# Patient Record
Sex: FEMALE | Race: WHITE | NOT HISPANIC OR LATINO | Employment: FULL TIME | URBAN - METROPOLITAN AREA
[De-identification: names, ages, dates, MRNs, and addresses within clinical notes are randomized per-mention and may not be internally consistent; named-entity substitution may affect disease eponyms.]

---

## 2017-01-03 ENCOUNTER — HOSPITAL ENCOUNTER (OUTPATIENT)
Facility: CLINIC | Age: 52
End: 2017-01-03
Attending: INTERNAL MEDICINE | Admitting: INTERNAL MEDICINE

## 2017-02-22 ENCOUNTER — OFFICE VISIT (OUTPATIENT)
Dept: FAMILY MEDICINE | Facility: CLINIC | Age: 52
End: 2017-02-22
Payer: COMMERCIAL

## 2017-02-22 VITALS
WEIGHT: 266 LBS | HEART RATE: 66 BPM | HEIGHT: 65 IN | BODY MASS INDEX: 44.32 KG/M2 | TEMPERATURE: 98.1 F | DIASTOLIC BLOOD PRESSURE: 70 MMHG | SYSTOLIC BLOOD PRESSURE: 112 MMHG

## 2017-02-22 DIAGNOSIS — M54.41 ACUTE RIGHT-SIDED LOW BACK PAIN WITH RIGHT-SIDED SCIATICA: Primary | ICD-10-CM

## 2017-02-22 PROCEDURE — 99213 OFFICE O/P EST LOW 20 MIN: CPT | Performed by: PHYSICIAN ASSISTANT

## 2017-02-22 RX ORDER — METHYLPREDNISOLONE 4 MG
TABLET, DOSE PACK ORAL
Qty: 21 TABLET | Refills: 0 | Status: SHIPPED | OUTPATIENT
Start: 2017-02-22 | End: 2017-03-02

## 2017-02-22 ASSESSMENT — PAIN SCALES - GENERAL: PAINLEVEL: SEVERE PAIN (6)

## 2017-02-22 NOTE — PROGRESS NOTES
SUBJECTIVE:                                                    Sunshine Delgado is a 52 year old female who presents to clinic today for the following health issues:      Joint Pain     Onset: 3 weeks ago    Description:   Location: right hip and going down to leg  Character: Sharp and shooting pain    Intensity: 6/10    Progression of Symptoms: same    Accompanying Signs & Symptoms:  Other symptoms: radiation of pain to down right leg, numbness in right foot and leg and swelling of right knee   History:   Previous similar pain: no       Precipitating factors:   Trauma or overuse: no     Alleviating factors:  Improved by: Tylenol and NSAID - Naproxen       Therapies Tried and outcome: none other than above      Problem list and histories reviewed & adjusted, as indicated.  Additional history: as documented    Patient Active Problem List   Diagnosis     Generalized anxiety disorder     CARDIOVASCULAR SCREENING; LDL GOAL LESS THAN 160     MARIA GUADALUPE (obstructive sleep apnea)- severe (AHI 84)     Elevated LFTs     Morbid obesity (H)     Left ovarian cyst     Health Care Home     Moderate major depression (H)     Insomnia     Past Surgical History   Procedure Laterality Date     Hysterectomy, vaginal       still has ovaries     Colonoscopy  2000       Social History   Substance Use Topics     Smoking status: Never Smoker     Smokeless tobacco: Never Used     Alcohol use Yes      Comment: 1-2 per mo     Family History   Problem Relation Age of Onset     Eye Disorder Mother      lost eyesight; probable macular degeneration     Psychotic Disorder Mother      Dementia /Alzhimers     Neurologic Disorder Mother      seizures     DIABETES Father      Psychotic Disorder Sister      bipolar     Thyroid Disease Sister      h/o thyroid cancer     CANCER Other      Brain cancer     Hypertension No family hx of          Current Outpatient Prescriptions   Medication Sig Dispense Refill     citalopram (CELEXA) 40 MG tablet Take 1 tablet (40  "mg) by mouth daily 90 tablet 1     traZODone (DESYREL) 100 MG tablet Take 1.5 tablets (150 mg) by mouth nightly as needed for sleep 135 tablet 1     order for DME Resmed Airsense 10 auto cpap 6-7 cm, Airfit P10 for her XS pillows       Acetaminophen (TYLENOL PO) Take 325 mg by mouth As needed       Allergies   Allergen Reactions     Contrast Dye Other (See Comments)     Patient had sneezing and an itchy throat following contrast injection of 100 mL Isovue-370.     Sulfa Drugs Hives     Vicodin [Hydrocodone-Acetaminophen]      Wasps [Hornets] Swelling     Now carries Epi Pen       ROS:  Constitutional, HEENT, cardiovascular, pulmonary, GI, , musculoskeletal, neuro, skin, endocrine and psych systems are negative, except as otherwise noted.    OBJECTIVE:                                                    /70 (BP Location: Right arm, Cuff Size: Adult Large)  Pulse 66  Temp 98.1  F (36.7  C) (Oral)  Ht 5' 5\" (1.651 m)  Wt 266 lb (120.7 kg)  BMI 44.26 kg/m2  Body mass index is 44.26 kg/(m^2).  GENERAL: healthy, alert and no distress  NECK: no adenopathy, no asymmetry, masses, or scars and thyroid normal to palpation  RESP: lungs clear to auscultation - no rales, rhonchi or wheezes  CV: regular rate and rhythm, normal S1 S2, no S3 or S4, no murmur, click or rub, no peripheral edema and peripheral pulses strong  MS: RUE exam shows normal strength and muscle mass, ROM of all joints is normal and TTP of right buttock.     Diagnostic Test Results:  none      ASSESSMENT/PLAN:                                                    1. Acute right-sided low back pain with right-sided sciatica  Continue with stretches and ortho follow up.  - methylPREDNISolone (MEDROL DOSEPAK) 4 MG tablet; Follow package instructions  Dispense: 21 tablet; Refill: 0  - ORTHO  REFERRAL  I have discussed any lab or imaging results, the patient's diagnosis, and my plan of treatment with the patient and/or family. Patient is aware to come " back in with worsening symptoms or if no relief despite treatment plan.  Patient voiced understanding and had no further questions.     Claudia Kim PA-C  [facility]

## 2017-02-22 NOTE — NURSING NOTE
"Chief Complaint   Patient presents with     Musculoskeletal Problem     hip and right leg pain       Initial /70 (BP Location: Right arm, Cuff Size: Adult Large)  Pulse 66  Temp 98.1  F (36.7  C) (Oral)  Ht 5' 5\" (1.651 m)  Wt 266 lb (120.7 kg)  BMI 44.26 kg/m2 Estimated body mass index is 44.26 kg/(m^2) as calculated from the following:    Height as of this encounter: 5' 5\" (1.651 m).    Weight as of this encounter: 266 lb (120.7 kg).  Medication Reconciliation: complete     Amira PHILIP, Certified Medical Assistant (AAMA)February 22, 2017 11:04 AM      "

## 2017-02-22 NOTE — PATIENT INSTRUCTIONS
Possible Causes of Low Back or Leg Pain    The symptoms in your back or leg may be due to pressure on a nerve. This pressure may be caused by a damaged disk or by abnormal bone growth. Either way, you may feel pain, burning, tingling, or numbness. If you have pressure on a nerve that connects to the sciatic nerve, pain may shoot down your leg.    Pressure from the disk  Constant wear and tear can weaken a disk over time and cause back pain. The disk can then be damaged by a sudden movement or injury. If its soft center begins to bulge, the disk may press on a nerve. Or the outside of the disk may tear, and the soft center may squeeze through and pinch a nerve.    Pressure from bone  As a disk wears out, the vertebrae right above and below the disk begin to touch. This can put pressure on a nerve. Often, abnormal bone (called bone spurs) grows where the vertebrae rub against each other. This can cause the foramen or the spinal canal to narrow (called stenosis) and press against a nerve.    8765-9812 [employer]. [address] [place] [address], [place] [address]. All rights reserved. This information is not intended as a substitute for professional medical care. Always follow your healthcare professional's instructions.

## 2017-02-22 NOTE — MR AVS SNAPSHOT
After Visit Summary   2/22/2017    Sunshine Delgado    MRN: [id number]           Patient Information     Date Of Birth          1965        Visit Information        Provider Department      2/22/2017 11:00 AM Claudia Kim PA-C [facility]        Today's Diagnoses     Acute right-sided low back pain with right-sided sciatica    -  1      Care Instructions      Possible Causes of Low Back or Leg Pain    The symptoms in your back or leg may be due to pressure on a nerve. This pressure may be caused by a damaged disk or by abnormal bone growth. Either way, you may feel pain, burning, tingling, or numbness. If you have pressure on a nerve that connects to the sciatic nerve, pain may shoot down your leg.    Pressure from the disk  Constant wear and tear can weaken a disk over time and cause back pain. The disk can then be damaged by a sudden movement or injury. If its soft center begins to bulge, the disk may press on a nerve. Or the outside of the disk may tear, and the soft center may squeeze through and pinch a nerve.    Pressure from bone  As a disk wears out, the vertebrae right above and below the disk begin to touch. This can put pressure on a nerve. Often, abnormal bone (called bone spurs) grows where the vertebrae rub against each other. This can cause the foramen or the spinal canal to narrow (called stenosis) and press against a nerve.    7440-8371 [employer]. [address] [place] [address]. All rights reserved. This information is not intended as a substitute for professional medical care. Always follow your healthcare professional's instructions.              Follow-ups after your visit        Additional Services     ORTHO  REFERRAL       [facility] is referring you to the Orthopedic  Services at [facility].       The  Representative will assist you in the coordination of  your Orthopedic and Musculoskeletal Care as prescribed by your physician.    The [facility] Representative will call you within 1 business day to help schedule your appointment, or you may contact the [facility] Representative at:    All areas ~ [phone]     Type of Referral : NON surgical      Timeframe requested: 3 - 5 days    Coverage of these services is subject to the terms and limitations of your health insurance plan.  Please call member services at your health plan with any benefit or coverage questions.      If X-rays, CT or MRI's have been performed, please contact the facility where they were done to arrange for , prior to your scheduled appointment.  Please bring this referral request to your appointment and present it to your specialist.                  Your next 10 appointments already scheduled     Mar 15, 2017   Procedure with [place] Goncalves MD   [facility] ([facility])    [address]  [place] [address] [phone]           [facility] is located at [address] [place]              Who to contact     If you have questions or need follow up information about today's clinic visit or your schedule please contact [facility] directly at [phone].  Normal or non-critical lab and imaging results will be communicated to you by MyChart, letter or phone within 4 business days after the clinic has received the results. If you do not hear from us within 7 days, please contact the clinic through MyChart or phone. If you have a critical or abnormal lab result, we will notify you by phone as soon as possible.  Submit refill requests through [employer] or call your pharmacy and they will forward the refill request to us. Please allow 3 business days for your refill to be completed.          Additional Information About Your Visit        [employer] Information     [employer] gives you secure access to your  "electronic health record. If you see a primary care provider, you can also send messages to your care team and make appointments. If you have questions, please call your primary care clinic.  If you do not have a primary care provider, please call [phone] and they will assist you.        Care EveryWhere ID     This is your Care EveryWhere ID. This could be used by other organizations to access your [facility] medical records  [id number]        Your Vitals Were     Pulse Temperature Height BMI (Body Mass Index)          66 98.1  F (36.7  C) (Oral) 5' 5\" (1.651 m) 44.26 kg/m2         Blood Pressure from Last 3 Encounters:   02/22/17 112/70   12/05/16 128/76   12/02/16 109/75    Weight from Last 3 Encounters:   02/22/17 266 lb (120.7 kg)   12/05/16 255 lb (115.7 kg)   12/02/16 256 lb (116.1 kg)              We Performed the Following     ORTHO  REFERRAL          Today's Medication Changes          These changes are accurate as of: 2/22/17 11:24 AM.  If you have any questions, ask your nurse or doctor.               Start taking these medicines.        Dose/Directions    methylPREDNISolone 4 MG tablet   Commonly known as:  MEDROL DOSEPAK   Used for:  Acute right-sided low back pain with right-sided sciatica   Started by:  Claudia Kim PA-C        Follow package instructions   Quantity:  21 tablet   Refills:  0            Where to get your medicines      These medications were sent to [facility] [address].  [address] [place] [address], [facility] [address]     Phone:  [phone]     methylPREDNISolone 4 MG tablet                Primary Care Provider Office Phone # Fax #    Lesly Pacheco PA-C [phone]       [address] [address]        Thank you!     Thank you for choosing [facility]  for your care. Our goal is always to provide you with excellent care. Hearing " back from our patients is one way we can continue to improve our services. Please take a few minutes to complete the written survey that you may receive in the mail after your visit with us. Thank you!             Your Updated Medication List - Protect others around you: Learn how to safely use, store and throw away your medicines at www.disposemymeds.org.          This list is accurate as of: 2/22/17 11:24 AM.  Always use your most recent med list.                   Brand Name Dispense Instructions for use    citalopram 40 MG tablet    celeXA    90 tablet    Take 1 tablet (40 mg) by mouth daily       methylPREDNISolone 4 MG tablet    MEDROL DOSEPAK    21 tablet    Follow package instructions       order for DME      Resmed Airsense 10 auto cpap 6-7 cm, Airfit P10 for her XS pillows       traZODone 100 MG tablet    DESYREL    135 tablet    Take 1.5 tablets (150 mg) by mouth nightly as needed for sleep       TYLENOL PO      Take 325 mg by mouth As needed

## 2017-02-27 ENCOUNTER — TRANSFERRED RECORDS (OUTPATIENT)
Dept: HEALTH INFORMATION MANAGEMENT | Facility: CLINIC | Age: 52
End: 2017-02-27

## 2017-02-27 LAB — INR PPP: 0.9 (ref 1–1.2)

## 2017-03-02 DIAGNOSIS — M54.41 ACUTE RIGHT-SIDED LOW BACK PAIN WITH RIGHT-SIDED SCIATICA: ICD-10-CM

## 2017-03-02 RX ORDER — METHYLPREDNISOLONE 4 MG
TABLET, DOSE PACK ORAL
Qty: 21 TABLET | Refills: 0 | Status: SHIPPED | OUTPATIENT
Start: 2017-03-02 | End: 2017-03-11

## 2017-03-02 NOTE — TELEPHONE ENCOUNTER
Reason for Call:  Other     Detailed comments: Patient said that she is unable to get an appointment for pain specialist until March 11. She is in extreme pain and would like to know if the steroids were still an option. Please call back to discuss further    Phone Number Patient can be reached at: Home number on file [phone] (home)    Best Time:     Can we leave a detailed message on this number? Not Applicable    Call taken on 3/2/2017 at 11:13 AM by Sadie Lama

## 2017-03-02 NOTE — TELEPHONE ENCOUNTER
Medrol helped a lot, the swelling went down & she was able to walk. The pain is bad again & the swelling is worsening again & she wants something to get her though until the 11th if possible.   Jocelyn Dillard RN

## 2017-03-03 NOTE — TELEPHONE ENCOUNTER
Patient presents to clinic requesting this tonight as she is in so much pain. Last fill 2/22 #21. Pended med & pharmacy.  Jocelyn Dillard RN

## 2017-03-11 ENCOUNTER — OFFICE VISIT (OUTPATIENT)
Dept: ORTHOPEDICS | Facility: CLINIC | Age: 52
End: 2017-03-11
Payer: COMMERCIAL

## 2017-03-11 ENCOUNTER — RADIANT APPOINTMENT (OUTPATIENT)
Dept: GENERAL RADIOLOGY | Facility: CLINIC | Age: 52
End: 2017-03-11
Attending: PHYSICIAN ASSISTANT
Payer: COMMERCIAL

## 2017-03-11 VITALS — HEART RATE: 70 BPM | HEIGHT: 65 IN | BODY MASS INDEX: 48.82 KG/M2 | OXYGEN SATURATION: 96 % | WEIGHT: 293 LBS

## 2017-03-11 DIAGNOSIS — M54.50 LOW BACK PAIN RADIATING TO RIGHT LEG: Primary | ICD-10-CM

## 2017-03-11 DIAGNOSIS — M79.604 LOW BACK PAIN RADIATING TO RIGHT LEG: ICD-10-CM

## 2017-03-11 DIAGNOSIS — M79.604 LOW BACK PAIN RADIATING TO RIGHT LEG: Primary | ICD-10-CM

## 2017-03-11 DIAGNOSIS — M54.50 LOW BACK PAIN RADIATING TO RIGHT LEG: ICD-10-CM

## 2017-03-11 PROCEDURE — 72100 X-RAY EXAM L-S SPINE 2/3 VWS: CPT

## 2017-03-11 PROCEDURE — 99244 OFF/OP CNSLTJ NEW/EST MOD 40: CPT | Performed by: PHYSICIAN ASSISTANT

## 2017-03-11 RX ORDER — TRAMADOL HYDROCHLORIDE 50 MG/1
50-100 TABLET ORAL EVERY 6 HOURS PRN
Qty: 20 TABLET | Refills: 0 | Status: SHIPPED | OUTPATIENT
Start: 2017-03-11 | End: 2017-03-17

## 2017-03-11 RX ORDER — NAPROXEN 500 MG/1
500 TABLET ORAL 2 TIMES DAILY WITH MEALS
Qty: 60 TABLET | Refills: 1 | Status: SHIPPED | OUTPATIENT
Start: 2017-03-11 | End: 2017-08-31

## 2017-03-11 RX ORDER — CYCLOBENZAPRINE HCL 5 MG
5 TABLET ORAL 2 TIMES DAILY PRN
Qty: 45 TABLET | Refills: 0 | Status: SHIPPED | OUTPATIENT
Start: 2017-03-11 | End: 2017-03-16

## 2017-03-11 NOTE — MR AVS SNAPSHOT
After Visit Summary   3/11/2017    Sunshine Delgado    MRN: [id number]           Patient Information     Date Of Birth          1965        Visit Information        Provider Department      3/11/2017 9:00 AM Francesca Abraham PA-C [facility]        Today's Diagnoses     Low back pain radiating to right leg    -  1       Follow-ups after your visit        Additional Services     [facility] PT, HAND, AND CHIROPRACTIC REFERRAL       **This order will print in the [facility] Scheduling Office**    Physical Therapy, Hand Therapy and Chiropractic Care are available through:    *[facility]  *[facility]  *[facility]    Call one number to schedule at any of the above locations: [phone].    Your provider has referred you to: Physical Therapy at [facility] or [facility]    Indication/Reason for Referral: low back pain  Onset of Illness: acute  Therapy Orders: Evaluate and Treat  Special Programs: mobilization, myofacial release, stretching, then strengthening  Special Request: possible dry needling    Joslyn Jorgensen      Additional Comments for the Therapist or Chiropractor: none    Please be aware that coverage of these services is subject to the terms and limitations of your health insurance plan.  Call member services at your health plan with any benefit or coverage questions.      Please bring the following to your appointment:    *Your personal calendar for scheduling future appointments  *Comfortable clothing                  Future tests that were ordered for you today     Open Future Orders        Priority Expected Expires Ordered    MR Lumbar Spine w/o Contrast Routine  3/11/2018 3/11/2017            Who to contact     If you have questions or need follow up information about today's clinic visit or your schedule please contact [facility] directly at [phone].  Normal or non-critical lab and  "imaging results will be communicated to you by MyChart, letter or phone within 4 business days after the clinic has received the results. If you do not hear from us within 7 days, please contact the clinic through [employer] or phone. If you have a critical or abnormal lab result, we will notify you by phone as soon as possible.  Submit refill requests through [employer] or call your pharmacy and they will forward the refill request to us. Please allow 3 business days for your refill to be completed.          Additional Information About Your Visit        [facility] Information     [employer] gives you secure access to your electronic health record. If you see a primary care provider, you can also send messages to your care team and make appointments. If you have questions, please call your primary care clinic.  If you do not have a primary care provider, please call [phone] and they will assist you.        Care EveryWhere ID     This is your Care EveryWhere ID. This could be used by other organizations to access your [place] medical records  [id number]        Your Vitals Were     Pulse Height Pulse Oximetry BMI (Body Mass Index)          70 5' 5\" (1.651 m) 96% 48.92 kg/m2         Blood Pressure from Last 3 Encounters:   02/22/17 112/70   12/05/16 128/76   12/02/16 109/75    Weight from Last 3 Encounters:   03/11/17 294 lb (133.4 kg)   02/22/17 266 lb (120.7 kg)   12/05/16 255 lb (115.7 kg)              We Performed the Following     [facility] PT, HAND, AND CHIROPRACTIC REFERRAL          Today's Medication Changes          These changes are accurate as of: 3/11/17  9:52 AM.  If you have any questions, ask your nurse or doctor.               Start taking these medicines.        Dose/Directions    cyclobenzaprine 5 MG tablet   Commonly known as:  FLEXERIL   Used for:  Low back pain radiating to right leg   Started by:  Francesca Abraham PA-C        Dose:  5 mg   Take 1 tablet (5 mg) by mouth 2 times daily as needed for " muscle spasms   Quantity:  45 tablet   Refills:  0       naproxen 500 MG tablet   Commonly known as:  NAPROSYN   Used for:  Low back pain radiating to right leg   Started by:  Francesca Abraham PA-C        Dose:  500 mg   Take 1 tablet (500 mg) by mouth 2 times daily (with meals)   Quantity:  60 tablet   Refills:  1       traMADol 50 MG tablet   Commonly known as:  ULTRAM   Used for:  Low back pain radiating to right leg   Started by:  Francesca Abraham PA-C        Dose:   mg   Take 1-2 tablets ( mg) by mouth every 6 hours as needed for pain   Quantity:  20 tablet   Refills:  0            Where to get your medicines      Some of these will need a paper prescription and others can be bought over the counter.  Ask your nurse if you have questions.     Bring a paper prescription for each of these medications     cyclobenzaprine 5 MG tablet    naproxen 500 MG tablet    traMADol 50 MG tablet                Primary Care Provider Office Phone # Fax #    Lesly Pacheco PA-C [phone]       [address] [address]        Thank you!     Thank you for choosing [facility]  for your care. Our goal is always to provide you with excellent care. Hearing back from our patients is one way we can continue to improve our services. Please take a few minutes to complete the written survey that you may receive in the mail after your visit with us. Thank you!             Your Updated Medication List - Protect others around you: Learn how to safely use, store and throw away your medicines at www.disposemymeds.org.          This list is accurate as of: 3/11/17  9:52 AM.  Always use your most recent med list.                   Brand Name Dispense Instructions for use    citalopram 40 MG tablet    celeXA    90 tablet    Take 1 tablet (40 mg) by mouth daily       cyclobenzaprine 5 MG tablet    FLEXERIL    45 tablet    Take 1  tablet (5 mg) by mouth 2 times daily as needed for muscle spasms       naproxen 500 MG tablet    NAPROSYN    60 tablet    Take 1 tablet (500 mg) by mouth 2 times daily (with meals)       order for DME      Resmed Airsense 10 auto cpap 6-7 cm, Airfit P10 for her XS pillows       traMADol 50 MG tablet    ULTRAM    20 tablet    Take 1-2 tablets ( mg) by mouth every 6 hours as needed for pain       traZODone 100 MG tablet    DESYREL    135 tablet    Take 1.5 tablets (150 mg) by mouth nightly as needed for sleep       TYLENOL PO      Take 325 mg by mouth As needed

## 2017-03-11 NOTE — PROGRESS NOTES
Sports Medicine Clinic Visit    PCP: Lesly Pachecobonnie Delgado is a 52 year old female who is seen  in consultation at the request of Claudia Kim presenting with low back pain with radiating pain to the hip and groin.    Injury: insidious onset    Location of Pain: right side low back, hip, lateral thigh, inner lower leg and foot  Duration of Pain: 6-7 weeks   Rating of Pain at worst: 10/10  Rating of Pain Currently:5/10  Symptoms are better with: Medrol Dose Pack, tylenol and ibuprofen with minimal improvement   Symptoms are worse with: walking, activity, being on her feet  Additional Features:  SOB, heart palpitations, increasing fatigue, bilateral lower leg edema   Positive: swelling in the legs, numbness in the pelvis and weakness in the legs (feeling like her legs are going to give out)  Other evaluation and/or treatments so far consists of: Medrol Dose Pack with moderate relief however gets swelling in the lower extremities and feelings of compression in the spine, chiropractor  Prior History of related problems: Seen chiropractor for ~3 years for general adjustments    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    Family history, medical history and surgical history have all been discussed with patient and appended to medical chart below.    Past Medical History   Diagnosis Date     Hoarseness of voice 3/4/2010     Voice fatigue 10/22/2009     Past Surgical History   Procedure Laterality Date     Hysterectomy, vaginal       still has ovaries     Colonoscopy  2000     Family History   Problem Relation Age of Onset     Eye Disorder Mother      lost eyesight; probable macular degeneration     Psychotic Disorder Mother      Dementia /Alzhimers     Neurologic Disorder Mother      seizures     DIABETES Father   "    Psychotic Disorder Sister      bipolar     Thyroid Disease Sister      h/o thyroid cancer     CANCER Other      Brain cancer     Hypertension No family hx of      Objective  Pulse 70  Ht 5' 5\" (1.651 m)  Wt 294 lb (133.4 kg)  SpO2 96%  BMI 48.92 kg/m2  Constitutional:well-developed, well-nourished, and in no distress.   Cardiovascular: Intact distal pulses.    Neurological: alert. Gait normal  Skin: Skin is warm and dry.   Psychiatric: Mood and affect normal.   Respiratory: unlabored, speaks in full sentences  Gastrointestinal: masses -liver painful, deformities none  Hematologic/Lymphatic/Immunologic: neg lymphadenopathy, positive lymphedema    Exam:  Back Exam:    Inspection:       no visible deformity in the low back       normal skin       normal vascular       normal lymphatic    ROM:       limited flexion due to pain       limited extension due to pain       Full rotation    Tender/ Tissue Texture Abnormality:       paraspinal muscles, hip flexors    Non Tender:       remainder of lumbar spine    Strength:       hip flexion 3/5       knee extension 3/5       ankle dorsiflexion 5/5       ankle plantarflexion 5/5       dorsiflexion of the great toe 5/5      Hip abduction: 4/5      Hip adduction:  4/5    Reflexes:       patellar (L3, L4) symmetric normal       achilles tendons (S1) symmetric normal    Sensation:      grossly intact throughout lower extremities    Special tests:       straight leg raise left negative        straight leg raise right positive              Radiology:  Study Result   LUMBAR SPINE TWO TO THREE VIEWS 3/11/2017 9:20 AM      HISTORY: Low back pain.     COMPARISON: None.         IMPRESSION: Lumbar vertebrae are normally aligned. No compression  deformity or acute fracture.     LIONEL RITCHIE MD       I have personally reviewed images with patient    Assessment:  1. Low back pain radiating to right leg        Plan:  Discussed the assessment with the patient.  I am recommending she " obtain an MRI for her lumbar spine.  I would also like for her to start with physical therapy.  In regards to her complaints of bilateral lower leg pitting edema, shortness of breath, increasing fatigue and heart palpitations, I would like for her to have a thorough evaluation with her primary care provider as these are not related to her back pain.  My clinic will be in touch with Sunshine regarding her MRI results and any other treatment options we may have for her at that time.          Francesca Abraham PA-C  [facility]  [facility]      Disclaimer: This note consists of symbols derived from keyboarding, dictation and/or voice recognition software. As a result, there may be errors in the script that have gone undetected. Please consider this when interpreting information found in this chart.

## 2017-03-11 NOTE — NURSING NOTE
"Chief Complaint   Patient presents with     Back Pain       Initial Pulse 70  Ht 5' 5\" (1.651 m)  Wt 294 lb (133.4 kg)  SpO2 96%  BMI 48.92 kg/m2 Estimated body mass index is 48.92 kg/(m^2) as calculated from the following:    Height as of this encounter: 5' 5\" (1.651 m).    Weight as of this encounter: 294 lb (133.4 kg).  Medication Reconciliation: complete     April Box M.Ed., ATC      "

## 2017-03-15 ENCOUNTER — RADIANT APPOINTMENT (OUTPATIENT)
Dept: MRI IMAGING | Facility: CLINIC | Age: 52
End: 2017-03-15
Attending: PHYSICIAN ASSISTANT
Payer: COMMERCIAL

## 2017-03-15 DIAGNOSIS — M54.50 LOW BACK PAIN RADIATING TO RIGHT LEG: ICD-10-CM

## 2017-03-15 DIAGNOSIS — M54.50 LUMBAR PAIN: Primary | ICD-10-CM

## 2017-03-15 DIAGNOSIS — M79.604 LOW BACK PAIN RADIATING TO RIGHT LEG: ICD-10-CM

## 2017-03-15 PROCEDURE — 72148 MRI LUMBAR SPINE W/O DYE: CPT | Mod: TC

## 2017-03-15 NOTE — TELEPHONE ENCOUNTER
MR LUMBAR SPINE WITHOUT CONTRAST 3/15/2017 11:10 AM     HISTORY: Low back pain.     TECHNIQUE: Sagittal T1 and T2, sagittal IR, and transverse proton  density and T2-weighted pulse sequences.     FINDINGS: Five lumbar vertebrae are assumed. Vertebral body heights  and sagittal alignment are within normal limits. The conus medullaris  is unremarkable in appearance on the sagittal images. Apophyseal  joints appear essentially within normal limits with no periarticular  reactive marrow edema. Mild or early degenerative loss of disc signal  with normal disc height is noted from L2-L3 through L4-L5. Marrow  signal is within normal limits.     L2-L3: No disc bulge or herniation. No central or foraminal stenosis.     L3-L4: High signal annular fissuring adjacent to the left lateral  recess with minimal left lateral recess disc protrusion. This could  asymmetrically contact the descending left L4 nerve root. There is no  central stenosis. The L3 neural foramina appear to be bilaterally  patent.     L4-L5: No disc bulge or herniation. No central or foraminal stenosis.     L5-S1: No disc bulge or herniation. No central or foraminal stenosis.         IMPRESSION:  1. Mild or early multilevel degenerative disc disease, greatest at  L3-L4 and L4-L5.  2. L3-L4 high signal annular fissuring and small left lateral recess  disc protrusion which could asymmetrically contact the descending left  L4 nerve root within the left lateral recess.     ELEANOR KONG MD

## 2017-03-15 NOTE — TELEPHONE ENCOUNTER
Pt. LVM stating that the medication she was given following her Saturday appointment is not effective because it is only providing relief for about an hour per dose, and she describes the pain as excruciating. Please call back at earliest convenience.

## 2017-03-16 RX ORDER — CYCLOBENZAPRINE HCL 5 MG
5 TABLET ORAL 2 TIMES DAILY PRN
Qty: 45 TABLET | Refills: 0 | Status: SHIPPED | OUTPATIENT
Start: 2017-03-16 | End: 2017-08-31

## 2017-03-16 NOTE — TELEPHONE ENCOUNTER
I am recommending she be scheduled for a epidural steroid injection through [facility] to help reduce inflammation and pain, however if pain is excruciating I am also recommending a surgical consultation or patient may need evaluation at ER if pain worsens.  For short term pain relief, I am recommending muscle relaxer along with a one time only prescription for a narcotic.  She will have to pick this narcotic up at the [facility] as this can not be sent directly to a pharmacy.       Francesca Abraham PA-C

## 2017-03-16 NOTE — TELEPHONE ENCOUNTER
Spoke with patient.  Both referrals for inj and spine surgeon placed, patient will be contacted for scheduling.  Patient did go to ER over weekend.  Suggested she hold off on oral steroid unitl contacted by IFTIKHAR.    Francesca, please send Rx muscle relaxer to [facility] pharmacy.    Shayy SUGGS

## 2017-03-17 ENCOUNTER — TRANSFERRED RECORDS (OUTPATIENT)
Dept: HEALTH INFORMATION MANAGEMENT | Facility: CLINIC | Age: 52
End: 2017-03-17

## 2017-03-17 NOTE — TELEPHONE ENCOUNTER
Discussed with patient that medication requests can take up to three days to fill, this will be managed on Monday when Francesca is back in clinic.  Patient ok with that plan.    RIGOBERTO Robertson,  ATC

## 2017-03-17 NOTE — TELEPHONE ENCOUNTER
Patient called at 15:28. She said that she is getting low on her tramadol and would like a refill. She had her injection today for her pinched nerve and is in some pain so she would like enough for the weekend and also to get her through to her next appointment. We can call her at [phone]    Claudia CHAMPION (R)

## 2017-03-20 RX ORDER — TRAMADOL HYDROCHLORIDE 50 MG/1
50-100 TABLET ORAL EVERY 6 HOURS PRN
Qty: 20 TABLET | Refills: 0 | Status: SHIPPED | OUTPATIENT
Start: 2017-03-20 | End: 2017-03-20

## 2017-03-20 RX ORDER — TRAMADOL HYDROCHLORIDE 50 MG/1
50-100 TABLET ORAL EVERY 6 HOURS PRN
Qty: 40 TABLET | Refills: 0 | Status: SHIPPED | OUTPATIENT
Start: 2017-03-20 | End: 2017-08-31

## 2017-03-20 NOTE — TELEPHONE ENCOUNTER
LVM that prescription is ready for  at [facility] and that she will need a photo ID.    Marga Del Cid, ATC

## 2017-03-21 ENCOUNTER — THERAPY VISIT (OUTPATIENT)
Dept: PHYSICAL THERAPY | Facility: CLINIC | Age: 52
End: 2017-03-21
Payer: COMMERCIAL

## 2017-03-21 DIAGNOSIS — M54.42 BILATERAL LOW BACK PAIN WITH LEFT-SIDED SCIATICA: Primary | ICD-10-CM

## 2017-03-21 PROCEDURE — 97110 THERAPEUTIC EXERCISES: CPT | Mod: GP | Performed by: PHYSICAL THERAPIST

## 2017-03-21 PROCEDURE — 97162 PT EVAL MOD COMPLEX 30 MIN: CPT | Mod: GP | Performed by: PHYSICAL THERAPIST

## 2017-03-21 NOTE — PROGRESS NOTES
"[facility] Initial Evaluation    Subjective:    Sunshine Delgado is a 52 year old female with a lumbar condition.  Condition occurred with:  Insidious onset (mid to late January 2017).  Condition occurred: for unknown reasons.  This is a recurrent condition  History of previous LBP, but never LE symptoms.    Patient reports pain:  Lumbar spine right and lumbar spine left.  Radiates to:  Gluteals right, thigh right, other, lower leg right and foot right (right groin; A/P thigh).  Pain is described as sharp and stabbing and is constant and reported as 8/10.  Associated symptoms:  Loss of motion, numbness and tingling. Pain is the same all the time.  Symptoms are exacerbated by sitting, standing, walking and bending Relieved by: stretching.  Since onset symptoms are gradually worsening.  Special tests:  MRI and x-ray (DDD, \"three pinched nerves\" per patient; see EMR for results).  Previous treatment includes chiropractic.  There was mild improvement following previous treatment.  General health as reported by patient is good.  Pertinent medical history includes:  Overweight, depression and sleep disorder/apnea.  Medical allergies: yes (Sulfa, contrast dye).  Other surgeries include:  No.  Current medications:  Muscle relaxants, pain medication, anti-depressants and anti-inflammatory.  Current occupation is .  Patient is working in normal job without restrictions.  Primary job tasks include:  Prolonged standing, lifting and other (computer work, pushing/pulling).    Barriers include:  None as reported by the patient.    Red flags:  Pain at rest/night.                      Objective:  Posture: obese stature; anterior pelvic tilt; hinging upper lumbar spine; increased thoracic kyphosis  Lumbar AROM:  Movement Loss None Mild  Moderate Significant   Flexion  LBP     Extension    LBP   Side Bend L       Side Bend R       Side [place] L   X    Side Glide R   X      Hip Screen:  " negative    Myotomes Left Right   Hip Flexion (T12-L3) 5/5 3+/5   Quads (L2-4) 5/5 5/5   Ankle DF (L4) 5/5 3+/5   EHL (L5) 5/5 5/5   Gastroc (S1) 5/5 5/5     DTR's Left Right   Knee Jerk (L4) 1 0   Ankle Jerk (S1) 1 0     Dural Tension Signs Left Right   Slump Neg Pos   SLR Neg Neg   SLR w/DF Pos Pos   Femoral Nerve Neg Neg     LE Flexibility:  Decreased bilateral quads and HS  Core Strength: NA  Palpation:  Tenderness bilateral T-L paraspinals, PSIS, and R pirifomis     Mobility Testing:   Mild hypomobility and tenderness to PA's L2-5               System    Physical Exam      [place] Lumbar Evaluation        Test Movements:  FIS: During: produces  After: no worse  Mechanical Response: no effect  Repeat FIS: During: peripheralizing  After: worse  Mechanical Response: no effect  EIS: During: no effect  After: no effect  Mechanical Response: no effect  Repeat EIS: During: abolishes  After: better  Mechanical Response: IncROM    EIL: During: decreases  After: better  Mechanical Response: no effect  Repeat EIL: During: decreases  After: better  Mechanical Response: IncROM        Conclusion: derangement                                         ROS    Assessment/Plan:      Patient is a 52 year old female with lumbar complaints.    Patient has the following significant findings with corresponding treatment plan.                Diagnosis 1:  LBP   Pain -  self management, education, directional preference exercise and home program  Decreased ROM/flexibility - manual therapy and therapeutic exercise  Decreased strength - therapeutic exercise  Decreased function - home program  Impaired posture - neuro re-education    Therapy Evaluation Codes:   1) History comprised of:   Personal factors that impact the plan of care:      None.    Comorbidity factors that impact the plan of care are:      Depression and Overweight.     Medications impacting care: Anti-depressant and Pain.  2) Examination of Body Systems comprised of:   Body  structures and functions that impact the plan of care:      Lumbar spine.   Activity limitations that impact the plan of care are:      Bending, Dressing, Sitting, Standing and Walking.  3) Clinical presentation characteristics are:   Evolving/Changing.  4) Decision-Making    Moderate complexity using standardized patient assessment instrument and/or measureable assessment of functional outcome.  Cumulative Therapy Evaluation is: Moderate complexity.    Previous and current functional limitations:  (See Goal Flow Sheet for this information)    Short term and Long term goals: (See Goal Flow Sheet for this information)     Communication ability:  Patient appears to be able to clearly communicate and understand verbal and written communication and follow directions correctly.  Treatment Explanation - The following has been discussed with the patient:   RX ordered/plan of care  Anticipated outcomes  Possible risks and side effects  This patient would benefit from PT intervention to resume normal activities.   Rehab potential is good.    Frequency:  1 X week, once daily  Duration:  for 6 weeks  Discharge Plan:  Achieve all LTG.  Independent in home treatment program.  Reach maximal therapeutic benefit.    Please refer to the daily flowsheet for treatment today, total treatment time and time spent performing 1:1 timed codes.

## 2017-03-21 NOTE — MR AVS SNAPSHOT
After Visit Summary   3/21/2017    Sunshine Delgado    MRN: [id number]           Patient Information     Date Of Birth          1965        Visit Information        Provider Department      3/21/2017 11:30 AM Gretchen Amos, [facility] [place] [facility]        Today's Diagnoses     Bilateral low back pain with left-sided sciatica    -  1       Follow-ups after your visit        Your next 10 appointments already scheduled     Mar 27, 2017 10:20 AM CDT   New Visit with See Cheema MD   [facility] ([facility])    [address] [place] [address] [address]  [facility] [address]   [phone]            Mar 28, 2017 11:30 AM CDT   RAPHAEL Spine with Gretchen Amos PT   [facility] [place] [facility] ([facility] [place])    [address]  [place] [address]   [phone]            Apr 04, 2017 11:30 AM CDT   RAPHAEL Spine with Gretchen Amos PT   [facility] [place] [facility] ([facility] [place])    [address]  [place] [address]   [phone]              Who to contact     If you have questions or need follow up information about today's clinic visit or your schedule please contact [facility] directly at [phone].  Normal or non-critical lab and imaging results will be communicated to you by MyChart, letter or phone within 4 business days after the clinic has received the results. If you do not hear from us within 7 days, please contact the clinic through [employer] or phone. If you have a critical or abnormal lab result, we will notify you by phone as soon as possible.  Submit refill requests through Leaf or call your pharmacy and they will forward the refill request to us. Please allow 3 business days for your refill to be completed.          Additional Information About Your Visit        [employer] Information     Leaf gives you secure  access to your electronic health record. If you see a primary care provider, you can also send messages to your care team and make appointments. If you have questions, please call your primary care clinic.  If you do not have a primary care provider, please call [phone] and they will assist you.        Care EveryWhere ID     This is your Care EveryWhere ID. This could be used by other organizations to access your [facility] medical records  [id number]         Blood Pressure from Last 3 Encounters:   02/22/17 112/70   12/05/16 128/76   12/02/16 109/75    Weight from Last 3 Encounters:   03/11/17 133.4 kg (294 lb)   02/22/17 120.7 kg (266 lb)   12/05/16 115.7 kg (255 lb)              We Performed the Following     HC PT EVAL, MODERATE COMPLEXITY     RAPHAEL INITIAL EVAL REPORT     THERAPEUTIC EXERCISES        Primary Care Provider Office Phone # Fax #    Lesly Pacheco PA-C [phone]       [address] [address]        Thank you!     Thank you for choosing [facility]  for your care. Our goal is always to provide you with excellent care. Hearing back from our patients is one way we can continue to improve our services. Please take a few minutes to complete the written survey that you may receive in the mail after your visit with us. Thank you!             Your Updated Medication List - Protect others around you: Learn how to safely use, store and throw away your medicines at www.disposemymeds.org.          This list is accurate as of: 3/21/17  3:57 PM.  Always use your most recent med list.                   Brand Name Dispense Instructions for use    citalopram 40 MG tablet    celeXA    90 tablet    Take 1 tablet (40 mg) by mouth daily       cyclobenzaprine 5 MG tablet    FLEXERIL    45 tablet    Take 1 tablet (5 mg) by mouth 2 times daily as needed for muscle spasms       naproxen 500 MG tablet    NAPROSYN    60 tablet     Take 1 tablet (500 mg) by mouth 2 times daily (with meals)       order for DME      Resmed Airsense 10 auto cpap 6-7 cm, Airfit P10 for her XS pillows       traMADol 50 MG tablet    ULTRAM    40 tablet    Take 1-2 tablets ( mg) by mouth every 6 hours as needed for pain       traZODone 100 MG tablet    DESYREL    135 tablet    Take 1.5 tablets (150 mg) by mouth nightly as needed for sleep       TYLENOL PO      Take 325 mg by mouth As needed

## 2017-03-27 ENCOUNTER — OFFICE VISIT (OUTPATIENT)
Dept: NEUROSURGERY | Facility: CLINIC | Age: 52
End: 2017-03-27
Attending: NEUROLOGICAL SURGERY
Payer: COMMERCIAL

## 2017-03-27 VITALS
SYSTOLIC BLOOD PRESSURE: 111 MMHG | TEMPERATURE: 97 F | HEART RATE: 75 BPM | WEIGHT: 275.8 LBS | DIASTOLIC BLOOD PRESSURE: 75 MMHG | BODY MASS INDEX: 45.9 KG/M2

## 2017-03-27 DIAGNOSIS — M79.604 RIGHT LEG PAIN: Primary | ICD-10-CM

## 2017-03-27 PROCEDURE — 99211 OFF/OP EST MAY X REQ PHY/QHP: CPT | Performed by: NEUROLOGICAL SURGERY

## 2017-03-27 PROCEDURE — 99204 OFFICE O/P NEW MOD 45 MIN: CPT | Performed by: NEUROLOGICAL SURGERY

## 2017-03-27 NOTE — MR AVS SNAPSHOT
After Visit Summary   3/27/2017    Sunshine Delgado    MRN: [id number]           Patient Information     Date Of Birth          1965        Visit Information        Provider Department      3/27/2017 10:20 AM See Cheema MD [facility]        Today's Diagnoses     Right leg pain    -  1      Care Instructions    Right leg EMG with Dr. Mcgregor  We will call you with the results        Follow-ups after your visit        Your next 10 appointments already scheduled     Mar 28, 2017 11:30 AM CDT   RAPHAEL Spine with Gretchen Amos PT   [facility] [place] [facility] ([facility] [place])    [address]  [address]  [place] [address]   [phone]            Apr 04, 2017 11:30 AM CDT   RAPHAEL Spine with Gretchen Amos PT   [facility] [place] [facility] ([facility] [place])    [address]  [address]  [place] [address]   [phone]              Who to contact     If you have questions or need follow up information about today's clinic visit or your schedule please contact [facility] directly at [phone].  Normal or non-critical lab and imaging results will be communicated to you by MyChart, letter or phone within 4 business days after the clinic has received the results. If you do not hear from us within 7 days, please contact the clinic through MyChart or phone. If you have a critical or abnormal lab result, we will notify you by phone as soon as possible.  Submit refill requests through [employer] or call your pharmacy and they will forward the refill request to us. Please allow 3 business days for your refill to be completed.          Additional Information About Your Visit        [employer] Information     [employer] gives you secure access to your electronic health record. If you see a primary care provider, you can also send messages to your care team and make appointments. If you have questions,  please call your primary care clinic.  If you do not have a primary care provider, please call [phone] and they will assist you.        Care EveryWhere ID     This is your Care EveryWhere ID. This could be used by other organizations to access your [facility] medical records  [id number]        Your Vitals Were     Pulse Temperature BMI (Body Mass Index)             75 97  F (36.1  C) 45.9 kg/m2          Blood Pressure from Last 3 Encounters:   03/27/17 111/75   02/22/17 112/70   12/05/16 128/76    Weight from Last 3 Encounters:   03/27/17 275 lb 12.8 oz (125.1 kg)   03/11/17 294 lb (133.4 kg)   02/22/17 266 lb (120.7 kg)              We Performed the Following     NEEDLE EMG SINGLE FIBER        Primary Care Provider Office Phone # Fax #    Lesly Pacheco PA-C [phone]       [address] [address]        Thank you!     Thank you for choosing [facility]  for your care. Our goal is always to provide you with excellent care. Hearing back from our patients is one way we can continue to improve our services. Please take a few minutes to complete the written survey that you may receive in the mail after your visit with us. Thank you!             Your Updated Medication List - Protect others around you: Learn how to safely use, store and throw away your medicines at www.disposemymeds.org.          This list is accurate as of: 3/27/17 11:11 AM.  Always use your most recent med list.                   Brand Name Dispense Instructions for use    citalopram 40 MG tablet    celeXA    90 tablet    Take 1 tablet (40 mg) by mouth daily       cyclobenzaprine 5 MG tablet    FLEXERIL    45 tablet    Take 1 tablet (5 mg) by mouth 2 times daily as needed for muscle spasms       naproxen 500 MG tablet    NAPROSYN    60 tablet    Take 1 tablet (500 mg) by mouth 2 times daily (with meals)       order for [facility]      Resmed Airsense 10 auto  cpap 6-7 cm, Airfit P10 for her XS pillows       traMADol 50 MG tablet    ULTRAM    40 tablet    Take 1-2 tablets ( mg) by mouth every 6 hours as needed for pain       traZODone 100 MG tablet    DESYREL    135 tablet    Take 1.5 tablets (150 mg) by mouth nightly as needed for sleep       TYLENOL PO      Take 325 mg by mouth As needed

## 2017-03-27 NOTE — NURSING NOTE
"Sunshine Delgado is a 52 year old female who presents for:  Chief Complaint   Patient presents with     Neurologic Problem     Lumbar pain radiating into right leg -SUPRIYA Rodriguez. MRI 3-15-17        Initial Vitals:  /75 (BP Location: Right arm, Patient Position: Chair, Cuff Size: Adult Large)  Pulse 75  Temp 97  F (36.1  C)  Wt 275 lb 12.8 oz (125.1 kg)  BMI 45.9 kg/m2 Estimated body mass index is 45.9 kg/(m^2) as calculated from the following:    Height as of 3/11/17: 5' 5\" (1.651 m).    Weight as of this encounter: 275 lb 12.8 oz (125.1 kg).. Body surface area is 2.4 meters squared. BP completed using cuff size: large  Data Unavailable    Do you feel safe in your environment?  Yes  Do you need any refills today? No    Nursing Comments:Lumbar pain radiating into right leg -SUPRIYA Rodriguez. MRI 3-15-17 .  Patient rates 8 pain today as 3/27/17      5 min. nursing intake time  Faby Linares CMA      Discharge plan: See doctor dictation  2 min. nursing discharge time  Faby Linares CMA         "

## 2017-03-27 NOTE — PROGRESS NOTES
52-year-old female with lumbar spondylosis, severe right leg pain.  Notes 7 out of 10, sharp pain, radiating from the right lower back to the lateral thigh, knee, and dorsal foot.  This has been over the last three months, after a trip to [place].  Severe and lifestyle limiting.  She did do an epidural steroid injection, with modest improvement.  She has recently started physical therapy.  MRI of the lumbar spine demonstrated a left-sided L3 4 disc herniation, but no pressure on any of the right-sided nerve roots.         Past Medical History:   Diagnosis Date     Depression     self reported     Hoarseness of voice 3/4/2010     Voice fatigue 10/22/2009     Past Surgical History:   Procedure Laterality Date     COLONOSCOPY  2000     HYSTERECTOMY, VAGINAL      still has ovaries     Social History     Social History     Marital status:      Spouse name: N/A     Number of children: N/A     Years of education: N/A     Occupational History     Recreation Especialist      [employer]     Social History Main Topics     Smoking status: Never Smoker     Smokeless tobacco: Never Used     Alcohol use Yes      Comment: 1-2 per mo     Drug use: No     Sexual activity: Yes     Partners: Male     Birth control/ protection: Surgical     Other Topics Concern     Parent/Sibling W/ Cabg, Mi Or Angioplasty Before 65f 55m? No     Social History Narrative     Family History   Problem Relation Age of Onset     Eye Disorder Mother      lost eyesight; probable macular degeneration     Psychotic Disorder Mother      Dementia /Alzhimers     Neurologic Disorder Mother      seizures     DIABETES Mother      Hypertension Mother      Alzheimer Disease Mother      Arthritis Mother      OSTEOPOROSIS Mother      DIABETES Father      Depression Father      Hyperlipidemia Father      Psychotic Disorder Sister      bipolar     Thyroid Disease Sister      h/o thyroid cancer     Depression Sister      CANCER Other      Brain cancer        ROS:  10 point ROS neg other than the symptoms noted above in the HPI.    Physical Exam  /75 (BP Location: Right arm, Patient Position: Chair, Cuff Size: Adult Large)  Pulse 75  Temp 97  F (36.1  C)  Wt 125.1 kg (275 lb 12.8 oz)  BMI 45.9 kg/m2  HEENT:  Normocephalic, atraumatic.  PERRLA.  EOM s intact.  Visual fields full to gross exam  Neck:  Supple, non-tender, without lymphadenopathy.  Heart:  No peripheral edema  Lungs:  No SOB  Abdomen:  Non-distended.   Skin:  Warm and dry.  Extremities:  No edema, cyanosis or clubbing.    NEUROLOGICAL EXAMINATION:     Mental status:  Alert and Oriented x 3, speech is fluent.  Cranial nerves:  II-XII intact.   Motor:    Shoulder Abduction:  Right:  5/5   Left:  5/5  Biceps:                      Right:  5/5   Left:  5/5  Triceps:                     Right:  5/5   Left:  5/5  Wrist Extensors:       Right:  5/5   Left:  5/5  Wrist Flexors:           Right:  5/5   Left:  5/5  interosseus :            Right:  5/5   Left:  5/5   Hip Flexor:                Right: 5/5  Left:  5/5  Hip Adductor:             Right:  5/5  Left:  5/5  Hip Abductor:             Right:  5/5  Left:  5/5  Gastroc Soleus:        Right:  5/5  Left:  5/5  Tib/Ant:                      Right:  5/5  Left:  5/5  EHL:                     Right:  5/5  Left:  5/5  Sensation:  Intact  Reflexes:  Negative Babinski.  Negative Clonus.  Negative Toledo's.  Coordination:  Smooth finger to nose testing.   Negative pronator drift.  Smooth tandem walking.    A/P:  52-year-old female with lumbar spondylosis, severe right leg pain    I had a lengthy discussion with the patient, reviewing the history, symptoms and imaging  We discussed some possibilities for causes of her leg pain given that there is no pressure on the nerves  We will obtain an EMG for further diagnostic work-up  Recommended that she continue PT  If EMG negative, would recommend alternate medical therapy such as gabapentin or lyrica

## 2017-04-11 ENCOUNTER — TRANSFERRED RECORDS (OUTPATIENT)
Dept: HEALTH INFORMATION MANAGEMENT | Facility: CLINIC | Age: 52
End: 2017-04-11

## 2017-04-17 ENCOUNTER — TELEPHONE (OUTPATIENT)
Dept: NEUROSURGERY | Facility: CLINIC | Age: 52
End: 2017-04-17

## 2017-04-17 DIAGNOSIS — M79.604 RIGHT LEG PAIN: Primary | ICD-10-CM

## 2017-04-17 NOTE — TELEPHONE ENCOUNTER
Evon Brown PA-C reviewed patient's EMG and spoke with Dr. Cheema. They recommend pain clinic referral. Placed order for [facility]. LVM with patient, advised her to call back with questions.

## 2017-05-01 PROBLEM — M54.42 BILATERAL LOW BACK PAIN WITH LEFT-SIDED SCIATICA: Status: RESOLVED | Noted: 2017-03-21 | Resolved: 2017-05-01

## 2017-05-01 NOTE — PROGRESS NOTES
Patient was seen in physical therapy for evaluation of LBP on 3/21/17. Following initial visit, patient consecutively either no-showed or cancelled for her next 3 appointments. No further information on patient's status is known.  Will consequently discharge from physical therapy.

## 2017-05-03 ENCOUNTER — DOCUMENTATION ONLY (OUTPATIENT)
Dept: SLEEP MEDICINE | Facility: CLINIC | Age: 52
End: 2017-05-03

## 2017-05-03 DIAGNOSIS — G47.33 OSA (OBSTRUCTIVE SLEEP APNEA): Primary | ICD-10-CM

## 2017-05-03 NOTE — PROGRESS NOTES
Pt returned my call and states that things are going well.  She is not having any issues using it.  Her AHI is still elevated so order sent to provider to adjust pressure as her 95% is 10cm H2O.

## 2017-07-05 DIAGNOSIS — F41.1 GENERALIZED ANXIETY DISORDER: Chronic | ICD-10-CM

## 2017-07-05 DIAGNOSIS — F33.1 MAJOR DEPRESSIVE DISORDER, RECURRENT EPISODE, MODERATE (H): Chronic | ICD-10-CM

## 2017-07-05 NOTE — TELEPHONE ENCOUNTER
Medication Detail      Disp Refills Start End ADRIANNA   citalopram (CELEXA) 40 MG tablet 90 tablet 1 12/5/2016  No   Sig: Take 1 tablet (40 mg) by mouth daily   Class: E-Prescribe   Route: Oral   Order: [id number]       Last Office Visit with [facility] primary care provider:  2/22/2017        Last PHQ-9 score on record=   PHQ-9 SCORE 12/5/2016   Total Score 5

## 2017-07-06 RX ORDER — CITALOPRAM HYDROBROMIDE 40 MG/1
40 TABLET ORAL DAILY
Qty: 30 TABLET | Refills: 0 | Status: SHIPPED | OUTPATIENT
Start: 2017-07-06 | End: 2017-08-08

## 2017-08-02 ENCOUNTER — OFFICE VISIT (OUTPATIENT)
Dept: FAMILY MEDICINE | Facility: CLINIC | Age: 52
End: 2017-08-02
Payer: COMMERCIAL

## 2017-08-02 ENCOUNTER — RADIANT APPOINTMENT (OUTPATIENT)
Dept: GENERAL RADIOLOGY | Facility: CLINIC | Age: 52
End: 2017-08-02
Attending: FAMILY MEDICINE
Payer: COMMERCIAL

## 2017-08-02 VITALS
SYSTOLIC BLOOD PRESSURE: 128 MMHG | HEIGHT: 65 IN | HEART RATE: 72 BPM | BODY MASS INDEX: 44.36 KG/M2 | TEMPERATURE: 98.3 F | DIASTOLIC BLOOD PRESSURE: 72 MMHG | WEIGHT: 266.25 LBS

## 2017-08-02 DIAGNOSIS — M70.61 TROCHANTERIC BURSITIS OF BOTH HIPS: Primary | ICD-10-CM

## 2017-08-02 DIAGNOSIS — M54.42 LEFT-SIDED LOW BACK PAIN WITH LEFT-SIDED SCIATICA, UNSPECIFIED CHRONICITY: ICD-10-CM

## 2017-08-02 DIAGNOSIS — M25.552 PAIN OF BOTH HIP JOINTS: ICD-10-CM

## 2017-08-02 DIAGNOSIS — M25.551 PAIN OF BOTH HIP JOINTS: ICD-10-CM

## 2017-08-02 DIAGNOSIS — R20.9 DISTURBANCE OF SKIN SENSATION: ICD-10-CM

## 2017-08-02 DIAGNOSIS — M70.62 TROCHANTERIC BURSITIS OF BOTH HIPS: Primary | ICD-10-CM

## 2017-08-02 DIAGNOSIS — Z12.31 VISIT FOR SCREENING MAMMOGRAM: ICD-10-CM

## 2017-08-02 DIAGNOSIS — Z12.11 SCREEN FOR COLON CANCER: ICD-10-CM

## 2017-08-02 DIAGNOSIS — M79.605 PAIN IN BOTH LOWER EXTREMITIES: ICD-10-CM

## 2017-08-02 DIAGNOSIS — M79.604 PAIN IN BOTH LOWER EXTREMITIES: ICD-10-CM

## 2017-08-02 PROCEDURE — 99215 OFFICE O/P EST HI 40 MIN: CPT | Performed by: FAMILY MEDICINE

## 2017-08-02 PROCEDURE — 73523 X-RAY EXAM HIPS BI 5/> VIEWS: CPT

## 2017-08-02 NOTE — MR AVS SNAPSHOT
After Visit Summary   8/2/2017    Sunshine Delgado    MRN: [id number]           Patient Information     Date Of Birth          1965        Visit Information        Provider Department      8/2/2017 3:40 PM Carlos Bhat DO [facility]        Today's Diagnoses     Trochanteric bursitis of both hips    -  1    Screen for colon cancer        Visit for screening mammogram        Pain of both hip joints        Left-sided low back pain with left-sided sciatica, unspecified chronicity        Pain in both lower extremities        Disturbance of skin sensation          Care Instructions    Follow up with ortho for trochanteric injection  Possible RONAK injection in the back  Follow up for mammo  Colonoscopy  Here for physical and fasing labs and recheck on pain  Come fasting for labs  Do exercise below                   Trochanteric Bursitis           What is trochanteric bursitis?   Trochanteric bursitis is irritation or inflammation of the trochanteric bursa. A bursa is a fluid-filled sac that acts as a cushion between tendons, bones, and skin. The trochanteric bursa is located on the upper, outer area of the thigh. There is a bump on the outer side of the upper part of the thigh bone (femur) called the greater trochanter. The trochanteric bursa is located over the greater trochanter.   How does it occur?   The trochanteric bursa may be inflamed by a group of muscles or tendons rubbing over the bursa and causing friction against the thigh bone. This injury can occur with running, walking, or bicycling, especially when the bicycle seat is too high.   What are the symptoms?   You have pain on the upper outer area of your thigh or in your hip. The pain is worse when you walk, bicycle, or go up or down stairs. You have pain when you move your thigh bone and feel tenderness in the area over the greater trochanter.   How is it diagnosed?   Your healthcare provider will ask about your  symptoms and examine your hip and thigh.   How is it treated?   To treat this condition:   Put an ice pack, gel pack, or package of frozen vegetables, wrapped in a cloth on the area every 3 to 4 hours, for up to 20 minutes at a time.   Take an anti-inflammatory medicine such as ibuprofen, or other medicine as directed by your provider. Nonsteroidal anti-inflammatory medicines (NSAIDs) may cause stomach bleeding and other problems. These risks increase with age. Read the label and take as directed. Unless recommended by your healthcare provider, do not take for more than 10 days.   Your provider may give you an injection of a corticosteroid medicine.   While you are recovering from your injury you will need to change your sport or activity to one that does not make your condition worse. For example, you may need to swim instead of running or bicycling. If you are bicycling, you may need to lower your bicycle seat.   How long do the effects last?   The length of recovery depends on many factors such as your age, health, and if you have had a previous injury. Recovery time also depends on the severity of the injury. A bursa that is only mildly inflamed and has just started to hurt may improve within a few weeks. A bursa that is significantly inflamed and has been painful for a long time may take up to a few months to improve. You need to stop doing the activities that cause pain until your bursa has healed. If you continue doing activities that cause pain, your symptoms will return and it will take longer to recover.   When can I return to my normal activities?   Everyone recovers from an injury at a different rate. Return to your activities depends on how soon your leg recovers, not by how many days or weeks it has been since your injury has occurred. In general, the longer you have symptoms before you start treatment, the longer it will take to get better. The goal of rehabilitation is to return to your normal  activities as soon as is safely possible. If you return too soon you may worsen your injury.   You may safely return to your normal activities when, starting from the top of the list and progressing to the end, each of the following is true:   You have full range of motion in the injured leg compared to the uninjured leg.   You have full strength of the injured leg compared to the uninjured leg.   You can walk straight ahead without pain or limping.   How can I prevent trochanteric bursitis?   Trochanteric bursitis is best prevented by warming up properly and stretching the muscles on the outer side of your upper thigh.     Published by [employer].  This content is reviewed periodically and is subject to change as new health information becomes available. The information is intended to inform and educate and is not a replacement for medical evaluation, advice, diagnosis or treatment by a healthcare professional.   Written by Humberto Longoria MD, for [employer].   ? 2010 [facility] and/or its affiliates. All Rights Reserved.   Copyright   [employer] Reference Systems 2011         Trochanteric Bursitis Rehabilitation Exercises   You can begin stretching the muscles that run along the outside of your hip using the first two exercise. You can do the strengthening exercises when the sharp pain lessens.   Stretching exercises     Piriformis stretch: Lying on your back with both knees bent, rest the ankle of your injured leg over the knee of your uninjured leg. Grasp the thigh of your uninjured leg and pull that knee toward your chest. You will feel a stretch along the buttocks and possibly along the outside of your hip on the injured side. Hold this for 15 to 30 seconds. Repeat 3 times.     Iliotibial band stretch (standing): Cross your uninjured leg in front of your injured leg and bend down and touch your toes. You can move your hands across the floor toward the uninjured side and you will feel more stretch on the  outside of your thigh on the injured side. Hold this position for 15 to 30 seconds. Return to the starting position. Repeat 3 times.   Strengthening exercises     Straight leg raise: Lie on your back with your legs straight out in front of you. Tighten up the top of your thigh muscle on the injured leg and lift that leg about 8 inches off the floor, keeping the thigh muscle tight throughout. Slowly lower your leg back down to the floor. Do 3 sets of 10.     Wall squat with a ball: Stand with your back, shoulders, and head against a wall and look straight ahead. Keep your shoulders relaxed and your feet 1 foot away from the wall and a shoulder's width apart. Place a rolled up pillow or a soccer-sized ball between your thighs. Keeping your head against the wall, slowly squat while squeezing the pillow or ball at the same time. Squat down until you are almost in a sitting position. Your thighs will not yet be parallel to the floor. Hold this position for 10 seconds and then slowly slide back up the wall. Make sure you keep squeezing the pillow or ball throughout this exercise. Repeat 10 times. Build up to 3 sets of 10.     Prone hip extension: Lie on your stomach with your legs straight out behind you. Tighten up your buttocks muscles and lift one leg off the floor about 8 inches. Keep your knee straight. Hold for 5 seconds. Then lower your leg and relax. Do 3 sets of 10.   Written by Anna Escobedo M.S., P.T., for [employer].   Published by [employer].   This content is reviewed periodically and is subject to change as new health information becomes available. The information is intended to inform and educate and is not a replacement for medical evaluation, advice, diagnosis or treatment by a healthcare professional.   Sports Medicine Advisor 2003.1 Index  Sports Medicine Advisor 2003.1 Credits   Copyright   2003 [employer]. All rights reserved.   Page footer  image                  [facility]   Discharged by : Amira PHILIP, Certified Medical Assistant (AAMA)August 2, 2017 4:49 PM    Paper scripts provided to patient : none   If you have any questions regarding to your visit please contact your care team:   [facility] Clinic Hours Telephone Number   Dr. Amira Pacheco, LINDA   7am-7pm Monday - Thursday   7am-5pm Fridays  [phone]   (Appointment scheduling available 24/7)   RN Line   [phone] option 2     What options do I have for visits at the clinic other than the traditional office visit?   To expand how we care for you, many of our providers are utilizing electronic visits (e-visits) and telephone visits, when medically appropriate, for interactions with their patients rather than a visit in the clinic. We also offer nurse visits for many medical concerns. Just like any other service, we will bill your insurance company for this type of visit based on time spent on the phone with your provider. Not all insurance companies cover these visits. Please check with your medical insurance if this type of visit is covered. You will be responsible for any charges that are not paid by your insurance.   E-visits via [email or web address]: generally incur a $35.00 fee.   Telephone visits:   Time spent on the phone: *charged based on time that is spent on the phone in increments of 10 minutes. Estimated cost:   5-10 mins $30.00   11-20 mins. $59.00   21-30 mins. $85.00   Use [email or web address] (secure email communication and access to your chart) to send your primary care provider a message or make an appointment. Ask someone on your Team how to sign up for [employer].   For a Price Quote for your services, please call our Consumer Price Line at [phone].   As always, Thank you for trusting us with your health care needs!                    [facility]:    Scheduling Appointments  Kristel Leiva  [facility]  Call: [phone]    [facility]  Call: [phone]    [facility]  Call: [phone]    WHERE TO GO FOR CARE?    Clinic    Make an appointment if you:       Are sick (cold, cough, flu, sore throat, earache or in pain).       Have a small injury (sprain, small cut, burn or broken bone).       Need a physical exam, Pap smear, vaccine or prescription refill.       Have questions about your health or medicines.    To reach us:      Call [phone] ([phone]). Open 24 hours every day. (For counseling services, call [phone].)    Log into Bitly at [employer]. (Visit [facility] to create an account.) Hospital emergency room    An emergency is a serious or life- threatening problem that must be treated right away.    Call [phone] or get to the hospital if you have:      Very bad or sudden:            - Chest pain or pressure         - Bleeding         - Head or belly pain         - Dizziness or trouble seeing, walking or                          Speaking      Problems breathing      Blood in your vomit or you are coughing up blood      A major injury (knocked out, loss of a finger or limb, rape, broken bone protruding from skin)    A mental health crisis. (Or call the Mental Health Crisis line at 1-593.448.1523 or Suicide Prevention Hotline at 1-216.199.1256.)    Open 24 hours every day. You don't need an appointment.     Urgent care    Visit urgent care for sickness or small injuries when the clinic is closed. You don't need an appointment. To check hours or find an urgent care near you, visit [email or web address]. Online care    Get online care from [email or web address] for more than 70 common problems, like colds, allergies and infections. Open 24 hours every day at: [email or web address]   Need help deciding?    For advice about where to be seen, you may call your clinic and ask to speak with a nurse. We're here for you 24 hours  every day.         If you are deaf or hard of hearing, please let us know. We provide many free services including sign language interpreters, oral interpreters, TTYs, telephone amplifiers, note takers and written materials.                 Follow-ups after your visit        Additional Services     GASTROENTEROLOGY ADULT REF PROCEDURE ONLY       Last Lab Result: Creatinine (mg/dL)       Date                     Value                 08/07/2016               1.12             ----------  There is no height or weight on file to calculate BMI.     Needed:  No  Language:  English    Patient will be contacted to schedule procedure.     Please be aware that coverage of these services is subject to the terms and limitations of your health insurance plan.  Call member services at your health plan with any benefit or coverage questions.  Any procedures must be performed at a [facility] facility OR coordinated by your clinic's referral office.    Please bring the following with you to your appointment:    (1) Any X-Rays, CTs or MRIs which have been performed.  Contact the facility where they were done to arrange for  prior to your scheduled appointment.    (2) List of current medications   (3) This referral request   (4) Any documents/labs given to you for this referral            ORTHO  REFERRAL       [facility] is referring you to the Orthopedic  Services at [facility].       The  Representative will assist you in the coordination of your Orthopedic and Musculoskeletal Care as prescribed by your physician.    The  Representative will call you within 1 business day to help schedule your appointment, or you may contact the  Representative at:    All areas ~ [phone]     Type of Referral : Non Surgical  Pain Interventionist       Timeframe requested: Within 2 weeks    Coverage of these services is subject to the terms and  limitations of your health insurance plan.  Please call member services at your health plan with any benefit or coverage questions.      If X-rays, CT or MRI's have been performed, please contact the facility where they were done to arrange for , prior to your scheduled appointment.  Please bring this referral request to your appointment and present it to your specialist.                  Future tests that were ordered for you today     Open Future Orders        Priority Expected Expires Ordered    MA SCREENING DIGITAL BILAT - Future  (s+30) Routine  8/2/2018 8/2/2017            Who to contact     If you have questions or need follow up information about today's clinic visit or your schedule please contact [facility] directly at [phone].  Normal or non-critical lab and imaging results will be communicated to you by [facility], letter or phone within 4 business days after the clinic has received the results. If you do not hear from us within 7 days, please contact the clinic through [employer] or phone. If you have a critical or abnormal lab result, we will notify you by phone as soon as possible.  Submit refill requests through [employer] or call your pharmacy and they will forward the refill request to us. Please allow 3 business days for your refill to be completed.          Additional Information About Your Visit        [facility] Information     [employer] gives you secure access to your electronic health record. If you see a primary care provider, you can also send messages to your care team and make appointments. If you have questions, please call your primary care clinic.  If you do not have a primary care provider, please call [phone] and they will assist you.        Care EveryWhere ID     This is your Care EveryWhere ID. This could be used by other organizations to access your [facility] medical records  [id number]        Your Vitals Were     Pulse Temperature Height BMI (Body Mass  "Index)          72 98.3  F (36.8  C) (Oral) 5' 5\" (1.651 m) 44.31 kg/m2         Blood Pressure from Last 3 Encounters:   08/02/17 128/72   03/27/17 111/75   02/22/17 112/70    Weight from Last 3 Encounters:   08/02/17 266 lb 4 oz (120.8 kg)   03/27/17 275 lb 12.8 oz (125.1 kg)   03/11/17 294 lb (133.4 kg)              We Performed the Following     GASTROENTEROLOGY ADULT REF PROCEDURE ONLY     ORTHO  REFERRAL     XR Pelvis and Hip Bilateral 2 Views          Today's Medication Changes          These changes are accurate as of: 8/2/17  4:49 PM.  If you have any questions, ask your nurse or doctor.               Start taking these medicines.        Dose/Directions    ferrous sulfate 142 (45 FE) MG Tbcr   Commonly known as:  SLO-FE   Used for:  Pain in both lower extremities   Started by:  Carlos Bhat DO        Dose:  142 mg   Take 1 tablet (142 mg) by mouth daily   Quantity:  30 tablet   Refills:  0         Stop taking these medicines if you haven't already. Please contact your care team if you have questions.     TYLENOL PO   Stopped by:  Carlos Bhat DO                Where to get your medicines      These medications were sent to [facility] [place] [address]     Phone:  [phone]     ferrous sulfate 142 (45 FE) MG Tbcr                Primary Care Provider Office Phone # Fax #    [facility] [phone]       [address] [place] [address]        Equal Access to Services     LUCA KELLER : Wero Jimenez, rian judge, qamary redmond. So [facility] [phone].    ATENCIÓN: Si habla español, tiene a beard disposición servicios gratuitos de asistencia lingüística. Isidra al [phone].    We comply with applicable federal civil rights laws and [place] laws. We do not discriminate on " the basis of race, color, national origin, age, disability sex, sexual orientation or gender identity.            Thank you!     Thank you for choosing [facility]  for your care. Our goal is always to provide you with excellent care. Hearing back from our patients is one way we can continue to improve our services. Please take a few minutes to complete the written survey that you may receive in the mail after your visit with us. Thank you!             Your Updated Medication List - Protect others around you: Learn how to safely use, store and throw away your medicines at www.disposemymeds.org.          This list is accurate as of: 8/2/17  4:49 PM.  Always use your most recent med list.                   Brand Name Dispense Instructions for use Diagnosis    citalopram 40 MG tablet    celeXA    30 tablet    Take 1 tablet (40 mg) by mouth daily Due for a visit for refills!    Generalized anxiety disorder, Major depressive disorder, recurrent episode, moderate (H)       cyclobenzaprine 5 MG tablet    FLEXERIL    45 tablet    Take 1 tablet (5 mg) by mouth 2 times daily as needed for muscle spasms    Low back pain radiating to right leg       ferrous sulfate 142 (45 FE) MG Tbcr    SLO-FE    30 tablet    Take 1 tablet (142 mg) by mouth daily    Pain in both lower extremities       naproxen 500 MG tablet    NAPROSYN    60 tablet    Take 1 tablet (500 mg) by mouth 2 times daily (with meals)    Low back pain radiating to right leg       order for DME      Resmed Airsense 10 auto cpap 6-7 cm, Airfit P10 for her XS pillows        traMADol 50 MG tablet    ULTRAM    40 tablet    Take 1-2 tablets ( mg) by mouth every 6 hours as needed for pain    Low back pain radiating to right leg       traZODone 100 MG tablet    DESYREL    135 tablet    Take 1.5 tablets (150 mg) by mouth nightly as needed for sleep    Insomnia due to other mental disorder

## 2017-08-02 NOTE — PROGRESS NOTES
SUBJECTIVE:                                                    Sunshine Delgado is a 52 year old female who presents to clinic today for the following health issues:  NEW TO THIS PROVIDER  Joint Pain    Onset: January 2017    Description:   Location: Started with right leg through March, started in right hip and radiating down to ankle.  Left leg started bothering her after March.  She aches in her knees, joints, hard to get up in morning. CT scan done of pelvic area and hips and spine. Has had PT, an injection of some sort also that helped for a week.  Character: Stabbing    Intensity: 4-10/10    Progression of Symptoms: intermittent    Accompanying Signs & Symptoms:  Other symptoms: numbness, tingling and swelling    History:   Previous similar pain: no       Precipitating factors:   Trauma or overuse: no     Alleviating factors:  Improved by: Shot helped for a week, chiropractor helped for a day at a time. PT exercises, Ice helped a little    Therapies Tried and outcome: above, heat did not help.    Patient has seen physical therapy/ortho/neurosurgery    A/P:  52-year-old female with lumbar spondylosis, severe right leg pain     I had a lengthy discussion with the patient, reviewing the history, symptoms and imaging  We discussed some possibilities for causes of her leg pain given that there is no pressure on the nerves  We will obtain an EMG for further diagnostic work-up  Recommended that she continue PT  If EMG negative, would recommend alternate medical therapy such as gabapentin or lyrica     January right radiation back pain to the toes, had seen physical therapy and went to ortho  Lost weight ao help with pain, and has lost 10 lbs and has not helped,  3/17 steroid injection done and felt that it helped with the pain for few weeks.     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, she has to work and be active    She has not been without pain, both legs are well in pain   shooting pain from back and wraping to thigh and heel on the right  Left side also the last month wrapping around  The legs  Then she gets sharp stabbing all around the thigh/legs/knee/feet too  Feet are numb  Mostly at night, no twitching,     Hip pain with touch -reproducible, no fever no chills, no fatigue, no abdominal pain, on vaginal issues, no bleeding, no other sx  No weakness, no bowel and bladder issues, no groin or genitalia nubmness      Problem list and histories reviewed & adjusted, as indicated.  Additional history: as documented    Patient Active Problem List   Diagnosis     Generalized anxiety disorder     CARDIOVASCULAR SCREENING; LDL GOAL LESS THAN 160     MARIA GUADALUPE (obstructive sleep apnea)- severe (AHI 84)     Elevated LFTs     Morbid obesity (H)     Left ovarian cyst     Health Care Home     Moderate major depression (H)     Insomnia     Past Surgical History:   Procedure Laterality Date     COLONOSCOPY  2000     GENITOURINARY SURGERY  207     HYSTERECTOMY, VAGINAL      still has ovaries       Social History   Substance Use Topics     Smoking status: Never Smoker     Smokeless tobacco: Never Used     Alcohol use Yes      Comment: 1-2 per mo     Family History   Problem Relation Age of Onset     Eye Disorder Mother      lost eyesight; probable macular degeneration     Psychotic Disorder Mother      Dementia /Alzhimers     Neurologic Disorder Mother      seizures     DIABETES Mother      Hypertension Mother      Alzheimer Disease Mother      Arthritis Mother      OSTEOPOROSIS Mother      Obesity Mother      DIABETES Father      Depression Father      Hyperlipidemia Father      Obesity Father      Psychotic Disorder Sister      bipolar     Thyroid Disease Sister      h/o thyroid cancer     Depression Sister      CANCER Other      Brain cancer     OSTEOPOROSIS Maternal  "Grandmother      Anxiety Disorder Son      Thyroid Disease Sister      Obesity Sister              Reviewed and updated as needed this visit by clinical staff       Reviewed and updated as needed this visit by Provider         ROS:  Constitutional, HEENT, cardiovascular, pulmonary, GI, , musculoskeletal, neuro, skin, endocrine and psych systems are negative, except as otherwise noted.      OBJECTIVE:   /72 (BP Location: Right arm, Patient Position: Sitting, Cuff Size: Adult Large)  Pulse 72  Temp 98.3  F (36.8  C) (Oral)  Ht 5' 5\" (1.651 m)  Wt 266 lb 4 oz (120.8 kg)  BMI 44.31 kg/m2  Body mass index is 44.31 kg/(m^2).  GENERAL: healthy, alert and no distress  EYES: Eyes grossly normal to inspection, PERRL and conjunctivae and sclerae normal  HENT: ear canals and TM's normal, nose and mouth without ulcers or lesions  NECK: no adenopathy, no asymmetry, masses, or scars and thyroid normal to palpation  RESP: lungs clear to auscultation - no rales, rhonchi or wheezes  CV: regular rate and rhythm, normal S1 S2, no S3 or S4, no murmur, click or rub, no peripheral edema and peripheral pulses strong  ABDOMEN: soft, nontender, no hepatosplenomegaly, no masses and bowel sounds normal  MS: no gross musculoskeletal defects noted, no edema    Diagnostic Test Results:  Results for orders placed or performed in visit on 08/02/17   XR Pelvis and Hip Bilateral 2 Views    Narrative    PELVIS AND HIP BILATERAL TWO VIEWS  8/2/2017 5:06 PM      HISTORY: Pain in right hip. Pain in left hip.    COMPARISON: None.      Impression    IMPRESSION: No acute fracture or dislocation. Minimal changes of  osteoarthritis bilaterally.    WESTON LUDWIG MD         ASSESSMENT/PLAN:       ICD-10-CM    1. Trochanteric bursitis of both hips M70.61 ORTHO  REFERRAL    M70.62    2. Screen for colon cancer Z12.11 GASTROENTEROLOGY ADULT REF PROCEDURE ONLY   3. Visit for screening mammogram Z12.31 MA SCREENING DIGITAL BILAT - Future  " (s+30)   4. Pain of both hip joints M25.551 XR Pelvis and Hip Bilateral 2 Views    M25.552 ORTHO  REFERRAL   5. Left-sided low back pain with left-sided sciatica, unspecified chronicity M54.42 ORTHO  REFERRAL   6. Pain in both lower extremities M79.604 ORTHO  REFERRAL    M79.605 ferrous sulfate (SLO-FE) 142 (45 FE) MG TBCR   7. Disturbance of skin sensation R20.9    new patient to this provider   With multiple concerns  Chronic back pain - lumbar spondylosis, severe right leg pain-she wanted to go over ortho and neurosurgery recommendations and imaging tests stating that they did not explain it for her   discussion with the patient, reviewing the history, symptoms and imaging  We discussed some possibilities for causes of her leg pain given that there is no pressure on the nerves  She was offered EMG and referred to pain clinic but states that she does not want those options.   On exam today tenderness on trochanteric bursa's, ? Contributing to her worsening leg pain? Advised ortho for injection and reevaluation. Imaged hip/legs-negative  Discussed considering another RONAK  Possible RONAK injection in the back  Follow up for mammo  Colonoscopy advised, referred toay  Here for physical and fasing labs and recheck on pain  Come fasting for labs    Spent greater than 75  min with  50% of counseling and coordination of care for the conditions documented above.          See Patient Instructions    Carlos Bhat DO  [facility]

## 2017-08-02 NOTE — PATIENT INSTRUCTIONS
Follow up with ortho for trochanteric injection  Possible RONAK injection in the back  Follow up for mammo  Colonoscopy  Here for physical and fasing labs and recheck on pain  Come fasting for labs  Do exercise below                   Trochanteric Bursitis           What is trochanteric bursitis?   Trochanteric bursitis is irritation or inflammation of the trochanteric bursa. A bursa is a fluid-filled sac that acts as a cushion between tendons, bones, and skin. The trochanteric bursa is located on the upper, outer area of the thigh. There is a bump on the outer side of the upper part of the thigh bone (femur) called the greater trochanter. The trochanteric bursa is located over the greater trochanter.   How does it occur?   The trochanteric bursa may be inflamed by a group of muscles or tendons rubbing over the bursa and causing friction against the thigh bone. This injury can occur with running, walking, or bicycling, especially when the bicycle seat is too high.   What are the symptoms?   You have pain on the upper outer area of your thigh or in your hip. The pain is worse when you walk, bicycle, or go up or down stairs. You have pain when you move your thigh bone and feel tenderness in the area over the greater trochanter.   How is it diagnosed?   Your healthcare provider will ask about your symptoms and examine your hip and thigh.   How is it treated?   To treat this condition:   Put an ice pack, gel pack, or package of frozen vegetables, wrapped in a cloth on the area every 3 to 4 hours, for up to 20 minutes at a time.   Take an anti-inflammatory medicine such as ibuprofen, or other medicine as directed by your provider. Nonsteroidal anti-inflammatory medicines (NSAIDs) may cause stomach bleeding and other problems. These risks increase with age. Read the label and take as directed. Unless recommended by your healthcare provider, do not take for more than 10 days.   Your provider may give you an injection of a  corticosteroid medicine.   While you are recovering from your injury you will need to change your sport or activity to one that does not make your condition worse. For example, you may need to swim instead of running or bicycling. If you are bicycling, you may need to lower your bicycle seat.   How long do the effects last?   The length of recovery depends on many factors such as your age, health, and if you have had a previous injury. Recovery time also depends on the severity of the injury. A bursa that is only mildly inflamed and has just started to hurt may improve within a few weeks. A bursa that is significantly inflamed and has been painful for a long time may take up to a few months to improve. You need to stop doing the activities that cause pain until your bursa has healed. If you continue doing activities that cause pain, your symptoms will return and it will take longer to recover.   When can I return to my normal activities?   Everyone recovers from an injury at a different rate. Return to your activities depends on how soon your leg recovers, not by how many days or weeks it has been since your injury has occurred. In general, the longer you have symptoms before you start treatment, the longer it will take to get better. The goal of rehabilitation is to return to your normal activities as soon as is safely possible. If you return too soon you may worsen your injury.   You may safely return to your normal activities when, starting from the top of the list and progressing to the end, each of the following is true:   You have full range of motion in the injured leg compared to the uninjured leg.   You have full strength of the injured leg compared to the uninjured leg.   You can walk straight ahead without pain or limping.   How can I prevent trochanteric bursitis?   Trochanteric bursitis is best prevented by warming up properly and stretching the muscles on the outer side of your upper thigh.      Published by [employer].  This content is reviewed periodically and is subject to change as new health information becomes available. The information is intended to inform and educate and is not a replacement for medical evaluation, advice, diagnosis or treatment by a healthcare professional.   Written by Humberto Longoria MD, for [employer].   ? 2010 [facility] and/or its affiliates. All Rights Reserved.   Copyright   [employer] 2011         Trochanteric Bursitis Rehabilitation Exercises   You can begin stretching the muscles that run along the outside of your hip using the first two exercise. You can do the strengthening exercises when the sharp pain lessens.   Stretching exercises     Piriformis stretch: Lying on your back with both knees bent, rest the ankle of your injured leg over the knee of your uninjured leg. Grasp the thigh of your uninjured leg and pull that knee toward your chest. You will feel a stretch along the buttocks and possibly along the outside of your hip on the injured side. Hold this for 15 to 30 seconds. Repeat 3 times.     Iliotibial band stretch (standing): Cross your uninjured leg in front of your injured leg and bend down and touch your toes. You can move your hands across the floor toward the uninjured side and you will feel more stretch on the outside of your thigh on the injured side. Hold this position for 15 to 30 seconds. Return to the starting position. Repeat 3 times.   Strengthening exercises     Straight leg raise: Lie on your back with your legs straight out in front of you. Tighten up the top of your thigh muscle on the injured leg and lift that leg about 8 inches off the floor, keeping the thigh muscle tight throughout. Slowly lower your leg back down to the floor. Do 3 sets of 10.     Wall squat with a ball: Stand with your back, shoulders, and head against a wall and look straight ahead. Keep your shoulders relaxed and your feet 1 foot away from the  wall and a shoulder's width apart. Place a rolled up pillow or a soccer-sized ball between your thighs. Keeping your head against the wall, slowly squat while squeezing the pillow or ball at the same time. Squat down until you are almost in a sitting position. Your thighs will not yet be parallel to the floor. Hold this position for 10 seconds and then slowly slide back up the wall. Make sure you keep squeezing the pillow or ball throughout this exercise. Repeat 10 times. Build up to 3 sets of 10.     Prone hip extension: Lie on your stomach with your legs straight out behind you. Tighten up your buttocks muscles and lift one leg off the floor about 8 inches. Keep your knee straight. Hold for 5 seconds. Then lower your leg and relax. Do 3 sets of 10.   Written by Anna Escobedo M.S., P.T., for [employer].   Published by [employer].   This content is reviewed periodically and is subject to change as new health information becomes available. The information is intended to inform and educate and is not a replacement for medical evaluation, advice, diagnosis or treatment by a healthcare professional.   Sports Medicine Advisor 2003.1 Index  Sports Medicine Advisor 2003.1 Credits   Copyright   2003 [employer]. All rights reserved.   Page footer image                  [facility]   Discharged by : Amira PHILIP, Certified Medical Assistant (AAMA)August 2, 2017 4:49 PM    Paper scripts provided to patient : none   If you have any questions regarding to your visit please contact your care team:   [facility] Hours Telephone Number   Dr. Amira Pacheco PA-C   7am-7pm Monday - Thursday   7am-5pm Fridays  [phone]   (Appointment scheduling available 24/7)   RN Line   [phone] option 2     What options do I have for visits at the clinic other than the traditional office visit?   To expand how we  care for you, many of our providers are utilizing electronic visits (e-visits) and telephone visits, when medically appropriate, for interactions with their patients rather than a visit in the clinic. We also offer nurse visits for many medical concerns. Just like any other service, we will bill your insurance company for this type of visit based on time spent on the phone with your provider. Not all insurance companies cover these visits. Please check with your medical insurance if this type of visit is covered. You will be responsible for any charges that are not paid by your insurance.   E-visits via [employer]: generally incur a $35.00 fee.   Telephone visits:   Time spent on the phone: *charged based on time that is spent on the phone in increments of 10 minutes. Estimated cost:   5-10 mins $30.00   11-20 mins. $59.00   21-30 mins. $85.00   Use [employer] (secure email communication and access to your chart) to send your primary care provider a message or make an appointment. Ask someone on your Team how to sign up for [employer].   For a Price Quote for your services, please call our [employer] Price Line at [phone].   As always, Thank you for trusting us with your health care needs!                    [facility]:    Scheduling Appointments  [facility]  Call: [phone]    [facility]  Call: [phone]    [facility]  Call: [phone]    WHERE TO GO FOR CARE?    Clinic    Make an appointment if you:       Are sick (cold, cough, flu, sore throat, earache or in pain).       Have a small injury (sprain, small cut, burn or broken bone).       Need a physical exam, Pap smear, vaccine or prescription refill.       Have questions about your health or medicines.    To reach us:      Call [phone] ([phone]). Open 24 hours every day. (For counseling services, call [phone].)    Log into [employer] at  [email or web address]. (Visit [email or web address] to create an account.) Hospital emergency room    An emergency is a serious or life- threatening problem that must be treated right away.    Call 911 or get to the hospital if you have:      Very bad or sudden:            - Chest pain or pressure         - Bleeding         - Head or belly pain         - Dizziness or trouble seeing, walking or                          Speaking      Problems breathing      Blood in your vomit or you are coughing up blood      A major injury (knocked out, loss of a finger or limb, rape, broken bone protruding from skin)    A mental health crisis. (Or call the Mental Health Crisis line at 1-613.508.3008 or Suicide Prevention Hotline at 1-953.146.6884.)    Open 24 hours every day. You don't need an appointment.     Urgent care    Visit urgent care for sickness or small injuries when the clinic is closed. You don't need an appointment. To check hours or find an urgent care near you, visit [email or web address]. Online care    Get online care from [email or web address] for more than 70 common problems, like colds, allergies and infections. Open 24 hours every day at: [email or web address]   Need help deciding?    For advice about where to be seen, you may call your clinic and ask to speak with a nurse. We're here for you 24 hours every day.         If you are deaf or hard of hearing, please let us know. We provide many free services including sign language interpreters, oral interpreters, TTYs, telephone amplifiers, note takers and written materials.

## 2017-08-02 NOTE — NURSING NOTE
"Chief Complaint   Patient presents with     Musculoskeletal Problem       Initial /72 (BP Location: Right arm, Patient Position: Sitting, Cuff Size: Adult Large)  Pulse 72  Temp 98.3  F (36.8  C) (Oral)  Ht 5' 5\" (1.651 m)  Wt 266 lb 4 oz (120.8 kg)  BMI 44.31 kg/m2 Estimated body mass index is 44.31 kg/(m^2) as calculated from the following:    Height as of this encounter: 5' 5\" (1.651 m).    Weight as of this encounter: 266 lb 4 oz (120.8 kg).  Medication Reconciliation: complete     Amira PHILIP, Certified Medical Assistant (AAMA)August 2, 2017 3:51 PM      "

## 2017-08-07 ENCOUNTER — OFFICE VISIT (OUTPATIENT)
Dept: ORTHOPEDICS | Facility: CLINIC | Age: 52
End: 2017-08-07
Payer: COMMERCIAL

## 2017-08-07 VITALS
HEIGHT: 65 IN | WEIGHT: 266.25 LBS | BODY MASS INDEX: 44.36 KG/M2 | DIASTOLIC BLOOD PRESSURE: 73 MMHG | SYSTOLIC BLOOD PRESSURE: 113 MMHG | HEART RATE: 70 BPM

## 2017-08-07 DIAGNOSIS — M25.551 BILATERAL HIP PAIN: Primary | ICD-10-CM

## 2017-08-07 DIAGNOSIS — M25.552 BILATERAL HIP PAIN: Primary | ICD-10-CM

## 2017-08-07 PROCEDURE — 99243 OFF/OP CNSLTJ NEW/EST LOW 30: CPT | Performed by: PEDIATRICS

## 2017-08-07 NOTE — PROGRESS NOTES
Sports Medicine Clinic Visit    PCP: Clinic, [facility]    Sunshine eDlgado is a 52 year old female who is seen  in consultation at the request of  Carlos Bhat D.O. presenting with bilateral hip pain.    Injury: Patient reports pain for the past 8 months.  No injury or overuse.  Pain is all over, low back, lateral hips, groin, down legs.  Previously her right leg was numb as well.  Has seen Francesca Abraham in the past for low back pain, also saw Spine surgery and had and RONAK that worked for only around 1 week.  Has not done PT, previously pain was too severe.  Is considering pain clinic referral.  - Here today to help determine if there is a hip diagnosis contributing to her pain.    Sunshine was asked to complete the Oswestry Low Back Disability Index today:  Disability score: 48%.     Location of Pain: groin, lateral hip  Duration of Pain: 8 month(s)  Rating of Pain at worst: 10/10  Rating of Pain Currently: 4/10  Symptoms are better with: Heat and new bed, trying to get back into walking for fitness  Symptoms are worse with: sitting and walking  Additional Features:   Positive: paresthesias and numbness - right leg, both feet (improved)   Negative: swelling, bruising, popping, grinding, catching, locking, instability, weakness, pain in other joints and systemic symptoms  Other evaluation and/or treatments so far consists of: RONAK in Feb or Mar 2017 at [facility], got relief for about 1 week, chiropractor  Prior History of related problems: chronic pain    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    Patient's current problem list, past medical and surgical history, and family history were reviewed.    Patient Active Problem  "List   Diagnosis     Generalized anxiety disorder     CARDIOVASCULAR SCREENING; LDL GOAL LESS THAN 160     MARIA GUADALUPE (obstructive sleep apnea)- severe (AHI 84)     Elevated LFTs     Morbid obesity (H)     Left ovarian cyst     Health Care Home     Moderate major depression (H)     Insomnia     Past Medical History:   Diagnosis Date     Depression     self reported     Depressive disorder      Hoarseness of voice 3/4/2010     Voice fatigue 10/22/2009     Past Surgical History:   Procedure Laterality Date     COLONOSCOPY  2000     GENITOURINARY SURGERY  207     HYSTERECTOMY, VAGINAL      still has ovaries     Family History   Problem Relation Age of Onset     Eye Disorder Mother      lost eyesight; probable macular degeneration     Psychotic Disorder Mother      Dementia /Alzhimers     Neurologic Disorder Mother      seizures     DIABETES Mother      Hypertension Mother      Alzheimer Disease Mother      Arthritis Mother      OSTEOPOROSIS Mother      Obesity Mother      DIABETES Father      Depression Father      Hyperlipidemia Father      Obesity Father      Psychotic Disorder Sister      bipolar     Thyroid Disease Sister      h/o thyroid cancer     Depression Sister      CANCER Other      Brain cancer     OSTEOPOROSIS Maternal Grandmother      Anxiety Disorder Son      Thyroid Disease Sister      Obesity Sister        Objective  /73  Pulse 70  Ht 5' 5\" (1.651 m)  Wt 266 lb 4 oz (120.8 kg)  BMI 44.31 kg/m2    GENERAL APPEARANCE: healthy, alert and no distress   GAIT: NORMAL  SKIN: no suspicious lesions or rashes  HEENT: Sclera clear, anicteric  CV: good peripheral pulses  RESP: Breathing not labored  NEURO: Normal strength and tone, mentation intact and speech normal  PSYCH:  mentation appears normal and affect normal/bright    Bilateral hip exam    Inspection:      no edema or ecchymosis in hip area    Tender:      greater trochanter bilateral       groin bilateral    Non Tender:      remainder of the hip area " bilateral    ROM:     Full active and passive ROM  bilateral    Strength:      flexion 4/5 bilateral       abduction 4/5 bilateral       adduction 4/5 bilateral    Sensation:      grossly intact in hip and thigh    Special Tests:      positive (+) CHI bilateral       positive (+) FADIR bilateral       positive (+) scour bilateral    Radiology  I visualized and reviewed these images with the patient  PELVIS AND HIP BILATERAL TWO VIEWS  8/2/2017 5:06 PM    HISTORY: Pain in right hip. Pain in left hip.  COMPARISON: None.  IMPRESSION: No acute fracture or dislocation. Minimal changes of  osteoarthritis bilaterally.    Assessment:  1. Bilateral hip pain      Bilateral hip pain in the context of overall pain including lumbar pain.  On exam, patient has features of both intra and extraarticular pain with differential of hip osteoarthritis, greater trochanteric pain syndrome, lumbar pathology all possibly contributing.  We discussed further work up and treatment to better define if her hip pain is the main cause of pain including physical therapy (which she would not be interested in), injections, imaging.  We also discussed the benefits of pain clinic referral, which she will consider.    Plan:  - Today's Plan of Care:  Observe and monitor symptoms  Rehab: Physical Therapy: [facility] - [phone]  Steroid injection of the  bilateral hip: intra-articular  performed via ultrasound by Dr. Simons    Follow Up: 2 weeks after injection    Concerning signs and symptoms were reviewed.  The patient expressed understanding of this management plan and all questions were answered at this time.    Thanks for the opportunity to participate in the care of this patient, I will keep you updated on their progress.    CC: Carlos Sesay MD [facility]  Primary Care Sports Medicine  [facility]

## 2017-08-07 NOTE — MR AVS SNAPSHOT
After Visit Summary   8/7/2017    Sunshine Delgado    MRN: [id number]           Patient Information     Date Of Birth          1965        Visit Information        Provider Department      8/7/2017 6:40 PM Lashonda Sesay MD [facility] [place]        Today's Diagnoses     Bilateral hip pain    -  1      Care Instructions    Plan:  - Today's Plan of Care:  Observe and monitor symptoms  Rehab: [facility]: [facility] - [phone]  Steroid injection of the  bilateral hip: intra-articular  performed via ultrasound by Dr. Simons    Follow Up: 2 weeks after injection    If you have any further questions for your physician or physician s care team you can call [phone] and use option 3 to leave a voice message. Calls received during business hours will be returned same day.            Follow-ups after your visit        Additional Services     [facility] PT, HAND, AND CHIROPRACTIC REFERRAL       **This order will print in the [facility] Scheduling Office**    Physical Therapy, Hand Therapy and Chiropractic Care are available through:    *[facility]  *[facility]  *[facility]    Call one number to schedule at any of the above locations: [phone].    Your provider has referred you to: Physical Therapy at [facility] or [facility]    Indication/Reason for Referral: Hip Pain  Onset of Illness:   Therapy Orders: Evaluate and Treat  Special Programs: None  Special Request: None    Joslyn Jorgensen      Additional Comments for the Therapist or Chiropractor:     Please be aware that coverage of these services is subject to the terms and limitations of your health insurance plan.  Call member services at your health plan with any benefit or coverage questions.      Please bring the following to your appointment:    *Your personal calendar for scheduling future appointments  *Comfortable clothing                  Your next 10  appointments already scheduled     Sep 18, 2017 10:20 AM CDT   SHORT with Guera Castellon MD   [facility]    [address] [place] [id number] [phone]           Your Arrival times is X, We need you to be here at this time to get checked in and have the assistant get you ready for the provider, which can take about 15 minutes. Your appointment time with your provider is at  X. Thank you.            Sep 20, 2017 12:20 PM CDT   PHYSICAL with Guera Castellon MD   [facility] ([address] [address]   [phone]              Who to contact     If you have questions or need follow up information about today's clinic visit or your schedule please contact [facility] directly at [phone].  Normal or non-critical lab and imaging results will be communicated to you by MyChart, letter or phone within 4 business days after the clinic has received the results. If you do not hear from us within 7 days, please contact the clinic through Kii[employer] or phone. If you have a critical or abnormal lab result, we will notify you by phone as soon as possible.  Submit refill requests through [email or web address] or call your pharmacy and they will forward the refill request to us. Please allow 3 business days for your refill to be completed.          Additional Information About Your Visit        Kiihart Information     [email or web address] gives you secure access to your electronic health record. If you see a primary care provider, you can also send messages to your care team and make appointments. If you have questions, please call your primary care clinic.  If you do not have a primary care provider, please call [phone] and they will assist you.        Care EveryWhere ID     This is your Care EveryWhere ID. This could be used by other organizations to access your  "[place] medical records  [id number]        Your Vitals Were     Pulse Height BMI (Body Mass Index)             70 5' 5\" (1.651 m) 44.31 kg/m2          Blood Pressure from Last 3 Encounters:   08/07/17 113/73   08/02/17 128/72   03/27/17 111/75    Weight from Last 3 Encounters:   08/07/17 266 lb 4 oz (120.8 kg)   08/02/17 266 lb 4 oz (120.8 kg)   03/27/17 275 lb 12.8 oz (125.1 kg)              We Performed the Following     [facility] PT, HAND, AND CHIROPRACTIC REFERRAL        Primary Care Provider Office Phone # Fax #    [facility] [phone]       [address] [facility] [address]        Equal Access to Services     LUCA KELLER : Hadii jatinder lillyo Sovadim, waaxda luqadaha, qaybta kaalmada adeegyada, mary raymundo. So [facility] [phone].    ATENCIÓN: Si habla español, tiene a beard disposición servicios gratuitos de asistencia lingüística. Isidra al [phone].    We comply with applicable federal civil rights laws and [place] laws. We do not discriminate on the basis of race, color, national origin, age, disability sex, sexual orientation or gender identity.            Thank you!     Thank you for choosing [facility]  for your care. Our goal is always to provide you with excellent care. Hearing back from our patients is one way we can continue to improve our services. Please take a few minutes to complete the written survey that you may receive in the mail after your visit with us. Thank you!             Your Updated Medication List - Protect others around you: Learn how to safely use, store and throw away your medicines at www.disposemymeds.org.          This list is accurate as of: 8/7/17  7:33 PM.  Always use your most recent med list.                   Brand Name Dispense Instructions for use Diagnosis    citalopram 40 MG tablet    celeXA    30 tablet    Take 1 tablet (40 mg) by mouth daily Due for a visit " for refills!    Generalized anxiety disorder, Major depressive disorder, recurrent episode, moderate (H)       cyclobenzaprine 5 MG tablet    FLEXERIL    45 tablet    Take 1 tablet (5 mg) by mouth 2 times daily as needed for muscle spasms    Low back pain radiating to right leg       ferrous sulfate 142 (45 FE) MG Tbcr    SLO-FE    30 tablet    Take 1 tablet (142 mg) by mouth daily    Pain in both lower extremities       naproxen 500 MG tablet    NAPROSYN    60 tablet    Take 1 tablet (500 mg) by mouth 2 times daily (with meals)    Low back pain radiating to right leg       order for DME      Resmed Airsense 10 auto cpap 6-7 cm, Airfit P10 for her XS pillows        traMADol 50 MG tablet    ULTRAM    40 tablet    Take 1-2 tablets ( mg) by mouth every 6 hours as needed for pain    Low back pain radiating to right leg       traZODone 100 MG tablet    DESYREL    135 tablet    Take 1.5 tablets (150 mg) by mouth nightly as needed for sleep    Insomnia due to other mental disorder

## 2017-08-08 DIAGNOSIS — F33.1 MAJOR DEPRESSIVE DISORDER, RECURRENT EPISODE, MODERATE (H): Chronic | ICD-10-CM

## 2017-08-08 DIAGNOSIS — F41.1 GENERALIZED ANXIETY DISORDER: Chronic | ICD-10-CM

## 2017-08-08 NOTE — PATIENT INSTRUCTIONS
Plan:  - Today's Plan of Care:  Observe and monitor symptoms  Rehab: Physical Therapy: [facility] - [phone]  Steroid injection of the  bilateral hip: intra-articular  performed via ultrasound by Dr. Simons    Follow Up: 2 weeks after injection    If you have any further questions for your physician or physician s care team you can call [phone] and use option 3 to leave a voice message. Calls received during business hours will be returned same day.

## 2017-08-08 NOTE — TELEPHONE ENCOUNTER
Medication Detail      Disp Refills Start End ADRIANNA   citalopram (CELEXA) 40 MG tablet 30 tablet 0 7/6/2017  No   Sig: Take 1 tablet (40 mg) by mouth daily Due for a visit for refills!   Class: E-Prescribe   Route: Oral   Order: [id number]   E-Prescribing Status       Last Office Visit with [facility] primary care provider:  8/2/2017   Next 5 appointments (look out 90 days)     Aug 21, 2017 10:40 AM CDT   Return Visit with Lashonda Sesay MD   [facility] [place] [facility] [place]    [address]  [place] [address]   [phone]            Sep 18, 2017 10:20 AM CDT   SHORT [facility]on [facility]   [facility]    [address] [place] [address]   [phone]            Sep 20, 2017 12:20 PM CDT   PHYSICAL with Guera [facility]ellon MD   [facility]    [address] [place] [address]   [phone]                   Last PHQ-9 score on record=   PHQ-9 SCORE 12/5/2016   Total Score -   Total Score 5

## 2017-08-09 ENCOUNTER — TELEPHONE (OUTPATIENT)
Dept: ORTHOPEDICS | Facility: CLINIC | Age: 52
End: 2017-08-09

## 2017-08-09 NOTE — TELEPHONE ENCOUNTER
LVM for patient to return call. Please leave a time that you will be available for a call back    Pt is scheduled with Dr Sesay on 8/21 however Dr. Sesay referred her to Dr. Simons for bilateral hip injections. Pt should schedule appt with Dr. Simons for this (First available 8/23). Dr Sesay would like to see her back 2 weeks following the injection.      April Box M.Ed., ATR, ATC

## 2017-08-10 RX ORDER — CITALOPRAM HYDROBROMIDE 40 MG/1
TABLET ORAL
Qty: 30 TABLET | Refills: 1 | Status: SHIPPED | OUTPATIENT
Start: 2017-08-10 | End: 2017-09-18

## 2017-08-10 NOTE — TELEPHONE ENCOUNTER
LVM with detailed info on scheduling injection with Dr. Simons and following up with Dr. Sesay 2 weeks after the injections.  Left number for central scheduling as well a clinic number for questions.    Marga Del Cid, ATC

## 2017-08-10 NOTE — TELEPHONE ENCOUNTER
Patient was seen on 8/2 but it appears that anxiety wasn't discussed. Olena to 9/18 appt & added to appt note.  Jocelyn Dillard RN

## 2017-08-15 ENCOUNTER — THERAPY VISIT (OUTPATIENT)
Dept: PHYSICAL THERAPY | Facility: CLINIC | Age: 52
End: 2017-08-15
Payer: COMMERCIAL

## 2017-08-15 DIAGNOSIS — M54.50 BILATERAL LOW BACK PAIN WITHOUT SCIATICA: Primary | ICD-10-CM

## 2017-08-15 PROCEDURE — 97161 PT EVAL LOW COMPLEX 20 MIN: CPT | Mod: GP | Performed by: PHYSICAL THERAPIST

## 2017-08-15 PROCEDURE — 97110 THERAPEUTIC EXERCISES: CPT | Mod: GP | Performed by: PHYSICAL THERAPIST

## 2017-08-15 ASSESSMENT — ACTIVITIES OF DAILY LIVING (ADL)
STANDING_FOR_15_MINUTES: EXTREME DIFFICULTY
HOS_ADL_SCORE(%): 26.47
WALKING_INITIALLY: EXTREME DIFFICULTY
RECREATIONAL_ACTIVITIES: EXTREME DIFFICULTY
DEEP_SQUATTING: UNABLE TO DO
LIGHT_TO_MODERATE_WORK: MODERATE DIFFICULTY
STEPPING_UP_AND_DOWN_CURBS: EXTREME DIFFICULTY
HOS_ADL_ITEM_SCORE_TOTAL: 18
TWISTING/PIVOTING_ON_INVOLVED_LEG: EXTREME DIFFICULTY
GOING_DOWN_1_FLIGHT_OF_STAIRS: EXTREME DIFFICULTY
WALKING_UP_STEEP_HILLS: EXTREME DIFFICULTY
GOING_UP_1_FLIGHT_OF_STAIRS: EXTREME DIFFICULTY
HEAVY_WORK: UNABLE TO DO
SITTING_FOR_15_MINUTES: EXTREME DIFFICULTY
ROLLING_OVER_IN_BED: EXTREME DIFFICULTY
HOS_ADL_COUNT: 17
WALKING_APPROXIMATELY_10_MINUTES: EXTREME DIFFICULTY
HOS_ADL_HIGHEST_POTENTIAL_SCORE: 68
GETTING_INTO_AND_OUT_OF_AN_AVERAGE_CAR: MODERATE DIFFICULTY
WALKING_DOWN_STEEP_HILLS: MODERATE DIFFICULTY
GETTING_INTO_AND_OUT_OF_A_BATHTUB: EXTREME DIFFICULTY
WALKING_15_MINUTES_OR_GREATER: EXTREME DIFFICULTY
PUTTING_ON_SOCKS_AND_SHOES: EXTREME DIFFICULTY

## 2017-08-15 NOTE — PROGRESS NOTES
Subjective:    Patient is a 52 year old female presenting with rehab left hip hpi.   Sunshine Delgado is a 52 year old female with a bilateral hips condition.  Condition occurred with:  Insidious onset.  Condition occurred: for unknown reasons.  This is a chronic condition  Patient reports the onset of bilateral hip and leg pain in January 2017 without a specific mechanism of injury or change in daily routine..      Radiates to:  Thigh.  Pain is described as aching and stabbing and is constant and reported as 9/10.  Associated symptoms:  Loss of motion/stiffness, loss of strength, buckling/giving out and catching. Pain is worse during the day.  Symptoms are exacerbated by ascending stairs, descending stairs, bending/squatting, running, standing and walking and relieved by rest.  Since onset symptoms are unchanged.  Special tests:  MRI and x-ray (see epic).      General health as reported by patient is fair.                      Red flags:  None as reported by the patient.                        Objective:    System         Lumbar/SI Evaluation  ROM:    AROM Lumbar:   Flexion:            Hands to floor, painful  Ext:                    Mod loss, no pain   Side Bend:        Left:  Hand to knee, no pain    Right:  Hand to knee, no tammie  Rotation:           Left:  Mod loss due to neck pain    Right:  Mod loss due to neck  Side Glide:        Left:     Right:           Lumbar Myotomes:  Lumbar myotomes: grossly 4+/5, bilaterally.                Lumbar Dermtomes:  normal                Neural Tension/Mobility:    Left side:  SLR positive.   Right side:   SLR positive.  Lumbar Palpation:    Tenderness present at Left:    PSIS; Gluteus Medius; Greater Trochanter and Vertebral  Tenderness present at Right: PSIS; Gluteus Medius; Greater Trochanter and Vertebral        SI joint/Sacrum:    unremarkable                                            Hip Evaluation  HIP AROM:  AROM:    Left Hip:     Normal    Right Hip:    Normal                    Hip Strength:    Flexion:   Left: 4+/5   Pain:  Right: 4+/5   Pain:                    Extension:  Left: 4-/5  Pain:Right: 4-/5    Pain:    Abduction:  Left: 4-/5     Pain:Right: 4-/5    Pain:  Adduction:  Left: 4+/5    Pain:Right: 4+/5   Pain:  Internal Rotation:  Left: 4+/5    Pain:Right: 4+/5   Pain:  External Rotation:  Left: 4-/5   Pain:  Right: 4-/5   Pain:            Hip Special Testing:      Left hip negative for the following special tests:  Juan; Fadir/Labrum or Distraction  Right hip negative for the following special tests:  Juan; Fadir/Labrum or Distraction    Hip Palpation:    Left hip tenderness present at:   Greater Trachanter  Right hip tenderness present at:  Greater Trachanter  Functional Testing:  not assessed                         [facility] Lumbar Evaluation        Test Movements:  FIS: During: produces  After: no worse  Mechanical Response: no effect  Repeat FIS: During: produces  After: worse  Mechanical Response: no effect  EIS: During: no effect  After: no effect  Mechanical Response: no effect  Repeat EIS: During: abolishes  After: better  Mechanical Response: IncROM                                                     ROS    Assessment/Plan:      Patient is a 52 year old female with both sides hip and low back complaints.    Patient has the following significant findings with corresponding treatment plan.                Diagnosis 1:  Bilateral hip pain  Pain -  hot/cold therapy, manual therapy, self management, education and home program  Decreased ROM/flexibility - manual therapy, therapeutic exercise, therapeutic activity and home program  Decreased joint mobility - manual therapy, therapeutic exercise, therapeutic activity and home program  Decreased strength - therapeutic exercise, therapeutic activities and home program  Decreased function - therapeutic activities and home program  Impaired posture - neuro re-education    Therapy Evaluation Codes:    1) History comprised of:   Personal factors that impact the plan of care:      Time since onset of symptoms.    Comorbidity factors that impact the plan of care are:      Depression and Overweight.     Medications impacting care: None.  2) Examination of Body Systems comprised of:   Body structures and functions that impact the plan of care:      Hip and Lumbar spine.   Activity limitations that impact the plan of care are:      Bending, Lifting, Sitting, Squatting/kneeling, Stairs and Walking.  3) Clinical presentation characteristics are:   Stable/Uncomplicated.  4) Decision-Making    Low complexity using standardized patient assessment instrument and/or measureable assessment of functional outcome.  Cumulative Therapy Evaluation is: Low complexity.    Previous and current functional limitations:  (See Goal Flow Sheet for this information)    Short term and Long term goals: (See Goal Flow Sheet for this information)     Communication ability:  Patient appears to be able to clearly communicate and understand verbal and written communication and follow directions correctly.  Treatment Explanation - The following has been discussed with the patient:   RX ordered/plan of care  Anticipated outcomes  Possible risks and side effects  This patient would benefit from PT intervention to resume normal activities.   Rehab potential is good.    Frequency:  1 X week, once daily  Duration:  for 6 weeks  Discharge Plan:  Achieve all LTG.  Independent in home treatment program.  Reach maximal therapeutic benefit.    Please refer to the daily flowsheet for treatment today, total treatment time and time spent performing 1:1 timed codes.

## 2017-08-15 NOTE — MR AVS SNAPSHOT
After Visit Summary   8/15/2017    Sunshine Delgado    MRN: [id number]           Patient Information     Date Of Birth          1965        Visit Information        Provider Department      8/15/2017 3:30 PM Raúl Wilkins, PT [facility]        Today's Diagnoses     Bilateral low back pain without sciatica    -  1       Follow-ups after your visit        Your next 10 appointments already scheduled     Aug 23, 2017  9:20 AM CDT   PROCEDURE with Reese Siomns DO   [facility] [place] ([facility] [place])    [address]  [place] [address]   [phone]            Aug 23, 2017 11:20 AM CDT   RAPHAEL Extremity with Raúl Wilkins, PT   [facility] ([facility] [place])    [address]  [address]  [place] [address]   [phone]            Aug 28, 2017 10:40 AM CDT   RAPHAEL Extremity with Raúl Wilkins, PT   [facility] ([facility] [place])    [address]  [address]  [place] [address]   [phone]            Sep 07, 2017 11:00 AM CDT   Return Visit with Lashonda Sesay MD   [facility] [place] ([facility] [place])    [address]  [place] [address]   [phone]            Sep 13, 2017 10:30 AM CDT   (Arrive by 10:15 AM)   New Bariatric Surgery Consult with LINDA Nuñez [facility] ([facility])    [address] [place] [address] [phone]            Sep 13, 2017 11:00 AM CDT   NUTRITION VISIT with STANFORD Koch [facility] ([facility] [place])    [address] [place] [address]   [phone]            Sep 18, 2017 10:20 AM CDT   SHORT with Guera Castellon MD   [facility]    [address] [place] [address]  [place] [id number] [phone]           Your Arrival times is X, We need you to be here at this time to get checked in and have the assistant get you ready for the provider, which can take about 15 minutes. Your appointment time with your provider is at  X. Thank you.            Sep 20, 2017 12:20 PM CDT   PHYSICAL with Guera Castellon MD   [facility]    [address] [place] [id number] [phone]              Who to contact     If you have questions or need follow up information about today's clinic visit or your schedule please contact [facility] directly at [phone].  Normal or non-critical lab and imaging results will be communicated to you by [employer], letter or phone within 4 business days after the clinic has received the results. If you do not hear from us within 7 days, please contact the clinic through [email or web address] or phone. If you have a critical or abnormal lab result, we will notify you by phone as soon as possible.  Submit refill requests through [employer] or call your pharmacy and they will forward the refill request to us. Please allow 3 business days for your refill to be completed.          Additional Information About Your Visit        [employer] Information     [employer] gives you secure access to your electronic health record. If you see a primary care provider, you can also send messages to your care team and make appointments. If you have questions, please call your primary care clinic.  If you do not have a primary care provider, please call [phone] and they will assist you.        Care EveryWhere ID     This is your Care EveryWhere ID. This could be used by other organizations to access your [place] medical records  [id number]         Blood Pressure from Last 3 Encounters:   08/07/17 113/73   08/02/17 128/72   03/27/17 111/75    Weight from Last 3 Encounters:   08/07/17 120.8 kg (266 lb 4 oz)   08/02/17 120.8 kg  (266 lb 4 oz)   03/27/17 125.1 kg (275 lb 12.8 oz)              We Performed the Following     PT Eval, Low Complexity (36546)     Therapeutic Exercises        Primary Care Provider Office Phone # Fax #    [facility] [phone] [address] [address]        Equal Access to Services     LUCA KELLER : Hadii aad ku hadasho Soomaali, waaxda luqadaha, qaybta kaalmada adeegyada, waxay idiin hayaan adeeg khjovanteena laines . So [facility] [phone].    ATENCIÓN: Si habla español, tiene a beard disposición servicios gratuitos de asistencia lingüística. Amyame al [phone].    We comply with applicable federal civil rights laws and [place] laws. We do not discriminate on the basis of race, color, national origin, age, disability sex, sexual orientation or gender identity.            Thank you!     Thank you for choosing [facility]  for your care. Our goal is always to provide you with excellent care. Hearing back from our patients is one way we can continue to improve our services. Please take a few minutes to complete the written survey that you may receive in the mail after your visit with us. Thank you!             Your Updated Medication List - Protect others around you: Learn how to safely use, store and throw away your medicines at www.disposemymeds.org.          This list is accurate as of: 8/15/17  4:17 PM.  Always use your most recent med list.                   Brand Name Dispense Instructions for use Diagnosis    citalopram 40 MG tablet    celeXA    30 tablet    TAKE 1 TABLET (40 MG) BY MOUTH DAILY DUE FOR A VISIT FOR REFILLS!    Generalized anxiety disorder, Major depressive disorder, recurrent episode, moderate (H)       cyclobenzaprine 5 MG tablet    FLEXERIL    45 tablet    Take 1 tablet (5 mg) by mouth 2 times daily as needed for muscle spasms    Low back pain radiating to right leg       ferrous sulfate 142 (45 FE) MG Tbcr    SLO-FE    30 tablet     Take 1 tablet (142 mg) by mouth daily    Pain in both lower extremities       naproxen 500 MG tablet    NAPROSYN    60 tablet    Take 1 tablet (500 mg) by mouth 2 times daily (with meals)    Low back pain radiating to right leg       order for DME      Resmed Airsense 10 auto cpap 6-7 cm, Airfit P10 for her XS pillows        traMADol 50 MG tablet    ULTRAM    40 tablet    Take 1-2 tablets ( mg) by mouth every 6 hours as needed for pain    Low back pain radiating to right leg       traZODone 100 MG tablet    DESYREL    135 tablet    Take 1.5 tablets (150 mg) by mouth nightly as needed for sleep    Insomnia due to other mental disorder

## 2017-08-16 DIAGNOSIS — M54.50 CHRONIC BILATERAL LOW BACK PAIN WITHOUT SCIATICA: ICD-10-CM

## 2017-08-16 DIAGNOSIS — M25.559 PAIN IN JOINT, PELVIC REGION AND THIGH, UNSPECIFIED LATERALITY: Primary | ICD-10-CM

## 2017-08-16 DIAGNOSIS — G89.29 CHRONIC BILATERAL LOW BACK PAIN WITHOUT SCIATICA: ICD-10-CM

## 2017-08-16 NOTE — PROGRESS NOTES
Subjective:    Patient is a 52 year old female presenting with rehab left ankle/foot hpi.                                      Pertinent medical history includes:  None.  Medical allergies: no.  Other surgeries include:  None reported.  Current medications:  Anti-depressants.  Current occupation is .                                    Objective:    System    Physical Exam    General     ROS    Assessment/Plan:

## 2017-08-17 ENCOUNTER — TELEPHONE (OUTPATIENT)
Dept: FAMILY MEDICINE | Facility: CLINIC | Age: 52
End: 2017-08-17

## 2017-08-17 NOTE — TELEPHONE ENCOUNTER
Please abstract the following data from this visit with this patient into the appropriate field in Epic:    Colonoscopy done on this date: 2015 (approximately), by this group: [facility], results were normal.

## 2017-08-23 ENCOUNTER — THERAPY VISIT (OUTPATIENT)
Dept: PHYSICAL THERAPY | Facility: CLINIC | Age: 52
End: 2017-08-23
Payer: COMMERCIAL

## 2017-08-23 ENCOUNTER — OFFICE VISIT (OUTPATIENT)
Dept: ORTHOPEDICS | Facility: CLINIC | Age: 52
End: 2017-08-23
Payer: COMMERCIAL

## 2017-08-23 ENCOUNTER — PRE VISIT (OUTPATIENT)
Dept: SURGERY | Facility: CLINIC | Age: 52
End: 2017-08-23

## 2017-08-23 DIAGNOSIS — M54.50 BILATERAL LOW BACK PAIN WITHOUT SCIATICA: Primary | ICD-10-CM

## 2017-08-23 DIAGNOSIS — M25.559 PAIN IN JOINT, PELVIC REGION AND THIGH, UNSPECIFIED LATERALITY: Primary | ICD-10-CM

## 2017-08-23 PROCEDURE — 97110 THERAPEUTIC EXERCISES: CPT | Mod: GP | Performed by: PHYSICAL THERAPIST

## 2017-08-23 PROCEDURE — 20611 DRAIN/INJ JOINT/BURSA W/US: CPT | Mod: 50 | Performed by: FAMILY MEDICINE

## 2017-08-23 RX ORDER — TRIAMCINOLONE ACETONIDE 40 MG/ML
80 INJECTION, SUSPENSION INTRA-ARTICULAR; INTRAMUSCULAR ONCE
Qty: 2 ML | Refills: 0 | OUTPATIENT
Start: 2017-08-23 | End: 2017-08-23

## 2017-08-23 NOTE — PROGRESS NOTES
Sunshine Delgado  :  1965  DOS: 2017  MRN: [id number]    Sports Medicine Clinic Procedure    Ultrasound Guided Bilateral Intra-Articular Hip Injection    Clinical History:  Patient reports pain for the past 8 months.  No injury or overuse.  Pain is all over, low back, lateral hips, groin, down legs.  Previously her right leg was numb as well.  Has seen Francesca Abraham in the past for low back pain, also saw Spine surgery and had and RONAK that worked for only around 1 week.  Has not done PT, previously pain was too severe.  Is considering pain clinic referral.  - Here today to help determine if there is a hip diagnosis contributing to her pain.    Diagnosis:   1. Pain in joint, pelvic region and thigh, unspecified laterality      Referring Physician: Lashonda Sesay M.D.  Technique: The risks of the procedure were explained to the patient.  A consent was signed for the intra-articular hip injection.  The patient was evaluated with a [employer] ultrasound machine using a 12 MHz linear probe.     4 ml of 1% lidocaine was used for local anesthesia. The Bilateral hip was prepped and draped in a sterile manner.  Ultrasound identification of the acetabulum, femoral head, and femoral neck in both long and short axis.  The location of the femoral vessels were noted and marked.  The probe was placed in a oblique-sagittal axis to the Bilateral femoral neck.  A 5 inch 22 gauge needle was placed under ultrasound guidance into the anterior hip capsule.  A mixture of 4 ml's 0.2% ropivicaine and 1 ml kenalog (40mg/ml) was injected without difficulty on oblique-sagittal axis view.  The needle was removed and there was good hemostasis without complications.  There was ultrasound documentation of needle placement and injection.  Pre-procedural pain 7/10 bilaterally.  Post-procedural pain 2/10 on R and 1/10 on L, with some mild muscle discomfort on L due to needle path     Impression:  Successful Left intra-articular hip  injection.    Plan:  Follow up 2 weeks as discussed with Dr Sesay  Expectations and goals of CSI reviewed  Often 2-3 days for steroid effect, and can take up to two weeks for maximum effect  We discussed modified progressive pain-free activity as tolerated  Do not overuse in first two weeks if feeling better due to concern for vulnerability while steroid is working  Supportive care reviewed  All questions were answered today  Contact us with additional questions or concerns  Signs and sx of concern reviewed      Reese Simons DO, CAQ  Primary Care Sports Medicine  [facility]

## 2017-08-23 NOTE — MR AVS SNAPSHOT
After Visit Summary   8/23/2017    Sunshine Delgado    MRN: [id number]           Patient Information     Date Of Birth          1965        Visit Information        Provider Department      8/23/2017 11:20 AM Raúl Wilkins, PT [facility]        Today's Diagnoses     Bilateral low back pain without sciatica    -  1       Follow-ups after your visit        Your next 10 appointments already scheduled     Aug 25, 2017  8:00 AM CDT   MA SCREENING DIGITAL BILATERAL with FKMA1   [facility] ([facility]    [address] [address]   [phone]           Do not use any powder, lotion or deodorant under your arms or on your breast. If you do, we will ask you to remove it before your exam.  Wear comfortable, two-piece clothing.  If you have any allergies, tell your care team.  Bring any previous mammograms from other facilities or have them mailed to the breast center.            Aug 28, 2017 10:40 AM CDT   RAPHAEL Extremity with Raúl Wilkins, PT   [facility] ([facility])    [address]  [address]  [facility] [address]   [phone]            Sep 07, 2017 11:00 AM CDT   Return Visit with Lashonda Sesay MD   [facility] [place] ([facility] [place])    [address]  [place] [address]   [phone]            Sep 13, 2017 10:30 AM CDT   (Arrive by 10:15 AM)   New Bariatric Surgery Consult with LINDA Nuñez [facility] ([facility])    [address] [place] [id number] [phone]            Sep 13, 2017 11:00 AM CDT   NUTRITION VISIT with STANFORD Koch [facility] ([facility] [place])    [address] [place] [id number] [phone]            Sep 18, 2017 10:20 AM CDT   SHORT with Guera Andrea  MD Cande   [facility] ([facility])    [address] [place] [address] [phone]           Your Arrival times is X, We need you to be here at this time to get checked in and have the assistant get you ready for the provider, which can take about 15 minutes. Your appointment time with your provider is at  X. Thank you.            Sep 20, 2017 12:20 PM CDT   PHYSICAL with Guera Castellon MD   [facility] ([address] [address] [phone]              Who to contact     If you have questions or need follow up information about today's clinic visit or your schedule please contact [facility] directly at [phone].  Normal or non-critical lab and imaging results will be communicated to you by MyChart, letter or phone within 4 business days after the clinic has received the results. If you do not hear from us within 7 days, please contact the clinic through [employer] or phone. If you have a critical or abnormal lab result, we will notify you by phone as soon as possible.  Submit refill requests through [employer] or call your pharmacy and they will forward the refill request to us. Please allow 3 business days for your refill to be completed.          Additional Information About Your Visit        [employer] Information     [employer] gives you secure access to your electronic health record. If you see a primary care provider, you can also send messages to your care team and make appointments. If you have questions, please call your primary care clinic.  If you do not have a primary care provider, please call [phone] and they will assist you.        Care EveryWhere ID     This is your Care EveryWhere ID. This could be used by other organizations to access your [place] medical records  [id number]         Blood Pressure from Last 3 Encounters:   08/07/17 113/73   08/02/17  128/72   03/27/17 111/75    Weight from Last 3 Encounters:   08/07/17 120.8 kg (266 lb 4 oz)   08/02/17 120.8 kg (266 lb 4 oz)   03/27/17 125.1 kg (275 lb 12.8 oz)              We Performed the Following     Therapeutic Exercises        Primary Care Provider Office Phone # Fax #    [facility] [phone]       [address] [facility] [address]        Equal Access to Services     LUCA KELLER : Hadii aad ku hadasho Soomaali, waaxda luqadaha, qaybta kaalmada adeegyada, waxay idiin hayaan ryan raymundo. So [facility] [phone].    ATENCIÓN: Si habla español, tiene a beard disposición servicios gratuitos de asistencia lingüística. Llame al [phone].    We comply with applicable federal civil rights laws and [place] laws. We do not discriminate on the basis of race, color, national origin, age, disability sex, sexual orientation or gender identity.            Thank you!     Thank you for choosing [facility]  for your care. Our goal is always to provide you with excellent care. Hearing back from our patients is one way we can continue to improve our services. Please take a few minutes to complete the written survey that you may receive in the mail after your visit with us. Thank you!             Your Updated Medication List - Protect others around you: Learn how to safely use, store and throw away your medicines at www.disposemymeds.org.          This list is accurate as of: 8/23/17 11:49 AM.  Always use your most recent med list.                   Brand Name Dispense Instructions for use Diagnosis    citalopram 40 MG tablet    celeXA    30 tablet    TAKE 1 TABLET (40 MG) BY MOUTH DAILY DUE FOR A VISIT FOR REFILLS!    Generalized anxiety disorder, Major depressive disorder, recurrent episode, moderate (H)       cyclobenzaprine 5 MG tablet    FLEXERIL    45 tablet    Take 1 tablet (5 mg) by mouth 2 times daily as needed for muscle spasms    Low back pain radiating to  right leg       ferrous sulfate 142 (45 FE) MG Tbcr    SLO-FE    30 tablet    Take 1 tablet (142 mg) by mouth daily    Pain in both lower extremities       naproxen 500 MG tablet    NAPROSYN    60 tablet    Take 1 tablet (500 mg) by mouth 2 times daily (with meals)    Low back pain radiating to right leg       order for DME      Resmed Airsense 10 auto cpap 6-7 cm, Airfit P10 for her XS pillows        traMADol 50 MG tablet    ULTRAM    40 tablet    Take 1-2 tablets ( mg) by mouth every 6 hours as needed for pain    Low back pain radiating to right leg       traZODone 100 MG tablet    DESYREL    135 tablet    Take 1.5 tablets (150 mg) by mouth nightly as needed for sleep    Insomnia due to other mental disorder

## 2017-08-23 NOTE — MR AVS SNAPSHOT
After Visit Summary   8/23/2017    Sunshine Delgado    MRN: [id number]           Patient Information     Date Of Birth          1965        Visit Information        Provider Department      8/23/2017 9:20 AM Reese Simons,  [facility] [place]        Today's Diagnoses     Pain in joint, pelvic region and thigh, unspecified laterality    -  1       Follow-ups after your visit        Your next 10 appointments already scheduled     Aug 25, 2017  8:00 AM CDT   MA SCREENING DIGITAL BILATERAL with FKMA1   [facility] ([facility])    [address] [address]   [phone]           Do not use any powder, lotion or deodorant under your arms or on your breast. If you do, we will ask you to remove it before your exam.  Wear comfortable, two-piece clothing.  If you have any allergies, tell your care team.  Bring any previous mammograms from other facilities or have them mailed to the breast center.            Aug 28, 2017 10:40 AM CDT   RAPHAEL Extremity with Raúl Wilkins, PT   [facility] ([facility] [place])    [address]  [address]  [facility] [address]   [phone]            Sep 07, 2017 11:00 AM CDT   Return Visit with Lashonda Sesay MD   [facility] [place] ([facility] [place])    [address]  [place] [address]   [phone]            Sep 13, 2017 10:30 AM CDT   (Arrive by 10:15 AM)   New Bariatric Surgery Consult with LINDA Nuñez [facility] ([facility])    [address] [place] [address] [phone]            Sep 13, 2017 11:00 AM CDT   NUTRITION VISIT with STANFORD Koch [facility] ([facility])    [address] [place] [id number] [phone]             Sep 18, 2017 10:20 AM CDT   SHORT with Guera Castellon MD   [facility] ([facility])    [address] [place] [address] [phone]           Your Arrival times is X, We need you to be here at this time to get checked in and have the assistant get you ready for the provider, which can take about 15 minutes. Your appointment time with your provider is at  X. Thank you.            Sep 20, 2017 12:20 PM CDT   PHYSICAL with Guera Castellon MD   [facility] ([facility])    [address] [place] [address]   [phone]              Who to contact     If you have questions or need follow up information about today's clinic visit or your schedule please contact [facility] directly at [phone].  Normal or non-critical lab and imaging results will be communicated to you by MyChart, letter or phone within 4 business days after the clinic has received the results. If you do not hear from us within 7 days, please contact the clinic through Customer Alliance[employer] or phone. If you have a critical or abnormal lab result, we will notify you by phone as soon as possible.  Submit refill requests through YouEarnedIt or call your pharmacy and they will forward the refill request to us. Please allow 3 business days for your refill to be completed.          Additional Information About Your Visit        YouEarnedIt Information     YouEarnedIt gives you secure access to your electronic health record. If you see a primary care provider, you can also send messages to your care team and make appointments. If you have questions, please call your primary care clinic.  If you do not have a primary care provider, please call [phone] and they will assist you.        Care EveryWhere ID     This is your Care EveryWhere ID. This could be used by other organizations to access your [facility]  records  [id number]         Blood Pressure from Last 3 Encounters:   08/07/17 113/73   08/02/17 128/72   03/27/17 111/75    Weight from Last 3 Encounters:   08/07/17 266 lb 4 oz (120.8 kg)   08/02/17 266 lb 4 oz (120.8 kg)   03/27/17 275 lb 12.8 oz (125.1 kg)              We Performed the Following     HC ARTHROCENTESIS ASPIR&/INJ MAJOR JT/BURSA W/US     TRIAMCINOLONE ACET INJ NOS          Today's Medication Changes          These changes are accurate as of: 8/23/17  7:42 PM.  If you have any questions, ask your nurse or doctor.               Start taking these medicines.        Dose/Directions    triamcinolone acetonide 40 MG/ML injection   Commonly known as:  KENALOG-40   Used for:  Pain in joint, pelvic region and thigh, unspecified laterality   Started by:  Reese Simons DO        Dose:  80 mg   2 mLs (80 mg) by INTRA-ARTICULAR route once for 1 dose   Quantity:  2 mL   Refills:  0            Where to get your medicines      Some of these will need a paper prescription and others can be bought over the counter.  Ask your nurse if you have questions.     You don't need a prescription for these medications     triamcinolone acetonide 40 MG/ML injection                Primary Care Provider Office Phone # Fax #    [facility] [phone]       [address] [place] [address]        Equal Access to Services     LUCA KELLER AH: Hadii jatinder lo hadasho Soronnali, waaxda luqadaha, qaybta kaalmada adeegyada, wax osiris raymundo. So [facility] [phone].    ATENCIÓN: Si habla español, tiene a beard disposición servicios gratuitos de asistencia lingüística. Llame al [phone].    We comply with applicable federal civil rights laws and [place] laws. We do not discriminate on the basis of race, color, national origin, age, disability sex, sexual orientation or gender identity.            Thank you!     Thank you for choosing [facility]  for your care. Our goal is always to provide you with excellent care. Hearing back from our patients is one way we can continue to improve our services. Please take a few minutes to complete the written survey that you may receive in the mail after your visit with us. Thank you!             Your Updated Medication List - Protect others around you: Learn how to safely use, store and throw away your medicines at www.disposemymeds.org.          This list is accurate as of: 8/23/17  7:42 PM.  Always use your most recent med list.                   Brand Name Dispense Instructions for use Diagnosis    citalopram 40 MG tablet    celeXA    30 tablet    TAKE 1 TABLET (40 MG) BY MOUTH DAILY DUE FOR A VISIT FOR REFILLS!    Generalized anxiety disorder, Major depressive disorder, recurrent episode, moderate (H)       cyclobenzaprine 5 MG tablet    FLEXERIL    45 tablet    Take 1 tablet (5 mg) by mouth 2 times daily as needed for muscle spasms    Low back pain radiating to right leg       ferrous sulfate 142 (45 FE) MG Tbcr    SLO-FE    30 tablet    Take 1 tablet (142 mg) by mouth daily    Pain in both lower extremities       naproxen 500 MG tablet    NAPROSYN    60 tablet    Take 1 tablet (500 mg) by mouth 2 times daily (with meals)    Low back pain radiating to right leg       order for DME      Resmed Airsense 10 auto cpap 6-7 cm, Airfit P10 for her XS pillows        traMADol 50 MG tablet    ULTRAM    40 tablet    Take 1-2 tablets ( mg) by mouth every 6 hours as needed for pain    Low back pain radiating to right leg       traZODone 100 MG tablet    DESYREL    135 tablet    Take 1.5 tablets (150 mg) by mouth nightly as needed for sleep    Insomnia due to other mental disorder       triamcinolone acetonide 40 MG/ML injection    KENALOG-40    2 mL    2 mLs (80 mg) by INTRA-ARTICULAR route once for 1 dose    Pain in joint, pelvic region and thigh, unspecified laterality

## 2017-08-23 NOTE — TELEPHONE ENCOUNTER
1.  Date/reason for appt: 9/13/17-- NBS  2.  Referring provider: Lesly Pacheco  3.  Call to patient (Yes / No - short description): no, referred  4.  Previous care at / records requested from: [facility] -- referral and records in [facility].

## 2017-08-31 ENCOUNTER — OFFICE VISIT (OUTPATIENT)
Dept: FAMILY MEDICINE | Facility: CLINIC | Age: 52
End: 2017-08-31
Payer: COMMERCIAL

## 2017-08-31 VITALS
TEMPERATURE: 98.4 F | HEIGHT: 65 IN | DIASTOLIC BLOOD PRESSURE: 80 MMHG | HEART RATE: 70 BPM | WEIGHT: 268 LBS | RESPIRATION RATE: 16 BRPM | SYSTOLIC BLOOD PRESSURE: 122 MMHG | BODY MASS INDEX: 44.65 KG/M2 | OXYGEN SATURATION: 96 %

## 2017-08-31 DIAGNOSIS — E66.01 MORBID OBESITY DUE TO EXCESS CALORIES (H): Chronic | ICD-10-CM

## 2017-08-31 DIAGNOSIS — J01.90 ACUTE SINUSITIS, RECURRENCE NOT SPECIFIED, UNSPECIFIED LOCATION: Primary | ICD-10-CM

## 2017-08-31 DIAGNOSIS — F32.0 MILD MAJOR DEPRESSION (H): ICD-10-CM

## 2017-08-31 PROCEDURE — 99213 OFFICE O/P EST LOW 20 MIN: CPT | Performed by: FAMILY MEDICINE

## 2017-08-31 NOTE — NURSING NOTE
"Chief Complaint   Patient presents with     Cough     2 1/2 week     Headache       Initial /80  Pulse 70  Temp 98.4  F (36.9  C)  Resp 16  Ht 5' 5\" (1.651 m)  Wt 268 lb (121.6 kg)  SpO2 96%  BMI 44.6 kg/m2 Estimated body mass index is 44.6 kg/(m^2) as calculated from the following:    Height as of this encounter: 5' 5\" (1.651 m).    Weight as of this encounter: 268 lb (121.6 kg).  Medication Reconciliation: complete     Zelda Garcia. MA      "

## 2017-08-31 NOTE — PROGRESS NOTES
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  GENERAL: alert, no distress and obese  EYES: Eyes grossly normal to inspection, PERRL and conjunctivae and sclerae normal  HENT: ear canals and TM's normal, nose and mouth without ulcers or lesions  NECK: no adenopathy, no asymmetry, masses, or scars and thyroid normal to palpation  RESP: lungs clear to auscultation - no rales, rhonchi or wheezes  CV: regular rate and rhythm, normal S1 S2, no S3 or S4, no murmur, click or rub, no peripheral edema and peripheral pulses strong  MS: extremities normal- no gross deformities noted  PSYCH: mentation appears normal, affect normal/bright    Diagnostic Test Results:  None     ASSESSMENT/PLAN:   (J01.90) Acute sinusitis, recurrence not specified, unspecified location  (primary encounter diagnosis)  Comment: Differential diagnoses would include: viral upper respiratory illness, viral bronchitis; doubt allergies   Plan: amoxicillin-clavulanate (AUGMENTIN) " 875-125 MG         per tablet        Call or return to clinic as needed if these symptoms worsen or fail to improve as anticipated.     (F32.0) Mild major depression (H)  Comment: Well controlled with medications without side effects.   Plan: follow-up with PCP as scheduled     (E66.01) Morbid obesity due to excess calories (H)  Plan: seeing our dietician; follow-up with PCP as planned       See Patient Instructions    Shana Jett MD  [facility]

## 2017-08-31 NOTE — PATIENT INSTRUCTIONS
[facility]    If you have any questions regarding to your visit please contact your care team:       Team Red:   Clinic Hours Telephone Number   Dr. [facility]  (pediatrics)  Lesly Arteaga NP 7am-7pm  Monday - Thursday   7am-5pm  Fridays  [phone] (fax)    Chele CARREON  [phone]   Urgent Care - [place] and [place] Monday-Friday  [place] - 11am-8pm  Saturday-Sunday  Both sites - 9am-5pm  [phone] - [facility]  [phone] - [place]       What options do I have for visits at the clinic other than the traditional office visit?  To expand how we care for you, many of our providers are utilizing electronic visits (e-visits) and telephone visits, when medically appropriate, for interactions with their patients rather than a visit in the clinic.   We also offer nurse visits for many medical concerns. Just like any other service, we will bill your insurance company for this type of visit based on time spent on the phone with your provider. Not all insurance companies cover these visits. Please check with your medical insurance if this type of visit is covered. You will be responsible for any charges that are not paid by your insurance.      E-visits via CorkCRM:  generally incur a $35.00 fee.  Telephone visits:  Time spent on the phone: *charged based on time that is spent on the phone in increments of 10 minutes. Estimated cost:   5-10 mins $30.00   11-20 mins. $59.00   21-30 mins. $85.00     As always, Thank you for trusting us with your health care needs!    Discharge by BUCK BO

## 2017-08-31 NOTE — MR AVS SNAPSHOT
After Visit Summary   8/31/2017    Sunshine Delgado    MRN: [id number]           Patient Information     Date Of Birth          1965        Visit Information        Provider Department      8/31/2017 11:20 AM Shana Jett MD [facility]        Today's Diagnoses     Acute sinusitis, recurrence not specified, unspecified location    -  1    Mild major depression (H)        Morbid obesity due to excess calories (H)          Care Instructions    [facility]    If you have any questions regarding to your visit please contact your care team:       Team Red:   Clinic Hours Telephone Number   Dr. [facility]  (pediatrics)  Lesly Arteaga NP 7am-7pm  Monday - Thursday   7am-5pm  Fridays  [phone] (fax)    Chele CARREON  [phone]   Urgent Care - [place] and [place] Monday-Friday  [place] - 11am-8pm  Saturday-Sunday  Both sites - 9am-5pm  [phone] - [facility]  [phone] - [place]       What options do I have for visits at the clinic other than the traditional office visit?  To expand how we care for you, many of our providers are utilizing electronic visits (e-visits) and telephone visits, when medically appropriate, for interactions with their patients rather than a visit in the clinic.   We also offer nurse visits for many medical concerns. Just like any other service, we will bill your insurance company for this type of visit based on time spent on the phone with your provider. Not all insurance companies cover these visits. Please check with your medical insurance if this type of visit is covered. You will be responsible for any charges that are not paid by your insurance.      E-visits via Skadoit:  generally incur a $35.00 fee.  Telephone visits:  Time spent on the phone: *charged based on time that is spent on the phone in increments of 10 minutes. Estimated cost:   5-10 mins $30.00   11-20  mins. $59.00   21-30 mins. $85.00     As always, Thank you for trusting us with your health care needs!    Discharge by BUCK BO                         Follow-ups after your visit        Follow-up notes from your care team     Return in about 1 month (around 9/30/2017), or if symptoms worsen or fail to improve, for physical (fasting labs up to one week prior), depression.      Your next 10 appointments already scheduled     Sep 07, 2017 11:00 AM CDT   Return Visit with Lashonda Sesay MD   [facility] [place] ([facility] [place])    [address]  [place] [address]   [phone]            Sep 13, 2017 10:30 AM CDT   (Arrive by 10:15 AM)   New Bariatric Surgery Consult with Any Morris PA-C   [facility] ([facility])    [address] [id number] [phone]            Sep 13, 2017 11:00 AM CDT   NUTRITION VISIT with Simin Wagner RD   [facility] ([facility])    [address] [id number] [phone]            Sep 18, 2017 10:20 AM CDT   SHORT with Guera Castellon MD   [facility]    [address] [place] [address] [phone]           Your Arrival times is X, We need you to be here at this time to get checked in and have the assistant get you ready for the provider, which can take about 15 minutes. Your appointment time with your provider is at  X. Thank you.            Sep 20, 2017 12:20 PM CDT   PHYSICAL with Guera Castellon MD   [facility]    [address] [place] [address] [phone]              Who to contact     If you have questions or need follow up information about today's clinic visit or your schedule please  "contact [facility] directly at [phone].  Normal or non-critical lab and imaging results will be communicated to you by MyChart, letter or phone within 4 business days after the clinic has received the results. If you do not hear from us within 7 days, please contact the clinic through nLife Therapeuticshart or phone. If you have a critical or abnormal lab result, we will notify you by phone as soon as possible.  Submit refill requests through Efield or call your pharmacy and they will forward the refill request to us. Please allow 3 business days for your refill to be completed.          Additional Information About Your Visit        [employer] Therapeutics[employer]b [employer] Information     Efield gives you secure access to your electronic health record. If you see a primary care provider, you can also send messages to your care team and make appointments. If you have questions, please call your primary care clinic.  If you do not have a primary care provider, please call [phone] and they will assist you.        Care EveryWhere ID     This is your Care EveryWhere ID. This could be used by other organizations to access your [place] medical records  [id number]        Your Vitals Were     Pulse Temperature Respirations Height Pulse Oximetry BMI (Body Mass Index)    70 98.4  F (36.9  C) 16 5' 5\" (1.651 m) 96% 44.6 kg/m2       Blood Pressure from Last 3 Encounters:   08/31/17 122/80   08/07/17 113/73   08/02/17 128/72    Weight from Last 3 Encounters:   08/31/17 268 lb (121.6 kg)   08/07/17 266 lb 4 oz (120.8 kg)   08/02/17 266 lb 4 oz (120.8 kg)              Today, you had the following     No orders found for display         Today's Medication Changes          These changes are accurate as of: 8/31/17 11:50 AM.  If you have any questions, ask your nurse or doctor.               Start taking these medicines.        Dose/Directions    amoxicillin-clavulanate 875-125 MG per tablet   Commonly known as:  AUGMENTIN   Used for:  Acute sinusitis, " recurrence not specified, unspecified location   Started by:  Shana Jett MD        Dose:  1 tablet   Take 1 tablet by mouth 2 times daily for 10 days   Quantity:  20 tablet   Refills:  0            Where to get your medicines      These medications were sent to [facility] - [place], [place] - [address]  [address], [place] [address]     Phone:  [phone]     amoxicillin-clavulanate 875-125 MG per tablet                Primary Care Provider Office Phone # Fax #    Shana Jett [phone]       [address] [place] [address]  [place] [address]        Equal Access to Services     [facility]: Hadii jatinder lo hadasho Soomaali, waaxda luqadaha, qaybta kaalmada adeegyada, mary cloud . So [facility] [phone].    ATENCIÓN: Si habla español, tiene a beard disposición servicios gratuitos de asistencia lingüística. Llame al [phone].    We comply with applicable federal civil rights laws and [place] laws. We do not discriminate on the basis of race, color, national origin, age, disability sex, sexual orientation or gender identity.            Thank you!     Thank you for choosing [facility]  for your care. Our goal is always to provide you with excellent care. Hearing back from our patients is one way we can continue to improve our services. Please take a few minutes to complete the written survey that you may receive in the mail after your visit with us. Thank you!             Your Updated Medication List - Protect others around you: Learn how to safely use, store and throw away your medicines at www.disposemymeds.org.          This list is accurate as of: 8/31/17 11:50 AM.  Always use your most recent med list.                   Brand Name Dispense Instructions for use Diagnosis    amoxicillin-clavulanate 875-125 MG per tablet    AUGMENTIN    20 tablet    Take 1 tablet by mouth 2 times daily for 10 days    Acute  sinusitis, recurrence not specified, unspecified location       citalopram 40 MG tablet    celeXA    30 tablet    TAKE 1 TABLET (40 MG) BY MOUTH DAILY DUE FOR A VISIT FOR REFILLS!    Generalized anxiety disorder, Major depressive disorder, recurrent episode, moderate (H)       order for DME      Resmed Airsense 10 auto cpap 6-7 cm, Airfit P10 for her XS pillows

## 2017-09-05 ENCOUNTER — TELEPHONE (OUTPATIENT)
Dept: FAMILY MEDICINE | Facility: CLINIC | Age: 52
End: 2017-09-05

## 2017-09-05 NOTE — TELEPHONE ENCOUNTER
Patient was in to see Dr. Jett and has the diagnosis of Depression and was due for a PHQ-9. Please complete, thank you.

## 2017-09-08 ASSESSMENT — PATIENT HEALTH QUESTIONNAIRE - PHQ9: SUM OF ALL RESPONSES TO PHQ QUESTIONS 1-9: 4

## 2017-09-11 ENCOUNTER — CARE COORDINATION (OUTPATIENT)
Dept: SURGERY | Facility: CLINIC | Age: 52
End: 2017-09-11

## 2017-09-11 NOTE — PROGRESS NOTES
Patient contacted. Phone    I am contacting you to confirm your appointment with  Any ANGUIANO at 1030 on 9/13.     Please complete your questionnaire at least one day prior to your appointment. If you're unable to complete the questionnaire prior to your appointment please arrive 30 min prior to check-in time.    If questionarre is not completed your appointment may be canceled.     If you cannot make it to this appointment and would like to reschedule, please call [phone] option 1. For nursing questions please press option #3.    [facility], 4th floor.  [address] [place] [address]    It's a pleasure being involved in your health care      Sincerely,   Lila Lujan LPN  Bariatric and General Surgery

## 2017-09-13 ENCOUNTER — ALLIED HEALTH/NURSE VISIT (OUTPATIENT)
Dept: SURGERY | Facility: CLINIC | Age: 52
End: 2017-09-13

## 2017-09-13 ENCOUNTER — OFFICE VISIT (OUTPATIENT)
Dept: SURGERY | Facility: CLINIC | Age: 52
End: 2017-09-13

## 2017-09-13 VITALS
DIASTOLIC BLOOD PRESSURE: 72 MMHG | WEIGHT: 272 LBS | BODY MASS INDEX: 45.32 KG/M2 | HEIGHT: 65 IN | OXYGEN SATURATION: 95 % | HEART RATE: 67 BPM | TEMPERATURE: 98.2 F | SYSTOLIC BLOOD PRESSURE: 114 MMHG

## 2017-09-13 DIAGNOSIS — E66.01 MORBID OBESITY DUE TO EXCESS CALORIES (H): Primary | ICD-10-CM

## 2017-09-13 RX ORDER — ACETAMINOPHEN 325 MG/1
TABLET ORAL
COMMUNITY
End: 2018-10-29

## 2017-09-13 RX ORDER — CITALOPRAM HYDROBROMIDE 20 MG/1
20 TABLET ORAL
COMMUNITY
End: 2017-09-18 | Stop reason: DRUGHIGH

## 2017-09-13 RX ORDER — IBUPROFEN 600 MG/1
600 TABLET, FILM COATED ORAL PRN
COMMUNITY
Start: 2012-03-21 | End: 2018-10-01

## 2017-09-13 RX ORDER — EPINEPHRINE 0.3 MG/.3ML
0.3 INJECTION SUBCUTANEOUS
COMMUNITY
Start: 2015-08-09 | End: 2017-09-18

## 2017-09-13 ASSESSMENT — PAIN SCALES - GENERAL: PAINLEVEL: NO PAIN (0)

## 2017-09-13 NOTE — PROGRESS NOTES
"New Bariatric Nutrition Consultation Note    Reason For Visit: Nutrition Assessment    Sunshine Delgado is a 52 year-old presenting today for new bariatric nutrition consult.  Pt is interested in laparoscopic sleeve gastrectomy (Dr. Joseph).  This is pt's first of 3 required nutrition visits prior to surgery. Pt referred by Any CARR) on 9/13/17.     She is interested in having weight loss surgery for the following reasons:  To improve overall health and wellbeing with arthritis and bursitis in hips.     Support System Reviewed With Patient 9/12/2017   Who do you have in your support network that can be available to help you for the first 2 weeks after surgery? My dad, my sister and my boy friend   Who can you count on for support throughout your weight loss surgery journey? My boss at work and my boy friend       ANTHROPOMETRICS:    Height as of an earlier encounter on 9/13/17: 1.651 m (5' 5\").    Weight as of an earlier encounter on 9/13/17: 123.4 kg (272 lb) with BMI of 45.26.    Required weight loss goal pre-op: -10 lbs from initial consult weight (goal weight 262 lbs or less before surgery)       9/12/2017   I have tried the following methods to lose weight Watching portions or calories, Exercise, Weight Watchers, Ashleykrista Devi       Weight Loss Questions Reviewed With Patient 9/12/2017   How long have you been overweight? Following one or more pregnancies       SUPPLEMENT INFORMATION:  none    NUTRITION HISTORY:  Recall Diet Questions Reviewed With Patient 9/12/2017   Describe what you typically consume for breakfast (typical or most recent): Coffee protein bar eggs    Describe what you typically consume for lunch (typical or most recent): Salad with chicken cheese tomatoes    Describe what you typically consume for supper (typical or most recent): Pizza or sandwich or tacos   Describe what you typically consume as snacks (typical or most recent): Popcorn, almonds, chocolate,,protein idalia   How many ounces " of water, or other low calorie drinks, do you drink daily (8 oz=1 glass)? 64 oz or more   How many ounces of caffeine (coffee, tea, pop) do you drink daily (8 oz=1 glass)? 16 oz - coffee with almond milk   How often do you drink alcohol? Monthly or less   If you do drink alcohol, how many drinks might you have in a day? (one drink = 5 oz. wine, 1 can/bottle of beer, 1 shot liquor) 1 or 2   *Pt is willing to abstain from alcohol after surgery.     Eating Habits 9/12/2017   Do you have any dietary restrictions? No   Do you currently binge eat (eat a large amount of food in a short time)? No   Are you an emotional eater? Yes   Do you get up to eat after falling asleep? No   What foods do you crave? Sweets       Dining Out History Reviewed With Patient 9/12/2017   How often do you dine out? Nearly every day.   Where do you dine out? (select all that apply) sit-down restaurants, fast food chains   What types of food do you order when you dine out? Grilled chicken or salads and burgers       Physical Activity Reviewed With Patient 9/12/2017   How often do you exercise? 3 to 4 times per week   What is the duration of your exercise (in minutes)? 20 Minutes   What types of exercise do you do? walking   What keeps you from being more active?  Pain, Too tired   *Pt completed her PT for her hips.      NUTRITION DIAGNOSIS:  Obesity r/t long history of self-monitoring deficit and excessive energy intake aeb BMI >30.    INTERVENTION:  Intervention Provided/Education Provided on post-op diet guidelines, vitamins/minerals essential post-operatively, GI anatomy of bariatric surgeries, ways to help prepare for post-op diet guidelines pre-operatively, portion/calorie-control, mindful eating, exercise.  Provided pt with list of goals RD contact information.      Questions Reviewed With Patient 9/12/2017   How ready are you to make changes regarding your weight? Number 1 = Not ready at all to make changes up to 10 = very ready. 10   How  confident are you that you can change? 1 = Not confident that you will be successful making changes up to 10 = very confident. 8       Patient Understanding: good  Expected Compliance: good    GOALS:  Relating To Eating:  Eat slowly (20-30 minutes per meal), chewing foods well (25 chews per bite/applesauce consistency)  Focus on lean protein and non-starchy vegetables/whole fruit at each meal with no more than 1 cup of carbs or 2 slices of bread  Record food intake prior to eating throughout the day.  May use MyFitnessPal.com.    Relating to beverages:  Separate fluids from meals by 30 minutes before, during, and after eating.  Gradually wean off of caffeine.     Relating to dietary supplements:  Start a multivitamin containing iron daily    Relating to activity:  Increase activity as able (walking; pool)    Relating to cravings:  Practice alternatives to emotional eating (making jewelry; painting rocks).    Follow-Up: 1 month    Time spent with patient: 30 minutes.  Simin Wagner, MS, RDN, LDN, CLT  Pager: [phone]

## 2017-09-13 NOTE — MR AVS SNAPSHOT
MRN:[id number]                      After Visit Summary   9/13/2017    Sunshine Delgado    MRN: [id number]           Visit Information        Provider Department      9/13/2017 11:00 AM Simin Wagner RD [facility]        Your next 10 appointments already scheduled     Sep 18, 2017 10:20 AM CDT   SHORT with Guera Castellon MD   [address] [id number] [phone]           Your Arrival times is X, We need you to be here at this time to get checked in and have the assistant get you ready for the provider, which can take about 15 minutes. Your appointment time with your provider is at  X. Thank you.            Sep 20, 2017 12:20 PM CDT   PHYSICAL with Guera Castellon MD   [address] [address]   [phone]              Care Instructions    GOALS:  Relating To Eating:  Eat slowly (20-30 minutes per meal), chewing foods well (25 chews per bite/applesauce consistency)  Focus on lean protein and non-starchy vegetables/whole fruit at each meal with no more than 1 cup of carbs or 2 slices of bread  Record food intake prior to eating throughout the day.  May use MyFitnessPal.com.    Relating to beverages:  Separate fluids from meals by 30 minutes before, during, and after eating.  Gradually wean off of caffeine.     Relating to dietary supplements:  Start a multivitamin containing iron daily    Relating to activity:  Increase activity as able (walking; pool)    Relating to cravings:  Practice alternatives to emotional eating (making jewelry; painting rocks).    Follow-up with dietitian (Simin Wagner) in 1 month.         [employer] Information     [employer] gives you secure access to your electronic health record. If you see a primary care provider, you can also send messages to  your care team and make appointments. If you have questions, please call your primary care clinic.  If you do not have a primary care provider, please call [phone] and they will assist you.      [employer] is an electronic gateway that provides easy, online access to your medical records. With [employer], you can request a clinic appointment, read your test results, renew a prescription or communicate with your care team.     To access your existing account, please contact your [facility] or call [phone] for assistance.        Care EveryWhere ID     This is your Care EveryWhere ID. This could be used by other organizations to access your [facility] medical records  [id number]        Equal Access to Services     LUCA KELLER : Wero Jimenez, rian judge, jaxon ball, mary raymundo. So [facility] [phone].    ATENCIÓN: Si habla español, tiene a beard disposición servicios gratuitos de asistencia lingüística. Llame al [phone].    We comply with applicable federal civil rights laws and [place] laws. We do not discriminate on the basis of race, color, national origin, age, disability sex, sexual orientation or gender identity.

## 2017-09-13 NOTE — NURSING NOTE
"(   Chief Complaint   Patient presents with     Consult     NBS    )    ( Weight: 272 lb )  ( Height: 5' 5\" )  ( BMI (Calculated): 45.36 )  (   )  (   )  (   )  (   )  ( Waist Circumference (cm): 53 cm )  (   )    ( BP: 114/72 )  (   )  ( Temp: 98.2  F (36.8  C) )  (   )  ( Pulse: 67 )  (   )  ( SpO2: 95 % )    (   Patient Active Problem List   Diagnosis     Generalized anxiety disorder     CARDIOVASCULAR SCREENING; LDL GOAL LESS THAN 160     MARIA GUADALUPE (obstructive sleep apnea)- severe (AHI 84)     Morbid obesity (H)     Health Care Home     Mild major depression (H)     Insomnia     Bee sting allergy    )  (   Current Outpatient Prescriptions   Medication Sig Dispense Refill     ibuprofen (ADVIL/MOTRIN) 600 MG tablet Take 600 mg by mouth       EPINEPHrine (EPIPEN/ADRENACLICK/OR ANY BX GENERIC EQUIV) 0.3 MG/0.3ML injection 2-pack Inject 0.3 mg into the muscle       citalopram (CELEXA) 20 MG tablet Take 20 mg by mouth       acetaminophen (TYLENOL) 325 MG tablet        citalopram (CELEXA) 40 MG tablet TAKE 1 TABLET (40 MG) BY MOUTH DAILY DUE FOR A VISIT FOR REFILLS! 30 tablet 1     order for DME Resmed Airsense 10 auto cpap 6-7 cm, Airfit P10 for her XS pillows      )  ( Diabetes Eval:    )    ( Pain Eval:  No Pain (0) )    ( Wound Eval:       )    (   History   Smoking Status     Never Smoker   Smokeless Tobacco     Never Used    )    ( Signed By:  Kamila Giraldo; September 13, 2017; 10:40 AM )  "

## 2017-09-13 NOTE — PROGRESS NOTES
"New Bariatric Surgery Consultation Note    RE: Sunshine Delgado  MR#: [id number]  : 1965      Referring provider:       2017   Who referred you? Lesly Pacheco       Chief Complaint/Reason for visit: evaluation for possible weight loss surgery    Dear Shana Jett MD (General),    I had the pleasure of seeing your patient, Sunshine Delgado, to evaluate her obesity and consider her for possible weight loss surgery. As you know, Sunshine Delgado is 52 year old.  She has a height of 5' 5\", a weight of 272 lbs 0 oz, and calculated Body mass index is 45.26 kg/(m^2).    HISTORY OF PRESENT ILLNESS:  Weight Loss History Reviewed with Patient 2017   How long have you been overweight? Following one or more pregnancies   What is the most that you have ever weighed? 290   What is the most weight you have lost? 70   I have tried the following methods to lose weight Watching portions or calories, Exercise, Weight Watchers, Ashley Marito   I have tried the following weight loss medications? (Check all that apply) None   Have you ever had weight loss surgery? No       CO-MORBIDITIES OF OBESITY INCLUDE:     2017   I have the following co-morbidities associated with obesity: Sleep Apnea, Weight Bearing Joint Pain   Do you use a CPAP? Yes       PAST MEDICAL HISTORY:  Past Medical History:   Diagnosis Date     Bee sting allergy      Depressive disorder      Generalized anxiety disorder 10/15/2009    lexapro made things worse.       Morbid obesity (H)      MARIA GUADALUPE (obstructive sleep apnea)- severe (AHI 84) 2011     Voice fatigue 10/22/2009       PAST SURGICAL HISTORY:  Past Surgical History:   Procedure Laterality Date     COLONOSCOPY       GENITOURINARY SURGERY  207     HYSTERECTOMY, PAP NO LONGER INDICATED       HYSTERECTOMY, VAGINAL      still has ovaries       FAMILY HISTORY:   Family History   Problem Relation Age of Onset     Eye Disorder Mother      lost eyesight; probable macular degeneration     Psychotic " Disorder Mother      Dementia /Alzhimers     Neurologic Disorder Mother      seizures     DIABETES Mother      Hypertension Mother      Alzheimer Disease Mother      Arthritis Mother      OSTEOPOROSIS Mother      Obesity Mother      DIABETES Father      Depression Father      Hyperlipidemia Father      Obesity Father      Psychotic Disorder Sister      bipolar     Thyroid Disease Sister      h/o thyroid cancer     Depression Sister      CANCER Other      Brain cancer     OSTEOPOROSIS Maternal Grandmother      Anxiety Disorder Son      Thyroid Disease Sister      Obesity Sister        SOCIAL HISTORY:   Social History Questions Reviewed With Patient 9/12/2017   Which best describes your employment status (select all that apply) I work full-time   If you work, what is your occupation? [employer]   Which best describes your marital status:    Do you have children? Yes   Who do you have in your support network that can be available to help you for the first 2 weeks after surgery? My dad, my sister and my boy friend   Who can you count on for support throughout your weight loss surgery journey? My boss at work and my boy friend   Can you afford 3 meals a day?  Yes   Can you afford 50-60 dollars a month for vitamins? No   Lost  to brain cancer 2 years ago. Gained a lot of weight during the stress.  January 2017: Hip pain due to arthritis/bursitis.  She gained more weight due to immobility.      HABITS:     9/12/2017   How often do you drink alcohol? Monthly or less   If you do drink alcohol, how many drinks might you have in a day? (one drink = 5 oz. wine, 1 can/bottle of beer, 1 shot liquor) 1 or 2   Do you currently use any of the following Nicotine products? No   Have you ever used any of the following nicotine products? No   Have you or are you currently using street drugs or prescription strength medication for which you do not have a prescription for? No   Do you have a history of chemical  dependency (alcohol or drug abuse)? No       PSYCHOLOGICAL HISTORY:   Psychological History Reviewed With Patient 9/12/2017   Have you ever attempted suicide? Never.   Have you had thoughts of suicide in the past year? No   Have you ever been hospitalized for mental illness or a suicide attempt? Never.   Do you have a history of chronic pain? No   Have you ever been diagnosed with fibromyalgia? No   Are you currently being treated for any of the following? (select all that apply) Anxiety   Are you currently seeing a therapist or counselor?  Yes   Are you currently seeing a psychiatrist? No       ROS:     9/12/2017   Skin:  None of the above   HEENT: Headaches   Musculoskeletal: Arthritis   Cardiovascular: None of the above   Pulmonary: None of the above   Gastrointestinal: None of the above   Genitourinary: None of the above   Hematological: None of the above   Neurological: None of the above   Female only: None of the above       EATING BEHAVIORS:     9/12/2017   Have you or anyone else thought that you had an eating disorder? No   Do you currently binge eat (eat a large amount of food in a short time)? No   Are you an emotional eater? Yes   Do you get up to eat after falling asleep? No       EXERCISE:     9/12/2017   How often do you exercise? 3 to 4 times per week   What is the duration of your exercise (in minutes)? 20 Minutes   What types of exercise do you do? walking   What keeps you from being more active?  Pain, Too tired       MEDICATIONS:  Current Outpatient Prescriptions   Medication     ibuprofen (ADVIL/MOTRIN) 600 MG tablet     EPINEPHrine (EPIPEN/ADRENACLICK/OR ANY BX GENERIC EQUIV) 0.3 MG/0.3ML injection 2-pack     citalopram (CELEXA) 20 MG tablet     acetaminophen (TYLENOL) 325 MG tablet     citalopram (CELEXA) 40 MG tablet     order for DME     No current facility-administered medications for this visit.        ALLERGIES:  Allergies   Allergen Reactions     Contrast Dye Other (See Comments)      "Patient had sneezing and an itchy throat following contrast injection of 100 mL Isovue-370.     Sulfa Drugs Hives     Vicodin [Hydrocodone-Acetaminophen]      Wasps [Hornets] Swelling     Now carries Epi Pen       LABS/IMAGING/MEDICAL RECORDS REVIEW:     PHYSICAL EXAM:  /72 (BP Location: Left arm, Patient Position: Chair, Cuff Size: Adult Regular)  Pulse 67  Temp 98.2  F (36.8  C)  Ht 5' 5\"  Wt 272 lb  SpO2 95%  BMI 45.26 kg/m2  General: NAD  Neurologic: A & O x 3, gait normal  Head: normocephalic, atraumatic  HEENT: PERRL, EOMI.   Respiratory: respirations unlabored  Abdomen: Obese, Soft NT ND   Extremities: No LE swelling   Skin: warm and dry.  No rashes on exposed skin  Psychiatric: Mentation and Affect appear normal      In summary, Sunshine Delgado has Class III obesity with a body mass index of Body mass index is 45.26 kg/(m^2). kg/m2 and the comorbidities stated above. She completed an informational seminar and is a candidate for the laparoscopic gastric sleeve.  She will have to complete the following pre-requisites:  Bariatric Task List  Status:  Is patient a candidate for bariatric surgery?:  Yes -     Cleared to schedule surgeon consult?:    -     Status:  surgery evaluation in process -     Surgeon: Opal Hernandezative surgery month/year: Dec 2017 -        Patient Info: Initial Height:  5'5\" -     Initial Weight:  272lb -     Initial BMI:  45.26 -     Date of Initial Weight/Height:  9/13/2017 -     Goal Weight (lbs):  262 -     Required Weight Loss:  10 -     Surgery Type:  sleeve gastrectomy -     Multidisciplinary Meeting:    -        Insurance:  Insurance:  Yes -     Insurance Type:  Medica -        Patient Education:  Information Session:  Completed -     Given \"Making your decision\" handout?:  Yes -     Given support group information?:  Yes -     Attended support group?:  Yes -     Support plan in place?:  Completed -     Research consents signed?:  Yes -        PCP:  Establish care " with PCP:  Completed -     Follow up with PCP:    -     PCP letter of support:  Needed -        Psychological Evaluation:  Psych eval:  Needed -     Therapist letter of support:  Needed -        Dietician Visits: Structured weight loss required?:  Yes -     Number of Visits:  3 -     Dietician Visit 1:  Completed -     Dietician Visit 2:  Needed -     Dietician Visit 3:  Needed -        Lab Work: Complete Blood Count:  Needed -     Comprehensive Metabolic Panel:  Needed -     Vitamin D:  Needed -     PTH:  Needed -        Consults:  Sleep Medicine Testing/Consult:  Needed - clearance due to MARIA GUADALUPE      Final Tasks:  Before surgery online class:  Needed -     Before surgery online class website link:  [email or web address]   After surgery online class:  Needed -     After surgery online class website link:  [email or web address]   Nurse visit for weigh-in and information:  Needed -     Pre-assessment clinic visit with anesthesia team for H&P:  Needed -     Final labs (Hgb, plt, T&S, UA):  Needed -              Today in the office we discussed gastric sleeve surgery. Preoperative, perioperative, and postoperative processes, management, and follow up were addressed.  Risks and benefits were outlined including the risk of death, staple line leak (1-2%), PE, DVT, ulcer, worsening GERD, N/V, stricture, hernia, wound infection, weight regain, and vitamin deficiencies. I emphasized exercise and activity along with appropriate food choice as the main foundation for weight loss with surgery providing surgical reinforcement of this.  All questions were answered.  A goal sheet and support group handout were given to the patient.      Once the patient has completed the requirements in their task list and there are no further recommendations, the pt will be allowed to see the surgeon of her choice for consultation on the laparoscopic gastric sleeve surgery. Patient verbalizes understanding of the  process to surgery and expectations for the postoperative period including the need for lifelong lifestyle changes, vitamin supplementation, and laboratory monitoring.     Sincerely,  Any Morris PA-C    I spent a total of 30 minutes face to face with Sunshine during today's office visit. Over 50% of this time was spent counseling the patient and/or coordinating care.

## 2017-09-13 NOTE — MR AVS SNAPSHOT
"              After Visit Summary   9/13/2017    Sunshine Delgado    MRN: [id number]           Patient Information     Date Of Birth          1965        Visit Information        Provider Department      9/13/2017 10:30 AM Any Morris PA-C [facility]        Care Instructions    See dietitian in 1 month      Bariatric Task List  Status:  Is patient a candidate for bariatric surgery?:  Yes -     Cleared to schedule surgeon consult?:    -     Status:  surgery evaluation in process -     Surgeon: Opal Manriquez surgery month/year: Dec 2017 -        Patient Info: Initial Height:  5'5\" -     Initial Weight:  272lb -     Initial BMI:  45.26 -     Date of Initial Weight/Height:  9/13/2017 -     Goal Weight (lbs):  262 -     Required Weight Loss:  10 -     Surgery Type:  sleeve gastrectomy -     Multidisciplinary Meeting:    -        Insurance:  Insurance:  Yes -     Insurance Type:  Medica -        Patient Education:  Information Session:  Completed -     Given \"Making your decision\" handout?:  Yes -     Given support group information?:  Yes -     Attended support group?:  Yes -     Support plan in place?:  Completed -     Research consents signed?:  Yes -        PCP:  Establish care with PCP:  Completed -     Follow up with PCP:    -     PCP letter of support:  Needed -        Psychological Evaluation:  Psych eval:  Needed -     Therapist letter of support:  Needed -        Dietician Visits: Structured weight loss required?:  Yes -     Number of Visits:  3 -     Dietician Visit 1:  Completed -     Dietician Visit 2:  Needed -     Dietician Visit 3:  Needed -        Lab Work: Complete Blood Count:  Needed -     Comprehensive Metabolic Panel:  Needed -     Vitamin D:  Needed -     PTH:  Needed -        Consults:  Sleep Medicine Testing/Consult:  Needed - clearance due to MARIA GUADALUPE      Final Tasks:  Before surgery online class:  Needed -     Before surgery online class website " link:  [email or web address]   After surgery online class:  Needed -     After surgery online class website link:  [email or web address]   Nurse visit for weigh-in and information:  Needed -     Pre-assessment clinic visit with anesthesia team for H&P:  Needed -     Final labs (Hgb, plt, T&S, UA):  Needed -                    Follow-ups after your visit        Your next 10 appointments already scheduled     Sep 18, 2017 10:20 AM CDT   SHORT with Guera Castellon MD   [address] [address] [phone]           Your Arrival times is X, We need you to be here at this time to get checked in and have the assistant get you ready for the provider, which can take about 15 minutes. Your appointment time with your provider is at  X. Thank you.            Sep 20, 2017 12:20 PM CDT   PHYSICAL with Guera Castellon MD   [address] [address] [phone]              Who to contact     Please call your clinic at [phone] to:    Ask questions about your health    Make or cancel appointments    Discuss your medicines    Learn about your test results    Speak to your doctor   If you have compliments or concerns about an experience at your clinic, or if you wish to file a complaint, please contact [facility] Patient Relations at [phone] or email us at [email or web address]         Additional Information About Your Visit        Med.lyhart Information     [employer] gives you secure access to your electronic health record. If you see a primary care provider, you can also send messages to your care team and make appointments. If you have questions, please call your primary care clinic.  If you do not have a primary care provider, please call [phone] and they will  "assist you.      NuPotential is an electronic gateway that provides easy, online access to your medical records. With [employer], you can request a clinic appointment, read your test results, renew a prescription or communicate with your care team.     To access your existing account, please contact your [facility] or call [phone] for assistance.        Care EveryWhere ID     This is your Care EveryWhere ID. This could be used by other organizations to access your [facility] medical records  [id number]        Your Vitals Were     Pulse Temperature Height Pulse Oximetry BMI (Body Mass Index)       67 98.2  F (36.8  C) 5' 5\" 95% 45.26 kg/m2        Blood Pressure from Last 3 Encounters:   09/13/17 114/72   08/31/17 122/80   08/07/17 113/73    Weight from Last 3 Encounters:   09/13/17 272 lb   08/31/17 268 lb   08/07/17 266 lb 4 oz              Today, you had the following     No orders found for display       Primary Care Provider Office Phone # Fax #    Shana Jett [phone] [address] [place] [address]        Equal Access to Services     [facility]: Hadii aad ku hadasho Soronnali, waaxda luqadaha, qaybta kaalmada adeegyada, mary renn ryan cloud . So [facility] [phone].    ATENCIÓN: Si habla español, tiene a beard disposición servicios gratuitos de asistencia lingüística. Llame al [phone].    We comply with applicable federal civil rights laws and [place] laws. We do not discriminate on the basis of race, color, national origin, age, disability sex, sexual orientation or gender identity.            Thank you!     Thank you for choosing [facility]  for your care. Our goal is always to provide you with excellent care. Hearing back from our patients is one way we can continue to improve our services. Please take a few minutes to complete the written survey that you may receive in the mail after your " visit with us. Thank you!             Your Updated Medication List - Protect others around you: Learn how to safely use, store and throw away your medicines at www.disposemymeds.org.          This list is accurate as of: 9/13/17 11:03 AM.  Always use your most recent med list.                   Brand Name Dispense Instructions for use Diagnosis    * citalopram 20 MG tablet    celeXA     Take 20 mg by mouth        * citalopram 40 MG tablet    celeXA    30 tablet    TAKE 1 TABLET (40 MG) BY MOUTH DAILY DUE FOR A VISIT FOR REFILLS!    Generalized anxiety disorder, Major depressive disorder, recurrent episode, moderate (H)       EPINEPHrine 0.3 MG/0.3ML injection 2-pack    EPIPEN/ADRENACLICK/or ANY BX GENERIC EQUIV     Inject 0.3 mg into the muscle        ibuprofen 600 MG tablet    ADVIL/MOTRIN     Take 600 mg by mouth        order for DME      Resmed Airsense 10 auto cpap 6-7 cm, Airfit P10 for her XS pillows        TYLENOL 325 MG tablet   Generic drug:  acetaminophen           * Notice:  This list has 2 medication(s) that are the same as other medications prescribed for you. Read the directions carefully, and ask your doctor or other care provider to review them with you.

## 2017-09-13 NOTE — PATIENT INSTRUCTIONS
GOALS:  Relating To Eating:  Eat slowly (20-30 minutes per meal), chewing foods well (25 chews per bite/applesauce consistency)  Focus on lean protein and non-starchy vegetables/whole fruit at each meal with no more than 1 cup of carbs or 2 slices of bread  Record food intake prior to eating throughout the day.  May use MyFitnessPal.com.    Relating to beverages:  Separate fluids from meals by 30 minutes before, during, and after eating.  Gradually wean off of caffeine.     Relating to dietary supplements:  Start a multivitamin containing iron daily    Relating to activity:  Increase activity as able (walking; pool)    Relating to cravings:  Practice alternatives to emotional eating (making jewelry; painting rocks).    Follow-up with dietitian (Simin Wagner) in 1 month.

## 2017-09-13 NOTE — PATIENT INSTRUCTIONS
"See dietitian in 1 month      Bariatric Task List  Status:  Is patient a candidate for bariatric surgery?:  Yes -     Cleared to schedule surgeon consult?:    -     Status:  surgery evaluation in process -     Surgeon: Opal Manriquez surgery month/year: Dec 2017 -        Patient Info: Initial Height:  5'5\" -     Initial Weight:  272lb -     Initial BMI:  45.26 -     Date of Initial Weight/Height:  9/13/2017 -     Goal Weight (lbs):  262 -     Required Weight Loss:  10 -     Surgery Type:  sleeve gastrectomy -     Multidisciplinary Meeting:    -        Insurance:  Insurance:  Yes -     Insurance Type:  Medica -        Patient Education:  Information Session:  Completed -     Given \"Making your decision\" handout?:  Yes -     Given support group information?:  Yes -     Attended support group?:  Yes -     Support plan in place?:  Completed -     Research consents signed?:  Yes -        PCP:  Establish care with PCP:  Completed -     Follow up with PCP:    -     PCP letter of support:  Needed -        Psychological Evaluation:  Psych eval:  Needed -     Therapist letter of support:  Needed -        Dietician Visits: Structured weight loss required?:  Yes -     Number of Visits:  3 -     Dietician Visit 1:  Completed -     Dietician Visit 2:  Needed -     Dietician Visit 3:  Needed -        Lab Work: Complete Blood Count:  Needed -     Comprehensive Metabolic Panel:  Needed -     Vitamin D:  Needed -     PTH:  Needed -        Consults:  Sleep Medicine Testing/Consult:  Needed - clearance due to MARIA GUADALUPE      Final Tasks:  Before surgery online class:  Needed -     Before surgery online class website link:  [email or web address]   After surgery online class:  Needed -     After surgery online class website link:  [email or web address]   Nurse visit for weigh-in and information:  Needed -     Pre-assessment clinic visit with anesthesia team for H&P:  Needed -     Final labs (Hgb, plt, " T&S, UA):  Needed -

## 2017-09-13 NOTE — LETTER
"2017       RE: Sunshine Delgado  [address] [place] [id number]     Dear Colleague,    Thank you for referring your patient, Sunshine Delgado, to the [facility] at [facility]. Please see a copy of my visit note below.    New Bariatric Surgery Consultation Note    RE: Sunshine Delgado  MR#: [id number]  : 1965      Referring provider:       2017   Who referred you? Lesly Pacheco       Chief Complaint/Reason for visit: evaluation for possible weight loss surgery    Dear Shana Jett MD (General),    I had the pleasure of seeing your patient, Sunshine Delgado, to evaluate her obesity and consider her for possible weight loss surgery. As you know, Sunshine Delgado is 52 year old.  She has a height of 5' 5\", a weight of 272 lbs 0 oz, and calculated Body mass index is 45.26 kg/(m^2).    HISTORY OF PRESENT ILLNESS:  Weight Loss History Reviewed with Patient 2017   How long have you been overweight? Following one or more pregnancies   What is the most that you have ever weighed? 290   What is the most weight you have lost? 70   I have tried the following methods to lose weight Watching portions or calories, Exercise, Weight Watchers, Ashley Marito   I have tried the following weight loss medications? (Check all that apply) None   Have you ever had weight loss surgery? No       CO-MORBIDITIES OF OBESITY INCLUDE:     2017   I have the following co-morbidities associated with obesity: Sleep Apnea, Weight Bearing Joint Pain   Do you use a CPAP? Yes       PAST MEDICAL HISTORY:  Past Medical History:   Diagnosis Date     Bee sting allergy      Depressive disorder      Generalized anxiety disorder 10/15/2009    lexapro made things worse.       Morbid obesity (H)      MARIA GUADALUPE (obstructive sleep apnea)- severe (AHI 84) 2011     Voice fatigue 10/22/2009       PAST SURGICAL HISTORY:  Past Surgical History:   Procedure Laterality Date     " COLONOSCOPY  2000     GENITOURINARY SURGERY  207     HYSTERECTOMY, PAP NO LONGER INDICATED       HYSTERECTOMY, VAGINAL      still has ovaries       FAMILY HISTORY:   Family History   Problem Relation Age of Onset     Eye Disorder Mother      lost eyesight; probable macular degeneration     Psychotic Disorder Mother      Dementia /Alzhimers     Neurologic Disorder Mother      seizures     DIABETES Mother      Hypertension Mother      Alzheimer Disease Mother      Arthritis Mother      OSTEOPOROSIS Mother      Obesity Mother      DIABETES Father      Depression Father      Hyperlipidemia Father      Obesity Father      Psychotic Disorder Sister      bipolar     Thyroid Disease Sister      h/o thyroid cancer     Depression Sister      CANCER Other      Brain cancer     OSTEOPOROSIS Maternal Grandmother      Anxiety Disorder Son      Thyroid Disease Sister      Obesity Sister        SOCIAL HISTORY:   Social History Questions Reviewed With Patient 9/12/2017   Which best describes your employment status (select all that apply) I work full-time   If you work, what is your occupation? [employer]   Which best describes your marital status:    Do you have children? Yes   Who do you have in your support network that can be available to help you for the first 2 weeks after surgery? My dad, my sister and my boy friend   Who can you count on for support throughout your weight loss surgery journey? My boss at work and my boy friend   Can you afford 3 meals a day?  Yes   Can you afford 50-60 dollars a month for vitamins? No   Lost  to brain cancer 2 years ago. Gained a lot of weight during the stress.  January 2017: Hip pain due to arthritis/bursitis.  She gained more weight due to immobility.      HABITS:     9/12/2017   How often do you drink alcohol? Monthly or less   If you do drink alcohol, how many drinks might you have in a day? (one drink = 5 oz. wine, 1 can/bottle of beer, 1 shot liquor) 1 or 2    Do you currently use any of the following Nicotine products? No   Have you ever used any of the following nicotine products? No   Have you or are you currently using street drugs or prescription strength medication for which you do not have a prescription for? No   Do you have a history of chemical dependency (alcohol or drug abuse)? No       PSYCHOLOGICAL HISTORY:   Psychological History Reviewed With Patient 9/12/2017   Have you ever attempted suicide? Never.   Have you had thoughts of suicide in the past year? No   Have you ever been hospitalized for mental illness or a suicide attempt? Never.   Do you have a history of chronic pain? No   Have you ever been diagnosed with fibromyalgia? No   Are you currently being treated for any of the following? (select all that apply) Anxiety   Are you currently seeing a therapist or counselor?  Yes   Are you currently seeing a psychiatrist? No       ROS:     9/12/2017   Skin:  None of the above   HEENT: Headaches   Musculoskeletal: Arthritis   Cardiovascular: None of the above   Pulmonary: None of the above   Gastrointestinal: None of the above   Genitourinary: None of the above   Hematological: None of the above   Neurological: None of the above   Female only: None of the above       EATING BEHAVIORS:     9/12/2017   Have you or anyone else thought that you had an eating disorder? No   Do you currently binge eat (eat a large amount of food in a short time)? No   Are you an emotional eater? Yes   Do you get up to eat after falling asleep? No       EXERCISE:     9/12/2017   How often do you exercise? 3 to 4 times per week   What is the duration of your exercise (in minutes)? 20 Minutes   What types of exercise do you do? walking   What keeps you from being more active?  Pain, Too tired       MEDICATIONS:  Current Outpatient Prescriptions   Medication     ibuprofen (ADVIL/MOTRIN) 600 MG tablet     EPINEPHrine (EPIPEN/ADRENACLICK/OR ANY BX GENERIC EQUIV) 0.3 MG/0.3ML injection  "2-pack     citalopram (CELEXA) 20 MG tablet     acetaminophen (TYLENOL) 325 MG tablet     citalopram (CELEXA) 40 MG tablet     order for DME     No current facility-administered medications for this visit.        ALLERGIES:  Allergies   Allergen Reactions     Contrast Dye Other (See Comments)     Patient had sneezing and an itchy throat following contrast injection of 100 mL Isovue-370.     Sulfa Drugs Hives     Vicodin [Hydrocodone-Acetaminophen]      Wasps [Hornets] Swelling     Now carries Epi Pen       LABS/IMAGING/MEDICAL RECORDS REVIEW:     PHYSICAL EXAM:  /72 (BP Location: Left arm, Patient Position: Chair, Cuff Size: Adult Regular)  Pulse 67  Temp 98.2  F (36.8  C)  Ht 5' 5\"  Wt 272 lb  SpO2 95%  BMI 45.26 kg/m2  General: NAD  Neurologic: A & O x 3, gait normal  Head: normocephalic, atraumatic  HEENT: PERRL, EOMI.   Respiratory: respirations unlabored  Abdomen: Obese, Soft NT ND   Extremities: No LE swelling   Skin: warm and dry.  No rashes on exposed skin  Psychiatric: Mentation and Affect appear normal      In summary, Sunshine Delgado has Class III obesity with a body mass index of Body mass index is 45.26 kg/(m^2). kg/m2 and the comorbidities stated above. She completed an informational seminar and is a candidate for the laparoscopic gastric sleeve.  She will have to complete the following pre-requisites:  Bariatric Task List  Status:  Is patient a candidate for bariatric surgery?:  Yes -     Cleared to schedule surgeon consult?:    -     Status:  surgery evaluation in process -     Surgeon: Opal -     Davidative surgery month/year: Dec 2017 -        Patient Info: Initial Height:  5'5\" -     Initial Weight:  272lb -     Initial BMI:  45.26 -     Date of Initial Weight/Height:  9/13/2017 -     Goal Weight (lbs):  262 -     Required Weight Loss:  10 -     Surgery Type:  sleeve gastrectomy -     Multidisciplinary Meeting:    -        Insurance:  Insurance:  Yes -     Insurance Type:  Medica -   " "     Patient Education:  Information Session:  Completed -     Given \"Making your decision\" handout?:  Yes -     Given support group information?:  Yes -     Attended support group?:  Yes -     Support plan in place?:  Completed -     Research consents signed?:  Yes -        PCP:  Establish care with PCP:  Completed -     Follow up with PCP:    -     PCP letter of support:  Needed -        Psychological Evaluation:  Psych eval:  Needed -     Therapist letter of support:  Needed -        Dietician Visits: Structured weight loss required?:  Yes -     Number of Visits:  3 -     Dietician Visit 1:  Completed -     Dietician Visit 2:  Needed -     Dietician Visit 3:  Needed -        Lab Work: Complete Blood Count:  Needed -     Comprehensive Metabolic Panel:  Needed -     Vitamin D:  Needed -     PTH:  Needed -        Consults:  Sleep Medicine Testing/Consult:  Needed - clearance due to MARIA GUADALUPE      Final Tasks:  Before surgery online class:  Needed -     Before surgery online class website link:  [email or web address]   After surgery online class:  Needed -     After surgery online class website link:  [email or web address]   Nurse visit for weigh-in and information:  Needed -     Pre-assessment clinic visit with anesthesia team for H&P:  Needed -     Final labs (Hgb, plt, T&S, UA):  Needed -              Today in the office we discussed gastric sleeve surgery. Preoperative, perioperative, and postoperative processes, management, and follow up were addressed.  Risks and benefits were outlined including the risk of death, staple line leak (1-2%), PE, DVT, ulcer, worsening GERD, N/V, stricture, hernia, wound infection, weight regain, and vitamin deficiencies. I emphasized exercise and activity along with appropriate food choice as the main foundation for weight loss with surgery providing surgical reinforcement of this.  All questions were answered.  A goal sheet and support group handout were " given to the patient.      Once the patient has completed the requirements in their task list and there are no further recommendations, the pt will be allowed to see the surgeon of her choice for consultation on the laparoscopic gastric sleeve surgery. Patient verbalizes understanding of the process to surgery and expectations for the postoperative period including the need for lifelong lifestyle changes, vitamin supplementation, and laboratory monitoring.     Sincerely,  Any Morris PA-C    I spent a total of 30 minutes face to face with Sunshine during today's office visit. Over 50% of this time was spent counseling the patient and/or coordinating care.        Again, thank you for allowing me to participate in the care of your patient.      Sincerely,    Any Morris PA-C

## 2017-09-18 ENCOUNTER — OFFICE VISIT (OUTPATIENT)
Dept: FAMILY MEDICINE | Facility: CLINIC | Age: 52
End: 2017-09-18
Payer: COMMERCIAL

## 2017-09-18 VITALS
BODY MASS INDEX: 45.15 KG/M2 | HEART RATE: 68 BPM | DIASTOLIC BLOOD PRESSURE: 74 MMHG | HEIGHT: 65 IN | TEMPERATURE: 98 F | WEIGHT: 271 LBS | SYSTOLIC BLOOD PRESSURE: 118 MMHG

## 2017-09-18 DIAGNOSIS — F33.1 MAJOR DEPRESSIVE DISORDER, RECURRENT EPISODE, MODERATE (H): Primary | Chronic | ICD-10-CM

## 2017-09-18 DIAGNOSIS — Z91.030 BEE STING ALLERGY: ICD-10-CM

## 2017-09-18 DIAGNOSIS — F41.1 GENERALIZED ANXIETY DISORDER: Chronic | ICD-10-CM

## 2017-09-18 DIAGNOSIS — Z23 NEED FOR PROPHYLACTIC VACCINATION AND INOCULATION AGAINST INFLUENZA: ICD-10-CM

## 2017-09-18 PROCEDURE — 99214 OFFICE O/P EST MOD 30 MIN: CPT | Mod: 25 | Performed by: FAMILY MEDICINE

## 2017-09-18 PROCEDURE — 90471 IMMUNIZATION ADMIN: CPT | Performed by: FAMILY MEDICINE

## 2017-09-18 PROCEDURE — 90686 IIV4 VACC NO PRSV 0.5 ML IM: CPT | Performed by: FAMILY MEDICINE

## 2017-09-18 RX ORDER — EPINEPHRINE 0.3 MG/.3ML
0.3 INJECTION SUBCUTANEOUS PRN
Qty: 0.6 ML | Refills: 3 | Status: SHIPPED | OUTPATIENT
Start: 2017-09-18 | End: 2018-10-29 | Stop reason: ALTCHOICE

## 2017-09-18 RX ORDER — BUPROPION HYDROCHLORIDE 150 MG/1
150 TABLET ORAL EVERY MORNING
Qty: 90 TABLET | Refills: 0 | Status: SHIPPED | OUTPATIENT
Start: 2017-09-18 | End: 2017-11-15

## 2017-09-18 RX ORDER — CITALOPRAM HYDROBROMIDE 40 MG/1
40 TABLET ORAL DAILY
Qty: 90 TABLET | Refills: 3 | Status: SHIPPED | OUTPATIENT
Start: 2017-09-18 | End: 2018-10-29

## 2017-09-18 ASSESSMENT — ANXIETY QUESTIONNAIRES
1. FEELING NERVOUS, ANXIOUS, OR ON EDGE: MORE THAN HALF THE DAYS
2. NOT BEING ABLE TO STOP OR CONTROL WORRYING: MORE THAN HALF THE DAYS
5. BEING SO RESTLESS THAT IT IS HARD TO SIT STILL: MORE THAN HALF THE DAYS
GAD7 TOTAL SCORE: 15
3. WORRYING TOO MUCH ABOUT DIFFERENT THINGS: MORE THAN HALF THE DAYS
7. FEELING AFRAID AS IF SOMETHING AWFUL MIGHT HAPPEN: MORE THAN HALF THE DAYS
6. BECOMING EASILY ANNOYED OR IRRITABLE: NEARLY EVERY DAY

## 2017-09-18 ASSESSMENT — PATIENT HEALTH QUESTIONNAIRE - PHQ9
5. POOR APPETITE OR OVEREATING: MORE THAN HALF THE DAYS
SUM OF ALL RESPONSES TO PHQ QUESTIONS 1-9: 12

## 2017-09-18 NOTE — PROGRESS NOTES
"  SUBJECTIVE:   Sunshine Delgado is a 52 year old female who presents to clinic today for the following health issues:      Depression and Anxiety Follow-Up    Status since last visit:  Up and Down-- would like to talk about medication- adding something else or changing dose?    Other associated symptoms:  None    Complicating factors:     Significant life event: Yes-   Still dealing with death of - anger issues, new boyfriend but feels he doesn't understand her- is seeing a therapist     Current substance abuse: None    -- Patient is using her Citalopram 40 MG medication for both anxiety and depression after her  fell sick. She denies any sexual side effects due to her medication. Patient notes that she has been really \"weepy and sad\" for some months. She initially thought her mood was due to her being diagnosed with a pinch nerve which made it difficult for her to use her right leg. She was placed on pain medications. She went for physical therapy which provided her minimal relief, and after having an injections in her right leg she started feeling better and has since been able to use her leg. She notes that even after she started experiencing pain relief she was still \"weepy and sad.\"  She follows up with a psychologist. She states that she has had decreased interest in doing activities. She has no trouble sleeping due to her CPAP, but she has a hard time concentrating which she has always had since she was younger, but she is still able to function throughout her day. She denied any thoughts of self harm. She also denies any seizures. She has tried lexapro 20 MG  which she had side effects to (sweeling).  She has also tried Wellbutrin a few years ago due to sad moods after giving birth to her son. She states that her doctor told her that her sadness is not post partum.       Alternative medications such as Wellbutrin, Zoloft, Cymbalta, and other SNRI's and their side effects were discussed with her " today and she is open to the plan of switching her medications.       PHQ-9 SCORE 12/5/2016 9/8/2017 9/18/2017   Total Score - - -   Total Score 5 4 12     ADRIANNE-7 SCORE 9/6/2016 12/5/2016 9/18/2017   Total Score - - -   Total Score 0 0 15       PHQ-9  English  PHQ-9   Any Language  GAD7      Amount of exercise or physical activity: 6-7 days/week for an average of 15-30 minutes    Problems taking medications regularly: No    Medication side effects: none  Diet:  Diet for [facility]      Problem list and histories reviewed & adjusted, as indicated.  Additional history: as documented    Patient Active Problem List   Diagnosis     Generalized anxiety disorder     CARDIOVASCULAR SCREENING; LDL GOAL LESS THAN 160     MARIA GUADALUPE (obstructive sleep apnea)- severe (AHI 84)     Morbid obesity (H)     Health Care Home     Mild major depression (H)     Insomnia     Bee sting allergy     Past Surgical History:   Procedure Laterality Date     COLONOSCOPY  2000     GENITOURINARY SURGERY  207     HYSTERECTOMY, PAP NO LONGER INDICATED       HYSTERECTOMY, VAGINAL      still has ovaries       Social History   Substance Use Topics     Smoking status: Never Smoker     Smokeless tobacco: Never Used     Alcohol use Yes      Comment: 1-2 per mo     Family History   Problem Relation Age of Onset     Eye Disorder Mother      lost eyesight; probable macular degeneration     Psychotic Disorder Mother      Dementia /Alzhimers     Neurologic Disorder Mother      seizures     DIABETES Mother      Hypertension Mother      Alzheimer Disease Mother      Arthritis Mother      OSTEOPOROSIS Mother      Obesity Mother      DIABETES Father      Depression Father      Hyperlipidemia Father      Obesity Father      Psychotic Disorder Sister      bipolar     Thyroid Disease Sister      h/o thyroid cancer     Depression Sister      CANCER Other      Brain cancer     OSTEOPOROSIS Maternal Grandmother      Anxiety Disorder Son      Thyroid Disease Sister       "Obesity Sister          Current Outpatient Prescriptions   Medication Sig Dispense Refill     EPINEPHrine (EPIPEN/ADRENACLICK/OR ANY BX GENERIC EQUIV) 0.3 MG/0.3ML injection 2-pack Inject 0.3 mLs (0.3 mg) into the muscle as needed for anaphylaxis 0.6 mL 3     citalopram (CELEXA) 40 MG tablet Take 1 tablet (40 mg) by mouth daily 90 tablet 3     buPROPion (WELLBUTRIN XL) 150 MG 24 hr tablet Take 1 tablet (150 mg) by mouth every morning 90 tablet 0     acetaminophen (TYLENOL) 325 MG tablet        ibuprofen (ADVIL/MOTRIN) 600 MG tablet Take 600 mg by mouth       order for DME Resmed Airsense 10 auto cpap 6-7 cm, Airfit P10 for her XS pillows       [DISCONTINUED] citalopram (CELEXA) 20 MG tablet Take 20 mg by mouth       [DISCONTINUED] citalopram (CELEXA) 40 MG tablet TAKE 1 TABLET (40 MG) BY MOUTH DAILY DUE FOR A VISIT FOR REFILLS! 30 tablet 1     Labs reviewed in EPIC      Reviewed and updated as needed this visit by clinical staffTobacco  Allergies  Meds  Med Hx  Surg Hx  Fam Hx  Soc Hx      Reviewed and updated as needed this visit by Provider         ROS:  Constitutional, HEENT, cardiovascular, pulmonary, GI, , musculoskeletal, neuro, skin, endocrine and psych systems are negative, except as otherwise noted.    This document serves as a record of the services and decisions personally performed and made by Guera Velez MD. It was created on his/her behalf by Gabbie Salazar, a trained medical scribe. The creation of this document is based the provider's statements to the medical scribe.    Scribderik Salazar  10:31 AM, September 18, 2017  OBJECTIVE:   /74 (BP Location: Right arm, Patient Position: Chair, Cuff Size: Adult Large)  Pulse 68  Temp 98  F (36.7  C) (Oral)  Ht 1.651 m (5' 5\")  Wt 122.9 kg (271 lb)  Breastfeeding? Yes  BMI 45.1 kg/m2  Body mass index is 45.1 kg/(m^2).  GENERAL: healthy, alert and no distress  PSYCH: mentation appears normal, affect normal/bright    Diagnostic " Test Results:  Results for orders placed or performed in visit on 08/02/17   XR Pelvis and Hip Bilateral 2 Views    Narrative    PELVIS AND HIP BILATERAL TWO VIEWS  8/2/2017 5:06 PM      HISTORY: Pain in right hip. Pain in left hip.    COMPARISON: None.      Impression    IMPRESSION: No acute fracture or dislocation. Minimal changes of  osteoarthritis bilaterally.    WESTON LUDWIG MD     PHQ-9 SCORE 12/5/2016 9/8/2017 9/18/2017   Total Score - - -   Total Score 5 4 12     ADRIANNE-7 SCORE 9/6/2016 12/5/2016 9/18/2017   Total Score - - -   Total Score 0 0 15           ASSESSMENT/PLAN:   1. Major depressive disorder, recurrent episode, moderate (H)  Not well controlled, add Wellbutrin, reviewed side effects, She has a physical in 2 days   - citalopram (CELEXA) 40 MG tablet; Take 1 tablet (40 mg) by mouth daily  Dispense: 90 tablet; Refill: 3  - buPROPion (WELLBUTRIN XL) 150 MG 24 hr tablet; Take 1 tablet (150 mg) by mouth every morning  Dispense: 90 tablet; Refill: 0    Common side effects of medications prescribed at this visit were discussed with the patient. Severe side effects, including current applicable black box warnings, were discussed. We discussed options for dealing with these possible side effects and allergic reactions, based on their severity.      2. Generalized anxiety disorder  Not well controlled, add Wellbutrin, reviewed side effects, She has a physical in 2 days   - citalopram (CELEXA) 40 MG tablet; Take 1 tablet (40 mg) by mouth daily  Dispense: 90 tablet; Refill: 3  - buPROPion (WELLBUTRIN XL) 150 MG 24 hr tablet; Take 1 tablet (150 mg) by mouth every morning  Dispense: 90 tablet; Refill: 0    Common side effects of medications prescribed at this visit were discussed with the patient. Severe side effects, including current applicable black box warnings, were discussed. We discussed options for dealing with these possible side effects and allergic reactions, based on their severity.      3. Need for  prophylactic vaccination and inoculation against influenza    - FLU VAC, SPLIT VIRUS IM > 3 YO (QUADRIVALENT) [59076]  - Vaccine Administration, Initial [22588]    4. Bee sting allergy  Chronic, refill done  - EPINEPHrine (EPIPEN/ADRENACLICK/OR ANY BX GENERIC EQUIV) 0.3 MG/0.3ML injection 2-pack; Inject 0.3 mLs (0.3 mg) into the muscle as needed for anaphylaxis  Dispense: 0.6 mL; Refill: 3    FUTURE APPOINTMENTS:       - Follow-up visit in 2 days for physical    The information in this document, created by the medical scribe for me, accurately reflects the services I personally performed and the decisions made by me. I have reviewed and approved this document for accuracy prior to leaving the patient care area.  Guera Castellon MD  10:32 AM, 09/18/17    Guera Castellon MD  [facility]

## 2017-09-18 NOTE — NURSING NOTE
"Chief Complaint   Patient presents with     Anxiety     Depression     Flu Shot     DONE       Initial /74 (BP Location: Right arm, Patient Position: Chair, Cuff Size: Adult Large)  Pulse 68  Temp 98  F (36.7  C) (Oral)  Ht 5' 5\" (1.651 m)  Wt 271 lb (122.9 kg)  Breastfeeding? Yes  BMI 45.1 kg/m2 Estimated body mass index is 45.1 kg/(m^2) as calculated from the following:    Height as of this encounter: 5' 5\" (1.651 m).    Weight as of this encounter: 271 lb (122.9 kg).  Medication Reconciliation: complete    "

## 2017-09-18 NOTE — PROGRESS NOTES
Injectable Influenza Immunization Documentation    1.  Is the person to be vaccinated sick today? No    2. Does the person to be vaccinated have an allergy to a component   of the vaccine?  No    3. Has the person to be vaccinated ever had a serious reaction   to influenza vaccine in the past?  No    4. Has the person to be vaccinated ever had Guillain-Barré syndrome? No    Form completed by patient

## 2017-09-18 NOTE — PATIENT INSTRUCTIONS
[facility]   Discharged by : Luci STOVALL MA    If you have any questions regarding your visit please contact your care team:     [facility] Clinic Hours Telephone Number   Dr. Amira Jimenez   7am-7pm Monday - Thursday   7am-5pm Fridays  [phone]   (Appointment scheduling available 24/7)   RN Line   [phone] option 2       For a Price Quote for your services, please call our Consumer Price Line at [phone].     What options do I have for visits at the clinic other than the traditional office visit?     To expand how we care for you, many of our providers are utilizing electronic visits (e-visits) and telephone visits, when medically appropriate, for interactions with their patients rather than a visit in the clinic. We also offer nurse visits for many medical concerns. Just like any other service, we will bill your insurance company for this type of visit based on time spent on the phone with your provider. Not all insurance companies cover these visits. Please check with your medical insurance if this type of visit is covered. You will be responsible for any charges that are not paid by your insurance.   E-visits via [employer]: generally incur a $35.00 fee.     Telephone visits:   Time spent on the phone: *charged based on time that is spent on the phone in increments of 10 minutes. Estimated cost:   5-10 mins $30.00   11-20 mins. $59.00   21-30 mins. $85.00     Use [employer] (secure email communication and access to your chart) to send your primary care provider a message or make an appointment. Ask someone on your Team how to sign up for [employer].     As always, Thank you for trusting us with your health care needs!      [facility]:    Scheduling Appointments  [facility] [place]  Call: [phone]    [facility]etsy Robles [facility]  Call: [phone]    [facility]  Locations  Call: [phone]      WHERE TO GO FOR CARE?    Clinic    Make an appointment if you:       Are sick (cold, cough, flu, sore throat, earache or in pain).       Have a small injury (sprain, small cut, burn or broken bone).       Need a physical exam, Pap smear, vaccine or prescription refill.       Have questions about your health or medicines.    To reach us:      Call [phone] ([phone]). Open 24 hours every day. (For counseling services, call [phone].)    Log into [employer] at [employer]ass. (Visit [employer] to create an account.) Hospital emergency room    An emergency is a serious or life- threatening problem that must be treated right away.    Call [phone] or get to the hospital if you have:      Very bad or sudden:            - Chest pain or pressure         - Bleeding         - Head or belly pain         - Dizziness or trouble seeing, walking or                          Speaking      Problems breathing      Blood in your vomit or you are coughing up blood      A major injury (knocked out, loss of a finger or limb, rape, broken bone protruding from skin)    A mental health crisis. (Or call the Mental Health Crisis line at 1-539.293.4085 or Suicide Prevention Hotline at 1-390.922.5878.)    Open 24 hours every day. You don't need an appointment.     Urgent care    Visit urgent care for sickness or small injuries when the clinic is closed. You don't need an appointment. To check hours or find an urgent care near you, visit [email or web address]. Online care    Get online care from [facility] for more than 70 common problems, like colds, allergies and infections. Open 24 hours every day at:   [email or web address]   Need help deciding?    For advice about where to be seen, you may call your clinic and ask to speak with a nurse. We're here for you 24 hours every day.         If you are deaf or hard of hearing, please let us know. We provide many free services including sign language  interpreters, oral interpreters, TTYs, telephone amplifiers, note takers and written materials.

## 2017-09-18 NOTE — MR AVS SNAPSHOT
After Visit Summary   9/18/2017    Sunshine Delgado    MRN: [id number]           Patient Information     Date Of Birth          1965        Visit Information        Provider Department      9/18/2017 10:20 AM Guera Castellon MD [facility]        Today's Diagnoses     Major depressive disorder, recurrent episode, moderate (H)    -  1    Generalized anxiety disorder        Need for prophylactic vaccination and inoculation against influenza        Bee sting allergy          Care Instructions    [facility]   Discharged by : Luci STOVALL MA    If you have any questions regarding your visit please contact your care team:     [facility] Clinic Hours Telephone Number   Dr. Amira Jimenez   7am-7pm Monday - Thursday   7am-5pm Fridays  [phone]   (Appointment scheduling available 24/7)   RN Line   [phone] option 2       For a Price Quote for your services, please call our Consumer Price Line at [phone].     What options do I have for visits at the clinic other than the traditional office visit?     To expand how we care for you, many of our providers are utilizing electronic visits (e-visits) and telephone visits, when medically appropriate, for interactions with their patients rather than a visit in the clinic. We also offer nurse visits for many medical concerns. Just like any other service, we will bill your insurance company for this type of visit based on time spent on the phone with your provider. Not all insurance companies cover these visits. Please check with your medical insurance if this type of visit is covered. You will be responsible for any charges that are not paid by your insurance.   E-visits via [employer]: generally incur a $35.00 fee.     Telephone visits:   Time spent on the phone: *charged based on time that is spent on the phone in increments of 10 minutes. Estimated  cost:   5-10 mins $30.00   11-20 mins. $59.00   21-30 mins. $85.00     Use [employer] (secure email communication and access to your chart) to send your primary care provider a message or make an appointment. Ask someone on your Team how to sign up for [employer].     As always, Thank you for trusting us with your health care needs!      [facility]:    Scheduling Appointments  [facility]  Call: [phone]    [facility]  Call: [phone]    [facility]  Call: [phone]      WHERE TO GO FOR CARE?    Clinic    Make an appointment if you:       Are sick (cold, cough, flu, sore throat, earache or in pain).       Have a small injury (sprain, small cut, burn or broken bone).       Need a physical exam, Pap smear, vaccine or prescription refill.       Have questions about your health or medicines.    To reach us:      Call [phone] ([phone]). Open 24 hours every day. (For counseling services, call [phone].)    Log into GooodJob at [email or web address]. (Visit [email or web address] to create an account.) Hospital emergency room    An emergency is a serious or life- threatening problem that must be treated right away.    Call [phone] or get to the hospital if you have:      Very bad or sudden:            - Chest pain or pressure         - Bleeding         - Head or belly pain         - Dizziness or trouble seeing, walking or                          Speaking      Problems breathing      Blood in your vomit or you are coughing up blood      A major injury (knocked out, loss of a finger or limb, rape, broken bone protruding from skin)    A mental health crisis. (Or call the Mental Health Crisis line at 1-384.952.6178 or Suicide Prevention Hotline at 1-512.616.6121.)    Open 24 hours every day. You don't need an appointment.     Urgent care    Visit urgent care for sickness or small injuries when the clinic is closed. You  don't need an appointment. To check hours or find an urgent care near you, visit [email or web address]. Online care    Get online care from [facility] for more than 70 common problems, like colds, allergies and infections. Open 24 hours every day at:   [email or web address]   Need help deciding?    For advice about where to be seen, you may call your clinic and ask to speak with a nurse. We're here for you 24 hours every day.         If you are deaf or hard of hearing, please let us know. We provide many free services including sign language interpreters, oral interpreters, TTYs, telephone amplifiers, note takers and written materials.                         Follow-ups after your visit        Your next 10 appointments already scheduled     Sep 20, 2017 12:20 PM CDT   PHYSICAL with Guera Castellon MD   [facility]    [address] [place] [id number] [phone]            Sep 28, 2017 10:30 AM CDT   Return Sleep Patient with SUPRIYA Bartholomew   [facility] ([facility])    [address] [place] [id number]   [phone]            Oct 13, 2017 10:30 AM CDT   NUTRITION VISIT with STANFORD Koch [facility] ([facility])    [address] [place] [id number] [phone]            Nov 10, 2017 10:30 AM CST   NUTRITION VISIT with STANFORD Koch [facility] ([facility])    [address] [place] [id number] [phone]              Who to contact     If you have questions or need follow up information about today's clinic visit or your schedule please contact [facility] directly at [phone].  Normal or non-critical lab and imaging results will be communicated to you by MyChart, letter or phone  "within 4 business days after the clinic has received the results. If you do not hear from us within 7 days, please contact the clinic through [employer] or phone. If you have a critical or abnormal lab result, we will notify you by phone as soon as possible.  Submit refill requests through [employer] or call your pharmacy and they will forward the refill request to us. Please allow 3 business days for your refill to be completed.          Additional Information About Your Visit        [employer] Information     [employer] gives you secure access to your electronic health record. If you see a primary care provider, you can also send messages to your care team and make appointments. If you have questions, please call your primary care clinic.  If you do not have a primary care provider, please call [phone] and they will assist you.        Care EveryWhere ID     This is your Care EveryWhere ID. This could be used by other organizations to access your [facility] medical records  [id number]        Your Vitals Were     Pulse Temperature Height Breastfeeding? BMI (Body Mass Index)       68 98  F (36.7  C) (Oral) 5' 5\" (1.651 m) Yes 45.1 kg/m2        Blood Pressure from Last 3 Encounters:   09/18/17 118/74   09/13/17 114/72   08/31/17 122/80    Weight from Last 3 Encounters:   09/18/17 271 lb (122.9 kg)   09/13/17 272 lb (123.4 kg)   08/31/17 268 lb (121.6 kg)              We Performed the Following     FLU VAC, SPLIT VIRUS IM > 3 YO (QUADRIVALENT) [10222]     Vaccine Administration, Initial [08331]          Today's Medication Changes          These changes are accurate as of: 9/18/17 11:02 AM.  If you have any questions, ask your nurse or doctor.               Start taking these medicines.        Dose/Directions    buPROPion 150 MG 24 hr tablet   Commonly known as:  WELLBUTRIN XL   Used for:  Generalized anxiety disorder, Major depressive disorder, recurrent episode, moderate (H)   Started by:  Guera Castellon MD        " Dose:  150 mg   Take 1 tablet (150 mg) by mouth every morning   Quantity:  90 tablet   Refills:  0         These medicines have changed or have updated prescriptions.        Dose/Directions    citalopram 40 MG tablet   Commonly known as:  celeXA   This may have changed:  See the new instructions.   Used for:  Generalized anxiety disorder, Major depressive disorder, recurrent episode, moderate (H)   Changed by:  Guera Castellon MD        Dose:  40 mg   Take 1 tablet (40 mg) by mouth daily   Quantity:  90 tablet   Refills:  3       EPINEPHrine 0.3 MG/0.3ML injection 2-pack   Commonly known as:  EPIPEN/ADRENACLICK/or ANY BX GENERIC EQUIV   This may have changed:    - when to take this  - reasons to take this   Used for:  Bee sting allergy   Changed by:  Guera Castellon MD        Dose:  0.3 mg   Inject 0.3 mLs (0.3 mg) into the muscle as needed for anaphylaxis   Quantity:  0.6 mL   Refills:  3            Where to get your medicines      These medications were sent to [facility] - [address].  [address] [facility] [address]     Phone:  [phone]     buPROPion 150 MG 24 hr tablet    citalopram 40 MG tablet    EPINEPHrine 0.3 MG/0.3ML injection 2-pack                Primary Care Provider Office Phone # Fax #    Shana Jett [phone] [address] [facility] [address]        Equal Access to Services     [facility] AH: Hadii jatinder ku hadasho Soomaali, waaxda luqadaha, qaybta kaalmada adeegyada, mary newell hayruben cloud . So [facility] [phone].    ATENCIÓN: Si habla español, tiene a beard disposición servicios gratuitos de asistencia lingüística. Isidra al [phone].    We comply with applicable federal civil rights laws and [place] laws. We do not discriminate on the basis of race, color, national origin, age, disability sex, sexual orientation or gender identity.            Thank you!     Thank you for  choosing [facility]  for your care. Our goal is always to provide you with excellent care. Hearing back from our patients is one way we can continue to improve our services. Please take a few minutes to complete the written survey that you may receive in the mail after your visit with us. Thank you!             Your Updated Medication List - Protect others around you: Learn how to safely use, store and throw away your medicines at www.disposemymeds.org.          This list is accurate as of: 9/18/17 11:02 AM.  Always use your most recent med list.                   Brand Name Dispense Instructions for use Diagnosis    buPROPion 150 MG 24 hr tablet    WELLBUTRIN XL    90 tablet    Take 1 tablet (150 mg) by mouth every morning    Generalized anxiety disorder, Major depressive disorder, recurrent episode, moderate (H)       citalopram 40 MG tablet    celeXA    90 tablet    Take 1 tablet (40 mg) by mouth daily    Generalized anxiety disorder, Major depressive disorder, recurrent episode, moderate (H)       EPINEPHrine 0.3 MG/0.3ML injection 2-pack    EPIPEN/ADRENACLICK/or ANY BX GENERIC EQUIV    0.6 mL    Inject 0.3 mLs (0.3 mg) into the muscle as needed for anaphylaxis    Bee sting allergy       ibuprofen 600 MG tablet    ADVIL/MOTRIN     Take 600 mg by mouth        order for DME      Resmed Airsense 10 auto cpap 6-7 cm, Airfit P10 for her XS pillows        TYLENOL 325 MG tablet   Generic drug:  acetaminophen

## 2017-09-19 ASSESSMENT — ANXIETY QUESTIONNAIRES: GAD7 TOTAL SCORE: 15

## 2017-09-20 ENCOUNTER — DOCUMENTATION ONLY (OUTPATIENT)
Dept: LAB | Facility: CLINIC | Age: 52
End: 2017-09-20

## 2017-09-20 ENCOUNTER — OFFICE VISIT (OUTPATIENT)
Dept: FAMILY MEDICINE | Facility: CLINIC | Age: 52
End: 2017-09-20
Payer: COMMERCIAL

## 2017-09-20 VITALS
TEMPERATURE: 98.7 F | DIASTOLIC BLOOD PRESSURE: 86 MMHG | WEIGHT: 271 LBS | SYSTOLIC BLOOD PRESSURE: 121 MMHG | BODY MASS INDEX: 45.15 KG/M2 | HEART RATE: 86 BPM | HEIGHT: 65 IN

## 2017-09-20 DIAGNOSIS — Z23 NEED FOR PROPHYLACTIC VACCINATION WITH TETANUS-DIPHTHERIA (TD): ICD-10-CM

## 2017-09-20 DIAGNOSIS — E66.01 MORBID OBESITY DUE TO EXCESS CALORIES (H): Chronic | ICD-10-CM

## 2017-09-20 DIAGNOSIS — L98.9 SKIN LESION: ICD-10-CM

## 2017-09-20 DIAGNOSIS — Z13.6 CARDIOVASCULAR SCREENING; LDL GOAL LESS THAN 160: Chronic | ICD-10-CM

## 2017-09-20 DIAGNOSIS — Z12.11 SCREEN FOR COLON CANCER: ICD-10-CM

## 2017-09-20 DIAGNOSIS — Z00.01 ENCOUNTER FOR PREVENTATIVE ADULT HEALTH CARE EXAM WITH ABNORMAL FINDINGS: Primary | ICD-10-CM

## 2017-09-20 DIAGNOSIS — L53.9 ERYTHEMA OF SKIN: ICD-10-CM

## 2017-09-20 DIAGNOSIS — Z13.1 SCREENING FOR DIABETES MELLITUS: ICD-10-CM

## 2017-09-20 DIAGNOSIS — Z12.31 VISIT FOR SCREENING MAMMOGRAM: ICD-10-CM

## 2017-09-20 PROCEDURE — 90471 IMMUNIZATION ADMIN: CPT | Performed by: FAMILY MEDICINE

## 2017-09-20 PROCEDURE — 99396 PREV VISIT EST AGE 40-64: CPT | Mod: 25 | Performed by: FAMILY MEDICINE

## 2017-09-20 PROCEDURE — 90714 TD VACC NO PRESV 7 YRS+ IM: CPT | Performed by: FAMILY MEDICINE

## 2017-09-20 PROCEDURE — 99213 OFFICE O/P EST LOW 20 MIN: CPT | Mod: 25 | Performed by: FAMILY MEDICINE

## 2017-09-20 RX ORDER — BUPROPION HYDROCHLORIDE 150 MG/1
150 TABLET ORAL EVERY MORNING
Qty: 90 TABLET | Refills: 0 | Status: CANCELLED | OUTPATIENT
Start: 2017-09-20

## 2017-09-20 NOTE — LETTER
17    Re:  Sunshine Delgado   1965      To whom it may concern,         I support Sunshine Delgado decision to move forward with weight loss surgery.   She has been morbidly obese since  and has been trying to lose weight for 18 years.  She has tried: watching portions or calories, exercise, Weight Watchers and Ashley Marito.  She has not tried medications.      Past Medical History:   Diagnosis Date     Bee sting allergy      Depressive disorder      Generalized anxiety disorder 10/15/2009    lexapro made things worse.       Morbid obesity (H)      MARIA GUADALUPE (obstructive sleep apnea)- severe (AHI 84) 2011     Voice fatigue 10/22/2009     Past Surgical History:   Procedure Laterality Date     HYSTERECTOMY, PAP NO LONGER INDICATED       HYSTERECTOMY, VAGINAL  2007    still has ovaries     Patient Active Problem List    Diagnosis Date Noted     Bee sting allergy      Priority: Medium     Insomnia 2015     Priority: Medium     Mild major depression (H) 2015     Priority: Medium      is terminally ill. Going through a lot.       Health Care Home 2015     Priority: Medium     No Active Care Coordination 3/16/2015  Care Coordinator: Isamar SANDERS, MSW  See letters for Health Care Home care plan        Morbid obesity (H) 2013     Priority: Medium     MARIA GUADALUPE (obstructive sleep apnea)- severe (AHI 84) 2011     Priority: Medium     Sleep study 5/10/2011 (226#)- AHI 24, RDI 30, lowest oxygen saturation was 88%, CPAP 8 cm was curative.   Study Date: 2016- (248.0 lbs) apnea/hypopnea index 84.4.  REM AHI n/a, supine AHI 88.8 events per hour. Lowest oxygen saturation 80.2%.  Time spent less than or equal to 88% 26.1 minutes. CPAP optimal pressure 5 cmH2O with AHI of 5.2 events per hour.  There were residual non-specific arousals. Time in REM supine on final pressure 72.5 minutes.            CARDIOVASCULAR SCREENING; LDL GOAL LESS THAN 160 2010     Priority: Medium      "Generalized anxiety disorder 10/15/2009     Priority: Medium     lexapro made things worse.          She will be getting a mammogram and is up to date on colonoscopy and immunizations.            Vital Signs 1/20/2015 1/20/2015 1/20/2015 1/23/2015 1/23/2015   Systolic   127  114   Diastolic   70  68   Pulse   57  68   Temperature     98.6   Respirations   16  16   Weight (LB)     241 lb 12.8 oz   Height     5' 4.5\"   BMI (Calculated)     40.95   Pain    5    O2 97 97 97       Vital Signs 5/15/2015 7/8/2015 7/27/2015 9/2/2015 9/28/2015   Systolic 112 118 120 120 90   Diastolic 68 70 82 68 62   Pulse 68 76 60 66 72   Temperature 97.9 98.7 98.6 97.6 98.8   Respirations 16 20  20    Weight (LB) (No Data) (No Data) 238 lb 227 lb 6.4 oz 228 lb   Height 5' 4.5\" 5' 4.5\" 5' 4.5\" 5' 4.5\" 5' 4.5\"   BMI (Calculated)   40.31 38.51 38.61   Pain        O2  96 97           Vital Signs 11/2/2015 11/2/2015 12/8/2015 3/22/2016 3/22/2016   Systolic  108 108  118   Diastolic  76 68  72   Pulse  62 62  76   Temperature  97.4   98   Respirations   12     Weight (LB)  229 lb   233 lb 4 oz   Height     5' 4.5\"   BMI (Calculated)     39.5   Pain 4   0    O2  95        Vital Signs 4/8/2016 4/8/2016 4/25/2016 7/22/2016 8/9/2016   Systolic  120 110 110    Diastolic  74 72 72    Pulse  68 56 71    Temperature  97.7 97.7 97.7    Respirations        Weight (LB)  237 lb 4 oz (No Data) 245 lb    Height  5' 4.5\" 5' 4.5\" 5' 4.5\"    BMI (Calculated)  40.18  41.49    Pain 0    5   O2   100       Vital Signs 8/9/2016 9/6/2016 9/9/2016 9/22/2016 10/14/2016   Systolic 116 116 112 110 110   Diastolic 72 65 78 76 70   Pulse 68 65 70 79 59   Temperature 97.8 97.1 97.7 97.2 97.9   Respirations        Weight (LB) 246 lb 6 oz 244 lb 248 lb 250 lb 251 lb   Height 5' 4.5\" 5' 4.25\" 5' 5\" 5' 5\" 5' 5\"   BMI (Calculated) 41.72 41.64 41.36 41.69 41.86   Pain        O2  97 97 98 95     Vital Signs 12/2/2016 12/5/2016 2/22/2017 2/22/2017 3/11/2017   Systolic 109 128  112 " "   Diastolic 75 76  70    Pulse 67 72  66 70   Temperature 97.8 98.1  98.1    Respirations  20      Weight (LB) 256 lb 255 lb  266 lb 294 lb   Height 5' 5\" 5' 5\"  5' 5\" 5' 5\"   BMI (Calculated) 42.69 42.52  44.36 49.03   Pain   6     O2 96    96         Vital Signs 3/27/2017 8/2/2017 8/7/2017 8/31/2017   Systolic 111 128 113 122   Diastolic 75 72 73 80   Pulse 75 72 70 70   Temperature 97 98.3  98.4   Respirations    16   Weight (LB) 275 lb 12.8 oz 266 lb 4 oz 266 lb 4 oz 268 lb   Height  5' 5\" 5' 5\" 5' 5\"   BMI (Calculated)  44.4 44.4 44.69   Pain       O2    96     Vital Signs 9/13/2017 9/13/2017 9/18/2017 9/20/2017   Systolic  114 118 121   Diastolic  72 74 86   Pulse  67 68 86   Temperature  98.2 98 98.7   Respirations       Weight (LB)  272 lb 271 lb 271 lb   Height  5' 5\" 5' 5\" 5' 4.5\"   BMI (Calculated)  45.36 45.19 45.89   Pain 0      O2  95       Current Outpatient Prescriptions   Medication     TRAZODONE HCL PO     EPINEPHrine (EPIPEN/ADRENACLICK/OR ANY BX GENERIC EQUIV) 0.3 MG/0.3ML injection 2-pack     citalopram (CELEXA) 40 MG tablet     buPROPion (WELLBUTRIN XL) 150 MG 24 hr tablet     acetaminophen (TYLENOL) 325 MG tablet     ibuprofen (ADVIL/MOTRIN) 600 MG tablet     order for DME     No current facility-administered medications for this visit.        Please contact me with any questions.              Guera Castellon MD    "

## 2017-09-20 NOTE — NURSING NOTE
"Chief Complaint   Patient presents with     Physical       Initial /86  Pulse 86  Temp 98.7  F (37.1  C) (Oral)  Ht 5' 4.5\" (1.638 m)  Wt 271 lb (122.9 kg)  Breastfeeding? No  BMI 45.8 kg/m2 Estimated body mass index is 45.8 kg/(m^2) as calculated from the following:    Height as of this encounter: 5' 4.5\" (1.638 m).    Weight as of this encounter: 271 lb (122.9 kg).  Medication Reconciliation: complete    "

## 2017-09-20 NOTE — NURSING NOTE
Prior to injection verified patient identity using patient's name and date of birth.    Screening Questionnaire for Adult Immunization    Are you sick today?   No   Do you have allergies to medications, food, a vaccine component or latex?   Yes   Have you ever had a serious reaction after receiving a vaccination?   No   Do you have a long-term health problem with heart disease, lung disease, asthma, kidney disease, metabolic disease (e.g. diabetes), anemia, or other blood disorder?   No   Do you have cancer, leukemia, HIV/AIDS, or any other immune system problem?   No   In the past 3 months, have you taken medications that affect  your immune system, such as prednisone, other steroids, or anticancer drugs; drugs for the treatment of rheumatoid arthritis, Crohn s disease, or psoriasis; or have you had radiation treatments?   No   Have you had a seizure, or a brain or other nervous system problem?   No   During the past year, have you received a transfusion of blood or blood     products, or been given immune (gamma) globulin or antiviral drug?   No   For women: Are you pregnant or is there a chance you could become        pregnant during the next month?   No   Have you received any vaccinations in the past 4 weeks?   Yes- Flu     Immunization questionnaire was positive for at least one answer.  Notified Dr. Castellon.        Per orders of Dr. Castellon, injection of Td given by Luci Ocasio. Patient instructed to remain in clinic for 15 minutes afterwards, and to report any adverse reaction to me immediately.       Screening performed by Luci Ocasio on 9/20/2017 at 1:57 PM.

## 2017-09-20 NOTE — PROGRESS NOTES
SUBJECTIVE:   CC: Sunshine Delgado is an 52 year old woman who presents for preventive health visit.     Physical   Annual:     Getting at least 3 servings of Calcium per day::  Yes    Bi-annual eye exam::  Yes    Dental care twice a year::  Yes    Sleep apnea or symptoms of sleep apnea::  Sleep apnea    Diet::  Gluten-free/reduced    Frequency of exercise::  2-3 days/week    Duration of exercise::  15-30 minutes    Taking medications regularly::  Yes    Medication side effects::  None    Additional concerns today::  No    Colonoscopy 2015 at [facility] was normal    Mom has vascular dementia.  Mgreat gm-alzheimers, Maunt-alz    How long have you been overweight? Following one or more pregnancies   What is the most that you have ever weighed? 290   What is the most weight you have lost? 70   I have tried the following methods to lose weight Watching portions or calories, Exercise, Weight Watchers, Ashley Marito   I have tried the following weight loss medications? (Check all that apply) None   Have you ever had weight loss surgery? No       CO-MORBIDITIES OF OBESITY INCLUDE:     9/12/2017   I have the following co-morbidities associated with obesity: Sleep Apnea, Weight Bearing Joint Pain   Do you use a CPAP? Yes       Today's PHQ-2 Score:   PHQ-2 ( 1999 Pfizer) 9/13/2017   Q1: Little interest or pleasure in doing things 2   Q2: Feeling down, depressed or hopeless 0   PHQ-2 Score 2   Q1: Little interest or pleasure in doing things More than half the days   Q2: Feeling down, depressed or hopeless Not at all   PHQ-2 Score 2       Abuse: Current or Past(Physical, Sexual or Emotional)- No  Do you feel safe in your environment - Yes    Social History   Substance Use Topics     Smoking status: Never Smoker     Smokeless tobacco: Never Used     Alcohol use Yes      Comment: 1-2 per mo     The patient does not drink >3 drinks per day nor >7 drinks per week.    Reviewed orders with patient.  Reviewed health maintenance and updated  orders accordingly - Yes  BP Readings from Last 3 Encounters:   09/20/17 121/86   09/18/17 118/74   09/13/17 114/72    Wt Readings from Last 3 Encounters:   09/20/17 271 lb (122.9 kg)   09/18/17 271 lb (122.9 kg)   09/13/17 272 lb (123.4 kg)                  Patient Active Problem List   Diagnosis     Generalized anxiety disorder     CARDIOVASCULAR SCREENING; LDL GOAL LESS THAN 160     MARIA GUADALUPE (obstructive sleep apnea)- severe (AHI 84)     Morbid obesity (H)     Health Care Home     Mild major depression (H)     Insomnia     Bee sting allergy     Past Surgical History:   Procedure Laterality Date     HYSTERECTOMY, PAP NO LONGER INDICATED       HYSTERECTOMY, VAGINAL  2007    still has ovaries       Social History   Substance Use Topics     Smoking status: Never Smoker     Smokeless tobacco: Never Used     Alcohol use Yes      Comment: 1-2 per mo     Family History   Problem Relation Age of Onset     Eye Disorder Mother      lost eyesight; probable macular degeneration     Psychotic Disorder Mother      Dementia /Alzhimers     Neurologic Disorder Mother      seizures     DIABETES Mother      Hypertension Mother      Alzheimer Disease Mother      Arthritis Mother      OSTEOPOROSIS Mother      Obesity Mother      DIABETES Father      Depression Father      Hyperlipidemia Father      Obesity Father      Psychotic Disorder Sister      bipolar     Thyroid Disease Sister      h/o thyroid cancer     Depression Sister      CANCER Other      Brain cancer     OSTEOPOROSIS Maternal Grandmother      Anxiety Disorder Son      Thyroid Disease Sister      Obesity Sister          Current Outpatient Prescriptions   Medication Sig Dispense Refill     TRAZODONE HCL PO Take 50 mg by mouth At Bedtime       EPINEPHrine (EPIPEN/ADRENACLICK/OR ANY BX GENERIC EQUIV) 0.3 MG/0.3ML injection 2-pack Inject 0.3 mLs (0.3 mg) into the muscle as needed for anaphylaxis 0.6 mL 3     citalopram (CELEXA) 40 MG tablet Take 1 tablet (40 mg) by mouth daily 90  "tablet 3     buPROPion (WELLBUTRIN XL) 150 MG 24 hr tablet Take 1 tablet (150 mg) by mouth every morning 90 tablet 0     acetaminophen (TYLENOL) 325 MG tablet        ibuprofen (ADVIL/MOTRIN) 600 MG tablet Take 600 mg by mouth       order for DME Resmed Airsense 10 auto cpap 6-7 cm, Airfit P10 for her XS pillows             Patient over age 50, mutual decision to screen reflected in health maintenance.      Pertinent mammograms are reviewed under the imaging tab.  History of abnormal Pap smear: Status post benign hysterectomy. Health Maintenance and Surgical History updated.    Reviewed and updated as needed this visit by clinical staffTobacco  Allergies  Med Hx  Surg Hx  Fam Hx  Soc Hx        Reviewed and updated as needed this visit by Provider  Med Hx        ROS:  C: NEGATIVE for fever, chills, change in weight  INTEGUMENTARY/SKIN: erythema on neck and chest for many years.  Has new pink papule on right lower leg  E: NEGATIVE for vision changes or irritation  ENT: NEGATIVE for ear, mouth and throat problems  R: NEGATIVE for significant cough or SOB  B: NEGATIVE for masses, tenderness or discharge  CV: NEGATIVE for chest pain, palpitations or peripheral edema  GI: NEGATIVE for nausea, abdominal pain, heartburn, or change in bowel habits  : NEGATIVE for unusual urinary or vaginal symptoms. No vaginal bleeding.  M: NEGATIVE for significant arthralgias or myalgia  N: NEGATIVE for weakness, dizziness or paresthesias  P: NEGATIVE for changes in mood or affect      OBJECTIVE:   /86  Pulse 86  Temp 98.7  F (37.1  C) (Oral)  Ht 5' 4.5\" (1.638 m)  Wt 271 lb (122.9 kg)  Breastfeeding? No  BMI 45.8 kg/m2  EXAM:  GENERAL: healthy, alert and no distress, morbid obesity  HENT: ear canals and TM's normal, nose and mouth without ulcers or lesions  NECK: no adenopathy, no asymmetry, masses, or scars and thyroid normal to palpation  RESP: lungs clear to auscultation - no rales, rhonchi or wheezes  BREAST: normal " without masses, tenderness or nipple discharge and no palpable axillary masses or adenopathy  CV: regular rate and rhythm, normal S1 S2, no S3 or S4, no murmur, click or rub, no peripheral edema and peripheral pulses strong  ABDOMEN: soft, nontender, no hepatosplenomegaly, no masses and bowel sounds normal  MS: no gross musculoskeletal defects noted, no edema  SKIN: erythema on neck and chest for many years.  Has new pink papule, dome shaped, smooth on right lower leg  NEURO: Normal strength and tone, mentation intact and speech normal  PSYCH: mentation appears normal, affect normal/bright    ASSESSMENT/PLAN:   1. Encounter for preventative adult health care exam with abnormal findings      2. Morbid obesity due to excess calories (H)  She needs letter written to pursue bariatric surgery  Letter done in [employer] and routed to Jane Aguirre RN nurse coordinator    - **CBC with platelets FUTURE anytime; Future  - **Comprehensive metabolic panel FUTURE anytime; Future  - **Vitamin D Deficiency FUTURE anytime; Future  - **Parathyroid Hormone Intact FUTURE 2mo; Future    3. Skin lesion    - DERMATOLOGY REFERRAL    4. Erythema of skin    - DERMATOLOGY REFERRAL    5. Screen for colon cancer  Up to date on colonoscopy 2015    6. CARDIOVASCULAR SCREENING; LDL GOAL LESS THAN 160    - **Lipid panel reflex to direct LDL FUTURE anytime; Future    7. Need for prophylactic vaccination with tetanus-diphtheria (TD)    - TD (ADULT, 7+) PRESERVE FREE    8. Screening for diabetes mellitus  glucose    9. Visit for screening mammogram    - MA SCREENING DIGITAL BILAT - Future  (s+30); Future    COUNSELING:  Reviewed preventive health counseling, as reflected in patient instructions       Regular exercise       Healthy diet/nutrition       Colon cancer screening       ([place])menopause management       The ASCVD Risk score (Snelling CHYNA Jr, et al., 2013) failed to calculate for the following reasons:    Cannot find a previous HDL lab    Cannot  "find a previous total cholesterol lab    BP Screening:   Last 3 BP Readings:    BP Readings from Last 3 Encounters:   09/20/17 121/86   09/18/17 118/74   09/13/17 114/72       The following was recommended to the patient:  Re-screen BP within a year and recommended lifestyle modifications     reports that she has never smoked. She has never used smokeless tobacco.    Estimated body mass index is 45.8 kg/(m^2) as calculated from the following:    Height as of this encounter: 5' 4.5\" (1.638 m).    Weight as of this encounter: 271 lb (122.9 kg).   Weight management plan: Patient referred to endocrine and/or weight management specialty    Counseling Resources:  ATP IV Guidelines  Pooled Cohorts Equation Calculator  Breast Cancer Risk Calculator  FRAX Risk Assessment  ICSI Preventive Guidelines  Dietary Guidelines for Americans, 2010  USDA's MyPlate  ASA Prophylaxis  Lung CA Screening    Guera Castellon MD  [facility]  Answers for HPI/ROS submitted by the patient on 9/13/2017   PHQ-2 Score: 2    "

## 2017-09-20 NOTE — MR AVS SNAPSHOT
After Visit Summary   9/20/2017    Sunshine Delgado    MRN: [id number]           Patient Information     Date Of Birth          1965        Visit Information        Provider Department      9/20/2017 12:20 PM Guera Castellon MD [facility]        Today's Diagnoses     Encounter for preventative adult health care exam with abnormal findings    -  1    Screen for colon cancer        Visit for screening mammogram        Screening for diabetes mellitus        CARDIOVASCULAR SCREENING; LDL GOAL LESS THAN 160        Need for prophylactic vaccination with tetanus-diphtheria (TD)        Skin lesion        Erythema of skin          Care Instructions      Preventive Health Recommendations  Female Ages 50 - 64    Yearly exam: See your health care provider every year in order to  o Review health changes.   o Discuss preventive care.    o Review your medicines if your doctor has prescribed any.      Get a Pap test every three years (unless you have an abnormal result and your provider advises testing more often).    If you get Pap tests with HPV test, you only need to test every 5 years, unless you have an abnormal result.     You do not need a Pap test if your uterus was removed (hysterectomy) and you have not had cancer.    You should be tested each year for STDs (sexually transmitted diseases) if you're at risk.     Have a mammogram every 1 to 2 years.    Have a colonoscopy at age 50, or have a yearly FIT test (stool test). These exams screen for colon cancer.      Have a cholesterol test every 5 years, or more often if advised.    Have a diabetes test (fasting glucose) every three years. If you are at risk for diabetes, you should have this test more often.     If you are at risk for osteoporosis (brittle bone disease), think about having a bone density scan (DEXA).    Shots: Get a flu shot each year. Get a tetanus shot every 10 years.    Nutrition:     Eat at least 5 servings of  fruits and vegetables each day.    Eat whole-grain bread, whole-wheat pasta and brown rice instead of white grains and rice.    Talk to your provider about Calcium and Vitamin D.     Lifestyle    Exercise at least 150 minutes a week (30 minutes a day, 5 days a week). This will help you control your weight and prevent disease.    Limit alcohol to one drink per day.    No smoking.     Wear sunscreen to prevent skin cancer.     See your dentist every six months for an exam and cleaning.    See your eye doctor every 1 to 2 years.    [facility]   Discharged by : Luci STOVALL MA    If you have any questions regarding your visit please contact your care team:     [facility] Clinic Hours Telephone Number   Dr. Amira Jimenez   7am-7pm Monday - Thursday   7am-5pm Fridays  [phone]   (Appointment scheduling available 24/7)   RN Line   [phone] option 2       For a Price Quote for your services, please call our [employer] Price Line at [phone].     What options do I have for visits at the clinic other than the traditional office visit?     To expand how we care for you, many of our providers are utilizing electronic visits (e-visits) and telephone visits, when medically appropriate, for interactions with their patients rather than a visit in the clinic. We also offer nurse visits for many medical concerns. Just like any other service, we will bill your insurance company for this type of visit based on time spent on the phone with your provider. Not all insurance companies cover these visits. Please check with your medical insurance if this type of visit is covered. You will be responsible for any charges that are not paid by your insurance.   E-visits via [employer]: generally incur a $35.00 fee.     Telephone visits:   Time spent on the phone: *charged based on time that is spent on the phone in increments of 10 minutes. Estimated cost:    5-10 mins $30.00   11-20 mins. $59.00   21-30 mins. $85.00     Use [employer] (secure email communication and access to your chart) to send your primary care provider a message or make an appointment. Ask someone on your Team how to sign up for [employer].     As always, Thank you for trusting us with your health care needs!      [facility]:    Scheduling Appointments  [facility]  Call: [phone]    [facility]  Call: [phone]    [facility]  Call: [phone]      WHERE TO GO FOR CARE?    Clinic    Make an appointment if you:       Are sick (cold, cough, flu, sore throat, earache or in pain).       Have a small injury (sprain, small cut, burn or broken bone).       Need a physical exam, Pap smear, vaccine or prescription refill.       Have questions about your health or medicines.    To reach us:      Call [phone] ([phone]). Open 24 hours every day. (For counseling services, call [phone].)    Log into [employer] at [email or web address]. (Visit [email or web address] to create an account.) Hospital emergency room    An emergency is a serious or life- threatening problem that must be treated right away.    Call 246 or get to the hospital if you have:      Very bad or sudden:            - Chest pain or pressure         - Bleeding         - Head or belly pain         - Dizziness or trouble seeing, walking or                          Speaking      Problems breathing      Blood in your vomit or you are coughing up blood      A major injury (knocked out, loss of a finger or limb, rape, broken bone protruding from skin)    A mental health crisis. (Or call the Mental Health Crisis line at 1-932.256.1717 or Suicide Prevention Hotline at 1-354.355.4108.)    Open 24 hours every day. You don't need an appointment.     Urgent care    Visit urgent care for sickness or small injuries when the clinic is closed. You don't  need an appointment. To check hours or find an urgent care near you, visit [email or web address]. Online care    Get online care from [facility] for more than 70 common problems, like colds, allergies and infections. Open 24 hours every day at:   [email or web address]   Need help deciding?    For advice about where to be seen, you may call your clinic and ask to speak with a nurse. We're here for you 24 hours every day.         If you are deaf or hard of hearing, please let us know. We provide many free services including sign language interpreters, oral interpreters, TTYs, telephone amplifiers, note takers and written materials.                         Follow-ups after your visit        Additional Services     DERMATOLOGY REFERRAL       Your provider has referred you to: [facility]: Dermatology Clinic - [place] [phone]   [email or web address]  [facility]: [facility] [phone]   [email or web address]    Please be aware that coverage of these services is subject to the terms and limitations of your health insurance plan.  Call member services at your health plan with any benefit or coverage questions.      Please bring the following with you to your appointment:    (1) Any X-Rays, CTs or MRIs which have been performed.  Contact the facility where they were done to arrange for  prior to your scheduled appointment.    (2) List of current medications  (3) This referral request   (4) Any documents/labs given to you for this referral                  Your next 10 appointments already scheduled     Sep 25, 2017  6:40 PM CDT   Return Visit with Lashonda Sesay MD   [facility] [place] ([facility] [place])    [address]  [place] [address]   [phone]            Sep 28, 2017 10:30 AM CDT   Return Sleep Patient with SUPRIYA Bartholomew   [facility] ([facility])     [address] [address]   [phone]            Oct 13, 2017 10:30 AM CDT   NUTRITION VISIT with STANFORD [facility] ([facility])    [address]  [address] [facility] [id number] [phone]            Nov 10, 2017 10:30 AM CST   NUTRITION VISIT with STANFORD [facility] ([facility])    [address] [id number] [phone]              Future tests that were ordered for you today     Open Future Orders        Priority Expected Expires Ordered    MA SCREENING DIGITAL BILAT - Future  (s+30) Routine  9/20/2018 9/20/2017            Who to contact     If you have questions or need follow up information about today's clinic visit or your schedule please contact [facility] directly at [phone].  Normal or non-critical lab and imaging results will be communicated to you by [employer], letter or phone within 4 business days after the clinic has received the results. If you do not hear from us within 7 days, please contact the clinic through [employer] or phone. If you have a critical or abnormal lab result, we will notify you by phone as soon as possible.  Submit refill requests through [employer] or call your pharmacy and they will forward the refill request to us. Please allow 3 business days for your refill to be completed.          Additional Information About Your Visit        [employer] Information     [employer] gives you secure access to your electronic health record. If you see a primary care provider, you can also send messages to your care team and make appointments. If you have questions, please call your primary care clinic.  If you do not have a primary care provider, please call [phone] and they will assist you.        Care EveryWhere ID     This is your Care  "EveryWhere ID. This could be used by other organizations to access your [facility] medical records  [id number]        Your Vitals Were     Pulse Temperature Height Breastfeeding? BMI (Body Mass Index)       86 98.7  F (37.1  C) (Oral) 5' 4.5\" (1.638 m) No 45.8 kg/m2        Blood Pressure from Last 3 Encounters:   09/20/17 121/86   09/18/17 118/74   09/13/17 114/72    Weight from Last 3 Encounters:   09/20/17 271 lb (122.9 kg)   09/18/17 271 lb (122.9 kg)   09/13/17 272 lb (123.4 kg)              We Performed the Following     DERMATOLOGY REFERRAL     TD (ADULT, 7+) PRESERVE FREE        Primary Care Provider Office Phone # Fax #    Shana Jett [phone] [address] [facility] [address]        Equal Access to Services     [facility]: Hadii aad ku hadasho Soomaali, waaxda luqadaha, qaybta kaalmada adeegyada, waxay griselin hayingridn ryan cloud . So [facility] [phone].    ATENCIÓN: Si habla español, tiene a beard disposición servicios gratuitos de asistencia lingüística. Isidra al [phone].    We comply with applicable federal civil rights laws and [place] laws. We do not discriminate on the basis of race, color, national origin, age, disability sex, sexual orientation or gender identity.            Thank you!     Thank you for choosing [facility]  for your care. Our goal is always to provide you with excellent care. Hearing back from our patients is one way we can continue to improve our services. Please take a few minutes to complete the written survey that you may receive in the mail after your visit with us. Thank you!             Your Updated Medication List - Protect others around you: Learn how to safely use, store and throw away your medicines at www.disposemymeds.org.          This list is accurate as of: 9/20/17  1:40 PM.  Always use your most recent med list.                   Brand Name Dispense Instructions for use Diagnosis    buPROPion 150 " MG 24 hr tablet    WELLBUTRIN XL    90 tablet    Take 1 tablet (150 mg) by mouth every morning    Generalized anxiety disorder, Major depressive disorder, recurrent episode, moderate (H)       citalopram 40 MG tablet    celeXA    90 tablet    Take 1 tablet (40 mg) by mouth daily    Generalized anxiety disorder, Major depressive disorder, recurrent episode, moderate (H)       EPINEPHrine 0.3 MG/0.3ML injection 2-pack    EPIPEN/ADRENACLICK/or ANY BX GENERIC EQUIV    0.6 mL    Inject 0.3 mLs (0.3 mg) into the muscle as needed for anaphylaxis    Bee sting allergy       ibuprofen 600 MG tablet    ADVIL/MOTRIN     Take 600 mg by mouth        order for DME      Resmed Airsense 10 auto cpap 6-7 cm, Airfit P10 for her XS pillows        TRAZODONE HCL PO      Take 50 mg by mouth At Bedtime        TYLENOL 325 MG tablet   Generic drug:  acetaminophen

## 2017-09-20 NOTE — PROGRESS NOTES
This patient has a future lab only appointment on 09/21/2017 and needs orders for fasting labs. Please sign the pended labs taken from the overdue hm and make any needed changes. Thanks oly

## 2017-09-20 NOTE — PATIENT INSTRUCTIONS
Preventive Health Recommendations  Female Ages 50 - 64    Yearly exam: See your health care provider every year in order to  o Review health changes.   o Discuss preventive care.    o Review your medicines if your doctor has prescribed any.      Get a Pap test every three years (unless you have an abnormal result and your provider advises testing more often).    If you get Pap tests with HPV test, you only need to test every 5 years, unless you have an abnormal result.     You do not need a Pap test if your uterus was removed (hysterectomy) and you have not had cancer.    You should be tested each year for STDs (sexually transmitted diseases) if you're at risk.     Have a mammogram every 1 to 2 years.    Have a colonoscopy at age 50, or have a yearly FIT test (stool test). These exams screen for colon cancer.      Have a cholesterol test every 5 years, or more often if advised.    Have a diabetes test (fasting glucose) every three years. If you are at risk for diabetes, you should have this test more often.     If you are at risk for osteoporosis (brittle bone disease), think about having a bone density scan (DEXA).    Shots: Get a flu shot each year. Get a tetanus shot every 10 years.    Nutrition:     Eat at least 5 servings of fruits and vegetables each day.    Eat whole-grain bread, whole-wheat pasta and brown rice instead of white grains and rice.    Talk to your provider about Calcium and Vitamin D.     Lifestyle    Exercise at least 150 minutes a week (30 minutes a day, 5 days a week). This will help you control your weight and prevent disease.    Limit alcohol to one drink per day.    No smoking.     Wear sunscreen to prevent skin cancer.     See your dentist every six months for an exam and cleaning.    See your eye doctor every 1 to 2 years.    [facility]   Discharged by : Luci STOVALL MA    If you have any questions regarding your visit please contact your care team:     [facility]  Hours Telephone Number   Dr. Amira Jimenez   7am-7pm Monday - Thursday   7am-5pm Fridays  [phone]   (Appointment scheduling available 24/7)   RN Line   [phone] option 2       For a Price Quote for your services, please call our Consumer Price Line at [phone].     What options do I have for visits at the clinic other than the traditional office visit?     To expand how we care for you, many of our providers are utilizing electronic visits (e-visits) and telephone visits, when medically appropriate, for interactions with their patients rather than a visit in the clinic. We also offer nurse visits for many medical concerns. Just like any other service, we will bill your insurance company for this type of visit based on time spent on the phone with your provider. Not all insurance companies cover these visits. Please check with your medical insurance if this type of visit is covered. You will be responsible for any charges that are not paid by your insurance.   E-visits via Gozent: generally incur a $35.00 fee.     Telephone visits:   Time spent on the phone: *charged based on time that is spent on the phone in increments of 10 minutes. Estimated cost:   5-10 mins $30.00   11-20 mins. $59.00   21-30 mins. $85.00     Use TwtBkshart (secure email communication and access to your chart) to send your primary care provider a message or make an appointment. Ask someone on your Team how to sign up for GlobeRangert.     As always, Thank you for trusting us with your health care needs!      [facility]:    Scheduling Appointments  [place], [facility]  Call: [phone]    [facility]  Call: [phone]    [facility]  Call: [phone]      WHERE TO GO FOR CARE?    Clinic    Make an appointment if you:       Are sick (cold, cough, flu, sore throat, earache or in  pain).       Have a small injury (sprain, small cut, burn or broken bone).       Need a physical exam, Pap smear, vaccine or prescription refill.       Have questions about your health or medicines.    To reach us:      Call [phone] ([phone]). Open 24 hours every day. (For counseling services, call [phone].)    Log into [employer] at [employer]. (Visit Savosolar to create an account.) Hospital emergency room    An emergency is a serious or life- threatening problem that must be treated right away.    Call 911 or get to the hospital if you have:      Very bad or sudden:            - Chest pain or pressure         - Bleeding         - Head or belly pain         - Dizziness or trouble seeing, walking or                          Speaking      Problems breathing      Blood in your vomit or you are coughing up blood      A major injury (knocked out, loss of a finger or limb, rape, broken bone protruding from skin)    A mental health crisis. (Or call the Mental Health Crisis line at 1-398.329.2466 or Suicide Prevention Hotline at 1-698.391.4878.)    Open 24 hours every day. You don't need an appointment.     Urgent care    Visit urgent care for sickness or small injuries when the clinic is closed. You don't need an appointment. To check hours or find an urgent care near you, visit [email or web address]. Online care    Get online care from [facility] for more than 70 common problems, like colds, allergies and infections. Open 24 hours every day at:   [email or web address]   Need help deciding?    For advice about where to be seen, you may call your clinic and ask to speak with a nurse. We're here for you 24 hours every day.         If you are deaf or hard of hearing, please let us know. We provide many free services including sign language interpreters, oral interpreters, TTYs, telephone amplifiers, note takers and written materials.

## 2017-09-22 ENCOUNTER — TELEPHONE (OUTPATIENT)
Dept: LAB | Facility: CLINIC | Age: 52
End: 2017-09-22

## 2017-09-22 DIAGNOSIS — Z13.6 CARDIOVASCULAR SCREENING; LDL GOAL LESS THAN 160: Chronic | ICD-10-CM

## 2017-09-22 DIAGNOSIS — E66.01 MORBID OBESITY DUE TO EXCESS CALORIES (H): Chronic | ICD-10-CM

## 2017-09-22 LAB
ERYTHROCYTE [DISTWIDTH] IN BLOOD BY AUTOMATED COUNT: 15.3 % (ref 10–15)
HCT VFR BLD AUTO: 41.3 % (ref 35–47)
HGB BLD-MCNC: 13.4 G/DL (ref 11.7–15.7)
MCH RBC QN AUTO: 29.7 PG (ref 26.5–33)
MCHC RBC AUTO-ENTMCNC: 32.4 G/DL (ref 31.5–36.5)
MCV RBC AUTO: 92 FL (ref 78–100)
PLATELET # BLD AUTO: 263 10E9/L (ref 150–450)
PTH-INTACT SERPL-MCNC: 56 PG/ML (ref 12–72)
RBC # BLD AUTO: 4.51 10E12/L (ref 3.8–5.2)
WBC # BLD AUTO: 6.1 10E9/L (ref 4–11)

## 2017-09-22 PROCEDURE — 36415 COLL VENOUS BLD VENIPUNCTURE: CPT | Performed by: FAMILY MEDICINE

## 2017-09-22 PROCEDURE — 82306 VITAMIN D 25 HYDROXY: CPT | Performed by: FAMILY MEDICINE

## 2017-09-22 PROCEDURE — 85027 COMPLETE CBC AUTOMATED: CPT | Performed by: FAMILY MEDICINE

## 2017-09-22 PROCEDURE — 83970 ASSAY OF PARATHORMONE: CPT | Performed by: FAMILY MEDICINE

## 2017-09-22 PROCEDURE — 80061 LIPID PANEL: CPT | Performed by: FAMILY MEDICINE

## 2017-09-22 PROCEDURE — 80053 COMPREHEN METABOLIC PANEL: CPT | Performed by: FAMILY MEDICINE

## 2017-09-22 NOTE — TELEPHONE ENCOUNTER
Patient came to lab for her blood work today.  Parathyroid hormone expect to be done on 11/19 /17. Do you want it done today? YT

## 2017-09-23 LAB
ALBUMIN SERPL-MCNC: 3.8 G/DL (ref 3.4–5)
ALP SERPL-CCNC: 56 U/L (ref 40–150)
ALT SERPL W P-5'-P-CCNC: 37 U/L (ref 0–50)
ANION GAP SERPL CALCULATED.3IONS-SCNC: 4 MMOL/L (ref 3–14)
AST SERPL W P-5'-P-CCNC: 22 U/L (ref 0–45)
BILIRUB SERPL-MCNC: 0.6 MG/DL (ref 0.2–1.3)
BUN SERPL-MCNC: 18 MG/DL (ref 7–30)
CALCIUM SERPL-MCNC: 9.1 MG/DL (ref 8.5–10.1)
CHLORIDE SERPL-SCNC: 103 MMOL/L (ref 94–109)
CHOLEST SERPL-MCNC: 180 MG/DL
CO2 SERPL-SCNC: 30 MMOL/L (ref 20–32)
CREAT SERPL-MCNC: 0.98 MG/DL (ref 0.52–1.04)
GFR SERPL CREATININE-BSD FRML MDRD: 60 ML/MIN/1.7M2
GLUCOSE SERPL-MCNC: 90 MG/DL (ref 70–99)
HDLC SERPL-MCNC: 64 MG/DL
LDLC SERPL CALC-MCNC: 104 MG/DL
NONHDLC SERPL-MCNC: 116 MG/DL
POTASSIUM SERPL-SCNC: 4.4 MMOL/L (ref 3.4–5.3)
PROT SERPL-MCNC: 7.2 G/DL (ref 6.8–8.8)
SODIUM SERPL-SCNC: 137 MMOL/L (ref 133–144)
TRIGL SERPL-MCNC: 59 MG/DL

## 2017-09-25 LAB — DEPRECATED CALCIDIOL+CALCIFEROL SERPL-MC: 31 UG/L (ref 20–75)

## 2017-09-28 ENCOUNTER — OFFICE VISIT (OUTPATIENT)
Dept: SLEEP MEDICINE | Facility: CLINIC | Age: 52
End: 2017-09-28
Payer: COMMERCIAL

## 2017-09-28 VITALS
OXYGEN SATURATION: 96 % | BODY MASS INDEX: 45.32 KG/M2 | WEIGHT: 272 LBS | DIASTOLIC BLOOD PRESSURE: 70 MMHG | HEIGHT: 65 IN | HEART RATE: 69 BPM | SYSTOLIC BLOOD PRESSURE: 105 MMHG

## 2017-09-28 DIAGNOSIS — G47.33 OSA (OBSTRUCTIVE SLEEP APNEA): Primary | ICD-10-CM

## 2017-09-28 PROCEDURE — 99214 OFFICE O/P EST MOD 30 MIN: CPT | Performed by: PHYSICIAN ASSISTANT

## 2017-09-28 NOTE — MR AVS SNAPSHOT
After Visit Summary   9/28/2017    Sunshine Delgado    MRN: [id number]           Patient Information     Date Of Birth          1965        Visit Information        Provider Department      9/28/2017 10:30 AM Tamra Juarez PA [facility]        Today's Diagnoses     MARIA GUADALUPE (obstructive sleep apnea)    -  1      Care Instructions      Your BMI is Body mass index is 45.26 kg/(m^2).  Weight management is a personal decision.  If you are interested in exploring weight loss strategies, the following discussion covers the approaches that may be successful. Body mass index (BMI) is one way to tell whether you are at a healthy weight, overweight, or obese. It measures your weight in relation to your height.  A BMI of 18.5 to 24.9 is in the healthy range. A person with a BMI of 25 to 29.9 is considered overweight, and someone with a BMI of 30 or greater is considered obese. More than two-thirds of American adults are considered overweight or obese.  Being overweight or obese increases the risk for further weight gain. Excess weight may lead to heart disease and diabetes.  Creating and following plans for healthy eating and physical activity may help you improve your health.  Weight control is part of healthy lifestyle and includes exercise, emotional health, and healthy eating habits. Careful eating habits lifelong are the mainstay of weight control. Though there are significant health benefits from weight loss, long-term weight loss with diet alone may be very difficult to achieve- studies show long-term success with dietary management in less than 10% of people. Attaining a healthy weight may be especially difficult to achieve in those with severe obesity. In some cases, medications, devices and surgical management might be considered.  What can you do?  If you are overweight or obese and are interested in methods for weight loss, you should discuss this with your provider.     Consider  reducing daily calorie intake by 500 calories.     Keep a food journal.     Avoiding skipping meals, consider cutting portions instead.    Diet combined with exercise helps maintain muscle while optimizing fat loss. Strength training is particularly important for building and maintaining muscle mass. Exercise helps reduce stress, increase energy, and improves fitness. Increasing exercise without diet control, however, may not burn enough calories to loose weight.       Start walking three days a week 10-20 minutes at a time    Work towards walking thirty minutes five days a week     Eventually, increase the speed of your walking for 1-2 minutes at time    In addition, we recommend that you review healthy lifestyles and methods for weight loss available through the National Institutes of Health patient information sites:  http://win.niddk.nih.gov/publications/index.htm    And look into health and wellness programs that may be available through your health insurance provider, employer, local community center, or marisol club.    Weight management plan: Patient was referred to their PCP to discuss a diet and exercise plan.        Your Body mass index is 45.26 kg/(m^2).  Weight management is a personal decision.  If you are interested in exploring weight loss strategies, the following discussion covers the approaches that may be successful. Body mass index (BMI) is one way to tell whether you are at a healthy weight, overweight, or obese. It measures your weight in relation to your height.  A BMI of 18.5 to 24.9 is in the healthy range. A person with a BMI of 25 to 29.9 is considered overweight, and someone with a BMI of 30 or greater is considered obese. More than two-thirds of American adults are considered overweight or obese.  Being overweight or obese increases the risk for further weight gain. Excess weight may lead to heart disease and diabetes.  Creating and following plans for healthy eating and physical activity  may help you improve your health.  Weight control is part of healthy lifestyle and includes exercise, emotional health, and healthy eating habits. Careful eating habits lifelong are the mainstay of weight control. Though there are significant health benefits from weight loss, long-term weight loss with diet alone may be very difficult to achieve- studies show long-term success with dietary management in less than 10% of people. Attaining a healthy weight may be especially difficult to achieve in those with severe obesity. In some cases, medications, devices and surgical management might be considered.  What can you do?  If you are overweight or obese and are interested in methods for weight loss, you should discuss this with your provider.     Consider reducing daily calorie intake by 500 calories.     Keep a food journal.     Avoiding skipping meals, consider cutting portions instead.    Diet combined with exercise helps maintain muscle while optimizing fat loss. Strength training is particularly important for building and maintaining muscle mass. Exercise helps reduce stress, increase energy, and improves fitness. Increasing exercise without diet control, however, may not burn enough calories to loose weight.       Start walking three days a week 10-20 minutes at a time    Work towards walking thirty minutes five days a week     Eventually, increase the speed of your walking for 1-2 minutes at time    In addition, we recommend that you review healthy lifestyles and methods for weight loss available through the National Institutes of Health patient information sites:  http://win.niddk.nih.gov/publications/index.htm    And look into health and wellness programs that may be available through your health insurance provider, employer, local community center, or marisol club.    Weight management plan: Patient was referred to their PCP to discuss a diet and exercise plan.            Follow-ups after your visit         Follow-up notes from your care team     Return in 1 year (on 9/28/2018) for PAP follow up.      Your next 10 appointments already scheduled     Oct 13, 2017 10:30 AM CDT   NUTRITION VISIT with [facility] ([facility])    [address] [place] [address]   [phone]            Nov 10, 2017 10:30 AM CST   NUTRITION VISIT with [facility] ([facility])    [address] [place] [id number]   [phone]            Nov 15, 2017 11:00 AM CST   [facility]   [facility]    [address] [place] [id number] [phone]           Your Arrival times is X, We need you to be here at this time to get checked in and have the assistant get you ready for the provider, which can take about 15 minutes. Your appointment time with your provider is at  X. Thank you.              Who to contact     If you have questions or need follow up information about today's clinic visit or your schedule please contact [facility] directly at [phone].  Normal or non-critical lab and imaging results will be communicated to you by MyChart, letter or phone within 4 business days after the clinic has received the results. If you do not hear from us within 7 days, please contact the clinic through MyChart or phone. If you have a critical or abnormal lab result, we will notify you by phone as soon as possible.  Submit refill requests through Germmatters or call your pharmacy and they will forward the refill request to us. Please allow 3 business days for your refill to be completed.          Additional Information About Your Visit        Germmatters Information     Germmatters gives you secure access to your electronic health record. If you see a  "primary care provider, you can also send messages to your care team and make appointments. If you have questions, please call your primary care clinic.  If you do not have a primary care provider, please call [phone] and they will assist you.        Care EveryWhere ID     This is your Care EveryWhere ID. This could be used by other organizations to access your [facility] medical records  [id number]        Your Vitals Were     Pulse Height Pulse Oximetry BMI (Body Mass Index)          69 1.651 m (5' 5\") 96% 45.26 kg/m2         Blood Pressure from Last 3 Encounters:   09/28/17 105/70   09/20/17 121/86   09/18/17 118/74    Weight from Last 3 Encounters:   09/28/17 123.4 kg (272 lb)   09/20/17 122.9 kg (271 lb)   09/18/17 122.9 kg (271 lb)              We Performed the Following     Sleep Comprehensive DME        Primary Care Provider Office Phone # Fax #    Shana Jett [phone]       [address] [facility] [address]        Equal Access to Services     [facility]: Hadii aad ku hadasho Soomaali, waaxda luqadaha, qaybta kaalmada quinn, mary cloud . So [facility] [phone].    ATENCIÓN: Si habla español, tiene a beard disposición servicios gratuitos de asistencia lingüística. Isidra al [phone].    We comply with applicable federal civil rights laws and [place] laws. We do not discriminate on the basis of race, color, national origin, age, disability sex, sexual orientation or gender identity.            Thank you!     Thank you for choosing [facility]  for your care. Our goal is always to provide you with excellent care. Hearing back from our patients is one way we can continue to improve our services. Please take a few minutes to complete the written survey that you may receive in the mail after your visit with us. Thank you!             Your Updated Medication List - Protect others around you: Learn how to safely use, store and " throw away your medicines at www.disposemymeds.org.          This list is accurate as of: 9/28/17 10:54 AM.  Always use your most recent med list.                   Brand Name Dispense Instructions for use Diagnosis    buPROPion 150 MG 24 hr tablet    WELLBUTRIN XL    90 tablet    Take 1 tablet (150 mg) by mouth every morning    Generalized anxiety disorder, Major depressive disorder, recurrent episode, moderate (H)       citalopram 40 MG tablet    celeXA    90 tablet    Take 1 tablet (40 mg) by mouth daily    Generalized anxiety disorder, Major depressive disorder, recurrent episode, moderate (H)       EPINEPHrine 0.3 MG/0.3ML injection 2-pack    EPIPEN/ADRENACLICK/or ANY BX GENERIC EQUIV    0.6 mL    Inject 0.3 mLs (0.3 mg) into the muscle as needed for anaphylaxis    Bee sting allergy       ibuprofen 600 MG tablet    ADVIL/MOTRIN     Take 600 mg by mouth        order for DME      Resmed Airsense 10 auto cpap 6-7 cm, Airfit P10 for her XS pillows        TRAZODONE HCL PO      Take 50 mg by mouth At Bedtime        TYLENOL 325 MG tablet   Generic drug:  acetaminophen

## 2017-09-28 NOTE — NURSING NOTE
"Chief Complaint   Patient presents with     CPAP Follow Up       Initial There were no vitals taken for this visit. Estimated body mass index is 45.8 kg/(m^2) as calculated from the following:    Height as of 9/20/17: 1.638 m (5' 4.5\").    Weight as of 9/20/17: 122.9 kg (271 lb).  Medication Reconciliation: complete    "

## 2017-09-28 NOTE — PROGRESS NOTES
"Obstructive Sleep Apnea- PAP Follow-Up Visit:    Chief Complaint   Patient presents with     CPAP Follow Up       Sunshine Delgado comes in today for follow-up of their severe sleep apnea, managed with CPAP.     Sunshine had a sleep study on 5/10/2001 (226#, AHI 24, RDI 30, lowest oxygen saturation was 88%, CPAP 8 cm was curative), did not tolerate CPAP. Treatment likely complicated by sleep onset difficulties. Interim 26# weight gain.    Repeat sleep study-  Study Date: 9/30/2016- (248.0 lbs) apnea/hypopnea index 84.4.  REM AHI n/a, supine AHI 88.8 events per hour. Lowest oxygen saturation 80.2%.  Time spent less than or equal to 88% 26.1 minutes. CPAP optimal pressure 5 cmH2O with AHI of 5.2 events per hour.  There were residual non-specific arousals. Time in REM supine on final pressure 72.5 minutes.    Overall, she rates the experience with PAP as 10 (0 poor, 10 great). The mask is not comfortable.  The mask is uncomfortable because of \"needs new nose pads\".  The mask is not leaking .  She is not snoring with the mask on. She is not having gasp arousals.  She is not having significant oral/nasal dryness. The pressure is comfortable.     Her PAP interface is Nasal Pillows.    Bedtime is typically 2200. Usually it takes about (!) 2 min minutes to fall asleep with the mask on. Wake time is typically 0540.  Patient is using PAP therapy 7 hours per night. The patient is usually getting 7 hours of sleep per night.    She does feel rested in the morning.    Total score - [place]: 0 (9/28/2017 10:00 AM)      ResMed     Auto-PAP 8.0 - 12.0 cmH2O 30 day usage data:    93% of days with > 4 hours of use. 1/30 days with no use. Average use 425 minutes per day.   95%ile Leak 11.68 L/min.   CPAP 95% pressure 11.6 cm.   AHI 1.52 events per hour.     She reports CPAP is going very well and noted significant decrease in daytime sleepiness. She is planning of weight loss surgery possibly in 12/2017.     Past medical/surgical history, " "family history, social history, medications and allergies were reviewed.      Problem List:  Patient Active Problem List    Diagnosis Date Noted     Bee sting allergy      Priority: Medium     Insomnia 12/09/2015     Priority: Medium     Mild major depression (H) 03/27/2015     Priority: Medium      is terminally ill. Going through a lot.       Health Care Home 01/26/2015     Priority: Medium     No Active Care Coordination 3/16/2015  Care Coordinator: Isamar Watson LSW, MSW  See letters for Health Care Home care plan        Morbid obesity (H) 03/21/2013     Priority: Medium     MARIA GUADALUPE (obstructive sleep apnea)- severe (AHI 84) 09/09/2011     Priority: Medium     Sleep study 5/10/2011 (226#)- AHI 24, RDI 30, lowest oxygen saturation was 88%, CPAP 8 cm was curative.   Study Date: 9/30/2016- (248.0 lbs) apnea/hypopnea index 84.4.  REM AHI n/a, supine AHI 88.8 events per hour. Lowest oxygen saturation 80.2%.  Time spent less than or equal to 88% 26.1 minutes. CPAP optimal pressure 5 cmH2O with AHI of 5.2 events per hour.  There were residual non-specific arousals. Time in REM supine on final pressure 72.5 minutes.            CARDIOVASCULAR SCREENING; LDL GOAL LESS THAN 160 05/09/2010     Priority: Medium     Generalized anxiety disorder 10/15/2009     Priority: Medium     lexapro made things worse.           /70  Pulse 69  Ht 1.651 m (5' 5\")  Wt 123.4 kg (272 lb)  SpO2 96%  BMI 45.26 kg/m2        Impression/Plan:  1. Severe Sleep apnea-  Tolerating PAP well. Daytime symptoms are improved.  Continue current CPAP treatment.   The patient is cleared to proceed with bariatric surgery from obstructive sleep apnea standpoint.   Patient advised to bring PAP equipment to use after surgery.   Comprehensive DME.     Sunshine Delgado will follow up in about 1 year, sooner if questions/concerns.    Twenty-five minutes spent with patient, all of which were spent face-to-face counseling, consulting, coordinating plan " of care regarding MARIA GUADALUPE.      Tamra Juarez PA-C    SLEEP APNEA  AIRWAY MANAGEMENT        Patients should be considered for difficult airway management strategy during sedation and will need airway support whenever sleeping without intubation. Structured approach:    1. ANESTHESIAàConsider regional block or short-acting agent such as propofol or desflurane.  2.  ANALGESIAàConsider NSAID, titratable dosing of opiod without sedative to minimize sedation, dexmedetomidine for agitation.  3. OXYGENATIONàUse titratable oxygen to maintain SpO2 90-94%.  4. POSITIONàElevate head of bed 45 degrees or place in lateral position.  5.  MONITORINGàMonitor in supervised area with continuous oximetry and ECG with SpO2 alarm set at 85%. Obtain blood gases to exclude hypercapnia if suspected [SpO2<94% on room air, BMI >45, lethargy].  6. AIRWAYà    Assess for snoring, airway obstruction, or episodic desaturation.    Apply previous effective CPAP 8-12 cm/H20    Intubate if there continues to be evidence of airway obstruction, uncontrolled hypercapnia or risk of vomiting with loss of consciousness.

## 2017-09-28 NOTE — PATIENT INSTRUCTIONS

## 2017-10-08 DIAGNOSIS — F41.1 GENERALIZED ANXIETY DISORDER: Chronic | ICD-10-CM

## 2017-10-08 DIAGNOSIS — F33.1 MAJOR DEPRESSIVE DISORDER, RECURRENT EPISODE, MODERATE (H): Chronic | ICD-10-CM

## 2017-10-09 NOTE — TELEPHONE ENCOUNTER
citalopram  Last Written Prescription Date: 09/18/17  Last Fill Quantity: 90, # refills: 3  Last Office Visit with [facility] primary care provider:  09/20/17 Cande   Next 5 appointments (look out 90 days)     Nov 15, 2017 11:00 AM CST   SHORT with Guera Castellon MD   [facility]    [address] [place] [id number] [phone]                   Last PHQ-9 score on record=   PHQ-9 SCORE 9/18/2017   Total Score -   Total Score 12

## 2017-10-10 RX ORDER — CITALOPRAM HYDROBROMIDE 40 MG/1
40 TABLET ORAL DAILY
Qty: 90 TABLET | Refills: 3 | Status: SHIPPED | OUTPATIENT
Start: 2017-10-10 | End: 2017-11-15

## 2017-10-11 ENCOUNTER — CARE COORDINATION (OUTPATIENT)
Dept: SURGERY | Facility: CLINIC | Age: 52
End: 2017-10-11

## 2017-10-11 NOTE — PROGRESS NOTES
Client returned call to coordinator.  Client would like a December OR date.  Goal = all tasks done and PBS visit with Dr Joseph in November to allow for a December OR date.  Has lost 3 lb so far.  Psych eval final visit at [facility] scheduled for 11/28/17, encouraged to move up to an earlier date if available.  Sees therapist, Joann Padilla at [facility]. Has requested letter of support - will follow-up next week.

## 2017-10-11 NOTE — PROGRESS NOTES
"Left voice message for client to call with updates relating to scheduling psych eval and obtaining letter of support from therapist.    On track with rest of tasks.    Bariatric Task List  Status:  Is patient a candidate for bariatric surgery?:  Yes -     Cleared to schedule surgeon consult?:    -     Status:  surgery evaluation in process -     Surgeon: Opal Hernandezative surgery month/year: Dec 2017 -        Insurance: Insurance:  [employer] -        Patient Info: Initial Weight:  272lb -     Date of Initial Weight/Height:  9/13/2017 -     Goal Weight (lbs):  262 -     Required Weight Loss:  10 -     Surgery Type:  sleeve gastrectomy -        Dietician Visits: Structured weight loss required by insurance?:  Yes -     Number of Visits:  3 -     Dietician Visit 1:  Completed -     Dietician Visit 2:  Needed - 10/13/17 appt   Dietician Visit 3:  Needed - 11/10/17 appt   Dietician Visit additional:    -     Clearance from dietician to see surgeon?:    -     Dietician Notes:    -        Psychological Evaluation: Psych eval:  Needed -     Therapist letter of support:  Needed -        Lab Work: Complete Blood Count:  Completed - 9/22/17 in [facility].   Comprehensive Metabolic Panel:  Completed - 9/22/17 in Epic.   Vitamin D:  Completed - 9/22/17 in Epic.   Hgb A1c:    -     PTH:  Completed - 9/22/17 in Epic.   H. pylori:    -     TSH:    -     Nicotine Testing:    -     Other:    -        Consults/ Clearance: Sleep Medicine:  Completed - clearance due to MARIA GUADALUPE per Basha - 9/28/17 in [facility]. 9/30/16 = [facility].      PCP: Establish care with PCP:  Completed -     Follow up with PCP:    -     PCP letter of support:  Completed - 9/20/17 Dr Guera Castellon - PCP support. BKS      Patient Education:  Information Session:  Completed -     Given \"Making your decision\" handout?:  Yes -     Given support group information?:  Yes -     Support plan in place?:  Completed -     Research consents signed?:  Yes -        Final Tasks:  Before surgery " online class:  Needed -     Before surgery online class website link:  [email or web address]   After surgery online class:  Needed -     After surgery online class website link:  [email or web address]   Nurse visit for weigh-in and information:  Needed -     Pre-assessment clinic visit with anesthesia team for H&P:  Needed -     Final labs (Hgb, plt, T&S, UA):  Needed -        Notes:   -

## 2017-10-13 ENCOUNTER — ALLIED HEALTH/NURSE VISIT (OUTPATIENT)
Dept: SURGERY | Facility: CLINIC | Age: 52
End: 2017-10-13

## 2017-10-13 NOTE — MR AVS SNAPSHOT
MRN:[id number]                      After Visit Summary   10/13/2017    Sunshine Delgado    MRN: [id number]           Visit Information        Provider Department      10/13/2017 10:30 AM Simin Wagner RD M [facility] Surgical Weight Management        Your next 10 appointments already scheduled     Nov 10, 2017 10:30 AM CST   NUTRITION VISIT with STANFORD Koch [facility] ([facility])    [address]  4th Floor  [facility] [id number] [phone]            Nov 15, 2017 11:00 AM CST   SHORT with Guera Castellon MD   [facility]    [address] [address] [phone]           Your Arrival times is X, We need you to be here at this time to get checked in and have the assistant get you ready for the provider, which can take about 15 minutes. Your appointment time with your provider is at  X. Thank you.              Care Instructions    GOALS:  Relating To Eating:  Eat slowly (20-30 minutes per meal), chewing foods well (25 chews per bite/applesauce consistency)  Focus on lean protein and non-starchy vegetables/whole fruit at each meal with no more than 1 cup of carbs or 2 slices of bread  Record food intake prior to eating throughout the day in notebook.     Relating to beverages:  Separate fluids from meals by 30 minutes before, during, and after eating.  (Be sure to drink at least 64 oz between meals daily).   Gradually wean off of caffeine.     Relating to dietary supplements:  Continue multivitamin, vitamin D, and hair/nail/skin supplement daily.    Relating to activity:  Increase activity as able (walking; pool) - daily walking using FitBit.     Relating to cravings:  Practice alternatives to emotional eating (making jewelry; painting rocks).           [employer] Information     [employer] gives you secure access to your electronic health  record. If you see a primary care provider, you can also send messages to your care team and make appointments. If you have questions, please call your primary care clinic.  If you do not have a primary care provider, please call [phone] and they will assist you.      [employer] is an electronic gateway that provides easy, online access to your medical records. With [employer], you can request a clinic appointment, read your test results, renew a prescription or communicate with your care team.     To access your existing account, please contact your [facility] or call [phone] for assistance.        Care EveryWhere ID     This is your Care EveryWhere ID. This could be used by other organizations to access your [place] medical records  [id number]        Equal Access to Services     LUCA KELLER : Wero Jimenez, rian judge, jaxon ball, mary raymundo. So [facility] [phone].    ATENCIÓN: Si habla español, tiene a beard disposición servicios gratuitos de asistencia lingüística. Llame al [phone].    We comply with applicable federal civil rights laws and [place] laws. We do not discriminate on the basis of race, color, national origin, age, disability, sex, sexual orientation, or gender identity.

## 2017-10-13 NOTE — PROGRESS NOTES
"Bariatric Nutrition Consultation Note    Reason For Visit: Nutrition Reassessment    Sunshine Delgado is a 52 year-old presenting today for bariatric nutrition consult.  Pt is interested in laparoscopic sleeve gastrectomy (Dr. Joseph).  This is pt's second of 3 required nutrition visits prior to surgery. Pt referred by Any CARR) on 9/13/17.     Coordination Note: Pt reported that she has scheduled several appointments with providers to help complete her task list and will be seeing a psychologist in [place] for her psych eval.  Pt seemed very organized, bringing in her journal and folder containing her task list and nutrition goals/handouts.  She is hoping to have surgery after the holidays in January.     She is interested in having weight loss surgery for the following reasons:  To improve overall health and wellbeing with arthritis and bursitis in hips.     Support System Reviewed With Patient 9/12/2017   Who do you have in your support network that can be available to help you for the first 2 weeks after surgery? My dad, my sister and my boy friend   Who can you count on for support throughout your weight loss surgery journey? My boss at work and my boy friend       ANTHROPOMETRICS:    Height as of an earlier encounter on 9/13/17: 1.651 m (5' 5\").    Weight as of an earlier encounter on 9/13/17: 123.4 kg (272 lb) with BMI of 45.26.  Current Weight: 269.8 lbs  (-2.2 lbs over the past month from initial weight)     Required weight loss goal pre-op: -10 lbs from initial consult weight (goal weight 262 lbs or less before surgery)    SUPPLEMENT INFORMATION:  MVI and vitamin D gummy and hair/nail supplement    (Reviewed Sept 2017 labs; vitamin D at 32 - advised continuing vitamin/min regimen with vitamin D; GFR borderline low - advised hydration; LDL slightly elevated at 104 - advised continuing exercise and wt loss)    NUTRITION HISTORY:  Progress with Previous Goals:  Relating To Eating:  Eat slowly " (20-30 minutes per meal), chewing foods well (25 chews per bite/applesauce consistency) - Improving; continues to practice this.  Focus on lean protein and non-starchy vegetables/whole fruit at each meal with no more than 1 cup of carbs or 2 slices of bread - Improving; (Breakfast: eggs with salsa or yogurt; Lunch: salad with lean protein (egg, turkey breast, Tbsp mcdaniel + a little olive oil); Dinner: chicken breast + veg or tacos loaded with vegetables and light meat; occasionally 1 protein bar/yogurt/100 Noah almonds/apple/grilled chicken for snack.   Record food intake prior to eating throughout the day.  May use [email or web address]. - Meeting via hand-written notebook record.     Relating to beverages:  Separate fluids from meals by 30 minutes before, during, and after eating.- Continues to work on this.   Gradually wean off of caffeine. - Pt is down to 1 c/day of regular coffee.     Relating to dietary supplements:  Start a multivitamin containing iron daily - meeting.     Relating to activity:  Increase activity as able (walking; pool)  - Pt has been walking daily, longer distance on weekends.  Pt plans to start using her FitBit.     Relating to cravings:  Practice alternatives to emotional eating (making jewelry; painting rocks). - Meeting.     Eating Habits 9/12/2017   Do you have any dietary restrictions? No   Do you currently binge eat (eat a large amount of food in a short time)? No   Are you an emotional eater? Yes   Do you get up to eat after falling asleep? No   What foods do you crave? Sweets       NUTRITION DIAGNOSIS:  Obesity r/t long history of self-monitoring deficit and excessive energy intake aeb BMI >30.- continues    INTERVENTION:  Intervention Provided/Education Provided: Praised Pt for excellent progress with goals and weight lost.  Reviewed food intake journal, making suggestions regarding portions and condiments.  Reviewed previous goals and ways to improve upon them.  Encouraged continuing  mindful eating and exercise.  Provided pt with list of goals RD contact information.      Patient Understanding: good  Expected Compliance: good    GOALS:  Relating To Eating:  Eat slowly (20-30 minutes per meal), chewing foods well (25 chews per bite/applesauce consistency)  Focus on lean protein and non-starchy vegetables/whole fruit at each meal with no more than 1 cup of carbs or 2 slices of bread  Record food intake prior to eating throughout the day in notebook.     Relating to beverages:  Separate fluids from meals by 30 minutes before, during, and after eating.  (Be sure to drink at least 64 oz between meals daily).   Gradually wean off of caffeine.     Relating to dietary supplements:  Continue multivitamin, vitamin D, and hair/nail/skin supplement daily.    Relating to activity:  Increase activity as able (walking; pool) - daily walking using FitBit.     Relating to cravings:  Practice alternatives to emotional eating (making jewelry; painting rocks).    Follow-Up: 1 month    Time spent with patient: 30 minutes.  Simin Wagner, MS, RDN, LDN, CLT  Pager: [phone]

## 2017-10-13 NOTE — PATIENT INSTRUCTIONS
GOALS:  Relating To Eating:  Eat slowly (20-30 minutes per meal), chewing foods well (25 chews per bite/applesauce consistency)  Focus on lean protein and non-starchy vegetables/whole fruit at each meal with no more than 1 cup of carbs or 2 slices of bread  Record food intake prior to eating throughout the day in notebook.     Relating to beverages:  Separate fluids from meals by 30 minutes before, during, and after eating.  (Be sure to drink at least 64 oz between meals daily).   Gradually wean off of caffeine.     Relating to dietary supplements:  Continue multivitamin, vitamin D, and hair/nail/skin supplement daily.    Relating to activity:  Increase activity as able (walking; pool) - daily walking using FitBit.     Relating to cravings:  Practice alternatives to emotional eating (making jewelry; painting rocks).

## 2017-10-23 ENCOUNTER — TRANSFERRED RECORDS (OUTPATIENT)
Dept: HEALTH INFORMATION MANAGEMENT | Facility: CLINIC | Age: 52
End: 2017-10-23

## 2017-11-10 ENCOUNTER — ALLIED HEALTH/NURSE VISIT (OUTPATIENT)
Dept: SURGERY | Facility: CLINIC | Age: 52
End: 2017-11-10

## 2017-11-10 NOTE — PATIENT INSTRUCTIONS
GOALS:  Relating To Eating:  Eat slowly (20-30 minutes per meal), chewing foods well (25 chews per bite/applesauce consistency)    Follow the Modified Liquid Diet for weight loss:  Breakfast: Protein Shake  Lunch: Protein Shake  Supper: 3 oz lean protein + non-starchy vegetables  Snack: non-starchy vegetables (no calorie-containing dips/condiments)  Beverages: at least 48-64 oz water between meals daily    *Protein Shake Criteria: ~200 Calories, at least 20 grams of protein, and less than 10 grams of sugar     Relating to beverages:  Separate fluids from meals by 30 minutes before, during, and after eating.  (Be sure to drink at least 64 oz between meals daily).     Relating to dietary supplements:  Continue multivitamin, vitamin D, and hair/nail/skin supplement daily.    Relating to activity:  Increase activity as able (walking; pool) - daily walking using FitBit.     Relating to cravings:  Practice alternatives to emotional eating (making jewelry; painting rocks).    *Follow-up with Any CARR) as soon as possible and RD (Simin Wagner) in 1 month.

## 2017-11-10 NOTE — MR AVS SNAPSHOT
MRN:[id number]                      After Visit Summary   11/10/2017    Sunshine Delgado    MRN: [id number]           Visit Information        Provider Department      11/10/2017 10:30 AM [facility]        Your next 10 appointments already scheduled     Nov 15, 2017 11:00 AM CST   SHORT with Guera Castellon MD   [facility]    [address] [place] [id number] [phone]           Your Arrival times is X, We need you to be here at this time to get checked in and have the assistant get you ready for the provider, which can take about 15 minutes. Your appointment time with your provider is at  X. Thank you.            Nov 24, 2017 10:00 AM CST   PROCEDURE with Reese Simons DO   [facility] [place] ([facility] [place])    [address]  [place] [id number] [phone]              Care Instructions    GOALS:  Relating To Eating:  Eat slowly (20-30 minutes per meal), chewing foods well (25 chews per bite/applesauce consistency)    Follow the Modified Liquid Diet for weight loss:  Breakfast: Protein Shake  Lunch: Protein Shake  Supper: 3 oz lean protein + non-starchy vegetables  Snack: non-starchy vegetables (no calorie-containing dips/condiments)  Beverages: at least 48-64 oz water between meals daily    *Protein Shake Criteria: ~200 Calories, at least 20 grams of protein, and less than 10 grams of sugar     Relating to beverages:  Separate fluids from meals by 30 minutes before, during, and after eating.  (Be sure to drink at least 64 oz between meals daily).     Relating to dietary supplements:  Continue multivitamin, vitamin D, and hair/nail/skin supplement daily.    Relating to activity:  Increase activity as able (walking; pool) - daily walking using FitBit.     Relating to cravings:  Practice alternatives  to emotional eating (making jewelry; painting rocks).    *Follow-up with Any CARR) as soon as possible and RD (Simin Wagner) in 1 month.             [employer] Information     [employer] gives you secure access to your electronic health record. If you see a primary care provider, you can also send messages to your care team and make appointments. If you have questions, please call your primary care clinic.  If you do not have a primary care provider, please call [phone] and they will assist you.      [employer] is an electronic gateway that provides easy, online access to your medical records. With [employer], you can request a clinic appointment, read your test results, renew a prescription or communicate with your care team.     To access your existing account, please contact your [facility] or call [phone] for assistance.        Care EveryWhere ID     This is your Care EveryWhere ID. This could be used by other organizations to access your [place] medical records  [id number]        Equal Access to Services     LUCA KELLER : Hadii jatinder lo hadasho Soomaali, waaxda luqadaha, qaybta kaalmada adeegyalidia, mary raymundo. So [facility] [phone].    ATENCIÓN: Si habla español, tiene a beard disposición servicios gratuitos de asistencia lingüística. Llame al [phone].    We comply with applicable federal civil rights laws and [place] laws. We do not discriminate on the basis of race, color, national origin, age, disability, sex, sexual orientation, or gender identity.

## 2017-11-10 NOTE — PROGRESS NOTES
"Bariatric Nutrition Consultation Note    Reason For Visit: Nutrition Reassessment    Sunshine Delgado is a 52 year-old presenting today for bariatric nutrition consult.  Pt is interested in laparoscopic sleeve gastrectomy (Dr. Joseph); anticipated surgery Dec 2017/Jan 2018.  This is pt's third of 3 required nutrition visits prior to surgery. Pt referred by Any CARR) on 9/13/17.     Coordination Note: Pt is almost done with her psych eval task which she completed in [place] (just waiting for the results).  Pt continues to show good organizational skills, bringing in her journal and folder containing her task list and nutrition goals/handouts.       She is interested in having weight loss surgery for the following reasons:  To improve overall health and wellbeing with arthritis and bursitis in hips.     ANTHROPOMETRICS:    Height as of an earlier encounter on 9/13/17: 1.651 m (5' 5\").    Weight as of an earlier encounter on 9/13/17: 123.4 kg (272 lb) with BMI of 45.26.  Current Weight: 271.6 lbs (+1.8 lbs over the past month; -0.4 lbs from initial weight)     Required weight loss goal pre-op: -10 lbs from initial consult weight (goal weight 262 lbs or less before surgery)    SUPPLEMENT INFORMATION:  MVI and vitamin D gummy and hair/nail supplement    (Reviewed Sept 2017 labs; vitamin D at 32 - advised continuing vitamin/min regimen with vitamin D; GFR borderline low - advised hydration; LDL slightly elevated at 104 - advised continuing exercise and wt loss)    NUTRITION HISTORY:  Progress with Previous Goals:  Relating To Eating:  Eat slowly (20-30 minutes per meal), chewing foods well (25 chews per bite/applesauce consistency)- meeting.   Focus on lean protein and non-starchy vegetables/whole fruit at each meal with no more than 1 cup of carbs or 2 slices of bread- meeting.   Record food intake prior to eating throughout the day in notebook. - not this past month due to stressors.     Relating to " beverages:  Separate fluids from meals by 30 minutes before, during, and after eating.  (Be sure to drink at least 64 oz between meals daily). - meeting.   Gradually wean off of caffeine. - meeting; now on decaf coffee - sporadically has reg coffee (black).     Relating to dietary supplements:  Continue multivitamin, vitamin D, and hair/nail/skin supplement daily. - meeting.    Relating to activity:  Increase activity as able (walking; pool) - daily walking using FitBit. - Pt has been walking/hiking a little, but her cortisone shots for her hips are wearing off (next injection is end of Nov).     Relating to cravings:  Practice alternatives to emotional eating (making jewelry; painting rocks). - Emotional eating got the best of her this past month due to stress at work and stressful relationship.  She is seeing her psychologist about this.  She also stated that her fiance is having issues with her getting surgery and feeling that she may just leave him for someone else afterwards.  Pt is working on this with the psychologist.       Eating Habits 9/12/2017   Do you have any dietary restrictions? No   Do you currently binge eat (eat a large amount of food in a short time)? No   Are you an emotional eater? Yes   Do you get up to eat after falling asleep? No   What foods do you crave? Sweets       NUTRITION DIAGNOSIS:  Obesity r/t long history of self-monitoring deficit and excessive energy intake aeb BMI >30.- continues    INTERVENTION:  Intervention Provided/Education Provided: Discussed lack of weight loss, possible reasoning, and how to move through this challenge. Reviewed previous goals, importance of practicing mindfulness with eating, portion control.  Educated Pt on the modified liquid diet and suggested she may benefit from seeing the PA for an appetite suppressant.  Provided pt with list of goals RD contact information.      Patient Understanding: good  Expected Compliance: good    GOALS:  Relating To  Eating:  Eat slowly (20-30 minutes per meal), chewing foods well (25 chews per bite/applesauce consistency)    Follow the Modified Liquid Diet for weight loss:  Breakfast: Protein Shake  Lunch: Protein Shake  Supper: 3 oz lean protein + non-starchy vegetables  Snack: non-starchy vegetables (no calorie-containing dips/condiments)  Beverages: at least 48-64 oz water between meals daily    *Protein Shake Criteria: ~200 Calories, at least 20 grams of protein, and less than 10 grams of sugar     Relating to beverages:  Separate fluids from meals by 30 minutes before, during, and after eating.  (Be sure to drink at least 64 oz between meals daily).     Relating to dietary supplements:  Continue multivitamin, vitamin D, and hair/nail/skin supplement daily.    Relating to activity:  Increase activity as able (walking; pool) - daily walking using FitBit.     Relating to cravings:  Practice alternatives to emotional eating (making jewelry; painting rocks).    Follow-Up: 1 month    Time spent with patient: 30 minutes.  Simin Wagner, MS, RDN, LDN, CLT  Pager: [phone]

## 2017-11-13 ENCOUNTER — OFFICE VISIT (OUTPATIENT)
Dept: SURGERY | Facility: CLINIC | Age: 52
End: 2017-11-13

## 2017-11-13 VITALS
HEART RATE: 73 BPM | WEIGHT: 273.2 LBS | TEMPERATURE: 98.7 F | DIASTOLIC BLOOD PRESSURE: 70 MMHG | BODY MASS INDEX: 45.52 KG/M2 | SYSTOLIC BLOOD PRESSURE: 114 MMHG | HEIGHT: 65 IN | OXYGEN SATURATION: 98 %

## 2017-11-13 DIAGNOSIS — E66.01 MORBID OBESITY (H): Primary | ICD-10-CM

## 2017-11-13 RX ORDER — TOPIRAMATE 25 MG/1
TABLET, FILM COATED ORAL
Qty: 90 TABLET | Refills: 1 | Status: SHIPPED | OUTPATIENT
Start: 2017-11-13 | End: 2018-01-15

## 2017-11-13 NOTE — LETTER
2017       RE: Sunshine Delgado  [address] [place] [id number]     Dear Colleague,    Thank you for referring your patient, Sunshine Delgado, to the [facility] at [facility]. Please see a copy of my visit note below.    Pre-Bariatric Surgery Note    Shana Jett    Date: 2017     RE: Sunshine Delgado    MR#: [id number]   : 1965   Date of Visit: 2017    REASON FOR VISIT: Preoperative evaluation for possible weight loss surgery    Dear Shana Vigil,    I had the pleasure of seeing your patient, Sunshine Delgado, in my pre-operative bariatric clinic.    As you know, she is morbidly obese and considering weight loss surgery to treat obesity in association with her medical conditions of obesity.  Her consult weight was 272.  She has gained 1 pounds since her consult weight. She has not met her required pre-surgery weight.    Most recent weights:   Wt Readings from Last 4 Encounters:   17 273 lb 3.2 oz   17 272 lb   17 271 lb   17 271 lb       Please refer to initial consult note from date 17 for patient's weight history and co-morbidities.    ROS    Past Medical History:   Diagnosis Date     Bee sting allergy      Depressive disorder      Generalized anxiety disorder 10/15/2009    lexapro made things worse.       Morbid obesity (H)      MARIA GUADALUPE (obstructive sleep apnea)- severe (AHI 84) 2011     Voice fatigue 10/22/2009       Past Surgical History:   Procedure Laterality Date     HYSTERECTOMY, PAP NO LONGER INDICATED       HYSTERECTOMY, VAGINAL  2007    still has ovaries       Current Outpatient Prescriptions   Medication     citalopram (CELEXA) 40 MG tablet     TRAZODONE HCL PO     EPINEPHrine (EPIPEN/ADRENACLICK/OR ANY BX GENERIC EQUIV) 0.3 MG/0.3ML injection 2-pack     citalopram (CELEXA) 40 MG tablet     buPROPion (WELLBUTRIN XL) 150 MG 24 hr tablet     acetaminophen (TYLENOL) 325  "MG tablet     ibuprofen (ADVIL/MOTRIN) 600 MG tablet     order for DME     No current facility-administered medications for this visit.        Allergies   Allergen Reactions     Contrast Dye Other (See Comments)     Patient had sneezing and an itchy throat following contrast injection of 100 mL Isovue-370.     Sulfa Drugs Hives     Vicodin [Hydrocodone-Acetaminophen]      Wasps [Hornets] Swelling     Now carries Epi Pen       PHYSICAL EXAMINATION:  /70  Pulse 73  Temp 98.7  F (37.1  C) (Oral)  Ht 5' 5\"  Wt 273 lb 3.2 oz  SpO2 98%  BMI 45.46 kg/m2   Body mass index is 45.46 kg/(m^2).   NAD NCAT  Respiratory: breathing unlabored  Abdomen: obese, soft/nt/nd    I emphasized exercise and activity behavior along with appropriate food choice as the main foundation for weight loss with surgery providing surgical reinforcement of the appropriate behavior set.    PLAN:    Starting partial liquid diet per RD recommendations    Start topiramate ramp to 75mg      MEDICATION STARTED AT THIS APPOINTMENT  We are starting topiramate at bedtime.  Start one tab, 25 mg, for a week. Go up to 50 mg (2 tabs) for the next week. At the third week, take   3 tabs (75 mg).  Stay at 3 tabs until you are seen again. Call the nurse at [phone] if you have any questions or concerns. (Do not stop taking it if you don't think it's working. For some people it works even though they do not feel much different.)    Topiramate (Topamax) is a medication that is used most often to treat migraine headaches or for seizures. It has also been found to help with weight loss. Although it's not currently FDA approved for weight loss, it has been used safely for a number of years to help people who are carrying extra weight.     Just how topiramate helps with weight loss has not been exactly determined. However it seems to work on areas of the brain to quiet down signals related to eating.      Topiramate may make you:    >feel less interest in " eating in between meals   >think less about food and eating   >find it easier to push the plate away   >find giving up pop easier    >have an easier time eating less    For some of our patients, the pills work right away. They feel and think quite differently about food. Other patients don't feel much of a change but find in fact they have lost weight! Like all weight loss medications, topiramate works best when you help it work.  This means:    1) Have less tempting high calorie (fattening) food around the house or office    2) Have lower calorie food (fruits, vegetables,low fat meats and dairy) for snacks    3) Eat out only one time or less each week.   4) Eat your meals at a table with the TV or computer off.    Side-effects. Topiramate is generally well tolerated. The main side-effects we see are:   Tingling in hands,feet, or face (usually not very troublesome)   Mental confusion and word finding trouble (about 10% of patients have this.)     Feeling sleepy or a bit dopey- this goes away very soon after starting.    One of the dangers of topiramate is the possibility of birth defects--if you get pregnant when you are on it, there is the risk that your baby will be born with a cleft lip or palate.  If you are on topiramate and of child bearing age, you need to be on a reliable form of birth control or refrain from sexual intercourse.     Please refer to the pharmacy insert for more information on side-effects. Since many pharmacists are not familiar with the use of topiramate in weight loss, calling the clinic will get you the most accurate information on the use of this medication for weight loss.     In order to get refills of this or any medication we prescribe you must be seen in the medical weight mgmt clinic every 2-3 months. Please have your pharmacy fax a refill request to [phone].      Review of general surgery weight loss process    1. Complete preoperative requirements, including weight loss.   Final weight check to confirm MANDATORY weight loss requirement must be documented on a clinic scale.    2. Discuss prior authorization with .    3. History and physical evaluation by PCP of PAC clinic within 30 days of surgery date, preoperative class, and weight check (weigh-in visit) to be scheduled by patient.  Pre-anesthesia clinic for risk evaluation to be scheduled by anesthesia clinic.    4. We cannot guarantee that patient will qualify for surgery unless all preoperative requirements are met, prior authorization from primary insurance company is granted, and insurance changes do not occur.    5. It is possible for patients to regain all weight after weight loss surgery unless they follow guidelines prescribed by our bariatric center.    6. All patients with gastrointestinal complaints after weight loss surgery must have complaints conveyed to the bariatric team for appropriate treatment.    7. Vitamin deficiencies may develop post-bariatric surgery and annual laboratory testing should be performed.    8. Persistent nausea/vomiting after bariatric surgery entails risk of thiamine deficiency and should be treated early.  Vitamin B12 deficiency may develop, especially after gastric bypass surgery and must be recognized.        If you have any questions about our plans please don't hesitate to contact me.    Sincerely,    Any Morris PA-C    I spent a total of 15 minutes face to face with Sunshine Delgado during today's office visit.  Over 50% of this time was spent counseling the patient and/or coordinating care.

## 2017-11-13 NOTE — PATIENT INSTRUCTIONS
See RD in December 11th  Start topiramate ramp 75mg        MEDICATION STARTED AT THIS APPOINTMENT  We are starting topiramate at bedtime.  Start one tab, 25 mg, for a week. Go up to 50 mg (2 tabs) for the next week. At the third week, take   3 tabs (75 mg).  Stay at 3 tabs until you are seen again. Call the nurse at [phone] if you have any questions or concerns. (Do not stop taking it if you don't think it's working. For some people it works even though they do not feel much different.)    Topiramate (Topamax) is a medication that is used most often to treat migraine headaches or for seizures. It has also been found to help with weight loss. Although it's not currently FDA approved for weight loss, it has been used safely for a number of years to help people who are carrying extra weight.     Just how topiramate helps with weight loss has not been exactly determined. However it seems to work on areas of the brain to quiet down signals related to eating.      Topiramate may make you:    >feel less interest in eating in between meals   >think less about food and eating   >find it easier to push the plate away   >find giving up pop easier    >have an easier time eating less    For some of our patients, the pills work right away. They feel and think quite differently about food. Other patients don't feel much of a change but find in fact they have lost weight! Like all weight loss medications, topiramate works best when you help it work.  This means:    1) Have less tempting high calorie (fattening) food around the house or office    2) Have lower calorie food (fruits, vegetables,low fat meats and dairy) for snacks    3) Eat out only one time or less each week.   4) Eat your meals at a table with the TV or computer off.    Side-effects. Topiramate is generally well tolerated. The main side-effects we see are:   Tingling in hands,feet, or face (usually not very troublesome)   Mental confusion and word finding  trouble (about 10% of patients have this.)     Feeling sleepy or a bit dopey- this goes away very soon after starting.    One of the dangers of topiramate is the possibility of birth defects--if you get pregnant when you are on it, there is the risk that your baby will be born with a cleft lip or palate.  If you are on topiramate and of child bearing age, you need to be on a reliable form of birth control or refrain from sexual intercourse.     Please refer to the pharmacy insert for more information on side-effects. Since many pharmacists are not familiar with the use of topiramate in weight loss, calling the clinic will get you the most accurate information on the use of this medication for weight loss.     In order to get refills of this or any medication we prescribe you must be seen in the medical weight mgmt clinic every 2-3 months. Please have your pharmacy fax a refill request to [phone].

## 2017-11-13 NOTE — PROGRESS NOTES
Pre-Bariatric Surgery Note    Shana Jett    Date: 2017     RE: Sunshine Delgado    MR#: [id number]   : 1965   Date of Visit: 2017    REASON FOR VISIT: Preoperative evaluation for possible weight loss surgery    Dear Shana Vigil,    I had the pleasure of seeing your patient, Sunshine Delgado, in my pre-operative bariatric clinic.    As you know, she is morbidly obese and considering weight loss surgery to treat obesity in association with her medical conditions of obesity.  Her consult weight was 272.  She has gained 1 pounds since her consult weight. She has not met her required pre-surgery weight.    Most recent weights:   Wt Readings from Last 4 Encounters:   17 273 lb 3.2 oz   17 272 lb   17 271 lb   17 271 lb       Please refer to initial consult note from date 17 for patient's weight history and co-morbidities.    ROS    Past Medical History:   Diagnosis Date     Bee sting allergy      Depressive disorder      Generalized anxiety disorder 10/15/2009    lexapro made things worse.       Morbid obesity (H)      MARIA GUADALUPE (obstructive sleep apnea)- severe (AHI 84) 2011     Voice fatigue 10/22/2009       Past Surgical History:   Procedure Laterality Date     HYSTERECTOMY, PAP NO LONGER INDICATED       HYSTERECTOMY, VAGINAL  2007    still has ovaries       Current Outpatient Prescriptions   Medication     citalopram (CELEXA) 40 MG tablet     TRAZODONE HCL PO     EPINEPHrine (EPIPEN/ADRENACLICK/OR ANY BX GENERIC EQUIV) 0.3 MG/0.3ML injection 2-pack     citalopram (CELEXA) 40 MG tablet     buPROPion (WELLBUTRIN XL) 150 MG 24 hr tablet     acetaminophen (TYLENOL) 325 MG tablet     ibuprofen (ADVIL/MOTRIN) 600 MG tablet     order for DME     No current facility-administered medications for this visit.        Allergies   Allergen Reactions     Contrast Dye Other (See Comments)     Patient had sneezing and an itchy throat following contrast injection of 100 mL  "Isovue-370.     Sulfa Drugs Hives     Vicodin [Hydrocodone-Acetaminophen]      Wasps [Hornets] Swelling     Now carries Epi Pen       PHYSICAL EXAMINATION:  /70  Pulse 73  Temp 98.7  F (37.1  C) (Oral)  Ht 5' 5\"  Wt 273 lb 3.2 oz  SpO2 98%  BMI 45.46 kg/m2   Body mass index is 45.46 kg/(m^2).   NAD NCAT  Respiratory: breathing unlabored  Abdomen: obese, soft/nt/nd    I emphasized exercise and activity behavior along with appropriate food choice as the main foundation for weight loss with surgery providing surgical reinforcement of the appropriate behavior set.    PLAN:    Starting partial liquid diet per RD recommendations    Start topiramate ramp to 75mg      MEDICATION STARTED AT THIS APPOINTMENT  We are starting topiramate at bedtime.  Start one tab, 25 mg, for a week. Go up to 50 mg (2 tabs) for the next week. At the third week, take   3 tabs (75 mg).  Stay at 3 tabs until you are seen again. Call the nurse at [phone] if you have any questions or concerns. (Do not stop taking it if you don't think it's working. For some people it works even though they do not feel much different.)    Topiramate (Topamax) is a medication that is used most often to treat migraine headaches or for seizures. It has also been found to help with weight loss. Although it's not currently FDA approved for weight loss, it has been used safely for a number of years to help people who are carrying extra weight.     Just how topiramate helps with weight loss has not been exactly determined. However it seems to work on areas of the brain to quiet down signals related to eating.      Topiramate may make you:    >feel less interest in eating in between meals   >think less about food and eating   >find it easier to push the plate away   >find giving up pop easier    >have an easier time eating less    For some of our patients, the pills work right away. They feel and think quite differently about food. Other patients don't feel " much of a change but find in fact they have lost weight! Like all weight loss medications, topiramate works best when you help it work.  This means:    1) Have less tempting high calorie (fattening) food around the house or office    2) Have lower calorie food (fruits, vegetables,low fat meats and dairy) for snacks    3) Eat out only one time or less each week.   4) Eat your meals at a table with the TV or computer off.    Side-effects. Topiramate is generally well tolerated. The main side-effects we see are:   Tingling in hands,feet, or face (usually not very troublesome)   Mental confusion and word finding trouble (about 10% of patients have this.)     Feeling sleepy or a bit dopey- this goes away very soon after starting.    One of the dangers of topiramate is the possibility of birth defects--if you get pregnant when you are on it, there is the risk that your baby will be born with a cleft lip or palate.  If you are on topiramate and of child bearing age, you need to be on a reliable form of birth control or refrain from sexual intercourse.     Please refer to the pharmacy insert for more information on side-effects. Since many pharmacists are not familiar with the use of topiramate in weight loss, calling the clinic will get you the most accurate information on the use of this medication for weight loss.     In order to get refills of this or any medication we prescribe you must be seen in the medical weight mgmt clinic every 2-3 months. Please have your pharmacy fax a refill request to [phone].      Review of general surgery weight loss process    1. Complete preoperative requirements, including weight loss.  Final weight check to confirm MANDATORY weight loss requirement must be documented on a clinic scale.    2. Discuss prior authorization with .    3. History and physical evaluation by PCP of PAC clinic within 30 days of surgery date, preoperative class, and weight check (weigh-in  visit) to be scheduled by patient.  Pre-anesthesia clinic for risk evaluation to be scheduled by anesthesia clinic.    4. We cannot guarantee that patient will qualify for surgery unless all preoperative requirements are met, prior authorization from primary insurance company is granted, and insurance changes do not occur.    5. It is possible for patients to regain all weight after weight loss surgery unless they follow guidelines prescribed by our bariatric center.    6. All patients with gastrointestinal complaints after weight loss surgery must have complaints conveyed to the bariatric team for appropriate treatment.    7. Vitamin deficiencies may develop post-bariatric surgery and annual laboratory testing should be performed.    8. Persistent nausea/vomiting after bariatric surgery entails risk of thiamine deficiency and should be treated early.  Vitamin B12 deficiency may develop, especially after gastric bypass surgery and must be recognized.        If you have any questions about our plans please don't hesitate to contact me.    Sincerely,    Any Morris PA-C    I spent a total of 15 minutes face to face with Sunshine Delgado during today's office visit.  Over 50% of this time was spent counseling the patient and/or coordinating care.

## 2017-11-13 NOTE — NURSING NOTE
"(   Chief Complaint   Patient presents with     RECHECK     PBS    )    ( Weight: 273 lb 3.2 oz )  ( Height: 5' 5\" )  ( BMI (Calculated): 45.56 )  ( Initial Weight: 272 lb )  ( Cumulative weight loss (lbs): -1.2 )  ( Last Visits Weight: 272 lb )  ( Wt change since last visit (lbs): 1.2 )  (   )  (   )    ( BP: 114/70 )  (   )  ( Temp: 98.7  F (37.1  C) )  ( Temp src: Oral )  ( Pulse: 73 )  (   )  ( SpO2: 98 % )    (   Patient Active Problem List   Diagnosis     Generalized anxiety disorder     CARDIOVASCULAR SCREENING; LDL GOAL LESS THAN 160     MARIA GUADALUPE (obstructive sleep apnea)- severe (AHI 84)     Morbid obesity (H)     Health Care Home     Mild major depression (H)     Insomnia     Bee sting allergy    )  (   Current Outpatient Prescriptions   Medication Sig Dispense Refill     citalopram (CELEXA) 40 MG tablet Take 1 tablet (40 mg) by mouth daily 90 tablet 3     TRAZODONE HCL PO Take 50 mg by mouth At Bedtime       EPINEPHrine (EPIPEN/ADRENACLICK/OR ANY BX GENERIC EQUIV) 0.3 MG/0.3ML injection 2-pack Inject 0.3 mLs (0.3 mg) into the muscle as needed for anaphylaxis 0.6 mL 3     citalopram (CELEXA) 40 MG tablet Take 1 tablet (40 mg) by mouth daily 90 tablet 3     buPROPion (WELLBUTRIN XL) 150 MG 24 hr tablet Take 1 tablet (150 mg) by mouth every morning 90 tablet 0     acetaminophen (TYLENOL) 325 MG tablet        ibuprofen (ADVIL/MOTRIN) 600 MG tablet Take 600 mg by mouth       order for DME Resmed Airsense 10 auto cpap 6-7 cm, Airfit P10 for her XS pillows      )  ( Diabetes Eval:    )    ( Pain Eval:  Data Unavailable )    ( Wound Eval:       )    (   History   Smoking Status     Never Smoker   Smokeless Tobacco     Never Used    )    ( Signed By:  Elizabeth Nicholson; November 13, 2017; 8:31 AM )    "

## 2017-11-13 NOTE — MR AVS SNAPSHOT
After Visit Summary   11/13/2017    Sunshine Delgado    MRN: [id number]           Patient Information     Date Of Birth          1965        Visit Information        Provider Department      11/13/2017 8:00 AM Any Morris PA-C [facility]        Today's Diagnoses     Morbid obesity (H)    -  1      Care Instructions    See RD in December 11th  Start topiramate ramp 75mg        MEDICATION STARTED AT THIS APPOINTMENT  We are starting topiramate at bedtime.  Start one tab, 25 mg, for a week. Go up to 50 mg (2 tabs) for the next week. At the third week, take   3 tabs (75 mg).  Stay at 3 tabs until you are seen again. Call the nurse at [phone] if you have any questions or concerns. (Do not stop taking it if you don't think it's working. For some people it works even though they do not feel much different.)    Topiramate (Topamax) is a medication that is used most often to treat migraine headaches or for seizures. It has also been found to help with weight loss. Although it's not currently FDA approved for weight loss, it has been used safely for a number of years to help people who are carrying extra weight.     Just how topiramate helps with weight loss has not been exactly determined. However it seems to work on areas of the brain to quiet down signals related to eating.      Topiramate may make you:    >feel less interest in eating in between meals   >think less about food and eating   >find it easier to push the plate away   >find giving up pop easier    >have an easier time eating less    For some of our patients, the pills work right away. They feel and think quite differently about food. Other patients don't feel much of a change but find in fact they have lost weight! Like all weight loss medications, topiramate works best when you help it work.  This means:    1) Have less tempting high calorie (fattening) food around the house or office    2) Have lower  calorie food (fruits, vegetables,low fat meats and dairy) for snacks    3) Eat out only one time or less each week.   4) Eat your meals at a table with the TV or computer off.    Side-effects. Topiramate is generally well tolerated. The main side-effects we see are:   Tingling in hands,feet, or face (usually not very troublesome)   Mental confusion and word finding trouble (about 10% of patients have this.)     Feeling sleepy or a bit dopey- this goes away very soon after starting.    One of the dangers of topiramate is the possibility of birth defects--if you get pregnant when you are on it, there is the risk that your baby will be born with a cleft lip or palate.  If you are on topiramate and of child bearing age, you need to be on a reliable form of birth control or refrain from sexual intercourse.     Please refer to the pharmacy insert for more information on side-effects. Since many pharmacists are not familiar with the use of topiramate in weight loss, calling the clinic will get you the most accurate information on the use of this medication for weight loss.     In order to get refills of this or any medication we prescribe you must be seen in the medical weight mgmt clinic every 2-3 months. Please have your pharmacy fax a refill request to [phone].                  Follow-ups after your visit        Follow-up notes from your care team     Return in about 2 months (around 1/13/2018).      Your next 10 appointments already scheduled     Nov 15, 2017 11:00 AM TRAM CHEW with Guera Castellon MD   [facility]    [address] [place] [address] [phone]           Your Arrival times is X, We need you to be here at this time to get checked in and have the assistant get you ready for the provider, which can take about 15 minutes. Your appointment time with your provider is at  X. Thank you.            Nov 24, 2017 10:00 AM CST    PROCEDURE with Reese Simons, DO   [facility] [place] ([facility] [place])    [address]  [place] [address]   [phone]            Dec 11, 2017 11:30 AM CST   (Arrive by 11:15 AM)   NUTRITION VISIT with Simin Wganer RD   [facility] ([facility])    [address]  4th Floor  [facility] [address] [phone]              Who to contact     Please call your clinic at [phone] to:    Ask questions about your health    Make or cancel appointments    Discuss your medicines    Learn about your test results    Speak to your doctor   If you have compliments or concerns about an experience at your clinic, or if you wish to file a complaint, please contact [facility] Patient Relations at [phone] or email us at [email or web address]         Additional Information About Your Visit        [employer] Information     [employer] gives you secure access to your electronic health record. If you see a primary care provider, you can also send messages to your care team and make appointments. If you have questions, please call your primary care clinic.  If you do not have a primary care provider, please call [phone] and they will assist you.      [employer] is an electronic gateway that provides easy, online access to your medical records. With [employer], you can request a clinic appointment, read your test results, renew a prescription or communicate with your care team.     To access your existing account, please contact your [facility] Clinic or call [phone] for assistance.        Care EveryWhere ID     This is your Care EveryWhere ID. This could be used by other organizations to access your [facility] medical records  [id number]        Your Vitals Were     Pulse Temperature Height Pulse Oximetry BMI (Body Mass Index)  "      73 98.7  F (37.1  C) (Oral) 5' 5\" 98% 45.46 kg/m2        Blood Pressure from Last 3 Encounters:   11/13/17 114/70   09/28/17 105/70   09/20/17 121/86    Weight from Last 3 Encounters:   11/13/17 273 lb 3.2 oz   09/28/17 272 lb   09/20/17 271 lb              Today, you had the following     No orders found for display         Today's Medication Changes          These changes are accurate as of: 11/13/17  9:10 AM.  If you have any questions, ask your nurse or doctor.               Start taking these medicines.        Dose/Directions    topiramate 25 MG tablet   Commonly known as:  TOPAMAX   Used for:  Morbid obesity (H)   Started by:  Any Morris PA-C        25mg at bedtime for week 1, 50mg at bedtime for 1 week, and 75mg at bedtime thereafter   Quantity:  90 tablet   Refills:  1            Where to get your medicines      These medications were sent to [place] [address] [place] [address]  [address] [facility] [address]     Phone:  [phone]     topiramate 25 MG tablet                Primary Care Provider Office Phone # Fax #    Shana Jett [phone] [address] [facility] [address]        Equal Access to Services     [facility]: Hadii jatinder ku hadasho Soomaali, waaxda luqadaha, qaybta kaalmada adeegyada, mary raymundo. So [facility] [phone].    ATENCIÓN: Si habla español, tiene a beard disposición servicios gratuitos de asistencia lingüística. Isidra al [phone].    We comply with applicable federal civil rights laws and [place] laws. We do not discriminate on the basis of race, color, national origin, age, disability, sex, sexual orientation, or gender identity.            Thank you!     Thank you for choosing [facility]  for your care. Our goal is always to provide you with excellent care. Hearing back from our patients is one way we can continue to improve our services. " Please take a few minutes to complete the written survey that you may receive in the mail after your visit with us. Thank you!             Your Updated Medication List - Protect others around you: Learn how to safely use, store and throw away your medicines at www.disposemymeds.org.          This list is accurate as of: 11/13/17  9:10 AM.  Always use your most recent med list.                   Brand Name Dispense Instructions for use Diagnosis    buPROPion 150 MG 24 hr tablet    WELLBUTRIN XL    90 tablet    Take 1 tablet (150 mg) by mouth every morning    Generalized anxiety disorder, Major depressive disorder, recurrent episode, moderate (H)       * citalopram 40 MG tablet    celeXA    90 tablet    Take 1 tablet (40 mg) by mouth daily    Generalized anxiety disorder, Major depressive disorder, recurrent episode, moderate (H)       * citalopram 40 MG tablet    celeXA    90 tablet    Take 1 tablet (40 mg) by mouth daily    Generalized anxiety disorder, Major depressive disorder, recurrent episode, moderate (H)       EPINEPHrine 0.3 MG/0.3ML injection 2-pack    EPIPEN/ADRENACLICK/or ANY BX GENERIC EQUIV    0.6 mL    Inject 0.3 mLs (0.3 mg) into the muscle as needed for anaphylaxis    Bee sting allergy       ibuprofen 600 MG tablet    ADVIL/MOTRIN     Take 600 mg by mouth        order for DME      Resmed Airsense 10 auto cpap 6-7 cm, Airfit P10 for her XS pillows        topiramate 25 MG tablet    TOPAMAX    90 tablet    25mg at bedtime for week 1, 50mg at bedtime for 1 week, and 75mg at bedtime thereafter    Morbid obesity (H)       TRAZODONE HCL PO      Take 50 mg by mouth At Bedtime        TYLENOL 325 MG tablet   Generic drug:  acetaminophen           * Notice:  This list has 2 medication(s) that are the same as other medications prescribed for you. Read the directions carefully, and ask your doctor or other care provider to review them with you.

## 2017-11-15 ENCOUNTER — OFFICE VISIT (OUTPATIENT)
Dept: FAMILY MEDICINE | Facility: CLINIC | Age: 52
End: 2017-11-15
Payer: COMMERCIAL

## 2017-11-15 VITALS
DIASTOLIC BLOOD PRESSURE: 74 MMHG | BODY MASS INDEX: 44.98 KG/M2 | TEMPERATURE: 99.1 F | HEIGHT: 65 IN | SYSTOLIC BLOOD PRESSURE: 128 MMHG | HEART RATE: 80 BPM | WEIGHT: 270 LBS

## 2017-11-15 DIAGNOSIS — R07.0 THROAT PAIN: ICD-10-CM

## 2017-11-15 DIAGNOSIS — J06.9 VIRAL URI: ICD-10-CM

## 2017-11-15 DIAGNOSIS — F41.1 GENERALIZED ANXIETY DISORDER: Chronic | ICD-10-CM

## 2017-11-15 DIAGNOSIS — F33.1 MAJOR DEPRESSIVE DISORDER, RECURRENT EPISODE, MODERATE (H): Primary | Chronic | ICD-10-CM

## 2017-11-15 LAB
DEPRECATED S PYO AG THROAT QL EIA: NORMAL
SPECIMEN SOURCE: NORMAL

## 2017-11-15 PROCEDURE — 87880 STREP A ASSAY W/OPTIC: CPT | Performed by: FAMILY MEDICINE

## 2017-11-15 PROCEDURE — 99214 OFFICE O/P EST MOD 30 MIN: CPT | Performed by: FAMILY MEDICINE

## 2017-11-15 PROCEDURE — 87081 CULTURE SCREEN ONLY: CPT | Performed by: FAMILY MEDICINE

## 2017-11-15 RX ORDER — BUPROPION HYDROCHLORIDE 150 MG/1
150 TABLET ORAL EVERY MORNING
Qty: 90 TABLET | Refills: 3 | Status: SHIPPED | OUTPATIENT
Start: 2017-11-15 | End: 2017-12-28

## 2017-11-15 ASSESSMENT — ANXIETY QUESTIONNAIRES
7. FEELING AFRAID AS IF SOMETHING AWFUL MIGHT HAPPEN: NOT AT ALL
2. NOT BEING ABLE TO STOP OR CONTROL WORRYING: NOT AT ALL
1. FEELING NERVOUS, ANXIOUS, OR ON EDGE: NOT AT ALL
5. BEING SO RESTLESS THAT IT IS HARD TO SIT STILL: NOT AT ALL
3. WORRYING TOO MUCH ABOUT DIFFERENT THINGS: NOT AT ALL
6. BECOMING EASILY ANNOYED OR IRRITABLE: NOT AT ALL
GAD7 TOTAL SCORE: 0

## 2017-11-15 ASSESSMENT — PATIENT HEALTH QUESTIONNAIRE - PHQ9
SUM OF ALL RESPONSES TO PHQ QUESTIONS 1-9: 1
5. POOR APPETITE OR OVEREATING: NOT AT ALL

## 2017-11-15 NOTE — MR AVS SNAPSHOT
After Visit Summary   11/15/2017    Sunshine Delgado    MRN: [id number]           Patient Information     Date Of Birth          1965        Visit Information        Provider Department      11/15/2017 11:00 AM Guera Castellon MD [facility]        Today's Diagnoses     Major depressive disorder, recurrent episode, moderate (H)    -  1    Generalized anxiety disorder        Throat pain        Viral URI          Care Instructions    If you start feeling like something is off with your moods it could be a side effect of the weight loss medication.  Send us a MyChart when you have your surgery scheduled so we can set up a pre-op appointment.          Follow-ups after your visit        Your next 10 appointments already scheduled     Nov 24, 2017 10:00 AM CST   PROCEDURE with Reese Simons,    [facility] [place] ([facility] [place])    [address] [facility] [address]  [place] [address]   [phone]            Dec 11, 2017 11:30 AM CST   (Arrive by 11:15 AM)   NUTRITION VISIT with Simin Wagner RD   [facility] ([facility])    [address] [place] [address] [phone]              Who to contact     If you have questions or need follow up information about today's clinic visit or your schedule please contact [facility] directly at [phone].  Normal or non-critical lab and imaging results will be communicated to you by MyChart, letter or phone within 4 business days after the clinic has received the results. If you do not hear from us within 7 days, please contact the clinic through MyChart or phone. If you have a critical or abnormal lab result, we will notify you by phone as soon as possible.  Submit refill requests through [facility] or call your pharmacy and they will forward the refill request to us. Please allow 3  "business days for your refill to be completed.          Additional Information About Your Visit        MyChart Information     [employer] gives you secure access to your electronic health record. If you see a primary care provider, you can also send messages to your care team and make appointments. If you have questions, please call your primary care clinic.  If you do not have a primary care provider, please call [phone] and they will assist you.        Care EveryWhere ID     This is your Care EveryWhere ID. This could be used by other organizations to access your [facility] medical records  [id number]        Your Vitals Were     Pulse Temperature Height BMI (Body Mass Index)          80 99.1  F (37.3  C) (Oral) 5' 5\" (1.651 m) 44.93 kg/m2         Blood Pressure from Last 3 Encounters:   11/15/17 128/74   11/13/17 114/70   09/28/17 105/70    Weight from Last 3 Encounters:   11/15/17 270 lb (122.5 kg)   11/13/17 273 lb 3.2 oz (123.9 kg)   09/28/17 272 lb (123.4 kg)              We Performed the Following     Beta strep group A culture     Rapid strep screen          Today's Medication Changes          These changes are accurate as of: 11/15/17  4:32 PM.  If you have any questions, ask your nurse or doctor.               These medicines have changed or have updated prescriptions.        Dose/Directions    citalopram 40 MG tablet   Commonly known as:  celeXA   This may have changed:  Another medication with the same name was removed. Continue taking this medication, and follow the directions you see here.   Used for:  Generalized anxiety disorder, Major depressive disorder, recurrent episode, moderate (H)   Changed by:  Guera Castellon MD        Dose:  40 mg   Take 1 tablet (40 mg) by mouth daily   Quantity:  90 tablet   Refills:  3            Where to get your medicines      [place] [facility] [place] [address]  [address] [place] " [facility] [address]     Phone:  [phone]     buPROPion 150 MG 24 hr tablet                Primary Care Provider Office Phone # Fax #    Shana Jett [phone]       [address]  [place] [address]        Equal Access to Services     LUCA KELLER : Hadii aad ku hadasho Soomaali, waaxda luqadaha, qaybta kaalmada adeegyada, waxay idiin hayaan adeeg khjovanteena laines raymundo. So [facility] [phone].    ATENCIÓN: Si habla español, tiene a beard disposición servicios gratuitos de asistencia lingüística. [facility] [phone].    We comply with applicable federal civil rights laws and [place] laws. We do not discriminate on the basis of race, color, national origin, age, disability, sex, sexual orientation, or gender identity.            Thank you!     Thank you for choosing [facility]  for your care. Our goal is always to provide you with excellent care. Hearing back from our patients is one way we can continue to improve our services. Please take a few minutes to complete the written survey that you may receive in the mail after your visit with us. Thank you!             Your Updated Medication List - Protect others around you: Learn how to safely use, store and throw away your medicines at www.disposemymeds.org.          This list is accurate as of: 11/15/17  4:32 PM.  Always use your most recent med list.                   Brand Name Dispense Instructions for use Diagnosis    buPROPion 150 MG 24 hr tablet    WELLBUTRIN XL    90 tablet    Take 1 tablet (150 mg) by mouth every morning    Generalized anxiety disorder, Major depressive disorder, recurrent episode, moderate (H)       citalopram 40 MG tablet    celeXA    90 tablet    Take 1 tablet (40 mg) by mouth daily    Generalized anxiety disorder, Major depressive disorder, recurrent episode, moderate (H)       EPINEPHrine 0.3 MG/0.3ML injection 2-pack    EPIPEN/ADRENACLICK/or ANY BX GENERIC EQUIV    0.6 mL    Inject 0.3 mLs (0.3  mg) into the muscle as needed for anaphylaxis    Bee sting allergy       ibuprofen 600 MG tablet    ADVIL/MOTRIN     Take 600 mg by mouth        order for DME      Resmed Airsense 10 auto cpap 6-7 cm, Airfit P10 for her XS pillows        topiramate 25 MG tablet    TOPAMAX    90 tablet    25mg at bedtime for week 1, 50mg at bedtime for 1 week, and 75mg at bedtime thereafter    Morbid obesity (H)       TRAZODONE HCL PO      Take 50 mg by mouth At Bedtime        TYLENOL 325 MG tablet   Generic drug:  acetaminophen

## 2017-11-15 NOTE — PROGRESS NOTES
"  SUBJECTIVE:   Sunshine Delgado is a 52 year old female who presents to clinic today for the following health issues:      Depression and Anxiety Follow-Up    Status since last visit: stable    Other associated symptoms:None    Complicating factors:     Significant life event: No     Current substance abuse: None    PHQ-9 Score and MyChart F/U Questions 12/5/2016 9/8/2017 9/18/2017   Total Score 5 4 12   Q9: Suicide Ideation Not at all Not at all Not at all     ADRIANNE-7 SCORE 9/6/2016 12/5/2016 9/18/2017   Total Score - - -   Total Score 0 0 15     PHQ-9  English  PHQ-9   Any Language  GAD7  Suicide Assessment Five-step Evaluation and Treatment (SAFE-T)      Amount of exercise or physical activity: hips have been bothering her with arthritis and bursitis, MAYBE a couple days a week, pushing herself through walking in [employer] with a cart.     Problems taking medications regularly: No    Medication side effects: none    Diet: regular (no restrictions)    HENT: Throat \"feels like she has swallowed glass,\"and face, ears, head and stomach hurts. Mild non-productive cough over the weekend with nausea. Recent ill contact with coworkers who have strep. Has not taken any tylenol/ibuprofen for this. Missed work today.     Depression/anxiety: 9/18 added Wellbutrin to her Celexa. Feels like the Wellbutrin add-on has been helpful. Feels more like herself. Does not notice any side effects.    Weight: prescribed a medication by the [facility] [place] for weight loss. If she loses 5 lbs can have a consult and get pre-op for surgery.    Hip pain: has another injection scheduled for after thanksgiving.    Problem list and histories reviewed & adjusted, as indicated.  Additional history: as documented    Patient Active Problem List   Diagnosis     Generalized anxiety disorder     CARDIOVASCULAR SCREENING; LDL GOAL LESS THAN 160     MARIA GUADALUPE (obstructive sleep apnea)- severe (AHI 84)     Morbid obesity (H)     Health Care Home     Mild major depression (H) "     Insomnia     Bee sting allergy     Past Surgical History:   Procedure Laterality Date     HYSTERECTOMY, PAP NO LONGER INDICATED       HYSTERECTOMY, VAGINAL  2007    still has ovaries       Social History   Substance Use Topics     Smoking status: Never Smoker     Smokeless tobacco: Never Used     Alcohol use Yes      Comment: 1-2 per mo     Family History   Problem Relation Age of Onset     Eye Disorder Mother      lost eyesight; probable macular degeneration     Psychotic Disorder Mother      Dementia /Alzhimers     Neurologic Disorder Mother      seizures     DIABETES Mother      Hypertension Mother      Alzheimer Disease Mother      Arthritis Mother      OSTEOPOROSIS Mother      Obesity Mother      DIABETES Father      Depression Father      Hyperlipidemia Father      Obesity Father      Psychotic Disorder Sister      bipolar     Thyroid Disease Sister      h/o thyroid cancer     Depression Sister      CANCER Other      Brain cancer     OSTEOPOROSIS Maternal Grandmother      Anxiety Disorder Son      Thyroid Disease Sister      Obesity Sister          Current Outpatient Prescriptions   Medication Sig Dispense Refill     topiramate (TOPAMAX) 25 MG tablet 25mg at bedtime for week 1, 50mg at bedtime for 1 week, and 75mg at bedtime thereafter 90 tablet 1     TRAZODONE HCL PO Take 50 mg by mouth At Bedtime       EPINEPHrine (EPIPEN/ADRENACLICK/OR ANY BX GENERIC EQUIV) 0.3 MG/0.3ML injection 2-pack Inject 0.3 mLs (0.3 mg) into the muscle as needed for anaphylaxis 0.6 mL 3     citalopram (CELEXA) 40 MG tablet Take 1 tablet (40 mg) by mouth daily 90 tablet 3     buPROPion (WELLBUTRIN XL) 150 MG 24 hr tablet Take 1 tablet (150 mg) by mouth every morning 90 tablet 0     acetaminophen (TYLENOL) 325 MG tablet        ibuprofen (ADVIL/MOTRIN) 600 MG tablet Take 600 mg by mouth       order for DME Resmed Airsense 10 auto cpap 6-7 cm, Airfit P10 for her XS pillows       [DISCONTINUED] citalopram (CELEXA) 40 MG tablet Take 1  "tablet (40 mg) by mouth daily 90 tablet 3     Allergies   Allergen Reactions     Contrast Dye Other (See Comments)     Patient had sneezing and an itchy throat following contrast injection of 100 mL Isovue-370.     Sulfa Drugs Hives     Vicodin [Hydrocodone-Acetaminophen]      Wasps [Hornets] Swelling     Now carries Epi Pen       Reviewed and updated as needed this visit by clinical staffTobacco  Med Hx  Surg Hx  Fam Hx  Soc Hx      Reviewed and updated as needed this visit by Provider       ROS:  Constitutional, HEENT, cardiovascular, pulmonary, GI, , musculoskeletal, neuro, skin, endocrine and psych systems are negative, except as otherwise noted.    This document serves as a record of the services and decisions personally performed and made by Guera Castellon MD. It was created on her behalf by Wilda Pierre, a trained medical scribe. The creation of this document is based the provider's statements to the medical scribe.    Wilda Pierre November 15, 2017 11:58 AM    OBJECTIVE:   /74  Pulse 80  Temp 99.1  F (37.3  C) (Oral)  Ht 5' 5\" (1.651 m)  Wt 270 lb (122.5 kg)  BMI 44.93 kg/m2  Body mass index is 44.93 kg/(m^2).  GENERAL: obese, alert and no distress  HENT: ear canals and TM's normal, nose and mouth without ulcers or lesions  NECK: no adenopathy, no asymmetry, masses, or scars and thyroid normal to palpation  RESP: lungs clear to auscultation - no rales, rhonchi or wheezes  CV: regular rate and rhythm, normal S1 S2, no S3 or S4, no murmur, click or rub, no peripheral edema and peripheral pulses strong  ABDOMEN: soft, nontender, no hepatosplenomegaly, no masses and bowel sounds normal  PSYCH: mentation appears normal, affect normal/bright    Diagnostic Test Results:  Strep screen - Negative    ASSESSMENT/PLAN:     1. Major depressive disorder, recurrent episode, moderate (H)  Much improved with addition of Wellbutrin. Chronic, stable, well controlled, continue current medication, refill " done if needed    - buPROPion (WELLBUTRIN XL) 150 MG 24 hr tablet; Take 1 tablet (150 mg) by mouth every morning  Dispense: 90 tablet; Refill: 3    2. Generalized anxiety disorder  Much improved with addition of Wellbutrin. Chronic, stable, well controlled, continue current medication, refill done if needed    - buPROPion (WELLBUTRIN XL) 150 MG 24 hr tablet; Take 1 tablet (150 mg) by mouth every morning  Dispense: 90 tablet; Refill: 3    3. Throat pain  Likely viral, symptomatic care.  - Rapid strep screen  - Beta strep group A culture    4. Viral URI  Likely viral, symptomatic care.      FUTURE APPOINTMENTS:       - Follow-up for pre-op after she sends Petert    Guera Castellon MD  [facility]    The information in this document, created by the medical scribe for me, accurately reflects the services I personally performed and the decisions made by me. I have reviewed and approved this document for accuracy prior to leaving the patient care area.

## 2017-11-15 NOTE — PATIENT INSTRUCTIONS
If you start feeling like something is off with your moods it could be a side effect of the weight loss medication.  Send us a MyChart when you have your surgery scheduled so we can set up a pre-op appointment.

## 2017-11-15 NOTE — NURSING NOTE
"Chief Complaint   Patient presents with     RECHECK     mood     Pharyngitis     with strep exposure       Initial /74  Pulse 80  Temp 99.1  F (37.3  C) (Oral)  Ht 5' 5\" (1.651 m)  Wt 270 lb (122.5 kg)  BMI 44.93 kg/m2 Estimated body mass index is 44.93 kg/(m^2) as calculated from the following:    Height as of this encounter: 5' 5\" (1.651 m).    Weight as of this encounter: 270 lb (122.5 kg).  Medication Reconciliation: complete   Celine Eubanks MA      "

## 2017-11-16 LAB
BACTERIA SPEC CULT: NORMAL
SPECIMEN SOURCE: NORMAL

## 2017-11-16 ASSESSMENT — ANXIETY QUESTIONNAIRES: GAD7 TOTAL SCORE: 0

## 2017-11-24 ENCOUNTER — OFFICE VISIT (OUTPATIENT)
Dept: ORTHOPEDICS | Facility: CLINIC | Age: 52
End: 2017-11-24
Payer: COMMERCIAL

## 2017-11-24 VITALS
WEIGHT: 269 LBS | SYSTOLIC BLOOD PRESSURE: 122 MMHG | BODY MASS INDEX: 44.82 KG/M2 | DIASTOLIC BLOOD PRESSURE: 76 MMHG | HEIGHT: 65 IN

## 2017-11-24 DIAGNOSIS — M25.551 BILATERAL HIP PAIN: Primary | ICD-10-CM

## 2017-11-24 DIAGNOSIS — M25.552 BILATERAL HIP PAIN: Primary | ICD-10-CM

## 2017-11-24 DIAGNOSIS — M16.0 PRIMARY OSTEOARTHRITIS OF BOTH HIPS: ICD-10-CM

## 2017-11-24 DIAGNOSIS — M25.559 PAIN IN JOINT, PELVIC REGION AND THIGH, UNSPECIFIED LATERALITY: ICD-10-CM

## 2017-11-24 PROCEDURE — 99213 OFFICE O/P EST LOW 20 MIN: CPT | Mod: 25 | Performed by: FAMILY MEDICINE

## 2017-11-24 PROCEDURE — 20611 DRAIN/INJ JOINT/BURSA W/US: CPT | Mod: 50 | Performed by: FAMILY MEDICINE

## 2017-11-24 NOTE — PROGRESS NOTES
Sports Medicine Clinic Visit    PCP: Clinic, [facility]    Sunshine Delgado is a 52 year old female who is seen  in consultation at the request of  Carlos Bhat D.O. presenting with bilateral hip pain.    Injury: Patient reports pain for the past 8 months.  No injury or overuse.  Pain is all over, low back, lateral hips, groin, down legs.  Previously her right leg was numb as well.  Has seen Francesca Abraham in the past for low back pain, also saw Spine surgery and had and RONAK that worked for only around 1 week.  Has not done PT, previously pain was too severe.  Is considering pain clinic referral.  - Here today to help determine if there is a hip diagnosis contributing to her pain.    Sunshine was asked to complete the Oswestry Low Back Disability Index today:  Disability score: 48%.     Location of Pain: groin, lateral hip  Duration of Pain: 8 month(s)  Rating of Pain at worst: 10/10  Rating of Pain Currently: 4/10  Symptoms are better with: Heat and new bed, trying to get back into walking for fitness  Symptoms are worse with: sitting and walking  Additional Features:   Positive: paresthesias and numbness - right leg, both feet (improved)   Negative: swelling, bruising, popping, grinding, catching, locking, instability, weakness, pain in other joints and systemic symptoms  Other evaluation and/or treatments so far consists of: RONAK in Feb or Mar 2017 at [facility], got relief for about 1 week, chiropractor  Prior History of related problems: chronic pain    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  Bilateral hip IA steroid injection completed on 8/23 provided good relief for ~ 2.5 months.  She is interested in pursuing repeat injections today.  No new injury in the interim.    Review of Systems  Skin: no bruising, no  "swelling  Musculoskeletal: as above  Neurologic: yes prior numbness, paresthesias  Remainder of review of systems is negative including constitutional, CV, pulmonary, GI, except as noted in HPI or medical history.    Patient's current problem list, past medical and surgical history, and family history were reviewed.    Patient Active Problem List   Diagnosis     Generalized anxiety disorder     CARDIOVASCULAR SCREENING; LDL GOAL LESS THAN 160     MARIA GUADALUPE (obstructive sleep apnea)- severe (AHI 84)     Morbid obesity (H)     Health Care Home     Mild major depression (H)     Insomnia     Bee sting allergy     Past Medical History:   Diagnosis Date     Bee sting allergy      Depressive disorder      Generalized anxiety disorder 10/15/2009    lexapro made things worse.       Morbid obesity (H)      MARIA GUADALUPE (obstructive sleep apnea)- severe (AHI 84) 09/09/2011     Voice fatigue 10/22/2009     Past Surgical History:   Procedure Laterality Date     HYSTERECTOMY, PAP NO LONGER INDICATED       HYSTERECTOMY, VAGINAL  2007    still has ovaries     Family History   Problem Relation Age of Onset     Eye Disorder Mother      lost eyesight; probable macular degeneration     Psychotic Disorder Mother      Dementia /Alzhimers     Neurologic Disorder Mother      seizures     DIABETES Mother      Hypertension Mother      Alzheimer Disease Mother      Arthritis Mother      OSTEOPOROSIS Mother      Obesity Mother      DIABETES Father      Depression Father      Hyperlipidemia Father      Obesity Father      Psychotic Disorder Sister      bipolar     Thyroid Disease Sister      h/o thyroid cancer     Depression Sister      CANCER Other      Brain cancer     OSTEOPOROSIS Maternal Grandmother      Anxiety Disorder Son      Thyroid Disease Sister      Obesity Sister        Objective  /76  Ht 5' 5\" (1.651 m)  Wt 269 lb (122 kg)  BMI 44.76 kg/m2    GENERAL APPEARANCE: healthy, alert and no distress   GAIT: NORMAL  SKIN: no suspicious lesions " or rashes  HEENT: Sclera clear, anicteric  CV: good peripheral pulses  RESP: Breathing not labored  NEURO: Normal strength and tone, mentation intact and speech normal  PSYCH:  mentation appears normal and affect normal/bright    Bilateral hip exam - similar to previous    Inspection:      no edema or ecchymosis in hip area    Tender:      greater trochanter bilateral       groin bilateral    Non Tender:      remainder of the hip area bilateral    ROM:     Full active and passive ROM  bilateral    Strength:      flexion 4/5 bilateral       abduction 4/5 bilateral       adduction 4/5 bilateral    Sensation:      grossly intact in hip and thigh    Special Tests:      positive (+) CHI bilateral       positive (+) FADIR bilateral       positive (+) scour bilateral    Sports Medicine Clinic Procedure  Ultrasound Guided Bilateral Intra-Articular Hip Injection    Technique: The risks of the procedure were explained to the patient.  A consent was signed for the intra-articular hip injection.  The patient was evaluated with a [employer] ultrasound machine using a 12 MHz linear probe.   4 ml of 1% lidocaine was used for local anesthesia. The Bilateral hip was prepped and draped in a sterile manner.  Ultrasound identification of the acetabulum, femoral head, and femoral neck in both long and short axis.  The location of the femoral vessels were noted and marked.  The probe was placed in a oblique-sagittal axis to the Bilateral femoral neck.  A 3.5 inch 20 gauge needle was placed under ultrasound guidance into the anterior hip capsule.  A mixture of 3 ml's of 0.2% ropivacaine and 1 ml kenalog (40mg/ml) was injected without difficulty on oblique-sagittal axis view.  The needle was removed and there was good hemostasis without complications.  There was ultrasound documentation of needle placement and injection.  Pre-procedural pain 7/10 on R, 6/10 on L.  Post procedural pain 1-2/10 b/l.      Radiology  I visualized and reviewed  these images with the patient  PELVIS AND HIP BILATERAL TWO VIEWS  8/2/2017 5:06 PM    HISTORY: Pain in right hip. Pain in left hip.  COMPARISON: None.  IMPRESSION: No acute fracture or dislocation. Minimal changes of  osteoarthritis bilaterally.    Assessment:  1. Bilateral hip pain    2. Pain in joint, pelvic region and thigh, unspecified laterality    3. Primary osteoarthritis of both hips        Plan:  Bilateral hip pain in the context of overall pain including lumbar pain.    Improved significantly with hip injections a few months ago  We reviewed her imaging and overall plan again today  She has been working in the water  She plans to have gastric sleeve procedure in January, she needs to lose 5 pounds before then, per her notes  We discussed that cortisone injections for her are not a good long term strategy for management of her hip pain  Main value of the injections was diagnostic, their therapeutic value is useful to help facilitate PT/HEP, but the effect only lasted a couple of months, and they do not heal or fix the underlying wear and tear  We repeated today to give her some relief while she tries to continue to improve her physcial conditioning and work on wt loss before surgery  We discussed that strength and wt loss are the two most important variables in the management of her pain, she acknowledged this  F/u will be prn based on her clinical progress  PT continues to be available to her, home exercise strategies reviewed again  Expectations and goals of CSI reviewed  Often 2-3 days for steroid effect, and can take up to two weeks for maximum effect  We discussed modified progressive pain-free activity as tolerated  Do not overuse in first two weeks if feeling better due to concern for vulnerability while steroid is working  Supportive care reviewed  All questions were answered today  Contact us with additional questions or concerns  Signs and sx of concern reviewed      Reese Simons DO, BALTAZAR  Primary  Care Sports Medicine  [facility]

## 2017-11-24 NOTE — LETTER
11/24/2017         RE: Sunshine Delgado  [address] [facility] [id number]        Dear Colleague,    Thank you for referring your patient, Sunshine Delgado, to the [facility]. Please see a copy of my visit note below.    Sports Medicine Clinic Visit    PCP: Clinic, [facility]    Sunshine Delgado is a 52 year old female who is seen  in consultation at the request of  Carlos Bhat D.O. presenting with bilateral hip pain.    Injury: Patient reports pain for the past 8 months.  No injury or overuse.  Pain is all over, low back, lateral hips, groin, down legs.  Previously her right leg was numb as well.  Has seen Francesca Abraham in the past for low back pain, also saw Spine surgery and had and RONAK that worked for only around 1 week.  Has not done PT, previously pain was too severe.  Is considering pain clinic referral.  - Here today to help determine if there is a hip diagnosis contributing to her pain.    Sunshine was asked to complete the Oswestry Low Back Disability Index today:  Disability score: 48%.     Location of Pain: groin, lateral hip  Duration of Pain: 8 month(s)  Rating of Pain at worst: 10/10  Rating of Pain Currently: 4/10  Symptoms are better with: Heat and new bed, trying to get back into walking for fitness  Symptoms are worse with: sitting and walking  Additional Features:   Positive: paresthesias and numbness - right leg, both feet (improved)   Negative: swelling, bruising, popping, grinding, catching, locking, instability, weakness, pain in other joints and systemic symptoms  Other evaluation and/or treatments so far consists of: RONAK in Feb or Mar 2017 at [facility], got relief for about 1 week, chiropractor  Prior History of related problems: chronic pain    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  Bilateral hip IA steroid injection completed on 8/23 provided good relief for ~ 2.5 months.  She is interested in pursuing repeat injections today.  No new injury in the interim.    Review of Systems  Skin: no bruising, no swelling  Musculoskeletal: as above  Neurologic: yes prior numbness, paresthesias  Remainder of review of systems is negative including constitutional, CV, pulmonary, GI, except as noted in HPI or medical history.    Patient's current problem list, past medical and surgical history, and family history were reviewed.    Patient Active Problem List   Diagnosis     Generalized anxiety disorder     CARDIOVASCULAR SCREENING; LDL GOAL LESS THAN 160     MARIA GUADALUPE (obstructive sleep apnea)- severe (AHI 84)     Morbid obesity (H)     Health Care Home     Mild major depression (H)     Insomnia     Bee sting allergy     Past Medical History:   Diagnosis Date     Bee sting allergy      Depressive disorder      Generalized anxiety disorder 10/15/2009    lexapro made things worse.       Morbid obesity (H)      MARIA GUADALUPE (obstructive sleep apnea)- severe (AHI 84) 09/09/2011     Voice fatigue 10/22/2009     Past Surgical History:   Procedure Laterality Date     HYSTERECTOMY, PAP NO LONGER INDICATED       HYSTERECTOMY, VAGINAL  2007    still has ovaries     Family History   Problem Relation Age of Onset     Eye Disorder Mother      lost eyesight; probable macular degeneration     Psychotic Disorder Mother      Dementia /Alzhimers     Neurologic Disorder Mother      seizures     DIABETES Mother      Hypertension Mother      Alzheimer Disease Mother      Arthritis Mother      OSTEOPOROSIS Mother      Obesity Mother      DIABETES Father      Depression Father      Hyperlipidemia Father      Obesity Father      Psychotic Disorder Sister      bipolar     Thyroid Disease Sister      h/o thyroid cancer     Depression Sister      CANCER Other      Brain cancer     OSTEOPOROSIS Maternal  "Grandmother      Anxiety Disorder Son      Thyroid Disease Sister      Obesity Sister        Objective  /76  Ht 5' 5\" (1.651 m)  Wt 269 lb (122 kg)  BMI 44.76 kg/m2    GENERAL APPEARANCE: healthy, alert and no distress   GAIT: NORMAL  SKIN: no suspicious lesions or rashes  HEENT: Sclera clear, anicteric  CV: good peripheral pulses  RESP: Breathing not labored  NEURO: Normal strength and tone, mentation intact and speech normal  PSYCH:  mentation appears normal and affect normal/bright    Bilateral hip exam - similar to previous    Inspection:      no edema or ecchymosis in hip area    Tender:      greater trochanter bilateral       groin bilateral    Non Tender:      remainder of the hip area bilateral    ROM:     Full active and passive ROM  bilateral    Strength:      flexion 4/5 bilateral       abduction 4/5 bilateral       adduction 4/5 bilateral    Sensation:      grossly intact in hip and thigh    Special Tests:      positive (+) CHI bilateral       positive (+) FADIR bilateral       positive (+) scour bilateral    Sports Medicine Clinic Procedure  Ultrasound Guided Bilateral Intra-Articular Hip Injection    Technique: The risks of the procedure were explained to the patient.  A consent was signed for the intra-articular hip injection.  The patient was evaluated with a [employer] ultrasound machine using a 12 MHz linear probe.   4 ml of 1% lidocaine was used for local anesthesia. The Bilateral hip was prepped and draped in a sterile manner.  Ultrasound identification of the acetabulum, femoral head, and femoral neck in both long and short axis.  The location of the femoral vessels were noted and marked.  The probe was placed in a oblique-sagittal axis to the Bilateral femoral neck.  A 3.5 inch 20 gauge needle was placed under ultrasound guidance into the anterior hip capsule.  A mixture of 3 ml's of 0.2% ropivacaine and 1 ml kenalog (40mg/ml) was injected without difficulty on oblique-sagittal axis " view.  The needle was removed and there was good hemostasis without complications.  There was ultrasound documentation of needle placement and injection.  Pre-procedural pain 7/10 on R, 6/10 on L.  Post procedural pain 1-2/10 b/l.      Radiology  I visualized and reviewed these images with the patient  PELVIS AND HIP BILATERAL TWO VIEWS  8/2/2017 5:06 PM    HISTORY: Pain in right hip. Pain in left hip.  COMPARISON: None.  IMPRESSION: No acute fracture or dislocation. Minimal changes of  osteoarthritis bilaterally.    Assessment:  1. Bilateral hip pain    2. Pain in joint, pelvic region and thigh, unspecified laterality    3. Primary osteoarthritis of both hips        Plan:  Bilateral hip pain in the context of overall pain including lumbar pain.    Improved significantly with hip injections a few months ago  We reviewed her imaging and overall plan again today  She has been working in the water  She plans to have gastric sleeve procedure in January, she needs to lose 5 pounds before then, per her notes  We discussed that cortisone injections for her are not a good long term strategy for management of her hip pain  Main value of the injections was diagnostic, their therapeutic value is useful to help facilitate PT/HEP, but the effect only lasted a couple of months, and they do not heal or fix the underlying wear and tear  We repeated today to give her some relief while she tries to continue to improve her physcial conditioning and work on wt loss before surgery  We discussed that strength and wt loss are the two most important variables in the management of her pain, she acknowledged this  F/u will be prn based on her clinical progress  PT continues to be available to her, home exercise strategies reviewed again  Expectations and goals of CSI reviewed  Often 2-3 days for steroid effect, and can take up to two weeks for maximum effect  We discussed modified progressive pain-free activity as tolerated  Do not overuse  in first two weeks if feeling better due to concern for vulnerability while steroid is working  Supportive care reviewed  All questions were answered today  Contact us with additional questions or concerns  Signs and sx of concern reviewed      Reese Simons DO, CAQ  Primary Care Sports Medicine  [facility]           Again, thank you for allowing me to participate in the care of your patient.        Sincerely,        Reese Simons DO

## 2017-11-26 RX ORDER — TRIAMCINOLONE ACETONIDE 40 MG/ML
40 INJECTION, SUSPENSION INTRA-ARTICULAR; INTRAMUSCULAR ONCE
Qty: 2 ML | Refills: 0 | OUTPATIENT
Start: 2017-11-26 | End: 2017-11-26

## 2017-12-06 ENCOUNTER — MEDICAL CORRESPONDENCE (OUTPATIENT)
Dept: HEALTH INFORMATION MANAGEMENT | Facility: CLINIC | Age: 52
End: 2017-12-06

## 2017-12-08 ENCOUNTER — TELEPHONE (OUTPATIENT)
Dept: FAMILY MEDICINE | Facility: CLINIC | Age: 52
End: 2017-12-08

## 2017-12-08 DIAGNOSIS — F99 INSOMNIA DUE TO OTHER MENTAL DISORDER: Primary | ICD-10-CM

## 2017-12-08 DIAGNOSIS — F51.05 INSOMNIA DUE TO OTHER MENTAL DISORDER: Primary | ICD-10-CM

## 2017-12-08 RX ORDER — TRAZODONE HYDROCHLORIDE 50 MG/1
50 TABLET, FILM COATED ORAL AT BEDTIME
Qty: 90 TABLET | Refills: 3 | Status: SHIPPED | OUTPATIENT
Start: 2017-12-08 | End: 2018-10-29

## 2017-12-08 NOTE — TELEPHONE ENCOUNTER
TRAZODONE HCL PO   50 mg, AT BEDTIME   Reorder     Summary: Take 50 mg by mouth At Bedtime   Dose, Route, Frequency: 50 mg, Oral, AT BEDTIME  Ord/Sold: 9/20/2017 (O)  Report  Taking:   Long-term:   Med Dose History  EditDiscontinue       Patient Sig: Take 50 mg by mouth At Bedtime       Ordered on: 9/20/2017       Authorized by: PATIENT REPORTED             Last Written Prescription Date:  9-  Last Fill Quantity: na,   # refills: na  Last Office Visit: 11/15/2017  Future Office visit:       Routing refill request to provider for review/approval because:  Medication is reported/historical

## 2017-12-08 NOTE — TELEPHONE ENCOUNTER
Called Sunshine- this is a long term med, confirmed dosing, for insomnia & her primary has been the prescriber in the past.   Jocelyn Dillard RN

## 2017-12-08 NOTE — TELEPHONE ENCOUNTER
Is this a long term medication she has been on?  Confirm dosing and pend.  Confirm she is on this for insomnia.  Has primary care been the one to prescribe this in the past.  Guera Castellon MD

## 2017-12-11 ENCOUNTER — ALLIED HEALTH/NURSE VISIT (OUTPATIENT)
Dept: SURGERY | Facility: CLINIC | Age: 52
End: 2017-12-11

## 2017-12-11 NOTE — PROGRESS NOTES
"Bariatric Nutrition Consultation Note    Reason For Visit: Nutrition Reassessment    Sunshine Delgado is a 52 year-old presenting today for bariatric nutrition consult.  Pt is interested in laparoscopic sleeve gastrectomy (Dr. Joseph); anticipated surgery Dec 2017/Jan 2018.  This is pt's fourth of 3 required nutrition visits prior to surgery. Pt referred by Any CARR) on 9/13/17.     Coordination Note: (12/11/17) - Updated task list.  Pt has brought in her psych (therapist) letter of support which she completed in [place].  Writer faxed it into the office of Satya Brambila (surgery scheduler) and Keyana Aguirre (nurse coordinator).  Pt stated that all other tasks are complete with the exception of reaching her pre-op weight loss goal.      She is interested in having weight loss surgery for the following reasons:  To improve overall health and wellbeing with arthritis and bursitis in hips.     ANTHROPOMETRICS:    Height as of an earlier encounter on 9/13/17: 1.651 m (5' 5\").    Weight as of an earlier encounter on 9/13/17: 123.4 kg (272 lb) with BMI of 45.26.  Current Weight: 268.7 lbs (-2.9 lbs over the past month; -3.3 lbs from initial weight)     Required weight loss goal pre-op: -10 lbs from initial consult weight (goal weight 262 lbs or less before surgery)    SUPPLEMENT INFORMATION:  MVI and vitamin D gummy and hair/nail supplement    (Reviewed Sept 2017 labs; vitamin D at 32 - advised continuing vitamin/min regimen with vitamin D; GFR borderline low - advised hydration; LDL slightly elevated at 104 - advised continuing exercise and wt loss)    NUTRITION HISTORY:  Progress with Previous Goals:  Relating To Eating:  Eat slowly (20-30 minutes per meal), chewing foods well (25 chews per bite/applesauce consistency) - Meeting.     Follow the Modified Liquid Diet for weight loss:- meeting (breakfast protein shake; lunch: turkey/chicken/low-fat cheese + veg; supper: protein shake - Slim Fast).   Breakfast: " Protein Shake  Lunch: Protein Shake  Supper: 3 oz lean protein + non-starchy vegetables  Snack: non-starchy vegetables (no calorie-containing dips/condiments)  Beverages: at least 48-64 oz water between meals daily    *Protein Shake Criteria: ~200 Calories, at least 20 grams of protein, and less than 10 grams of sugar     Relating to beverages:  Separate fluids from meals by 30 minutes before, during, and after eating.  (Be sure to drink at least 64 oz between meals daily).  -meeting.     Relating to dietary supplements:  Continue multivitamin, vitamin D, and hair/nail/skin supplement daily. - meeting.     Relating to activity:  Increase activity as able (walking; pool) - daily walking using FitBit. - Since Pt had cortisone shots, she has been able to walk the mall more and swim more often.     Relating to cravings:  Practice alternatives to emotional eating (making jewelry; painting rocks). - meeting  (arm rosemarie).       Eating Habits 9/12/2017   Do you have any dietary restrictions? No   Do you currently binge eat (eat a large amount of food in a short time)? No   Are you an emotional eater? Yes   Do you get up to eat after falling asleep? No   What foods do you crave? Sweets       NUTRITION DIAGNOSIS:  Obesity r/t long history of self-monitoring deficit and excessive energy intake aeb BMI >30.- continues    INTERVENTION:  Intervention Provided/Education Provided: Praised Pt for weight lost.  Reviewed previous goals, importance of practicing mindfulness with eating, portion control.  Pt is happy to continue the modified liquid diet as she is feeling good.  Gave encouragement and support.  Provided instruction on bariatric clear and low-fat full liquid diets.  Provided the following handouts: Diet Guidelines for Bariatric Surgery, Sources of Protein, Keeping Track of Your Fluids, list of recommended vitamin/mineral supplementation after SG surgery and RD contact information.  Provided Pt with list of  goals.    Patient Understanding: good  Expected Compliance: good    GOALS:  Relating To Eating:  Eat slowly (20-30 minutes per meal), chewing foods well (25 chews per bite/applesauce consistency)    Follow the Modified Liquid Diet for weight loss:  Breakfast: Protein Shake  Lunch: Protein Shake  Supper: 3 oz lean protein + non-starchy vegetables  Snack: non-starchy vegetables (no calorie-containing dips/condiments)  Beverages: at least 48-64 oz water between meals daily    *Protein Shake Criteria: ~200 Calories, at least 20 grams of protein, and less than 10 grams of sugar     Relating to beverages:  Separate fluids from meals by 30 minutes before, during, and after eating.  (Be sure to drink at least 64 oz between meals daily).     Relating to dietary supplements:  Continue multivitamin, vitamin D, and hair/nail/skin supplement daily.    Relating to activity:  Increase activity as able (walking; pool) - daily walking using FitBit.     Relating to cravings:  Practice alternatives to emotional eating (making jewelry; painting rocks).    Post-op Goals:   1. Follow the bariatric post-op diet advancement schedule:  - Bariatric clear liquid diet through post-op day 1 (while in the hospital).  - Bariatric low-fat full liquid diet on post-op days 2 through 13 (2 weeks).  2. Sip on 48-64 oz (or greater) fluids daily, recording intake to help stay on-track.  - Drink at least 2 oz of fluid every 30 min.  Follow-Up: 1 month    Time spent with patient: 30 minutes.  Simin Wagner, MS, RDN, LDN, CLT  Pager: [phone]

## 2017-12-11 NOTE — PATIENT INSTRUCTIONS
GOALS:  Relating To Eating:  Eat slowly (20-30 minutes per meal), chewing foods well (25 chews per bite/applesauce consistency)    Follow the Modified Liquid Diet for weight loss:  Breakfast: Protein Shake  Lunch: Protein Shake  Supper: 3 oz lean protein + non-starchy vegetables  Snack: non-starchy vegetables (no calorie-containing dips/condiments)  Beverages: at least 48-64 oz water between meals daily    *Protein Shake Criteria: ~200 Calories, at least 20 grams of protein, and less than 10 grams of sugar     Relating to beverages:  Separate fluids from meals by 30 minutes before, during, and after eating.  (Be sure to drink at least 64 oz between meals daily).     Relating to dietary supplements:  Continue multivitamin, vitamin D, and hair/nail/skin supplement daily.    Relating to activity:  Increase activity as able (walking; pool) - daily walking using FitBit.     Relating to cravings:  Practice alternatives to emotional eating (making jewelry; painting rocks).        CONTACT INFORMATION  Call VELMA Ridley at [phone]:   -If questions about completing tasks on the Tasklist.   -To confirm if tasks are done. If tasks are done, then she can schedule  you to meet the surgeon to review risks and benefits of the surgery, the  clinic nurse for preop teaching and the dietician for postop diet teaching.   -If you have watched the on-line classes prior to meeting the surgeon, call  her or send a [employer] message to confirm that you watched them. The  online classes are at [email or web address] and  [email or web address].    Call Satya at [phone]:   -If questions about insurance and the prior authorization process.   -After meeting the surgeon to get a surgery date, schedule the  appointments with the anesthesia team and the first postop appointments.    Call the Call Center at [phone]:   -If you need to schedule additional monthly dietician visits before or after  surgery.   -If questions after surgery  (ask for the clinic nurse).   -To schedule additional follow-up visits to help keep you on track (1 wk, 1  mo, 3 mo, 6  mo, every 6 months until year 5, then every year at your  anniversary month).    Fax # [phone]:    -preoperative documents to complete tasks on Tasklist.   -FMLA or return to work forms        *Your weight will be checked at your surgeon visit and also again at your anesthesia visit.  It is required that you be at or below your final weight goal up until the day of surgery or your surgery will be cancelled.    DISCLAIMER: Based on the evaluation results, the bariatric team decides whether and which bariatric surgery is the best option for you. Sometimes, even if you meet all of the pre-operative criteria, the bariatric team may still determine that in their medical judgement surgery is not recommended because of the risks to you.

## 2017-12-11 NOTE — MR AVS SNAPSHOT
MRN:[id number]                      After Visit Summary   12/11/2017    Sunshine Delgado    MRN: [id number]           Visit Information        Provider Department      12/11/2017 11:30 AM Simin Wagner RD [facility]        Care Instructions    GOALS:  Relating To Eating:  Eat slowly (20-30 minutes per meal), chewing foods well (25 chews per bite/applesauce consistency)    Follow the Modified Liquid Diet for weight loss:  Breakfast: Protein Shake  Lunch: Protein Shake  Supper: 3 oz lean protein + non-starchy vegetables  Snack: non-starchy vegetables (no calorie-containing dips/condiments)  Beverages: at least 48-64 oz water between meals daily    *Protein Shake Criteria: ~200 Calories, at least 20 grams of protein, and less than 10 grams of sugar     Relating to beverages:  Separate fluids from meals by 30 minutes before, during, and after eating.  (Be sure to drink at least 64 oz between meals daily).     Relating to dietary supplements:  Continue multivitamin, vitamin D, and hair/nail/skin supplement daily.    Relating to activity:  Increase activity as able (walking; pool) - daily walking using FitBit.     Relating to cravings:  Practice alternatives to emotional eating (making jewelry; painting rocks).        CONTACT INFORMATION  Call VELMA Ridley at [phone]:   -If questions about completing tasks on the Tasklist.   -To confirm if tasks are done. If tasks are done, then she can schedule  you to meet the surgeon to review risks and benefits of the surgery, the  clinic nurse for preop teaching and the dietician for postop diet teaching.   -If you have watched the on-line classes prior to meeting the surgeon, call  her or send a [employer] message to confirm that you watched them. The  online classes are at [email or web address] and  [email or web address].    Call Satya at [phone]:   -If questions about insurance and the prior authorization  process.   -After meeting the surgeon to get a surgery date, schedule the  appointments with the anesthesia team and the first postop appointments.    Call the Call Center at [phone]:   -If you need to schedule additional monthly dietician visits before or after  surgery.   -If questions after surgery (ask for the clinic nurse).   -To schedule additional follow-up visits to help keep you on track (1 wk, 1  mo, 3 mo, 6  mo, every 6 months until year 5, then every year at your  anniversary month).    Fax # [phone]:    -preoperative documents to complete tasks on Tasklist.   -FMLA or return to work forms        *Your weight will be checked at your surgeon visit and also again at your anesthesia visit.  It is required that you be at or below your final weight goal up until the day of surgery or your surgery will be cancelled.    DISCLAIMER: Based on the evaluation results, the bariatric team decides whether and which bariatric surgery is the best option for you. Sometimes, even if you meet all of the pre-operative criteria, the bariatric team may still determine that in their medical judgement surgery is not recommended because of the risks to you.         [employer] Information     [employer] gives you secure access to your electronic health record. If you see a primary care provider, you can also send messages to your care team and make appointments. If you have questions, please call your primary care clinic.  If you do not have a primary care provider, please call [phone] and they will assist you.      [employer] is an electronic gateway that provides easy, online access to your medical records. With [employer], you can request a clinic appointment, read your test results, renew a prescription or communicate with your care team.     To access your existing account, please contact your [facility] or call [phone] for assistance.        Care EveryWhere ID     This is your Care  EveryWhere ID. This could be used by other organizations to access your [place] medical records  [id number]        Equal Access to Services     LUCA KELLER : Wero Jimenez, rian judge, jaxon ball, mary raymundo. So [facility] [phone].    ATENCIÓN: Si habla español, tiene a beard disposición servicios gratuitos de asistencia lingüística. Llame al [phone].    We comply with applicable federal civil rights laws and [place] laws. We do not discriminate on the basis of race, color, national origin, age, disability, sex, sexual orientation, or gender identity.

## 2017-12-13 ENCOUNTER — TRANSFERRED RECORDS (OUTPATIENT)
Dept: HEALTH INFORMATION MANAGEMENT | Facility: CLINIC | Age: 52
End: 2017-12-13

## 2017-12-20 ENCOUNTER — OFFICE VISIT (OUTPATIENT)
Dept: FAMILY MEDICINE | Facility: CLINIC | Age: 52
End: 2017-12-20
Payer: COMMERCIAL

## 2017-12-20 VITALS
HEIGHT: 65 IN | DIASTOLIC BLOOD PRESSURE: 72 MMHG | BODY MASS INDEX: 45.23 KG/M2 | TEMPERATURE: 98 F | HEART RATE: 70 BPM | SYSTOLIC BLOOD PRESSURE: 122 MMHG | OXYGEN SATURATION: 97 % | WEIGHT: 271.5 LBS

## 2017-12-20 DIAGNOSIS — J01.90 ACUTE SINUSITIS WITH SYMPTOMS > 10 DAYS: Primary | ICD-10-CM

## 2017-12-20 DIAGNOSIS — R07.0 THROAT PAIN: ICD-10-CM

## 2017-12-20 LAB
DEPRECATED S PYO AG THROAT QL EIA: NORMAL
SPECIMEN SOURCE: NORMAL

## 2017-12-20 PROCEDURE — 87880 STREP A ASSAY W/OPTIC: CPT | Performed by: PHYSICIAN ASSISTANT

## 2017-12-20 PROCEDURE — 87081 CULTURE SCREEN ONLY: CPT | Performed by: PHYSICIAN ASSISTANT

## 2017-12-20 PROCEDURE — 99213 OFFICE O/P EST LOW 20 MIN: CPT | Performed by: PHYSICIAN ASSISTANT

## 2017-12-20 ASSESSMENT — ENCOUNTER SYMPTOMS
WHEEZING: 0
FEVER: 0
PALPITATIONS: 0
COUGH: 1
SORE THROAT: 1
CARDIOVASCULAR NEGATIVE: 1
HEMOPTYSIS: 0
GASTROINTESTINAL NEGATIVE: 1
SINUS PAIN: 1
SHORTNESS OF BREATH: 0
DIAPHORESIS: 0
WEIGHT LOSS: 0
EYE PAIN: 0
CONSTITUTIONAL NEGATIVE: 1
SPUTUM PRODUCTION: 0

## 2017-12-20 NOTE — MR AVS SNAPSHOT
"              After Visit Summary   12/20/2017    Sunshine Delgado    MRN: [id number]           Patient Information     Date Of Birth          1965        Visit Information        Provider Department      12/20/2017 12:20 PM Leigh Michael PA-C [facility]        Today's Diagnoses     Acute sinusitis with symptoms > 10 days    -  1    Throat pain           Follow-ups after your visit        Who to contact     If you have questions or need follow up information about today's clinic visit or your schedule please contact [facility] directly at [phone].  Normal or non-critical lab and imaging results will be communicated to you by Shuttlerockhart, letter or phone within 4 business days after the clinic has received the results. If you do not hear from us within 7 days, please contact the clinic through Shuttlerockhart or phone. If you have a critical or abnormal lab result, we will notify you by phone as soon as possible.  Submit refill requests through Hakia or call your pharmacy and they will forward the refill request to us. Please allow 3 business days for your refill to be completed.          Additional Information About Your Visit        MyChart Information     Hakia gives you secure access to your electronic health record. If you see a primary care provider, you can also send messages to your care team and make appointments. If you have questions, please call your primary care clinic.  If you do not have a primary care provider, please call [phone] and they will assist you.        Care EveryWhere ID     This is your Care EveryWhere ID. This could be used by other organizations to access your [facility] medical records  [id number]        Your Vitals Were     Pulse Temperature Height Pulse Oximetry BMI (Body Mass Index)       70 98  F (36.7  C) (Oral) 5' 5\" (1.651 m) 97% 45.18 kg/m2        Blood Pressure from Last 3 Encounters:   12/20/17 122/72   11/24/17 122/76   11/15/17 " 128/74    Weight from Last 3 Encounters:   12/20/17 271 lb 8 oz (123.2 kg)   11/24/17 269 lb (122 kg)   11/15/17 270 lb (122.5 kg)              We Performed the Following     Rapid strep screen          Today's Medication Changes          These changes are accurate as of: 12/20/17 12:48 PM.  If you have any questions, ask your nurse or doctor.               Start taking these medicines.        Dose/Directions    amoxicillin-clavulanate 875-125 MG per tablet   Commonly known as:  AUGMENTIN   Used for:  Acute sinusitis with symptoms > 10 days   Started by:  Leigh Michael PA-C        Dose:  1 tablet   Take 1 tablet by mouth 2 times daily for 10 days   Quantity:  20 tablet   Refills:  0            Where to get your medicines      These medications [place] [facility] [place] [address]  [address] [facility] [facility] [address]     Phone:  [phone]     amoxicillin-clavulanate 875-125 MG per tablet                Primary Care Provider Office Phone # Fax #    Shana Jett [phone] [address] [facility] [address]        Equal Access to Services     [facility] AH: Hadii jatinder lo hadasho Sovadim, waaxda luqadaha, qaybta kaalmada adeegyada, mary raymundo. So [facility] [phone].    ATENCIÓN: Si habla español, tiene a beard disposición servicios gratuitos de asistencia lingüística. [facility] [phone].    We comply with applicable federal civil rights laws and [place] laws. We do not discriminate on the basis of race, color, national origin, age, disability, sex, sexual orientation, or gender identity.            Thank you!     Thank you for choosing [facility]  for your care. Our goal is always to provide you with excellent care. Hearing back from our patients is one way we can continue to improve our services. Please take a few minutes to complete the written survey that you may receive in  the mail after your visit with us. Thank you!             Your Updated Medication List - Protect others around you: Learn how to safely use, store and throw away your medicines at www.disposemymeds.org.          This list is accurate as of: 12/20/17 12:48 PM.  Always use your most recent med list.                   Brand Name Dispense Instructions for use Diagnosis    amoxicillin-clavulanate 875-125 MG per tablet    AUGMENTIN    20 tablet    Take 1 tablet by mouth 2 times daily for 10 days    Acute sinusitis with symptoms > 10 days       buPROPion 150 MG 24 hr tablet    WELLBUTRIN XL    90 tablet    Take 1 tablet (150 mg) by mouth every morning    Generalized anxiety disorder, Major depressive disorder, recurrent episode, moderate (H)       citalopram 40 MG tablet    celeXA    90 tablet    Take 1 tablet (40 mg) by mouth daily    Generalized anxiety disorder, Major depressive disorder, recurrent episode, moderate (H)       EPINEPHrine 0.3 MG/0.3ML injection 2-pack    EPIPEN/ADRENACLICK/or ANY BX GENERIC EQUIV    0.6 mL    Inject 0.3 mLs (0.3 mg) into the muscle as needed for anaphylaxis    Bee sting allergy       ibuprofen 600 MG tablet    ADVIL/MOTRIN     Take 600 mg by mouth        order for DME      Resmed Airsense 10 auto cpap 6-7 cm, Airfit P10 for her XS pillows        topiramate 25 MG tablet    TOPAMAX    90 tablet    25mg at bedtime for week 1, 50mg at bedtime for 1 week, and 75mg at bedtime thereafter    Morbid obesity (H)       traZODone 50 MG tablet    DESYREL    90 tablet    Take 1 tablet (50 mg) by mouth At Bedtime    Insomnia due to other mental disorder       TYLENOL 325 MG tablet   Generic drug:  acetaminophen

## 2017-12-20 NOTE — PROGRESS NOTES
SUBJECTIVE:     HPI  Sunshine Delgado is a 52 year old female who presents to clinic today for the following health issues:  ENT Symptoms           Symptoms: cc Present Absent Comment   Fever/Chills   x Initially but this has resolved   Fatigue  x     Muscle Aches   x    Eye Irritation  x  Itching and left eye pain   Sneezing   x    Nasal Nacho/Drg  x     Sinus Pressure/Pain  x     Loss of smell  x     Dental pain  x  The whole left side, upper and bottom teeth   Sore Throat  x  Due to a lot of drainage   Swollen Glands   x    Ear Pain/Fullness  x  Left ear feels full but no drainage or changes in her hearing   Cough  x     Wheeze   x    Chest Pain   x    Shortness of breath   x    Rash       Other         Symptom duration:  6 weeks ago   Symptom severity:  some days seems better then gets worse again   Treatments tried:  Ibuprofen, sudefed, antihistamine without any relief   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  Patient Active Problem List   Diagnosis     Generalized anxiety disorder     CARDIOVASCULAR SCREENING; LDL GOAL LESS THAN 160     MARIA GUADALUPE (obstructive sleep apnea)- severe (AHI 84)     Morbid obesity (H)     Health Care Home     Mild major depression (H)     Insomnia     Bee sting allergy     Current Outpatient Prescriptions   Medication Sig Dispense Refill     traZODone (DESYREL) 50 MG tablet Take 1 tablet (50 mg) by mouth At Bedtime 90 tablet 3     buPROPion (WELLBUTRIN XL) 150 MG 24 hr tablet Take 1 tablet (150 mg) by mouth every morning 90 tablet 3     topiramate (TOPAMAX) 25 MG tablet 25mg at bedtime for week 1, 50mg at bedtime for 1 week, and 75mg at bedtime thereafter 90 tablet 1     EPINEPHrine (EPIPEN/ADRENACLICK/OR ANY BX GENERIC EQUIV) 0.3 MG/0.3ML injection 2-pack Inject 0.3 mLs (0.3 mg) into the muscle as needed for anaphylaxis 0.6 mL 3     citalopram (CELEXA) 40 MG tablet Take 1 tablet (40 mg) by mouth daily 90 tablet 3     acetaminophen (TYLENOL) 325  "MG tablet        ibuprofen (ADVIL/MOTRIN) 600 MG tablet Take 600 mg by mouth       order for DME Resmed Airsense 10 auto cpap 6-7 cm, Airfit P10 for her XS pillows       Allergies   Allergen Reactions     Contrast Dye Other (See Comments)     Patient had sneezing and an itchy throat following contrast injection of 100 mL Isovue-370.     Sulfa Drugs Hives     Vicodin [Hydrocodone-Acetaminophen]      Wasps [Hornets] Swelling     Now carries Epi Pen       Review of Systems   Constitutional: Negative.  Negative for diaphoresis, fever and weight loss.   HENT: Positive for congestion, ear pain, sinus pain and sore throat.    Eyes: Negative for pain.   Respiratory: Positive for cough. Negative for hemoptysis, sputum production, shortness of breath and wheezing.    Cardiovascular: Negative.  Negative for chest pain and palpitations.   Gastrointestinal: Negative.    Skin: Negative.    Endo/Heme/Allergies: Negative for environmental allergies.   All other systems reviewed and are negative.      /72 (BP Location: Right arm, Cuff Size: Adult Large)  Pulse 70  Temp 98  F (36.7  C) (Oral)  Ht 5' 5\" (1.651 m)  Wt 271 lb 8 oz (123.2 kg)  SpO2 97%  BMI 45.18 kg/m2  Physical Exam   Constitutional: She is oriented to person, place, and time and well-developed, well-nourished, and in no distress. No distress.   HENT:   Head: Normocephalic and atraumatic.   Nose: Mucosal edema and rhinorrhea present. No nasal deformity or septal deviation. No epistaxis.  No foreign bodies. Right sinus exhibits maxillary sinus tenderness and frontal sinus tenderness. Left sinus exhibits maxillary sinus tenderness and frontal sinus tenderness.   Mouth/Throat: Uvula is midline and oropharynx is clear and moist. No oropharyngeal exudate, posterior oropharyngeal edema, posterior oropharyngeal erythema or tonsillar abscesses.   TMs are intact without any erythema or bulging bilaterally.  Airway is patent.   Eyes: Conjunctivae and EOM are normal. " Pupils are equal, round, and reactive to light. No scleral icterus.   Neck: Normal range of motion. Neck supple. No thyromegaly present.   Cardiovascular: Normal rate, regular rhythm, normal heart sounds and intact distal pulses.  Exam reveals no gallop and no friction rub.    No murmur heard.  Pulmonary/Chest: Effort normal and breath sounds normal. No accessory muscle usage. No respiratory distress. She has no decreased breath sounds. She has no wheezes. She has no rhonchi. She has no rales.   Lymphadenopathy:     She has no cervical adenopathy.   Neurological: She is alert and oriented to person, place, and time.   Skin: Skin is warm and dry. No cyanosis. Nails show no clubbing.   Psychiatric: Mood and affect normal.   Nursing note and vitals reviewed.        Assessment/Plan:  Acute sinusitis with symptoms > 10 days:  Will treat with [id number] for sinusitis and take with food/probiotics to minimize GI upset.  Recommend tylenol/ibuprofen prn pain/fever and zyrtec/pseudofed for sinus congestion.   Rest, fluids, chicken soup.  Recheck in clinic if symptoms worsen or if symptoms do not improve.    -     amoxicillin-clavulanate (AUGMENTIN) 875-125 MG per tablet; Take 1 tablet by mouth 2 times daily for 10 days  -     Beta strep group A culture    Throat pain:  RST is negative, will send for throat culture.  Recommend tylenol/ibuprofen prn pain/fever, warm salt water gargles, lozenges or cough drops.  -     Rapid strep screen  -     Beta strep group A culture          Leigh Michael PA-C

## 2017-12-21 LAB
BACTERIA SPEC CULT: NORMAL
SPECIMEN SOURCE: NORMAL

## 2017-12-27 ENCOUNTER — OFFICE VISIT (OUTPATIENT)
Dept: FAMILY MEDICINE | Facility: CLINIC | Age: 52
End: 2017-12-27
Payer: COMMERCIAL

## 2017-12-27 VITALS
TEMPERATURE: 98.8 F | WEIGHT: 278.6 LBS | BODY MASS INDEX: 46.36 KG/M2 | OXYGEN SATURATION: 97 % | HEART RATE: 73 BPM | SYSTOLIC BLOOD PRESSURE: 122 MMHG | DIASTOLIC BLOOD PRESSURE: 78 MMHG

## 2017-12-27 DIAGNOSIS — S90.851A ACUTE FOREIGN BODY OF RIGHT HEEL, INITIAL ENCOUNTER: Primary | ICD-10-CM

## 2017-12-27 PROCEDURE — 99213 OFFICE O/P EST LOW 20 MIN: CPT | Performed by: FAMILY MEDICINE

## 2017-12-27 NOTE — NURSING NOTE
"Chief Complaint   Patient presents with     Musculoskeletal Problem     heel pain       Initial /78  Pulse 73  Temp 98.8  F (37.1  C) (Oral)  Wt 278 lb 9.6 oz (126.4 kg)  SpO2 97%  BMI 46.36 kg/m2 Estimated body mass index is 46.36 kg/(m^2) as calculated from the following:    Height as of 12/20/17: 5' 5\" (1.651 m).    Weight as of this encounter: 278 lb 9.6 oz (126.4 kg).  Medication Reconciliation: complete   Christa Martins MA      "

## 2017-12-27 NOTE — MR AVS SNAPSHOT
After Visit Summary   12/27/2017    Sunshine Delgado    MRN: [id number]           Patient Information     Date Of Birth          1965        Visit Information        Provider Department      12/27/2017 3:15 PM Elaine Bean MD [facility]        Today's Diagnoses     Acute foreign body of right heel, initial encounter    -  1      Care Instructions    [facility]    If you have any questions regarding to your visit please contact your care team:       Team Purple:   Clinic Hours Telephone Number   Dr. Elaine Alba   7am-7pm  Monday - Thursday   7am-5pm  Fridays  [phone]  (Appointment scheduling available 24/7)    Questions about your Visit?   Team Line:  [phone]   Urgent Care - [place] - 11am-9pm Monday-Friday Saturday-Sunday- 9am-5pm   [place] - 5pm-9pm Monday-Friday Saturday-Sunday- 9am-5pm  [phone] - [facility]  [phone] - [place]       What options do I have for visits at the clinic other than the traditional office visit?  To expand how we care for you, many of our providers are utilizing electronic visits (e-visits) and telephone visits, when medically appropriate, for interactions with their patients rather than a visit in the clinic.   We also offer nurse visits for many medical concerns. Just like any other service, we will bill your insurance company for this type of visit based on time spent on the phone with your provider. Not all insurance companies cover these visits. Please check with your medical insurance if this type of visit is covered. You will be responsible for any charges that are not paid by your insurance.      E-visits via [employer]:  generally incur a $35.00 fee.  Telephone visits:  Time spent on the phone: *charged based on time that is spent on the phone in increments of 10 minutes. Estimated cost:   5-10 mins $30.00   11-20  mins. $59.00   21-30 mins. $85.00     Use NextFitt (secure email communication and access to your chart) to send your primary care provider a message or make an appointment. Ask someone on your Team how to sign up for InPlace.  For a Price Quote for your services, please call our Consumer Price Line at [phone].  As always, Thank you for trusting us with your health care needs!              Follow-ups after your visit        Additional Services     PODIATRY/FOOT & ANKLE SURGERY REFERRAL       Your provider has referred you to: [place]: [facility] [phone]   [email or web address]    Please be aware that coverage of these services is subject to the terms and limitations of your health insurance plan.  Call member services at your health plan with any benefit or coverage questions.      Please bring the following to your appointment:  >>   Any x-rays, CTs or MRIs which have been performed.  Contact the facility where they were done to arrange for  prior to your scheduled appointment.    >>   List of current medications   >>   This referral request   >>   Any documents/labs given to you for this referral                  Who to contact     If you have questions or need follow up information about today's clinic visit or your schedule please contact [facility] directly at [phone].  Normal or non-critical lab and imaging results will be communicated to you by INTEGRATED BIOPHAR[employer]hart, letter or phone within 4 business days after the clinic has received the results. If you do not hear from us within 7 days, please contact the clinic through INTE[employer]OP[employer]AR[employer] or phone. If you have a critical or abnormal lab result, we will notify you by phone as soon as possible.  Submit refill requests through InPlace or call your pharmacy and they will forward the refill request to us. Please allow 3 business days for your refill to be completed.          Additional Information About Your  Visit        MyChart Information     [email or web address] gives you secure access to your electronic health record. If you see a primary care provider, you can also send messages to your care team and make appointments. If you have questions, please call your primary care clinic.  If you do not have a primary care provider, please call [phone] and they will assist you.        Care EveryWhere ID     This is your Care EveryWhere ID. This could be used by other organizations to access your [place] medical records  [id number]        Your Vitals Were     Pulse Temperature Pulse Oximetry BMI (Body Mass Index)          73 98.8  F (37.1  C) (Oral) 97% 46.36 kg/m2         Blood Pressure from Last 3 Encounters:   12/27/17 122/78   12/20/17 122/72   11/24/17 122/76    Weight from Last 3 Encounters:   12/27/17 278 lb 9.6 oz (126.4 kg)   12/20/17 271 lb 8 oz (123.2 kg)   11/24/17 269 lb (122 kg)              We Performed the Following     PODIATRY/FOOT & ANKLE SURGERY REFERRAL        Primary Care Provider Office Phone # Fax #    Shana Jett [phone]       [address] [place] [address]        Equal Access to Services     LUCA KELLER : Hadii aad ku hadasho Soomaali, waaxda luqadaha, qaybta kaalmada adeegyada, waxay idiin hayingridn adeeg khbriana laines ah. So [facility] [phone].    ATENCIÓN: Si habla español, tiene a beard disposición servicios gratuitos de asistencia lingüística. Llame al [phone].    We comply with applicable federal civil rights laws and [place] laws. We do not discriminate on the basis of race, color, national origin, age, disability, sex, sexual orientation, or gender identity.            Thank you!     Thank you for choosing [facility]  for your care. Our goal is always to provide you with excellent care. Hearing back from our patients is one way we can continue to improve our services. Please take a few minutes to complete the written survey that you may receive in  the mail after your visit with us. Thank you!             Your Updated Medication List - Protect others around you: Learn how to safely use, store and throw away your medicines at www.disposemymeds.org.          This list is accurate as of: 12/27/17  4:01 PM.  Always use your most recent med list.                   Brand Name Dispense Instructions for use Diagnosis    amoxicillin-clavulanate 875-125 MG per tablet    AUGMENTIN    20 tablet    Take 1 tablet by mouth 2 times daily for 10 days    Acute sinusitis with symptoms > 10 days       buPROPion 150 MG 24 hr tablet    WELLBUTRIN XL    90 tablet    Take 1 tablet (150 mg) by mouth every morning    Generalized anxiety disorder, Major depressive disorder, recurrent episode, moderate (H)       citalopram 40 MG tablet    celeXA    90 tablet    Take 1 tablet (40 mg) by mouth daily    Generalized anxiety disorder, Major depressive disorder, recurrent episode, moderate (H)       EPINEPHrine 0.3 MG/0.3ML injection 2-pack    EPIPEN/ADRENACLICK/or ANY BX GENERIC EQUIV    0.6 mL    Inject 0.3 mLs (0.3 mg) into the muscle as needed for anaphylaxis    Bee sting allergy       ibuprofen 600 MG tablet    ADVIL/MOTRIN     Take 600 mg by mouth        order for DME      Resmed Airsense 10 auto cpap 6-7 cm, Airfit P10 for her XS pillows        topiramate 25 MG tablet    TOPAMAX    90 tablet    25mg at bedtime for week 1, 50mg at bedtime for 1 week, and 75mg at bedtime thereafter    Morbid obesity (H)       traZODone 50 MG tablet    DESYREL    90 tablet    Take 1 tablet (50 mg) by mouth At Bedtime    Insomnia due to other mental disorder       TYLENOL 325 MG tablet   Generic drug:  acetaminophen

## 2017-12-27 NOTE — PATIENT INSTRUCTIONS
[facility]    If you have any questions regarding to your visit please contact your care team:       Team Purple:   Clinic Hours Telephone Number   Dr. Elaine Alba   7am-7pm  Monday - Thursday   7am-5pm  Fridays  [phone]  (Appointment scheduling available 24/7)    Questions about your Visit?   Team Line:  [phone]   Urgent Care - [place] and [facility] - 11am-9pm Monday-Friday Saturday-Sunday- 9am-5pm   [place] - 5pm-9pm Monday-Friday Saturday-Sunday- 9am-5pm  [phone] - [facility]  [phone] - [place]       What options do I have for visits at the clinic other than the traditional office visit?  To expand how we care for you, many of our providers are utilizing electronic visits (e-visits) and telephone visits, when medically appropriate, for interactions with their patients rather than a visit in the clinic.   We also offer nurse visits for many medical concerns. Just like any other service, we will bill your insurance company for this type of visit based on time spent on the phone with your provider. Not all insurance companies cover these visits. Please check with your medical insurance if this type of visit is covered. You will be responsible for any charges that are not paid by your insurance.      E-visits via [employer]:  generally incur a $35.00 fee.  Telephone visits:  Time spent on the phone: *charged based on time that is spent on the phone in increments of 10 minutes. Estimated cost:   5-10 mins $30.00   11-20 mins. $59.00   21-30 mins. $85.00     Use Manyetahart (secure email communication and access to your chart) to send your primary care provider a message or make an appointment. Ask someone on your Team how to sign up for MarginLeft.  For a Price Quote for your services, please call our Consumer Price Line at [phone].  As always, Thank you for trusting us with your health care needs!

## 2017-12-27 NOTE — PROGRESS NOTES
SUBJECTIVE:   Sunshine Delgado is a 52 year old female who presents to clinic today for the following health issues:      Musculoskeletal problem/pain      Duration: 4 days ago    Description  Location: right heel    Intensity:  mild    Accompanying signs and symptoms: none    History  Previous similar problem: no   Previous evaluation:  none    Precipitating or alleviating factors:  Trauma or overuse: YES stepped on something  Aggravating factors include: walking    Therapies tried and outcome: soaking in hot water             Problem list and histories reviewed & adjusted, as indicated.  Additional history: as documented    Patient Active Problem List   Diagnosis     Generalized anxiety disorder     CARDIOVASCULAR SCREENING; LDL GOAL LESS THAN 160     MARIA GUADALUPE (obstructive sleep apnea)- severe (AHI 84)     Morbid obesity (H)     Health Care Home     Mild major depression (H)     Insomnia     Bee sting allergy     Past Surgical History:   Procedure Laterality Date     HYSTERECTOMY, PAP NO LONGER INDICATED       HYSTERECTOMY, VAGINAL  2007    still has ovaries       Social History   Substance Use Topics     Smoking status: Never Smoker     Smokeless tobacco: Never Used     Alcohol use Yes      Comment: 1-2 per mo     Family History   Problem Relation Age of Onset     Eye Disorder Mother      lost eyesight; probable macular degeneration     Psychotic Disorder Mother      Dementia /Alzhimers     Neurologic Disorder Mother      seizures     DIABETES Mother      Hypertension Mother      Alzheimer Disease Mother      Arthritis Mother      OSTEOPOROSIS Mother      Obesity Mother      DIABETES Father      Depression Father      Hyperlipidemia Father      Obesity Father      Psychotic Disorder Sister      bipolar     Thyroid Disease Sister      h/o thyroid cancer     Depression Sister      Bipolar     Obesity Sister      CANCER Other      Brain cancer     OSTEOPOROSIS Maternal Grandmother      Anxiety Disorder Son           Current Outpatient Prescriptions   Medication Sig Dispense Refill     amoxicillin-clavulanate (AUGMENTIN) 875-125 MG per tablet Take 1 tablet by mouth 2 times daily for 10 days 20 tablet 0     traZODone (DESYREL) 50 MG tablet Take 1 tablet (50 mg) by mouth At Bedtime 90 tablet 3     buPROPion (WELLBUTRIN XL) 150 MG 24 hr tablet Take 1 tablet (150 mg) by mouth every morning 90 tablet 3     EPINEPHrine (EPIPEN/ADRENACLICK/OR ANY BX GENERIC EQUIV) 0.3 MG/0.3ML injection 2-pack Inject 0.3 mLs (0.3 mg) into the muscle as needed for anaphylaxis 0.6 mL 3     citalopram (CELEXA) 40 MG tablet Take 1 tablet (40 mg) by mouth daily 90 tablet 3     acetaminophen (TYLENOL) 325 MG tablet        ibuprofen (ADVIL/MOTRIN) 600 MG tablet Take 600 mg by mouth       order for DME Resmed Airsense 10 auto cpap 6-7 cm, Airfit P10 for her XS pillows       topiramate (TOPAMAX) 25 MG tablet 25mg at bedtime for week 1, 50mg at bedtime for 1 week, and 75mg at bedtime thereafter (Patient not taking: Reported on 12/27/2017) 90 tablet 1     Allergies   Allergen Reactions     Contrast Dye Other (See Comments)     Patient had sneezing and an itchy throat following contrast injection of 100 mL Isovue-370.     Sulfa Drugs Hives     Vicodin [Hydrocodone-Acetaminophen]      Wasps [Hornets] Swelling     Now carries Epi Pen     BP Readings from Last 3 Encounters:   12/27/17 122/78   12/20/17 122/72   11/24/17 122/76    Wt Readings from Last 3 Encounters:   12/27/17 278 lb 9.6 oz (126.4 kg)   12/20/17 271 lb 8 oz (123.2 kg)   11/24/17 269 lb (122 kg)                  Labs reviewed in EPIC        Reviewed and updated as needed this visit by clinical staff     Reviewed and updated as needed this visit by Provider         ROS:  This 52 year old female is here today because she accidentally stepped on a sharp tiny piece of some broken ornament 3 days ago on her right heel. Very painful to walk, especially when barefoot. She can see a dark piece in her heel  when she looks close. All other review of systems are negative  Personal, family, and social history reviewed with patient and revised.         OBJECTIVE:     /78  Pulse 73  Temp 98.8  F (37.1  C) (Oral)  Wt 278 lb 9.6 oz (126.4 kg)  SpO2 97%  BMI 46.36 kg/m2  Body mass index is 46.36 kg/(m^2).   patient has a very tender area on her right heel where I can see a deep dark small foreign body. It is too deep for me to get out. She will need podiatry.     Diagnostic Test Results:  none     ASSESSMENT/PLAN:              1. Acute foreign body of right heel, initial encounter  As above   - PODIATRY/FOOT & ANKLE SURGERY REFERRAL    Return to clinic if no improvement     JUAN A PIZANO MD  [facility]

## 2017-12-28 ENCOUNTER — OFFICE VISIT (OUTPATIENT)
Dept: PODIATRY | Facility: CLINIC | Age: 52
End: 2017-12-28
Payer: COMMERCIAL

## 2017-12-28 ENCOUNTER — TELEPHONE (OUTPATIENT)
Dept: FAMILY MEDICINE | Facility: CLINIC | Age: 52
End: 2017-12-28

## 2017-12-28 VITALS — WEIGHT: 278 LBS | HEART RATE: 84 BPM | BODY MASS INDEX: 46.26 KG/M2 | OXYGEN SATURATION: 94 %

## 2017-12-28 DIAGNOSIS — S90.851A FOREIGN BODY IN RIGHT FOOT, INITIAL ENCOUNTER: Primary | ICD-10-CM

## 2017-12-28 DIAGNOSIS — F33.1 MAJOR DEPRESSIVE DISORDER, RECURRENT EPISODE, MODERATE (H): Chronic | ICD-10-CM

## 2017-12-28 DIAGNOSIS — F41.1 GENERALIZED ANXIETY DISORDER: Chronic | ICD-10-CM

## 2017-12-28 PROCEDURE — 10120 INC&RMVL FB SUBQ TISS SMPL: CPT | Performed by: PODIATRIST

## 2017-12-28 PROCEDURE — 99243 OFF/OP CNSLTJ NEW/EST LOW 30: CPT | Mod: 25 | Performed by: PODIATRIST

## 2017-12-28 RX ORDER — BUPROPION HYDROCHLORIDE 150 MG/1
150 TABLET ORAL EVERY MORNING
Qty: 90 TABLET | Refills: 3 | Status: SHIPPED | OUTPATIENT
Start: 2017-12-28 | End: 2018-10-29

## 2017-12-28 NOTE — TELEPHONE ENCOUNTER
Reason for Call:  Other prescription    Detailed comments: needs a refill of buPROPion (WELLBUTRIN XL) 150 MG 24 hr tablet PT stated that Dr Castellon fills for her please call when sent     [facility] [address] [place] [address]  Phone Number Patient can be reached at: Home number on file [phone] (home)    Best Time: any    Can we leave a detailed message on this number? YES    Call taken on 12/28/2017 at 4:30 PM by Ellen Callejas

## 2017-12-28 NOTE — MR AVS SNAPSHOT
After Visit Summary   12/28/2017    Sunshine Delgado    MRN: [id number]           Patient Information     Date Of Birth          1965        Visit Information        Provider Department      12/28/2017 3:45 PM Dwain Lacy, DPZENON [facility]        Today's Diagnoses     Foreign body in right foot, initial encounter    -  1      Care Instructions    We wish you continued good healing. If you have any questions or concerns, please do not hesitate to contact us at [phone]      Please remember to call and schedule a follow up appointment if one was recommended at your earliest convenience.   PODIATRY CLINIC HOURS  TELEPHONE NUMBER    Dr. Dwain Lacy D.P.M FAC FAS    Clinics:  [facility]        Bridget Kimble MA  Medical Assistant  Tuesday 1PM-6PM  [place]  Wednesday 7AM-2PM  [place]  Thursday 10AM-6PM  [place]  Friday 7AM-345PM  [place]  Specialty schedulers:   [phone] to make an appointment with any Specialty Provider.        Urgent Care locations:    [facility] Monday-Friday 5 pm - 9 pm. Saturday-Sunday 9 am -5pm    Monday-Friday 11 am - 9 pm Saturday 9 am - 5 pm     Monday-Sunday 12 noon-8PM [phone]     If you need a medication refill, please contact us you may need lab work and/or a follow up visit prior to your refill (i.e. Antifungal medications).    Backyardt (secure e-mail communication and access to your chart) to send a message or to make an appointment.    If MRI needed please call [facility] at [phone]        278 lbs 0 oz     Weight management plan: Patient was referred to their PCP to discuss a diet and exercise plan.            Follow-ups after your visit        Who to contact     If you have questions or need follow up information about today's clinic visit or your schedule please contact [place]  [facility] directly at [phone].  Normal or non-critical lab and imaging results will be communicated to you by MyChart, letter or phone within 4 business days after the clinic has received the results. If you do not hear from us within 7 days, please contact the clinic through BagThathart or phone. If you have a critical or abnormal lab result, we will notify you by phone as soon as possible.  Submit refill requests through [employer] or call your pharmacy and they will forward the refill request to us. Please allow 3 business days for your refill to be completed.          Additional Information About Your Visit        [employer] Information     [employer] gives you secure access to your electronic health record. If you see a primary care provider, you can also send messages to your care team and make appointments. If you have questions, please call your primary care clinic.  If you do not have a primary care provider, please call [phone] and they will assist you.        Care EveryWhere ID     This is your Care EveryWhere ID. This could be used by other organizations to access your [place] medical records  [id number]        Your Vitals Were     Pulse Pulse Oximetry BMI (Body Mass Index)             84 94% 46.26 kg/m2          Blood Pressure from Last 3 Encounters:   12/27/17 122/78   12/20/17 122/72   11/24/17 122/76    Weight from Last 3 Encounters:   12/28/17 278 lb (126.1 kg)   12/27/17 278 lb 9.6 oz (126.4 kg)   12/20/17 271 lb 8 oz (123.2 kg)              We Performed the Following     REMOVE FOREIGN BODY SIMPLE          Where to get your medicines      These medications [facility] to [id number] [place] [facility] [place], [place] - [address] [facility]  [address] [facility] [address]     Phone:  [phone]     buPROPion 150 MG 24 hr tablet          Primary Care Provider Office Phone # Fax #    Shana Jett [phone] [address] [facility] [address]        Equal Access  to Services     LUCA KELLER : Wero Jimenez, wadomda humzaqadaha, qaybta kaalmalidia ball, mary raymundo. So [facility] [phone].    ATENCIÓN: Si alice enciso, tiene a beard disposición servicios gratuitos de asistencia lingüística. Llame al [phone].    We comply with applicable federal civil rights laws and [place] laws. We do not discriminate on the basis of race, color, national origin, age, disability, sex, sexual orientation, or gender identity.            Thank you!     Thank you for choosing [facility]  for your care. Our goal is always to provide you with excellent care. Hearing back from our patients is one way we can continue to improve our services. Please take a few minutes to complete the written survey that you may receive in the mail after your visit with us. Thank you!             Your Updated Medication List - Protect others around you: Learn how to safely use, store and throw away your medicines at www.disposemymeds.org.          This list is accurate as of: 12/28/17 11:59 PM.  Always use your most recent med list.                   Brand Name Dispense Instructions for use Diagnosis    amoxicillin-clavulanate 875-125 MG per tablet    AUGMENTIN    20 tablet    Take 1 tablet by mouth 2 times daily for 10 days    Acute sinusitis with symptoms > 10 days       buPROPion 150 MG 24 hr tablet    WELLBUTRIN XL    90 tablet    Take 1 tablet (150 mg) by mouth every morning    Generalized anxiety disorder, Major depressive disorder, recurrent episode, moderate (H)       citalopram 40 MG tablet    celeXA    90 tablet    Take 1 tablet (40 mg) by mouth daily    Generalized anxiety disorder, Major depressive disorder, recurrent episode, moderate (H)       EPINEPHrine 0.3 MG/0.3ML injection 2-pack    EPIPEN/ADRENACLICK/or ANY BX GENERIC EQUIV    0.6 mL    Inject 0.3 mLs (0.3 mg) into the muscle as needed for anaphylaxis    Bee sting allergy       ibuprofen  600 MG tablet    ADVIL/MOTRIN     Take 600 mg by mouth        order for DME      Resmed Airsense 10 auto cpap 6-7 cm, Airfit P10 for her XS pillows        topiramate 25 MG tablet    TOPAMAX    90 tablet    25mg at bedtime for week 1, 50mg at bedtime for 1 week, and 75mg at bedtime thereafter    Morbid obesity (H)       traZODone 50 MG tablet    DESYREL    90 tablet    Take 1 tablet (50 mg) by mouth At Bedtime    Insomnia due to other mental disorder       TYLENOL 325 MG tablet   Generic drug:  acetaminophen

## 2017-12-28 NOTE — PATIENT INSTRUCTIONS
We wish you continued good healing. If you have any questions or concerns, please do not hesitate to contact us at [phone]      Please remember to call and schedule a follow up appointment if one was recommended at your earliest convenience.   PODIATRY CLINIC HOURS  TELEPHONE NUMBER    Dr. Dwain Lacy D.P.M [facility]    Clinics:  [facility]        Bridget Kimble MA  Medical Assistant  Tuesday 1PM-6PM  [place] [facility]  Wednesday 7AM-2PM  [place]  Thursday 10AM-6PM  [place] Friday 7AM-345PM  [place]  Specialty schedulers:   [phone] to make an appointment with any Specialty Provider.        Urgent Care locations:    [facility] Monday-Friday 5 pm - 9 pm. Saturday-Sunday 9 am -5pm    Monday-Friday 11 am - 9 pm Saturday 9 am - 5 pm     Monday-Sunday 12 noon-8PM [phone]     If you need a medication refill, please contact us you may need lab work and/or a follow up visit prior to your refill (i.e. Antifungal medications).    Gloss48t (secure e-mail communication and access to your chart) to send a message or to make an appointment.    If MRI needed please call [facility] at [phone]        278 lbs 0 oz     Weight management plan: Patient was referred to their PCP to discuss a diet and exercise plan.

## 2017-12-28 NOTE — LETTER
12/28/2017         RE: Sunshine Delgado  [address] [facility] [address]        Dear Colleague,    Thank you for referring your patient, Sunshine Delgado, to the [facility]. Please see a copy of my visit note below.    S:  Patient seen in consult from Dr. Bean and complains of right heel pain.  Points to plantar central posterior right heel.  Describes as a burning pain.  aggravated by activity and relieved by rest.  Started this week end.  No pain anywhere else on feet.  Goes barefoot around house.  Has animals in her house.  Works at standing job.  Denies purulence, odor, fever and chills, shortness of breath, numbness.      ROS:  A 10-point review of systems was performed and is positive for that noted in the HPI and as seen below.  All other areas are negative.          Allergies   Allergen Reactions     Contrast Dye Other (See Comments)     Patient had sneezing and an itchy throat following contrast injection of 100 mL Isovue-370.     Sulfa Drugs Hives     Vicodin [Hydrocodone-Acetaminophen]      Wasps [Hornets] Swelling     Now carries Epi Pen       Current Outpatient Prescriptions   Medication Sig Dispense Refill     amoxicillin-clavulanate (AUGMENTIN) 875-125 MG per tablet Take 1 tablet by mouth 2 times daily for 10 days 20 tablet 0     traZODone (DESYREL) 50 MG tablet Take 1 tablet (50 mg) by mouth At Bedtime 90 tablet 3     topiramate (TOPAMAX) 25 MG tablet 25mg at bedtime for week 1, 50mg at bedtime for 1 week, and 75mg at bedtime thereafter 90 tablet 1     EPINEPHrine (EPIPEN/ADRENACLICK/OR ANY BX GENERIC EQUIV) 0.3 MG/0.3ML injection 2-pack Inject 0.3 mLs (0.3 mg) into the muscle as needed for anaphylaxis 0.6 mL 3     citalopram (CELEXA) 40 MG tablet Take 1 tablet (40 mg) by mouth daily 90 tablet 3     acetaminophen (TYLENOL) 325 MG tablet        ibuprofen (ADVIL/MOTRIN) 600 MG tablet Take 600 mg by mouth       order for DME Resmed Airsense 10 auto cpap 6-7 cm, Airfit  P10 for her XS pillows       buPROPion (WELLBUTRIN XL) 150 MG 24 hr tablet Take 1 tablet (150 mg) by mouth every morning 90 tablet 3       Patient Active Problem List   Diagnosis     Generalized anxiety disorder     CARDIOVASCULAR SCREENING; LDL GOAL LESS THAN 160     MARIA GUADALUPE (obstructive sleep apnea)- severe (AHI 84)     Morbid obesity (H)     Health Care Home     Mild major depression (H)     Insomnia     Bee sting allergy       Past Medical History:   Diagnosis Date     Bee sting allergy      Depressive disorder      Generalized anxiety disorder 10/15/2009    lexapro made things worse.       Morbid obesity (H)      MARIA GUADALUPE (obstructive sleep apnea)- severe (AHI 84) 09/09/2011     Voice fatigue 10/22/2009       Past Surgical History:   Procedure Laterality Date     HYSTERECTOMY, PAP NO LONGER INDICATED       HYSTERECTOMY, VAGINAL  2007    still has ovaries       Family History   Problem Relation Age of Onset     Eye Disorder Mother      lost eyesight; probable macular degeneration     Psychotic Disorder Mother      Dementia /Alzhimers     Neurologic Disorder Mother      seizures     DIABETES Mother      Hypertension Mother      Alzheimer Disease Mother      Arthritis Mother      OSTEOPOROSIS Mother      Obesity Mother      DIABETES Father      Depression Father      Hyperlipidemia Father      Obesity Father      Psychotic Disorder Sister      bipolar     Thyroid Disease Sister      h/o thyroid cancer     Depression Sister      Bipolar     Obesity Sister      CANCER Other      Brain cancer     OSTEOPOROSIS Maternal Grandmother      Anxiety Disorder Son        Social History   Substance Use Topics     Smoking status: Never Smoker     Smokeless tobacco: Never Used     Alcohol use Yes      Comment: 1-2 per mo         O: Pulse 84  Wt 278 lb (126.1 kg)  SpO2 94%  BMI 46.26 kg/m2.      Constitutional/ general:  Pt is in no apparent distress, appears well-nourished.  Cooperative with history and physical exam.     Psych:  The  patient answered questions appropriately.  Normal affect.  Seems to have reasonable expectations, in terms of treatment.     Eyes:  Visual scanning/ tracking without deficit.     Ears:  Response to auditory stimuli is normal.  No  hearing aid devices.  Auricles in proper alignment.     Lymphatic:  Popliteal lymph nodes not enlarged.     Lungs:  Non labored breathing, non labored speech. No cough.  No audible wheezing. Even, quiet breathing.       Vascular:  Pedal pulses are palpable bilaterally for both the DP and PT arteries.  CFT < 3 sec.  No edema.  Pedal hair growth noted.     Neuro:  Alert and oriented x 3. Coordinated gait.  Light touch sensation is intact to the L4, L5, S1 distributions. No obvious deficits.  No evidence of neurological-based weakness, spasticity, or contracture in the lower extremities.     Musculoskeletal:    Lower extremity muscle strength is normal.  Patient is ambulatory without an assistive device or brace .  No gross deformities.     Derm: Normal texture and turgor.  No erythema, ecchymosis, or cyanosis.  Hair growth noted.  Pain right heel plantar central posterior.  There is thick skin here.  With debriding this down we encountered a foreign body. This foreign body was incised and removed.  No additional foreign body was noticed.  No sinus tracts, erythema, or edema was noted.  Foreign body consistent with pet hair.        A:  Foreign body right heel    P:   Discussed patient may have foreign body in foot.  Discussed debridement and exploration with patient.  Patient in agreement.  Debrided lesion down and noticed foreign body.  Patient gave verbal consent and this foreign body was incised and removed.  Would was explored, cleansed and dressed with a bandaid.  postopp instructions given.  RETURN TO CLINIC PRN.  Thank you for allowing me participate in the care of this patient.        Dwain Lacy DPM, FACFAS      Again, thank you for allowing me to participate in the care of your  patient.        Sincerely,        Dwain Lacy DPM

## 2017-12-28 NOTE — NURSING NOTE
"Chief Complaint   Patient presents with     Follow Up For     Foot Problems     Fb in right heel       Initial Pulse 84  Wt 278 lb (126.1 kg)  SpO2 94%  BMI 46.26 kg/m2 Estimated body mass index is 46.26 kg/(m^2) as calculated from the following:    Height as of 12/20/17: 5' 5\" (1.651 m).    Weight as of this encounter: 278 lb (126.1 kg).  Medication Reconciliation: complete  "

## 2017-12-29 NOTE — PROGRESS NOTES
S:  Patient seen in consult from Dr. Bean and complains of right heel pain.  Points to plantar central posterior right heel.  Describes as a burning pain.  aggravated by activity and relieved by rest.  Started this week end.  No pain anywhere else on feet.  Goes barefoot around house.  Has animals in her house.  Works at standing job.  Denies purulence, odor, fever and chills, shortness of breath, numbness.      ROS:  A 10-point review of systems was performed and is positive for that noted in the HPI and as seen below.  All other areas are negative.          Allergies   Allergen Reactions     Contrast Dye Other (See Comments)     Patient had sneezing and an itchy throat following contrast injection of 100 mL Isovue-370.     Sulfa Drugs Hives     Vicodin [Hydrocodone-Acetaminophen]      Wasps [Hornets] Swelling     Now carries Epi Pen       Current Outpatient Prescriptions   Medication Sig Dispense Refill     amoxicillin-clavulanate (AUGMENTIN) 875-125 MG per tablet Take 1 tablet by mouth 2 times daily for 10 days 20 tablet 0     traZODone (DESYREL) 50 MG tablet Take 1 tablet (50 mg) by mouth At Bedtime 90 tablet 3     topiramate (TOPAMAX) 25 MG tablet 25mg at bedtime for week 1, 50mg at bedtime for 1 week, and 75mg at bedtime thereafter 90 tablet 1     EPINEPHrine (EPIPEN/ADRENACLICK/OR ANY BX GENERIC EQUIV) 0.3 MG/0.3ML injection 2-pack Inject 0.3 mLs (0.3 mg) into the muscle as needed for anaphylaxis 0.6 mL 3     citalopram (CELEXA) 40 MG tablet Take 1 tablet (40 mg) by mouth daily 90 tablet 3     acetaminophen (TYLENOL) 325 MG tablet        ibuprofen (ADVIL/MOTRIN) 600 MG tablet Take 600 mg by mouth       order for DME Resmed Airsense 10 auto cpap 6-7 cm, Airfit P10 for her XS pillows       buPROPion (WELLBUTRIN XL) 150 MG 24 hr tablet Take 1 tablet (150 mg) by mouth every morning 90 tablet 3       Patient Active Problem List   Diagnosis     Generalized anxiety disorder     CARDIOVASCULAR SCREENING; LDL  GOAL LESS THAN 160     MARIA GUADALUPE (obstructive sleep apnea)- severe (AHI 84)     Morbid obesity (H)     Health Care Home     Mild major depression (H)     Insomnia     Bee sting allergy       Past Medical History:   Diagnosis Date     Bee sting allergy      Depressive disorder      Generalized anxiety disorder 10/15/2009    lexapro made things worse.       Morbid obesity (H)      MARIA GUADALUPE (obstructive sleep apnea)- severe (AHI 84) 09/09/2011     Voice fatigue 10/22/2009       Past Surgical History:   Procedure Laterality Date     HYSTERECTOMY, PAP NO LONGER INDICATED       HYSTERECTOMY, VAGINAL  2007    still has ovaries       Family History   Problem Relation Age of Onset     Eye Disorder Mother      lost eyesight; probable macular degeneration     Psychotic Disorder Mother      Dementia /Alzhimers     Neurologic Disorder Mother      seizures     DIABETES Mother      Hypertension Mother      Alzheimer Disease Mother      Arthritis Mother      OSTEOPOROSIS Mother      Obesity Mother      DIABETES Father      Depression Father      Hyperlipidemia Father      Obesity Father      Psychotic Disorder Sister      bipolar     Thyroid Disease Sister      h/o thyroid cancer     Depression Sister      Bipolar     Obesity Sister      CANCER Other      Brain cancer     OSTEOPOROSIS Maternal Grandmother      Anxiety Disorder Son        Social History   Substance Use Topics     Smoking status: Never Smoker     Smokeless tobacco: Never Used     Alcohol use Yes      Comment: 1-2 per mo         O: Pulse 84  Wt 278 lb (126.1 kg)  SpO2 94%  BMI 46.26 kg/m2.      Constitutional/ general:  Pt is in no apparent distress, appears well-nourished.  Cooperative with history and physical exam.     Psych:  The patient answered questions appropriately.  Normal affect.  Seems to have reasonable expectations, in terms of treatment.     Eyes:  Visual scanning/ tracking without deficit.     Ears:  Response to auditory stimuli is normal.  No  hearing aid  devices.  Auricles in proper alignment.     Lymphatic:  Popliteal lymph nodes not enlarged.     Lungs:  Non labored breathing, non labored speech. No cough.  No audible wheezing. Even, quiet breathing.       Vascular:  Pedal pulses are palpable bilaterally for both the DP and PT arteries.  CFT < 3 sec.  No edema.  Pedal hair growth noted.     Neuro:  Alert and oriented x 3. Coordinated gait.  Light touch sensation is intact to the L4, L5, S1 distributions. No obvious deficits.  No evidence of neurological-based weakness, spasticity, or contracture in the lower extremities.     Musculoskeletal:    Lower extremity muscle strength is normal.  Patient is ambulatory without an assistive device or brace .  No gross deformities.     Derm: Normal texture and turgor.  No erythema, ecchymosis, or cyanosis.  Hair growth noted.  Pain right heel plantar central posterior.  There is thick skin here.  With debriding this down we encountered a foreign body. This foreign body was incised and removed.  No additional foreign body was noticed.  No sinus tracts, erythema, or edema was noted.  Foreign body consistent with pet hair.        A:  Foreign body right heel    P:   Discussed patient may have foreign body in foot.  Discussed debridement and exploration with patient.  Patient in agreement.  Debrided lesion down and noticed foreign body.  Patient gave verbal consent and this foreign body was incised and removed.  Would was explored, cleansed and dressed with a bandaid.  postopp instructions given.  RETURN TO CLINIC PRN.  Thank you for allowing me participate in the care of this patient.        Dwain Lacy DPM, FACFAS

## 2017-12-30 ENCOUNTER — OFFICE VISIT (OUTPATIENT)
Dept: URGENT CARE | Facility: URGENT CARE | Age: 52
End: 2017-12-30
Payer: COMMERCIAL

## 2017-12-30 VITALS
TEMPERATURE: 98.3 F | SYSTOLIC BLOOD PRESSURE: 129 MMHG | HEART RATE: 82 BPM | BODY MASS INDEX: 45.6 KG/M2 | WEIGHT: 274 LBS | DIASTOLIC BLOOD PRESSURE: 73 MMHG | OXYGEN SATURATION: 96 %

## 2017-12-30 DIAGNOSIS — J01.90 ACUTE SINUSITIS TREATED WITH ANTIBIOTICS IN THE PAST 60 DAYS: Primary | ICD-10-CM

## 2017-12-30 DIAGNOSIS — R05.9 COUGH: ICD-10-CM

## 2017-12-30 PROCEDURE — 99214 OFFICE O/P EST MOD 30 MIN: CPT | Mod: 24 | Performed by: NURSE PRACTITIONER

## 2017-12-30 RX ORDER — DOXYCYCLINE 100 MG/1
100 CAPSULE ORAL 2 TIMES DAILY
Qty: 20 CAPSULE | Refills: 0 | Status: SHIPPED | OUTPATIENT
Start: 2017-12-30 | End: 2018-02-20

## 2017-12-30 RX ORDER — CODEINE PHOSPHATE AND GUAIFENESIN 10; 100 MG/5ML; MG/5ML
1 SOLUTION ORAL EVERY 4 HOURS PRN
Qty: 120 ML | Refills: 0 | Status: SHIPPED | OUTPATIENT
Start: 2017-12-30 | End: 2018-02-20

## 2017-12-30 ASSESSMENT — ENCOUNTER SYMPTOMS
PSYCHIATRIC NEGATIVE: 1
NEUROLOGICAL NEGATIVE: 1
MUSCULOSKELETAL NEGATIVE: 1
GASTROINTESTINAL NEGATIVE: 1
CARDIOVASCULAR NEGATIVE: 1
CONSTITUTIONAL NEGATIVE: 1
EYES NEGATIVE: 1
COUGH: 1
SINUS PAIN: 1

## 2017-12-30 NOTE — PROGRESS NOTES
SUBJECTIVE:   Sunshine Delgado is a 52 year old female presenting with a chief complaint of cold symptoms.     Onset of symptoms was 2 week(s) ago.  Course of illness is worsening.    Severity moderate  Current and Associated symptoms: cough - non-productive, cough - productive, headache, sinus pressure and rhinorrhea  Treatment measures tried include Tylenol/Ibuprofen.  Predisposing factors include None.  Treated with amoxicillin for sinus infection, finished yesterday, feels increased fatigue and worsening symptoms.       Review of Systems   Constitutional: Negative.    HENT: Positive for congestion and sinus pain.    Eyes: Negative.    Respiratory: Positive for cough.    Cardiovascular: Negative.    Gastrointestinal: Negative.    Genitourinary: Negative.    Musculoskeletal: Negative.    Skin: Negative.    Neurological: Negative.    Endo/Heme/Allergies: Negative.    Psychiatric/Behavioral: Negative.          Past Medical History:   Diagnosis Date     Bee sting allergy      Depressive disorder      Generalized anxiety disorder 10/15/2009    lexapro made things worse.       Morbid obesity (H)      MARIA GUADALUPE (obstructive sleep apnea)- severe (AHI 84) 09/09/2011     Voice fatigue 10/22/2009     Current Outpatient Prescriptions   Medication Sig Dispense Refill     buPROPion (WELLBUTRIN XL) 150 MG 24 hr tablet Take 1 tablet (150 mg) by mouth every morning 90 tablet 3     amoxicillin-clavulanate (AUGMENTIN) 875-125 MG per tablet Take 1 tablet by mouth 2 times daily for 10 days 20 tablet 0     traZODone (DESYREL) 50 MG tablet Take 1 tablet (50 mg) by mouth At Bedtime 90 tablet 3     topiramate (TOPAMAX) 25 MG tablet 25mg at bedtime for week 1, 50mg at bedtime for 1 week, and 75mg at bedtime thereafter 90 tablet 1     EPINEPHrine (EPIPEN/ADRENACLICK/OR ANY BX GENERIC EQUIV) 0.3 MG/0.3ML injection 2-pack Inject 0.3 mLs (0.3 mg) into the muscle as needed for anaphylaxis 0.6 mL 3     citalopram (CELEXA) 40 MG tablet Take 1 tablet (40  mg) by mouth daily 90 tablet 3     acetaminophen (TYLENOL) 325 MG tablet        ibuprofen (ADVIL/MOTRIN) 600 MG tablet Take 600 mg by mouth       order for DME Resmed Airsense 10 auto cpap 6-7 cm, Airfit P10 for her XS pillows       Social History   Substance Use Topics     Smoking status: Never Smoker     Smokeless tobacco: Never Used     Alcohol use Yes      Comment: 1-2 per mo       OBJECTIVE  /73 (BP Location: Left arm, Patient Position: Chair, Cuff Size: Adult Large)  Pulse 82  Temp 98.3  F (36.8  C) (Oral)  Wt 274 lb (124.3 kg)  SpO2 96%  BMI 45.6 kg/m2    Physical Exam   Constitutional: She is oriented to person, place, and time and well-developed, well-nourished, and in no distress.   HENT:   Head: Normocephalic and atraumatic.   Right Ear: External ear normal.   Left Ear: External ear normal.   Nose: Mucosal edema and rhinorrhea present. Right sinus exhibits maxillary sinus tenderness and frontal sinus tenderness. Left sinus exhibits frontal sinus tenderness.   Mouth/Throat: Oropharynx is clear and moist.   Eyes: Conjunctivae and EOM are normal. Pupils are equal, round, and reactive to light.   Neck: Normal range of motion. Neck supple.   Cardiovascular: Normal rate, regular rhythm and normal heart sounds.    Pulmonary/Chest: Effort normal and breath sounds normal.   Neurological: She is oriented to person, place, and time.   Skin: Skin is warm and dry.         ASSESSMENT:  (J01.90) Acute sinusitis treated with antibiotics in the past 60 days  (primary encounter diagnosis)  Doxycyline 100 mg BID X 10 days    (R05) Cough  Plan: guaiFENesin-codeine (ROBITUSSIN AC) 100-10  MG/5ML SOLN solution      PLAN:    URI Adult:  Ibuprofen, Fluids, Rest, Saline nasal spray and Vaporizer    Followup:    If not improving or if condition worsens, follow up with your Primary Care Provider    LEOPOLDO Perdomo CNP

## 2017-12-30 NOTE — NURSING NOTE
".  Chief Complaint   Patient presents with     URI     weeks       Initial /73 (BP Location: Left arm, Patient Position: Chair, Cuff Size: Adult Large)  Pulse 82  Temp 98.3  F (36.8  C) (Oral)  Wt 274 lb (124.3 kg)  SpO2 96%  BMI 45.6 kg/m2 Estimated body mass index is 45.6 kg/(m^2) as calculated from the following:    Height as of 12/20/17: 5' 5\" (1.651 m).    Weight as of this encounter: 274 lb (124.3 kg).  Medication Reconciliation: complete     Temi Chen CMA      "

## 2017-12-30 NOTE — MR AVS SNAPSHOT
After Visit Summary   12/30/2017    Sunshine Delgado    MRN: [id number]           Patient Information     Date Of Birth          1965        Visit Information        Provider Department      12/30/2017 10:40 AM Pallavi Ferrell APRN CNP [facility]        Today's Diagnoses     Acute sinusitis treated with antibiotics in the past 60 days    -  1    Cough           Follow-ups after your visit        Who to contact     If you have questions or need follow up information about today's clinic visit or your schedule please contact [facility] directly at [phone].  Normal or non-critical lab and imaging results will be communicated to you by [employer], letter or phone within 4 business days after the clinic has received the results. If you do not hear from us within 7 days, please contact the clinic through [employer] or phone. If you have a critical or abnormal lab result, we will notify you by phone as soon as possible.  Submit refill requests through [employer] or call your pharmacy and they will forward the refill request to us. Please allow 3 business days for your refill to be completed.          Additional Information About Your Visit        MyChart Information     [employer] gives you secure access to your electronic health record. If you see a primary care provider, you can also send messages to your care team and make appointments. If you have questions, please call your primary care clinic.  If you do not have a primary care provider, please call [phone] and they will assist you.        Care EveryWhere ID     This is your Care EveryWhere ID. This could be used by other organizations to access your [facility] medical records  [id number]        Your Vitals Were     Pulse Temperature Pulse Oximetry BMI (Body Mass Index)          82 98.3  F (36.8  C) (Oral) 96% 45.6 kg/m2         Blood Pressure from Last 3 Encounters:   12/30/17 129/73   12/27/17 122/78    12/20/17 122/72    Weight from Last 3 Encounters:   12/30/17 274 lb (124.3 kg)   12/28/17 278 lb (126.1 kg)   12/27/17 278 lb 9.6 oz (126.4 kg)              Today, you had the following     No orders found for display         Today's Medication Changes          These changes are accurate as of: 12/30/17 12:38 PM.  If you have any questions, ask your nurse or doctor.               Start taking these medicines.        Dose/Directions    doxycycline 100 MG capsule   Commonly known as:  VIBRAMYCIN   Used for:  Cough   Started by:  Plalavi Ferrell APRN CNP        Dose:  100 mg   Take 1 capsule (100 mg) by mouth 2 times daily   Quantity:  20 capsule   Refills:  0       guaiFENesin-codeine 100-10 MG/5ML Soln solution   Commonly known as:  ROBITUSSIN AC   Used for:  Cough   Started by:  Pallavi Ferrell APRN CNP        Dose:  1 tsp.   Take 5 mLs by mouth every 4 hours as needed for cough   Quantity:  120 mL   Refills:  0            Where to get your medicines      These medications were sent to [facility] [place], [place] - [address]  [address], [facility] [address]     Phone:  [phone]     doxycycline 100 MG capsule         Some of these will need a paper prescription and others can be bought over the counter.  Ask your nurse if you have questions.     Bring a paper prescription for each of these medications     guaiFENesin-codeine 100-10 MG/5ML Soln solution                Primary Care Provider Office Phone # Fax #    Shana Jett [phone]       [address] [place] [address]        Equal Access to Services     [facility] : Hadii jatinder lo hadashtania Sovadim, waaxda luqadaha, qaybta kaalmada quinn, mary raymundo. So [facility] [phone].    ATENCIÓN: Si habla español, tiene a beard disposición servicios gratuitos de asistencia lingüística. Isidra al [phone].    We comply with applicable federal civil rights  laws and [place] laws. We do not discriminate on the basis of race, color, national origin, age, disability, sex, sexual orientation, or gender identity.            Thank you!     Thank you for choosing [facility]  for your care. Our goal is always to provide you with excellent care. Hearing back from our patients is one way we can continue to improve our services. Please take a few minutes to complete the written survey that you may receive in the mail after your visit with us. Thank you!             Your Updated Medication List - Protect others around you: Learn how to safely use, store and throw away your medicines at www.disposemymeds.org.          This list is accurate as of: 12/30/17 12:38 PM.  Always use your most recent med list.                   Brand Name Dispense Instructions for use Diagnosis    buPROPion 150 MG 24 hr tablet    WELLBUTRIN XL    90 tablet    Take 1 tablet (150 mg) by mouth every morning    Generalized anxiety disorder, Major depressive disorder, recurrent episode, moderate (H)       citalopram 40 MG tablet    celeXA    90 tablet    Take 1 tablet (40 mg) by mouth daily    Generalized anxiety disorder, Major depressive disorder, recurrent episode, moderate (H)       doxycycline 100 MG capsule    VIBRAMYCIN    20 capsule    Take 1 capsule (100 mg) by mouth 2 times daily    Cough       EPINEPHrine 0.3 MG/0.3ML injection 2-pack    EPIPEN/ADRENACLICK/or ANY BX GENERIC EQUIV    0.6 mL    Inject 0.3 mLs (0.3 mg) into the muscle as needed for anaphylaxis    Bee sting allergy       guaiFENesin-codeine 100-10 MG/5ML Soln solution    ROBITUSSIN AC    120 mL    Take 5 mLs by mouth every 4 hours as needed for cough    Cough       ibuprofen 600 MG tablet    ADVIL/MOTRIN     Take 600 mg by mouth        order for DME      Resmed Airsense 10 auto cpap 6-7 cm, Airfit P10 for her XS pillows        topiramate 25 MG tablet    TOPAMAX    90 tablet    25mg at bedtime for week 1, 50mg at  bedtime for 1 week, and 75mg at bedtime thereafter    Morbid obesity (H)       traZODone 50 MG tablet    DESYREL    90 tablet    Take 1 tablet (50 mg) by mouth At Bedtime    Insomnia due to other mental disorder       TYLENOL 325 MG tablet   Generic drug:  acetaminophen

## 2018-01-08 ENCOUNTER — CARE COORDINATION (OUTPATIENT)
Dept: SURGERY | Facility: CLINIC | Age: 53
End: 2018-01-08

## 2018-01-08 DIAGNOSIS — Z01.818 PRE-OP EXAM: Primary | ICD-10-CM

## 2018-01-08 NOTE — PROGRESS NOTES
"Tasklist updated and copy sent to client via [employer].    Bariatric Task List  Status:  Is patient a candidate for bariatric surgery?:  Yes -     Cleared to schedule surgeon consult?:    -     Status:  surgery evaluation in process -     Surgeon: Opal -     Davidative surgery month/year: Feb 2018 -        Insurance: Insurance:  [employer] -        Patient Info: Initial Weight:  272lb -     Date of Initial Weight/Height:  9/13/2017 -     Goal Weight (lbs):  262 -     Required Weight Loss:  10 -     Surgery Type:  sleeve gastrectomy -     Multidisciplinary Meeting:    -        Dietician Visits: Structured weight loss required by insurance?:  Yes -     Dietician Visit 1:  Completed -     Dietician Visit 2:  Completed - 10/13/17 appt   Dietician Visit 3:  Completed - 11/10/17 appt   Dietician Visit 4:  Completed - Dec 12/11/17 ([facility])   Dietician Visit 5:    -     Dietician Visit 6:    -     Dietician Visit additional:    -     Clearance from dietician to see surgeon?:    -     Dietician Notes:  Diet post-op ed completed 12/11/17 ([facility]) -        Psychological Evaluation: Psych eval:  Completed - 10/23/17 with additional visits.  met with Joann Encarnacion.   Therapist letter of support:  Completed - Joann Padilla at [facility] (Tx Plan) Done and faxed to [place] on 12/11/17 ([facility])      Lab Work: Complete Blood Count:  Completed - 9/22/17 in [facility].   Comprehensive Metabolic Panel:  Completed - 9/22/17 in Epic.   Vitamin D:  Completed - 9/22/17 in Epic.   PTH:  Completed - 9/22/17 in Epic.   Other:    -  THC testing prior to surgery. To abstain before and after surgery.      Consults/ Clearance: Sleep Medicine:  Completed - clearance due to MARIA GUADALUPE per Basha - 9/28/17 in Epic.      PCP: Establish care with PCP:  Completed -     Follow up with PCP:    -     PCP letter of support:  Completed - 9/20/17 Dr Guera Castellon - PCP support. S      Patient Education:  Information Session:  Completed -     Given \"Making your decision\" " handout?:  Yes -     Given support group information?:  Yes -     Attended support group?:    -     Support plan in place?:  Completed -     Research consents signed?:  Yes -        Final Tasks:  Before surgery online class:  Needed -     Before surgery online class website link:  [email or web address]   After surgery online class:  Needed -     After surgery online class website link:  [email or web address]   Nurse visit for weigh-in and information:  Needed -     Pre-assessment clinic visit with anesthesia team for H&P:  Needed -     Final labs (Hgb, plt, T&S, UA):  Needed -        Notes:   -

## 2018-01-09 NOTE — PROGRESS NOTES
Subjective:  HPI                    Objective:  System    Physical Exam    General     ROS    Assessment/Plan:    DISCHARGE REPORT    Progress reporting period is from 8/23/2017       SUBJECTIVE  Subjective changes noted by patient:  .  Subjective: Patient reports feeling much better, but tends to only do her exercises when she feels pain.  Patient currently has no complaints    Current pain level is  Current Pain level: 0/10.     Previous pain level was   Initial Pain level: 9/10.   Changes in function:  Yes (See Goal flowsheet attached for changes in current functional level)  Adverse reaction to treatment or activity: None    OBJECTIVE  Changes noted in objective findings:  Yes,   Objective: No pain today. tolerated his exercise progressions well     ASSESSMENT/PLAN  Updated problem list and treatment plan: Diagnosis 1:  Low back pain    STG/LTGs have been met or progress has been made towards goals:  Yes (See Goal flow sheet completed today.)  Assessment of Progress: The patient's condition is improving.  The patient has met all of their long term goals.  Self Management Plans:  Patient has been instructed in a home treatment program.  Patient  has been instructed in self management of symptoms.  I have re-evaluated this patient and find that the nature, scope, duration and intensity of the therapy is appropriate for the medical condition of the patient.  Sunshine continues to require the following intervention to meet STG and LTG's:  PT intervention is no longer required to meet STG/LTG.    Recommendations:  This patient is ready to be discharged from therapy and continue their home treatment program.    Please refer to the daily flowsheet for treatment today, total treatment time and time spent performing 1:1 timed codes.

## 2018-01-10 DIAGNOSIS — E66.01 MORBID OBESITY (H): ICD-10-CM

## 2018-01-11 RX ORDER — TOPIRAMATE 25 MG/1
TABLET, FILM COATED ORAL
Qty: 90 TABLET | Refills: 1 | OUTPATIENT
Start: 2018-01-11

## 2018-01-11 NOTE — TELEPHONE ENCOUNTER
Recieved refill request for topiramate (TOPAMAX) 25 MG . Patient needs appointment scheduled prior to any refills. Clinic Coordinator notified and will follow up with the patient as appropriate. The pharmacy has been notified that the medication will not be refilled prior to an appointment being scheduled.

## 2018-01-15 DIAGNOSIS — E66.01 MORBID OBESITY (H): ICD-10-CM

## 2018-01-15 RX ORDER — TOPIRAMATE 25 MG/1
TABLET, FILM COATED ORAL
Qty: 90 TABLET | Refills: 1 | Status: SHIPPED | OUTPATIENT
Start: 2018-01-15 | End: 2018-02-20

## 2018-01-18 ENCOUNTER — ALLIED HEALTH/NURSE VISIT (OUTPATIENT)
Dept: SURGERY | Facility: CLINIC | Age: 53
End: 2018-01-18
Payer: COMMERCIAL

## 2018-01-18 ENCOUNTER — OFFICE VISIT (OUTPATIENT)
Dept: SURGERY | Facility: CLINIC | Age: 53
End: 2018-01-18
Payer: COMMERCIAL

## 2018-01-18 VITALS
SYSTOLIC BLOOD PRESSURE: 115 MMHG | OXYGEN SATURATION: 95 % | TEMPERATURE: 97.8 F | HEIGHT: 65 IN | HEART RATE: 60 BPM | DIASTOLIC BLOOD PRESSURE: 71 MMHG | WEIGHT: 272 LBS | BODY MASS INDEX: 45.32 KG/M2

## 2018-01-18 VITALS — BODY MASS INDEX: 45.32 KG/M2 | HEIGHT: 65 IN | WEIGHT: 272 LBS

## 2018-01-18 DIAGNOSIS — E66.01 MORBID OBESITY DUE TO EXCESS CALORIES (H): Primary | ICD-10-CM

## 2018-01-18 NOTE — PROGRESS NOTES
"  Bariatric Nutrition Consultation Note    Reason For Visit: Nutrition Reassessment    Sunshine Delgado is a 52 year-old presenting today for bariatric nutrition consult.  Pt is interested in laparoscopic sleeve gastrectomy (Dr. Joseph); anticipated surgery Dec 2017/Jan 2018.  This is pt's fifth of 3 required nutrition visits prior to surgery. Pt referred by Any CARR) on 9/13/17.     Coordination Note:   (1/18/18) - Pt stated all tasks are completed with the exception of viewing the online pre-surgery videos. Pt reports she's unable to view/download videos. Writer messaged team.     She is interested in having weight loss surgery for the following reasons:  To improve overall health and wellbeing with arthritis and bursitis in hips.     ANTHROPOMETRICS:    Height as of an earlier encounter on 9/13/17: 1.651 m (5' 5\").    Weight as of an earlier encounter on 9/13/17: 123.4 kg (272 lb) with BMI of 45.26.    Current Weight (1/18/18): 272 lbs (+3.3 lbs over the past month, -0 lbs from initial weight)      Required weight loss goal pre-op: -10 lbs from initial consult weight (goal weight 262 lbs or less before surgery)    SUPPLEMENT INFORMATION:  MVI and vitamin D (both gummy) and hair/nail supplement    (Reviewed Sept 2017 labs; vitamin D at 32 - advised continuing vitamin/min regimen with vitamin D; GFR borderline low - advised hydration; LDL slightly elevated at 104 - advised continuing exercise and wt loss)    NUTRITION HISTORY:  Progress with Previous Goals:  Relating To Eating:  - Eat slowly (20-30 minutes per meal), chewing foods well (25 chews per bite/applesauce consistency) - Met, continues .     - Follow the Modified Liquid Diet for weight loss:- Not met. Pt feeling hungry with modliquid diet (Slim Fast shake BID and only 1 meal/day). She reports she ate, \"whatever I wanted\" over the holidays, but as of 1/2/18 joined Ashley MIGUEL) and now feels more on track. She provides the following diet " "recall:    B: CHUYITA meal (protein/starch)   L: CHUYITA meal (protein/starch) + non-starchy vegetable   D: CHUYITA meal (protein/starch) + non-starchy vegetable   Snk: 3 snacks/day = CHUYITA granola bar, yogurt, chips/popcorn     Relating to beverages:  Separate fluids from meals by 30 minutes before, during, and after eating.  (Be sure to drink at least 64 oz between meals daily). - Not met, improving. Pt drinks >64 oz/day, but is forgetting to separate fluids from meals.     Relating to dietary supplements:  Continue multivitamin, vitamin D, and hair/nail/skin supplement daily. - Met, continues.    Relating to activity:  Increase activity as able (walking; pool) - daily walking using FitBit. - Met, continues. Walking 20 minutes daily.     Relating to cravings:  Practice alternatives to emotional eating (making jewelry; painting rocks). - Met, continues.     NUTRITION DIAGNOSIS:  Obesity r/t long history of self-monitoring deficit and excessive energy intake aeb BMI >30.- continues    INTERVENTION:  Intervention Provided/Education Provided:  Reviewed previous goals, importance of practicing mindfulness with eating, portion control. Pt reports feeling \"set back\" d/t weight gain. She is interested in trying the modified liquid diet again for weight loss. Gave encouragement and support. Provided instruction on post-op diet guidelines. Pt stated she will obtain a weekly weight at [facility] near her home, working toward goal weight. Provided the following handouts: Diet Guidelines for Bariatric Surgery, Keeping Track of Your Fluids, list of recommended vitamin/mineral supplementation after SG surgery and RD contact information.  Provided Pt with list of goals.  Patient Understanding: good  Expected Compliance: good    GOALS:  Pre-op goals:    Relating To Eating:  Eat slowly (20-30 minutes per meal), chewing foods well (25 chews per bite/applesauce consistency)    Follow the Modified Liquid Diet for weight loss:  Breakfast: Protein " Shake  Lunch: Protein Shake -or- CHUYITA meal (protein/starch) + non-starchy vegetables   Supper: 3 oz lean protein + non-starchy vegetables -or- CHUYITA meal (protein/starch) + non-starchy vegetables   Snack: non-starchy vegetables (no calorie-containing dips/condiments)  Beverages: at least 48-64 oz water between meals daily    Relating to beverages:  Separate fluids from meals by 30 minutes before, during, and after eating.   Continue to drink at least 64 oz between meals daily     Relating to dietary supplements:  Continue multivitamin, vitamin D, and hair/nail/skin supplement daily.    Relating to activity:  Increase activity as able (walking; pool) - daily walking using FitBit.     Relating to cravings:  Practice alternatives to emotional eating (making jewelry; painting rocks).      Post-op goals:  1. Follow the bariatric post-op diet advancement schedule:  - Bariatric clear liquid diet through post-op day 1 (while in the hospital).  - Bariatric low-fat full liquid diet on post-op days 2 through 13 (2 weeks).  2. Sip on 48-64 oz (or greater) fluids daily, recording intake to help stay on-track.  - Drink at least 2 oz of fluid every 30 min.    Follow-Up: PRN or 1 month     Time spent with patient: 45 minutes.    Kaity Alba RD, LD

## 2018-01-18 NOTE — PATIENT INSTRUCTIONS
Follow the Modified Liquid Diet for weight loss:  Breakfast: Protein Shake  Lunch: Protein Shake - or- 3 oz lean protein + non-starchy vegetables -or- meal replacement (CHUYITA meal or those listed below)   Supper: 3 oz lean protein + non-starchy vegetables -or- meal replacement (CHUYITA meal or those listed below)   Snack: non-starchy vegetables (no calorie-containing dips/condiments)  Beverages: at least 48-64 oz water between meals daily    *Protein Shake Criteria: no more than 250 Calories, at least 20 grams of protein, and less than 10 grams of sugar     Meal Replacement Shake Options:   M Health Meal Replacement (250 Calories, 35 g protein)   Premier Protein (160 Calories, 30 g protein)  Slim Fast Advanced Nutrition (180 Calories, 20 g protein)  Muscle Milk, lactose-free, 17 oz bottle (210 Calories, 30 g protein)  Integrated Supplements, no artificial sugars (110 Calories, 20 g protein)  Quest Protein Bars (190 Calories, 20 g protein)  No Cow Protein Bar, gluten, dairy, and soy free (200 Calories, 20 g protein)    Clear Liquid options:  BiPro  Premier Protein clear  [id number]    Frozen Meal Replacements  Healthy Choice  Lean Cuisine  Atkins Meals  Smart Ones

## 2018-01-18 NOTE — MR AVS SNAPSHOT
MRN:[id number]                      After Visit Summary   1/18/2018    Sunshine Delgado    MRN: [id number]           Visit Information        Provider Department      1/18/2018 10:30 AM Alena Millard RD [facility] Surgical Weight Management        Your next 10 appointments already scheduled     Jan 18, 2018 11:40 AM CST   (Arrive by 11:25 AM)   Pre Bariatric Surgery with MD ZENON [facility] Surgical Weight Management ([facility])    [address] [place] [id number] [phone]              Care Instructions    Follow the Modified Liquid Diet for weight loss:  Breakfast: Protein Shake  Lunch: Protein Shake - or- 3 oz lean protein + non-starchy vegetables -or- meal replacement (CHUYITA meal or those listed below)   Supper: 3 oz lean protein + non-starchy vegetables -or- meal replacement (CHUYITA meal or those listed below)   Snack: non-starchy vegetables (no calorie-containing dips/condiments)  Beverages: at least 48-64 oz water between meals daily    *Protein Shake Criteria: no more than 250 Calories, at least 20 grams of protein, and less than 10 grams of sugar     Meal Replacement Shake Options:    Health Meal Replacement (250 Calories, 35 g protein)   Premier Protein (160 Calories, 30 g protein)  Slim Fast Advanced Nutrition (180 Calories, 20 g protein)  Muscle Milk, lactose-free, 17 oz bottle (210 Calories, 30 g protein)  Integrated Supplements, no artificial sugars (110 Calories, 20 g protein)  Quest Protein Bars (190 Calories, 20 g protein)  No Cow Protein Bar, gluten, dairy, and soy free (200 Calories, 20 g protein)    Clear Liquid options:  BiPro  Premier Protein clear  [id number]    Frozen Meal Replacements  Healthy Choice  Lean Cuisine  Atkins Meals  Smart Ones           MyChart Information     [employer] gives you secure access to your electronic health record. If you see a primary care provider, you can also send messages to your  care team and make appointments. If you have questions, please call your primary care clinic.  If you do not have a primary care provider, please call [phone] and they will assist you.      [employer] is an electronic gateway that provides easy, online access to your medical records. With [employer], you can request a clinic appointment, read your test results, renew a prescription or communicate with your care team.     To access your existing account, please contact your [facility] or call [phone] for assistance.        Care EveryWhere ID     This is your Care EveryWhere ID. This could be used by other organizations to access your [facility] medical records  [id number]        Equal Access to Services     LUCA KELLER : Wero Jimenez, rian judge, jaxon ball, mary raymundo. So [facility] [phone].    ATENCIÓN: Si habla español, tiene a beard disposición servicios gratuitos de asistencia lingüística. Llame al [phone].    We comply with applicable federal civil rights laws and [place] laws. We do not discriminate on the basis of race, color, national origin, age, disability, sex, sexual orientation, or gender identity.

## 2018-01-18 NOTE — PROGRESS NOTES
"Dear Dr. Jett,      Referring provider:       9/12/2017   Who referred you? Lesly Pacheco     I was asked to see the patient regarding obesity by the referring provider above.    I had the pleasure of meeting with your patient Sunshine Delgado recently in our weight loss surgery office.  This patient is a 52 year old year old female who has been undergoing our thorough preoperative screening process in anticipation of potential bariatric surgery.    At initial evaluation we recorded Sunshine Delgado's height was 5' 5\", weight was 272 lbs 0 oz, and calculated Body mass index was 45.26 kg/(m^2). The patient has been unsuccessful with other methods of permanent weight loss and suffers from multiple weight related medical conditions.  Due to lack of success in achieving weight loss through other methods, she is interested in undergoing bariatric surgery.    PREVIOUS WEIGHT LOSS ATTEMPTS:     9/12/2017   I have tried the following methods to lose weight Watching portions or calories, Exercise, Weight Watchers, Ashley Devi       CO-MORBIDITIES OF OBESITY INCLUDE:     9/12/2017   I have the following co-morbidities associated with obesity: Sleep Apnea, Weight Bearing Joint Pain   Do you use a CPAP? Yes       VITALS:  /71  Pulse 60  Temp 97.8  F (36.6  C) (Oral)  Ht 1.651 m (5' 5\")  Wt 123.4 kg (272 lb)  SpO2 95%  BMI 45.26 kg/m2    PE:  GENERAL: Alert and oriented x3. NAD  HEENT exam: Sclerae not icteric. Hearing good. Head normocephalic and atraumatic.   CARDIOVASCULAR: No JVD  RESPIRATORY: Breathing unlabored  GASTROINTESTINAL: Obese  LOWER EXTREMITIES: no deformities  MUSCULOSKELETAL: Normal gait, Moves all 4 extremities equal and strong  NEUROLOGIC: no gross defect  SKIN: warm and dry to touch     In summary, she has undergone an appropriate medical evaluation, dietitian evaluation, as well as psychologic screening. The patient appears to be an appropriate candidate for bariatric surgery.    In the office " today, I discussed the laparoscopic gastric sleeve surgery.  Risks, benefits and anticipated outcomes were outlined including the risk of death, staple line leak (1-2%), PE, DVT, ulcer, worsening GERD, N/V, stricture, hernia, wound infection, weight regain, and vitamin deficiencies. This patient has a good chance of sustaining permanent weight loss due to this procedure.  This should also allow improvement if not resolution of his/her weight related medical conditions.    At present we are going to present your patient's file for prior authorization to insurance.  Pending prior authorization, I anticipate a surgical date in the near future after she has achieved goal weight loss. We will keep you updated on any progress.  If you have questions regarding care please feel free to contact me.  Total time spent in the clinic was 30 minutes with greater than 50% in face-to-face consultation.    Sincerely,    Kameron Joseph MD  Surgery  [phone] (hospital )  [phone] (clinic nurses)    Please route or send letter to:  Primary Care Provider (PCP) and Referring Provider

## 2018-01-18 NOTE — NURSING NOTE
"(   Chief Complaint   Patient presents with     RECHECK     PBS, needs pre op teaching    )    ( Weight: 272 lb )  ( Height: 5' 5\" )  ( BMI (Calculated): 45.36 )  ( Initial Weight: 272 lb )  ( Cumulative weight loss (lbs): 0 )  ( Last Visits Weight: 272 lb )  ( Wt change since last visit (lbs): 0 )  (   )  (   )    ( BP: 115/71 )  (   )  ( Temp: 97.8  F (36.6  C) )  ( Temp src: Oral )  ( Pulse: 60 )  (   )  ( SpO2: 95 % )    (   Patient Active Problem List   Diagnosis     Generalized anxiety disorder     CARDIOVASCULAR SCREENING; LDL GOAL LESS THAN 160     MARIA GUADALUPE (obstructive sleep apnea)- severe (AHI 84)     Morbid obesity (H)     Health Care Home     Mild major depression (H)     Insomnia     Bee sting allergy    )  (   Current Outpatient Prescriptions   Medication Sig Dispense Refill     topiramate (TOPAMAX) 25 MG tablet 25mg at bedtime for week 1, 50mg at bedtime for 1 week, and 75mg at bedtime thereafter 90 tablet 1     guaiFENesin-codeine (ROBITUSSIN AC) 100-10 MG/5ML SOLN solution Take 5 mLs by mouth every 4 hours as needed for cough 120 mL 0     doxycycline (VIBRAMYCIN) 100 MG capsule Take 1 capsule (100 mg) by mouth 2 times daily 20 capsule 0     buPROPion (WELLBUTRIN XL) 150 MG 24 hr tablet Take 1 tablet (150 mg) by mouth every morning 90 tablet 3     traZODone (DESYREL) 50 MG tablet Take 1 tablet (50 mg) by mouth At Bedtime 90 tablet 3     citalopram (CELEXA) 40 MG tablet Take 1 tablet (40 mg) by mouth daily 90 tablet 3     acetaminophen (TYLENOL) 325 MG tablet        ibuprofen (ADVIL/MOTRIN) 600 MG tablet Take 600 mg by mouth       order for DME Resmed Airsense 10 auto cpap 6-7 cm, Airfit P10 for her XS pillows       EPINEPHrine (EPIPEN/ADRENACLICK/OR ANY BX GENERIC EQUIV) 0.3 MG/0.3ML injection 2-pack Inject 0.3 mLs (0.3 mg) into the muscle as needed for anaphylaxis (Patient not taking: Reported on 1/18/2018) 0.6 mL 3    )  ( Diabetes Eval:    )    ( Pain Eval:  Data Unavailable )    ( Wound Eval:      "  )    (   History   Smoking Status     Never Smoker   Smokeless Tobacco     Never Used    )    ( Signed By:  Elizabeth Nicholson; January 18, 2018; 11:46 AM )

## 2018-01-18 NOTE — LETTER
"1/18/2018       RE: Sunshine Delgado  [address] [place] [id number]     Dear Colleague,    Thank you for referring your patient, Sunshine Delgado, to the [facility] SURGICAL WEIGHT MANAGEMENT at [facility]. Please see a copy of my visit note below.    Dear Dr. Jett,      Referring provider:       9/12/2017   Who referred you? Lesly Pacheco     I was asked to see the patient regarding obesity by the referring provider above.    I had the pleasure of meeting with your patient Sunshine Delgado recently in our weight loss surgery office.  This patient is a 52 year old year old female who has been undergoing our thorough preoperative screening process in anticipation of potential bariatric surgery.    At initial evaluation we recorded Sunshine Delgado's height was 5' 5\", weight was 272 lbs 0 oz, and calculated Body mass index was 45.26 kg/(m^2). The patient has been unsuccessful with other methods of permanent weight loss and suffers from multiple weight related medical conditions.  Due to lack of success in achieving weight loss through other methods, she is interested in undergoing bariatric surgery.    PREVIOUS WEIGHT LOSS ATTEMPTS:     9/12/2017   I have tried the following methods to lose weight Watching portions or calories, Exercise, Weight Watchers, Ashley Devi       CO-MORBIDITIES OF OBESITY INCLUDE:     9/12/2017   I have the following co-morbidities associated with obesity: Sleep Apnea, Weight Bearing Joint Pain   Do you use a CPAP? Yes       VITALS:  /71  Pulse 60  Temp 97.8  F (36.6  C) (Oral)  Ht 1.651 m (5' 5\")  Wt 123.4 kg (272 lb)  SpO2 95%  BMI 45.26 kg/m2    PE:  GENERAL: Alert and oriented x3. NAD  HEENT exam: Sclerae not icteric. Hearing good. Head normocephalic and atraumatic.   CARDIOVASCULAR: No JVD  RESPIRATORY: Breathing unlabored  GASTROINTESTINAL: Obese  LOWER EXTREMITIES: no deformities  MUSCULOSKELETAL: Normal gait, Moves all 4 " extremities equal and strong  NEUROLOGIC: no gross defect  SKIN: warm and dry to touch     In summary, she has undergone an appropriate medical evaluation, dietitian evaluation, as well as psychologic screening. The patient appears to be an appropriate candidate for bariatric surgery.    In the office today, I discussed the laparoscopic gastric sleeve surgery.  Risks, benefits and anticipated outcomes were outlined including the risk of death, staple line leak (1-2%), PE, DVT, ulcer, worsening GERD, N/V, stricture, hernia, wound infection, weight regain, and vitamin deficiencies. This patient has a good chance of sustaining permanent weight loss due to this procedure.  This should also allow improvement if not resolution of his/her weight related medical conditions.    At present we are going to present your patient's file for prior authorization to insurance.  Pending prior authorization, I anticipate a surgical date in the near future after she has achieved goal weight loss. We will keep you updated on any progress.  If you have questions regarding care please feel free to contact me.  Total time spent in the clinic was 30 minutes with greater than 50% in face-to-face consultation.    Sincerely,    Kameron Joseph MD  Surgery  [phone] (hospital )  [phone] (clinic nurses)

## 2018-01-24 ENCOUNTER — TELEPHONE (OUTPATIENT)
Dept: FAMILY MEDICINE | Facility: CLINIC | Age: 53
End: 2018-01-24

## 2018-01-24 NOTE — TELEPHONE ENCOUNTER
Reason for Call:  Other     Detailed comments: Patent called and would like to just make an appointment to check her weight, she is trying to get the gastric sleeve and was told by a provider at the [facility] to come to her clinic to get her weight checked and the clinic would be able to send them the information. Please call patient to discuss and to make an appointment if possible the patient has seen multiple providers her at [place]     Phone Number Patient can be reached at: Home number on file [phone] (home)    Best Time: ANY    Can we leave a detailed message on this number? YES    Call taken on 1/24/2018 at 11:43 AM by Ellen Callejas

## 2018-01-25 ENCOUNTER — CARE COORDINATION (OUTPATIENT)
Dept: SURGERY | Facility: CLINIC | Age: 53
End: 2018-01-25

## 2018-01-25 DIAGNOSIS — Z98.84 BARIATRIC SURGERY STATUS: Primary | ICD-10-CM

## 2018-01-25 DIAGNOSIS — Z01.818 PREOP EXAMINATION: ICD-10-CM

## 2018-01-25 NOTE — TELEPHONE ENCOUNTER
Called patient and left detailed message regarding below message. Scheduling number left for patient to call and set up nurse only visit for weight check.  Patsy EAGLE CMA ([facility])

## 2018-02-20 ENCOUNTER — ANESTHESIA EVENT (OUTPATIENT)
Dept: SURGERY | Facility: CLINIC | Age: 53
DRG: 621 | End: 2018-02-20
Payer: COMMERCIAL

## 2018-02-20 ENCOUNTER — OFFICE VISIT (OUTPATIENT)
Dept: SURGERY | Facility: CLINIC | Age: 53
End: 2018-02-20
Payer: COMMERCIAL

## 2018-02-20 ENCOUNTER — ALLIED HEALTH/NURSE VISIT (OUTPATIENT)
Dept: SURGERY | Facility: CLINIC | Age: 53
End: 2018-02-20
Payer: COMMERCIAL

## 2018-02-20 ENCOUNTER — APPOINTMENT (OUTPATIENT)
Dept: SURGERY | Facility: CLINIC | Age: 53
End: 2018-02-20
Payer: COMMERCIAL

## 2018-02-20 VITALS
HEIGHT: 65 IN | DIASTOLIC BLOOD PRESSURE: 76 MMHG | WEIGHT: 272.6 LBS | BODY MASS INDEX: 45.42 KG/M2 | OXYGEN SATURATION: 95 % | TEMPERATURE: 97.3 F | SYSTOLIC BLOOD PRESSURE: 116 MMHG | HEART RATE: 70 BPM | RESPIRATION RATE: 20 BRPM

## 2018-02-20 DIAGNOSIS — Z01.818 PRE-OP EXAM: ICD-10-CM

## 2018-02-20 DIAGNOSIS — E66.01 MORBID OBESITY (H): ICD-10-CM

## 2018-02-20 DIAGNOSIS — Z01.818 PREOP EXAMINATION: ICD-10-CM

## 2018-02-20 DIAGNOSIS — Z01.818 PREOP EXAMINATION: Primary | ICD-10-CM

## 2018-02-20 LAB
ALBUMIN UR-MCNC: NEGATIVE MG/DL
ANION GAP SERPL CALCULATED.3IONS-SCNC: 6 MMOL/L (ref 3–14)
APPEARANCE UR: CLEAR
BILIRUB UR QL STRIP: NEGATIVE
BUN SERPL-MCNC: 14 MG/DL (ref 7–30)
CALCIUM SERPL-MCNC: 9.1 MG/DL (ref 8.5–10.1)
CANNABINOIDS UR QL SCN: NEGATIVE
CHLORIDE SERPL-SCNC: 105 MMOL/L (ref 94–109)
CO2 SERPL-SCNC: 28 MMOL/L (ref 20–32)
COLOR UR AUTO: YELLOW
CREAT SERPL-MCNC: 1.1 MG/DL (ref 0.52–1.04)
ERYTHROCYTE [DISTWIDTH] IN BLOOD BY AUTOMATED COUNT: 15.8 % (ref 10–15)
GFR SERPL CREATININE-BSD FRML MDRD: 52 ML/MIN/1.7M2
GLUCOSE SERPL-MCNC: 86 MG/DL (ref 70–99)
GLUCOSE UR STRIP-MCNC: NEGATIVE MG/DL
HCT VFR BLD AUTO: 42.9 % (ref 35–47)
HGB BLD-MCNC: 13.8 G/DL (ref 11.7–15.7)
HGB UR QL STRIP: NEGATIVE
KETONES UR STRIP-MCNC: NEGATIVE MG/DL
LEUKOCYTE ESTERASE UR QL STRIP: NEGATIVE
MCH RBC QN AUTO: 29.4 PG (ref 26.5–33)
MCHC RBC AUTO-ENTMCNC: 32.2 G/DL (ref 31.5–36.5)
MCV RBC AUTO: 92 FL (ref 78–100)
NITRATE UR QL: NEGATIVE
PH UR STRIP: 5 PH (ref 5–7)
PLATELET # BLD AUTO: 288 10E9/L (ref 150–450)
POTASSIUM SERPL-SCNC: 4.2 MMOL/L (ref 3.4–5.3)
RBC # BLD AUTO: 4.69 10E12/L (ref 3.8–5.2)
SODIUM SERPL-SCNC: 140 MMOL/L (ref 133–144)
SOURCE: NORMAL
SP GR UR STRIP: 1.01 (ref 1–1.03)
UROBILINOGEN UR STRIP-MCNC: 0 MG/DL (ref 0–2)
WBC # BLD AUTO: 7.4 10E9/L (ref 4–11)

## 2018-02-20 RX ORDER — MULTIPLE VITAMINS W/ MINERALS TAB 9MG-400MCG
1 TAB ORAL EVERY MORNING
COMMUNITY
End: 2022-04-15

## 2018-02-20 ASSESSMENT — LIFESTYLE VARIABLES: TOBACCO_USE: 0

## 2018-02-20 NOTE — OR NURSING
PAC visit Date 2/20/2018   Patient Goal Weight 262  Actual Weight 272 lbs 9.6 oz  Met goal weight  No

## 2018-02-20 NOTE — PROGRESS NOTES
"  Bariatric Nutrition Consultation Note    Reason For Visit: Nutrition Reassessment    Sunshine Delgado is a 52 year-old presenting today for bariatric nutrition consult.  Pt is interested in laparoscopic sleeve gastrectomy (Dr. Joseph); anticipated surgery Dec 2017/Jan 2018.  Patient has completed all required nutrition visits prior to surgery. Pt referred by Any CARR) on 9/13/17.     ANTHROPOMETRICS:    Height as of an earlier encounter on 9/13/17: 1.651 m (5' 5\").    Weight as of an earlier encounter on 9/13/17: 123.4 kg (272 lb) with BMI of 45.26.    Current Weight (1/18/18): 272 lbs (+3.3 lbs over the past month, -0 lbs from initial weight)    Wt Readings from Last 1 Encounters:   02/20/18 123.7 kg (272 lb 9.6 oz)      Required weight loss goal pre-op: -10 lbs from initial consult weight (goal weight 262 lbs or less before surgery)    NUTRITION DIAGNOSIS:  Obesity r/t long history of self-monitoring deficit and excessive energy intake aeb BMI >30.- continues    INTERVENTION:  Intervention Provided/Education Provided on a modified liquid diet, encouraged two protein shakes per day with one meal an limited snacks for accelerated weight loss.  Patient reported she did not need a review of post op diet education.  Patient purchased 4 boxes of meal replacements from clinic.  Provided Pt with list of goals.  Patient Understanding: fair  Expected Compliance: fair    GOALS:  Pre-op goals:    Relating To Eating:  Eat slowly (20-30 minutes per meal), chewing foods well (25 chews per bite/applesauce consistency)    Follow the Modified Liquid Diet for weight loss:  Breakfast: Protein Shake  Lunch: Protein Shake -or- CHUYITA meal (protein/starch) + non-starchy vegetables   Supper: 3 oz lean protein + non-starchy vegetables -or- CHUYITA meal (protein/starch) + non-starchy vegetables   Snack: non-starchy vegetables (no calorie-containing dips/condiments)  Beverages: at least 48-64 oz water between meals daily    Relating to " beverages:  Separate fluids from meals by 30 minutes before, during, and after eating.   Continue to drink at least 64 oz between meals daily     Relating to dietary supplements:  Continue multivitamin, vitamin D, and hair/nail/skin supplement daily.    Relating to activity:  Increase activity as able (walking; pool) - daily walking using FitBit.     Relating to cravings:  Practice alternatives to emotional eating (making jewelry; painting rocks).      Post-op goals:  1. Follow the bariatric post-op diet advancement schedule:  - Bariatric clear liquid diet through post-op day 1 (while in the hospital).  - Bariatric low-fat full liquid diet on post-op days 2 through 13 (2 weeks).  2. Sip on 48-64 oz (or greater) fluids daily, recording intake to help stay on-track.  - Drink at least 2 oz of fluid every 30 min.    Follow-Up: PRN or 1 month     Time spent with patient: 15 minutes.    Pina Morrison RD, LD

## 2018-02-20 NOTE — MR AVS SNAPSHOT
After Visit Summary   2018    Sunshine Delgado    MRN: [id number]           Patient Information     Date Of Birth          1965        Visit Information        Provider Department      2018 12:00 PM Rn, [facility]        Care Instructions    Preparing for Your Surgery      Name:  Sunshine Delgado   MRN:  [id number]   :  1965   Today's Date:  2018     Arriving for surgery:  Surgery date:  3/13/18  Arrival time:  6:15 am    Please come to:    [facility], 3rd floor, [address]    [address] [place]  [address]    -   parking is available in front of the hospital from 5:15 am to 8:00 pm    -  Stop at the Information Desk in the lobby    -   Inform the information person that you are here for surgery. An escort to 3c will be provided. If you would not like an escort, please proceed to 3C on the 3rd floor. [phone]     What can I eat or drink?  - Follow bowel prep instructions from your surgeon's office.  Come in hydrated!    Which medicines can I take?    -  Hold Ibuprofen for one day before surgery.    -  Please take these medications the day of surgery:  ALL regular morning medications.          -  Do not bring your own medications to the hospital, except for inhalers and eye drops.    How do I prepare myself?  -  Take two showers: one the night before surgery; and one the morning of surgery.         Use Scrubcare or Hibiclens to wash from neck down.  You may use your own shampoo and conditioner. No other hair products.   -  Do NOT use lotion, powder, deodorant, or antiperspirant the day of your surgery.  -  Do NOT wear any makeup, fingernail polish or jewelry.  -  Bring your ID and insurance card.    AFTER YOUR SURGERY  Breathing exercises   Breathing exercises help you recover faster. Take deep breaths and let the air out slowly. This will:     Help you wake up after surgery.    Help prevent  complications like pneumonia.  Preventing complications will help you go home sooner.   We may give you a breathing device (incentive spirometer) to encourage you to breathe deeply.   Nausea and vomiting   You may feel sick to your stomach after surgery; if so, let your nurse know.    Pain control:  After surgery, you may have pain. Our goal is to help you manage your pain. Pain medicine will help you feel comfortable enough to do activities that will help you heal.  These activities may include breathing exercises, walking and physical therapy.   To help your health care team treat your pain we will ask: 1) If you have pain  2) where it is located 3) describe your pain in your words  Methods of pain control include medications given by mouth, vein or by nerve block for some surgeries.  We may give you a pain control pump that will:  1) Deliver the medicine through a tube placed in your vein  2) Control the amount of medicine you receive  3) Allow you to push a button to deliver a dose of pain medicine  Sequential Compression Device (SCD) or Pneumo Boots:  You may need to wear SCD S on your legs or feet. These are wraps connected to a machine that pumps in air and releases it. The repeated pumping helps prevent blood clots from forming.     Questions or Concerns:  If you have questions or concerns, please call the  Preoperative Assessment Center, Monday-Friday 7AM-7PM:  [phone]          Follow-ups after your visit        Your next 10 appointments already scheduled     Feb 20, 2018 12:00 PM CST   (Arrive by 11:45 AM)   PAC RN ASSESSMENT with Nano Pac Rn   [facility] ([facility])    [address] [place] [address]  [address] [facility] [id number] [phone]            Feb 20, 2018 12:20 PM CST   (Arrive by 12:05 PM)   PAC Anesthesia Consult with Nano Pac Anesthesiologist   [facility] Preoperative Assessment [place] ([facility])    [address] [place] [address]  [address] [address]   [phone]            Feb 20, 2018 12:30 PM CST   LAB with  LAB    [facility] ([facility])    [address] [place] [address]   [phone]           Please do not eat 10-12 hours before your appointment if you are coming in fasting for labs on lipids, cholesterol, or glucose (sugar). This does not apply to pregnant women. Water, hot tea and black coffee (with nothing added) are okay. Do not drink other fluids, diet soda or chew gum.            Mar 13, 2018   Procedure with Kameron Joseph MD   [facility], [place], Same Day Surgery (--)    [address] [facility] [address]   [phone]            Mar 22, 2018 10:20 AM CDT   (Arrive by 10:05 AM)   RETURN BARIATRIC SURGERY with Kameron Joseph MD   [facility] Surgical Weight Management ([facility])    [address] [place] [address]   [phone]            Mar 22, 2018 11:00 AM CDT   (Arrive by 10:45 AM)   NUTRITION VISIT with Sarah Alba RD   [facility] ([facility])    [address] [place] [address]   [phone]            Mar 22, 2018 12:15 PM CDT   (Arrive by 12:00 PM)   RETURN BARIATRIC SURGERY with Any Morris PA-C   [facility] ([facility])    [address] [place] [address]   [phone]            Mar 22, 2018 12:30 PM CDT   (Arrive by 12:15 PM)   NUTRITION VISIT with Sarah Alba RD   [facility] ([facility])    [address] [place] [address]   [phone]              Future tests that were ordered for you today     Open Future Orders        Priority Expected Expires Ordered    CBC with platelets Routine 2/20/2018 3/22/2018 2/20/2018    Basic  metabolic panel Routine 2/20/2018 3/22/2018 2/20/2018            Who to contact     Please call your clinic at [phone] to:    Ask questions about your health    Make or cancel appointments    Discuss your medicines    Learn about your test results    Speak to your doctor            Additional Information About Your Visit        MyChart Information     [employer] gives you secure access to your electronic health record. If you see a primary care provider, you can also send messages to your care team and make appointments. If you have questions, please call your primary care clinic.  If you do not have a primary care provider, please call [phone] and they will assist you.      [employer] is an electronic gateway that provides easy, online access to your medical records. With [employer], you can request a clinic appointment, read your test results, renew a prescription or communicate with your care team.     To access your existing account, please contact your [facility] or call [phone] for assistance.        Care EveryWhere ID     This is your Care EveryWhere ID. This could be used by other organizations to access your [place] medical records  [id number]         Blood Pressure from Last 3 Encounters:   02/20/18 116/76   01/18/18 115/71   12/30/17 129/73    Weight from Last 3 Encounters:   02/20/18 123.7 kg (272 lb 9.6 oz)   01/18/18 123.4 kg (272 lb)   01/18/18 123.4 kg (272 lb)              Today, you had the following     No orders found for display         Today's Medication Changes          These changes are accurate as of 2/20/18 11:54 AM.  If you have any questions, ask your nurse or doctor.               These medicines have changed or have updated prescriptions.        Dose/Directions    citalopram 40 MG tablet   Commonly known as:  celeXA   This may have changed:  when to take this   Used for:  Generalized anxiety disorder, Major depressive disorder, recurrent episode,  moderate (H)        Dose:  40 mg   Take 1 tablet (40 mg) by mouth daily   Quantity:  90 tablet   Refills:  3                Primary Care Provider Office Phone # Fax #    Shana Jett [phone] [address] [facility]  [place] [address]        Equal Access to Services     [facility] : Hadii aad ku hadasho Soomaali, waaxda luqadaha, qaybta kaalmada adeegyada, waxanny griselin mikaln adehannah eaton la'ingridtahir . So [facility] [phone].    ATENCIÓN: Si habla español, tiene a beard disposición servicios gratuitos de asistencia lingüística. [facility] [phone].    We comply with applicable federal civil rights laws and [place] laws. We do not discriminate on the basis of race, color, national origin, age, disability, sex, sexual orientation, or gender identity.            Thank you!     Thank you for choosing [facility]  for your care. Our goal is always to provide you with excellent care. Hearing back from our patients is one way we can continue to improve our services. Please take a few minutes to complete the written survey that you may receive in the mail after your visit with us. Thank you!             Your Updated Medication List - Protect others around you: Learn how to safely use, store and throw away your medicines at www.disposemymeds.org.          This list is accurate as of 2/20/18 11:54 AM.  Always use your most recent med list.                   Brand Name Dispense Instructions for use Diagnosis    buPROPion 150 MG 24 hr tablet    WELLBUTRIN XL    90 tablet    Take 1 tablet (150 mg) by mouth every morning    Generalized anxiety disorder, Major depressive disorder, recurrent episode, moderate (H)       citalopram 40 MG tablet    celeXA    90 tablet    Take 1 tablet (40 mg) by mouth daily    Generalized anxiety disorder, Major depressive disorder, recurrent episode, moderate (H)       EPINEPHrine 0.3 MG/0.3ML injection 2-pack    EPIPEN/ADRENACLICK/or ANY BX GENERIC  EQUIV    0.6 mL    Inject 0.3 mLs (0.3 mg) into the muscle as needed for anaphylaxis    Bee sting allergy       ibuprofen 600 MG tablet    ADVIL/MOTRIN     Take 600 mg by mouth as needed        * Multi-vitamin Tabs tablet      Take 1 tablet by mouth every morning        * HAIR VITAMINS PO      Take by mouth every morning        order for DME      Resmed Airsense 10 auto cpap 6-7 cm, Airfit P10 for her XS pillows        traZODone 50 MG tablet    DESYREL    90 tablet    Take 1 tablet (50 mg) by mouth At Bedtime    Insomnia due to other mental disorder       TYLENOL 325 MG tablet   Generic drug:  acetaminophen           VITAMIN C PO      Take by mouth every morning        VITAMIN D PO      Take by mouth every morning        * Notice:  This list has 2 medication(s) that are the same as other medications prescribed for you. Read the directions carefully, and ask your doctor or other care provider to review them with you.

## 2018-02-20 NOTE — PROGRESS NOTES
Gianni Gonsalez,    Your test results are attached.  All of your labs are okay for surgery.          Adelaide Cruz DNP, RN, ANP-C

## 2018-02-20 NOTE — PATIENT INSTRUCTIONS
Preparing for Your Surgery      Name:  Sunshine Delgado   MRN:  [id number]   :  1965   Today's Date:  2018     Arriving for surgery:  Surgery date:  3/13/18  Arrival time:  6:15 am    Please come to:    [facility], 3rd floor, [address]    [address] [place]  [address]    -   parking is available in front of the hospital from 5:15 am to 8:00 pm    -  Stop at the Information Desk in the lobby    -   Inform the information person that you are here for surgery. An escort to 3c will be provided. If you would not like an escort, please proceed to 3C on the 3rd floor. [phone]     What can I eat or drink?  - Follow bowel prep instructions from your surgeon's office.  Come in hydrated!    Which medicines can I take?    -  Hold Ibuprofen for one day before surgery.    -  Please take these medications the day of surgery:  ALL regular morning medications.          -  Do not bring your own medications to the hospital, except for inhalers and eye drops.    How do I prepare myself?  -  Take two showers: one the night before surgery; and one the morning of surgery.         Use Scrubcare or Hibiclens to wash from neck down.  You may use your own shampoo and conditioner. No other hair products.   -  Do NOT use lotion, powder, deodorant, or antiperspirant the day of your surgery.  -  Do NOT wear any makeup, fingernail polish or jewelry.  -  Bring your ID and insurance card.    AFTER YOUR SURGERY  Breathing exercises   Breathing exercises help you recover faster. Take deep breaths and let the air out slowly. This will:     Help you wake up after surgery.    Help prevent complications like pneumonia.  Preventing complications will help you go home sooner.   We may give you a breathing device (incentive spirometer) to encourage you to breathe deeply.   Nausea and vomiting   You may feel sick to your stomach after surgery; if so, let your nurse know.    Pain control:  After  surgery, you may have pain. Our goal is to help you manage your pain. Pain medicine will help you feel comfortable enough to do activities that will help you heal.  These activities may include breathing exercises, walking and physical therapy.   To help your health care team treat your pain we will ask: 1) If you have pain  2) where it is located 3) describe your pain in your words  Methods of pain control include medications given by mouth, vein or by nerve block for some surgeries.  We may give you a pain control pump that will:  1) Deliver the medicine through a tube placed in your vein  2) Control the amount of medicine you receive  3) Allow you to push a button to deliver a dose of pain medicine  Sequential Compression Device (SCD) or Pneumo Boots:  You may need to wear SCD S on your legs or feet. These are wraps connected to a machine that pumps in air and releases it. The repeated pumping helps prevent blood clots from forming.     Questions or Concerns:  If you have questions or concerns, please call the  Preoperative Assessment Center, Monday-Friday 7AM-7PM:  [phone]

## 2018-02-20 NOTE — ANESTHESIA PREPROCEDURE EVALUATION
Anesthesia Evaluation     . Pt has had prior anesthetic. Type: General    No history of anesthetic complications          ROS/MED HX    ENT/Pulmonary:     (+)sleep apnea, uses CPAP unknown cmH2O , . .   (-) tobacco use   Neurologic:  - neg neurologic ROS     Cardiovascular:  - neg cardiovascular ROS   (+) ----. : . . . :. . Previous cardiac testing date:results:date: results:ECG reviewed date:2016 results:Sinus Rhythm   -Poor R-wave progression -nonspecific -consider old anterior infarct. date: results:          METS/Exercise Tolerance:  >4 METS   Hematologic:  - neg hematologic  ROS       Musculoskeletal:   (+) arthritis (bilateral hips), , , -       GI/Hepatic:  - neg GI/hepatic ROS       Renal/Genitourinary:     (+) chronic renal disease, type: CRI,       Endo:     (+) Obesity, .      Psychiatric:     (+) psychiatric history anxiety, depression and other (comment) (insomnia)      Infectious Disease:  - neg infectious disease ROS       Malignancy:      - no malignancy   Other:    (+) No chance of pregnancy no H/O Chronic Pain,                   Physical Exam  Normal systems: cardiovascular, pulmonary and dental    Airway   Mallampati: III  TM distance: >3 FB  Neck ROM: full    Dental     Cardiovascular   Rhythm and rate: regular and normal      Pulmonary    breath sounds clear to auscultation               PAC Discussion and Assessment    ASA Classification: 3  Case is suitable for: [facility] [place]  Anesthetic techniques and relevant risks discussed: GA  Invasive monitoring and risk discussed:   Types:   Possibility and Risk of blood transfusion discussed:   NPO instructions given:   Additional anesthetic preparation and risks discussed:   Needs early admission to pre-op area:   Other:     PAC Resident/NP Anesthesia Assessment:  Sunshine Delgado is a 52 year old female scheduled for a laparoscopic gastric sleeve on 3/13/2018 by Dr. Joseph in treatment of morbid obesity.  PAC referral for risk assessment and optimization  for anesthesia with comorbid conditions of:  sleep apnea, bilateral hip OA, depression, anxiety and insomnia.       Pre-operative considerations:  1.  Cardiac:  Functional status- METS >4.  She reports that she is walking 1 mile 2-3x per week for exercise.  She denies any exertional dyspnea with that.  She has no known cardiac history.  Intermediate risk surgery with 0.4% risk of major adverse cardiac event.   2.  Pulm:  Airway feasible.  She has known sleep apnea and does use CPAP as directed.  She is morbidly obese with a BMI >40.  3.  GI:  Risk of PONV score = 3.  If > 2, anti-emetic intervention recommended.  4. Musculoskeletal:  She has bilateral hip OA - consider cautious positioning.   5. Other:  She is currently 10lbs over her surgery goal weight.  The bariatric service has been notified and instructed her on a liquid diet to promote getting to goal weight in time for surgery.      VTE risk: 0.5%    Patient is optimized and is acceptable candidate for the proposed procedure.  No further diagnostic evaluation is needed.     Patient discussed with Dr. Garcia.    For further details of assessment, testing, and physical exam please see H and P completed on same date.          Adelaide Cruz DNP, RN, APRN        Mid-Level Provider/Resident: Adelaide Cruz DNP, RN, APRN  Date: 2/20/2018  Time: 1214    Attending Anesthesiologist Anesthesia Assessment:  52 year old for lap gastric sleeve in management of morbid obesity. Patient has no significant cardiac or pulmonary disease. Patient/case discussed with DORIE. No need to see patient. Patient is appropriate for the planned procedure without further work-up or medical management.      Reviewed and Signed by PAC Anesthesiologist  Anesthesiologist: naren  Date: 2/20/2018  Time:   Pass/Fail: Pass  Disposition:     PAC Pharmacist Assessment:        Pharmacist:   Date:   Time:      Anesthesia Plan      History & Physical Review  History and physical reviewed and following  examination; no interval change.    ASA Status:  3 .    NPO Status:  > 8 hours    Plan for General and ETT with Intravenous and Propofol induction. Maintenance will be Inhalation and Balanced.    PONV prophylaxis:  Ondansetron (or other 5HT-3) and Dexamethasone or Solumedrol       Postoperative Care  Postoperative pain management:  IV analgesics, Oral pain medications and Multi-modal analgesia.      Consents  Anesthetic plan, risks, benefits and alternatives discussed with:  Patient..          Procedure: Procedure(s):  Laparoscopic Sleeve Gastrectomy - Wound Class:     HPI: 53 year old female with morbid obesity who requires anesthesia for Procedure(s):  Laparoscopic Sleeve Gastrectomy - Wound Class: .     PMH/PSH:  Past Medical History:   Diagnosis Date     Bee sting allergy      Depressive disorder      Generalized anxiety disorder 10/15/2009    lexapro made things worse.       Morbid obesity (H)      MARIA GUADALUPE (obstructive sleep apnea)- severe (AHI 84) 09/09/2011     Voice fatigue 10/22/2009       Past Surgical History:   Procedure Laterality Date     HYSTERECTOMY, PAP NO LONGER INDICATED       HYSTERECTOMY, VAGINAL  2007    still has ovaries         No current facility-administered medications on file prior to encounter.   Current Outpatient Prescriptions on File Prior to Encounter:  buPROPion (WELLBUTRIN XL) 150 MG 24 hr tablet Take 1 tablet (150 mg) by mouth every morning   traZODone (DESYREL) 50 MG tablet Take 1 tablet (50 mg) by mouth At Bedtime   EPINEPHrine (EPIPEN/ADRENACLICK/OR ANY BX GENERIC EQUIV) 0.3 MG/0.3ML injection 2-pack Inject 0.3 mLs (0.3 mg) into the muscle as needed for anaphylaxis (Patient not taking: Reported on 1/18/2018)   citalopram (CELEXA) 40 MG tablet Take 1 tablet (40 mg) by mouth daily (Patient taking differently: Take 40 mg by mouth At Bedtime )   acetaminophen (TYLENOL) 325 MG tablet    ibuprofen (ADVIL/MOTRIN) 600 MG tablet Take 600 mg by mouth as needed    order for DME Resmed  Airsense 10 auto cpap 6-7 cm, Airfit P10 for her XS pillows       SH:   Social History   Substance Use Topics     Smoking status: Never Smoker     Smokeless tobacco: Never Used     Alcohol use Yes      Comment: 1-2 per mo       Allergies:   Allergies   Allergen Reactions     Contrast Dye Other (See Comments)     Patient had sneezing and an itchy throat following contrast injection of 100 mL Isovue-370.     Sulfa Drugs Hives     Vicodin [Hydrocodone-Acetaminophen]      Wasps [Hornets] Swelling     Now carries Epi Pen       NPO Status: Per ASA Guidelines    Labs:    Blood Bank:  No results found for: ABO, RH, AS  BMP:  Recent Labs   Lab Test  02/20/18   1257   NA  140   POTASSIUM  4.2   CHLORIDE  105   CO2  28   BUN  14   CR  1.10*   GLC  86   CONCHA  9.1     CBC:   Recent Labs   Lab Test  02/20/18   1257   WBC  7.4   RBC  4.69   HGB  13.8   HCT  42.9   MCV  92   MCH  29.4   MCHC  32.2   RDW  15.8*   PLT  288     Coags:  Recent Labs   Lab Test 02/27/17   INR  0.9*     To be discussed with staff.   - ASA 3  - GETA with standard ASA monitors, IV induction, balanced anesthetic  - PIV  - Preoperative tylenol and gabapentin for postoperative pain control  - Antibiotics per surgery  - PONV prophylaxis    Naresh Galdamez DO  [facility]   Department of Anesthesiology  P: [phone]            Comments:     I have personally seen and evaluated the patient.  The patient is healthy from a cardiopulmonary standpoint.  The patient has history of MARIA GUADALUPE and uses CPAP.  The patient had uneventful general anesthesia in the past.     Plan today is for general anesthesia with ETT, maintenance with inhalation anesthetics.  PONV prophylaxis with Ondansetron, and Decadron.  I would place two large bore peripheral IVs.Post operative pain management with multimodal analgesia.  Plan discussed with patient who expressed understanding and desire to proceed.     Oskar Tyler MD, PhD        .

## 2018-02-20 NOTE — H&P
Pre-Operative H & P     CC:  Preoperative exam to assess for increased cardiopulmonary risk while undergoing surgery and anesthesia.    Date of Encounter: 2/20/2018  Primary Care Physician:  Shana Jett  Associated diagnosis:  Morbid obesity    HPI  Sunshine Delgado is a 52 year old female who presents for pre-operative H & P in preparation for a laparoscopic gastric sleeve with Dr. Joseph on 3/13/2018 at [facility].     Sunshine Delgado is a 52 year old female with sleep apnea, bilateral hip OA, depression, anxiety and insomnia that is morbidly obese.  She has sought surgical consultation for her morbid obesity as she is hoping to avoid having some of the chronic medical complications that her mother had due to her obesity.  The above listed surgery has been recommended for treatment.      History is obtained from the patient.     Past Medical History  Past Medical History:   Diagnosis Date     Bee sting allergy      Depressive disorder      Generalized anxiety disorder 10/15/2009    lexapro made things worse.       Morbid obesity (H)      MARIA GUADALUPE (obstructive sleep apnea)- severe (AHI 84) 09/09/2011     Voice fatigue 10/22/2009       Past Surgical History  Past Surgical History:   Procedure Laterality Date     HYSTERECTOMY, PAP NO LONGER INDICATED       HYSTERECTOMY, VAGINAL  2007    still has ovaries       Hx of Blood transfusions/reactions: none     Hx of abnormal bleeding or anti-platelet use: none    Menstrual history: No LMP recorded. Patient has had a hysterectomy.:     Steroid use in the last year: none    Personal or FH with difficulty with Anesthesia:  none    Prior to Admission Medications  Current Outpatient Prescriptions   Medication Sig Dispense Refill     multivitamin, therapeutic with minerals (MULTI-VITAMIN) TABS tablet Take 1 tablet by mouth every morning       Ascorbic Acid (VITAMIN C PO) Take by mouth every morning       Cholecalciferol (VITAMIN D PO) Take  "by mouth every morning       Multiple Vitamins-Minerals (HAIR VITAMINS PO) Take by mouth every morning       buPROPion (WELLBUTRIN XL) 150 MG 24 hr tablet Take 1 tablet (150 mg) by mouth every morning 90 tablet 3     traZODone (DESYREL) 50 MG tablet Take 1 tablet (50 mg) by mouth At Bedtime 90 tablet 3     citalopram (CELEXA) 40 MG tablet Take 1 tablet (40 mg) by mouth daily (Patient taking differently: Take 40 mg by mouth At Bedtime ) 90 tablet 3     acetaminophen (TYLENOL) 325 MG tablet        ibuprofen (ADVIL/MOTRIN) 600 MG tablet Take 600 mg by mouth as needed        order for DME Resmed Airsense 10 auto cpap 6-7 cm, Airfit P10 for her XS pillows       EPINEPHrine (EPIPEN/ADRENACLICK/OR ANY BX GENERIC EQUIV) 0.3 MG/0.3ML injection 2-pack Inject 0.3 mLs (0.3 mg) into the muscle as needed for anaphylaxis (Patient not taking: Reported on 2018) 0.6 mL 3       Allergies  Allergies   Allergen Reactions     Contrast Dye Other (See Comments)     Patient had sneezing and an itchy throat following contrast injection of 100 mL Isovue-370.     Sulfa Drugs Hives     Vicodin [Hydrocodone-Acetaminophen]      Wasps [Hornets] Swelling     Now carries Epi Pen       Social History  Social History     Social History     Marital status:      Spouse name: N/A     Number of children: 1     Years of education: N/A     Occupational History           [employer]     Social History Main Topics     Smoking status: Never Smoker     Smokeless tobacco: Never Used     Alcohol use Yes      Comment: 1-2 per mo     Drug use: No     Sexual activity: Yes     Partners: Male     Birth control/ protection: Female Surgical     Other Topics Concern     Parent/Sibling W/ Cabg, Mi Or Angioplasty Before 65f 55m? No     Social History Narrative    ,  had glioblastoma,      [employer]    Started the \"[employer]\"        Reggie, born " "1998, son. Has aspergers                   Family History  Family History   Problem Relation Age of Onset     Eye Disorder Mother      lost eyesight; probable macular degeneration     Psychotic Disorder Mother      Dementia /Alzhimers     Neurologic Disorder Mother      seizures     DIABETES Mother      Hypertension Mother      Alzheimer Disease Mother      Arthritis Mother      OSTEOPOROSIS Mother      Obesity Mother      DIABETES Father      Depression Father      Hyperlipidemia Father      Obesity Father      Psychotic Disorder Sister      bipolar     Thyroid Disease Sister      h/o thyroid cancer     Depression Sister      Bipolar     Obesity Sister      CANCER Other      Brain cancer     OSTEOPOROSIS Maternal Grandmother      Anxiety Disorder Son                  ROS/MED HX  The complete review of systems is negative other than noted in the HPI or here.     ENT/Pulmonary:     (+)sleep apnea, uses CPAP unknown cmH2O , . .   (-) tobacco use   Neurologic:  - neg neurologic ROS     Cardiovascular:  - neg cardiovascular ROS            METS/Exercise Tolerance:  >4 METS   Hematologic:  - neg hematologic  ROS       Musculoskeletal:   (+) arthritis (bilateral hips), , , -       GI/Hepatic:  - neg GI/hepatic ROS       Renal/Genitourinary:  - ROS Renal section negative       Endo:     (+) Obesity, .      Psychiatric:     (+) psychiatric history anxiety, depression and other (comment) (insomnia)      Infectious Disease:  - neg infectious disease ROS       Malignancy:      - no malignancy   Other:    (+) No chance of pregnancy no H/O Chronic Pain,                 Temp: 97.3  F (36.3  C) Temp src: Oral BP: 116/76 Pulse: 70   Resp: 20 SpO2: 95 %         272 lbs 9.6 oz  5' 5\"   Body mass index is 45.36 kg/(m^2).       Physical Exam  Constitutional: Awake, alert, cooperative, no apparent distress, and appears stated age. Morbidly obese  Eyes: Pupils equal, round and reactive to light, extra ocular muscles intact, sclera clear, " conjunctiva normal.  HENT: Normocephalic, oral pharynx with moist mucus membranes, good dentition. No goiter appreciated.   Respiratory: Clear to auscultation bilaterally, no crackles or wheezing.  Cardiovascular: Regular rate and rhythm, normal S1 and S2, and no murmur noted.  Carotids +2, no bruits. No edema. Palpable pulses to radial  DP and PT arteries.   GI: Normal bowel sounds, soft, non-distended, non-tender, no masses palpated, no hepatosplenomegaly.   Lymph/Hematologic: No cervical lymphadenopathy and no supraclavicular lymphadenopathy.  Genitourinary:  deferred  Skin: Warm and dry.  No rashes at anticipated surgical site.   Musculoskeletal: Full ROM of neck. There is no redness, warmth, or swelling of the joints. Gross motor strength is normal.    Neurologic: Awake, alert, oriented to name, place and time. Cranial nerves II-XII are grossly intact. Gait is normal.   Neuropsychiatric: Calm, cooperative. Normal affect.     Labs: (personally reviewed)  Component      Latest Ref Rng & Units 2/20/2018   Color Urine       Yellow   Appearance Urine       Clear   Glucose Urine      NEG:Negative mg/dL Negative   Bilirubin Urine      NEG:Negative Negative   Ketones Urine      NEG:Negative mg/dL Negative   Specific Gravity Urine      1.003 - 1.035 1.015   Blood Urine      NEG:Negative Negative   pH Urine      5.0 - 7.0 pH 5.0   Protein Albumin Urine      NEG:Negative mg/dL Negative   Urobilinogen mg/dL      0.0 - 2.0 mg/dL 0.0   Nitrite Urine      NEG:Negative Negative   Leukocyte Esterase Urine      NEG:Negative Negative   Source       Midstream Urine   Sodium      133 - 144 mmol/L 140   Potassium      3.4 - 5.3 mmol/L 4.2   Chloride      94 - 109 mmol/L 105   Carbon Dioxide      20 - 32 mmol/L 28   Anion Gap      3 - 14 mmol/L 6   Glucose      70 - 99 mg/dL 86   Urea Nitrogen      7 - 30 mg/dL 14   Creatinine      0.52 - 1.04 mg/dL 1.10 (H)   GFR Estimate      >60 mL/min/1.7m2 52 (L)   GFR Estimate If Black      >60  mL/min/1.7m2 63   Calcium      8.5 - 10.1 mg/dL 9.1   WBC      4.0 - 11.0 10e9/L 7.4   RBC Count      3.8 - 5.2 10e12/L 4.69   Hemoglobin      11.7 - 15.7 g/dL 13.8   Hematocrit      35.0 - 47.0 % 42.9   MCV      78 - 100 fl 92   MCH      26.5 - 33.0 pg 29.4   MCHC      31.5 - 36.5 g/dL 32.2   RDW      10.0 - 15.0 % 15.8 (H)   Platelet Count      150 - 450 10e9/L 288   Cannabinoids Qual Urine      NEG:Negative Negative       EK  Sinus  Rhythm   -Poor R-wave progression -nonspecific -consider old anterior infarct.      ASSESSMENT and PLAN  Sunshine Delgado is a 52 year old female scheduled for a laparoscopic gastric sleeve on 3/13/2018 by Dr. Joseph in treatment of morbid obesity.  PAC referral for risk assessment and optimization for anesthesia with comorbid conditions of: sleep apnea, bilateral hip OA, depression, anxiety and insomnia.       Pre-operative considerations:  1.  Cardiac:  Functional status- METS >4.  She reports that she is walking 1 mile 2-3x per week for exercise.  She denies any exertional dyspnea with that.  She has no known cardiac history.  Intermediate risk surgery with 0.4% risk of major adverse cardiac event.   2.  Pulm:  Airway feasible.  She has known sleep apnea and does use CPAP as directed.  She is morbidly obese with a BMI >40.  3.  GI:  Risk of PONV score = 3.  If > 2, anti-emetic intervention recommended.  4. Musculoskeletal:  She has bilateral hip OA - consider cautious positioning.   5. Other:  She is currently 10lbs over her surgery goal weight.  The bariatric service has been notified and instructed her on a liquid diet to promote getting to goal weight in time for surgery.      VTE risk: 0.5%    Patient is optimized and is acceptable candidate for the proposed procedure.  No further diagnostic evaluation is needed.     Patient discussed with Dr. Garcia.              Adelaide Cruz, DNP, RN, APRN  Preoperative Assessment Center  [facility]  Clinic and Surgery  [place]  Phone: [phone]  Fax: [phone]

## 2018-02-27 ENCOUNTER — ALLIED HEALTH/NURSE VISIT (OUTPATIENT)
Dept: SURGERY | Facility: CLINIC | Age: 53
End: 2018-02-27
Payer: COMMERCIAL

## 2018-02-27 VITALS — WEIGHT: 268.5 LBS | BODY MASS INDEX: 44.68 KG/M2

## 2018-02-27 DIAGNOSIS — E66.01 MORBID OBESITY (H): Primary | ICD-10-CM

## 2018-02-27 NOTE — MR AVS SNAPSHOT
After Visit Summary   2/27/2018    Sunshine Delgado    MRN: [id number]           Patient Information     Date Of Birth          1965        Visit Information        Provider Department      2/27/2018 10:30 AM Nurse,  Surgery General Surgery        Today's Diagnoses     Morbid obesity (H)    -  1       Follow-ups after your visit        Your next 10 appointments already scheduled     Mar 06, 2018 10:30 AM CST   Nurse Visit with  Surgery Nurse   General Surgery ([facility])    [address] [place] [id number] [phone]            Mar 13, 2018   Procedure with Kameron Joseph MD   [facility], [place], Same Day Surgery (--)    [address] [facility] [address] [phone]            Mar 22, 2018 10:20 AM CDT   (Arrive by 10:05 AM)   RETURN BARIATRIC SURGERY with Kameron Joseph MD   [facility] Surgical [facility] Management ([facility])    [address] [place] [id number] [phone]            Mar 22, 2018 11:00 AM CDT   (Arrive by 10:45 AM)   NUTRITION VISIT with Sarah Alba RD   [facility] Surgical [facility] Management ([facility])    [address] [id number] [phone]            Mar 22, 2018 12:15 PM CDT   (Arrive by 12:00 PM)   RETURN BARIATRIC SURGERY with Any Morris PA-C   [facility] ([facility])    [address] [place] [id number] [phone]            Mar 22, 2018 12:30 PM CDT   (Arrive by 12:15 PM)   NUTRITION VISIT with Sarah Alba RD   [facility] Surgical [facility] Management ([facility])    [address] [id number] [phone]              Who to contact     Please call your clinic at [phone] to:    Ask questions about your health    Make or cancel  appointments    Discuss your medicines    Learn about your test results    Speak to your doctor            Additional Information About Your Visit        Movityhart Information     LearnSomething gives you secure access to your electronic health record. If you see a primary care provider, you can also send messages to your care team and make appointments. If you have questions, please call your primary care clinic.  If you do not have a primary care provider, please call [phone] and they will assist you.      LearnSomething is an electronic gateway that provides easy, online access to your medical records. With LearnSomething, you can request a clinic appointment, read your test results, renew a prescription or communicate with your care team.     To access your existing account, please contact your [facility] or call [phone] for assistance.        Care EveryWhere ID     This is your Care EveryWhere ID. This could be used by other organizations to access your [place] medical records  [id number]        Your Vitals Were     BMI (Body Mass Index)                   44.68 kg/m2            Blood Pressure from Last 3 Encounters:   02/20/18 116/76   01/18/18 115/71   12/30/17 129/73    Weight from Last 3 Encounters:   02/27/18 268 lb 8 oz   02/20/18 272 lb 9.6 oz   01/18/18 272 lb              Today, you had the following     No orders found for display         Today's Medication Changes          These changes are accurate as of 2/27/18 10:52 AM.  If you have any questions, ask your nurse or doctor.               These medicines have changed or have updated prescriptions.        Dose/Directions    citalopram 40 MG tablet   Commonly known as:  celeXA   This may have changed:  when to take this   Used for:  Generalized anxiety disorder, Major depressive disorder, recurrent episode, moderate (H)        Dose:  40 mg   Take 1 tablet (40 mg) by mouth daily   Quantity:  90 tablet   Refills:  3                 Primary Care Provider Office Phone # Fax #    Shana Jett [phone] [address] [facility] [address]        Equal Access to Services     LUCA RICHSOFIE : Hadii aad ku maxxo Soronnali, waaxda luqadaha, qaybta kaalmada adewinsome, mary kelleytahir breanne. So [facility] [phone].    ATENCIÓN: Si habla español, tiene a beard disposición servicios gratuitos de asistencia lingüística. Llame al [phone].    We comply with applicable federal civil rights laws and [place] laws. We do not discriminate on the basis of race, color, national origin, age, disability, sex, sexual orientation, or gender identity.            Thank you!     Thank you for choosing GENERAL SURGERY  for your care. Our goal is always to provide you with excellent care. Hearing back from our patients is one way we can continue to improve our services. Please take a few minutes to complete the written survey that you may receive in the mail after your visit with us. Thank you!             Your Updated Medication List - Protect others around you: Learn how to safely use, store and throw away your medicines at www.disposemymeds.org.          This list is accurate as of 2/27/18 10:52 AM.  Always use your most recent med list.                   Brand Name Dispense Instructions for use Diagnosis    buPROPion 150 MG 24 hr tablet    WELLBUTRIN XL    90 tablet    Take 1 tablet (150 mg) by mouth every morning    Generalized anxiety disorder, Major depressive disorder, recurrent episode, moderate (H)       citalopram 40 MG tablet    celeXA    90 tablet    Take 1 tablet (40 mg) by mouth daily    Generalized anxiety disorder, Major depressive disorder, recurrent episode, moderate (H)       EPINEPHrine 0.3 MG/0.3ML injection 2-pack    EPIPEN/ADRENACLICK/or ANY BX GENERIC EQUIV    0.6 mL    Inject 0.3 mLs (0.3 mg) into the muscle as needed for anaphylaxis    Bee sting allergy       ibuprofen 600 MG tablet     ADVIL/MOTRIN     Take 600 mg by mouth as needed        * Multi-vitamin Tabs tablet      Take 1 tablet by mouth every morning        * HAIR VITAMINS PO      Take by mouth every morning        order for DME      Resmed Airsense 10 auto cpap 6-7 cm, Airfit P10 for her XS pillows        traZODone 50 MG tablet    DESYREL    90 tablet    Take 1 tablet (50 mg) by mouth At Bedtime    Insomnia due to other mental disorder       TYLENOL 325 MG tablet   Generic drug:  acetaminophen           VITAMIN C PO      Take by mouth every morning        VITAMIN D PO      Take by mouth every morning        * Notice:  This list has 2 medication(s) that are the same as other medications prescribed for you. Read the directions carefully, and ask your doctor or other care provider to review them with you.

## 2018-02-27 NOTE — NURSING NOTE
Patient presented for weight check - her goal weight is 262, she weighed in today at 268 and will weigh again next Tuesday 3/6

## 2018-03-06 ENCOUNTER — ALLIED HEALTH/NURSE VISIT (OUTPATIENT)
Dept: SURGERY | Facility: CLINIC | Age: 53
End: 2018-03-06
Payer: COMMERCIAL

## 2018-03-06 DIAGNOSIS — E66.01 MORBID OBESITY (H): Primary | ICD-10-CM

## 2018-03-06 NOTE — MR AVS SNAPSHOT
After Visit Summary   3/6/2018    Sunshine Delgado    MRN: [id number]           Patient Information     Date Of Birth          1965        Visit Information        Provider Department      3/6/2018 10:30 AM Nurse,  Surgery General Surgery        Today's Diagnoses     Morbid obesity (H)    -  1       Follow-ups after your visit        Your next 10 appointments already scheduled     Mar 09, 2018  9:30 AM CST   Nurse Visit with  Surgery Nurse   General Surgery ([facility])    [address] [place] [id number] [phone]            Mar 13, 2018   Procedure with Kameron Joseph MD   [facility], [place], Same Day Surgery (--)    [address] [facility] [address] [phone]            Mar 22, 2018 10:20 AM CDT   (Arrive by 10:05 AM)   RETURN BARIATRIC SURGERY with Kameron Joseph MD   [facility] Surgical [facility] Management ([facility])    [address] [place] [id number] [phone]            Mar 22, 2018 11:00 AM CDT   (Arrive by 10:45 AM)   NUTRITION VISIT with Sarah Alba RD   [facility] Surgical [facility] ([facility])    [address] [phone] [phone]            Mar 22, 2018 12:15 PM CDT   (Arrive by 12:00 PM)   RETURN BARIATRIC SURGERY with Any Morris PA-C   [facility] ([facility])    [address] [place] [phone] [phone]            Mar 22, 2018 12:30 PM CDT   (Arrive by 12:15 PM)   NUTRITION VISIT with Sarah Alba RD   [facility] Surgical [facility] Management ([facility])    [address] [phone] [phone]              Who to contact     Please call your clinic at [phone] to:    Ask questions about your health    Make or cancel  appointments    Discuss your medicines    Learn about your test results    Speak to your doctor            Additional Information About Your Visit        Plum Districthart Information     Impermium gives you secure access to your electronic health record. If you see a primary care provider, you can also send messages to your care team and make appointments. If you have questions, please call your primary care clinic.  If you do not have a primary care provider, please call [phone] and they will assist you.      Impermium is an electronic gateway that provides easy, online access to your medical records. With Impermium, you can request a clinic appointment, read your test results, renew a prescription or communicate with your care team.     To access your existing account, please contact your [facility] or call [phone] for assistance.        Care EveryWhere ID     This is your Care EveryWhere ID. This could be used by other organizations to access your [place] medical records  [id number]         Blood Pressure from Last 3 Encounters:   02/20/18 116/76   01/18/18 115/71   12/30/17 129/73    Weight from Last 3 Encounters:   02/27/18 268 lb 8 oz   02/20/18 272 lb 9.6 oz   01/18/18 272 lb              Today, you had the following     No orders found for display         Today's Medication Changes          These changes are accurate as of 3/6/18 10:54 AM.  If you have any questions, ask your nurse or doctor.               These medicines have changed or have updated prescriptions.        Dose/Directions    citalopram 40 MG tablet   Commonly known as:  celeXA   This may have changed:  when to take this   Used for:  Generalized anxiety disorder, Major depressive disorder, recurrent episode, moderate (H)        Dose:  40 mg   Take 1 tablet (40 mg) by mouth daily   Quantity:  90 tablet   Refills:  3                Primary Care Provider Office Phone # Fax #    Shana Jett [phone]  [phone]       [address] [facility] [place] [address]        Equal Access to Services     LAMONTVASILE ARIANNA : Hadii aad wally rowena Sovadim, wadomda luqadaha, qaybta kaalmada quinn, mary griselin hayaatahir vasqueshannah eaton lasavagetahir raymundo. So [facility] [phone].    ATENCIÓN: Si habla español, tiene a beard disposición servicios gratuitos de asistencia lingüística. Llame al [phone].    We comply with applicable federal civil rights laws and [place] laws. We do not discriminate on the basis of race, color, national origin, age, disability, sex, sexual orientation, or gender identity.            Thank you!     Thank you for choosing GENERAL SURGERY  for your care. Our goal is always to provide you with excellent care. Hearing back from our patients is one way we can continue to improve our services. Please take a few minutes to complete the written survey that you may receive in the mail after your visit with us. Thank you!             Your Updated Medication List - Protect others around you: Learn how to safely use, store and throw away your medicines at www.disposemymeds.org.          This list is accurate as of 3/6/18 10:54 AM.  Always use your most recent med list.                   Brand Name Dispense Instructions for use Diagnosis    buPROPion 150 MG 24 hr tablet    WELLBUTRIN XL    90 tablet    Take 1 tablet (150 mg) by mouth every morning    Generalized anxiety disorder, Major depressive disorder, recurrent episode, moderate (H)       citalopram 40 MG tablet    celeXA    90 tablet    Take 1 tablet (40 mg) by mouth daily    Generalized anxiety disorder, Major depressive disorder, recurrent episode, moderate (H)       EPINEPHrine 0.3 MG/0.3ML injection 2-pack    EPIPEN/ADRENACLICK/or ANY BX GENERIC EQUIV    0.6 mL    Inject 0.3 mLs (0.3 mg) into the muscle as needed for anaphylaxis    Bee sting allergy       ibuprofen 600 MG tablet    ADVIL/MOTRIN     Take 600 mg by mouth as needed        * Multi-vitamin Tabs tablet       Take 1 tablet by mouth every morning        * HAIR VITAMINS PO      Take by mouth every morning        order for DME      Resmed Airsense 10 auto cpap 6-7 cm, Airfit P10 for her XS pillows        traZODone 50 MG tablet    DESYREL    90 tablet    Take 1 tablet (50 mg) by mouth At Bedtime    Insomnia due to other mental disorder       TYLENOL 325 MG tablet   Generic drug:  acetaminophen           VITAMIN C PO      Take by mouth every morning        VITAMIN D PO      Take by mouth every morning        * Notice:  This list has 2 medication(s) that are the same as other medications prescribed for you. Read the directions carefully, and ask your doctor or other care provider to review them with you.

## 2018-03-06 NOTE — NURSING NOTE
Patient came in for weight check. Was given strict diet to continue to follow. She was 268 and her goal is 262. She will come back Friday for weight check and if she is not within 2 lbs of goal she will need to be rescheduled.

## 2018-03-09 ENCOUNTER — ALLIED HEALTH/NURSE VISIT (OUTPATIENT)
Dept: SURGERY | Facility: CLINIC | Age: 53
End: 2018-03-09
Payer: COMMERCIAL

## 2018-03-09 DIAGNOSIS — E66.01 MORBID OBESITY (H): Primary | ICD-10-CM

## 2018-03-09 NOTE — NURSING NOTE
patient had weight check and was at 264 (262 goal) so she was within 2 lbs so she will have surgery on Tuesday.

## 2018-03-09 NOTE — MR AVS SNAPSHOT
After Visit Summary   3/9/2018    Sunshine Delgado    MRN: [id number]           Patient Information     Date Of Birth          1965        Visit Information        Provider Department      3/9/2018 9:30 AM Nurse,  Surgery General Surgery        Today's Diagnoses     Morbid obesity (H)    -  1       Follow-ups after your visit        Your next 10 appointments already scheduled     Mar 09, 2018  9:30 AM CST   Nurse Visit with  Surgery Nurse   General Surgery ([facility])    [address] [place] [phone]            Mar 13, 2018   Procedure with Kameron Joseph MD   [facility], [place], Same Day Surgery (--)    [address] [facility] [address] [phone]            Mar 22, 2018 10:20 AM CDT   (Arrive by 10:05 AM)   RETURN BARIATRIC SURGERY with Kameron Joseph MD   [facility] Management ([facility])    [address] [place] [id number] [phone]            Mar 22, 2018 11:00 AM CDT   (Arrive by 10:45 AM)   NUTRITION VISIT with Sarah Alba RD   [facility] ([facility])    [address] [phone]            Mar 22, 2018 12:15 PM CDT   (Arrive by 12:00 PM)   RETURN BARIATRIC SURGERY with Any Morris PA-C   [facility] ([facility])    [address] [place] [id number] [phone]            Mar 22, 2018 12:30 PM CDT   (Arrive by 12:15 PM)   NUTRITION VISIT with Sarah Alba RD   [facility] Management ([facility])    [address] [phone]              Who to contact     Please call your clinic at [phone] to:    Ask questions about your health    Make or cancel  appointments    Discuss your medicines    Learn about your test results    Speak to your doctor            Additional Information About Your Visit        Proxinohart Information     [employer] Software gives you secure access to your electronic health record. If you see a primary care provider, you can also send messages to your care team and make appointments. If you have questions, please call your primary care clinic.  If you do not have a primary care provider, please call [phone] and they will assist you.      [employer] Software is an electronic gateway that provides easy, online access to your medical records. With [employer], you can request a clinic appointment, read your test results, renew a prescription or communicate with your care team.     To access your existing account, please contact your [facility] or call [phone] for assistance.        Care EveryWhere ID     This is your Care EveryWhere ID. This could be used by other organizations to access your [place] medical records  [id number]         Blood Pressure from Last 3 Encounters:   02/20/18 116/76   01/18/18 115/71   12/30/17 129/73    Weight from Last 3 Encounters:   02/27/18 268 lb 8 oz   02/20/18 272 lb 9.6 oz   01/18/18 272 lb              Today, you had the following     No orders found for display         Today's Medication Changes          These changes are accurate as of 3/9/18  8:56 AM.  If you have any questions, ask your nurse or doctor.               These medicines have changed or have updated prescriptions.        Dose/Directions    citalopram 40 MG tablet   Commonly known as:  celeXA   This may have changed:  when to take this   Used for:  Generalized anxiety disorder, Major depressive disorder, recurrent episode, moderate (H)        Dose:  40 mg   Take 1 tablet (40 mg) by mouth daily   Quantity:  90 tablet   Refills:  3                Primary Care Provider Office Phone # Fax #    Shana Jett [phone]  [phone]       [address] [facility] [place] [address]        Equal Access to Services     LAMONTVASILE ARIANNA : Hadii jatinder lo rowena Sovadim, wadomda luqadaha, qaybta kaalmada quinn, mary griselin hayaatahir vasqueshannah eaton laLauraruben raymundo. So [facility] [phone].    ATENCIÓN: Si habla español, tiene a beard disposición servicios gratuitos de asistencia lingüística. Llame al [phone].    We comply with applicable federal civil rights laws and [place] laws. We do not discriminate on the basis of race, color, national origin, age, disability, sex, sexual orientation, or gender identity.            Thank you!     Thank you for choosing GENERAL SURGERY  for your care. Our goal is always to provide you with excellent care. Hearing back from our patients is one way we can continue to improve our services. Please take a few minutes to complete the written survey that you may receive in the mail after your visit with us. Thank you!             Your Updated Medication List - Protect others around you: Learn how to safely use, store and throw away your medicines at www.disposemymeds.org.          This list is accurate as of 3/9/18  8:56 AM.  Always use your most recent med list.                   Brand Name Dispense Instructions for use Diagnosis    buPROPion 150 MG 24 hr tablet    WELLBUTRIN XL    90 tablet    Take 1 tablet (150 mg) by mouth every morning    Generalized anxiety disorder, Major depressive disorder, recurrent episode, moderate (H)       citalopram 40 MG tablet    celeXA    90 tablet    Take 1 tablet (40 mg) by mouth daily    Generalized anxiety disorder, Major depressive disorder, recurrent episode, moderate (H)       EPINEPHrine 0.3 MG/0.3ML injection 2-pack    EPIPEN/ADRENACLICK/or ANY BX GENERIC EQUIV    0.6 mL    Inject 0.3 mLs (0.3 mg) into the muscle as needed for anaphylaxis    Bee sting allergy       ibuprofen 600 MG tablet    ADVIL/MOTRIN     Take 600 mg by mouth as needed        * Multi-vitamin Tabs tablet       Take 1 tablet by mouth every morning        * HAIR VITAMINS PO      Take by mouth every morning        order for DME      Resmed Airsense 10 auto cpap 6-7 cm, Airfit P10 for her XS pillows        traZODone 50 MG tablet    DESYREL    90 tablet    Take 1 tablet (50 mg) by mouth At Bedtime    Insomnia due to other mental disorder       TYLENOL 325 MG tablet   Generic drug:  acetaminophen           VITAMIN C PO      Take by mouth every morning        VITAMIN D PO      Take by mouth every morning        * Notice:  This list has 2 medication(s) that are the same as other medications prescribed for you. Read the directions carefully, and ask your doctor or other care provider to review them with you.

## 2018-03-13 ENCOUNTER — HOSPITAL ENCOUNTER (INPATIENT)
Facility: CLINIC | Age: 53
LOS: 1 days | Discharge: HOME | DRG: 621 | End: 2018-03-14
Attending: SURGERY | Admitting: SURGERY
Payer: COMMERCIAL

## 2018-03-13 ENCOUNTER — ANESTHESIA (OUTPATIENT)
Dept: SURGERY | Facility: CLINIC | Age: 53
DRG: 621 | End: 2018-03-13
Payer: COMMERCIAL

## 2018-03-13 DIAGNOSIS — G89.18 ACUTE POST-OPERATIVE PAIN: ICD-10-CM

## 2018-03-13 DIAGNOSIS — K59.03 DRUG-INDUCED CONSTIPATION: Primary | ICD-10-CM

## 2018-03-13 LAB
CREAT SERPL-MCNC: 1.01 MG/DL (ref 0.52–1.04)
GFR SERPL CREATININE-BSD FRML MDRD: 58 ML/MIN/1.7M2
GLUCOSE BLDC GLUCOMTR-MCNC: 93 MG/DL (ref 70–99)
PLATELET # BLD AUTO: 257 10E9/L (ref 150–450)

## 2018-03-13 PROCEDURE — 25000566 ZZH SEVOFLURANE, EA 15 MIN: Performed by: SURGERY

## 2018-03-13 PROCEDURE — 36415 COLL VENOUS BLD VENIPUNCTURE: CPT | Performed by: SURGERY

## 2018-03-13 PROCEDURE — 40000014 ZZH STATISTIC ARTERIAL MONITORING DAILY

## 2018-03-13 PROCEDURE — 25000128 H RX IP 250 OP 636: Performed by: SURGERY

## 2018-03-13 PROCEDURE — 25000128 H RX IP 250 OP 636: Performed by: ANESTHESIOLOGY

## 2018-03-13 PROCEDURE — 25000125 ZZHC RX 250: Performed by: SURGERY

## 2018-03-13 PROCEDURE — 25000128 H RX IP 250 OP 636: Performed by: PHYSICIAN ASSISTANT

## 2018-03-13 PROCEDURE — 0DB64Z3 EXCISION OF STOMACH, PERCUTANEOUS ENDOSCOPIC APPROACH, VERTICAL: ICD-10-PCS | Performed by: SURGERY

## 2018-03-13 PROCEDURE — 27210794 ZZH OR GENERAL SUPPLY STERILE: Performed by: SURGERY

## 2018-03-13 PROCEDURE — 37000009 ZZH ANESTHESIA TECHNICAL FEE, EACH ADDTL 15 MIN: Performed by: SURGERY

## 2018-03-13 PROCEDURE — 40000170 ZZH STATISTIC PRE-PROCEDURE ASSESSMENT II: Performed by: SURGERY

## 2018-03-13 PROCEDURE — 71000015 ZZH RECOVERY PHASE 1 LEVEL 2 EA ADDTL HR: Performed by: SURGERY

## 2018-03-13 PROCEDURE — 25000132 ZZH RX MED GY IP 250 OP 250 PS 637: Performed by: STUDENT IN AN ORGANIZED HEALTH CARE EDUCATION/TRAINING PROGRAM

## 2018-03-13 PROCEDURE — 71000014 ZZH RECOVERY PHASE 1 LEVEL 2 FIRST HR: Performed by: SURGERY

## 2018-03-13 PROCEDURE — 40000275 ZZH STATISTIC RCP TIME EA 10 MIN

## 2018-03-13 PROCEDURE — 25000128 H RX IP 250 OP 636: Performed by: STUDENT IN AN ORGANIZED HEALTH CARE EDUCATION/TRAINING PROGRAM

## 2018-03-13 PROCEDURE — 25000132 ZZH RX MED GY IP 250 OP 250 PS 637

## 2018-03-13 PROCEDURE — 12000003 ZZH R&B CRITICAL UMMC

## 2018-03-13 PROCEDURE — 25000125 ZZHC RX 250: Performed by: STUDENT IN AN ORGANIZED HEALTH CARE EDUCATION/TRAINING PROGRAM

## 2018-03-13 PROCEDURE — 37000008 ZZH ANESTHESIA TECHNICAL FEE, 1ST 30 MIN: Performed by: SURGERY

## 2018-03-13 PROCEDURE — 85049 AUTOMATED PLATELET COUNT: CPT | Performed by: SURGERY

## 2018-03-13 PROCEDURE — 88305 TISSUE EXAM BY PATHOLOGIST: CPT | Performed by: SURGERY

## 2018-03-13 PROCEDURE — C9399 UNCLASSIFIED DRUGS OR BIOLOG: HCPCS | Performed by: STUDENT IN AN ORGANIZED HEALTH CARE EDUCATION/TRAINING PROGRAM

## 2018-03-13 PROCEDURE — 00000146 ZZHCL STATISTIC GLUCOSE BY METER IP

## 2018-03-13 PROCEDURE — 36000064 ZZH SURGERY LEVEL 4 EA 15 ADDTL MIN - UMMC: Performed by: SURGERY

## 2018-03-13 PROCEDURE — 82565 ASSAY OF CREATININE: CPT | Performed by: SURGERY

## 2018-03-13 PROCEDURE — 25000131 ZZH RX MED GY IP 250 OP 636 PS 637: Performed by: STUDENT IN AN ORGANIZED HEALTH CARE EDUCATION/TRAINING PROGRAM

## 2018-03-13 PROCEDURE — 25000132 ZZH RX MED GY IP 250 OP 250 PS 637: Performed by: SURGERY

## 2018-03-13 PROCEDURE — 36000062 ZZH SURGERY LEVEL 4 1ST 30 MIN - UMMC: Performed by: SURGERY

## 2018-03-13 RX ORDER — SENNOSIDES A AND B 8.6 MG/1
2 TABLET, FILM COATED ORAL DAILY
Qty: 40 TABLET | Refills: 0 | Status: SHIPPED | OUTPATIENT
Start: 2018-03-13 | End: 2018-04-12

## 2018-03-13 RX ORDER — SODIUM CHLORIDE, SODIUM LACTATE, POTASSIUM CHLORIDE, CALCIUM CHLORIDE 600; 310; 30; 20 MG/100ML; MG/100ML; MG/100ML; MG/100ML
INJECTION, SOLUTION INTRAVENOUS CONTINUOUS
Status: DISCONTINUED | OUTPATIENT
Start: 2018-03-13 | End: 2018-03-13 | Stop reason: HOSPADM

## 2018-03-13 RX ORDER — LIDOCAINE 40 MG/G
CREAM TOPICAL
Status: DISCONTINUED | OUTPATIENT
Start: 2018-03-13 | End: 2018-03-13 | Stop reason: HOSPADM

## 2018-03-13 RX ORDER — TRAZODONE HYDROCHLORIDE 50 MG/1
50 TABLET, FILM COATED ORAL
Status: DISCONTINUED | OUTPATIENT
Start: 2018-03-13 | End: 2018-03-14 | Stop reason: HOSPADM

## 2018-03-13 RX ORDER — CITALOPRAM HYDROBROMIDE 40 MG/1
40 TABLET ORAL AT BEDTIME
Status: DISCONTINUED | OUTPATIENT
Start: 2018-03-13 | End: 2018-03-14 | Stop reason: HOSPADM

## 2018-03-13 RX ORDER — NALOXONE HYDROCHLORIDE 0.4 MG/ML
.1-.4 INJECTION, SOLUTION INTRAMUSCULAR; INTRAVENOUS; SUBCUTANEOUS
Status: DISCONTINUED | OUTPATIENT
Start: 2018-03-13 | End: 2018-03-14 | Stop reason: HOSPADM

## 2018-03-13 RX ORDER — OXYCODONE HYDROCHLORIDE 5 MG/1
5-10 TABLET ORAL
Status: DISCONTINUED | OUTPATIENT
Start: 2018-03-13 | End: 2018-03-14 | Stop reason: HOSPADM

## 2018-03-13 RX ORDER — ONDANSETRON 2 MG/ML
4 INJECTION INTRAMUSCULAR; INTRAVENOUS EVERY 6 HOURS PRN
Status: DISCONTINUED | OUTPATIENT
Start: 2018-03-13 | End: 2018-03-14 | Stop reason: HOSPADM

## 2018-03-13 RX ORDER — ACETAMINOPHEN 325 MG/1
975 TABLET ORAL ONCE
Status: COMPLETED | OUTPATIENT
Start: 2018-03-13 | End: 2018-03-13

## 2018-03-13 RX ORDER — ONDANSETRON 2 MG/ML
INJECTION INTRAMUSCULAR; INTRAVENOUS PRN
Status: DISCONTINUED | OUTPATIENT
Start: 2018-03-13 | End: 2018-03-13

## 2018-03-13 RX ORDER — GABAPENTIN 300 MG/1
300 CAPSULE ORAL ONCE
Status: COMPLETED | OUTPATIENT
Start: 2018-03-13 | End: 2018-03-13

## 2018-03-13 RX ORDER — LIDOCAINE 40 MG/G
CREAM TOPICAL
Status: DISCONTINUED | OUTPATIENT
Start: 2018-03-13 | End: 2018-03-14 | Stop reason: HOSPADM

## 2018-03-13 RX ORDER — ONDANSETRON 4 MG/1
4 TABLET, ORALLY DISINTEGRATING ORAL EVERY 30 MIN PRN
Status: DISCONTINUED | OUTPATIENT
Start: 2018-03-13 | End: 2018-03-13 | Stop reason: HOSPADM

## 2018-03-13 RX ORDER — DOCUSATE SODIUM 100 MG/1
100 CAPSULE, LIQUID FILLED ORAL 2 TIMES DAILY
Status: DISCONTINUED | OUTPATIENT
Start: 2018-03-13 | End: 2018-03-13

## 2018-03-13 RX ORDER — CEFAZOLIN SODIUM 1 G/3ML
1 INJECTION, POWDER, FOR SOLUTION INTRAMUSCULAR; INTRAVENOUS SEE ADMIN INSTRUCTIONS
Status: DISCONTINUED | OUTPATIENT
Start: 2018-03-13 | End: 2018-03-13 | Stop reason: HOSPADM

## 2018-03-13 RX ORDER — NALOXONE HYDROCHLORIDE 0.4 MG/ML
.1-.4 INJECTION, SOLUTION INTRAMUSCULAR; INTRAVENOUS; SUBCUTANEOUS
Status: DISCONTINUED | OUTPATIENT
Start: 2018-03-13 | End: 2018-03-13 | Stop reason: HOSPADM

## 2018-03-13 RX ORDER — ONDANSETRON 4 MG/1
4 TABLET, ORALLY DISINTEGRATING ORAL EVERY 6 HOURS PRN
Status: DISCONTINUED | OUTPATIENT
Start: 2018-03-13 | End: 2018-03-14 | Stop reason: HOSPADM

## 2018-03-13 RX ORDER — ACETAMINOPHEN 325 MG/1
650 TABLET ORAL EVERY 4 HOURS PRN
Status: DISCONTINUED | OUTPATIENT
Start: 2018-03-16 | End: 2018-03-14 | Stop reason: HOSPADM

## 2018-03-13 RX ORDER — ONDANSETRON 2 MG/ML
4 INJECTION INTRAMUSCULAR; INTRAVENOUS EVERY 30 MIN PRN
Status: DISCONTINUED | OUTPATIENT
Start: 2018-03-13 | End: 2018-03-13 | Stop reason: HOSPADM

## 2018-03-13 RX ORDER — BUPROPION HYDROCHLORIDE 150 MG/1
150 TABLET ORAL EVERY MORNING
Status: DISCONTINUED | OUTPATIENT
Start: 2018-03-14 | End: 2018-03-13

## 2018-03-13 RX ORDER — OXYCODONE HYDROCHLORIDE 5 MG/1
5-10 TABLET ORAL
Qty: 18 TABLET | Refills: 0 | Status: SHIPPED | OUTPATIENT
Start: 2018-03-13 | End: 2018-03-22

## 2018-03-13 RX ORDER — HYDRALAZINE HYDROCHLORIDE 20 MG/ML
2.5-5 INJECTION INTRAMUSCULAR; INTRAVENOUS EVERY 10 MIN PRN
Status: DISCONTINUED | OUTPATIENT
Start: 2018-03-13 | End: 2018-03-13 | Stop reason: HOSPADM

## 2018-03-13 RX ORDER — DEXAMETHASONE SODIUM PHOSPHATE 10 MG/ML
INJECTION, SOLUTION INTRAMUSCULAR; INTRAVENOUS PRN
Status: DISCONTINUED | OUTPATIENT
Start: 2018-03-13 | End: 2018-03-13

## 2018-03-13 RX ORDER — SODIUM CHLORIDE, SODIUM LACTATE, POTASSIUM CHLORIDE, CALCIUM CHLORIDE 600; 310; 30; 20 MG/100ML; MG/100ML; MG/100ML; MG/100ML
INJECTION, SOLUTION INTRAVENOUS CONTINUOUS
Status: DISCONTINUED | OUTPATIENT
Start: 2018-03-13 | End: 2018-03-14

## 2018-03-13 RX ORDER — CEFAZOLIN SODIUM 1 G/50ML
3 SOLUTION INTRAVENOUS
Status: COMPLETED | OUTPATIENT
Start: 2018-03-13 | End: 2018-03-13

## 2018-03-13 RX ORDER — ACETAMINOPHEN 325 MG/1
975 TABLET ORAL EVERY 8 HOURS
Status: DISCONTINUED | OUTPATIENT
Start: 2018-03-13 | End: 2018-03-14 | Stop reason: HOSPADM

## 2018-03-13 RX ORDER — LABETALOL HYDROCHLORIDE 5 MG/ML
10 INJECTION, SOLUTION INTRAVENOUS
Status: DISCONTINUED | OUTPATIENT
Start: 2018-03-13 | End: 2018-03-13 | Stop reason: HOSPADM

## 2018-03-13 RX ORDER — FENTANYL CITRATE 50 UG/ML
25-50 INJECTION, SOLUTION INTRAMUSCULAR; INTRAVENOUS
Status: DISCONTINUED | OUTPATIENT
Start: 2018-03-13 | End: 2018-03-13 | Stop reason: HOSPADM

## 2018-03-13 RX ORDER — LIDOCAINE HYDROCHLORIDE 20 MG/ML
INJECTION, SOLUTION INFILTRATION; PERINEURAL PRN
Status: DISCONTINUED | OUTPATIENT
Start: 2018-03-13 | End: 2018-03-13

## 2018-03-13 RX ORDER — LABETALOL HYDROCHLORIDE 5 MG/ML
INJECTION, SOLUTION INTRAVENOUS PRN
Status: DISCONTINUED | OUTPATIENT
Start: 2018-03-13 | End: 2018-03-13

## 2018-03-13 RX ORDER — BUPROPION HYDROCHLORIDE 75 MG/1
75 TABLET ORAL 2 TIMES DAILY
Status: DISCONTINUED | OUTPATIENT
Start: 2018-03-13 | End: 2018-03-13

## 2018-03-13 RX ORDER — HYDROMORPHONE HYDROCHLORIDE 1 MG/ML
.3-.5 INJECTION, SOLUTION INTRAMUSCULAR; INTRAVENOUS; SUBCUTANEOUS
Status: DISCONTINUED | OUTPATIENT
Start: 2018-03-13 | End: 2018-03-14 | Stop reason: HOSPADM

## 2018-03-13 RX ORDER — BUPROPION HYDROCHLORIDE 75 MG/1
75 TABLET ORAL 2 TIMES DAILY
Status: DISCONTINUED | OUTPATIENT
Start: 2018-03-14 | End: 2018-03-13

## 2018-03-13 RX ORDER — BUPROPION HCL 100 MG
50 TABLET ORAL 3 TIMES DAILY
Status: DISCONTINUED | OUTPATIENT
Start: 2018-03-13 | End: 2018-03-14 | Stop reason: HOSPADM

## 2018-03-13 RX ORDER — PROPOFOL 10 MG/ML
INJECTION, EMULSION INTRAVENOUS PRN
Status: DISCONTINUED | OUTPATIENT
Start: 2018-03-13 | End: 2018-03-13

## 2018-03-13 RX ORDER — FENTANYL CITRATE 50 UG/ML
INJECTION, SOLUTION INTRAMUSCULAR; INTRAVENOUS PRN
Status: DISCONTINUED | OUTPATIENT
Start: 2018-03-13 | End: 2018-03-13

## 2018-03-13 RX ORDER — SODIUM CHLORIDE, SODIUM LACTATE, POTASSIUM CHLORIDE, CALCIUM CHLORIDE 600; 310; 30; 20 MG/100ML; MG/100ML; MG/100ML; MG/100ML
INJECTION, SOLUTION INTRAVENOUS CONTINUOUS PRN
Status: DISCONTINUED | OUTPATIENT
Start: 2018-03-13 | End: 2018-03-13

## 2018-03-13 RX ORDER — NALOXONE HYDROCHLORIDE 0.4 MG/ML
.1-.4 INJECTION, SOLUTION INTRAMUSCULAR; INTRAVENOUS; SUBCUTANEOUS
Status: ACTIVE | OUTPATIENT
Start: 2018-03-13 | End: 2018-03-14

## 2018-03-13 RX ORDER — BUPIVACAINE HYDROCHLORIDE 2.5 MG/ML
INJECTION, SOLUTION EPIDURAL; INFILTRATION; INTRACAUDAL PRN
Status: DISCONTINUED | OUTPATIENT
Start: 2018-03-13 | End: 2018-03-13 | Stop reason: HOSPADM

## 2018-03-13 RX ADMIN — SODIUM CHLORIDE, POTASSIUM CHLORIDE, SODIUM LACTATE AND CALCIUM CHLORIDE: 600; 310; 30; 20 INJECTION, SOLUTION INTRAVENOUS at 22:59

## 2018-03-13 RX ADMIN — ONDANSETRON 4 MG: 2 INJECTION INTRAMUSCULAR; INTRAVENOUS at 10:20

## 2018-03-13 RX ADMIN — Medication 120 MG: at 08:45

## 2018-03-13 RX ADMIN — FENTANYL CITRATE 25 MCG: 50 INJECTION, SOLUTION INTRAMUSCULAR; INTRAVENOUS at 11:08

## 2018-03-13 RX ADMIN — MIDAZOLAM 2 MG: 1 INJECTION INTRAMUSCULAR; INTRAVENOUS at 08:43

## 2018-03-13 RX ADMIN — DOCUSATE SODIUM 100 MG: 50 LIQUID ORAL at 19:57

## 2018-03-13 RX ADMIN — ACETAMINOPHEN 975 MG: 325 TABLET, FILM COATED ORAL at 21:24

## 2018-03-13 RX ADMIN — ONDANSETRON 4 MG: 2 INJECTION INTRAMUSCULAR; INTRAVENOUS at 18:25

## 2018-03-13 RX ADMIN — FENTANYL CITRATE 25 MCG: 50 INJECTION, SOLUTION INTRAMUSCULAR; INTRAVENOUS at 10:46

## 2018-03-13 RX ADMIN — Medication 0.5 MG: at 15:02

## 2018-03-13 RX ADMIN — FENTANYL CITRATE 50 MCG: 50 INJECTION, SOLUTION INTRAMUSCULAR; INTRAVENOUS at 09:15

## 2018-03-13 RX ADMIN — SODIUM CHLORIDE, POTASSIUM CHLORIDE, SODIUM LACTATE AND CALCIUM CHLORIDE: 600; 310; 30; 20 INJECTION, SOLUTION INTRAVENOUS at 09:54

## 2018-03-13 RX ADMIN — LABETALOL HYDROCHLORIDE 10 MG: 5 INJECTION INTRAVENOUS at 09:43

## 2018-03-13 RX ADMIN — OXYCODONE HYDROCHLORIDE 5 MG: 5 TABLET ORAL at 16:59

## 2018-03-13 RX ADMIN — SODIUM CHLORIDE, POTASSIUM CHLORIDE, SODIUM LACTATE AND CALCIUM CHLORIDE: 600; 310; 30; 20 INJECTION, SOLUTION INTRAVENOUS at 08:43

## 2018-03-13 RX ADMIN — FENTANYL CITRATE 150 MCG: 50 INJECTION, SOLUTION INTRAMUSCULAR; INTRAVENOUS at 08:56

## 2018-03-13 RX ADMIN — FENTANYL CITRATE 25 MCG: 50 INJECTION, SOLUTION INTRAMUSCULAR; INTRAVENOUS at 10:14

## 2018-03-13 RX ADMIN — LIDOCAINE HYDROCHLORIDE 100 MG: 20 INJECTION, SOLUTION INFILTRATION; PERINEURAL at 08:45

## 2018-03-13 RX ADMIN — HYDROMORPHONE HYDROCHLORIDE 0.5 MG: 1 INJECTION, SOLUTION INTRAMUSCULAR; INTRAVENOUS; SUBCUTANEOUS at 12:30

## 2018-03-13 RX ADMIN — FENTANYL CITRATE 50 MCG: 50 INJECTION, SOLUTION INTRAMUSCULAR; INTRAVENOUS at 10:35

## 2018-03-13 RX ADMIN — ENOXAPARIN SODIUM 40 MG: 40 INJECTION SUBCUTANEOUS at 07:31

## 2018-03-13 RX ADMIN — SUGAMMADEX 200 MG: 100 INJECTION, SOLUTION INTRAVENOUS at 09:59

## 2018-03-13 RX ADMIN — SODIUM CHLORIDE, POTASSIUM CHLORIDE, SODIUM LACTATE AND CALCIUM CHLORIDE: 600; 310; 30; 20 INJECTION, SOLUTION INTRAVENOUS at 11:33

## 2018-03-13 RX ADMIN — PROPOFOL 200 MG: 10 INJECTION, EMULSION INTRAVENOUS at 08:45

## 2018-03-13 RX ADMIN — HYDROMORPHONE HYDROCHLORIDE 0.5 MG: 1 INJECTION, SOLUTION INTRAMUSCULAR; INTRAVENOUS; SUBCUTANEOUS at 11:49

## 2018-03-13 RX ADMIN — HYDROMORPHONE HYDROCHLORIDE 0.3 MG: 1 INJECTION, SOLUTION INTRAMUSCULAR; INTRAVENOUS; SUBCUTANEOUS at 11:24

## 2018-03-13 RX ADMIN — GABAPENTIN 300 MG: 300 CAPSULE ORAL at 07:28

## 2018-03-13 RX ADMIN — PROCHLORPERAZINE EDISYLATE 10 MG: 5 INJECTION INTRAMUSCULAR; INTRAVENOUS at 10:38

## 2018-03-13 RX ADMIN — TRAZODONE HYDROCHLORIDE 50 MG: 50 TABLET ORAL at 21:24

## 2018-03-13 RX ADMIN — HYDROMORPHONE HYDROCHLORIDE 0.4 MG: 1 INJECTION, SOLUTION INTRAMUSCULAR; INTRAVENOUS; SUBCUTANEOUS at 11:37

## 2018-03-13 RX ADMIN — ONDANSETRON 4 MG: 2 INJECTION INTRAMUSCULAR; INTRAVENOUS at 09:42

## 2018-03-13 RX ADMIN — OXYCODONE HYDROCHLORIDE 10 MG: 5 TABLET ORAL at 19:57

## 2018-03-13 RX ADMIN — FENTANYL CITRATE 25 MCG: 50 INJECTION, SOLUTION INTRAMUSCULAR; INTRAVENOUS at 10:53

## 2018-03-13 RX ADMIN — Medication 3 G: at 09:05

## 2018-03-13 RX ADMIN — ACETAMINOPHEN 975 MG: 325 TABLET, FILM COATED ORAL at 07:28

## 2018-03-13 RX ADMIN — ROCURONIUM BROMIDE 30 MG: 10 INJECTION INTRAVENOUS at 09:00

## 2018-03-13 RX ADMIN — OXYCODONE HYDROCHLORIDE 10 MG: 5 TABLET ORAL at 22:56

## 2018-03-13 RX ADMIN — ROCURONIUM BROMIDE 20 MG: 10 INJECTION INTRAVENOUS at 09:19

## 2018-03-13 RX ADMIN — FENTANYL CITRATE 25 MCG: 50 INJECTION, SOLUTION INTRAMUSCULAR; INTRAVENOUS at 11:01

## 2018-03-13 RX ADMIN — DEXAMETHASONE SODIUM PHOSPHATE 6 MG: 10 INJECTION, SOLUTION INTRAMUSCULAR; INTRAVENOUS at 09:00

## 2018-03-13 RX ADMIN — PROPOFOL 30 MG: 10 INJECTION, EMULSION INTRAVENOUS at 09:14

## 2018-03-13 RX ADMIN — CITALOPRAM HYDROBROMIDE 40 MG: 40 TABLET ORAL at 21:24

## 2018-03-13 RX ADMIN — HYDROMORPHONE HYDROCHLORIDE 0.3 MG: 1 INJECTION, SOLUTION INTRAMUSCULAR; INTRAVENOUS; SUBCUTANEOUS at 11:14

## 2018-03-13 RX ADMIN — Medication 50 MG: at 19:57

## 2018-03-13 RX ADMIN — FENTANYL CITRATE 25 MCG: 50 INJECTION, SOLUTION INTRAMUSCULAR; INTRAVENOUS at 10:20

## 2018-03-13 RX ADMIN — FENTANYL CITRATE 25 MCG: 50 INJECTION, SOLUTION INTRAMUSCULAR; INTRAVENOUS at 10:27

## 2018-03-13 NOTE — IP AVS SNAPSHOT
MRN:[id number]                      After Visit Summary   3/13/2018    Sunshine Delgado    MRN: [id number]           Thank you!     Thank you for choosing [facility] for your care. Our goal is always to provide you with excellent care. Hearing back from our patients is one way we can continue to improve our services. Please take a few minutes to complete the written survey that you may receive in the mail after you visit with us. Thank you!        Patient Information     Date Of Birth          1965        Designated Caregiver       Most Recent Value    Caregiver    Will someone help with your care after discharge? yes    Name of designated caregiver Reynold Burger     Phone number of caregiver [phone]    Caregiver address [address] [place] [address]      About your hospital stay     You were admitted on:  March 13, 2018 You last received care in the:  [address] [facility]    You were discharged on:  March 14, 2018        Reason for your hospital stay       Laparoscopic sleeve gastrectomy                  Who to Call     For medical emergencies, please call 911.  For non-urgent questions about your medical care, please call your primary care provider or clinic, [phone]  For questions related to your surgery, please call your surgery clinic        Attending Provider     Provider Specialty    Kameron Joseph MD Surgery       Primary Care Provider Office Phone # Fax #    Shana Jett [phone]      After Care Instructions     Activity       See discharge instructions            Diet       See discharge instructions            Discharge Instructions       Diet on discharge full liquids.    No lifting >10 pounds for 4-6 weeks. Discuss with your surgeon at follow up appointment  May shower starting postoperative day #1 but no scrubbing incisions. No bathing or soaking incisions for 2 weeks or until incisions completely healed.  Wound care: Keep clean and  dry. Steri strips or glue will fall off on their own.   After surgery it is ok to swallow medications smaller than 1/4 inch(size of pencil eraser) for all procedures.  If medication is larger than 1/4 inch then it will need to be crushed, cut or in liquid form for 1-2 months after surgery. This can be discussed with surgeon team at the 1 month follow up appointment and will depend on patient tolerance.  Follow-up with your surgeon team in 1-2 weeks. If this appointment was not previous made for you please call [phone] and choose option #1 to schedule your follow-up appointment.   You will receive a call from our clinic nurse after surgery.  If you have any questions and would like to speak with a nurse please call [phone] and choose option #3 to speak with a triage nurse.  Call [phone] and ask to speak with surgery resident if you are having troubles in the evenings, at night, or on weekends. Please call if you experience increasing abdominal pain, nausea, vomiting, increasing drainage from your wounds, chills, or fever >101.5  Take stool softener while taking narcotic pain medication.  No driving for at least 12 hours after taking narcotic pain medication.  Appointments located at [facility] clinic:  [address]  [place] [address]            Wound care and dressings       See discharge instructions                  Follow-up Appointments     Adult [facility] Follow-up and recommended labs and tests       Follow up in 1-2 weeks. Scheduled for 3/22/18  Appointments on [place] and/or [facility] (with [facility] or [facility] provider or service). Call [phone] if you haven't heard regarding these appointments within 7 days of discharge.                  Your next 10 appointments already scheduled     Mar 22, 2018 10:20 AM CDT   (Arrive by 10:05 AM)   RETURN BARIATRIC SURGERY with Kameron Joseph MD   [facility] ([facility])  "   [address] [place] [id number]   [phone]            Mar 22, 2018 11:00 AM CDT   (Arrive by 10:45 AM)   NUTRITION VISIT with STANFORD Boyer [facility] ([facility])    [address] [place] [id number]   [phone]            Mar 22, 2018 12:15 PM CDT   (Arrive by 12:00 PM)   RETURN BARIATRIC SURGERY with LINDA Nuñez [facility] ([facility])    [address] [place] [id number]   [phone]            Mar 22, 2018 12:30 PM CDT   (Arrive by 12:15 PM)   NUTRITION VISIT with STANFORD Boyer [facility] ([facility])    [address] [place] [id number]   [phone]              Additional Information     If you use hormonal birth control (such as the pill, patch, ring or implants): You'll need a second form of birth control for 7 days (condoms, a diaphragm or contraceptive foam). While in the hospital, you received a medicine called Bridion. Your normal birth control will not work as well for a week after taking this medicine.          Pending Results     No orders found for last 3 day(s).            Statement of Approval     Ordered          03/14/18 0629  I have reviewed and agree with all the recommendations and orders detailed in this document.  EFFECTIVE NOW     Approved and electronically signed by:  Carrie Altamirano MD             Admission Information     Date & Time Provider Department Dept. Phone    3/13/2018 Kameron Joseph MD [address] [facility] [phone]      Your Vitals Were     Blood Pressure Temperature Respirations Height Weight Pulse Oximetry    148/66 (BP Location: Right arm) 96.3  F (35.7  C) (Oral) 18 1.651 m (5' 5\") 119.2 kg (262 lb 12.6 oz) 90%    BMI (Body Mass Index)                   " 43.73 kg/m2           [facility] Information     [facility] gives you secure access to your electronic health record. If you see a primary care provider, you can also send messages to your care team and make appointments. If you have questions, please call your primary care clinic.  If you do not have a primary care provider, please call [phone] and they will assist you.        Care EveryWhere ID     This is your Care EveryWhere ID. This could be used by other organizations to access your [facility] medical records  [id number]        Equal Access to Services     LUCA KELLER : Hadii jatinder lo hadasho Soomaali, waaxda luqadaha, qaybta kaalmada adeegyalidia, mary cloud . So [facility] [place] [phone].    ATENCIÓN: Si habla español, tiene a beard disposición servicios gratuitos de asistencia lingüística. Llame al [phone].    We comply with applicable federal civil rights laws and [place] laws. We do not discriminate on the basis of race, color, national origin, age, disability, sex, sexual orientation, or gender identity.               Review of your medicines      START taking        Dose / Directions    oxyCODONE IR 5 MG tablet   Commonly known as:  ROXICODONE   Used for:  Acute post-operative pain        Dose:  5-10 mg   Take 1-2 tablets (5-10 mg) by mouth every 3 hours as needed for other (pain control or improvement in physical function. Hold dose for analgesic side effects.)   Quantity:  18 tablet   Refills:  0       senna 8.6 MG tablet   Commonly known as:  SENOKOT   Used for:  Drug-induced constipation        Dose:  2 tablet   Take 2 tablets by mouth daily   Quantity:  40 tablet   Refills:  0         CONTINUE these medicines which may have CHANGED, or have new prescriptions. If we are uncertain of the size of tablets/capsules you have at home, strength may be listed as something that might have changed.        Dose / Directions    citalopram 40 MG tablet   Commonly known as:  celeXA   This may  have changed:  when to take this   Used for:  Generalized anxiety disorder, Major depressive disorder, recurrent episode, moderate (H)        Dose:  40 mg   Take 1 tablet (40 mg) by mouth daily   Quantity:  90 tablet   Refills:  3         CONTINUE these medicines which have NOT CHANGED        Dose / Directions    buPROPion 150 MG 24 hr tablet   Commonly known as:  WELLBUTRIN XL   Used for:  Generalized anxiety disorder, Major depressive disorder, recurrent episode, moderate (H)        Dose:  150 mg   Take 1 tablet (150 mg) by mouth every morning   Quantity:  90 tablet   Refills:  3       EPINEPHrine 0.3 MG/0.3ML injection 2-pack   Commonly known as:  EPIPEN/ADRENACLICK/or ANY BX GENERIC EQUIV   Used for:  Bee sting allergy        Dose:  0.3 mg   Inject 0.3 mLs (0.3 mg) into the muscle as needed for anaphylaxis   Quantity:  0.6 mL   Refills:  3       ibuprofen 600 MG tablet   Commonly known as:  ADVIL/MOTRIN        Dose:  600 mg   Take 600 mg by mouth as needed   Refills:  0       * Multi-vitamin Tabs tablet        Dose:  1 tablet   Take 1 tablet by mouth every morning   Refills:  0       * HAIR VITAMINS PO        Take by mouth every morning   Refills:  0       order for DME        Resmed Airsense 10 auto cpap 6-7 cm, Airfit P10 for her XS pillows   Refills:  0       topiramate 25 MG tablet   Commonly known as:  TOPAMAX   Used for:  Morbid obesity (H)        Dose:  75 mg   Take 3 tablets (75 mg) by mouth At Bedtime 75mg at bedtime   Quantity:  90 tablet   Refills:  3       traZODone 50 MG tablet   Commonly known as:  DESYREL   Used for:  Insomnia due to other mental disorder        Dose:  50 mg   Take 1 tablet (50 mg) by mouth At Bedtime   Quantity:  90 tablet   Refills:  3       TYLENOL 325 MG tablet   Generic drug:  acetaminophen        Refills:  0       VITAMIN C PO        Take by mouth every morning   Refills:  0       VITAMIN D PO        Take by mouth every morning   Refills:  0       * Notice:  This list has 2  medication(s) that are the same as other medications prescribed for you. Read the directions carefully, and ask your doctor or other care provider to review them with you.         Where to get your medicines      These medications were sent to [facility] - [place], [place] - [address]  [address], [facility] [address]     Phone:  [phone]     senna 8.6 MG tablet         Some of these will need a paper prescription and others can be bought over the counter. Ask your nurse if you have questions.     Bring a paper prescription for each of these medications     oxyCODONE IR 5 MG tablet                Protect others around you: Learn how to safely use, store and throw away your medicines at www.disposemymeds.org.        Information about OPIOIDS     PRESCRIPTION OPIOIDS: WHAT YOU NEED TO KNOW    Prescription opioids can be used to help relieve moderate to severe pain and are often prescribed following a surgery or injury, or for certain health conditions. These medications can be an important part of treatment but also come with serious risks. It is important to work with your health care provider to make sure you are getting the safest, most effective care.    WHAT ARE THE RISKS AND SIDE EFFECTS OF OPIOID USE?  Prescription opioids carry serious risks of addiction and overdose, especially with prolonged use. An opioid overdose, often marked by slowed breathing can cause sudden death. The use of prescription opioids can have a number of side effects as well, even when taken as directed:      Tolerance - meaning you might need to take more of a medication for the same pain relief    Physical dependence - meaning you have symptoms of withdrawal when a medication is stopped    Increased sensitivity to pain    Constipation    Nausea, vomiting, and dry mouth    Sleepiness and dizziness    Confusion    Depression    Low levels of testosterone that can result in lower sex drive, energy,  and strength    Itching and sweating    RISKS ARE GREATER WITH:    History of drug misuse, substance use disorder, or overdose    Mental health conditions (such as depression or anxiety)    Sleep apnea    Older age (65 years or older)    Pregnancy    Avoid alcohol while taking prescription opioids.   Also, unless specifically advised by your health care provider, medications to avoid include:    Benzodiazepines (such as Xanax or Valium)    Muscle relaxants (such as Soma or Flexeril)    Hypnotics (such as Ambien or Lunesta)    Other prescription opioids    KNOW YOUR OPTIONS:  Talk to your health care provider about ways to manage your pain that do not involve prescription opioids. Some of these options may actually work better and have fewer risks and side effects:    Pain relievers such as acetaminophen, ibuprofen, and naproxen    Some medications that are also used for depression or seizures    Physical therapy and exercise    Cognitive behavioral therapy, a psychological, goal-directed approach, in which patients learn how to modify physical, behavioral, and emotional triggers of pain and stress    IF YOU ARE PRESCRIBED OPIOIDS FOR PAIN:    Never take opioids in greater amounts or more often than prescribed    Follow up with your primary health care provider and work together to create a plan on how to manage your pain.    Talk about ways to help manage your pain that do not involve prescription opioids    Talk about all concerns and side effects    Help prevent misuse and abuse    Never sell or share prescription opioids    Never use another person's prescription opioids    Store prescription opioids in a secure place and out of reach of others (this may include visitors, children, friends, and family)    Visit www.cdc.gov/drugoverdose to learn about risks of opioid abuse and overdose    If you believe you may be struggling with addiction, tell your health care provider and ask for guidance or call [facility]'s  National Helpline at 4-460-710-HELP    LEARN MORE / www.cdc.gov/drugoverdose/prescribing/guideline.html    Safely dispose of unused prescription opioids: Find your local drug take-back programs and more information about the importance of safe disposal at www.doseofreality.[place].gov             Medication List: This is a list of all your medications and when to take them. Check marks below indicate your daily home schedule. Keep this list as a reference.      Medications           Morning Afternoon Evening Bedtime As Needed    buPROPion 150 MG 24 hr tablet   Commonly known as:  WELLBUTRIN XL   Take 1 tablet (150 mg) by mouth every morning                                citalopram 40 MG tablet   Commonly known as:  celeXA   Take 1 tablet (40 mg) by mouth daily   Last time this was given:  40 mg on 3/13/2018  9:24 PM                                EPINEPHrine 0.3 MG/0.3ML injection 2-pack   Commonly known as:  EPIPEN/ADRENACLICK/or ANY BX GENERIC EQUIV   Inject 0.3 mLs (0.3 mg) into the muscle as needed for anaphylaxis                                ibuprofen 600 MG tablet   Commonly known as:  ADVIL/MOTRIN   Take 600 mg by mouth as needed                                * Multi-vitamin Tabs tablet   Take 1 tablet by mouth every morning                                * HAIR VITAMINS PO   Take by mouth every morning                                order for DME   Resmed Airsense 10 auto cpap 6-7 cm, Airfit P10 for her XS pillows                                oxyCODONE IR 5 MG tablet   Commonly known as:  ROXICODONE   Take 1-2 tablets (5-10 mg) by mouth every 3 hours as needed for other (pain control or improvement in physical function. Hold dose for analgesic side effects.)   Last time this was given:  5 mg on 3/14/2018 11:08 AM                                senna 8.6 MG tablet   Commonly known as:  SENOKOT   Take 2 tablets by mouth daily                                topiramate 25 MG tablet   Commonly known as:  TOPAMAX    Take 3 tablets (75 mg) by mouth At Bedtime 75mg at bedtime                                traZODone 50 MG tablet   Commonly known as:  DESYREL   Take 1 tablet (50 mg) by mouth At Bedtime   Last time this was given:  50 mg on 3/13/2018  9:24 PM                                TYLENOL 325 MG tablet   Last time this was given:  975 mg on 3/14/2018  3:03 PM   Generic drug:  acetaminophen                                VITAMIN C PO   Take by mouth every morning                                VITAMIN D PO   Take by mouth every morning                                * Notice:  This list has 2 medication(s) that are the same as other medications prescribed for you. Read the directions carefully, and ask your doctor or other care provider to review them with you.

## 2018-03-13 NOTE — ANESTHESIA POSTPROCEDURE EVALUATION
Patient: Sunshine Delgado    Procedure(s):  Laparoscopic Sleeve Gastrectomy - Wound Class: II-Clean Contaminated    Diagnosis:Bariatric Surgery Status   Diagnosis Additional Information: No value filed.    Anesthesia Type:  General, ETT    Note:  Anesthesia Post Evaluation    Patient location during evaluation: PACU and Bedside  Level of consciousness: sleepy but conscious  Pain management: adequate  Cardiovascular status: blood pressure returned to baseline  Respiratory status: acceptable  Hydration status: acceptable  PONV: none             Last vitals:  Vitals:    03/13/18 1015 03/13/18 1030 03/13/18 1045   BP: 147/68 152/81 138/61   Resp: 14 16 16   Temp: 36.4  C (97.6  F) 36.6  C (97.8  F) 36.7  C (98  F)   SpO2: 100% 99% 96%         Electronically Signed By: Oskar Tyler MD  March 13, 2018  11:17 AM

## 2018-03-13 NOTE — OR NURSING
Pain improved after administration of dilaudid. Pt ready for transport to 7B. Awaiting RN availability. Will hold pt in PACU and continue to monitor until 7B RN able to assume care of pt.

## 2018-03-13 NOTE — OR NURSING
Dr. Tyler to PACU bedside. Pt continues to have moderate to severe pain not at all relieved by fentanyl. Orders for dilaudid received. Will continue to monitor.

## 2018-03-13 NOTE — DISCHARGE SUMMARY
"[facility]   MIS Discharge Summary    Date of Admission: 3/13/2018  Date of Discharge: 3/1/2018    Admission Diagnosis:  1. Morbid obesity    Discharge Diagnosis:  1. Same as above    Consultations:  None    Procedures:  1. Sleeve gatrectomy by Dr Opal Martinez HPI:  Sunshine Delgado is a 53 year old year old female who is morbidly obese.  Numerous weight loss attempts without surgery have been without success    Hospital Course:  The patient was admitted and underwent the above procedure. The patient tolerated the procedure well. There were no complications. The patient's diet was slowly advanced as bowel function returned. Pain was controlled with oral pain medication and the patient was able to ambulate and void without difficulty. The patient received appropriate education post operatively. On POD #1 the patient was discharged to home.    Discharge Physical Exam:  /73 (BP Location: Right arm)  Temp 96.7  F (35.9  C) (Oral)  Resp 18  Ht 1.651 m (5' 5\")  Wt 119.2 kg (262 lb 12.6 oz)  SpO2 95%  BMI 43.73 kg/m2    Gen: NAD  Lungs: non-labored breathing  CV: regular rhythm, normal rate   Abd: obese, soft, nondistended, appropriately tender, incisions are c/d/i  Ext: no peripheral edema  Neuro: AOx3    Meds:     Review of your medicines      START taking       Dose / Directions    oxyCODONE IR 5 MG tablet   Commonly known as:  ROXICODONE   Used for:  Acute post-operative pain        Dose:  5-10 mg   Take 1-2 tablets (5-10 mg) by mouth every 3 hours as needed for other (pain control or improvement in physical function. Hold dose for analgesic side effects.)   Quantity:  18 tablet   Refills:  0       senna 8.6 MG tablet   Commonly known as:  SENOKOT   Used for:  Drug-induced constipation        Dose:  2 tablet   Take 2 tablets by mouth daily   Quantity:  40 tablet   Refills:  0         CONTINUE these medicines which may have CHANGED, or have new prescriptions. If we are " uncertain of the size of tablets/capsules you have at home, strength may be listed as something that might have changed.       Dose / Directions    citalopram 40 MG tablet   Commonly known as:  celeXA   This may have changed:  when to take this   Used for:  Generalized anxiety disorder, Major depressive disorder, recurrent episode, moderate (H)        Dose:  40 mg   Take 1 tablet (40 mg) by mouth daily   Quantity:  90 tablet   Refills:  3         CONTINUE these medicines which have NOT CHANGED       Dose / Directions    buPROPion 150 MG 24 hr tablet   Commonly known as:  WELLBUTRIN XL   Used for:  Generalized anxiety disorder, Major depressive disorder, recurrent episode, moderate (H)        Dose:  150 mg   Take 1 tablet (150 mg) by mouth every morning   Quantity:  90 tablet   Refills:  3       EPINEPHrine 0.3 MG/0.3ML injection 2-pack   Commonly known as:  EPIPEN/ADRENACLICK/or ANY BX GENERIC EQUIV   Used for:  Bee sting allergy        Dose:  0.3 mg   Inject 0.3 mLs (0.3 mg) into the muscle as needed for anaphylaxis   Quantity:  0.6 mL   Refills:  3       ibuprofen 600 MG tablet   Commonly known as:  ADVIL/MOTRIN        Dose:  600 mg   Take 600 mg by mouth as needed   Refills:  0       * Multi-vitamin Tabs tablet        Dose:  1 tablet   Take 1 tablet by mouth every morning   Refills:  0       * HAIR VITAMINS PO        Take by mouth every morning   Refills:  0       order for DME        Resmed Airsense 10 auto cpap 6-7 cm, Airfit P10 for her XS pillows   Refills:  0       traZODone 50 MG tablet   Commonly known as:  DESYREL   Used for:  Insomnia due to other mental disorder        Dose:  50 mg   Take 1 tablet (50 mg) by mouth At Bedtime   Quantity:  90 tablet   Refills:  3       TYLENOL 325 MG tablet   Generic drug:  acetaminophen        Refills:  0       VITAMIN C PO        Take by mouth every morning   Refills:  0       VITAMIN D PO        Take by mouth every morning   Refills:  0       * Notice:  This list has 2  medication(s) that are the same as other medications prescribed for you. Read the directions carefully, and ask your doctor or other care provider to review them with you.         Where to get your medicines      These medications were sent to [facility] - [place], [place] - [address]  [address], [facility] [address]     Phone:  [phone]      senna 8.6 MG tablet         Some of these will need a paper prescription and others can be bought over the counter. Ask your nurse if you have questions.     Bring a paper prescription for each of these medications      oxyCODONE IR 5 MG tablet             Additional instructions:  After Care     Future Labs/Procedures    Activity     Comments:    See discharge instructions    Diet     Comments:    See discharge instructions    Discharge Instructions     Comments:    Diet on discharge full liquids.    No lifting >10 pounds for 4-6 weeks. Discuss with your surgeon at follow up appointment  May shower starting postoperative day #1 but no scrubbing incisions. No bathing or soaking incisions for 2 weeks or until incisions completely healed.  Wound care: Keep clean and dry. Steri strips or glue will fall off on their own.   After surgery it is ok to swallow medications smaller than 1/4 inch(size of pencil eraser) for all procedures.  If medication is larger than 1/4 inch then it will need to be crushed, cut or in liquid form for 1-2 months after surgery. This can be discussed with surgeon team at the 1 month follow up appointment and will depend on patient tolerance.  Follow-up with your surgeon team in 1-2 weeks. If this appointment was not previous made for you please call [phone] and choose option #1 to schedule your follow-up appointment.   You will receive a call from our clinic nurse after surgery.  If you have any questions and would like to speak with a nurse please call [phone] and choose option #3 to speak with a triage  nurse.  Call [phone] and ask to speak with surgery resident if you are having troubles in the evenings, at night, or on weekends. Please call if you experience increasing abdominal pain, nausea, vomiting, increasing drainage from your wounds, chills, or fever >101.5  Take stool softener while taking narcotic pain medication.  No driving for at least 12 hours after taking narcotic pain medication.  Appointments located at [facility] clinic:  [address]  [place] [address]    Wound care and dressings     Comments:    See discharge instructions          Follow Up:  -Follow up with Dr Joseph in clinic in 1-2 week(s) after discharge. You should be called to make an appointment within 3 business days. If you are not contacted, call [phone] to make an appointment.

## 2018-03-13 NOTE — IP AVS SNAPSHOT
[address] [address]    Phone:  [phone]                                       After Visit Summary   3/13/2018    Sunshine Delgado    MRN: [id number]           After Visit Summary Signature Page     I have received my discharge instructions, and my questions have been answered. I have discussed any challenges I see with this plan with the nurse or doctor.    ..........................................................................................................................................  Patient/Patient Representative Signature      ..........................................................................................................................................  Patient Representative Print Name and Relationship to Patient    ..................................................               ................................................  Date                                            Time    ..........................................................................................................................................  Reviewed by Signature/Title    ...................................................              ..............................................  Date                                                            Time

## 2018-03-13 NOTE — PHARMACY-CONSULT NOTE
Bariatric Consult    Medications evaluated as requested per Bariatric Consult. Patient may swallow tablets/capsules ONLY if less than   inch.  Larger tablets/capsules should be broken, crushed or split.    The following changes have been made based on the Bariatric Medication Management Policy. Docusate (capsule to solution), Bupropion 150mg XL daily -> Bupropion 50mg TID    The pharmacist will continue to follow as new medications are ordered.    Quita Ingram, PharmD

## 2018-03-13 NOTE — OR NURSING
Pt complaining of moderate to severe pain not relieved by fentanyl. Dr. Tyler notified to request dilaudid orders. Pt can receive 0.2 mg of dilaudid only if pain not improved after 250 mcg of fentanyl per [facility]. Will continue to monitor.

## 2018-03-13 NOTE — ANESTHESIA CARE TRANSFER NOTE
Patient: Sunshine Delgado    Procedure(s):  Laparoscopic Sleeve Gastrectomy - Wound Class: II-Clean Contaminated    Diagnosis: Bariatric Surgery Status   Diagnosis Additional Information: No value filed.    Anesthesia Type:   General, ETT     Note:  Airway :Face Mask  Patient transferred to:PACU  Comments: VSS. Breathing spontaneously at a regular rate with adequate tidal volumes and maintaining O2 sats on 6L facemask. Denies nausea. No apparent complications from anesthesia. Pt reports pain at incisional sites, given 25 mcg fentanyl in PACU    Naresh Galdamez DO  CA-1  Handoff Report: Identifed the Patient, Identified the Reponsible Provider, Reviewed the pertinent medical history, Discussed the surgical course, Reviewed Intra-OP anesthesia mangement and issues during anesthesia, Set expectations for post-procedure period and Allowed opportunity for questions and acknowledgement of understanding      Vitals: (Last set prior to Anesthesia Care Transfer)    CRNA VITALS  3/13/2018 0938 - 3/13/2018 1015      3/13/2018             Pulse: 71    SpO2: 98 %                Electronically Signed By: Naresh Galdamez DO  March 13, 2018  10:15 AM

## 2018-03-13 NOTE — PLAN OF CARE
Problem: Patient Care Overview  Goal: Plan of Care/Patient Progress Review  Outcome: Improving  Arrived on 7B approximately 1300.  Alert and VSS. Lap sites x5 with 2 having scant amount of drainage. Ice applied to abdomen for pain (too early to give Dilaudid). Dilaudid 0.5 given when able with good relief; pt slept. Fiance at bedside. Due to void.

## 2018-03-13 NOTE — OP NOTE
[facility], [place]    Operative Note    Pre-operative diagnosis: Bariatric Surgery Status    Post-operative diagnosis Same   Procedure: Procedure(s):  Laparoscopic Sleeve Gastrectomy - Wound Class: II-Clean Contaminated   Surgeon: Surgeon(s) and Role:     * Kameron Joseph MD - Primary     * Iraida Young MD PGY-6 - Assisting   Anesthesia: General    Estimated blood loss: 20 ml   Drains: None   Specimens:   ID Type Source Tests Collected by Time Destination   A : Partial Gastrectomy Tissue Stomach, Body SURGICAL PATHOLOGY EXAM Kameron Joseph MD 3/13/2018  9:55 AM       Findings: None.   Complications: None.   Implants: None.         BOUGIE SIZE: 40 FR  DISTANCE FROM PYLORUS: 10 CM  STAPLE LINE REINFORCEMENT: NO  STAPLE LINE OVERSEW: NO  COMORBIDITIES:   Past Medical History:   Diagnosis Date     Bee sting allergy      Depressive disorder      Generalized anxiety disorder 10/15/2009    lexapro made things worse.       Morbid obesity (H)      MARIA GUADALUPE (obstructive sleep apnea)- severe (AHI 84) 2011     Voice fatigue 10/22/2009       INDICATIONS FOR PROCEDURE  Sunshine Delgado is a 53 year old female who is morbidly obese.  Numerous weight loss attempts without surgery have been without success.     After understanding the risks and benefits of proceeding with a laparoscopic vertical sleeve gastrectomy, she agreed to an operation as outlined by me.    I reviewed the risks of surgery with Sunshine Delgado.    These include, but are not limited to, death, myocardial infarction, pneumonia, urinary tract infection, deep venous thrombosis with or without pulmonary embolus, abdominal infection from bowel injury or abscess, bowel obstruction, wound infection, and bleeding.    More specific risks related to vertical sleeve gastrectomy were detailed at the bariatric informational seminar and include the followin.) leak at the vertical sleeve staple line, 2.) stricture in the  "sleeve, 3.) nausea, vomiting, and dehydration for several months, 4.) adhesions causing bowel obstruction, 5.) rapid weight loss causing a higher rate of gallstone formation during the first 6 months after surgery, 6.) decreased absorption of vitamins because of the reduced stomach size, 7.) weight regain if inappropriate food intake occurs.    The BMI that we are treating this patient for was measured at the initial consultation visit in our bariatric program and it was: 45.26 kg/m2 (as calculated just below).      The initial consult height, weight, and BMI are as follows:    Height: 5' 5\"  Initial BMI: 45.26  Initial Weight: 123.4 kg (272 lb)    Our weight loss surgery program requires weight loss prior to bariatric surgery and currently the height, weight, and BMI are as follows:    Height: 165.1 cm (5' 5\"), Weight: 119.2 kg (262 lb 12.6 oz), and currently the Body mass index is 43.73 kg/(m^2).    Due to the patient's comorbidity conditions of obstructive sleep apnea in association with elevated body mass index, bariatric surgery has been recommended and is being performed today.    OPERATIVE PROCEDURE:     Sunshine Delgado was brought to the operating room and prepared in routine fashion. Under the benefits of general anesthesia, a left upper quadrant Veress needle was inserted and pneumoperitoneum was established using carbon dioxide gas to a maximum pressure of 15 mmHg. A total of five ports were placed into the abdomen.     A liver retractor was placed through the rightmost port and this provided a view of the upper stomach. The operation was started by dividing the short gastric vessels off the greater curvature of the stomach. This dissection was carried up to the angle of His, and ligasure dissector was used for hemostasis.     A bougie (size noted above) was passed into the stomach and I used 4 purple loads of the Endo RADHA stapler device to create a vertical sleeve gastrectomy with the bougie as a " template. The bougie was removed.    The sleeve gastrectomy specimen (partial gastrectomy) was now removed from the abdomen through the 15 mm port.     Hemostasis was secured, and the liver retractor and all ports were removed from the abdomen under direct visualization.     All needle and sponge counts were correct x2 at the end of the operation, and I was present for all critical components of the procedure.     Skin incisions were closed using skin staples, and sterile dressings were placed.     Kameron Joseph MD  Surgery  [phone] (hospital )  [phone] (clinic nurses)

## 2018-03-14 VITALS
RESPIRATION RATE: 18 BRPM | TEMPERATURE: 99.2 F | WEIGHT: 262.79 LBS | HEIGHT: 65 IN | OXYGEN SATURATION: 92 % | BODY MASS INDEX: 43.78 KG/M2 | DIASTOLIC BLOOD PRESSURE: 73 MMHG | SYSTOLIC BLOOD PRESSURE: 149 MMHG

## 2018-03-14 DIAGNOSIS — E66.01 MORBID OBESITY (H): ICD-10-CM

## 2018-03-14 LAB
COPATH REPORT: NORMAL
GLUCOSE BLDC GLUCOMTR-MCNC: 90 MG/DL (ref 70–99)

## 2018-03-14 PROCEDURE — 25000132 ZZH RX MED GY IP 250 OP 250 PS 637: Performed by: SURGERY

## 2018-03-14 PROCEDURE — 25000125 ZZHC RX 250: Performed by: SURGERY

## 2018-03-14 PROCEDURE — 25000128 H RX IP 250 OP 636: Performed by: SURGERY

## 2018-03-14 PROCEDURE — 00000146 ZZHCL STATISTIC GLUCOSE BY METER IP

## 2018-03-14 PROCEDURE — 25000132 ZZH RX MED GY IP 250 OP 250 PS 637

## 2018-03-14 RX ORDER — TOPIRAMATE 25 MG/1
75 TABLET, FILM COATED ORAL AT BEDTIME
Qty: 90 TABLET | Refills: 3 | COMMUNITY
Start: 2018-03-14 | End: 2018-03-22

## 2018-03-14 RX ORDER — ONDANSETRON 2 MG/ML
4 INJECTION INTRAMUSCULAR; INTRAVENOUS ONCE
Status: DISCONTINUED | OUTPATIENT
Start: 2018-03-14 | End: 2018-03-14 | Stop reason: HOSPADM

## 2018-03-14 RX ADMIN — ACETAMINOPHEN 975 MG: 325 TABLET, FILM COATED ORAL at 03:35

## 2018-03-14 RX ADMIN — OXYCODONE HYDROCHLORIDE 10 MG: 5 TABLET ORAL at 02:43

## 2018-03-14 RX ADMIN — DOCUSATE SODIUM 100 MG: 50 LIQUID ORAL at 09:09

## 2018-03-14 RX ADMIN — ACETAMINOPHEN 975 MG: 325 TABLET, FILM COATED ORAL at 15:03

## 2018-03-14 RX ADMIN — OXYCODONE HYDROCHLORIDE 5 MG: 5 TABLET ORAL at 11:08

## 2018-03-14 RX ADMIN — ONDANSETRON 4 MG: 4 TABLET, ORALLY DISINTEGRATING ORAL at 12:56

## 2018-03-14 RX ADMIN — OXYCODONE HYDROCHLORIDE 10 MG: 5 TABLET ORAL at 06:36

## 2018-03-14 RX ADMIN — Medication 50 MG: at 15:09

## 2018-03-14 RX ADMIN — BENZOCAINE, MENTHOL 1 LOZENGE: 15; 3.6 LOZENGE ORAL at 16:31

## 2018-03-14 RX ADMIN — ONDANSETRON 4 MG: 4 TABLET, ORALLY DISINTEGRATING ORAL at 07:45

## 2018-03-14 RX ADMIN — BENZOCAINE, MENTHOL 1 LOZENGE: 15; 3.6 LOZENGE ORAL at 18:37

## 2018-03-14 RX ADMIN — Medication 50 MG: at 09:09

## 2018-03-14 RX ADMIN — BENZOCAINE, MENTHOL 1 LOZENGE: 15; 3.6 LOZENGE ORAL at 15:09

## 2018-03-14 RX ADMIN — ENOXAPARIN SODIUM 40 MG: 40 INJECTION SUBCUTANEOUS at 09:04

## 2018-03-14 NOTE — PLAN OF CARE
Problem: Patient Care Overview  Goal: Plan of Care/Patient Progress Review  Outcome: Improving  Pt having some nausea today. Bariatric team contacted to request additional dose of Zofran. Pt's IV was infiltrated and removed so extra dose given orally with good relief. Pt also mentioned sore throat and Cepacol lozenge given. Voiding in good amounts and following bariatric full liquid restrictions.  Attempted to take in as much as allowed. No emesis. Some neck discomfort and hot packs applied. Let patient know that if she does not feel up to discharging today, that she should let us know so we can notify the team.

## 2018-03-14 NOTE — PLAN OF CARE
Problem: Patient Care Overview  Goal: Plan of Care/Patient Progress Review  Outcome: No Change  Afeb, vitals stable, 02 sats 92-96% on c-pap,( room air), denies pain at start of shift, c/o severe headache around 0230, oxycodone, tylenol and ice packs with some relief, abd lap sites dry, intact with scant amt of old drng, belly soft with positive bowel sounds and gas, no stools, lungs clear, up to bathroom, voided 300cc clear, yellow urine, up walking in halls and sat down in day room, taking cld without nausea, iv infusing, offers no further c/o, appeared to rest/sleep at short intervals, hopes for dc later today

## 2018-03-14 NOTE — TELEPHONE ENCOUNTER
Faxed refill request to [facility] authorizing Topiramate #90 with 3 refills per Any Morris.

## 2018-03-14 NOTE — PLAN OF CARE
Problem: Patient Care Overview  Goal: Plan of Care/Patient Progress Review  Outcome: Improving    VSS. O2 sats in mid 90s on 2 L via NC. Pain controlled with PRN oxycodone and ice packs. Lap sites x5 with marked dried drainage. Tolerating bariatric clears. C/o feeling nauseated upon standing up. Nausea resolved after IV Zofran. Up to bathroom x2 with assist of 1. Voiding without difficulty in adequate amounts. CPAP on overnight. Continue with poc.

## 2018-03-15 ENCOUNTER — CARE COORDINATION (OUTPATIENT)
Dept: CARE COORDINATION | Facility: CLINIC | Age: 53
End: 2018-03-15

## 2018-03-15 NOTE — PLAN OF CARE
Problem: Patient Care Overview  Goal: Discharge Needs Assessment  Outcome: Adequate for Discharge Date Met: 03/14/18    Pt states pain adequately controlled with oral pain meds. Tolerating full liquid diet. Denies N/V. Bariatric team contacted in regards to oral thrush and pt concern about getting strep throat. Pt was seen by team and said ok to discharge home. Discharge instructions reviewed with pt and pt destiny. RN provided additional printed packed from [employer] on diet guidelines after gastric bypass surgery. Pt verbalized understanding of discharge instructions. Prescriptions meds are ready for . Pt left unit around 7.30 pm.

## 2018-03-16 ENCOUNTER — TELEPHONE (OUTPATIENT)
Dept: FAMILY MEDICINE | Facility: CLINIC | Age: 53
End: 2018-03-16

## 2018-03-16 NOTE — TELEPHONE ENCOUNTER
This patient was discharged from [facility] on 03/14/2018.    Discharge Diagnosis: morbid obesity, bariatric surgery status    Follow-up instructions: Follow up in 1-2 weeks. Scheduled for 3/22/18    A follow-up visit has been scheduled.      Number of ED/ER visits in the last 12 months:  1     Please follow-up with patient.    Monet Roger

## 2018-03-19 NOTE — TELEPHONE ENCOUNTER
"ED/Discharge Protocol    \"Hi, my name is Jocelynderik Dillard, a registered nurse, and I am calling on behalf of Dr. Kennedy's office at [facility].  I am calling to follow up and see how things are going for you after your recent visit.\"    \"I see that you were in the (ER/UC/IP) on 3/14.    How are you doing now that you are home?\" good, tired, moving around.    Is patient experiencing symptoms that may require a hospital visit?  no    Discharge Instructions    \"Let's review your discharge instructions.  What is/are the follow-up recommendations?  Pt. Response: see surgeon    \"Were you instructed to make a follow-up appointment?\"  Pt. Response: Yes.  Has appointment been made?   Yes      \"When you see the provider, I would recommend that you bring your discharge instructions with you.    Medications    \"How many new medications are you on since your hospitalization/ED visit?\"    2 or more - denies MTM referral needed  \"How many of your current medicines changed (dose, timing, name, etc.) while you were in the hospital/ED visit?\"   2 or more   \"Do you have questions about your medications?\"   No  \"Were you newly diagnosed with heart failure, COPD, diabetes or did you have a heart attack?\"   No  For patients on insulin: \"Did you start on insulin in the hospital or did you have your insulin dose changed?\"   No    Medication reconciliation completed? Yes    Was MTM referral placed (*Make sure to put transitions as reason for referral)?   No    Call Summary    \"Do you have any questions or concerns about your condition or care plan at the moment?\"    No  Triage nurse advice given: call with concerns    Patient was in ER 1 in the past year (assess appropriateness of ER visits.)      \"If you have questions or things don't continue to improve, we encourage you contact us through the main clinic number, [phone].  Even if the clinic is not open, triage nurses are available 24/7 to help you.     We would like you to know " "that our clinic has extended hours (provide information).  We also have urgent care (provide details on closest location and hours/contact info)\"      \"Thank you for your time and take care!\"        "

## 2018-03-22 ENCOUNTER — ALLIED HEALTH/NURSE VISIT (OUTPATIENT)
Dept: SURGERY | Facility: CLINIC | Age: 53
End: 2018-03-22
Payer: COMMERCIAL

## 2018-03-22 ENCOUNTER — OFFICE VISIT (OUTPATIENT)
Dept: SURGERY | Facility: CLINIC | Age: 53
End: 2018-03-22
Payer: COMMERCIAL

## 2018-03-22 VITALS
BODY MASS INDEX: 41.97 KG/M2 | DIASTOLIC BLOOD PRESSURE: 72 MMHG | OXYGEN SATURATION: 96 % | WEIGHT: 251.9 LBS | TEMPERATURE: 98.6 F | HEART RATE: 67 BPM | HEIGHT: 65 IN | SYSTOLIC BLOOD PRESSURE: 104 MMHG

## 2018-03-22 DIAGNOSIS — K31.83 ACHLORHYDRIA, GASTRIC: Primary | ICD-10-CM

## 2018-03-22 RX ORDER — URSODIOL 300 MG/1
300 CAPSULE ORAL 2 TIMES DAILY
Qty: 180 CAPSULE | Refills: 1 | Status: SHIPPED | OUTPATIENT
Start: 2018-03-22 | End: 2018-10-29

## 2018-03-22 ASSESSMENT — PAIN SCALES - GENERAL: PAINLEVEL: NO PAIN (0)

## 2018-03-22 NOTE — MR AVS SNAPSHOT
MRN:[id number]                      After Visit Summary   3/22/2018    Sunshine Delgado    MRN: [id number]           Visit Information        Provider Department      3/22/2018 11:00 AM Sarah Alba RD M [facility]        Your next 10 appointments already scheduled     Apr 12, 2018 12:00 PM CDT   (Arrive by 11:45 AM)   RETURN BARIATRIC SURGERY with LINDA Nuñez [facility] ([facility])    [address] [address] [phone]              MyChart Information     [employer] gives you secure access to your electronic health record. If you see a primary care provider, you can also send messages to your care team and make appointments. If you have questions, please call your primary care clinic.  If you do not have a primary care provider, please call [phone] and they will assist you.      [employer] is an electronic gateway that provides easy, online access to your medical records. With [employer], you can request a clinic appointment, read your test results, renew a prescription or communicate with your care team.     To access your existing account, please contact your [facility] or call [phone] for assistance.        Care EveryWhere ID     This is your Care EveryWhere ID. This could be used by other organizations to access your [facility] medical records  [id number]        Equal Access to Services     LUCA KELLER : Haddorian guaman Sovadim, waaxda luqadaha, qaybta kaalmada quinn, mary raymundo. So [facility] [phone].    ATENCIÓN: Si habla español, tiene a beard disposición servicios gratuitos de asistencia lingüística. Llame al [phone].    We comply with applicable federal civil rights laws and [place] laws. We do not discriminate on the basis of race, color, national origin, age, disability, sex,  sexual orientation, or gender identity.

## 2018-03-22 NOTE — MR AVS SNAPSHOT
After Visit Summary   3/22/2018    Sunshine Delgado    MRN: [id number]           Patient Information     Date Of Birth          1965        Visit Information        Provider Department      3/22/2018 10:20 AM Kameron Joseph MD [facility] Surgical Weight Management        Today's Diagnoses     Achlorhydria, gastric    -  1       Follow-ups after your visit        Follow-up notes from your care team     Return in about 4 weeks (around 4/19/2018).      Your next 10 appointments already scheduled     Apr 12, 2018 12:00 PM CDT   (Arrive by 11:45 AM)   RETURN BARIATRIC SURGERY with LINDA Nuñez [facility] Surgical [facility] Management ([facility])    [address]  [address] [facility] [address] [phone]              Who to contact     Please call your clinic at [phone] to:    Ask questions about your health    Make or cancel appointments    Discuss your medicines    Learn about your test results    Speak to your doctor            Additional Information About Your Visit        [employer] Information     [employer] gives you secure access to your electronic health record. If you see a primary care provider, you can also send messages to your care team and make appointments. If you have questions, please call your primary care clinic.  If you do not have a primary care provider, please call [phone] and they will assist you.      [employer] is an electronic gateway that provides easy, online access to your medical records. With [employer], you can request a clinic appointment, read your test results, renew a prescription or communicate with your care team.     To access your existing account, please contact your [facility] or call [phone] for assistance.        Care EveryWhere ID     This is your Care EveryWhere ID. This could be used by other organizations to access your [place] medical records  [id number]    "     Your Vitals Were     Pulse Temperature Height Pulse Oximetry BMI (Body Mass Index)       67 98.6  F (37  C) (Oral) 5' 5\" 96% 41.92 kg/m2        Blood Pressure from Last 3 Encounters:   03/22/18 104/72   03/14/18 149/73   02/20/18 116/76    Weight from Last 3 Encounters:   03/22/18 251 lb 14.4 oz   03/13/18 262 lb 12.6 oz   02/27/18 268 lb 8 oz              Today, you had the following     No orders found for display         Today's Medication Changes          These changes are accurate as of 3/22/18 11:14 AM.  If you have any questions, ask your nurse or doctor.               Start taking these medicines.        Dose/Directions    ursodiol 300 MG capsule   Commonly known as:  ACTIGALL   Used for:  Achlorhydria, gastric   Started by:  Kameron Joseph MD        Dose:  300 mg   Take 1 capsule (300 mg) by mouth 2 times daily   Quantity:  180 capsule   Refills:  1         These medicines have changed or have updated prescriptions.        Dose/Directions    citalopram 40 MG tablet   Commonly known as:  celeXA   This may have changed:  when to take this   Used for:  Generalized anxiety disorder, Major depressive disorder, recurrent episode, moderate (H)        Dose:  40 mg   Take 1 tablet (40 mg) by mouth daily   Quantity:  90 tablet   Refills:  3       Multi-vitamin Tabs tablet   This may have changed:  Another medication with the same name was removed. Continue taking this medication, and follow the directions you see here.   Changed by:  Kameron Joseph MD        Dose:  1 tablet   Take 1 tablet by mouth every morning   Refills:  0         Stop taking these medicines if you haven't already. Please contact your care team if you have questions.     oxyCODONE IR 5 MG tablet   Commonly known as:  ROXICODONE   Stopped by:  Kameron Joseph MD           topiramate 25 MG tablet   Commonly known as:  TOPAMAX   Stopped by:  Kameron Joseph MD                Where to get your medicines      These " medications were sent to [id number] - [place], [place] - [address]  [address] [address]     Phone:  [phone]     ursodiol 300 MG capsule                Primary Care Provider Office Phone # Fax #    Shana Jett [phone] [address]  [facility] [address]        Equal Access to Services     VASILE KELLER : Hadii aad ku hadasho Soomaali, waaxda luqadaha, qaybta kaalmada adeegyada, waxay idiin hayaan adeeg kharash la'aan . So [facility] [phone].    ATENCIÓN: Si habla español, tiene a beard disposición servicios gratuitos de asistencia lingüística. Amyame al [phone].    We comply with applicable federal civil rights laws and [place] laws. We do not discriminate on the basis of race, color, national origin, age, disability, sex, sexual orientation, or gender identity.            Thank you!     Thank you for choosing [facility] SURGICAL WEIGHT MANAGEMENT  for your care. Our goal is always to provide you with excellent care. Hearing back from our patients is one way we can continue to improve our services. Please take a few minutes to complete the written survey that you may receive in the mail after your visit with us. Thank you!             Your Updated Medication List - Protect others around you: Learn how to safely use, store and throw away your medicines at www.disposemymeds.org.          This list is accurate as of 3/22/18 11:14 AM.  Always use your most recent med list.                   Brand Name Dispense Instructions for use Diagnosis    buPROPion 150 MG 24 hr tablet    WELLBUTRIN XL    90 tablet    Take 1 tablet (150 mg) by mouth every morning    Generalized anxiety disorder, Major depressive disorder, recurrent episode, moderate (H)       citalopram 40 MG tablet    celeXA    90 tablet    Take 1 tablet (40 mg) by mouth daily    Generalized anxiety disorder, Major depressive disorder, recurrent episode, moderate (H)       EPINEPHrine 0.3  MG/0.3ML injection 2-pack    EPIPEN/ADRENACLICK/or ANY BX GENERIC EQUIV    0.6 mL    Inject 0.3 mLs (0.3 mg) into the muscle as needed for anaphylaxis    Bee sting allergy       ibuprofen 600 MG tablet    ADVIL/MOTRIN     Take 600 mg by mouth as needed        Multi-vitamin Tabs tablet      Take 1 tablet by mouth every morning        order for DME      Resmed Airsense 10 auto cpap 6-7 cm, Airfit P10 for her XS pillows        senna 8.6 MG tablet    SENOKOT    40 tablet    Take 2 tablets by mouth daily    Drug-induced constipation       traZODone 50 MG tablet    DESYREL    90 tablet    Take 1 tablet (50 mg) by mouth At Bedtime    Insomnia due to other mental disorder       TYLENOL 325 MG tablet   Generic drug:  acetaminophen           ursodiol 300 MG capsule    ACTIGALL    180 capsule    Take 1 capsule (300 mg) by mouth 2 times daily    Achlorhydria, gastric       VITAMIN C PO      Take by mouth every morning        VITAMIN D PO      Take by mouth every morning

## 2018-03-22 NOTE — PROGRESS NOTES
"Postoperative bariatric surgery visit.    Patient underwent sleeve gastrectomy 9 days ago.      Tolerating liquids: Yes  Lightheadedness: No  Abdominal pain: No  Bowel movements: Yes  Fevers/shakes/chills: No    /72  Pulse 67  Temp 98.6  F (37  C) (Oral)  Ht 1.651 m (5' 5\")  Wt 114.3 kg (251 lb 14.4 oz)  SpO2 96%  BMI 41.92 kg/m2  NAD  Overall looks comfortable with good energy  Incisions c/d/i; Steri-strips still in place    Plan:  1. RD visit today.  2. Start vitamin supplements per RD directions.  3. Advance diet per RD directions.  4. Start ursodiol for 6 months to prevent gallstone formation  5. Follow-up: 3-4 weeks.    Kameron Joseph MD  Surgery  [phone] (hospital )  [phone] (clinic nurses)                "

## 2018-03-22 NOTE — NURSING NOTE
"(   Chief Complaint   Patient presents with     Surgical Followup     1 wk post op LSG 3/13/18    )    ( Weight: 251 lb 14.4 oz )  ( Height: 5' 5\" )  ( BMI (Calculated): 42.01 )  ( Initial Weight: 272 lb )  ( Cumulative weight loss (lbs): 20.1 )  ( Last Visits Weight: 272 lb )  ( Wt change since last visit (lbs): -20.1 )  (   )  (   )    ( BP: 104/72 )  (   )  ( Temp: 98.6  F (37  C) )  ( Temp src: Oral )  ( Pulse: 67 )  (   )  ( SpO2: 96 % )    (   Patient Active Problem List   Diagnosis     Generalized anxiety disorder     CARDIOVASCULAR SCREENING; LDL GOAL LESS THAN 160     MARIA GUADALUPE (obstructive sleep apnea)- severe (AHI 84)     Morbid obesity (H)     Health Care Home     Mild major depression (H)     Insomnia     Bee sting allergy    )  (   Current Outpatient Prescriptions   Medication Sig Dispense Refill     senna (SENOKOT) 8.6 MG tablet Take 2 tablets by mouth daily 40 tablet 0     buPROPion (WELLBUTRIN XL) 150 MG 24 hr tablet Take 1 tablet (150 mg) by mouth every morning 90 tablet 3     traZODone (DESYREL) 50 MG tablet Take 1 tablet (50 mg) by mouth At Bedtime 90 tablet 3     EPINEPHrine (EPIPEN/ADRENACLICK/OR ANY BX GENERIC EQUIV) 0.3 MG/0.3ML injection 2-pack Inject 0.3 mLs (0.3 mg) into the muscle as needed for anaphylaxis 0.6 mL 3     citalopram (CELEXA) 40 MG tablet Take 1 tablet (40 mg) by mouth daily (Patient taking differently: Take 40 mg by mouth At Bedtime ) 90 tablet 3     acetaminophen (TYLENOL) 325 MG tablet        order for DME Resmed Airsense 10 auto cpap 6-7 cm, Airfit P10 for her XS pillows       multivitamin, therapeutic with minerals (MULTI-VITAMIN) TABS tablet Take 1 tablet by mouth every morning       Ascorbic Acid (VITAMIN C PO) Take by mouth every morning       Cholecalciferol (VITAMIN D PO) Take by mouth every morning       ibuprofen (ADVIL/MOTRIN) 600 MG tablet Take 600 mg by mouth as needed       )  ( Diabetes Eval:    )    ( Pain Eval:  No Pain (0) )    ( Wound Eval:      "  )    (   History   Smoking Status     Never Smoker   Smokeless Tobacco     Never Used    )    ( Signed By:  Elizabeth Nicholson; March 22, 2018; 10:38 AM )

## 2018-03-22 NOTE — LETTER
"3/22/2018       RE: Sunshine Delgado  [address] [place] [id number]     Dear Colleague,    Thank you for referring your patient, Sunshine Delagdo, to the [facility] SURGICAL WEIGHT MANAGEMENT at [facility]. Please see a copy of my visit note below.    Postoperative bariatric surgery visit.    Patient underwent sleeve gastrectomy 9 days ago.      Tolerating liquids: Yes  Lightheadedness: No  Abdominal pain: No  Bowel movements: Yes  Fevers/shakes/chills: No    /72  Pulse 67  Temp 98.6  F (37  C) (Oral)  Ht 1.651 m (5' 5\")  Wt 114.3 kg (251 lb 14.4 oz)  SpO2 96%  BMI 41.92 kg/m2  NAD  Overall looks comfortable with good energy  Incisions c/d/i; Steri-strips still in place    Plan:  1. RD visit today.  2. Start vitamin supplements per RD directions.  3. Advance diet per RD directions.  4. Start ursodiol for 6 months to prevent gallstone formation  5. Follow-up: 3-4 weeks.        Again, thank you for allowing me to participate in the care of your patient.      Sincerely,    Kameron Joseph MD      "

## 2018-03-22 NOTE — PROGRESS NOTES
Nutrition Assessment  Reason For Visit:  Sunshine Delgado is a 53 year old female presenting today for nutrition follow-up, 1 week s/p SG (3/13/18) with Dr. Joseph. Pt referred by Dr. Joseph (3/22/18)     Anthropometrics  Initial Consult Weight: 272 lbs  Day of Surgery Weight: 262.8 lbs  Current Weight: 251.9 lbs  Weight loss: -20.1 lbs from initial consult; -10.9 from day of surgery    Current Vitamins/Minerals: None     Nutrition History  Pt reports consuming and tolerating bariatric clear and low-fat full liquid diets. Pt reports mixing protein powder into cream of wheat and pudding as able. Fluid intake appears adequate, consuming 48-64 oz/day.    Nutrition Prescription:  Grams Protein: 60 (minimum)  Amount of Fluid: 48-64 oz    Nutrition Diagnosis  Food and nutrition-related knowledge deficit r/t lack of prior exposure to diet advancements beyond bariatric low-fat full liquid diet aeb pt unable to verbalize understanding of bariatric pureed and soft diets.    Intervention  Intervention At Appointment:  Materials/education provided on bariatric pureed and soft diets, protein intake, fluid intake, eating pace, portion control, avoiding excess sugar and fat, recommended vitamin/mineral supplements.    Patient Understanding: Good    Expected Compliance: Good     Goals:  1) Follow bariatric low-fat full liquid diet through day 13 post-op, then to progress to pureed diet x 2 weeks (3/27).  If tolerating, may advance on day 29 post-op to bariatric soft diet (4/10).   2) Work towards 60 gm protein/day.  3) Consume 48-64 oz fluids daily- between meals.  4) Eat slowly (>20 min/meal), chewing well to smooth consistency once on the bariatric soft diet.  5) Limit portions to 1/2 cup/meal.  6) Start chewable/liquid multivitamin/minerals twice daily.    Follow-Up: 3 weeks or prn    Time spent with patient: 15 minutes.  Kaity Alba, [place], LD

## 2018-04-12 ENCOUNTER — OFFICE VISIT (OUTPATIENT)
Dept: SURGERY | Facility: CLINIC | Age: 53
End: 2018-04-12
Payer: COMMERCIAL

## 2018-04-12 VITALS
SYSTOLIC BLOOD PRESSURE: 114 MMHG | DIASTOLIC BLOOD PRESSURE: 74 MMHG | BODY MASS INDEX: 40.6 KG/M2 | HEIGHT: 65 IN | OXYGEN SATURATION: 95 % | HEART RATE: 68 BPM | TEMPERATURE: 98.6 F | WEIGHT: 243.7 LBS

## 2018-04-12 DIAGNOSIS — Z98.84 S/P LAPAROSCOPIC SLEEVE GASTRECTOMY: Primary | ICD-10-CM

## 2018-04-12 RX ORDER — CYANOCOBALAMIN 1000 UG/ML
1 INJECTION, SOLUTION INTRAMUSCULAR; SUBCUTANEOUS ONCE
Qty: 1 ML | Refills: 0 | OUTPATIENT
Start: 2018-04-12 | End: 2018-04-12

## 2018-04-12 RX ORDER — CYANOCOBALAMIN 1000 UG/ML
1 INJECTION, SOLUTION INTRAMUSCULAR; SUBCUTANEOUS
Qty: 1 ML | Refills: 11 | Status: SHIPPED | OUTPATIENT
Start: 2018-04-12 | End: 2019-04-12

## 2018-04-12 NOTE — LETTER
2018       RE: Sunshine Delgado  [address] [place] [id number]     Dear Colleague,    Thank you for referring your patient, Sunshine Delgado, to the [facility] SURGICAL WEIGHT MANAGEMENT at [facility]. Please see a copy of my visit note below.    Return Bariatric Surgery Note    RE: Sunshine Delgado  MR#: [id number]  : 1965  VISIT DATE: 2018    Dear Shana Jett,    I had the pleasure of seeing your patient, Sunshine Delgado, in my post-bariatric surgery assessment clinic.    CHIEF COMPLAINT: Post-bariatric surgery follow-up. 1 month follow up.    HISTORY OF PRESENT ILLNESS:  Questions Regarding Prior Weight Loss Surgery Reviewed With Patient 2018   I had the following weight loss procedure: Sleeve Gastrectomy   What year was your surgery? 2018   How has your weight changed since your last visit? I have lost weight   Are you currently taking any weight loss medications? No   Do you currently have any of the following: Sleep Apnea   Have you been to the Emergency room since your last visit with us? No   Were you in the hospital since your last visit with us? No   Do you have any concerns today? no       Weight History:     2018   What is your highest lifetime weight? 280   What is your lowest weight since surgery? (In pounds) 242     Initial Weight: 272 lb  Current Weight: Weight: 243 lb 11.2 oz  Cumulative weight loss (lbs): 28.3  Last Visits Weight: 251 lb 14.4 oz    Questions Regarding Co-Morbidities and Health Concerns Reviewed With Patient 2018   Pre-diabetes: Never   Diabetes II: Never   High Blood Pressure: Never   High cholesterol: Never   Heartburn/Reflux: Never   Sleep apnea: Stayed the same   Do you use a CPAP? Yes   PCOS: Never   Back pain: Never   Joint pain: Improved   Lower leg swelling: Improved       Eating Habits 2018   How many meals do you eat per day? 3   Do you snack between meals? Yes   How much food are you  "eating at each meal? Less than 1/2 cup   Are you able to separate your meals and liquids by at least 30 minutes? Yes   Are you able to avoid liquid calories? Yes       Exercise Questions Reviewed With Patient 4/12/2018   How often do you exercise? 3 to 4 times per week   What is the duration of your exercise (in minutes)? 20 Minutes   What types of exercise do you do? walking   What keeps you from being more active?  I am as active as I can possbily be       Social History:      4/12/2018   Are you smoking? No   Are you drinking alcohol? No       Medications:  Current Outpatient Prescriptions   Medication     ursodiol (ACTIGALL) 300 MG capsule     multivitamin, therapeutic with minerals (MULTI-VITAMIN) TABS tablet     Ascorbic Acid (VITAMIN C PO)     Cholecalciferol (VITAMIN D PO)     buPROPion (WELLBUTRIN XL) 150 MG 24 hr tablet     traZODone (DESYREL) 50 MG tablet     EPINEPHrine (EPIPEN/ADRENACLICK/OR ANY BX GENERIC EQUIV) 0.3 MG/0.3ML injection 2-pack     citalopram (CELEXA) 40 MG tablet     acetaminophen (TYLENOL) 325 MG tablet     ibuprofen (ADVIL/MOTRIN) 600 MG tablet     order for DME     No current facility-administered medications for this visit.          4/12/2018   Do you avoid NSAIDs such as (Ibuprofen, Aleve, Naproxen, Advil)?   Yes       ROS:  GI:      4/12/2018   Vomiting: No   Diarrhea: No   Constipation: No   Swallowing trouble: No   Abdominal pain: No   Heartburn: No   Rash in skin folds: No   Depression: No   Stress urinary incontinence No     Skin:   BAR RBS ROS - SKIN 4/12/2018   Rash in skin folds: No     Psych:      4/12/2018   Depression: No   Anxiety: No     Female Only:   BAR RBS ROS - FEMALE ONLY 4/12/2018   Female only: None of the above       LABS/IMAGING/MEDICAL RECORDS REVIEW:     PHYSICAL EXAMINATION:  /74  Pulse 68  Temp 98.6  F (37  C) (Oral)  Ht 5' 5\"  Wt 243 lb 11.2 oz  SpO2 95%  BMI 40.55 kg/m2   General: No apparent distress  Neuro: A & O x 3  Head: Atraumatic, " normocephalic  Eyes: PERRL, EOMI  Skin: warm and dry, no rashes on exposed skin  Respiratory: respirations unlabored  Abdomen: soft NT ND   Extremities: No LE swelling    ASSESSMENT AND PLAN:      1. 1 months status laparoscopic gastric sleeve. She is doing well.   2. Morbid Obesity current BMI: Body mass index is 40.55 kg/(m^2).  3. Post surgical malabsorption:   Labs ordered per protocol.   Follow food plan per dietitian recommendations.   Continue taking recommended post-op vitamins.  4. Return to clinic in 2 months with labs  5. B12 injection today and prescription sent  6. Increase water intake    I spent a total of 15 minutes face to face with Sunshine during today's office visit. Over 50% of this time was spent counseling the patient and/or coordinating care.    Again, thank you for allowing me to participate in the care of your patient.      Sincerely,    Any Morris PA-C

## 2018-04-12 NOTE — PROGRESS NOTES
Return Bariatric Surgery Note    RE: Sunshine Delgado  MR#: [id number]  : 1965  VISIT DATE: 2018    Dear Shana Jett,    I had the pleasure of seeing your patient, Sunshine Delgado, in my post-bariatric surgery assessment clinic.    CHIEF COMPLAINT: Post-bariatric surgery follow-up. 1 month follow up.    HISTORY OF PRESENT ILLNESS:  Questions Regarding Prior Weight Loss Surgery Reviewed With Patient 2018   I had the following weight loss procedure: Sleeve Gastrectomy   What year was your surgery? 2018   How has your weight changed since your last visit? I have lost weight   Are you currently taking any weight loss medications? No   Do you currently have any of the following: Sleep Apnea   Have you been to the Emergency room since your last visit with us? No   Were you in the hospital since your last visit with us? No   Do you have any concerns today? no       Weight History:     2018   What is your highest lifetime weight? 280   What is your lowest weight since surgery? (In pounds) 242     Initial Weight: 272 lb  Current Weight: Weight: 243 lb 11.2 oz  Cumulative weight loss (lbs): 28.3  Last Visits Weight: 251 lb 14.4 oz    Questions Regarding Co-Morbidities and Health Concerns Reviewed With Patient 2018   Pre-diabetes: Never   Diabetes II: Never   High Blood Pressure: Never   High cholesterol: Never   Heartburn/Reflux: Never   Sleep apnea: Stayed the same   Do you use a CPAP? Yes   PCOS: Never   Back pain: Never   Joint pain: Improved   Lower leg swelling: Improved       Eating Habits 2018   How many meals do you eat per day? 3   Do you snack between meals? Yes   How much food are you eating at each meal? Less than 1/2 cup   Are you able to separate your meals and liquids by at least 30 minutes? Yes   Are you able to avoid liquid calories? Yes       Exercise Questions Reviewed With Patient 2018   How often do you exercise? 3 to 4 times per week   What is the duration of your  "exercise (in minutes)? 20 Minutes   What types of exercise do you do? walking   What keeps you from being more active?  I am as active as I can possbily be       Social History:      4/12/2018   Are you smoking? No   Are you drinking alcohol? No       Medications:  Current Outpatient Prescriptions   Medication     ursodiol (ACTIGALL) 300 MG capsule     multivitamin, therapeutic with minerals (MULTI-VITAMIN) TABS tablet     Ascorbic Acid (VITAMIN C PO)     Cholecalciferol (VITAMIN D PO)     buPROPion (WELLBUTRIN XL) 150 MG 24 hr tablet     traZODone (DESYREL) 50 MG tablet     EPINEPHrine (EPIPEN/ADRENACLICK/OR ANY BX GENERIC EQUIV) 0.3 MG/0.3ML injection 2-pack     citalopram (CELEXA) 40 MG tablet     acetaminophen (TYLENOL) 325 MG tablet     ibuprofen (ADVIL/MOTRIN) 600 MG tablet     order for DME     No current facility-administered medications for this visit.          4/12/2018   Do you avoid NSAIDs such as (Ibuprofen, Aleve, Naproxen, Advil)?   Yes       ROS:  GI:      4/12/2018   Vomiting: No   Diarrhea: No   Constipation: No   Swallowing trouble: No   Abdominal pain: No   Heartburn: No   Rash in skin folds: No   Depression: No   Stress urinary incontinence No     Skin:   BAR RBS ROS - SKIN 4/12/2018   Rash in skin folds: No     Psych:      4/12/2018   Depression: No   Anxiety: No     Female Only:   BAR RBS ROS - FEMALE ONLY 4/12/2018   Female only: None of the above       LABS/IMAGING/MEDICAL RECORDS REVIEW:     PHYSICAL EXAMINATION:  /74  Pulse 68  Temp 98.6  F (37  C) (Oral)  Ht 5' 5\"  Wt 243 lb 11.2 oz  SpO2 95%  BMI 40.55 kg/m2   General: No apparent distress  Neuro: A & O x 3  Head: Atraumatic, normocephalic  Eyes: PERRL, EOMI  Skin: warm and dry, no rashes on exposed skin  Respiratory: respirations unlabored  Abdomen: soft NT ND   Extremities: No LE swelling    ASSESSMENT AND PLAN:      1. 1 months status laparoscopic gastric sleeve. She is doing well.   2. Morbid Obesity current BMI: Body " mass index is 40.55 kg/(m^2).  3. Post surgical malabsorption:   Labs ordered per protocol.   Follow food plan per dietitian recommendations.   Continue taking recommended post-op vitamins.  4. Return to clinic in 2 months with labs  5. B12 injection today and prescription sent  6. Increase water intake        Sincerely,      Any Morris PA-C    I spent a total of 15 minutes face to face with Sunshine during today's office visit. Over 50% of this time was spent counseling the patient and/or coordinating care.

## 2018-04-12 NOTE — NURSING NOTE
The following medication was given:     MEDICATION: Vitamin B12  1000 mcg  ROUTE: SQ  SITE: RLQ - Abd.  DOSE: 1 ml  LOT #: [id number]  :  [employer]  EXPIRATION DATE:  03/19  NDC#: 93389-993-16

## 2018-04-12 NOTE — NURSING NOTE
"(   Chief Complaint   Patient presents with     RECHECK     S/P LSG 3/13/18    )    ( Weight: 243 lb 11.2 oz )  ( Height: 5' 5\" )  ( BMI (Calculated): 40.64 )  ( Initial Weight: 272 lb )  ( Cumulative weight loss (lbs): 28.3 )  ( Last Visits Weight: 251 lb 14.4 oz )  ( Wt change since last visit (lbs): -8.2 )  (   )  (   )    ( BP: 114/74 )  (   )  ( Temp: 98.6  F (37  C) )  ( Temp src: Oral )  ( Pulse: 68 )  (   )  ( SpO2: 95 % )    (   Patient Active Problem List   Diagnosis     Generalized anxiety disorder     CARDIOVASCULAR SCREENING; LDL GOAL LESS THAN 160     MARIA GUADALUPE (obstructive sleep apnea)- severe (AHI 84)     Morbid obesity (H)     Health Care Home     Mild major depression (H)     Insomnia     Bee sting allergy    )  (   Current Outpatient Prescriptions   Medication Sig Dispense Refill     ursodiol (ACTIGALL) 300 MG capsule Take 1 capsule (300 mg) by mouth 2 times daily 180 capsule 1     multivitamin, therapeutic with minerals (MULTI-VITAMIN) TABS tablet Take 1 tablet by mouth every morning       Ascorbic Acid (VITAMIN C PO) Take by mouth every morning       Cholecalciferol (VITAMIN D PO) Take by mouth every morning       buPROPion (WELLBUTRIN XL) 150 MG 24 hr tablet Take 1 tablet (150 mg) by mouth every morning 90 tablet 3     traZODone (DESYREL) 50 MG tablet Take 1 tablet (50 mg) by mouth At Bedtime 90 tablet 3     EPINEPHrine (EPIPEN/ADRENACLICK/OR ANY BX GENERIC EQUIV) 0.3 MG/0.3ML injection 2-pack Inject 0.3 mLs (0.3 mg) into the muscle as needed for anaphylaxis 0.6 mL 3     citalopram (CELEXA) 40 MG tablet Take 1 tablet (40 mg) by mouth daily (Patient taking differently: Take 40 mg by mouth At Bedtime ) 90 tablet 3     acetaminophen (TYLENOL) 325 MG tablet        ibuprofen (ADVIL/MOTRIN) 600 MG tablet Take 600 mg by mouth as needed        order for DME Resmed Airsense 10 auto cpap 6-7 cm, Airfit P10 for her XS pillows      )  ( Diabetes Eval:    )    ( Pain Eval:  Data Unavailable )    ( Wound Eval:   "     )    (   History   Smoking Status     Never Smoker   Smokeless Tobacco     Never Used    )    ( Signed By:  Elizabeth Nicholson; April 12, 2018; 12:14 PM )

## 2018-04-17 ENCOUNTER — MYC MEDICAL ADVICE (OUTPATIENT)
Dept: FAMILY MEDICINE | Facility: CLINIC | Age: 53
End: 2018-04-17

## 2018-04-17 NOTE — TELEPHONE ENCOUNTER
Panel Management Review      Composite cancer screening  Chart review shows that this patient is due/due soon for the following Mammogram and Colonoscopy  Summary:    Patient is due/failing the following:   COLONOSCOPY and MAMMOGRAM    Action needed:   Patient needs office visit for mammogram and colonoscopy.    Type of outreach:    Sent [employer] message.    Questions for provider review:    None                                                                                                                                      Luci Ocasio MA       Chart routed to Care Team .

## 2018-04-17 NOTE — LETTER
[facility]  [address] [facility] [address] [phone]                                                                                                April 24, 2018    Sunshine Deglado  [facility] [place] [address]        Dear Ms. Delgado,      In order to ensure we are providing the best quality care, we have reviewed your chart and see that you are due for:    1. Mammogram (scheduling line [phone])  2.  Colonoscopy ( [facility] [phone]) OR [facility] [phone]    Please call the clinic at your earliest convenience to schedule an appointment.    Thank you for trusting us with your health care.    Sincerely,    [facility] Patient Care Team/viri

## 2018-07-24 ENCOUNTER — HOSPITAL ENCOUNTER (EMERGENCY)
Facility: CLINIC | Age: 53
Discharge: HOME | End: 2018-07-25
Attending: EMERGENCY MEDICINE | Admitting: EMERGENCY MEDICINE
Payer: COMMERCIAL

## 2018-07-24 DIAGNOSIS — R10.11 RUQ ABDOMINAL PAIN: ICD-10-CM

## 2018-07-24 LAB
CREAT BLD-MCNC: 1.1 MG/DL (ref 0.52–1.04)
GFR SERPL CREATININE-BSD FRML MDRD: 52 ML/MIN/1.7M2

## 2018-07-24 PROCEDURE — 83690 ASSAY OF LIPASE: CPT | Performed by: EMERGENCY MEDICINE

## 2018-07-24 PROCEDURE — 85025 COMPLETE CBC W/AUTO DIFF WBC: CPT | Performed by: EMERGENCY MEDICINE

## 2018-07-24 PROCEDURE — 82565 ASSAY OF CREATININE: CPT

## 2018-07-24 PROCEDURE — 99284 EMERGENCY DEPT VISIT MOD MDM: CPT | Mod: Z6 | Performed by: EMERGENCY MEDICINE

## 2018-07-24 PROCEDURE — 99284 EMERGENCY DEPT VISIT MOD MDM: CPT | Mod: 25 | Performed by: EMERGENCY MEDICINE

## 2018-07-24 PROCEDURE — 80053 COMPREHEN METABOLIC PANEL: CPT | Performed by: EMERGENCY MEDICINE

## 2018-07-24 PROCEDURE — 81001 URINALYSIS AUTO W/SCOPE: CPT | Performed by: EMERGENCY MEDICINE

## 2018-07-24 NOTE — ED AVS SNAPSHOT
[facility], [employer], Emergency Department    [address] [place] [address]    Phone:  [phone]                                       Sunshine Delgado   MRN: [id number]    Department:  [facility], Emergency Department   Date of Visit:  7/24/2018           After Visit Summary Signature Page     I have received my discharge instructions, and my questions have been answered. I have discussed any challenges I see with this plan with the nurse or doctor.    ..........................................................................................................................................  Patient/Patient Representative Signature      ..........................................................................................................................................  Patient Representative Print Name and Relationship to Patient    ..................................................               ................................................  Date                                            Time    ..........................................................................................................................................  Reviewed by Signature/Title    ...................................................              ..............................................  Date                                                            Time

## 2018-07-24 NOTE — ED AVS SNAPSHOT
[facility], Emergency Department    [address] [facility] [address]    Phone:  [phone]                                       Sunshine Delgado   MRN: [id number]    Department:  [facility], Emergency Department   Date of Visit:  7/24/2018           Patient Information     Date Of Birth          1965        Your diagnoses for this visit were:     RUQ abdominal pain        You were seen by Jerel Bonilla MD.        Discharge Instructions       Please make an appointment to follow up with Your Primary Care Provider in 1-2 days for further evaluation and recommendations.  If your symptoms return please come back to the emergency department for further evaluation.  Please take Tylenol for pain, if needed.    *ABDOMINAL PAIN, UNCERTAIN CAUSE [Female]        Based on your visit today, the exact cause of your abdominal (stomach) pain is not certain. Your condition does not seem serious now; however, the signs of a serious problem may take more time to appear. Therefore, it is important for you to watch for any new symptoms or worsening of your condition.  HOME CARE:  1. Rest until your next exam. No strenuous activities.  2. Eat a diet low in fiber (called a low-residue diet). Foods allowed include refined breads, white rice, fruit and vegetable juices without pulp, tender meats. These foods will pass more easily through the intestine.  3. Avoid fried or fatty foods, dairy, alcohol and spicy foods until your symptoms go away.  FOLLOW UP with your doctor or this facility as instructed, or if your pain does not begin to improve in the next 24 hours.   GET PROMPT MEDICAL ATTENTION if any of the following occur:    Pain gets worse or moves to the right lower abdomen    New or worsening vomiting or diarrhea    Swelling of the abdomen    Unable to pass stool for more than three days    New fever over 101  F (38.3 C), or rising fever    Blood in vomit or bowel movements (dark red or black color)    Jaundice  (yellow color of eyes and skin)    Weakness, dizziness or fainting    Chest, arm, back, neck or jaw pain    Unexpected vaginal bleeding or missed period    [phone] [employer], [address] [place] [address], [place] [address]. All rights reserved. This information is not intended as a substitute for professional medical care. Always follow your healthcare professional's instructions.          24 Hour Appointment Hotline       To make an appointment at any [facility], call [phone] ([phone]). If you don't have a family doctor or clinic, we will help you find one. [facility] are conveniently located to serve the needs of you and your family.             Review of your medicines      Our records show that you are taking the medicines listed below. If these are incorrect, please call your family doctor or clinic.        Dose / Directions Last dose taken    buPROPion 150 MG 24 hr tablet   Commonly known as:  WELLBUTRIN XL   Dose:  150 mg   Quantity:  90 tablet        Take 1 tablet (150 mg) by mouth every morning   Refills:  3        citalopram 40 MG tablet   Commonly known as:  celeXA   Dose:  40 mg   Quantity:  90 tablet        Take 1 tablet (40 mg) by mouth daily   Refills:  3        cyanocobalamin 1000 MCG/ML injection   Commonly known as:  VITAMIN B12   Dose:  1 mL   Quantity:  1 mL        Inject 1 mL (1,000 mcg) Subcutaneous every 30 days   Refills:  11        EPINEPHrine 0.3 MG/0.3ML injection 2-pack   Commonly known as:  EPIPEN/ADRENACLICK/or ANY BX GENERIC EQUIV   Dose:  0.3 mg   Quantity:  0.6 mL        Inject 0.3 mLs (0.3 mg) into the muscle as needed for anaphylaxis   Refills:  3        ibuprofen 600 MG tablet   Commonly known as:  ADVIL/MOTRIN   Dose:  600 mg        Take 600 mg by mouth as needed   Refills:  0        Multi-vitamin Tabs tablet   Dose:  1 tablet        Take 1 tablet by mouth every morning   Refills:  0        order for DME        Resmed Airsense 10 auto cpap 6-7 cm,  Airfit P10 for her XS pillows   Refills:  0        order for DME   Quantity:  12 each        Injection Supplies for Vitamin B12: 3cc syringes w/ 27 gauge needles, 1/2 inch length   Refills:  0        traZODone 50 MG tablet   Commonly known as:  DESYREL   Dose:  50 mg   Quantity:  90 tablet        Take 1 tablet (50 mg) by mouth At Bedtime   Refills:  3        TYLENOL 325 MG tablet   Generic drug:  acetaminophen        Refills:  0        ursodiol 300 MG capsule   Commonly known as:  ACTIGALL   Dose:  300 mg   Quantity:  180 capsule        Take 1 capsule (300 mg) by mouth 2 times daily   Refills:  1        VITAMIN C PO        Take by mouth every morning   Refills:  0        VITAMIN D PO        Take by mouth every morning   Refills:  0                Procedures and tests performed during your visit     CBC with platelets differential    Comprehensive metabolic panel    Creatinine POCT    ISTAT creatinine  POCT    Lipase    Peripheral IV catheter    UA with Microscopic    US Abdomen Limited      Orders Needing Specimen Collection     None      Pending Results     Date and Time Order Name Status Description    7/25/2018 0005 US Abdomen Limited Preliminary             Pending Culture Results     No orders found for last 3 day(s).            Pending Results Instructions     If you had any lab results that were not finalized at the time of your Discharge, you can call the ED Lab Result RN at [phone]. You will be contacted by this team for any positive Lab results or changes in treatment. The nurses are available 7 days a week from 10A to 6:30P.  You can leave a message 24 hours per day and they will return your call.        Thank you for choosing [facility]       Thank you for choosing [facility] for your care. Our goal is always to provide you with excellent care. Hearing back from our patients is one way we can continue to improve our services. Please take a few minutes to complete the written survey that you may receive  in the mail after you visit with us. Thank you!        [employer] Information     [facility] gives you secure access to your electronic health record. If you see a primary care provider, you can also send messages to your care team and make appointments. If you have questions, please call your primary care clinic.  If you do not have a primary care provider, please call [phone] and they will assist you.        Care EveryWhere ID     This is your Care EveryWhere ID. This could be used by other organizations to access your [facility] medical records  [id number]        Equal Access to Services     LUCA KELLER : Wero Jimenez, rian judge, jaxon ball, mary cloud . So [place] -[phone].    ATENCIÓN: Si habla español, tiene a beard disposición servicios gratuitos de asistencia lingüística. Amyame al [phone].    We comply with applicable federal civil rights laws and [place] laws. We do not discriminate on the basis of race, color, national origin, age, disability, sex, sexual orientation, or gender identity.            After Visit Summary       This is your record. Keep this with you and show to your community pharmacist(s) and doctor(s) at your next visit.

## 2018-07-25 ENCOUNTER — APPOINTMENT (OUTPATIENT)
Dept: ULTRASOUND IMAGING | Facility: CLINIC | Age: 53
End: 2018-07-25
Attending: EMERGENCY MEDICINE
Payer: COMMERCIAL

## 2018-07-25 VITALS
OXYGEN SATURATION: 94 % | TEMPERATURE: 97.3 F | HEIGHT: 65 IN | WEIGHT: 213.2 LBS | SYSTOLIC BLOOD PRESSURE: 133 MMHG | BODY MASS INDEX: 35.52 KG/M2 | HEART RATE: 64 BPM | DIASTOLIC BLOOD PRESSURE: 90 MMHG | RESPIRATION RATE: 16 BRPM

## 2018-07-25 LAB
ALBUMIN SERPL-MCNC: 3.9 G/DL (ref 3.4–5)
ALBUMIN UR-MCNC: NEGATIVE MG/DL
ALP SERPL-CCNC: 68 U/L (ref 40–150)
ALT SERPL W P-5'-P-CCNC: 47 U/L (ref 0–50)
ANION GAP SERPL CALCULATED.3IONS-SCNC: 6 MMOL/L (ref 3–14)
APPEARANCE UR: CLEAR
AST SERPL W P-5'-P-CCNC: 21 U/L (ref 0–45)
BASOPHILS # BLD AUTO: 0 10E9/L (ref 0–0.2)
BASOPHILS NFR BLD AUTO: 0.3 %
BILIRUB SERPL-MCNC: 0.4 MG/DL (ref 0.2–1.3)
BILIRUB UR QL STRIP: NEGATIVE
BUN SERPL-MCNC: 23 MG/DL (ref 7–30)
CALCIUM SERPL-MCNC: 9.3 MG/DL (ref 8.5–10.1)
CHLORIDE SERPL-SCNC: 104 MMOL/L (ref 94–109)
CO2 SERPL-SCNC: 30 MMOL/L (ref 20–32)
COLOR UR AUTO: NORMAL
CREAT SERPL-MCNC: 1.14 MG/DL (ref 0.52–1.04)
DIFFERENTIAL METHOD BLD: NORMAL
EOSINOPHIL # BLD AUTO: 0.2 10E9/L (ref 0–0.7)
EOSINOPHIL NFR BLD AUTO: 1.8 %
ERYTHROCYTE [DISTWIDTH] IN BLOOD BY AUTOMATED COUNT: 14.9 % (ref 10–15)
GFR SERPL CREATININE-BSD FRML MDRD: 50 ML/MIN/1.7M2
GLUCOSE SERPL-MCNC: 104 MG/DL (ref 70–99)
GLUCOSE UR STRIP-MCNC: NEGATIVE MG/DL
HCT VFR BLD AUTO: 39.8 % (ref 35–47)
HGB BLD-MCNC: 13 G/DL (ref 11.7–15.7)
HGB UR QL STRIP: NEGATIVE
IMM GRANULOCYTES # BLD: 0 10E9/L (ref 0–0.4)
IMM GRANULOCYTES NFR BLD: 0.2 %
KETONES UR STRIP-MCNC: NEGATIVE MG/DL
LEUKOCYTE ESTERASE UR QL STRIP: NEGATIVE
LIPASE SERPL-CCNC: 153 U/L (ref 73–393)
LYMPHOCYTES # BLD AUTO: 2.7 10E9/L (ref 0.8–5.3)
LYMPHOCYTES NFR BLD AUTO: 30.3 %
MCH RBC QN AUTO: 29.9 PG (ref 26.5–33)
MCHC RBC AUTO-ENTMCNC: 32.7 G/DL (ref 31.5–36.5)
MCV RBC AUTO: 92 FL (ref 78–100)
MONOCYTES # BLD AUTO: 0.5 10E9/L (ref 0–1.3)
MONOCYTES NFR BLD AUTO: 5.6 %
NEUTROPHILS # BLD AUTO: 5.4 10E9/L (ref 1.6–8.3)
NEUTROPHILS NFR BLD AUTO: 61.8 %
NITRATE UR QL: NEGATIVE
NRBC # BLD AUTO: 0 10*3/UL
NRBC BLD AUTO-RTO: 0 /100
PH UR STRIP: 6 PH (ref 5–7)
PLATELET # BLD AUTO: 244 10E9/L (ref 150–450)
POTASSIUM SERPL-SCNC: 3.7 MMOL/L (ref 3.4–5.3)
PROT SERPL-MCNC: 7.1 G/DL (ref 6.8–8.8)
RBC # BLD AUTO: 4.35 10E12/L (ref 3.8–5.2)
RBC #/AREA URNS AUTO: 2 /HPF (ref 0–2)
SODIUM SERPL-SCNC: 140 MMOL/L (ref 133–144)
SOURCE: NORMAL
SP GR UR STRIP: 1 (ref 1–1.03)
SQUAMOUS #/AREA URNS AUTO: 1 /HPF (ref 0–1)
UROBILINOGEN UR STRIP-MCNC: NORMAL MG/DL (ref 0–2)
WBC # BLD AUTO: 8.8 10E9/L (ref 4–11)
WBC #/AREA URNS AUTO: 1 /HPF (ref 0–5)

## 2018-07-25 PROCEDURE — 76705 ECHO EXAM OF ABDOMEN: CPT

## 2018-07-25 RX ORDER — ONDANSETRON 2 MG/ML
4 INJECTION INTRAMUSCULAR; INTRAVENOUS EVERY 30 MIN PRN
Status: DISCONTINUED | OUTPATIENT
Start: 2018-07-25 | End: 2018-07-25 | Stop reason: HOSPADM

## 2018-07-25 ASSESSMENT — ENCOUNTER SYMPTOMS
AGITATION: 0
NAUSEA: 1
NECK STIFFNESS: 0
CHILLS: 0
BACK PAIN: 0
DYSURIA: 0
BRUISES/BLEEDS EASILY: 0
DIFFICULTY URINATING: 0
SHORTNESS OF BREATH: 0
POLYDIPSIA: 0
LIGHT-HEADEDNESS: 0
NECK PAIN: 0
ABDOMINAL PAIN: 1
VOMITING: 0
FEVER: 0
ADENOPATHY: 0
COLOR CHANGE: 0

## 2018-07-25 NOTE — DISCHARGE INSTRUCTIONS
Please make an appointment to follow up with Your Primary Care Provider in 1-2 days for further evaluation and recommendations.  If your symptoms return please come back to the emergency department for further evaluation.  Please take Tylenol for pain, if needed.    *ABDOMINAL PAIN, UNCERTAIN CAUSE [Female]        Based on your visit today, the exact cause of your abdominal (stomach) pain is not certain. Your condition does not seem serious now; however, the signs of a serious problem may take more time to appear. Therefore, it is important for you to watch for any new symptoms or worsening of your condition.  HOME CARE:  1. Rest until your next exam. No strenuous activities.  2. Eat a diet low in fiber (called a low-residue diet). Foods allowed include refined breads, white rice, fruit and vegetable juices without pulp, tender meats. These foods will pass more easily through the intestine.  3. Avoid fried or fatty foods, dairy, alcohol and spicy foods until your symptoms go away.  FOLLOW UP with your doctor or this facility as instructed, or if your pain does not begin to improve in the next 24 hours.   GET PROMPT MEDICAL ATTENTION if any of the following occur:    Pain gets worse or moves to the right lower abdomen    New or worsening vomiting or diarrhea    Swelling of the abdomen    Unable to pass stool for more than three days    New fever over 101  F (38.3 C), or rising fever    Blood in vomit or bowel movements (dark red or black color)    Jaundice (yellow color of eyes and skin)    Weakness, dizziness or fainting    Chest, arm, back, neck or jaw pain    Unexpected vaginal bleeding or missed period    [id number] The "[employer]", [address] [place] [address], [place] [address]. All rights reserved. This information is not intended as a substitute for professional medical care. Always follow your healthcare professional's instructions.

## 2018-07-25 NOTE — ED TRIAGE NOTES
Pt presents to ED with sharp sudden pain to her RUQ that started approx an hour a go. Pt states the pain is intermittent. Hx gastric bypass in March of 2018.

## 2018-07-25 NOTE — ED PROVIDER NOTES
History     Chief Complaint   Patient presents with     Abdominal Pain     HPI  Sunshine Delgado is a 53 year old female with a history of ADRIANNE, depression, MARIA GUADALUPE, s/p hysterectomy, and morbid obesity s/p laparoscopic sleeve gastrectomy (03/2018) who presents for evaluation of abdominal pain. Patient complains of sharp, intermittent, moderate, stabbing right-sided abdominal pain that woke her from sleep at 10:30 PM tonight. She has had associated nausea. No vomiting or fever.  Nothing makes it better or worse.  Her dinner tonight was an almond butter protein shake. No history of similar symptoms. She does have a gallbladder. No known history of liver disease.  She denies chest pain, cough, or shortness of breath.  No dysuria.    Past Medical History:   Diagnosis Date     Bee sting allergy      Depressive disorder      Generalized anxiety disorder 10/15/2009    lexapro made things worse.       Morbid obesity (H)      MARIA GUADALUPE (obstructive sleep apnea)- severe (AHI 84) 09/09/2011     Voice fatigue 10/22/2009       Past Surgical History:   Procedure Laterality Date     COLONOSCOPY  2000     DAVINCI GASTRIC SLEEVE       GENITOURINARY SURGERY  2007     HYSTERECTOMY, PAP NO LONGER INDICATED       HYSTERECTOMY, VAGINAL  2007    still has ovaries     LAPAROSCOPIC GASTRIC SLEEVE N/A 3/13/2018    Procedure: LAPAROSCOPIC GASTRIC SLEEVE;  Laparoscopic Sleeve Gastrectomy;  Surgeon: Kameron Joseph MD;  Location:  OR       Family History   Problem Relation Age of Onset     Eye Disorder Mother      lost eyesight; probable macular degeneration     Psychotic Disorder Mother      Dementia /Alzhimers     Neurologic Disorder Mother      seizures     Diabetes Mother      Hypertension Mother      Alzheimer Disease Mother      Arthritis Mother      Osteoperosis Mother      Obesity Mother      Diabetes Father      Depression Father      Hyperlipidemia Father      Obesity Father      Psychotic Disorder Sister      bipolar     Thyroid  Disease Sister      h/o thyroid cancer     Depression Sister      Bipolar     Obesity Sister      Cancer Other      Brain cancer     Osteoperosis Maternal Grandmother      Anxiety Disorder Son      Depression Sister      Thyroid Disease Sister        Social History   Substance Use Topics     Smoking status: Never Smoker     Smokeless tobacco: Never Used     Alcohol use Yes      Comment: 1-2 per mo       Current Facility-Administered Medications   Medication     ondansetron (ZOFRAN) injection 4 mg     Current Outpatient Prescriptions   Medication     acetaminophen (TYLENOL) 325 MG tablet     Ascorbic Acid (VITAMIN C PO)     buPROPion (WELLBUTRIN XL) 150 MG 24 hr tablet     Cholecalciferol (VITAMIN D PO)     citalopram (CELEXA) 40 MG tablet     cyanocobalamin (VITAMIN B12) 1000 MCG/ML injection     EPINEPHrine (EPIPEN/ADRENACLICK/OR ANY BX GENERIC EQUIV) 0.3 MG/0.3ML injection 2-pack     ibuprofen (ADVIL/MOTRIN) 600 MG tablet     multivitamin, therapeutic with minerals (MULTI-VITAMIN) TABS tablet     order for DME     order for DME     traZODone (DESYREL) 50 MG tablet     ursodiol (ACTIGALL) 300 MG capsule        Allergies   Allergen Reactions     Contrast Dye Other (See Comments)     Patient had sneezing and an itchy throat following contrast injection of 100 mL Isovue-370.     Sulfa Drugs Hives     Vicodin [Hydrocodone-Acetaminophen]      Wasps [Hornets] Swelling     Now carries Epi Pen       I have reviewed the Medications, Allergies, Past Medical and Surgical History, and Social History in the Epic system.    Review of Systems   Constitutional: Negative for chills and fever.   HENT: Negative for congestion.    Eyes: Negative for visual disturbance.   Respiratory: Negative for shortness of breath.    Cardiovascular: Negative for chest pain.   Gastrointestinal: Positive for abdominal pain and nausea. Negative for vomiting.   Endocrine: Negative for polydipsia and polyuria.   Genitourinary: Negative for difficulty  "urinating and dysuria.   Musculoskeletal: Negative for back pain, neck pain and neck stiffness.   Skin: Negative for color change.   Allergic/Immunologic: Negative for immunocompromised state.   Neurological: Negative for light-headedness.   Hematological: Negative for adenopathy. Does not bruise/bleed easily.   Psychiatric/Behavioral: Negative for agitation and behavioral problems.       Physical Exam   BP: 111/61  Pulse: 74  Temp: 97.3  F (36.3  C)  Resp: 16  Height: 165.1 cm (5' 5\")  Weight: 96.7 kg (213 lb 3.2 oz)  SpO2: 96 %      Physical Exam   Constitutional: She is oriented to person, place, and time. She appears well-developed and well-nourished. No distress.   HENT:   Head: Normocephalic and atraumatic.   Mouth/Throat: Oropharynx is clear and moist. No oropharyngeal exudate.   Eyes: Conjunctivae and EOM are normal. No scleral icterus.   Neck: Normal range of motion.   Cardiovascular: Normal rate, normal heart sounds and intact distal pulses.    Pulmonary/Chest: Effort normal and breath sounds normal. No respiratory distress. She has no wheezes. She has no rales.   Abdominal: Soft. Bowel sounds are normal. She exhibits no distension. There is tenderness in the right upper quadrant. There is no rebound, no guarding, no CVA tenderness, no tenderness at McBurney's point and negative Kowalski's sign.   Musculoskeletal: Normal range of motion. She exhibits no edema or tenderness.   Neurological: She is alert and oriented to person, place, and time. No cranial nerve deficit. She exhibits normal muscle tone. Coordination normal.   Skin: Skin is warm. No rash noted. She is not diaphoretic.   Psychiatric: She has a normal mood and affect. Her behavior is normal. Judgment and thought content normal.   Nursing note and vitals reviewed.      ED Course     ED Course     Procedures       12:00 AM  The patient was seen and examined by Dr. Bonilla in Room 21.            Critical Care time:  none             Labs Ordered and " Resulted from Time of ED Arrival Up to the Time of Departure from the ED   COMPREHENSIVE METABOLIC PANEL - Abnormal; Notable for the following:        Result Value    Glucose 104 (*)     Creatinine 1.14 (*)     GFR Estimate 50 (*)     All other components within normal limits   CREATININE POCT - Abnormal; Notable for the following:     Creatinine 1.1 (*)     GFR Estimate 52 (*)     All other components within normal limits   CBC WITH PLATELETS DIFFERENTIAL   LIPASE   ROUTINE UA WITH MICROSCOPIC   PERIPHERAL IV CATHETER   ISTAT CREATININE NURSING POCT            Assessments & Plan (with Medical Decision Making)   53 year old woman presenting with RUQ pain and nausea for several hours. Differential diagnosis: acute cholelithiasis, biliary colic, acute pancreatitis, unlikely kidney stone, unlikely volvulus.      After the recent physical exam, the patient appears to be in no acute distress. She declined analgesics at this time. I will treat her nausea with IV Zofran and obtain a RUQ ultrasound for further diagnostic evaluation. She agrees with the plan.     Laboratory studies returned with no leukocytosis, WBC is normal at 8800. There is no anemia, hemoglobin is normal at 13.0. Creatinine is somewhat elevated at 1.14. LFTs and lipase are normal. Urinalysis shows no evidence of infection or hematuria. I reviewed the patient's RUQ ultrasound and I read the Radiology report; no evidence of cholelithiasis.  On reexamination patient's abdomen is soft, nontender, nondistended.  I do not see the benefit of obtaining CT abdomen/pelvis at this time.  This was discussed with the patient and she opted to go home stating that she will come back if her symptoms return.  Her family agrees with this plan as well.           This part of the document was transcribed by Audie Lowry, Medical Scribe.     I have reviewed the nursing notes.    I have reviewed the findings, diagnosis, plan and need for follow up with the patient.    New  Prescriptions    No medications on file       Final diagnoses:   RUQ abdominal pain   Hilda DAVIS, am serving as a trained medical scribe to document services personally performed by Jerel Bonilla MD, based on the provider's statements to me.   Jerel DAVIS MD, was physically present and have reviewed and verified the accuracy of this note documented by Hilda Pierce.    7/24/2018   [facility], [place], EMERGENCY DEPARTMENT     Jerel Bonilla MD  07/25/18 0121

## 2018-07-31 ENCOUNTER — OFFICE VISIT (OUTPATIENT)
Dept: FAMILY MEDICINE | Facility: CLINIC | Age: 53
End: 2018-07-31
Payer: COMMERCIAL

## 2018-07-31 VITALS
OXYGEN SATURATION: 98 % | HEART RATE: 85 BPM | TEMPERATURE: 98.6 F | RESPIRATION RATE: 14 BRPM | WEIGHT: 214.6 LBS | BODY MASS INDEX: 35.75 KG/M2 | HEIGHT: 65 IN

## 2018-07-31 DIAGNOSIS — R51.9 NONINTRACTABLE HEADACHE, UNSPECIFIED CHRONICITY PATTERN, UNSPECIFIED HEADACHE TYPE: ICD-10-CM

## 2018-07-31 DIAGNOSIS — R07.0 THROAT PAIN: Primary | ICD-10-CM

## 2018-07-31 DIAGNOSIS — J01.90 ACUTE SINUSITIS WITH SYMPTOMS > 10 DAYS: ICD-10-CM

## 2018-07-31 LAB
DEPRECATED S PYO AG THROAT QL EIA: NORMAL
SPECIMEN SOURCE: NORMAL

## 2018-07-31 PROCEDURE — 87081 CULTURE SCREEN ONLY: CPT | Performed by: FAMILY MEDICINE

## 2018-07-31 PROCEDURE — 99213 OFFICE O/P EST LOW 20 MIN: CPT | Performed by: FAMILY MEDICINE

## 2018-07-31 PROCEDURE — 87880 STREP A ASSAY W/OPTIC: CPT | Performed by: FAMILY MEDICINE

## 2018-07-31 RX ORDER — FLUTICASONE PROPIONATE 50 MCG
2 SPRAY, SUSPENSION (ML) NASAL DAILY
Qty: 16 G | Refills: 1 | Status: SHIPPED | OUTPATIENT
Start: 2018-07-31 | End: 2022-04-15

## 2018-07-31 NOTE — NURSING NOTE
"Chief Complaint   Patient presents with     URI     Initial Pulse 85  Temp 98.6  F (37  C) (Oral)  Resp 14  Ht 5' 5\" (1.651 m)  Wt 214 lb 9.6 oz (97.3 kg)  SpO2 98%  Breastfeeding? No  BMI 35.71 kg/m2 Estimated body mass index is 35.71 kg/(m^2) as calculated from the following:    Height as of this encounter: 5' 5\" (1.651 m).    Weight as of this encounter: 214 lb 9.6 oz (97.3 kg).  BP completed using cuff size: ricarda Gray  "

## 2018-07-31 NOTE — MR AVS SNAPSHOT
After Visit Summary   7/31/2018    Sunshine Delgado    MRN: [id number]           Patient Information     Date Of Birth          1965        Visit Information        Provider Department      7/31/2018 2:40 PM Camden Conklin MD [facility]        Today's Diagnoses     Throat pain    -  1    Acute sinusitis with symptoms > 10 days        Nonintractable headache, unspecified chronicity pattern, unspecified headache type          Care Instructions    [facility]    If you have any questions regarding to your visit please contact your care team:       Team Purple:   Clinic Hours Telephone Number   Dr. Elaine Alba   7am-7pm  Monday - Thursday   7am-5pm  Fridays  [phone]  (Appointment scheduling available 24/7)    Questions about your recent visit?   Team Line:  [phone]   Urgent Care - [place] and [facility] - 11am-9pm Monday-Friday Saturday-Sunday- 9am-5pm   [place] - 5pm-9pm Monday-Friday Saturday-Sunday- 9am-5pm  [phone] - [place]  [phone] - [place]       What options do I have for a visit other than an office visit? We offer electronic visits (e-visits) and telephone visits, when medically appropriate.  Please check with your medical insurance to see if these types of visits are covered, as you will be responsible for any charges that are not paid by your insurance.      You can use [employer] (secure electronic communication) to access to your chart, send your primary care provider a message, or make an appointment. Ask a team member how to get started.     For a price quote for your services, please call our Consumer Price Line at [phone] or our Imaging Cost estimation line at [phone] (for imaging tests).    Kev Gray    Sinusitis (Antibiotic Treatment)    The sinuses are air-filled spaces within the bones of the face. They connect to the inside of the  nose. Sinusitis is an inflammation of the tissue that lines the sinuses. Sinusitis can occur during a cold. It can also happen due to allergies to pollens and other particles in the air. Sinusitis can cause symptoms of sinus congestion and a feeling of fullness. A sinus infection causes fever, headache, and facial pain. There is often green or yellow fluid draining from the nose or into the back of the throat (post-nasal drip). You have been given antibiotics to treat this condition.  Home care    Take the full course of antibiotics as instructed. Do not stop taking them, even when you feel better.    Drink plenty of water, hot tea, and other liquids. This may help thin nasal mucus. It also may help your sinuses drain fluids.    Heat may help soothe painful areas of your face. Use a towel soaked in hot water. Or,  the shower and direct the warm spray onto your face. Using a vaporizer along with a menthol rub at night may also help soothe symptoms.     An expectorant with guaifenesin may help thin nasal mucus and help your sinuses drain fluids.    You can use an over-the-counter decongestant, unless a similar medicine was prescribed to you. Nasal sprays work the fastest. Use one that contains phenylephrine or oxymetazoline. First blow your nose gently. Then use the spray. Do not use these medicines more often than directed on the label. If you do, your symptoms may get worse. You may also take pills that contain pseudoephedrine. Don t use products that combine multiple medicines. This is because side effects may be increased. Read labels. You can also ask the pharmacist for help. (People with high blood pressure should not use decongestants. They can raise blood pressure.)    Over-the-counter antihistamines may help if allergies contributed to your sinusitis.      Do not use nasal rinses or irrigation during an acute sinus infection, unless your healthcare provider tells you to. Rinsing may spread the  infection to other areas in your sinuses.    Use acetaminophen or ibuprofen to control pain, unless another pain medicine was prescribed to you. If you have chronic liver or kidney disease or ever had a stomach ulcer, talk with your healthcare provider before using these medicines. (Aspirin should never be taken by anyone under age 18 who is ill with a fever. It may cause severe liver damage.)    Don't smoke. This can make symptoms worse.  Follow-up care  Follow up with your healthcare provider or our staff if you are better in 1 week.  When to seek medical advice  Call your healthcare provider if any of these occur:    Facial pain or headache that gets worse    Stiff neck    Unusual drowsiness or confusion    Swelling of your forehead or eyelids    Vision problems, such as blurred or double vision    Fever of 100.4 F (38 C) or higher, or as directed by your healthcare provider    Seizure    Breathing problems    Symptoms don't go away in 10 days  Prevention  Here are steps you can take to help prevent an infection:    Keep good hand washing habits.    Don t have close contact with people who have sore throats, colds, or other upper respiratory infections.    Don t smoke, and stay away from secondhand smoke.    Stay up to date with of your vaccines.  Date Last Reviewed: 11/1/2017 2000-2017 [employer]. [address] [place] [address], [place] [address]. All rights reserved. This information is not intended as a substitute for professional medical care. Always follow your healthcare professional's instructions.                Follow-ups after your visit        Who to contact     If you have questions or need follow up information about today's clinic visit or your schedule please contact [facility] directly at [phone].  Normal or non-critical lab and imaging results will be communicated to you by MyChart, letter or phone within 4 business days after the clinic has received the results. If  "you do not hear from us within 7 days, please contact the clinic through [employer] or phone. If you have a critical or abnormal lab result, we will notify you by phone as soon as possible.  Submit refill requests through [employer] or call your pharmacy and they will forward the refill request to us. Please allow 3 business days for your refill to be completed.          Additional Information About Your Visit        [employer] Information     [employer] gives you secure access to your electronic health record. If you see a primary care provider, you can also send messages to your care team and make appointments. If you have questions, please call your primary care clinic.  If you do not have a primary care provider, please call [phone] and they will assist you.        Care EveryWhere ID     This is your Care EveryWhere ID. This could be used by other organizations to access your [facility] [place] medical records  [id number]        Your Vitals Were     Pulse Temperature Respirations Height Pulse Oximetry Breastfeeding?    85 98.6  F (37  C) (Oral) 14 5' 5\" (1.651 m) 98% No    BMI (Body Mass Index)                   35.71 kg/m2            Blood Pressure from Last 3 Encounters:   07/25/18 133/90   04/12/18 114/74   03/22/18 104/72    Weight from Last 3 Encounters:   07/31/18 214 lb 9.6 oz (97.3 kg)   07/24/18 213 lb 3.2 oz (96.7 kg)   04/12/18 243 lb 11.2 oz (110.5 kg)              We Performed the Following     Beta strep group A culture     Strep, Rapid Screen          Today's Medication Changes          These changes are accurate as of 7/31/18  3:02 PM.  If you have any questions, ask your nurse or doctor.               Start taking these medicines.        Dose/Directions    amoxicillin-clavulanate 875-125 MG per tablet   Commonly known as:  AUGMENTIN   Used for:  Acute sinusitis with symptoms > 10 days   Started by:  Camden Conklin MD        Dose:  1 tablet   Take 1 tablet by mouth 2 times daily   Quantity:  20 tablet "   Refills:  0       fluticasone 50 MCG/ACT spray   Commonly known as:  FLONASE   Used for:  Acute sinusitis with symptoms > 10 days, Nonintractable headache, unspecified chronicity pattern, unspecified headache type   Started by:  Camden Conklin MD        Dose:  2 spray   Spray 2 sprays into both nostrils daily   Quantity:  16 g   Refills:  1         These medicines have changed or have updated prescriptions.        Dose/Directions    citalopram 40 MG tablet   Commonly known as:  celeXA   This may have changed:  when to take this   Used for:  Generalized anxiety disorder, Major depressive disorder, recurrent episode, moderate (H)        Dose:  40 mg   Take 1 tablet (40 mg) by mouth daily   Quantity:  90 tablet   Refills:  3            Where to get your medicines      These medications were sent to [facility] - [place] - [address]  [address], [facility] [address]     Phone:  [phone]     amoxicillin-clavulanate 875-125 MG per tablet    fluticasone 50 MCG/ACT spray                Primary Care Provider Office Phone # Fax #    Shana Jett [phone] [address] [address]        Equal Access to Services     LUCA KELLER AH: Wero lo hadasho Soomaali, waaxda luqadaha, qaybta kaalmada adeegyada, mary raymundo. So [facility] [phone].    ATENCIÓN: Si habla español, tiene a beard disposición servicios gratuitos de asistencia lingüística. [facility] [phone].    We comply with applicable federal civil rights laws and [place] laws. We do not discriminate on the basis of race, color, national origin, age, disability, sex, sexual orientation, or gender identity.            Thank you!     Thank you for choosing [facility]  for your care. Our goal is always to provide you with excellent care. Hearing back from our patients is one way we can continue to improve our services. Please  take a few minutes to complete the written survey that you may receive in the mail after your visit with us. Thank you!             Your Updated Medication List - Protect others around you: Learn how to safely use, store and throw away your medicines at www.disposemymeds.org.          This list is accurate as of 7/31/18  3:02 PM.  Always use your most recent med list.                   Brand Name Dispense Instructions for use Diagnosis    amoxicillin-clavulanate 875-125 MG per tablet    AUGMENTIN    20 tablet    Take 1 tablet by mouth 2 times daily    Acute sinusitis with symptoms > 10 days       buPROPion 150 MG 24 hr tablet    WELLBUTRIN XL    90 tablet    Take 1 tablet (150 mg) by mouth every morning    Generalized anxiety disorder, Major depressive disorder, recurrent episode, moderate (H)       citalopram 40 MG tablet    celeXA    90 tablet    Take 1 tablet (40 mg) by mouth daily    Generalized anxiety disorder, Major depressive disorder, recurrent episode, moderate (H)       cyanocobalamin 1000 MCG/ML injection    VITAMIN B12    1 mL    Inject 1 mL (1,000 mcg) Subcutaneous every 30 days    S/P laparoscopic sleeve gastrectomy       EPINEPHrine 0.3 MG/0.3ML injection 2-pack    EPIPEN/ADRENACLICK/or ANY BX GENERIC EQUIV    0.6 mL    Inject 0.3 mLs (0.3 mg) into the muscle as needed for anaphylaxis    Bee sting allergy       fluticasone 50 MCG/ACT spray    FLONASE    16 g    Spray 2 sprays into both nostrils daily    Acute sinusitis with symptoms > 10 days, Nonintractable headache, unspecified chronicity pattern, unspecified headache type       ibuprofen 600 MG tablet    ADVIL/MOTRIN     Take 600 mg by mouth as needed        Multi-vitamin Tabs tablet      Take 1 tablet by mouth every morning        order for DME      Resmed Airsense 10 auto cpap 6-7 cm, Airfit P10 for her XS pillows        order for DME     12 each    Injection Supplies for Vitamin B12: 3cc syringes w/ 27 gauge needles, 1/2 inch length    S/P  laparoscopic sleeve gastrectomy       traZODone 50 MG tablet    DESYREL    90 tablet    Take 1 tablet (50 mg) by mouth At Bedtime    Insomnia due to other mental disorder       TYLENOL 325 MG tablet   Generic drug:  acetaminophen           ursodiol 300 MG capsule    ACTIGALL    180 capsule    Take 1 capsule (300 mg) by mouth 2 times daily    Achlorhydria, gastric       VITAMIN C PO      Take by mouth every morning        VITAMIN D PO      Take by mouth every morning

## 2018-07-31 NOTE — PROGRESS NOTES
SUBJECTIVE:   Sunshine Delgado is a 53 year old female who presents to clinic today for the following health issues:    RESPIRATORY SYMPTOMS    Duration: x 3 weeks    Description  nasal congestion, sore throat, facial pain/pressure, ear pain left, headache, hoarse voice and conjunctival irritation    Severity: moderate    Accompanying signs and symptoms: None    History (predisposing factors):  exposure to smoke, possible     Precipitating or alleviating factors: sudafed, generic allergy tablet     Therapies tried and outcome:  None    Headache mainly left ear but travels through whole head and facial pressure, teeth hurt too.  Nasal congestion.    Problem list and histories reviewed & adjusted, as indicated.  Additional history: as documented    Patient Active Problem List   Diagnosis     Generalized anxiety disorder     CARDIOVASCULAR SCREENING; LDL GOAL LESS THAN 160     MARIA GUADALUPE (obstructive sleep apnea)- severe (AHI 84)     Morbid obesity (H)     Health Care Home     Mild major depression (H)     Insomnia     Bee sting allergy     Past Surgical History:   Procedure Laterality Date     COLONOSCOPY  2000     DAVINCI GASTRIC SLEEVE       GENITOURINARY SURGERY  2007     HYSTERECTOMY, PAP NO LONGER INDICATED       HYSTERECTOMY, VAGINAL  2007    still has ovaries     LAPAROSCOPIC GASTRIC SLEEVE N/A 3/13/2018    Procedure: LAPAROSCOPIC GASTRIC SLEEVE;  Laparoscopic Sleeve Gastrectomy;  Surgeon: Kameron Joseph MD;  Location:  OR       Social History   Substance Use Topics     Smoking status: Never Smoker     Smokeless tobacco: Never Used     Alcohol use Yes      Comment: 1-2 per mo     Family History   Problem Relation Age of Onset     Eye Disorder Mother      lost eyesight; probable macular degeneration     Psychotic Disorder Mother      Dementia /Alzhimers     Neurologic Disorder Mother      seizures     Diabetes Mother      Hypertension Mother      Alzheimer Disease Mother      Arthritis Mother      Osteoperosis  "Mother      Obesity Mother      Diabetes Father      Depression Father      Hyperlipidemia Father      Obesity Father      Psychotic Disorder Sister      bipolar     Thyroid Disease Sister      h/o thyroid cancer     Depression Sister      Bipolar     Obesity Sister      Cancer Other      Brain cancer     Osteoperosis Maternal Grandmother      Anxiety Disorder Son      Depression Sister      Thyroid Disease Sister            Reviewed and updated as needed this visit by clinical staff  Tobacco  Allergies  Meds  Med Hx  Surg Hx  Fam Hx  Soc Hx      ROS:  Constitutional, HEENT, cardiovascular, pulmonary, gi and gu systems are negative, except as otherwise noted.    OBJECTIVE:     Pulse 85  Temp 98.6  F (37  C) (Oral)  Resp 14  Ht 5' 5\" (1.651 m)  Wt 214 lb 9.6 oz (97.3 kg)  SpO2 98%  Breastfeeding? No  BMI 35.71 kg/m2  Body mass index is 35.71 kg/(m^2).  GENERAL: healthy, alert and no distress.  CRANIAL NERVES: Intact  ENT: Nasal congestion with sinus tenderness  NECK: no adenopathy and thyroid normal to palpation  RESP: lungs clear to auscultation - no rales, rhonchi or wheezes  CV: regular rate and rhythm, no murmur, click or rub  MS: no gross musculoskeletal defects noted, no edema  NEURO: Normal strength and tone, mentation intact and speech normal  PSYCH: mentation appears normal, affect normal/bright    Diagnostic Test Results:  Results for orders placed or performed in visit on 07/31/18   Strep, Rapid Screen   Result Value Ref Range    Specimen Description Throat     Rapid Strep A Screen       NEGATIVE: No Group A streptococcal antigen detected by immunoassay, await culture report.   Beta strep group A culture   Result Value Ref Range    Specimen Description Throat     Culture Micro No beta hemolytic Streptococcus Group A isolated        ASSESSMENT/PLAN:     (R07.0) Throat pain  (primary encounter diagnosis)  Comment: RSS negative. Likely from PND  Plan: Strep, Rapid Screen, Beta strep group A " culture    (J01.90) Acute sinusitis with symptoms > 10 days  Comment: Discussed the nature and pathophysiology of sinusitis and treatment including the role of antibiotics and the need to get over the underlying trigger, URI or allergies.  Plan: amoxicillin-clavulanate (AUGMENTIN) 875-125 MG         per tablet, fluticasone (FLONASE) 50 MCG/ACT         spray    (R51) Nonintractable headache, unspecified chronicity pattern, unspecified headache type  Comment: Sinus headache. Decongestant  Plan: fluticasone (FLONASE) 50 MCG/ACT spray    Call or return to clinic prn if these symptoms worsen or fail to improve as anticipated in 2 weeks.    Camden Conklin MD  [facility]

## 2018-07-31 NOTE — PATIENT INSTRUCTIONS
[facility]    If you have any questions regarding to your visit please contact your care team:       Team Purple:   Clinic Hours Telephone Number   Dr. Elaine Alba   7am-7pm  Monday - Thursday   7am-5pm  Fridays  [phone]  (Appointment scheduling available 24/7)    Questions about your recent visit?   Team Line:  [phone]   Urgent Care - [place] and [facility] - 11am-9pm Monday-Friday Saturday-Sunday- 9am-5pm   [place] - 5pm-9pm Monday-Friday Saturday-Sunday- 9am-5pm  [phone] - [place]  [phone] [facility]       What options do I have for a visit other than an office visit? We offer electronic visits (e-visits) and telephone visits, when medically appropriate.  Please check with your medical insurance to see if these types of visits are covered, as you will be responsible for any charges that are not paid by your insurance.      You can use [employer] (secure electronic communication) to access to your chart, send your primary care provider a message, or make an appointment. Ask a team member how to get started.     For a price quote for your services, please call our Consumer Price Line at [phone] or our Imaging Cost estimation line at [phone] (for imaging tests).    Kev Gray    Sinusitis (Antibiotic Treatment)    The sinuses are air-filled spaces within the bones of the face. They connect to the inside of the nose. Sinusitis is an inflammation of the tissue that lines the sinuses. Sinusitis can occur during a cold. It can also happen due to allergies to pollens and other particles in the air. Sinusitis can cause symptoms of sinus congestion and a feeling of fullness. A sinus infection causes fever, headache, and facial pain. There is often green or yellow fluid draining from the nose or into the back of the throat (post-nasal drip). You have been given antibiotics to treat this condition.  Home  care    Take the full course of antibiotics as instructed. Do not stop taking them, even when you feel better.    Drink plenty of water, hot tea, and other liquids. This may help thin nasal mucus. It also may help your sinuses drain fluids.    Heat may help soothe painful areas of your face. Use a towel soaked in hot water. Or,  the shower and direct the warm spray onto your face. Using a vaporizer along with a menthol rub at night may also help soothe symptoms.     An expectorant with guaifenesin may help thin nasal mucus and help your sinuses drain fluids.    You can use an over-the-counter decongestant, unless a similar medicine was prescribed to you. Nasal sprays work the fastest. Use one that contains phenylephrine or oxymetazoline. First blow your nose gently. Then use the spray. Do not use these medicines more often than directed on the label. If you do, your symptoms may get worse. You may also take pills that contain pseudoephedrine. Don t use products that combine multiple medicines. This is because side effects may be increased. Read labels. You can also ask the pharmacist for help. (People with high blood pressure should not use decongestants. They can raise blood pressure.)    Over-the-counter antihistamines may help if allergies contributed to your sinusitis.      Do not use nasal rinses or irrigation during an acute sinus infection, unless your healthcare provider tells you to. Rinsing may spread the infection to other areas in your sinuses.    Use acetaminophen or ibuprofen to control pain, unless another pain medicine was prescribed to you. If you have chronic liver or kidney disease or ever had a stomach ulcer, talk with your healthcare provider before using these medicines. (Aspirin should never be taken by anyone under age 18 who is ill with a fever. It may cause severe liver damage.)    Don't smoke. This can make symptoms worse.  Follow-up care  Follow up with your healthcare provider or  our staff if you are better in 1 week.  When to seek medical advice  Call your healthcare provider if any of these occur:    Facial pain or headache that gets worse    Stiff neck    Unusual drowsiness or confusion    Swelling of your forehead or eyelids    Vision problems, such as blurred or double vision    Fever of 100.4 F (38 C) or higher, or as directed by your healthcare provider    Seizure    Breathing problems    Symptoms don't go away in 10 days  Prevention  Here are steps you can take to help prevent an infection:    Keep good hand washing habits.    Don t have close contact with people who have sore throats, colds, or other upper respiratory infections.    Don t smoke, and stay away from secondhand smoke.    Stay up to date with of your vaccines.  Date Last Reviewed: 11/1/2017 2000-2017 [employer]. [address] [place] [address], [place] [address]. All rights reserved. This information is not intended as a substitute for professional medical care. Always follow your healthcare professional's instructions.

## 2018-08-01 LAB
BACTERIA SPEC CULT: NORMAL
SPECIMEN SOURCE: NORMAL

## 2018-09-25 DIAGNOSIS — K31.83 ACHLORHYDRIA, GASTRIC: ICD-10-CM

## 2018-09-25 RX ORDER — URSODIOL 300 MG/1
300 CAPSULE ORAL 2 TIMES DAILY
Qty: 180 CAPSULE | Refills: 1 | Status: CANCELLED | OUTPATIENT
Start: 2018-09-25

## 2018-09-25 NOTE — TELEPHONE ENCOUNTER
ursodiol (ACTIGALL) 300 MG capsule      Last Written Prescription Date:  3/22/18  Last Fill Quantity: 180,   # refills: 1  Last Office Visit : 4/12/18  Future Office visit:  None    Routing refill request to provider for review/approval because:  Drug not on the Bariatric Surgery refill protocol.

## 2018-10-01 ENCOUNTER — OFFICE VISIT (OUTPATIENT)
Dept: FAMILY MEDICINE | Facility: CLINIC | Age: 53
End: 2018-10-01
Payer: COMMERCIAL

## 2018-10-01 VITALS
DIASTOLIC BLOOD PRESSURE: 72 MMHG | RESPIRATION RATE: 24 BRPM | WEIGHT: 207 LBS | OXYGEN SATURATION: 96 % | BODY MASS INDEX: 34.49 KG/M2 | TEMPERATURE: 97.1 F | HEIGHT: 65 IN | HEART RATE: 64 BPM | SYSTOLIC BLOOD PRESSURE: 114 MMHG

## 2018-10-01 DIAGNOSIS — R10.84 ABDOMINAL PAIN, GENERALIZED: Primary | ICD-10-CM

## 2018-10-01 DIAGNOSIS — M79.644 PAIN OF FINGER OF RIGHT HAND: ICD-10-CM

## 2018-10-01 DIAGNOSIS — R21 RASH AND NONSPECIFIC SKIN ERUPTION: ICD-10-CM

## 2018-10-01 LAB
ALBUMIN SERPL-MCNC: 3.8 G/DL (ref 3.4–5)
ALBUMIN UR-MCNC: NEGATIVE MG/DL
ALP SERPL-CCNC: 74 U/L (ref 40–150)
ALT SERPL W P-5'-P-CCNC: 31 U/L (ref 0–50)
ANION GAP SERPL CALCULATED.3IONS-SCNC: 6 MMOL/L (ref 3–14)
APPEARANCE UR: CLEAR
AST SERPL W P-5'-P-CCNC: 21 U/L (ref 0–45)
BASOPHILS # BLD AUTO: 0 10E9/L (ref 0–0.2)
BASOPHILS NFR BLD AUTO: 0.4 %
BILIRUB SERPL-MCNC: 0.4 MG/DL (ref 0.2–1.3)
BILIRUB UR QL STRIP: NEGATIVE
BUN SERPL-MCNC: 19 MG/DL (ref 7–30)
CALCIUM SERPL-MCNC: 9.6 MG/DL (ref 8.5–10.1)
CHLORIDE SERPL-SCNC: 105 MMOL/L (ref 94–109)
CO2 SERPL-SCNC: 31 MMOL/L (ref 20–32)
COLOR UR AUTO: YELLOW
CREAT SERPL-MCNC: 1.09 MG/DL (ref 0.52–1.04)
CRP SERPL-MCNC: 8.9 MG/L (ref 0–8)
DIFFERENTIAL METHOD BLD: NORMAL
EOSINOPHIL # BLD AUTO: 0.2 10E9/L (ref 0–0.7)
EOSINOPHIL NFR BLD AUTO: 2.4 %
ERYTHROCYTE [DISTWIDTH] IN BLOOD BY AUTOMATED COUNT: 15 % (ref 10–15)
ERYTHROCYTE [SEDIMENTATION RATE] IN BLOOD BY WESTERGREN METHOD: 13 MM/H (ref 0–30)
GFR SERPL CREATININE-BSD FRML MDRD: 53 ML/MIN/1.7M2
GLUCOSE SERPL-MCNC: 94 MG/DL (ref 70–99)
GLUCOSE UR STRIP-MCNC: NEGATIVE MG/DL
HCT VFR BLD AUTO: 43.3 % (ref 35–47)
HGB BLD-MCNC: 14.2 G/DL (ref 11.7–15.7)
HGB UR QL STRIP: NEGATIVE
KETONES UR STRIP-MCNC: NEGATIVE MG/DL
LEUKOCYTE ESTERASE UR QL STRIP: NEGATIVE
LIPASE SERPL-CCNC: 130 U/L (ref 73–393)
LYMPHOCYTES # BLD AUTO: 2.3 10E9/L (ref 0.8–5.3)
LYMPHOCYTES NFR BLD AUTO: 29.3 %
MCH RBC QN AUTO: 30.5 PG (ref 26.5–33)
MCHC RBC AUTO-ENTMCNC: 32.8 G/DL (ref 31.5–36.5)
MCV RBC AUTO: 93 FL (ref 78–100)
MONOCYTES # BLD AUTO: 0.6 10E9/L (ref 0–1.3)
MONOCYTES NFR BLD AUTO: 7.3 %
NEUTROPHILS # BLD AUTO: 4.8 10E9/L (ref 1.6–8.3)
NEUTROPHILS NFR BLD AUTO: 60.6 %
NITRATE UR QL: NEGATIVE
PH UR STRIP: 6.5 PH (ref 5–7)
PLATELET # BLD AUTO: 307 10E9/L (ref 150–450)
POTASSIUM SERPL-SCNC: 4.2 MMOL/L (ref 3.4–5.3)
PROT SERPL-MCNC: 7.6 G/DL (ref 6.8–8.8)
RBC # BLD AUTO: 4.65 10E12/L (ref 3.8–5.2)
SODIUM SERPL-SCNC: 142 MMOL/L (ref 133–144)
SOURCE: NORMAL
SP GR UR STRIP: 1.02 (ref 1–1.03)
UROBILINOGEN UR STRIP-ACNC: 0.2 EU/DL (ref 0.2–1)
WBC # BLD AUTO: 7.9 10E9/L (ref 4–11)

## 2018-10-01 PROCEDURE — 80053 COMPREHEN METABOLIC PANEL: CPT | Performed by: NURSE PRACTITIONER

## 2018-10-01 PROCEDURE — 85025 COMPLETE CBC W/AUTO DIFF WBC: CPT | Performed by: NURSE PRACTITIONER

## 2018-10-01 PROCEDURE — 85652 RBC SED RATE AUTOMATED: CPT | Performed by: NURSE PRACTITIONER

## 2018-10-01 PROCEDURE — 99214 OFFICE O/P EST MOD 30 MIN: CPT | Performed by: NURSE PRACTITIONER

## 2018-10-01 PROCEDURE — 81003 URINALYSIS AUTO W/O SCOPE: CPT | Performed by: NURSE PRACTITIONER

## 2018-10-01 PROCEDURE — 83690 ASSAY OF LIPASE: CPT | Performed by: NURSE PRACTITIONER

## 2018-10-01 PROCEDURE — 86140 C-REACTIVE PROTEIN: CPT | Performed by: NURSE PRACTITIONER

## 2018-10-01 PROCEDURE — 36415 COLL VENOUS BLD VENIPUNCTURE: CPT | Performed by: NURSE PRACTITIONER

## 2018-10-01 ASSESSMENT — PAIN SCALES - GENERAL: PAINLEVEL: SEVERE PAIN (6)

## 2018-10-01 NOTE — PROGRESS NOTES
SUBJECTIVE:   Sunshine Delgado is a 53 year old female who presents to clinic today for the following health issues:      Rash      Duration: arm rash x 3 weeks, scalp rash started Saturday    Description  Location: right arm and scalp  Itching: severe    Intensity:  severe    Accompanying signs and symptoms: None    History (similar episodes/previous evaluation): did go to the ER for her arm rash    Precipitating or alleviating factors:  New exposures:  None  Recent travel: no      Therapies tried and outcome: cream prescribed by the Emergency Room- helpful    Body Aches      Duration: last Wednesday    Description (location/character/radiation): Body aches, dizzy, headaches, nauseated    Intensity:  moderate    Accompanying signs and symptoms: diarrhea yesterday    History (similar episodes/previous evaluation): None    Precipitating or alleviating factors: None    Therapies tried and outcome: Pepto Bismol which helps, Tylenol helps and laying down     Patient 7 months s/p gastric sleeve attests to above symptoms. Also describes right-sided abdominal pain that woke her up last night, but gone now.    Right index finger painful for several weeks without known injury.    Problem list and histories reviewed & adjusted, as indicated.  Additional history: as documented    Patient Active Problem List   Diagnosis     Generalized anxiety disorder     CARDIOVASCULAR SCREENING; LDL GOAL LESS THAN 160     MARIA GUADALUPE (obstructive sleep apnea)- severe (AHI 84)     Morbid obesity (H)     Health Care Home     Mild major depression (H)     Insomnia     Bee sting allergy     CKD (chronic kidney disease) stage 3, GFR 30-59 ml/min (H)     Past Surgical History:   Procedure Laterality Date     COLONOSCOPY  2000     DAVINCI GASTRIC SLEEVE       GENITOURINARY SURGERY  2007     HYSTERECTOMY, PAP NO LONGER INDICATED       HYSTERECTOMY, VAGINAL  2007    still has ovaries     LAPAROSCOPIC GASTRIC SLEEVE N/A 3/13/2018    Procedure: LAPAROSCOPIC  "GASTRIC SLEEVE;  Laparoscopic Sleeve Gastrectomy;  Surgeon: Kameron Joseph MD;  Location:  OR       Social History   Substance Use Topics     Smoking status: Never Smoker     Smokeless tobacco: Never Used     Alcohol use Yes      Comment: 1-2 per mo     Family History   Problem Relation Age of Onset     Eye Disorder Mother      lost eyesight; probable macular degeneration     Psychotic Disorder Mother      Dementia /Alzhimers     Neurologic Disorder Mother      seizures     Diabetes Mother      Hypertension Mother      Alzheimer Disease Mother      Arthritis Mother      Osteoporosis Mother      Obesity Mother      Diabetes Father      Depression Father      Hyperlipidemia Father      Obesity Father      Psychotic Disorder Sister      bipolar     Thyroid Disease Sister      h/o thyroid cancer     Depression Sister      Bipolar     Obesity Sister      Cancer Other      Brain cancer     Osteoporosis Maternal Grandmother      Anxiety Disorder Son      Depression Sister      Thyroid Disease Sister            Reviewed and updated as needed this visit by clinical staff       Reviewed and updated as needed this visit by Provider         ROS:  Constitutional, HEENT, cardiovascular, pulmonary, GI, , musculoskeletal, neuro, skin, endocrine and psych systems are negative, except as otherwise noted.    OBJECTIVE:     /72 (BP Location: Right arm, Patient Position: Chair, Cuff Size: Adult Large)  Pulse 64  Temp 97.1  F (36.2  C) (Oral)  Resp 24  Ht 5' 5\" (1.651 m)  Wt 207 lb (93.9 kg)  SpO2 96%  BMI 34.45 kg/m2  Body mass index is 34.45 kg/(m^2).  GENERAL: healthy, alert and no distress  RESP: lungs clear to auscultation - no rales, rhonchi or wheezes  CV: regular rate and rhythm, normal S1 S2, no S3 or S4, no murmur, click or rub, no peripheral edema and peripheral pulses strong  ABDOMEN: soft, nontender, no hepatosplenomegaly, no masses and bowel sounds normal  SKIN: Faint erythematous papules, some with " excoriation over right dorsal forearm. 2-3 excoriated papules of scalp.  MS: Right hand atraumatic, mildly tender MCP of index finger with decreased ROM  BACK: no CVA tenderness, no paralumbar tenderness    Diagnostic Test Results:  Results for orders placed or performed in visit on 10/01/18   CBC with platelets differential   Result Value Ref Range    WBC 7.9 4.0 - 11.0 10e9/L    RBC Count 4.65 3.8 - 5.2 10e12/L    Hemoglobin 14.2 11.7 - 15.7 g/dL    Hematocrit 43.3 35.0 - 47.0 %    MCV 93 78 - 100 fl    MCH 30.5 26.5 - 33.0 pg    MCHC 32.8 31.5 - 36.5 g/dL    RDW 15.0 10.0 - 15.0 %    Platelet Count 307 150 - 450 10e9/L    % Neutrophils 60.6 %    % Lymphocytes 29.3 %    % Monocytes 7.3 %    % Eosinophils 2.4 %    % Basophils 0.4 %    Absolute Neutrophil 4.8 1.6 - 8.3 10e9/L    Absolute Lymphocytes 2.3 0.8 - 5.3 10e9/L    Absolute Monocytes 0.6 0.0 - 1.3 10e9/L    Absolute Eosinophils 0.2 0.0 - 0.7 10e9/L    Absolute Basophils 0.0 0.0 - 0.2 10e9/L    Diff Method Automated Method    Comprehensive metabolic panel   Result Value Ref Range    Sodium 142 133 - 144 mmol/L    Potassium 4.2 3.4 - 5.3 mmol/L    Chloride 105 94 - 109 mmol/L    Carbon Dioxide 31 20 - 32 mmol/L    Anion Gap 6 3 - 14 mmol/L    Glucose 94 70 - 99 mg/dL    Urea Nitrogen 19 7 - 30 mg/dL    Creatinine 1.09 (H) 0.52 - 1.04 mg/dL    GFR Estimate 53 (L) >60 mL/min/1.7m2    GFR Estimate If Black 64 >60 mL/min/1.7m2    Calcium 9.6 8.5 - 10.1 mg/dL    Bilirubin Total 0.4 0.2 - 1.3 mg/dL    Albumin 3.8 3.4 - 5.0 g/dL    Protein Total 7.6 6.8 - 8.8 g/dL    Alkaline Phosphatase 74 40 - 150 U/L    ALT 31 0 - 50 U/L    AST 21 0 - 45 U/L   Lipase   Result Value Ref Range    Lipase 130 73 - 393 U/L   UA reflex to Microscopic and Culture   Result Value Ref Range    Color Urine Yellow     Appearance Urine Clear     Glucose Urine Negative NEG^Negative mg/dL    Bilirubin Urine Negative NEG^Negative    Ketones Urine Negative NEG^Negative mg/dL    Specific Gravity  Urine 1.020 1.003 - 1.035    Blood Urine Negative NEG^Negative    pH Urine 6.5 5.0 - 7.0 pH    Protein Albumin Urine Negative NEG^Negative mg/dL    Urobilinogen Urine 0.2 0.2 - 1.0 EU/dL    Nitrite Urine Negative NEG^Negative    Leukocyte Esterase Urine Negative NEG^Negative    Source Midstream Urine    ESR: Erythrocyte sedimentation rate   Result Value Ref Range    Sed Rate 13 0 - 30 mm/h   CRP, inflammation   Result Value Ref Range    CRP Inflammation 8.9 (H) 0.0 - 8.0 mg/L       ASSESSMENT/PLAN:     1. Abdominal pain, generalized  Symptoms are vague- likely nonspecific viral illness. Will check labs and reviewed warning signs and when to follow up.  - CBC with platelets differential  - Comprehensive metabolic panel  - Lipase  - UA reflex to Microscopic and Culture  - ESR: Erythrocyte sedimentation rate  - CRP, inflammation    2. Rash and nonspecific skin eruption  Improving with topical steroid.  Can use T-Sal shampoo for the scalp.    3. Pain of finger of right hand  Avoid overuse, tylenol as needed. Consider x-ray if not improving over next few weeks      Patient Instructions     Start using T-Sal shampoo- daily for 1 week, then twice weekly for as long as needed.    [facility]    If you have any questions regarding to your visit please contact your care team:       Team Red:   Clinic Hours Telephone Number   Dr. Shana Arteaga, NP 7am-7pm  Monday - Thursday   7am-5pm  Fridays  [phone]  (Appointment scheduling available 24/7)   Urgent Care - [place] and [facility] - 11am-9pm Monday-Friday Saturday-Sunday- 9am-5pm   [place] - 5pm-9pm Monday-Friday Saturday-Sunday- 9am-5pm  [phone] - [place]  [phone] - [place]       What options do I have for a visit other than an office visit? We offer electronic visits (e-visits) and telephone visits, when medically appropriate.  Please check with your medical insurance to see  if these types of visits are covered, as you will be responsible for any charges that are not paid by your insurance.      You can use [employer] (secure electronic communication) to access to your chart, send your primary care provider a message, or make an appointment. Ask a team member how to get started.     For a price quote for your services, please call our Consumer Price Line at [phone] or our Imaging Cost estimation line at [phone] (for imaging tests).    Discharged by Cinda Solitario MA.        LEOPOLDO Michelle [facility]

## 2018-10-01 NOTE — PATIENT INSTRUCTIONS
Start using T-Sal shampoo- daily for 1 week, then twice weekly for as long as needed.    [facility]    If you have any questions regarding to your visit please contact your care team:       Team Red:   Clinic Hours Telephone Number   Dr. Shana Arteaga, NP 7am-7pm  Monday - Thursday   7am-5pm  Fridays  [phone]  (Appointment scheduling available 24/7)   Urgent Care - [place] - 11am-9pm Monday-Friday Saturday-Sunday- 9am-5pm   [place] - 5pm-9pm Monday-Friday Saturday-Sunday- 9am-5pm  [phone] - [place]  [phone] - [place]       What options do I have for a visit other than an office visit? We offer electronic visits (e-visits) and telephone visits, when medically appropriate.  Please check with your medical insurance to see if these types of visits are covered, as you will be responsible for any charges that are not paid by your insurance.      You can use [employer] (secure electronic communication) to access to your chart, send your primary care provider a message, or make an appointment. Ask a team member how to get started.     For a price quote for your services, please call our Consumer Price Line at [phone] or our Imaging Cost estimation line at [phone] (for imaging tests).    Discharged by Cinda Solitario MA.

## 2018-10-01 NOTE — MR AVS SNAPSHOT
After Visit Summary   10/1/2018    Sunshine Delgado    MRN: [id number]           Patient Information     Date Of Birth          1965        Visit Information        Provider Department      10/1/2018 2:00 PM Lesly Arteaga APRN CNP [facility]        Today's Diagnoses     Abdominal pain, generalized    -  1    Rash and nonspecific skin eruption        Pain of finger of right hand          Care Instructions    Start using T-Sal shampoo- daily for 1 week, then twice weekly for as long as needed.    [facility]    If you have any questions regarding to your visit please contact your care team:       Team Red:   Clinic Hours Telephone Number   Dr. Shana Arteaga, NP 7am-7pm  Monday - Thursday   7am-5pm  Fridays  [phone]  (Appointment scheduling available 24/7)   Urgent Care - [place] - 11am-9pm Monday-Friday Saturday-Sunday- 9am-5pm   [place] - 5pm-9pm Monday-Friday Saturday-Sunday- 9am-5pm  [phone] - [place]  [phone] - [place]       What options do I have for a visit other than an office visit? We offer electronic visits (e-visits) and telephone visits, when medically appropriate.  Please check with your medical insurance to see if these types of visits are covered, as you will be responsible for any charges that are not paid by your insurance.      You can use [employer] (secure electronic communication) to access to your chart, send your primary care provider a message, or make an appointment. Ask a team member how to get started.     For a price quote for your services, please call our Consumer Price Line at [phone] or our Imaging Cost estimation line at [phone] (for imaging tests).    Discharged by Cinda Solitario MA.            Follow-ups after your visit        Follow-up notes from your care team     Return in about 1 month (around 11/1/2018) for  "Physical.      Who to contact     If you have questions or need follow up information about today's clinic visit or your schedule please contact [facility] directly at [phone].  Normal or non-critical lab and imaging results will be communicated to you by MyChart, letter or phone within 4 business days after the clinic has received the results. If you do not hear from us within 7 days, please contact the clinic through [employer] or phone. If you have a critical or abnormal lab result, we will notify you by phone as soon as possible.  Submit refill requests through [employer] or call your pharmacy and they will forward the refill request to us. Please allow 3 business days for your refill to be completed.          Additional Information About Your Visit        [employer] Information     [employer] gives you secure access to your electronic health record. If you see a primary care provider, you can also send messages to your care team and make appointments. If you have questions, please call your primary care clinic.  If you do not have a primary care provider, please call [phone] and they will assist you.        Care EveryWhere ID     This is your Care EveryWhere ID. This could be used by other organizations to access your [place] medical records  [id number]        Your Vitals Were     Pulse Temperature Respirations Height Pulse Oximetry BMI (Body Mass Index)    64 97.1  F (36.2  C) (Oral) 24 5' 5\" (1.651 m) 96% 34.45 kg/m2       Blood Pressure from Last 3 Encounters:   10/01/18 114/72   07/25/18 133/90   04/12/18 114/74    Weight from Last 3 Encounters:   10/01/18 207 lb (93.9 kg)   07/31/18 214 lb 9.6 oz (97.3 kg)   07/24/18 213 lb 3.2 oz (96.7 kg)              We Performed the Following     CBC with platelets differential     Comprehensive metabolic panel     CRP, inflammation     ESR: Erythrocyte sedimentation rate     Lipase     UA reflex to Microscopic and Culture          Today's Medication " Changes          These changes are accurate as of 10/1/18  3:20 PM.  If you have any questions, ask your nurse or doctor.               These medicines have changed or have updated prescriptions.        Dose/Directions    citalopram 40 MG tablet   Commonly known as:  celeXA   This may have changed:  when to take this   Used for:  Generalized anxiety disorder, Major depressive disorder, recurrent episode, moderate (H)        Dose:  40 mg   Take 1 tablet (40 mg) by mouth daily   Quantity:  90 tablet   Refills:  3                Primary Care Provider Office Phone # Fax #    Shana Jett [phone] [facility] [address]        Equal Access to Services     [facility] Center[facility] : Hadii jatinder lo hadasho Sovadim, waaxda luqadaha, qaybta kaalmada quinn, mary cloud . So [facility] [phone].    ATENCIÓN: Si habla español, tiene a beard disposición servicios gratuitos de asistencia lingüística. [facility] [phone].    We comply with applicable federal civil rights laws and [place] laws. We do not discriminate on the basis of race, color, national origin, age, disability, sex, sexual orientation, or gender identity.            Thank you!     Thank you for choosing [facility]  for your care. Our goal is always to provide you with excellent care. Hearing back from our patients is one way we can continue to improve our services. Please take a few minutes to complete the written survey that you may receive in the mail after your visit with us. Thank you!             Your Updated Medication List - Protect others around you: Learn how to safely use, store and throw away your medicines at www.disposemymeds.org.          This list is accurate as of 10/1/18  3:20 PM.  Always use your most recent med list.                   Brand Name Dispense Instructions for use Diagnosis    buPROPion 150 MG 24 hr tablet    WELLBUTRIN XL    90 tablet    Take 1 tablet  (150 mg) by mouth every morning    Generalized anxiety disorder, Major depressive disorder, recurrent episode, moderate (H)       citalopram 40 MG tablet    celeXA    90 tablet    Take 1 tablet (40 mg) by mouth daily    Generalized anxiety disorder, Major depressive disorder, recurrent episode, moderate (H)       cyanocobalamin 1000 MCG/ML injection    VITAMIN B12    1 mL    Inject 1 mL (1,000 mcg) Subcutaneous every 30 days    S/P laparoscopic sleeve gastrectomy       EPINEPHrine 0.3 MG/0.3ML injection 2-pack    EPIPEN/ADRENACLICK/or ANY BX GENERIC EQUIV    0.6 mL    Inject 0.3 mLs (0.3 mg) into the muscle as needed for anaphylaxis    Bee sting allergy       fluticasone 50 MCG/ACT spray    FLONASE    16 g    Spray 2 sprays into both nostrils daily    Acute sinusitis with symptoms > 10 days, Nonintractable headache, unspecified chronicity pattern, unspecified headache type       Multi-vitamin Tabs tablet      Take 1 tablet by mouth every morning        order for DME      Resmed Airsense 10 auto cpap 6-7 cm, Airfit P10 for her XS pillows        order for DME     12 each    Injection Supplies for Vitamin B12: 3cc syringes w/ 27 gauge needles, 1/2 inch length    S/P laparoscopic sleeve gastrectomy       traZODone 50 MG tablet    DESYREL    90 tablet    Take 1 tablet (50 mg) by mouth At Bedtime    Insomnia due to other mental disorder       TYLENOL 325 MG tablet   Generic drug:  acetaminophen           ursodiol 300 MG capsule    ACTIGALL    180 capsule    Take 1 capsule (300 mg) by mouth 2 times daily    Achlorhydria, gastric       VITAMIN C PO      Take by mouth every morning        VITAMIN D PO      Take by mouth every morning

## 2018-10-02 PROBLEM — N18.30 CKD (CHRONIC KIDNEY DISEASE) STAGE 3, GFR 30-59 ML/MIN (H): Status: ACTIVE | Noted: 2018-10-02

## 2018-10-02 ASSESSMENT — PATIENT HEALTH QUESTIONNAIRE - PHQ9: SUM OF ALL RESPONSES TO PHQ QUESTIONS 1-9: 4

## 2018-10-14 DIAGNOSIS — F33.1 MAJOR DEPRESSIVE DISORDER, RECURRENT EPISODE, MODERATE (H): Chronic | ICD-10-CM

## 2018-10-14 DIAGNOSIS — F41.1 GENERALIZED ANXIETY DISORDER: Chronic | ICD-10-CM

## 2018-10-15 RX ORDER — CITALOPRAM HYDROBROMIDE 40 MG/1
TABLET ORAL
Qty: 30 TABLET | Refills: 0 | Status: SHIPPED | OUTPATIENT
Start: 2018-10-15 | End: 2018-10-29

## 2018-10-15 NOTE — TELEPHONE ENCOUNTER
"Requested Prescriptions   Pending Prescriptions Disp Refills     citalopram (CELEXA) 40 MG tablet [Pharmacy Med Name: CITALOPRAM HBR 40 MG TABLET]  Last Written Prescription Date:  9/18/2017  Last Fill Quantity: 90 tablet,  # refills: 3   Last office visit: 11/15/2017 with prescribing provider:  HAMILTON Castellon   Future Office Visit:   Next 5 appointments (look out 90 days)     Oct 29, 2018 10:40 AM CDT   PHYSICAL with LEOPOLDO Michelle CNP   [facility] [id number] [facility] [id number]   [phone]                  90 tablet 3     Sig: TAKE 1 TABLET (40 MG) BY MOUTH DAILY    SSRIs Protocol Passed    10/14/2018  2:25 AM       Passed - PHQ-9 score less than 5 in past 6 months    Please review last PHQ-9 score.     PHQ-9 SCORE 9/18/2017 11/15/2017 10/1/2018   Total Score - - -   Total Score 12 1 4     ADRIANNE-7 SCORE 12/5/2016 9/18/2017 11/15/2017   Total Score - - -   Total Score 0 15 0          Passed - Patient is age 18 or older       Passed - No active pregnancy on record       Passed - No positive pregnancy test in last 12 months       Passed - Recent (6 mo) or future (30 days) visit within the authorizing provider's specialty    Patient had office visit in the last 6 months or has a visit in the next 30 days with authorizing provider or within the authorizing provider's specialty.  See \"Patient Info\" tab in inbasket, or \"Choose Columns\" in Meds & Orders section of the refill encounter.              "

## 2018-10-15 NOTE — PATIENT INSTRUCTIONS
Preventive Health Recommendations  Female Ages 50 - 64    Yearly exam: See your health care provider every year in order to  o Review health changes.   o Discuss preventive care.    o Review your medicines if your doctor has prescribed any.      Get a Pap test every three years (unless you have an abnormal result and your provider advises testing more often).    If you get Pap tests with HPV test, you only need to test every 5 years, unless you have an abnormal result.     You do not need a Pap test if your uterus was removed (hysterectomy) and you have not had cancer.    You should be tested each year for STDs (sexually transmitted diseases) if you're at risk.     Have a mammogram every 1 to 2 years.    Have a colonoscopy at age 50, or have a yearly FIT test (stool test). These exams screen for colon cancer.      Have a cholesterol test every 5 years, or more often if advised.    Have a diabetes test (fasting glucose) every three years. If you are at risk for diabetes, you should have this test more often.     If you are at risk for osteoporosis (brittle bone disease), think about having a bone density scan (DEXA).    Shots: Get a flu shot each year. Get a tetanus shot every 10 years.    Nutrition:     Eat at least 5 servings of fruits and vegetables each day.    Eat whole-grain bread, whole-wheat pasta and brown rice instead of white grains and rice.    Get adequate Calcium and Vitamin D.     Lifestyle    Exercise at least 150 minutes a week (30 minutes a day, 5 days a week). This will help you control your weight and prevent disease.    Limit alcohol to one drink per day.    No smoking.     Wear sunscreen to prevent skin cancer.     See your dentist every six months for an exam and cleaning.    See your eye doctor every 1 to 2 years.  [facility]    If you have any questions regarding to your visit please contact your care team:       Team Red:   Clinic Hours Telephone Number   Dr. Shana Jett    Dr. Ludivina Arteaga, NP 7am-7pm  Monday - Thursday   7am-5pm  Fridays  [phone]  (Appointment scheduling available 24/7)   Urgent Care - [place] - 11am-9pm Monday-Friday Saturday-Sunday- 9am-5pm   [place] - 5pm-9pm Monday-Friday Saturday-Sunday- 9am-5pm  [phone] - [place]  [phone] - [place]       What options do I have for a visit other than an office visit? We offer electronic visits (e-visits) and telephone visits, when medically appropriate.  Please check with your medical insurance to see if these types of visits are covered, as you will be responsible for any charges that are not paid by your insurance.      You can use [employer] (secure electronic communication) to access to your chart, send your primary care provider a message, or make an appointment. Ask a team member how to get started.     For a price quote for your services, please call our Consumer Price Line at [phone] or our Imaging Cost estimation line at [phone] (for imaging tests).    Discharged by Cinda Solitario MA.

## 2018-10-22 NOTE — PROGRESS NOTES
SUBJECTIVE:   Sunshine Delgado is a 53 year old female who presents to clinic today for the following health issues:  Rash      Duration: 6 weeks    Description  Location: right forearm  Itching: severe    Intensity:  severe    Accompanying signs and symptoms: Red, raised, warmth    History (similar episodes/previous evaluation): ER and OV    Precipitating or alleviating factors:  New exposures:  None  Recent travel: no      Therapies tried and outcome: hydrocortisone cream from the ER      Patient presents with itchy right forearm for the last 6 weeks or so.  She has had no obvious rash, her skin has been a little dry.  She has been scratching her forearm a lot which has resulted in some skin irritation.  Itchiness flares significantly upon contact with right forearm.  Patient went to the ED and was given steroid cream which hasn't helped much.  Patient has no recent travel, no plant exposure, no recent allergen exposure, no new soaps or detergents, no new clothes.  Of note she has noticed that she's unable to completely flex her right 2nd digit due to pain/swelling at the base of the finger.  This symptom is of random occurrence.  Patient was seen in clinic previously with itchy scalp folliculitis, which have resolved with selenium sulfide shampoo.    Patient has been receiving vitamin B12 injections    Problem list and histories reviewed & adjusted, as indicated.  Additional history: as documented    BP Readings from Last 3 Encounters:   10/23/18 120/64   10/01/18 114/72   07/25/18 133/90    Wt Readings from Last 3 Encounters:   10/23/18 211 lb 9.6 oz (96 kg)   10/01/18 207 lb (93.9 kg)   07/31/18 214 lb 9.6 oz (97.3 kg)        Reviewed and updated as needed this visit by clinical staff  Tobacco  Allergies  Meds  Problems       Reviewed and updated as needed this visit by Provider  Allergies  Meds  Problems         ROS:  Constitutional, HEENT, cardiovascular, pulmonary, gi and gu systems are negative,  except as otherwise noted.    OBJECTIVE:     /64  Pulse 69  Temp 97  F (36.1  C) (Oral)  Resp 18  Wt 211 lb 9.6 oz (96 kg)  SpO2 96%  BMI 35.21 kg/m2  Body mass index is 35.21 kg/(m^2).  GENERAL: healthy, alert and no distress  EYES: Eyes grossly normal to inspection, PERRL and conjunctivae and sclerae normal  NECK: no adenopathy, no asymmetry, masses, or scars and thyroid normal to palpation  RESP: lungs clear to auscultation - no rales, rhonchi or wheezes  CV: regular rate and rhythm, normal S1 S2, no S3 or S4, no murmur, click or rub, no peripheral edema and peripheral pulses strong  MS: limited rom right 2nd digit with swelling/tenderness at base of the digit upon flexing  SKIN: no obvious rash, scattered erythema on forearm likely from significant scratching, some mild erythema surrounding base of several hair follicles  NEURO: Normal strength and tone, mentation intact and speech normal  PSYCH: mentation appears normal, affect normal/bright    ASSESSMENT/PLAN:     1. Folliculitis  Possible mild folliculitis of right forearm, will given mupirocin topical to see if this helps, will also give atarax for itchiness.  Will refer to dermatology for assessment as I am unsure what is causing her symptoms given no obvious visual symptoms present.  - mupirocin (BACTROBAN) 2 % cream; Apply topically 3 times daily  Dispense: 30 g; Refill: 0  - hydrOXYzine (ATARAX) 25 MG tablet; Take 1-2 tablets (25-50 mg) by mouth every 6 hours as needed for itching  Dispense: 60 tablet; Refill: 1  - DERMATOLOGY REFERRAL    2. H/O skin pruritus  Unknown etiology at this time, however there are signs of mild folliculitis.  It's possible that her pruritus is due to B12 injections as this is a common side effect, however unilateral nature suggests otherwise.  Patient will follow-up with dermatology for further evaluation.    - hydrOXYzine (ATARAX) 25 MG tablet; Take 1-2 tablets (25-50 mg) by mouth every 6 hours as needed for  itching  Dispense: 60 tablet; Refill: 1  - DERMATOLOGY REFERRAL    3. Vitamin B12 deficiency (non anemic)  Will check level  - Vitamin B12    4. Contracture of finger joint, right  Will refer to ortho for further evaluation.  - ORTHO  REFERRAL    Follow-up with primary care provider in 1-2 weeks    Duarte Alba MD  [facility]

## 2018-10-23 ENCOUNTER — OFFICE VISIT (OUTPATIENT)
Dept: FAMILY MEDICINE | Facility: CLINIC | Age: 53
End: 2018-10-23
Payer: COMMERCIAL

## 2018-10-23 VITALS
SYSTOLIC BLOOD PRESSURE: 120 MMHG | DIASTOLIC BLOOD PRESSURE: 64 MMHG | HEART RATE: 69 BPM | BODY MASS INDEX: 35.21 KG/M2 | OXYGEN SATURATION: 96 % | RESPIRATION RATE: 18 BRPM | WEIGHT: 211.6 LBS | TEMPERATURE: 97 F

## 2018-10-23 DIAGNOSIS — E53.8 VITAMIN B12 DEFICIENCY (NON ANEMIC): ICD-10-CM

## 2018-10-23 DIAGNOSIS — L73.9 FOLLICULITIS: Primary | ICD-10-CM

## 2018-10-23 DIAGNOSIS — Z87.2 H/O SKIN PRURITUS: ICD-10-CM

## 2018-10-23 DIAGNOSIS — M24.541 CONTRACTURE OF FINGER JOINT, RIGHT: ICD-10-CM

## 2018-10-23 LAB — VIT B12 SERPL-MCNC: 1872 PG/ML (ref 193–986)

## 2018-10-23 PROCEDURE — 82607 VITAMIN B-12: CPT | Performed by: FAMILY MEDICINE

## 2018-10-23 PROCEDURE — 36415 COLL VENOUS BLD VENIPUNCTURE: CPT | Performed by: FAMILY MEDICINE

## 2018-10-23 PROCEDURE — 99214 OFFICE O/P EST MOD 30 MIN: CPT | Performed by: FAMILY MEDICINE

## 2018-10-23 RX ORDER — MUPIROCIN CALCIUM 20 MG/G
CREAM TOPICAL 3 TIMES DAILY
Qty: 30 G | Refills: 0 | Status: SHIPPED | OUTPATIENT
Start: 2018-10-23 | End: 2019-04-12

## 2018-10-23 RX ORDER — HYDROXYZINE HYDROCHLORIDE 25 MG/1
25-50 TABLET, FILM COATED ORAL EVERY 6 HOURS PRN
Qty: 60 TABLET | Refills: 1 | Status: SHIPPED | OUTPATIENT
Start: 2018-10-23 | End: 2019-04-12

## 2018-10-23 NOTE — MR AVS SNAPSHOT
After Visit Summary   10/23/2018    Sunshine Delgado    MRN: [id number]           Patient Information     Date Of Birth          1965        Visit Information        Provider Department      10/23/2018 10:20 AM Duarte Alba MD [facility]        Today's Diagnoses     Folliculitis    -  1    H/O skin pruritus        Vitamin B12 deficiency (non anemic)        Contracture of finger joint, right          Care Instructions    [facility]    If you have any questions regarding to your visit please contact your care team:       Team Purple:   Clinic Hours Telephone Number   Dr. Elaine Alba   7am-7pm  Monday - Thursday   7am-5pm  Fridays  [phone]  (Appointment scheduling available 24/7)   Urgent Care - [place] and [facility] - 11am-9pm Monday-Friday Saturday-Sunday- 9am-5pm   [place] - 5pm-9pm Monday-Friday Saturday-Sunday- 9am-5pm  [phone] - [place]  [phone] [facility]       What options do I have for a visit other than an office visit? We offer electronic visits (e-visits) and telephone visits, when medically appropriate.  Please check with your medical insurance to see if these types of visits are covered, as you will be responsible for any charges that are not paid by your insurance.      You can use Sarta (secure electronic communication) to access to your chart, send your primary care provider a message, or make an appointment. Ask a team member how to get started.     For a price quote for your services, please call our Consumer Price Line at [phone] or our Imaging Cost estimation line at [phone] (for imaging tests).    Luly Reeves MA            Follow-ups after your visit        Additional Services     DERMATOLOGY REFERRAL       Your provider has referred you to: N: [facility] [place] [phone]    [email or web address]    Please be aware that coverage of these services is subject to the terms and limitations of your health insurance plan.  Call member services at your health plan with any benefit or coverage questions.      Please bring the following with you to your appointment:    (1) Any X-Rays, CTs or MRIs which have been performed.  Contact the facility where they were done to arrange for  prior to your scheduled appointment.    (2) List of current medications  (3) This referral request   (4) Any documents/labs given to you for this referral            ORTHO  REFERRAL       [facility] is referring you to the Orthopedic  Services at [facility].       The  Representative will assist you in the coordination of your Orthopedic and Musculoskeletal Care as prescribed by your physician.    The  Representative will call you within 1 business day to help schedule your appointment, or you may contact the  Representative at:    All areas ~ [phone]     Type of Referral : Surgical / Specialist       Timeframe requested: 1 - 2 days    Coverage of these services is subject to the terms and limitations of your health insurance plan.  Please call member services at your health plan with any benefit or coverage questions.      If X-rays, CT or MRI's have been performed, please contact the facility where they were done to arrange for , prior to your scheduled appointment.  Please bring this referral request to your appointment and present it to your specialist.                  Follow-up notes from your care team     Return in about 2 weeks (around 11/6/2018) for Next Scheduled visit with PCP.      Your next 10 appointments already scheduled     Oct 29, 2018 10:40 AM CDT   PHYSICAL with LEOPOLDO Michelle CNP   [facility] [place] ([facility])    [address]  [place]  [id number] [phone]              Who to contact     If you have questions or need follow up information about today's clinic visit or your schedule please contact [facility] directly at [phone].  Normal or non-critical lab and imaging results will be communicated to you by Globevestorhart, letter or phone within 4 business days after the clinic has received the results. If you do not hear from us within 7 days, please contact the clinic through Globevestorhart or phone. If you have a critical or abnormal lab result, we will notify you by phone as soon as possible.  Submit refill requests through Lang-8 or call your pharmacy and they will forward the refill request to us. Please allow 3 business days for your refill to be completed.          Additional Information About Your Visit        [employer] Information     Lang-8 gives you secure access to your electronic health record. If you see a primary care provider, you can also send messages to your care team and make appointments. If you have questions, please call your primary care clinic.  If you do not have a primary care provider, please call [phone] and they will assist you.        Care EveryWhere ID     This is your Care EveryWhere ID. This could be used by other organizations to access your [place] medical records  [id number]        Your Vitals Were     Pulse Temperature Respirations Pulse Oximetry BMI (Body Mass Index)       69 97  F (36.1  C) (Oral) 18 96% 35.21 kg/m2        Blood Pressure from Last 3 Encounters:   10/23/18 120/64   10/01/18 114/72   07/25/18 133/90    Weight from Last 3 Encounters:   10/23/18 211 lb 9.6 oz (96 kg)   10/01/18 207 lb (93.9 kg)   07/31/18 214 lb 9.6 oz (97.3 kg)              We Performed the Following     DERMATOLOGY REFERRAL     ORTHO  REFERRAL     Vitamin B12          Today's Medication Changes          These changes are accurate as of 10/23/18 12:08 PM.  If you have any questions, ask your nurse or  doctor.               Start taking these medicines.        Dose/Directions    hydrOXYzine 25 MG tablet   Commonly known as:  ATARAX   Used for:  Folliculitis, H/O skin pruritus   Started by:  Duarte Gramajo MD        Dose:  25-50 mg   Take 1-2 tablets (25-50 mg) by mouth every 6 hours as needed for itching   Quantity:  60 tablet   Refills:  1       mupirocin 2 % cream   Commonly known as:  BACTROBAN   Used for:  Folliculitis   Started by:  Duarte Gramajo MD        Apply topically 3 times daily   Quantity:  30 g   Refills:  0         These medicines have changed or have updated prescriptions.        Dose/Directions    * citalopram 40 MG tablet   Commonly known as:  celeXA   This may have changed:  when to take this   Used for:  Generalized anxiety disorder, Major depressive disorder, recurrent episode, moderate (H)        Dose:  40 mg   Take 1 tablet (40 mg) by mouth daily   Quantity:  90 tablet   Refills:  3       * citalopram 40 MG tablet   Commonly known as:  celeXA   This may have changed:  Another medication with the same name was changed. Make sure you understand how and when to take each.   Used for:  Generalized anxiety disorder, Major depressive disorder, recurrent episode, moderate (H)        TAKE 1 TABLET (40 MG) BY MOUTH DAILY   Quantity:  30 tablet   Refills:  0       * Notice:  This list has 2 medication(s) that are the same as other medications prescribed for you. Read the directions carefully, and ask your doctor or other care provider to review them with you.         Where to get your medicines      These medications were sent to [facility] - [address]  [address], [place] [address]     Phone:  [phone]     hydrOXYzine 25 MG tablet    mupirocin 2 % cream                Primary Care Provider Office Phone # Fax #    Shana Jett [phone] [address]  [place]  [place] [address]        Equal Access to Services     [facility] ARIANNA : Hadii aad ku hadkierstentania Lacievadim, wadomda luqadaha, qaybta kaalmalidia ball, mary raymundo. So [facility] [phone].    ATENCIÓN: Si habla español, tiene a beard disposición servicios gratuitos de asistencia lingüística. Amyame al [phone].    We comply with applicable federal civil rights laws and [place] laws. We do not discriminate on the basis of race, color, national origin, age, disability, sex, sexual orientation, or gender identity.            Thank you!     Thank you for choosing [facility]  for your care. Our goal is always to provide you with excellent care. Hearing back from our patients is one way we can continue to improve our services. Please take a few minutes to complete the written survey that you may receive in the mail after your visit with us. Thank you!             Your Updated Medication List - Protect others around you: Learn how to safely use, store and throw away your medicines at www.disposemymeds.org.          This list is accurate as of 10/23/18 12:08 PM.  Always use your most recent med list.                   Brand Name Dispense Instructions for use Diagnosis    buPROPion 150 MG 24 hr tablet    WELLBUTRIN XL    90 tablet    Take 1 tablet (150 mg) by mouth every morning    Generalized anxiety disorder, Major depressive disorder, recurrent episode, moderate (H)       * citalopram 40 MG tablet    celeXA    90 tablet    Take 1 tablet (40 mg) by mouth daily    Generalized anxiety disorder, Major depressive disorder, recurrent episode, moderate (H)       * citalopram 40 MG tablet    celeXA    30 tablet    TAKE 1 TABLET (40 MG) BY MOUTH DAILY    Generalized anxiety disorder, Major depressive disorder, recurrent episode, moderate (H)       cyanocobalamin 1000 MCG/ML injection    VITAMIN B12    1 mL    Inject 1 mL (1,000 mcg) Subcutaneous every 30 days    S/P laparoscopic sleeve gastrectomy        EPINEPHrine 0.3 MG/0.3ML injection 2-pack    EPIPEN/ADRENACLICK/or ANY BX GENERIC EQUIV    0.6 mL    Inject 0.3 mLs (0.3 mg) into the muscle as needed for anaphylaxis    Bee sting allergy       fluticasone 50 MCG/ACT spray    FLONASE    16 g    Spray 2 sprays into both nostrils daily    Acute sinusitis with symptoms > 10 days, Nonintractable headache, unspecified chronicity pattern, unspecified headache type       hydrOXYzine 25 MG tablet    ATARAX    60 tablet    Take 1-2 tablets (25-50 mg) by mouth every 6 hours as needed for itching    Folliculitis, H/O skin pruritus       Multi-vitamin Tabs tablet      Take 1 tablet by mouth every morning        mupirocin 2 % cream    BACTROBAN    30 g    Apply topically 3 times daily    Folliculitis       order for DME      Resmed Airsense 10 auto cpap 6-7 cm, Airfit P10 for her XS pillows        order for DME     12 each    Injection Supplies for Vitamin B12: 3cc syringes w/ 27 gauge needles, 1/2 inch length    S/P laparoscopic sleeve gastrectomy       traZODone 50 MG tablet    DESYREL    90 tablet    Take 1 tablet (50 mg) by mouth At Bedtime    Insomnia due to other mental disorder       TYLENOL 325 MG tablet   Generic drug:  acetaminophen           ursodiol 300 MG capsule    ACTIGALL    180 capsule    Take 1 capsule (300 mg) by mouth 2 times daily    Achlorhydria, gastric       VITAMIN C PO      Take by mouth every morning        VITAMIN D PO      Take by mouth every morning        * Notice:  This list has 2 medication(s) that are the same as other medications prescribed for you. Read the directions carefully, and ask your doctor or other care provider to review them with you.

## 2018-10-23 NOTE — LETTER
[facility]  [address] [place] [address]    October 29, 2018    Sunshine Delgado  [facility] [place] [id number]          Dear Sunshine,    Your B12 is high, however this is not concerning.  Low B12 can sometimes cause itchiness.  Please be sure to follow-up with your PCP as well as dermatology      Enclosed is a copy of your results.     Results for orders placed or performed in visit on 10/23/18   Vitamin B12   Result Value Ref Range    Vitamin B12 1872 (H) 193 - 986 pg/mL       If you have any questions or concerns, please call myself or my nurse at [phone].      Sincerely,        Duarte Gramajo MD/pete

## 2018-10-23 NOTE — PATIENT INSTRUCTIONS
[facility]    If you have any questions regarding to your visit please contact your care team:       Team Purple:   Clinic Hours Telephone Number   Dr. Elaine Alba   7am-7pm  Monday - Thursday   7am-5pm  Fridays  [phone]  (Appointment scheduling available 24/7)   Urgent Care - [place] and [facility] - 11am-9pm Monday-Friday Saturday-Sunday- 9am-5pm   [place] - 5pm-9pm Monday-Friday Saturday-Sunday- 9am-5pm  [phone] - [place]  [phone] - [place]       What options do I have for a visit other than an office visit? We offer electronic visits (e-visits) and telephone visits, when medically appropriate.  Please check with your medical insurance to see if these types of visits are covered, as you will be responsible for any charges that are not paid by your insurance.      You can use Sharypic (secure electronic communication) to access to your chart, send your primary care provider a message, or make an appointment. Ask a team member how to get started.     For a price quote for your services, please call our Consumer Price Line at [phone] or our Imaging Cost estimation line at [phone] (for imaging tests).    Luly Reeves MA

## 2018-10-29 ENCOUNTER — OFFICE VISIT (OUTPATIENT)
Dept: FAMILY MEDICINE | Facility: CLINIC | Age: 53
End: 2018-10-29
Payer: COMMERCIAL

## 2018-10-29 VITALS
DIASTOLIC BLOOD PRESSURE: 62 MMHG | OXYGEN SATURATION: 98 % | HEART RATE: 70 BPM | WEIGHT: 212 LBS | TEMPERATURE: 97.3 F | BODY MASS INDEX: 35.32 KG/M2 | HEIGHT: 65 IN | SYSTOLIC BLOOD PRESSURE: 108 MMHG

## 2018-10-29 DIAGNOSIS — F41.1 GENERALIZED ANXIETY DISORDER: Chronic | ICD-10-CM

## 2018-10-29 DIAGNOSIS — Z91.030 BEE STING ALLERGY: ICD-10-CM

## 2018-10-29 DIAGNOSIS — F33.1 MAJOR DEPRESSIVE DISORDER, RECURRENT EPISODE, MODERATE (H): Chronic | ICD-10-CM

## 2018-10-29 DIAGNOSIS — F99 INSOMNIA DUE TO OTHER MENTAL DISORDER: ICD-10-CM

## 2018-10-29 DIAGNOSIS — Z00.00 ROUTINE HISTORY AND PHYSICAL EXAMINATION OF ADULT: Primary | ICD-10-CM

## 2018-10-29 DIAGNOSIS — Z12.31 VISIT FOR SCREENING MAMMOGRAM: ICD-10-CM

## 2018-10-29 DIAGNOSIS — R21 RASH AND NONSPECIFIC SKIN ERUPTION: ICD-10-CM

## 2018-10-29 DIAGNOSIS — Z23 NEED FOR PROPHYLACTIC VACCINATION AND INOCULATION AGAINST INFLUENZA: ICD-10-CM

## 2018-10-29 DIAGNOSIS — Z12.11 SCREEN FOR COLON CANCER: ICD-10-CM

## 2018-10-29 DIAGNOSIS — F51.05 INSOMNIA DUE TO OTHER MENTAL DISORDER: ICD-10-CM

## 2018-10-29 PROCEDURE — 90471 IMMUNIZATION ADMIN: CPT | Performed by: NURSE PRACTITIONER

## 2018-10-29 PROCEDURE — 99396 PREV VISIT EST AGE 40-64: CPT | Mod: 25 | Performed by: NURSE PRACTITIONER

## 2018-10-29 PROCEDURE — 90682 RIV4 VACC RECOMBINANT DNA IM: CPT | Performed by: NURSE PRACTITIONER

## 2018-10-29 RX ORDER — BUPROPION HYDROCHLORIDE 150 MG/1
150 TABLET ORAL EVERY MORNING
Qty: 90 TABLET | Refills: 1 | Status: SHIPPED | OUTPATIENT
Start: 2018-10-29 | End: 2019-03-26

## 2018-10-29 RX ORDER — TRAZODONE HYDROCHLORIDE 50 MG/1
50 TABLET, FILM COATED ORAL AT BEDTIME
Qty: 90 TABLET | Refills: 3 | Status: SHIPPED | OUTPATIENT
Start: 2018-10-29 | End: 2019-03-26

## 2018-10-29 RX ORDER — CITALOPRAM HYDROBROMIDE 40 MG/1
40 TABLET ORAL DAILY
Qty: 90 TABLET | Refills: 1 | Status: SHIPPED | OUTPATIENT
Start: 2018-10-29 | End: 2019-03-26

## 2018-10-29 RX ORDER — EPINEPHRINE 0.3 MG/.3ML
0.3 INJECTION SUBCUTANEOUS PRN
Qty: 0.6 ML | Refills: 3 | Status: SHIPPED | OUTPATIENT
Start: 2018-10-29 | End: 2019-11-07

## 2018-10-29 ASSESSMENT — ENCOUNTER SYMPTOMS
ABDOMINAL PAIN: 0
PALPITATIONS: 0
DIARRHEA: 0
FEVER: 0
MYALGIAS: 0
HEADACHES: 1
HEARTBURN: 0
HEMATURIA: 0
DYSURIA: 0
NAUSEA: 0
EYE PAIN: 0
BREAST MASS: 0
CHILLS: 0
SORE THROAT: 0
JOINT SWELLING: 1
PARESTHESIAS: 1
DIZZINESS: 0
CONSTIPATION: 0
NERVOUS/ANXIOUS: 1
HEMATOCHEZIA: 0
COUGH: 0
WEAKNESS: 0
SHORTNESS OF BREATH: 0
FREQUENCY: 0
ARTHRALGIAS: 0

## 2018-10-29 ASSESSMENT — PAIN SCALES - GENERAL: PAINLEVEL: NO PAIN (0)

## 2018-10-29 NOTE — PROGRESS NOTES
SUBJECTIVE:   CC: Sunshine Delgado is an 53 year old woman who presents for preventive health visit.     Physical   Annual:     Getting at least 3 servings of Calcium per day:  Yes    Bi-annual eye exam:  Yes    Dental care twice a year:  Yes    Sleep apnea or symptoms of sleep apnea:  Sleep apnea    Diet:  Other    Frequency of exercise:  4-5 days/week    Duration of exercise:  30-45 minutes    Taking medications regularly:  Yes    Medication side effects:  None    Additional concerns today:  YES      Depression and Anxiety Follow-Up    Status since last visit: Worsened-   3 years ago and became very ill around this time of year, so fall is always difficult for her    Other associated symptoms:None    Complicating factors:     Significant life event: Yes-  Death of  3 years ago     Current substance abuse: None    PHQ 2017 11/15/2017 10/1/2018   PHQ-9 Total Score 12 1 4   Q9: Suicide Ideation Not at all Not at all Not at all     ADRIANNE-7 SCORE 2016 2017 11/15/2017   Total Score - - -   Total Score 0 15 0     In the past two weeks have you had thoughts of suicide or self-harm?  No.    Do you have concerns about your personal safety or the safety of others?   No  PHQ-9  English  PHQ-9   Any Language  ADRIANNE-7  Suicide Assessment Five-step Evaluation and Treatment (SAFE-T)    -has seen psychiatristy through [facility] In the past and plans to go back to her again    -Rash on forearm is not better despite use of topical steroid and Bactroban. Was referred to Derm but thought they would call her.    Today's PHQ-2 Score:   PHQ-2 (  [employer]) 10/29/2018   Q1: Little interest or pleasure in doing things 0   Q2: Feeling down, depressed or hopeless 1   PHQ-2 Score 1   Q1: Little interest or pleasure in doing things Not at all   Q2: Feeling down, depressed or hopeless Several days   PHQ-2 Score 1       Abuse: Current or Past(Physical, Sexual or Emotional)- No  Do you feel safe in your  environment - Yes    Social History   Substance Use Topics     Smoking status: Never Smoker     Smokeless tobacco: Never Used     Alcohol use Yes      Comment: 1-2 per mo     Alcohol Use 10/29/2018   If you drink alcohol do you typically have greater than 3 drinks per day OR greater than 7 drinks per week? No   No flowsheet data found.    Reviewed orders with patient.  Reviewed health maintenance and updated orders accordingly - Yes  Labs reviewed in [facility]    Patient over age 50, mutual decision to screen reflected in health maintenance.    Pertinent mammograms are reviewed under the imaging tab.  History of abnormal Pap smear: Status post benign hysterectomy. Health Maintenance and Surgical History updated.     Reviewed and updated as needed this visit by clinical staff  Tobacco  Allergies  Meds         Reviewed and updated as needed this visit by Provider            Review of Systems   Constitutional: Negative for chills and fever.   HENT: Negative for congestion, ear pain, hearing loss and sore throat.    Eyes: Negative for pain and visual disturbance.   Respiratory: Negative for cough and shortness of breath.    Cardiovascular: Negative for chest pain, palpitations and peripheral edema.   Gastrointestinal: Negative for abdominal pain, constipation, diarrhea, heartburn, hematochezia and nausea.   Breasts:  Negative for tenderness, breast mass and discharge.   Genitourinary: Negative for dysuria, frequency, genital sores, hematuria, pelvic pain, urgency, vaginal bleeding and vaginal discharge.   Musculoskeletal: Positive for joint swelling. Negative for arthralgias and myalgias.   Skin: Positive for rash.   Neurological: Positive for headaches and paresthesias. Negative for dizziness and weakness.   Psychiatric/Behavioral: Positive for mood changes. The patient is nervous/anxious.           OBJECTIVE:   /62 (BP Location: Right arm, Patient Position: Chair, Cuff Size: Adult Large)  Pulse 70  Temp 97.3  F  "(36.3  C) (Oral)  Ht 5' 5\" (1.651 m)  Wt 212 lb (96.2 kg)  SpO2 98%  BMI 35.28 kg/m2  Physical Exam  GENERAL: healthy, alert and no distress  EYES: Eyes grossly normal to inspection, PERRL and conjunctivae and sclerae normal  HENT: ear canals and TM's normal, nose and mouth without ulcers or lesions  NECK: no adenopathy, no asymmetry, masses, or scars and thyroid normal to palpation  RESP: lungs clear to auscultation - no rales, rhonchi or wheezes  BREAST: normal without masses, tenderness or nipple discharge and no palpable axillary masses or adenopathy  CV: regular rate and rhythm, normal S1 S2, no S3 or S4, no murmur, click or rub, no peripheral edema and peripheral pulses strong  ABDOMEN: soft, nontender, no hepatosplenomegaly, no masses and bowel sounds normal  MS: left index finger tenderness at MCP, decreased flexion  SKIN: Denuded erythematous papules right volar forearm. Thinning of scalp hair  NEURO: Normal strength and tone, mentation intact and speech normal  PSYCH: mentation appears normal, affect normal/bright    Diagnostic Test Results:  Results for orders placed or performed in visit on 10/23/18   Vitamin B12   Result Value Ref Range    Vitamin B12 1872 (H) 193 - 986 pg/mL       ASSESSMENT/PLAN:   1. Routine history and physical examination of adult      2. Rash and nonspecific skin eruption  Patient given Dermatology referral information.    3. Major depressive disorder, recurrent episode, moderate (H)  Reviewed Depression Action Plan and self-care strategies.  Patient to schedule with therapist.  Continue current medications without change.    - buPROPion (WELLBUTRIN XL) 150 MG 24 hr tablet; Take 1 tablet (150 mg) by mouth every morning  Dispense: 90 tablet; Refill: 1  - citalopram (CELEXA) 40 MG tablet; Take 1 tablet (40 mg) by mouth daily  Dispense: 90 tablet; Refill: 1    4. Generalized anxiety disorder  As above  - buPROPion (WELLBUTRIN XL) 150 MG 24 hr tablet; Take 1 tablet (150 mg) by " "mouth every morning  Dispense: 90 tablet; Refill: 1  - citalopram (CELEXA) 40 MG tablet; Take 1 tablet (40 mg) by mouth daily  Dispense: 90 tablet; Refill: 1    5. Insomnia due to other mental disorder  As above  - traZODone (DESYREL) 50 MG tablet; Take 1 tablet (50 mg) by mouth At Bedtime  Dispense: 90 tablet; Refill: 3    6. Bee sting allergy    - EPINEPHrine (AUVI-Q) 0.3 MG/0.3ML injection 2-pack; Inject 0.3 mLs (0.3 mg) into the muscle as needed for anaphylaxis  Dispense: 0.6 mL; Refill: 3    7. Visit for screening mammogram    - MA SCREENING DIGITAL BILAT - Future  (s+30); Future    8. Need for prophylactic vaccination and inoculation against influenza    - FLU VACCINE, (RIV4) RECOMBINANT HA  , IM (FluBlok, egg free) [57705]- >18 YRS (FMG recommended  50-64 YRS)  - Vaccine Administration, Initial [41972]    9. Screen for colon cancer  JESSE signed for records today      COUNSELING:  Reviewed preventive health counseling, as reflected in patient instructions       Regular exercise       Healthy diet/nutrition       Colon cancer screening    BP Readings from Last 1 Encounters:   10/29/18 108/62     Estimated body mass index is 35.28 kg/(m^2) as calculated from the following:    Height as of this encounter: 5' 5\" (1.651 m).    Weight as of this encounter: 212 lb (96.2 kg).      Weight management plan: Discussed healthy diet and exercise guidelines and patient will follow up in 12 months in clinic to re-evaluate.     reports that she has never smoked. She has never used smokeless tobacco.      Counseling Resources:  ATP IV Guidelines  Pooled Cohorts Equation Calculator  Breast Cancer Risk Calculator  FRAX Risk Assessment  ICSI Preventive Guidelines  Dietary Guidelines for Americans, 2010  USDA's MyPlate  ASA Prophylaxis  Lung CA Screening    LEOPOLDO Michelle [facility]    Injectable Influenza Immunization Documentation    1.  Is the person to be vaccinated sick today?   No    2. Does the " person to be vaccinated have an allergy to a component   of the vaccine?   No  Egg Allergy Algorithm Link    3. Has the person to be vaccinated ever had a serious reaction   to influenza vaccine in the past?   No    4. Has the person to be vaccinated ever had Guillain-Barré syndrome?   No    Form completed by Cinda Solitario MA

## 2018-10-29 NOTE — Clinical Note
Please abstract the following data from this visit with this patient into the appropriate field in Epic:  Colonoscopy done on this date: January 2015 (approximately), by this group: [facility], results were normal.

## 2018-10-29 NOTE — MR AVS SNAPSHOT
After Visit Summary   10/29/2018    Sunshine Delgado    MRN: [id number]           Patient Information     Date Of Birth          1965        Visit Information        Provider Department      10/29/2018 10:40 AM Lesly Arteaga APRN [facility]        Today's Diagnoses     Routine history and physical examination of adult    -  1    Rash and nonspecific skin eruption        Major depressive disorder, recurrent episode, moderate (H)        Generalized anxiety disorder        Insomnia due to other mental disorder        Bee sting allergy        Visit for screening mammogram        Need for prophylactic vaccination and inoculation against influenza        Screen for colon cancer          Care Instructions      Preventive Health Recommendations  Female Ages 50 - 64    Yearly exam: See your health care provider every year in order to  o Review health changes.   o Discuss preventive care.    o Review your medicines if your doctor has prescribed any.      Get a Pap test every three years (unless you have an abnormal result and your provider advises testing more often).    If you get Pap tests with HPV test, you only need to test every 5 years, unless you have an abnormal result.     You do not need a Pap test if your uterus was removed (hysterectomy) and you have not had cancer.    You should be tested each year for STDs (sexually transmitted diseases) if you're at risk.     Have a mammogram every 1 to 2 years.    Have a colonoscopy at age 50, or have a yearly FIT test (stool test). These exams screen for colon cancer.      Have a cholesterol test every 5 years, or more often if advised.    Have a diabetes test (fasting glucose) every three years. If you are at risk for diabetes, you should have this test more often.     If you are at risk for osteoporosis (brittle bone disease), think about having a bone density scan (DEXA).    Shots: Get a flu shot each year. Get a tetanus shot  every 10 years.    Nutrition:     Eat at least 5 servings of fruits and vegetables each day.    Eat whole-grain bread, whole-wheat pasta and brown rice instead of white grains and rice.    Get adequate Calcium and Vitamin D.     Lifestyle    Exercise at least 150 minutes a week (30 minutes a day, 5 days a week). This will help you control your weight and prevent disease.    Limit alcohol to one drink per day.    No smoking.     Wear sunscreen to prevent skin cancer.     See your dentist every six months for an exam and cleaning.    See your eye doctor every 1 to 2 years.  [facility]    If you have any questions regarding to your visit please contact your care team:       Team Red:   Clinic Hours Telephone Number   Dr. Shana Arteaga, NP 7am-7pm  Monday - Thursday   7am-5pm  Fridays  [phone]  (Appointment scheduling available 24/7)   Urgent [facility] - [place] - 11am-9pm Monday-Friday Saturday-Sunday- 9am-5pm   [place] - 5pm-9pm Monday-Friday Saturday-Sunday- 9am-5pm  [phone] - [place]  [phone] - [place]       What options do I have for a visit other than an office visit? We offer electronic visits (e-visits) and telephone visits, when medically appropriate.  Please check with your medical insurance to see if these types of visits are covered, as you will be responsible for any charges that are not paid by your insurance.      You can use [employer] (secure electronic communication) to access to your chart, send your primary care provider a message, or make an appointment. Ask a team member how to get started.     For a price quote for your services, please call our Consumer Price Line at [phone] or our Imaging Cost estimation line at [phone] (for imaging tests).    Discharged by Cinda Solitario MA.            Follow-ups after your visit        Follow-up notes from your care team     Return in about  6 months (around 4/29/2019) for Depression, Anxiety.      Your next 10 appointments already scheduled     Oct 29, 2018  3:15 PM CDT   New Visit with Adonis Noguera MD   [facility]    [address]  [place] [address]   [phone]            Apr 29, 2019 10:40 AM CDT   Office Visit with LEOPOLDO Michelle CNP   [facility] ([facility])    [address]  [place] [address]   [phone]           Bring a current list of meds and any records pertaining to this visit. For Physicals, please bring immunization records and any forms needing to be filled out. Please arrive 10 minutes early to complete paperwork.              Future tests that were ordered for you today     Open Future Orders        Priority Expected Expires Ordered    MA SCREENING DIGITAL BILAT - Future  (s+30) Routine  10/15/2019 10/29/2018            Who to contact     If you have questions or need follow up information about today's clinic visit or your schedule please contact [facility] directly at [phone].  Normal or non-critical lab and imaging results will be communicated to you by MyChart, letter or phone within 4 business days after the clinic has received the results. If you do not hear from us within 7 days, please contact the clinic through [employer] or phone. If you have a critical or abnormal lab result, we will notify you by phone as soon as possible.  Submit refill requests through [employer] or call your pharmacy and they will forward the refill request to us. Please allow 3 business days for your refill to be completed.          Additional Information About Your Visit        [employer] Information     [employer] gives you secure access to your electronic health record. If you see a primary care provider, you can also send messages to your care team and make appointments. If you have questions, please call your primary care  "clinic.  If you do not have a primary care provider, please call [phone] and they will assist you.        Care EveryWhere ID     This is your Care EveryWhere ID. This could be used by other organizations to access your [place] medical records  [id number]        Your Vitals Were     Pulse Temperature Height Pulse Oximetry BMI (Body Mass Index)       70 97.3  F (36.3  C) (Oral) 5' 5\" (1.651 m) 98% 35.28 kg/m2        Blood Pressure from Last 3 Encounters:   10/29/18 108/62   10/23/18 120/64   10/01/18 114/72    Weight from Last 3 Encounters:   10/29/18 212 lb (96.2 kg)   10/23/18 211 lb 9.6 oz (96 kg)   10/01/18 207 lb (93.9 kg)              We Performed the Following     FLU VACCINE, (RIV4) RECOMBINANT HA  , IM (FluBlok, egg free) [48673]- >18 YRS (G recommended  50-64 YRS)     Vaccine Administration, Initial [97641]          Today's Medication Changes          These changes are accurate as of 10/29/18 11:53 AM.  If you have any questions, ask your nurse or doctor.               These medicines have changed or have updated prescriptions.        Dose/Directions    citalopram 40 MG tablet   Commonly known as:  celeXA   This may have changed:  when to take this   Used for:  Generalized anxiety disorder, Major depressive disorder, recurrent episode, moderate (H)        Dose:  40 mg   Take 1 tablet (40 mg) by mouth daily   Quantity:  90 tablet   Refills:  1            Where to get your medicines      These medications were sent to [place] - [address] [place] [address]     Phone:  [phone]     EPINEPHrine 0.3 MG/0.3ML injection 2-pack         These medications were sent to [facility] [id number] [place] [facility] [address]  [address] [address]     Phone:  [phone]     buPROPion 150 MG 24 hr tablet    citalopram 40 MG tablet    traZODone 50 MG tablet                Primary Care Provider Office Phone # Fax #    " Lesly Arteaga, APRN [phone]       [address]  [place] [address]        Equal Access to Services     LUCA KELLER : Hadii jatinder ku hadkiersteno Soomaali, waaxda luqadaha, qaybta kaalmada adeegyada, mary eaton laLauraruben raymundo. So [facility] [phone].    ATENCIÓN: Si habla español, tiene a beard disposición servicios gratuitos de asistencia lingüística. Llame al [phone].    We comply with applicable federal civil rights laws and [place] laws. We do not discriminate on the basis of race, color, national origin, age, disability, sex, sexual orientation, or gender identity.            Thank you!     Thank you for choosing [facility]  for your care. Our goal is always to provide you with excellent care. Hearing back from our patients is one way we can continue to improve our services. Please take a few minutes to complete the written survey that you may receive in the mail after your visit with us. Thank you!             Your Updated Medication List - Protect others around you: Learn how to safely use, store and throw away your medicines at www.disposemymeds.org.          This list is accurate as of 10/29/18 11:53 AM.  Always use your most recent med list.                   Brand Name Dispense Instructions for use Diagnosis    buPROPion 150 MG 24 hr tablet    WELLBUTRIN XL    90 tablet    Take 1 tablet (150 mg) by mouth every morning    Generalized anxiety disorder, Major depressive disorder, recurrent episode, moderate (H)       citalopram 40 MG tablet    celeXA    90 tablet    Take 1 tablet (40 mg) by mouth daily    Generalized anxiety disorder, Major depressive disorder, recurrent episode, moderate (H)       cyanocobalamin 1000 MCG/ML injection    VITAMIN B12    1 mL    Inject 1 mL (1,000 mcg) Subcutaneous every 30 days    S/P laparoscopic sleeve gastrectomy       EPINEPHrine 0.3 MG/0.3ML injection 2-pack    AUVI-Q    0.6 mL    Inject 0.3 mLs (0.3 mg) into  the muscle as needed for anaphylaxis    Bee sting allergy       fluticasone 50 MCG/ACT spray    FLONASE    16 g    Spray 2 sprays into both nostrils daily    Acute sinusitis with symptoms > 10 days, Nonintractable headache, unspecified chronicity pattern, unspecified headache type       hydrOXYzine 25 MG tablet    ATARAX    60 tablet    Take 1-2 tablets (25-50 mg) by mouth every 6 hours as needed for itching    Folliculitis, H/O skin pruritus       Multi-vitamin Tabs tablet      Take 1 tablet by mouth every morning        mupirocin 2 % cream    BACTROBAN    30 g    Apply topically 3 times daily    Folliculitis       order for DME      [employer] Airsense 10 auto cpap 6-7 cm, Airfit P10 for her XS pillows        order for DME     12 each    Injection Supplies for Vitamin B12: 3cc syringes w/ 27 gauge needles, 1/2 inch length    S/P laparoscopic sleeve gastrectomy       traZODone 50 MG tablet    DESYREL    90 tablet    Take 1 tablet (50 mg) by mouth At Bedtime    Insomnia due to other mental disorder       VITAMIN C PO      Take by mouth every morning        VITAMIN D PO      Take by mouth every morning

## 2018-10-29 NOTE — LETTER
My Depression Action Plan  Name: Sunshine Delgado   Date of Birth 1965  Date: 10/29/2018    My doctor: Shana Jett   My clinic: [address]  [place] [id number] [phone]          GREEN    ZONE   Good Control    What it looks like:     Things are going generally well. You have normal up s and down s. You may even feel depressed from time to time, but bad moods usually last less than a day.   What you need to do:  1. Continue to care for yourself (see self care plan)  2. Check your depression survival kit and update it as needed  3. Follow your physician s recommendations including any medication.  4. Do not stop taking medication unless you consult with your physician first.           YELLOW         ZONE Getting Worse    What it looks like:     Depression is starting to interfere with your life.     It may be hard to get out of bed; you may be starting to isolate yourself from others.    Symptoms of depression are starting to last most all day and this has happened for several days.     You may have suicidal thoughts but they are not constant.   What you need to do:     1. Call your care team, your response to treatment will improve if you keep your care team informed of your progress. Yellow periods are signs an adjustment may need to be made.     2. Continue your self-care, even if you have to fake it!    3. Talk to someone in your support network    4. Open up your depression survival kit           RED    ZONE Medical Alert - Get Help    What it looks like:     Depression is seriously interfering with your life.     You may experience these or other symptoms: You can t get out of bed most days, can t work or engage in other necessary activities, you have trouble taking care of basic hygiene, or basic responsibilities, thoughts of suicide or death that will not go away, self-injurious behavior.     What you need to do:  1. Call your care team and  request a same-day appointment. If they are not available (weekends or after hours) call your local crisis line, emergency room or 911.            Depression Self Care Plan / Survival Kit    Self-Care for Depression  Here s the deal. Your body and mind are really not as separate as most people think.  What you do and think affects how you feel and how you feel influences what you do and think. This means if you do things that people who feel good do, it will help you feel better.  Sometimes this is all it takes.  There is also a place for medication and therapy depending on how severe your depression is, so be sure to consult with your medical provider and/ or Behavioral Health Consultant if your symptoms are worsening or not improving.     In order to better manage my stress, I will:    Exercise  Get some form of exercise, every day. This will help reduce pain and release endorphins, the  feel good  chemicals in your brain. This is almost as good as taking antidepressants!  This is not the same as joining a gym and then never going! (they count on that by the way ) It can be as simple as just going for a walk or doing some gardening, anything that will get you moving.      Hygiene   Maintain good hygiene (Get out of bed in the morning, Make your bed, Brush your teeth, Take a shower, and Get dressed like you were going to work, even if you are unemployed).  If your clothes don't fit try to get ones that do.    Diet  I will strive to eat foods that are good for me, drink plenty of water, and avoid excessive sugar, caffeine, alcohol, and other mood-altering substances.  Some foods that are helpful in depression are: complex carbohydrates, B vitamins, flaxseed, fish or fish oil, fresh fruits and vegetables.    Psychotherapy  I agree to participate in Individual Therapy (if recommended).    Medication  If prescribed medications, I agree to take them.  Missing doses can result in serious side effects.  I understand that  drinking alcohol, or other illicit drug use, may cause potential side effects.  I will not stop my medication abruptly without first discussing it with my provider.    Staying Connected With Others  I will stay in touch with my friends, family members, and my primary care provider/team.    Use your imagination  Be creative.  We all have a creative side; it doesn t matter if it s oil painting, sand castles, or mud pies! This will also kick up the endorphins.    Witness Beauty  (AKA stop and smell the roses) Take a look outside, even in mid-winter. Notice colors, textures. Watch the squirrels and birds.     Service to others  Be of service to others.  There is always someone else in need.  By helping others we can  get out of ourselves  and remember the really important things.  This also provides opportunities for practicing all the other parts of the program.    Humor  Laugh and be silly!  Adjust your TV habits for less news and crime-drama and more comedy.    Control your stress  Try breathing deep, massage therapy, biofeedback, and meditation. Find time to relax each day.     My support system    Clinic Contact:  Phone number:    Contact 1:  Phone number:    Contact 2:  Phone number:    Orthodoxy/:  Phone number:    Therapist:  Phone number:    Local crisis center:    Phone number:    Other community support:  Phone number:

## 2018-10-30 ENCOUNTER — RADIANT APPOINTMENT (OUTPATIENT)
Dept: MAMMOGRAPHY | Facility: CLINIC | Age: 53
End: 2018-10-30
Attending: NURSE PRACTITIONER
Payer: COMMERCIAL

## 2018-10-30 DIAGNOSIS — Z12.31 VISIT FOR SCREENING MAMMOGRAM: ICD-10-CM

## 2018-10-30 PROCEDURE — 77063 BREAST TOMOSYNTHESIS BI: CPT | Mod: TC

## 2018-10-30 PROCEDURE — 77067 SCR MAMMO BI INCL CAD: CPT | Mod: TC

## 2018-10-31 ENCOUNTER — TELEPHONE (OUTPATIENT)
Dept: FAMILY MEDICINE | Facility: CLINIC | Age: 53
End: 2018-10-31

## 2018-10-31 NOTE — TELEPHONE ENCOUNTER
Reason for Call:  Other call back    Detailed comments: Need quantity to be dispensed.  EPINEPHrine (AUVI-Q) 0.3 MG/0.3ML injection 2-pack        Phone Number Patient can be reached at: Other phone number: [phone]     Best Time: any     Can we leave a detailed message on this number? YES    Call taken on 10/31/2018 at 9:29 AM by Taras Ibarra

## 2018-10-31 NOTE — TELEPHONE ENCOUNTER
Called and spoke with pharmacy and clarified Rx.   Pharmacy is to dispense 2 pens for a total of 0.6 mL.    Anne Marie Jackson RN

## 2018-11-01 ENCOUNTER — TELEPHONE (OUTPATIENT)
Dept: FAMILY MEDICINE | Facility: CLINIC | Age: 53
End: 2018-11-01

## 2018-11-01 NOTE — TELEPHONE ENCOUNTER
Spoke with patient she did not know who was calling or the name of the medication.  Advised that this Auvi-Q is another name for epi-pen.  Patient now understands and would like medication delivered.  Called pharmacy and they will reach out to patient to verify and send medication.   Jasmin Alba RN

## 2018-11-01 NOTE — TELEPHONE ENCOUNTER
Reason for Call:  Other call back    Detailed comments: Paul from [facility] is calling because the patient refused to verify hippa for delivery of Auvi-O Please call back     Phone Number Patient can be reached at: Other phone number:  [phone]    Best Time: any    Can we leave a detailed message on this number? YES    Call taken on 11/1/2018 at 4:04 PM by Simin Zaragoza

## 2018-11-19 ENCOUNTER — TELEPHONE (OUTPATIENT)
Dept: FAMILY MEDICINE | Facility: CLINIC | Age: 53
End: 2018-11-19

## 2018-11-19 NOTE — TELEPHONE ENCOUNTER
Panel Management Review      Patient has the following on her problem list:     Depression / Dysthymia review    Measure:  Needs PHQ-9 score of 4 or less during index window.  Administer PHQ-9 and if score is 5 or more, send encounter to provider for next steps.    5 - 7 month window range: 10/01/2018    PHQ-9 SCORE 9/18/2017 11/15/2017 10/1/2018   Total Score - - -   Total Score 12 1 4       If PHQ-9 recheck is 5 or more, route to provider for next steps.    Patient is due for:  None      Composite cancer screening  Chart review shows that this patient is due/due soon for the following None  Summary:    Patient is due/failing the following:   None    Action needed:   None    Type of outreach:    Sent letter.    Questions for provider review:    None                                                                                                                                    Kev Gray       Chart routed to none.

## 2018-12-17 ENCOUNTER — OFFICE VISIT (OUTPATIENT)
Dept: FAMILY MEDICINE | Facility: CLINIC | Age: 53
End: 2018-12-17
Payer: COMMERCIAL

## 2018-12-17 VITALS
OXYGEN SATURATION: 98 % | DIASTOLIC BLOOD PRESSURE: 60 MMHG | SYSTOLIC BLOOD PRESSURE: 112 MMHG | TEMPERATURE: 98.3 F | WEIGHT: 209 LBS | BODY MASS INDEX: 34.82 KG/M2 | HEIGHT: 65 IN | HEART RATE: 60 BPM

## 2018-12-17 DIAGNOSIS — J01.90 ACUTE SINUSITIS, RECURRENCE NOT SPECIFIED, UNSPECIFIED LOCATION: Primary | ICD-10-CM

## 2018-12-17 PROCEDURE — 99213 OFFICE O/P EST LOW 20 MIN: CPT | Performed by: PHYSICIAN ASSISTANT

## 2018-12-17 ASSESSMENT — MIFFLIN-ST. JEOR: SCORE: 1563.9

## 2018-12-17 NOTE — PROGRESS NOTES
"  SUBJECTIVE:   Sunshine Delgado is a 53 year old female who presents to clinic today for the following health issues:      ENT Symptoms             Symptoms: cc Present Absent Comment   Fever/Chills  x  Chills, night sweats    Fatigue  x     Muscle Aches   x    Eye Irritation   x    Sneezing   x    Nasal Nacho/Drg  x     Sinus Pressure/Pain  x     Loss of smell   x    Dental pain  x     Sore Throat  x     Swollen Glands   x    Ear Pain/Fullness  x     Cough   x    Wheeze   x    Chest Pain   x    Shortness of breath   x    Rash   x    Other  x  Nausea, headaches, dizziness worsened over the past week.      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  Additional history: as documented    Labs reviewed in EPIC    Reviewed and updated as needed this visit by clinical staff       Reviewed and updated as needed this visit by Provider         ROS:  Constitutional, HEENT, cardiovascular, pulmonary, GI, , musculoskeletal, neuro, skin, endocrine and psych systems are negative, except as otherwise noted.    OBJECTIVE:     /60 (Cuff Size: Adult Large)   Pulse 60   Temp 98.3  F (36.8  C) (Oral)   Ht 1.651 m (5' 5\")   Wt 94.8 kg (209 lb)   SpO2 98%   BMI 34.78 kg/m    Body mass index is 34.78 kg/m .  GENERAL: healthy, alert and no distress  HENT: Maxillary sinus tenderness noted, otherwise ear canals and TM's normal, nose and mouth without ulcers or lesions  NECK: no adenopathy, no asymmetry, masses, or scars and thyroid normal to palpation  RESP: lungs clear to auscultation - no rales, rhonchi or wheezes  CV: regular rate and rhythm, normal S1 S2, no S3 or S4, no murmur, click or rub, no peripheral edema and peripheral pulses strong    ASSESSMENT/PLAN:         ICD-10-CM    1. Acute sinusitis, recurrence not specified, unspecified " location J01.90 amoxicillin-clavulanate (AUGMENTIN) 875-125 MG tablet      Will treat. Lungs clear. This prescription is given with a discussion of side effects, risks and proper use.  Instructions are given to follow up if not improving or symptoms change or worsen as discussed.     LISSA Pritchett, PA-C

## 2018-12-17 NOTE — LETTER
[facility]  [address] [facility] [address] [facility] [address]  [phone]          December 17, 2018    RE:  Sunshine YARBROUGH Danny                                                                                                                                                       [address] [place] [id number]            To whom it may concern:    Please excuse Sunshine from work for today, tomorrow and Wednesday for an acute illness.    Sincerely,        Oleg Young

## 2019-01-12 ENCOUNTER — OFFICE VISIT (OUTPATIENT)
Dept: URGENT CARE | Facility: URGENT CARE | Age: 54
End: 2019-01-12
Payer: COMMERCIAL

## 2019-01-12 VITALS
TEMPERATURE: 98.4 F | SYSTOLIC BLOOD PRESSURE: 136 MMHG | OXYGEN SATURATION: 98 % | BODY MASS INDEX: 35.74 KG/M2 | DIASTOLIC BLOOD PRESSURE: 85 MMHG | HEART RATE: 56 BPM | WEIGHT: 214.8 LBS

## 2019-01-12 DIAGNOSIS — B34.9 VIRAL SYNDROME: Primary | ICD-10-CM

## 2019-01-12 DIAGNOSIS — J02.9 SORE THROAT: ICD-10-CM

## 2019-01-12 DIAGNOSIS — L50.9 HIVES: ICD-10-CM

## 2019-01-12 DIAGNOSIS — M43.6 NECK STIFFNESS: ICD-10-CM

## 2019-01-12 DIAGNOSIS — M94.0 COSTOCHONDRITIS: ICD-10-CM

## 2019-01-12 LAB
BASOPHILS # BLD AUTO: 0 10E9/L (ref 0–0.2)
BASOPHILS NFR BLD AUTO: 0.4 %
DEPRECATED S PYO AG THROAT QL EIA: NORMAL
DIFFERENTIAL METHOD BLD: NORMAL
EOSINOPHIL # BLD AUTO: 0.2 10E9/L (ref 0–0.7)
EOSINOPHIL NFR BLD AUTO: 2.6 %
ERYTHROCYTE [DISTWIDTH] IN BLOOD BY AUTOMATED COUNT: 14.6 % (ref 10–15)
HCT VFR BLD AUTO: 41.6 % (ref 35–47)
HETEROPH AB SER QL: NEGATIVE
HGB BLD-MCNC: 13.4 G/DL (ref 11.7–15.7)
LYMPHOCYTES # BLD AUTO: 2.5 10E9/L (ref 0.8–5.3)
LYMPHOCYTES NFR BLD AUTO: 31.1 %
MCH RBC QN AUTO: 29.6 PG (ref 26.5–33)
MCHC RBC AUTO-ENTMCNC: 32.2 G/DL (ref 31.5–36.5)
MCV RBC AUTO: 92 FL (ref 78–100)
MONOCYTES # BLD AUTO: 0.7 10E9/L (ref 0–1.3)
MONOCYTES NFR BLD AUTO: 8.6 %
NEUTROPHILS # BLD AUTO: 4.6 10E9/L (ref 1.6–8.3)
NEUTROPHILS NFR BLD AUTO: 57.3 %
PLATELET # BLD AUTO: 277 10E9/L (ref 150–450)
RBC # BLD AUTO: 4.53 10E12/L (ref 3.8–5.2)
SPECIMEN SOURCE: NORMAL
WBC # BLD AUTO: 8 10E9/L (ref 4–11)

## 2019-01-12 PROCEDURE — 85025 COMPLETE CBC W/AUTO DIFF WBC: CPT | Performed by: PHYSICIAN ASSISTANT

## 2019-01-12 PROCEDURE — 99214 OFFICE O/P EST MOD 30 MIN: CPT | Performed by: PHYSICIAN ASSISTANT

## 2019-01-12 PROCEDURE — 87880 STREP A ASSAY W/OPTIC: CPT | Performed by: PHYSICIAN ASSISTANT

## 2019-01-12 PROCEDURE — 36415 COLL VENOUS BLD VENIPUNCTURE: CPT | Performed by: PHYSICIAN ASSISTANT

## 2019-01-12 PROCEDURE — 87081 CULTURE SCREEN ONLY: CPT | Performed by: PHYSICIAN ASSISTANT

## 2019-01-12 PROCEDURE — 86308 HETEROPHILE ANTIBODY SCREEN: CPT | Performed by: PHYSICIAN ASSISTANT

## 2019-01-12 RX ORDER — PREDNISONE 20 MG/1
20 TABLET ORAL 2 TIMES DAILY
Qty: 10 TABLET | Refills: 0 | Status: SHIPPED | OUTPATIENT
Start: 2019-01-12 | End: 2019-04-12

## 2019-01-12 NOTE — PROGRESS NOTES
S: 52 yo female here for sore throat and stiff neck for a week.  Denies any injury.  No fever.  For the last 3-4 days has also had eye hives that itch.  Benadryl has not helped.  The only time she has had hives similar to this in the past is when she ate cinnamon.  No new prescription or over-the-counter medications.  No new topicals.  No new laundry detergents or fabric softeners.  Mild cough.  Mild nasal discharge.  Does have some chest wall discomfort.  No vomiting or diarrhea. Mild HA. No vision changes.  No dysuria        Allergies   Allergen Reactions     Sulfa Drugs Hives     Contrast Dye Other (See Comments)     Patient had sneezing and an itchy throat following contrast injection of 100 mL Isovue-370.     Vicodin [Hydrocodone-Acetaminophen]      Wasps [Hornets] Swelling     Now carries Epi Pen       Past Medical History:   Diagnosis Date     Bee sting allergy      Depressive disorder      Generalized anxiety disorder 10/15/2009    lexapro made things worse.       Morbid obesity (H)      MARIA GUADALUPE (obstructive sleep apnea)- severe (AHI 84) 09/09/2011     Voice fatigue 10/22/2009         Current Outpatient Medications on File Prior to Visit:  Ascorbic Acid (VITAMIN C PO) Take by mouth every morning   buPROPion (WELLBUTRIN XL) 150 MG 24 hr tablet Take 1 tablet (150 mg) by mouth every morning   Cholecalciferol (VITAMIN D PO) Take by mouth every morning   citalopram (CELEXA) 40 MG tablet Take 1 tablet (40 mg) by mouth daily   cyanocobalamin (VITAMIN B12) 1000 MCG/ML injection Inject 1 mL (1,000 mcg) Subcutaneous every 30 days   EPINEPHrine (AUVI-Q) 0.3 MG/0.3ML injection 2-pack Inject 0.3 mLs (0.3 mg) into the muscle as needed for anaphylaxis   fluticasone (FLONASE) 50 MCG/ACT spray Spray 2 sprays into both nostrils daily   hydrOXYzine (ATARAX) 25 MG tablet Take 1-2 tablets (25-50 mg) by mouth every 6 hours as needed for itching   multivitamin, therapeutic with minerals (MULTI-VITAMIN) TABS tablet Take 1 tablet by  mouth every morning   mupirocin (BACTROBAN) 2 % cream Apply topically 3 times daily   order for DME Injection Supplies for Vitamin B12: 3cc syringes w/ 27 gauge needles, 1/2 inch length   order for DME Resmed Airsense 10 auto cpap 6-7 cm, Airfit P10 for her XS pillows   traZODone (DESYREL) 50 MG tablet Take 1 tablet (50 mg) by mouth At Bedtime     No current facility-administered medications on file prior to visit.     Social History     Tobacco Use     Smoking status: Never Smoker     Smokeless tobacco: Never Used   Substance Use Topics     Alcohol use: Yes     Comment: 1-2 per mo       ROS:  CONSTITUTIONAL: Negative for fatigue or fever.  EYES: Negative for eye problems.  ENT: As above.  RESP: As above.  CV: as above  GI: Negative for vomiting.  MUSCULOSKELETAL:  As above.  NEUROLOGIC: as above.  SKIN: as above  - neg    OBJECTIVE:  /85 (BP Location: Left arm, Patient Position: Chair, Cuff Size: Adult Regular)   Pulse 56   Temp 98.4  F (36.9  C) (Oral)   Wt 97.4 kg (214 lb 12.8 oz)   SpO2 98%   BMI 35.74 kg/m    GENERAL APPEARANCE: Healthy, alert and no distress.  EYES:Conjunctiva/sclera clear.  EARS: No cerumen.   Ear canals w/o erythema.  TM's intact w/o erythema.    NOSE/MOUTH: Nose without ulcers, erythema or lesions.  No tongue or lip swelling.  SINUSES: No maxillary sinus tenderness.  THROAT: No erythema w/o tonsillar enlargement . No exudates.  No posterior pharyngeal swelling.  NECK: Supple, nontender, no lymphadenopathy,  mild decreased range of motion with lateral rotation to both sides, extension with mild- moderate discomfort. Full flexion.  C-spine is nontender.  Nontender bilateral occipital grooves posteriorly.  Able to go into supine position and sit up without any discomfort. Flexion of the hip in supine position does not cause increased neck pain.  RESP: Lungs clear to auscultation - no rales, rhonchi or wheezes  CV: Regular rate and rhythm, normal S1 S2, no murmur noted.  NEURO:  Awake, alert    SKIN: Diffuse fine 1 mm red papular rash on arms and trunk.  Concentrates more in areas of creases with some confluency. No welts.  Chest wall- localized moderate tenderness left mid to lower sternal border  Results for orders placed or performed in visit on 01/12/19   CBC with platelets differential   Result Value Ref Range    WBC 8.0 4.0 - 11.0 10e9/L    RBC Count 4.53 3.8 - 5.2 10e12/L    Hemoglobin 13.4 11.7 - 15.7 g/dL    Hematocrit 41.6 35.0 - 47.0 %    MCV 92 78 - 100 fl    MCH 29.6 26.5 - 33.0 pg    MCHC 32.2 31.5 - 36.5 g/dL    RDW 14.6 10.0 - 15.0 %    Platelet Count 277 150 - 450 10e9/L    % Neutrophils 57.3 %    % Lymphocytes 31.1 %    % Monocytes 8.6 %    % Eosinophils 2.6 %    % Basophils 0.4 %    Absolute Neutrophil 4.6 1.6 - 8.3 10e9/L    Absolute Lymphocytes 2.5 0.8 - 5.3 10e9/L    Absolute Monocytes 0.7 0.0 - 1.3 10e9/L    Absolute Eosinophils 0.2 0.0 - 0.7 10e9/L    Absolute Basophils 0.0 0.0 - 0.2 10e9/L    Diff Method Automated Method    Mononucleosis screen   Result Value Ref Range    Mononucleosis Screen Negative NEG^Negative   Strep, Rapid Screen   Result Value Ref Range    Specimen Description Throat     Rapid Strep A Screen       NEGATIVE: No Group A streptococcal antigen detected by immunoassay, await culture report.         ASSESSMENT:     ICD-10-CM    1. Viral syndrome B34.9 predniSONE (DELTASONE) 20 MG tablet   2. Neck stiffness M43.6    3. Hives L50.9 predniSONE (DELTASONE) 20 MG tablet   4. Sore throat J02.9 Strep, Rapid Screen     Beta strep group A culture     CBC with platelets differential     Mononucleosis screen     predniSONE (DELTASONE) 20 MG tablet   5. Costochondritis M94.0 predniSONE (DELTASONE) 20 MG tablet         PLAN: Confusing clinical picture with no clear-cut etiology.  The rash or hives could be due to viral illness but could be unrelated to the viral illness.  Benadryl has not helped.  I told her to switch to Zyrtec and try 1 a day for a week.  The  rash is  pruritic.  I will try a 5-day course of prednisone.  This may also help with sore throat, neck stiffness, and costochondritis.  If the neck stiffness or HA worsens in any way over the weekend she should go to the emergency room for evaluation of meningitis.  At this point her neck does not seem that tender or stiff.  Follow-up with primary early next week.  Lots of rest and fluids.  Carmen Bennett PA-C

## 2019-01-13 LAB
BACTERIA SPEC CULT: NORMAL
SPECIMEN SOURCE: NORMAL

## 2019-02-04 ENCOUNTER — OFFICE VISIT (OUTPATIENT)
Dept: FAMILY MEDICINE | Facility: CLINIC | Age: 54
End: 2019-02-04
Payer: COMMERCIAL

## 2019-02-04 VITALS
HEIGHT: 65 IN | HEART RATE: 75 BPM | SYSTOLIC BLOOD PRESSURE: 120 MMHG | BODY MASS INDEX: 36.32 KG/M2 | TEMPERATURE: 97.3 F | WEIGHT: 218 LBS | DIASTOLIC BLOOD PRESSURE: 82 MMHG

## 2019-02-04 DIAGNOSIS — H69.92 DYSFUNCTION OF LEFT EUSTACHIAN TUBE: ICD-10-CM

## 2019-02-04 DIAGNOSIS — R21 RASH AND NONSPECIFIC SKIN ERUPTION: ICD-10-CM

## 2019-02-04 DIAGNOSIS — H53.9 TRANSIENT VISUAL DISTURBANCE, BILATERAL: Primary | ICD-10-CM

## 2019-02-04 DIAGNOSIS — M54.2 CERVICALGIA: ICD-10-CM

## 2019-02-04 PROCEDURE — 99214 OFFICE O/P EST MOD 30 MIN: CPT | Performed by: NURSE PRACTITIONER

## 2019-02-04 RX ORDER — CYCLOBENZAPRINE HCL 10 MG
10 TABLET ORAL 3 TIMES DAILY PRN
Qty: 30 TABLET | Refills: 1 | Status: SHIPPED | OUTPATIENT
Start: 2019-02-04 | End: 2019-04-12

## 2019-02-04 ASSESSMENT — MIFFLIN-ST. JEOR: SCORE: 1604.72

## 2019-02-04 ASSESSMENT — PAIN SCALES - GENERAL: PAINLEVEL: SEVERE PAIN (7)

## 2019-02-04 NOTE — PROGRESS NOTES
SUBJECTIVE:   Sunshine Delgado is a 53 year old female who presents to clinic today for the following health issues:    Eye(s) Problem      Duration: 2 episodes, once on Saturday had double vision and then today had an episode of wavy vision    Description:  Location: bilateral  Pain: YES- and Headache  Redness: no  Discharge: no     Accompanying signs and symptoms: neck pain x 1 week    History (Trauma, foreign body exposure,): Ocular Migraines    Precipitating or alleviating factors (contact use): None    Therapies tried and outcome: Tylenol     On Saturday had 5-10 minutes of diplopia, which resolved spontaneously. This morning had waves in peripheral vision of both eyes lasting 45 minutes.  Had some wavy vision last month ago- previously had this in her 30's and was diagnosed with ocular migraine. Did try caffeine which helped a little.     Has had neck/shoulder pain, which improved with Prednisone that she was given for hives last month. Has had recurrent rash of right arm. Has had a headache intermittently in the last 2 months, left ear pressure- was seen in Dec. For sinusitis and last month for viral syndrome.      Problem list and histories reviewed & adjusted, as indicated.  Additional history: as documented    Patient Active Problem List   Diagnosis     Generalized anxiety disorder     CARDIOVASCULAR SCREENING; LDL GOAL LESS THAN 160     MARIA GUADALUPE (obstructive sleep apnea)- severe (AHI 84)     Morbid obesity (H)     Health Care Home     Mild major depression (H)     Insomnia     Bee sting allergy     CKD (chronic kidney disease) stage 3, GFR 30-59 ml/min (H)     Past Surgical History:   Procedure Laterality Date     COLONOSCOPY  2000     DAVINCI GASTRIC SLEEVE       GENITOURINARY SURGERY  2007     HYSTERECTOMY, PAP NO LONGER INDICATED       HYSTERECTOMY, VAGINAL  2007    still has ovaries     LAPAROSCOPIC GASTRIC SLEEVE N/A 3/13/2018    Procedure: LAPAROSCOPIC GASTRIC SLEEVE;  Laparoscopic Sleeve Gastrectomy;   "Surgeon: Kameron Joseph MD;  Location:  OR       Social History     Tobacco Use     Smoking status: Never Smoker     Smokeless tobacco: Never Used   Substance Use Topics     Alcohol use: Yes     Comment: 1-2 per mo     Family History   Problem Relation Age of Onset     Eye Disorder Mother         lost eyesight; probable macular degeneration     Psychotic Disorder Mother         Dementia /Alzhimers     Neurologic Disorder Mother         seizures     Diabetes Mother      Hypertension Mother      Alzheimer Disease Mother      Arthritis Mother      Osteoporosis Mother      Obesity Mother      Diabetes Father      Depression Father      Hyperlipidemia Father      Obesity Father      Psychotic Disorder Sister         bipolar     Thyroid Disease Sister         h/o thyroid cancer     Depression Sister         Bipolar     Obesity Sister      Cancer Other         Brain cancer     Osteoporosis Maternal Grandmother      Anxiety Disorder Son      Depression Sister      Thyroid Disease Sister            Reviewed and updated as needed this visit by clinical staff       Reviewed and updated as needed this visit by Provider         ROS:  Constitutional, HEENT, cardiovascular, pulmonary, GI, , musculoskeletal, neuro, skin, endocrine and psych systems are negative, except as otherwise noted.    OBJECTIVE:     /82 (BP Location: Left arm, Patient Position: Chair, Cuff Size: Adult Large)   Pulse 75   Temp 97.3  F (36.3  C) (Oral)   Ht 1.651 m (5' 5\")   Wt 98.9 kg (218 lb)   BMI 36.28 kg/m    Body mass index is 36.28 kg/m .  GENERAL: healthy, alert and no distress  EYES: PERRL, EOMI, visual fields normal, conjunctivae and sclerae normal and fundi benign-no diabetic or hypertensive changes seen  HENT: normal cephalic/atraumatic, ear canals and TM's normal, nose and mouth without ulcers or lesions, oropharynx clear, oral mucous membranes moist and tonsillar hypertrophy  NECK: no adenopathy, no asymmetry, masses, or " scars and thyroid normal to palpation  RESP: lungs clear to auscultation - no rales, rhonchi or wheezes  CV: regular rate and rhythm, normal S1 S2, no S3 or S4, no murmur, click or rub, no peripheral edema and peripheral pulses strong  MS: c-spine tender to palpation at C5-6  SKIN: A few excoriations right volar forearm    Diagnostic Test Results:  No results found for this or any previous visit (from the past 24 hour(s)).    ASSESSMENT/PLAN:     1. Transient visual disturbance, bilateral  Likely recurrence of ocular migraine. Recommend dilated eye exam with ophthalmology- patient will schedule with her outside eye doctor. If any sudden loss of vision, to Emergency Room.     2. Dysfunction of left eustachian tube  Start Flonase and use regularly for 2-3 weeks. If ear fullness better, then continue Flonase. If not better, follow up with ENT- may have some chronic tonsillitis contributing.  - OTOLARYNGOLOGY REFERRAL    3. Rash and nonspecific skin eruption  No clear cause- follow up with Derm as previously referred.    4. Cervicalgia  MRI in 2013 demonstrates disc disease.  Recommend Physical Therapy and consider repeat cervical MRI if not improving in 6 weeks.   - [facility] PT[facility] HAND, AND CHIROPRACTIC REFERRAL; Future  - cyclobenzaprine (FLEXERIL) 10 MG tablet; Take 1 tablet (10 mg) by mouth 3 times daily as needed for muscle spasms  Dispense: 30 tablet; Refill: 1    See Patient Instructions    LEOPOLDO [facility]

## 2019-03-26 ENCOUNTER — MYC REFILL (OUTPATIENT)
Dept: FAMILY MEDICINE | Facility: CLINIC | Age: 54
End: 2019-03-26

## 2019-03-26 DIAGNOSIS — F51.05 INSOMNIA DUE TO OTHER MENTAL DISORDER: ICD-10-CM

## 2019-03-26 DIAGNOSIS — F33.1 MAJOR DEPRESSIVE DISORDER, RECURRENT EPISODE, MODERATE (H): Chronic | ICD-10-CM

## 2019-03-26 DIAGNOSIS — F41.1 GENERALIZED ANXIETY DISORDER: Chronic | ICD-10-CM

## 2019-03-26 DIAGNOSIS — F99 INSOMNIA DUE TO OTHER MENTAL DISORDER: ICD-10-CM

## 2019-03-26 NOTE — TELEPHONE ENCOUNTER
Reason for Call:  Other call back    Detailed comments: Patient lost purse and needs all these medications ASAP. Please advise.     Phone Number Patient can be reached at: Home number on file [phone] (home)    Best Time: any     Can we leave a detailed message on this number? YES    Call taken on 3/26/2019 at 3:32 PM by Taras Ibarra

## 2019-03-27 RX ORDER — BUPROPION HYDROCHLORIDE 150 MG/1
150 TABLET ORAL EVERY MORNING
Qty: 30 TABLET | Refills: 0 | Status: SHIPPED | OUTPATIENT
Start: 2019-03-27 | End: 2019-04-12

## 2019-03-27 RX ORDER — CITALOPRAM HYDROBROMIDE 40 MG/1
40 TABLET ORAL DAILY
Qty: 30 TABLET | Refills: 0 | Status: SHIPPED | OUTPATIENT
Start: 2019-03-27 | End: 2019-04-12 | Stop reason: ALTCHOICE

## 2019-03-27 RX ORDER — TRAZODONE HYDROCHLORIDE 50 MG/1
50 TABLET, FILM COATED ORAL AT BEDTIME
Qty: 30 TABLET | Refills: 0 | Status: SHIPPED | OUTPATIENT
Start: 2019-03-27 | End: 2019-04-12

## 2019-03-27 NOTE — TELEPHONE ENCOUNTER
Prescription approved per [facility] Refill Protocol.    Patsy Raya RN  [facility]

## 2019-04-12 ENCOUNTER — OFFICE VISIT (OUTPATIENT)
Dept: FAMILY MEDICINE | Facility: CLINIC | Age: 54
End: 2019-04-12
Payer: COMMERCIAL

## 2019-04-12 VITALS
TEMPERATURE: 97 F | HEIGHT: 65 IN | HEART RATE: 66 BPM | BODY MASS INDEX: 35.65 KG/M2 | SYSTOLIC BLOOD PRESSURE: 102 MMHG | DIASTOLIC BLOOD PRESSURE: 70 MMHG | OXYGEN SATURATION: 98 % | WEIGHT: 214 LBS

## 2019-04-12 DIAGNOSIS — R42 VERTIGO: ICD-10-CM

## 2019-04-12 DIAGNOSIS — J01.90 ACUTE SINUSITIS WITH SYMPTOMS > 10 DAYS: Primary | ICD-10-CM

## 2019-04-12 DIAGNOSIS — F51.05 INSOMNIA DUE TO OTHER MENTAL DISORDER: ICD-10-CM

## 2019-04-12 DIAGNOSIS — F33.1 MAJOR DEPRESSIVE DISORDER, RECURRENT EPISODE, MODERATE (H): Chronic | ICD-10-CM

## 2019-04-12 DIAGNOSIS — F99 INSOMNIA DUE TO OTHER MENTAL DISORDER: ICD-10-CM

## 2019-04-12 DIAGNOSIS — F41.1 GENERALIZED ANXIETY DISORDER: Chronic | ICD-10-CM

## 2019-04-12 PROCEDURE — 99214 OFFICE O/P EST MOD 30 MIN: CPT | Performed by: NURSE PRACTITIONER

## 2019-04-12 RX ORDER — TRAZODONE HYDROCHLORIDE 50 MG/1
50 TABLET, FILM COATED ORAL AT BEDTIME
Qty: 90 TABLET | Refills: 3 | Status: SHIPPED | OUTPATIENT
Start: 2019-04-12 | End: 2019-10-28

## 2019-04-12 RX ORDER — BUPROPION HYDROCHLORIDE 150 MG/1
150 TABLET ORAL EVERY MORNING
Qty: 90 TABLET | Refills: 1 | Status: SHIPPED | OUTPATIENT
Start: 2019-04-12 | End: 2019-06-02

## 2019-04-12 RX ORDER — MECLIZINE HYDROCHLORIDE 25 MG/1
25 TABLET ORAL 3 TIMES DAILY PRN
Qty: 30 TABLET | Refills: 1 | Status: SHIPPED | OUTPATIENT
Start: 2019-04-12 | End: 2019-06-07

## 2019-04-12 RX ORDER — SERTRALINE HYDROCHLORIDE 25 MG/1
25 TABLET, FILM COATED ORAL DAILY
Qty: 90 TABLET | Refills: 1 | Status: SHIPPED | OUTPATIENT
Start: 2019-04-12 | End: 2019-06-07 | Stop reason: SINTOL

## 2019-04-12 ASSESSMENT — PATIENT HEALTH QUESTIONNAIRE - PHQ9
5. POOR APPETITE OR OVEREATING: MORE THAN HALF THE DAYS
SUM OF ALL RESPONSES TO PHQ QUESTIONS 1-9: 14

## 2019-04-12 ASSESSMENT — ANXIETY QUESTIONNAIRES
6. BECOMING EASILY ANNOYED OR IRRITABLE: MORE THAN HALF THE DAYS
1. FEELING NERVOUS, ANXIOUS, OR ON EDGE: MORE THAN HALF THE DAYS
2. NOT BEING ABLE TO STOP OR CONTROL WORRYING: MORE THAN HALF THE DAYS
5. BEING SO RESTLESS THAT IT IS HARD TO SIT STILL: SEVERAL DAYS
7. FEELING AFRAID AS IF SOMETHING AWFUL MIGHT HAPPEN: SEVERAL DAYS
3. WORRYING TOO MUCH ABOUT DIFFERENT THINGS: MORE THAN HALF THE DAYS
GAD7 TOTAL SCORE: 12
IF YOU CHECKED OFF ANY PROBLEMS ON THIS QUESTIONNAIRE, HOW DIFFICULT HAVE THESE PROBLEMS MADE IT FOR YOU TO DO YOUR WORK, TAKE CARE OF THINGS AT HOME, OR GET ALONG WITH OTHER PEOPLE: SOMEWHAT DIFFICULT

## 2019-04-12 ASSESSMENT — PAIN SCALES - GENERAL: PAINLEVEL: SEVERE PAIN (7)

## 2019-04-12 ASSESSMENT — MIFFLIN-ST. JEOR: SCORE: 1581.58

## 2019-04-12 NOTE — PROGRESS NOTES
SUBJECTIVE:   Sunshine Delgado is a 54 year old female who presents to clinic today for the following   health issues:      RESPIRATORY SYMPTOMS      Duration: 3 weeks    Description  facial pain/pressure, ear pain, scratchy throat, dizziness and headache    Severity: moderate-severe    Accompanying signs and symptoms: None    History (predisposing factors):  none    Precipitating or alleviating factors: None    Therapies tried and outcome:  Allergy Tabs and Sudafed with some relief    Feverish last night. Slight cough. Vertigo when standing up.    Depression and Anxiety Follow-Up    Status since last visit: Worsened- anxiety a little worse- stopped Celexa    Other associated symptoms:None    Complicating factors:     Significant life event: No     Current substance abuse: None    PHQ 11/15/2017 10/1/2018 4/12/2019   PHQ-9 Total Score 1 4 14   Q9: Thoughts of better off dead/self-harm past 2 weeks Not at all Not at all Not at all     ADRIANNE-7 SCORE 9/18/2017 11/15/2017 4/12/2019   Total Score - - -   Total Score 15 0 12     PHQ-9  English  PHQ-9   Any Language  ADRIANNE-7  Suicide Assessment Five-step Evaluation and Treatment (SAFE-T)    Additional history: as documented    Reviewed  and updated as needed this visit by clinical staff         Reviewed and updated as needed this visit by Provider         Patient Active Problem List   Diagnosis     Generalized anxiety disorder     CARDIOVASCULAR SCREENING; LDL GOAL LESS THAN 160     MARIA GUADALUPE (obstructive sleep apnea)- severe (AHI 84)     Morbid obesity (H)     Health Care Home     Mild major depression (H)     Insomnia     Bee sting allergy     CKD (chronic kidney disease) stage 3, GFR 30-59 ml/min (H)     Past Surgical History:   Procedure Laterality Date     COLONOSCOPY  2000     DAVINCI GASTRIC SLEEVE       GENITOURINARY SURGERY  2007     HYSTERECTOMY, PAP NO LONGER INDICATED       HYSTERECTOMY, VAGINAL  2007    still has ovaries     LAPAROSCOPIC GASTRIC SLEEVE N/A 3/13/2018     "Procedure: LAPAROSCOPIC GASTRIC SLEEVE;  Laparoscopic Sleeve Gastrectomy;  Surgeon: Kameron Joseph MD;  Location:  OR       Social History     Tobacco Use     Smoking status: Never Smoker     Smokeless tobacco: Never Used   Substance Use Topics     Alcohol use: Yes     Comment: 1-2 per mo     Family History   Problem Relation Age of Onset     Eye Disorder Mother         lost eyesight; probable macular degeneration     Psychotic Disorder Mother         Dementia /Alzhimers     Neurologic Disorder Mother         seizures     Diabetes Mother      Hypertension Mother      Alzheimer Disease Mother      Arthritis Mother      Osteoporosis Mother      Obesity Mother      Diabetes Father      Depression Father      Hyperlipidemia Father      Obesity Father      Psychotic Disorder Sister         bipolar     Thyroid Disease Sister         h/o thyroid cancer     Depression Sister         Bipolar     Obesity Sister      Cancer Other         Brain cancer     Osteoporosis Maternal Grandmother      Anxiety Disorder Son      Depression Sister      Thyroid Disease Sister            ROS:  Constitutional, HEENT, cardiovascular, pulmonary, GI, , musculoskeletal, neuro, skin, endocrine and psych systems are negative, except as otherwise noted.    OBJECTIVE:     /70 (BP Location: Right arm, Patient Position: Chair, Cuff Size: Adult Large)   Pulse 66   Temp 97  F (36.1  C) (Oral)   Ht 1.651 m (5' 5\")   Wt 97.1 kg (214 lb)   SpO2 98%   BMI 35.61 kg/m    Body mass index is 35.61 kg/m .  GENERAL: healthy, alert and no distress  EYES: Eyes grossly normal to inspection, PERRL and conjunctivae and sclerae normal  HENT: normal cephalic/atraumatic, ear canals and TM's normal, nose and mouth without ulcers or lesions, oropharynx clear, oral mucous membranes moist and sinuses: maxillary tenderness on bilateral  NECK: no adenopathy, no asymmetry, masses, or scars and thyroid normal to palpation  RESP: lungs clear to auscultation " - no rales, rhonchi or wheezes  CV: regular rate and rhythm, normal S1 S2, no S3 or S4, no murmur, click or rub, no peripheral edema and peripheral pulses strong  PSYCH: mentation appears normal, affect normal/bright    Diagnostic Test Results:  Results for orders placed or performed in visit on 01/12/19   CBC with platelets differential   Result Value Ref Range    WBC 8.0 4.0 - 11.0 10e9/L    RBC Count 4.53 3.8 - 5.2 10e12/L    Hemoglobin 13.4 11.7 - 15.7 g/dL    Hematocrit 41.6 35.0 - 47.0 %    MCV 92 78 - 100 fl    MCH 29.6 26.5 - 33.0 pg    MCHC 32.2 31.5 - 36.5 g/dL    RDW 14.6 10.0 - 15.0 %    Platelet Count 277 150 - 450 10e9/L    % Neutrophils 57.3 %    % Lymphocytes 31.1 %    % Monocytes 8.6 %    % Eosinophils 2.6 %    % Basophils 0.4 %    Absolute Neutrophil 4.6 1.6 - 8.3 10e9/L    Absolute Lymphocytes 2.5 0.8 - 5.3 10e9/L    Absolute Monocytes 0.7 0.0 - 1.3 10e9/L    Absolute Eosinophils 0.2 0.0 - 0.7 10e9/L    Absolute Basophils 0.0 0.0 - 0.2 10e9/L    Diff Method Automated Method    Mononucleosis screen   Result Value Ref Range    Mononucleosis Screen Negative NEG^Negative   Strep, Rapid Screen   Result Value Ref Range    Specimen Description Throat     Rapid Strep A Screen       NEGATIVE: No Group A streptococcal antigen detected by immunoassay, await culture report.   Beta strep group A culture   Result Value Ref Range    Specimen Description Throat     Culture Micro No beta hemolytic Streptococcus Group A isolated        ASSESSMENT/PLAN:     1. Acute sinusitis with symptoms > 10 days  Reviewed that she should take all doses of the antibiotic, even if symptoms improve prior to finishing the medication. She may eat a yogurt or take a probiotic daily while on the antibiotic to prevent diarrhea.  - amoxicillin-clavulanate (AUGMENTIN) 875-125 MG tablet; Take 1 tablet by mouth 2 times daily for 10 days  Dispense: 20 tablet; Refill: 0    2. Vertigo  Likely due to sinus congestion- may use Meclizine PRN.  -  meclizine (ANTIVERT) 25 MG tablet; Take 1 tablet (25 mg) by mouth 3 times daily as needed for dizziness  Dispense: 30 tablet; Refill: 1    3. Generalized anxiety disorder  Due to regular Trazodone use, will avoid continuation of Celexa and start Zoloft instead. Continue Wellbutrin  - sertraline (ZOLOFT) 25 MG tablet; Take 1 tablet (25 mg) by mouth daily  Dispense: 90 tablet; Refill: 1  - buPROPion (WELLBUTRIN XL) 150 MG 24 hr tablet; Take 1 tablet (150 mg) by mouth every morning  Dispense: 90 tablet; Refill: 1    4. Major depressive disorder, recurrent episode, moderate (H)  As above  - sertraline (ZOLOFT) 25 MG tablet; Take 1 tablet (25 mg) by mouth daily  Dispense: 90 tablet; Refill: 1  - buPROPion (WELLBUTRIN XL) 150 MG 24 hr tablet; Take 1 tablet (150 mg) by mouth every morning  Dispense: 90 tablet; Refill: 1    5. Insomnia due to other mental disorder  As above  - traZODone (DESYREL) 50 MG tablet; Take 1 tablet (50 mg) by mouth At Bedtime  Dispense: 90 tablet; Refill: 3    See Patient Instructions    LEOPOLDO Michelle [facility]

## 2019-04-13 ASSESSMENT — ANXIETY QUESTIONNAIRES: GAD7 TOTAL SCORE: 12

## 2019-05-14 ENCOUNTER — E-VISIT (OUTPATIENT)
Dept: FAMILY MEDICINE | Facility: CLINIC | Age: 54
End: 2019-05-14
Payer: COMMERCIAL

## 2019-05-14 DIAGNOSIS — B37.31 CANDIDAL VULVOVAGINITIS: ICD-10-CM

## 2019-05-14 DIAGNOSIS — Z20.2 EXPOSURE TO SEXUALLY TRANSMITTED DISEASE (STD): ICD-10-CM

## 2019-05-14 PROCEDURE — 99444 ZZC PHYSICIAN ONLINE EVALUATION & MANAGEMENT SERVICE: CPT | Performed by: NURSE PRACTITIONER

## 2019-05-14 RX ORDER — FLUCONAZOLE 150 MG/1
150 TABLET ORAL ONCE
Qty: 1 TABLET | Refills: 0 | Status: SHIPPED | OUTPATIENT
Start: 2019-05-14 | End: 2019-06-07

## 2019-05-14 NOTE — PATIENT INSTRUCTIONS
Thank you for choosing us for your care. I have placed an order for a prescription so that you can start treatment. View your full visit summary for details by clicking on the link below. Your pharmacist will able to address any questions you may have about the medication.     If you re not feeling better within 2-3 days, please schedule an appointment.  You can schedule an appointment right here in [employer], or call [phone]  If the visit is for the same symptoms as your e-visit, we ll refund the cost of your e-visit if seen within seven days.      Yeast Infection (Candida Vaginal Infection)    You have a Candida vaginal infection. This is also known as a yeast infection. It is most often caused by a type of yeast (fungus) called Candida. Candida are normally found in the vagina. But if they increase in number, this can lead to infection and cause symptoms.  Symptoms of a yeast infection can include:    Clumpy or thin, white discharge, which may look like cottage cheese    Itching or burning    Burning with urination  Certain factors can make a yeast infection more likely. These can include:    Taking certain medicines, such as antibiotics or birth control pills    Pregnancy    Diabetes    Weak immune system  A yeast infection is most often treated with antifungal medicine. This may be given as a vaginal cream or pills you take by mouth. Treatment may last for about 1 to 7 days. Women with severe or recurrent infections may need longer courses of treatment.  Home care    If you re prescribed medicine, be sure to use it as directed. Finish all of the medicine, even if your symptoms go away. Note: Don t try to treat yourself using over-the-counter products without talking to your provider first. He or she will let you know if this is a good option for you.    Ask your provider what steps you can take to help reduce your risk of having a yeast infection in the future.  Follow-up care  Follow up with your  healthcare provider, or as directed.  When to seek medical advice  Call your healthcare provider right away if:    You have a fever of 100.4 F (38 C) or higher, or as directed by your provider.    Your symptoms worsen, or they don t go away within a few days of starting treatment.    You have new pain in the lower belly or pelvic region.    You have side effects that bother you or a reaction to the cream or pills you re prescribed.    You or any partners you have sex with have new symptoms, such as a rash, joint pain, or sores.  Date Last Reviewed: 10/1/2017    8185-3307 [employer]. [address] [place] [address]. All rights reserved. This information is not intended as a substitute for professional medical care. Always follow your healthcare professional's instructions.        Sunshine, genital warts are contagious via contact, so I recommend either abstaining from intercourse or using a barrier method such as condoms until your boyfriend is treated.  There is no way to screen you for genital warts. Please observe for warts on your own genitals and follow up with me in clinic if you are concerned you may have them.  If he has had genital warts in the past but no longer has them, then you don't need to be concerned. Using condoms is the best way to prevent against any STDs including genital warts.    Lesly Arteaga, CNP

## 2019-06-02 ENCOUNTER — MYC REFILL (OUTPATIENT)
Dept: FAMILY MEDICINE | Facility: CLINIC | Age: 54
End: 2019-06-02

## 2019-06-02 DIAGNOSIS — F33.1 MAJOR DEPRESSIVE DISORDER, RECURRENT EPISODE, MODERATE (H): Chronic | ICD-10-CM

## 2019-06-02 DIAGNOSIS — F41.1 GENERALIZED ANXIETY DISORDER: Chronic | ICD-10-CM

## 2019-06-03 RX ORDER — BUPROPION HYDROCHLORIDE 150 MG/1
150 TABLET ORAL EVERY MORNING
Qty: 30 TABLET | Refills: 0 | Status: SHIPPED | OUTPATIENT
Start: 2019-06-03 | End: 2019-09-20

## 2019-06-07 ENCOUNTER — OFFICE VISIT (OUTPATIENT)
Dept: FAMILY MEDICINE | Facility: CLINIC | Age: 54
End: 2019-06-07
Payer: COMMERCIAL

## 2019-06-07 VITALS
OXYGEN SATURATION: 96 % | HEART RATE: 74 BPM | WEIGHT: 211 LBS | SYSTOLIC BLOOD PRESSURE: 104 MMHG | DIASTOLIC BLOOD PRESSURE: 74 MMHG | BODY MASS INDEX: 35.16 KG/M2 | HEIGHT: 65 IN | TEMPERATURE: 98.5 F

## 2019-06-07 DIAGNOSIS — F41.1 GENERALIZED ANXIETY DISORDER: Chronic | ICD-10-CM

## 2019-06-07 DIAGNOSIS — N18.30 CKD (CHRONIC KIDNEY DISEASE) STAGE 3, GFR 30-59 ML/MIN (H): Primary | ICD-10-CM

## 2019-06-07 DIAGNOSIS — H81.10 BENIGN PAROXYSMAL POSITIONAL VERTIGO, UNSPECIFIED LATERALITY: ICD-10-CM

## 2019-06-07 DIAGNOSIS — F32.0 MILD MAJOR DEPRESSION (H): ICD-10-CM

## 2019-06-07 PROCEDURE — 99214 OFFICE O/P EST MOD 30 MIN: CPT | Performed by: NURSE PRACTITIONER

## 2019-06-07 RX ORDER — ESCITALOPRAM OXALATE 10 MG/1
10 TABLET ORAL DAILY
Qty: 90 TABLET | Refills: 1 | Status: SHIPPED | OUTPATIENT
Start: 2019-06-07 | End: 2019-11-07

## 2019-06-07 RX ORDER — LOSARTAN POTASSIUM 25 MG/1
12.5 TABLET ORAL DAILY
Qty: 45 TABLET | Refills: 3 | Status: SHIPPED | OUTPATIENT
Start: 2019-06-07 | End: 2019-11-07

## 2019-06-07 RX ORDER — MECLIZINE HYDROCHLORIDE 25 MG/1
25 TABLET ORAL 3 TIMES DAILY PRN
Qty: 30 TABLET | Refills: 1 | Status: SHIPPED | OUTPATIENT
Start: 2019-06-07 | End: 2020-07-16

## 2019-06-07 ASSESSMENT — MIFFLIN-ST. JEOR: SCORE: 1567.97

## 2019-06-07 ASSESSMENT — ANXIETY QUESTIONNAIRES
6. BECOMING EASILY ANNOYED OR IRRITABLE: SEVERAL DAYS
1. FEELING NERVOUS, ANXIOUS, OR ON EDGE: SEVERAL DAYS
5. BEING SO RESTLESS THAT IT IS HARD TO SIT STILL: NOT AT ALL
IF YOU CHECKED OFF ANY PROBLEMS ON THIS QUESTIONNAIRE, HOW DIFFICULT HAVE THESE PROBLEMS MADE IT FOR YOU TO DO YOUR WORK, TAKE CARE OF THINGS AT HOME, OR GET ALONG WITH OTHER PEOPLE: SOMEWHAT DIFFICULT
2. NOT BEING ABLE TO STOP OR CONTROL WORRYING: NOT AT ALL
7. FEELING AFRAID AS IF SOMETHING AWFUL MIGHT HAPPEN: NOT AT ALL
3. WORRYING TOO MUCH ABOUT DIFFERENT THINGS: SEVERAL DAYS
GAD7 TOTAL SCORE: 3

## 2019-06-07 ASSESSMENT — PATIENT HEALTH QUESTIONNAIRE - PHQ9
5. POOR APPETITE OR OVEREATING: NOT AT ALL
SUM OF ALL RESPONSES TO PHQ QUESTIONS 1-9: 10

## 2019-06-07 ASSESSMENT — PAIN SCALES - GENERAL: PAINLEVEL: SEVERE PAIN (6)

## 2019-06-07 NOTE — PROGRESS NOTES
Subjective     Sunshine Delgado is a 54 year old female who presents to clinic today for the following health issues:    HPI     Chief Complaint   Patient presents with     Fup Lab Results     -Patient presents for recheck of kidney function with questions about CKD. Notably, she used NSAIDs regularly for body aches for many years until her gastric bypass surgery. She was also hospitalized for a kidney infection a few years ago.  -Has known vertigo and wonders what could cause it. Episodes occur after bending over, standing up, or other position changes. No ear fullness, pain, or hearing loss. Has a family member with Meniere's    Depression and Anxiety Follow-Up    How are you doing with your depression since your last visit? No change    How are you doing with your anxiety since your last visit?  No change    Are you having other symptoms that might be associated with depression or anxiety? No    Have you had a significant life event? No     Do you have any concerns with your use of alcohol or other drugs? No    Social History     Tobacco Use     Smoking status: Never Smoker     Smokeless tobacco: Never Used   Substance Use Topics     Alcohol use: Yes     Comment: 1-2 per mo     Drug use: No     PHQ 10/1/2018 4/12/2019 6/7/2019   PHQ-9 Total Score 4 14 10   Q9: Thoughts of better off dead/self-harm past 2 weeks Not at all Not at all Not at all     ADRIANNE-7 SCORE 11/15/2017 4/12/2019 6/7/2019   Total Score - - -   Total Score 0 12 3       Suicide Assessment Five-step Evaluation and Treatment (SAFE-T)    Celexa was recently changed to Zoloft due to interaction with Trazodone; patient developed suicidal thoughts on Zoloft so stopped the med. Wonders what else she could take.    Patient Active Problem List   Diagnosis     Generalized anxiety disorder     CARDIOVASCULAR SCREENING; LDL GOAL LESS THAN 160     MARIA GUADALUPE (obstructive sleep apnea)- severe (AHI 84)     Morbid obesity (H)     Health Care Home     Mild major depression  "(H)     Insomnia     Bee sting allergy     CKD (chronic kidney disease) stage 3, GFR 30-59 ml/min (H)     Past Surgical History:   Procedure Laterality Date     COLONOSCOPY  2000     DAVINCI GASTRIC SLEEVE       GENITOURINARY SURGERY  2007     HYSTERECTOMY, PAP NO LONGER INDICATED       HYSTERECTOMY, VAGINAL  2007    still has ovaries     LAPAROSCOPIC GASTRIC SLEEVE N/A 3/13/2018    Procedure: LAPAROSCOPIC GASTRIC SLEEVE;  Laparoscopic Sleeve Gastrectomy;  Surgeon: Kameron Joseph MD;  Location:  OR       Social History     Tobacco Use     Smoking status: Never Smoker     Smokeless tobacco: Never Used   Substance Use Topics     Alcohol use: Yes     Comment: 1-2 per mo     Family History   Problem Relation Age of Onset     Eye Disorder Mother         lost eyesight; probable macular degeneration     Psychotic Disorder Mother         Dementia /Alzhimers     Neurologic Disorder Mother         seizures     Diabetes Mother      Hypertension Mother      Alzheimer Disease Mother      Arthritis Mother      Osteoporosis Mother      Obesity Mother      Diabetes Father      Depression Father      Hyperlipidemia Father      Obesity Father      Psychotic Disorder Sister         bipolar     Thyroid Disease Sister         h/o thyroid cancer     Depression Sister         Bipolar     Obesity Sister      Cancer Other         Brain cancer     Osteoporosis Maternal Grandmother      Anxiety Disorder Son      Depression Sister      Thyroid Disease Sister            Reviewed and updated as needed this visit by Provider         Review of Systems   ROS COMP: Constitutional, HEENT, cardiovascular, pulmonary, gi and gu systems are negative, except as otherwise noted.      Objective    /74 (BP Location: Left arm, Patient Position: Chair, Cuff Size: Adult Large)   Pulse 74   Temp 98.5  F (36.9  C) (Oral)   Ht 1.651 m (5' 5\")   Wt 95.7 kg (211 lb)   SpO2 96%   BMI 35.11 kg/m    Body mass index is 35.11 kg/m .  Physical Exam "   GENERAL: healthy, alert and no distress  RESP: lungs clear to auscultation - no rales, rhonchi or wheezes  CV: regular rate and rhythm, normal S1 S2, no S3 or S4, no murmur, click or rub, no peripheral edema and peripheral pulses strong  PSYCH: mentation appears normal, affect normal/bright    Diagnostic Test Results:  Labs reviewed in Epic  Results for orders placed or performed in visit on 01/12/19   CBC with platelets differential   Result Value Ref Range    WBC 8.0 4.0 - 11.0 10e9/L    RBC Count 4.53 3.8 - 5.2 10e12/L    Hemoglobin 13.4 11.7 - 15.7 g/dL    Hematocrit 41.6 35.0 - 47.0 %    MCV 92 78 - 100 fl    MCH 29.6 26.5 - 33.0 pg    MCHC 32.2 31.5 - 36.5 g/dL    RDW 14.6 10.0 - 15.0 %    Platelet Count 277 150 - 450 10e9/L    % Neutrophils 57.3 %    % Lymphocytes 31.1 %    % Monocytes 8.6 %    % Eosinophils 2.6 %    % Basophils 0.4 %    Absolute Neutrophil 4.6 1.6 - 8.3 10e9/L    Absolute Lymphocytes 2.5 0.8 - 5.3 10e9/L    Absolute Monocytes 0.7 0.0 - 1.3 10e9/L    Absolute Eosinophils 0.2 0.0 - 0.7 10e9/L    Absolute Basophils 0.0 0.0 - 0.2 10e9/L    Diff Method Automated Method    Mononucleosis screen   Result Value Ref Range    Mononucleosis Screen Negative NEG^Negative   Strep, Rapid Screen   Result Value Ref Range    Specimen Description Throat     Rapid Strep A Screen       NEGATIVE: No Group A streptococcal antigen detected by immunoassay, await culture report.   Beta strep group A culture   Result Value Ref Range    Specimen Description Throat     Culture Micro No beta hemolytic Streptococcus Group A isolated            Assessment & Plan     1. CKD (chronic kidney disease) stage 3, GFR 30-59 ml/min (H)  Recommend screening for hypertension, diabetes annually.  Does have risk factors for CKD including chronic NSAID use- avoid NSAIDs in the future. Will also obtain renal US to r/o structural causes.  Patient agreeable to starting Losartan for renal protection- wait until US complete to start.   -  "losartan (COZAAR) 25 MG tablet; Take 0.5 tablets (12.5 mg) by mouth daily  Dispense: 45 tablet; Refill: 3  - US Renal Complete w Doppler Complete; Future    2. Benign paroxysmal positional vertigo, unspecified laterality  Symptoms highly consistent with BPPV. Meniere's unlikely.  Recommend vestibular therapy.    - meclizine (ANTIVERT) 25 MG tablet; Take 1 tablet (25 mg) by mouth 3 times daily as needed for dizziness  Dispense: 30 tablet; Refill: 1  - [facility]; Future    3. Mild major depression (H)  Start lexapro instead. Reviewed risks/benefits. Follow-up 6 months, sooner for side effects or if mood worsens.  - escitalopram (LEXAPRO) 10 MG tablet; Take 1 tablet (10 mg) by mouth daily  Dispense: 90 tablet; Refill: 1    4. Generalized anxiety disorder  As above  - escitalopram (LEXAPRO) 10 MG tablet; Take 1 tablet (10 mg) by mouth daily  Dispense: 90 tablet; Refill: 1     BMI:   Estimated body mass index is 35.11 kg/m  as calculated from the following:    Height as of this encounter: 1.651 m (5' 5\").    Weight as of this encounter: 95.7 kg (211 lb).   Weight management plan: Discussed healthy diet and exercise guidelines        See Patient Instructions    Return in about 6 months (around 12/7/2019) for Depression, Anxiety.    [facility]DO Michelle [facility]    "

## 2019-06-08 ASSESSMENT — ANXIETY QUESTIONNAIRES: GAD7 TOTAL SCORE: 3

## 2019-08-13 ENCOUNTER — TELEPHONE (OUTPATIENT)
Dept: FAMILY MEDICINE | Facility: CLINIC | Age: 54
End: 2019-08-13

## 2019-08-13 NOTE — TELEPHONE ENCOUNTER
Left patient VM to return call to red team to complete PHQ9. Send [employer] after 3rd attempt of calling patient with no call back   Rosamaria Ocasio CMA on 8/13/2019 at 10:40 AM

## 2019-08-14 ASSESSMENT — PATIENT HEALTH QUESTIONNAIRE - PHQ9: SUM OF ALL RESPONSES TO PHQ QUESTIONS 1-9: 5

## 2019-08-14 NOTE — TELEPHONE ENCOUNTER
Called patient, PHQ-9 completed:    PHQ-9 SCORE 8/14/2019   PHQ-9 Total Score -   PHQ-9 Total Score 5     Appointment offered, patient declined at this time.  States she is in the middle of moving.  Cinda Solitario MA

## 2019-08-15 ENCOUNTER — ANCILLARY PROCEDURE (OUTPATIENT)
Dept: ULTRASOUND IMAGING | Facility: CLINIC | Age: 54
End: 2019-08-15
Attending: NURSE PRACTITIONER
Payer: COMMERCIAL

## 2019-08-15 DIAGNOSIS — N18.30 CKD (CHRONIC KIDNEY DISEASE) STAGE 3, GFR 30-59 ML/MIN (H): ICD-10-CM

## 2019-08-15 PROCEDURE — 76770 US EXAM ABDO BACK WALL COMP: CPT | Mod: 59 | Performed by: RADIOLOGY

## 2019-08-15 PROCEDURE — 93975 VASCULAR STUDY: CPT | Performed by: RADIOLOGY

## 2019-09-17 DIAGNOSIS — F41.1 GENERALIZED ANXIETY DISORDER: Chronic | ICD-10-CM

## 2019-09-17 DIAGNOSIS — F33.1 MAJOR DEPRESSIVE DISORDER, RECURRENT EPISODE, MODERATE (H): Chronic | ICD-10-CM

## 2019-09-20 ENCOUNTER — TELEPHONE (OUTPATIENT)
Dept: FAMILY MEDICINE | Facility: CLINIC | Age: 54
End: 2019-09-20

## 2019-09-20 RX ORDER — BUPROPION HYDROCHLORIDE 150 MG/1
150 TABLET ORAL EVERY MORNING
Qty: 30 TABLET | Refills: 0 | Status: SHIPPED | OUTPATIENT
Start: 2019-09-20 | End: 2019-11-07

## 2019-09-20 NOTE — TELEPHONE ENCOUNTER
"Routing refill request to provider for review/approval because:  Labs out of range:  PHQ-9, score of 5 on 8/14/19  Has appt scheduled for 12/6/19  Requested Prescriptions   Pending Prescriptions Disp Refills     buPROPion (WELLBUTRIN XL) 150 MG 24 hr tablet 30 tablet 0     Sig: Take 1 tablet (150 mg) by mouth every morning       SSRIs Protocol Failed - 9/17/2019  3:59 PM        Failed - PHQ-9 score less than 5 in past 6 months     Please review last PHQ-9 score.           Passed - Medication is Bupropion     If the medication is Bupropion (Wellbutrin), and the patient is taking for smoking cessation; OK to refill.          Passed - Medication is active on med list        Passed - Patient is age 18 or older        Passed - No active pregnancy on record        Passed - No positive pregnancy test in last 12 months        Passed - Recent (6 mo) or future (30 days) visit within the authorizing provider's specialty     Patient had office visit in the last 6 months or has a visit in the next 30 days with authorizing provider or within the authorizing provider's specialty.  See \"Patient Info\" tab in inbasket, or \"Choose Columns\" in Meds & Orders section of the refill encounter.            Anne Marie Faustin RN  "

## 2019-09-20 NOTE — TELEPHONE ENCOUNTER
1st attempt- Message left for patient to reschedule appointment on 12/6/2019 with Lesly Arteaga CNP for Depression, Anxiety.     Message left for patient to return call to clinic to reschedule.     Lupe Patino,

## 2019-09-20 NOTE — LETTER
September 23, 2019      Sunshine Delgado  [address] [place] [id number]      Dear Sunshine,    Your appointment on 12/6/2019 with LEOPOLDO Michelle CNP will need to be rescheduled.     We have tried contacting you via [phone] and were unsuccessful. Your appointment has been cancelled.     Please contact the clinic at your earliest convenience to reschedule this.     We apologize for any inconvenience this may cause.     Sincerely,    Your Care Team at [facility]

## 2019-09-23 NOTE — TELEPHONE ENCOUNTER
2nd attempt- called patient to reschedule appointment on     Next 5 appointments (look out 90 days)    Dec 06, 2019 10:00 AM CST  Office Visit with LEOPOLDO Michelle CNP  [facility] ([facility] [address] [facility] [address]  [phone]      Message left for patient to return call to clinic to reschedule.     Letter will be sent and encounter closed.     Lupe Patino,

## 2019-09-30 ENCOUNTER — HEALTH MAINTENANCE LETTER (OUTPATIENT)
Age: 54
End: 2019-09-30

## 2019-10-22 ENCOUNTER — MYC MEDICAL ADVICE (OUTPATIENT)
Dept: FAMILY MEDICINE | Facility: CLINIC | Age: 54
End: 2019-10-22

## 2019-10-24 DIAGNOSIS — F51.05 INSOMNIA DUE TO OTHER MENTAL DISORDER: ICD-10-CM

## 2019-10-24 DIAGNOSIS — F99 INSOMNIA DUE TO OTHER MENTAL DISORDER: ICD-10-CM

## 2019-10-24 NOTE — TELEPHONE ENCOUNTER
Patient has active rx with [facility]. Spoke to [facility] and informed them.  Patsy EAGLE CMA ([facility])

## 2019-10-25 RX ORDER — TRAZODONE HYDROCHLORIDE 50 MG/1
TABLET, FILM COATED ORAL
Qty: 30 TABLET | Refills: 0 | OUTPATIENT
Start: 2019-10-25

## 2019-10-28 ENCOUNTER — MYC REFILL (OUTPATIENT)
Dept: FAMILY MEDICINE | Facility: CLINIC | Age: 54
End: 2019-10-28

## 2019-10-28 DIAGNOSIS — F99 INSOMNIA DUE TO OTHER MENTAL DISORDER: ICD-10-CM

## 2019-10-28 DIAGNOSIS — F51.05 INSOMNIA DUE TO OTHER MENTAL DISORDER: ICD-10-CM

## 2019-10-28 RX ORDER — TRAZODONE HYDROCHLORIDE 50 MG/1
50 TABLET, FILM COATED ORAL AT BEDTIME
Qty: 90 TABLET | Refills: 0 | Status: SHIPPED | OUTPATIENT
Start: 2019-10-28 | End: 2019-11-07

## 2019-10-28 ASSESSMENT — PATIENT HEALTH QUESTIONNAIRE - PHQ9
SUM OF ALL RESPONSES TO PHQ QUESTIONS 1-9: 6
SUM OF ALL RESPONSES TO PHQ QUESTIONS 1-9: 6
10. IF YOU CHECKED OFF ANY PROBLEMS, HOW DIFFICULT HAVE THESE PROBLEMS MADE IT FOR YOU TO DO YOUR WORK, TAKE CARE OF THINGS AT HOME, OR GET ALONG WITH OTHER PEOPLE: SOMEWHAT DIFFICULT

## 2019-10-28 NOTE — TELEPHONE ENCOUNTER
Controlled Substance Refill Request for traZODone (DESYREL) 50 MG tablet  Problem List Complete:  No     PROVIDER TO CONSIDER COMPLETION OF PROBLEM LIST AND OVERVIEW/CONTROLLED SUBSTANCE AGREEMENT    Last Written Prescription Date:  4/12/2019  Last Fill Quantity: 90,   # refills: 3    THE MOST RECENT OFFICE VISIT MUST BE WITHIN THE PAST 3 MONTHS. AT LEAST ONE FACE TO FACE VISIT MUST OCCUR EVERY 6 MONTHS. ADDITIONAL VISITS CAN BE VIRTUAL.  (THIS STATEMENT SHOULD BE DELETED.)    Last Office Visit with [facility] primary care provider: 7/25/2019    Future Office visit:     Controlled substance agreement:   Encounter-Level CSA:    There are no encounter-level csa.     Patient-Level CSA:    There are no patient-level csa.         Last Urine Drug Screen: No results found for: CDAUT, No results found for: COMDAT, No results found for: THC13, PCP13, COC13, MAMP13, OPI13, AMP13, BZO13, TCA13, MTD13, BAR13, OXY13, PPX13, BUP13     Processing:  unknown     [email or web address]       checked in past 3 months?  No, route to RN

## 2019-10-28 NOTE — TELEPHONE ENCOUNTER
PHQ-9 completed.   PHQ-9 score:    PHQ-9 SCORE 10/28/2019   PHQ-9 Total Score -   PHQ-9 Total Score MyChart 6 (Mild depression)   PHQ-9 Total Score 6

## 2019-10-29 ENCOUNTER — HEALTH MAINTENANCE LETTER (OUTPATIENT)
Age: 54
End: 2019-10-29

## 2019-10-29 ASSESSMENT — PATIENT HEALTH QUESTIONNAIRE - PHQ9: SUM OF ALL RESPONSES TO PHQ QUESTIONS 1-9: 6

## 2019-11-04 NOTE — PATIENT INSTRUCTIONS
Ask in the pharmacy about the Shingrix vaccine.    Preventive Health Recommendations  Female Ages 50 - 64    Yearly exam: See your health care provider every year in order to  o Review health changes.   o Discuss preventive care.    o Review your medicines if your doctor has prescribed any.      Get a Pap test every three years (unless you have an abnormal result and your provider advises testing more often).    If you get Pap tests with HPV test, you only need to test every 5 years, unless you have an abnormal result.     You do not need a Pap test if your uterus was removed (hysterectomy) and you have not had cancer.    You should be tested each year for STDs (sexually transmitted diseases) if you're at risk.     Have a mammogram every 1 to 2 years.    Have a colonoscopy at age 50, or have a yearly FIT test (stool test). These exams screen for colon cancer.      Have a cholesterol test every 5 years, or more often if advised.    Have a diabetes test (fasting glucose) every three years. If you are at risk for diabetes, you should have this test more often.     If you are at risk for osteoporosis (brittle bone disease), think about having a bone density scan (DEXA).    Shots: Get a flu shot each year. Get a tetanus shot every 10 years.    Nutrition:     Eat at least 5 servings of fruits and vegetables each day.    Eat whole-grain bread, whole-wheat pasta and brown rice instead of white grains and rice.    Get adequate Calcium and Vitamin D.     Lifestyle    Exercise at least 150 minutes a week (30 minutes a day, 5 days a week). This will help you control your weight and prevent disease.    Limit alcohol to one drink per day.    No smoking.     Wear sunscreen to prevent skin cancer.     See your dentist every six months for an exam and cleaning.    See your eye doctor every 1 to 2 years.

## 2019-11-07 ENCOUNTER — OFFICE VISIT (OUTPATIENT)
Dept: FAMILY MEDICINE | Facility: CLINIC | Age: 54
End: 2019-11-07
Payer: COMMERCIAL

## 2019-11-07 VITALS
BODY MASS INDEX: 35.09 KG/M2 | HEART RATE: 67 BPM | SYSTOLIC BLOOD PRESSURE: 100 MMHG | RESPIRATION RATE: 16 BRPM | TEMPERATURE: 98.5 F | HEIGHT: 65 IN | WEIGHT: 210.6 LBS | OXYGEN SATURATION: 98 % | DIASTOLIC BLOOD PRESSURE: 70 MMHG

## 2019-11-07 DIAGNOSIS — Z98.84 S/P GASTRIC BYPASS: ICD-10-CM

## 2019-11-07 DIAGNOSIS — Z91.030 BEE STING ALLERGY: ICD-10-CM

## 2019-11-07 DIAGNOSIS — Z12.31 ENCOUNTER FOR SCREENING MAMMOGRAM FOR BREAST CANCER: ICD-10-CM

## 2019-11-07 DIAGNOSIS — F99 INSOMNIA DUE TO OTHER MENTAL DISORDER: ICD-10-CM

## 2019-11-07 DIAGNOSIS — F51.05 INSOMNIA DUE TO OTHER MENTAL DISORDER: ICD-10-CM

## 2019-11-07 DIAGNOSIS — Z13.6 CARDIOVASCULAR SCREENING; LDL GOAL LESS THAN 160: Chronic | ICD-10-CM

## 2019-11-07 DIAGNOSIS — Z00.00 ROUTINE GENERAL MEDICAL EXAMINATION AT A HEALTH CARE FACILITY: Primary | ICD-10-CM

## 2019-11-07 DIAGNOSIS — F41.1 GENERALIZED ANXIETY DISORDER: Chronic | ICD-10-CM

## 2019-11-07 DIAGNOSIS — F33.1 MAJOR DEPRESSIVE DISORDER, RECURRENT EPISODE, MODERATE (H): Chronic | ICD-10-CM

## 2019-11-07 LAB
ANION GAP SERPL CALCULATED.3IONS-SCNC: 4 MMOL/L (ref 3–14)
BASOPHILS # BLD AUTO: 0 10E9/L (ref 0–0.2)
BASOPHILS NFR BLD AUTO: 0.7 %
BUN SERPL-MCNC: 19 MG/DL (ref 7–30)
CALCIUM SERPL-MCNC: 9.1 MG/DL (ref 8.5–10.1)
CHLORIDE SERPL-SCNC: 105 MMOL/L (ref 94–109)
CHOLEST SERPL-MCNC: 228 MG/DL
CO2 SERPL-SCNC: 29 MMOL/L (ref 20–32)
CREAT SERPL-MCNC: 1.21 MG/DL (ref 0.52–1.04)
DIFFERENTIAL METHOD BLD: NORMAL
EOSINOPHIL # BLD AUTO: 0.1 10E9/L (ref 0–0.7)
EOSINOPHIL NFR BLD AUTO: 2.4 %
ERYTHROCYTE [DISTWIDTH] IN BLOOD BY AUTOMATED COUNT: 14 % (ref 10–15)
FERRITIN SERPL-MCNC: 87 NG/ML (ref 8–252)
GFR SERPL CREATININE-BSD FRML MDRD: 51 ML/MIN/{1.73_M2}
GLUCOSE SERPL-MCNC: 86 MG/DL (ref 70–99)
HCT VFR BLD AUTO: 40.6 % (ref 35–47)
HDLC SERPL-MCNC: 62 MG/DL
HGB BLD-MCNC: 13.2 G/DL (ref 11.7–15.7)
LDLC SERPL CALC-MCNC: 149 MG/DL
LYMPHOCYTES # BLD AUTO: 1.7 10E9/L (ref 0.8–5.3)
LYMPHOCYTES NFR BLD AUTO: 29.8 %
MCH RBC QN AUTO: 29.4 PG (ref 26.5–33)
MCHC RBC AUTO-ENTMCNC: 32.5 G/DL (ref 31.5–36.5)
MCV RBC AUTO: 90 FL (ref 78–100)
MONOCYTES # BLD AUTO: 0.5 10E9/L (ref 0–1.3)
MONOCYTES NFR BLD AUTO: 9.4 %
NEUTROPHILS # BLD AUTO: 3.3 10E9/L (ref 1.6–8.3)
NEUTROPHILS NFR BLD AUTO: 57.7 %
NONHDLC SERPL-MCNC: 166 MG/DL
PLATELET # BLD AUTO: 236 10E9/L (ref 150–450)
POTASSIUM SERPL-SCNC: 4.2 MMOL/L (ref 3.4–5.3)
RBC # BLD AUTO: 4.49 10E12/L (ref 3.8–5.2)
SODIUM SERPL-SCNC: 138 MMOL/L (ref 133–144)
TRIGL SERPL-MCNC: 86 MG/DL
VIT B12 SERPL-MCNC: 449 PG/ML (ref 193–986)
WBC # BLD AUTO: 5.7 10E9/L (ref 4–11)

## 2019-11-07 PROCEDURE — 82607 VITAMIN B-12: CPT | Performed by: NURSE PRACTITIONER

## 2019-11-07 PROCEDURE — 85025 COMPLETE CBC W/AUTO DIFF WBC: CPT | Performed by: NURSE PRACTITIONER

## 2019-11-07 PROCEDURE — 82306 VITAMIN D 25 HYDROXY: CPT | Performed by: NURSE PRACTITIONER

## 2019-11-07 PROCEDURE — 82728 ASSAY OF FERRITIN: CPT | Performed by: NURSE PRACTITIONER

## 2019-11-07 PROCEDURE — 80061 LIPID PANEL: CPT | Performed by: NURSE PRACTITIONER

## 2019-11-07 PROCEDURE — 99396 PREV VISIT EST AGE 40-64: CPT | Performed by: NURSE PRACTITIONER

## 2019-11-07 PROCEDURE — 36415 COLL VENOUS BLD VENIPUNCTURE: CPT | Performed by: NURSE PRACTITIONER

## 2019-11-07 PROCEDURE — 80048 BASIC METABOLIC PNL TOTAL CA: CPT | Performed by: NURSE PRACTITIONER

## 2019-11-07 RX ORDER — ESCITALOPRAM OXALATE 10 MG/1
10 TABLET ORAL DAILY
Qty: 90 TABLET | Refills: 1 | Status: SHIPPED | OUTPATIENT
Start: 2019-11-07 | End: 2020-04-30

## 2019-11-07 RX ORDER — TRAZODONE HYDROCHLORIDE 50 MG/1
100-150 TABLET, FILM COATED ORAL AT BEDTIME
Qty: 180 TABLET | Refills: 3 | Status: SHIPPED | OUTPATIENT
Start: 2019-11-07 | End: 2020-01-20

## 2019-11-07 RX ORDER — BUPROPION HYDROCHLORIDE 150 MG/1
150 TABLET ORAL EVERY MORNING
Qty: 90 TABLET | Refills: 1 | Status: SHIPPED | OUTPATIENT
Start: 2019-11-07 | End: 2020-02-04

## 2019-11-07 RX ORDER — EPINEPHRINE 0.3 MG/.3ML
0.3 INJECTION SUBCUTANEOUS PRN
Qty: 0.6 ML | Refills: 3 | Status: SHIPPED | OUTPATIENT
Start: 2019-11-07 | End: 2023-04-25

## 2019-11-07 ASSESSMENT — ENCOUNTER SYMPTOMS
HEMATOCHEZIA: 0
COUGH: 0
FEVER: 0
FREQUENCY: 0
HEMATURIA: 0
DIARRHEA: 0
CONSTIPATION: 0
NERVOUS/ANXIOUS: 1
ARTHRALGIAS: 1
EYE PAIN: 0
CHILLS: 0
DIZZINESS: 0
HEADACHES: 0
ABDOMINAL PAIN: 0
HEARTBURN: 0

## 2019-11-07 ASSESSMENT — MIFFLIN-ST. JEOR: SCORE: 1566.16

## 2019-11-07 NOTE — PROGRESS NOTES
SUBJECTIVE:   CC: Sunshine Delgado is an 54 year old woman who presents for preventive health visit.     Healthy Habits:     Getting at least 3 servings of Calcium per day:  Yes    Bi-annual eye exam:  Yes    Dental care twice a year:  Yes    Sleep apnea or symptoms of sleep apnea:  None    Diet:  Carbohydrate counting    Frequency of exercise:  2-3 days/week    Duration of exercise:  30-45 minutes    Taking medications regularly:  Yes    Medication side effects:  None    PHQ-2 Total Score: 2    Additional concerns today:  Yes      Would like to Adjust trazodone dose (increase)- depression and anxiety otherwise well controlled  1 year S/p gastric bypass surgery and no longer seeing surgeon    Today's PHQ-2 Score:   PHQ-2 ( 1999 [employer]) 11/7/2019   Q1: Little interest or pleasure in doing things 1   Q2: Feeling down, depressed or hopeless 1   PHQ-2 Score 2   Q1: Little interest or pleasure in doing things Several days   Q2: Feeling down, depressed or hopeless Several days   PHQ-2 Score 2       Abuse: Current or Past(Physical, Sexual or Emotional)- No  Do you feel safe in your environment? Yes        Social History     Tobacco Use     Smoking status: Never Smoker     Smokeless tobacco: Never Used   Substance Use Topics     Alcohol use: Yes     Comment: 1-2 per mo         Alcohol Use 11/7/2019   Prescreen: >3 drinks/day or >7 drinks/week? No   Prescreen: >3 drinks/day or >7 drinks/week? -       Reviewed orders with patient.  Reviewed health maintenance and updated orders accordingly - Yes  Lab work is in process    Mammogram Screening: Patient over age 50, mutual decision to screen reflected in health maintenance.    Pertinent mammograms are reviewed under the imaging tab.  History of abnormal Pap smear: Status post benign hysterectomy. Health Maintenance and Surgical History updated.     Reviewed and updated as needed this visit by clinical staff  Tobacco  Allergies  Meds  Med Hx  Surg Hx  Fam Hx  Soc Hx     "    Reviewed and updated as needed this visit by Provider            Review of Systems   Constitutional: Negative for chills and fever.   HENT: Negative for congestion, ear pain and hearing loss.    Eyes: Negative for pain.   Respiratory: Negative for cough.    Cardiovascular: Negative for chest pain.   Gastrointestinal: Negative for abdominal pain, constipation, diarrhea, heartburn and hematochezia.   Genitourinary: Negative for frequency, genital sores and hematuria.   Musculoskeletal: Positive for arthralgias.   Neurological: Negative for dizziness and headaches.   Psychiatric/Behavioral: The patient is nervous/anxious.           OBJECTIVE:   /70   Pulse 67   Temp 98.5  F (36.9  C) (Oral)   Resp 16   Ht 1.651 m (5' 5\")   Wt 95.5 kg (210 lb 9.6 oz)   SpO2 98%   BMI 35.05 kg/m    Physical Exam  GENERAL: healthy, alert and no distress  EYES: Eyes grossly normal to inspection, PERRL and conjunctivae and sclerae normal  HENT: ear canals and TM's normal, nose and mouth without ulcers or lesions  NECK: no adenopathy, no asymmetry, masses, or scars and thyroid normal to palpation  RESP: lungs clear to auscultation - no rales, rhonchi or wheezes  BREAST: normal without masses, tenderness or nipple discharge and no palpable axillary masses or adenopathy  CV: regular rate and rhythm, normal S1 S2, no S3 or S4, no murmur, click or rub, no peripheral edema and peripheral pulses strong  ABDOMEN: soft, nontender, no hepatosplenomegaly, no masses and bowel sounds normal  MS: no gross musculoskeletal defects noted, no edema  SKIN: no suspicious lesions or rashes  NEURO: Normal strength and tone, mentation intact and speech normal  PSYCH: mentation appears normal, affect normal/bright    Diagnostic Test Results:  Labs reviewed in Epic  Results for orders placed or performed in visit on 11/07/19   BASIC METABOLIC PANEL     Status: Abnormal   Result Value Ref Range    Sodium 138 133 - 144 mmol/L    Potassium 4.2 3.4 - " 5.3 mmol/L    Chloride 105 94 - 109 mmol/L    Carbon Dioxide 29 20 - 32 mmol/L    Anion Gap 4 3 - 14 mmol/L    Glucose 86 70 - 99 mg/dL    Urea Nitrogen 19 7 - 30 mg/dL    Creatinine 1.21 (H) 0.52 - 1.04 mg/dL    GFR Estimate 51 (L) >60 mL/min/[1.73_m2]    GFR Estimate If Black 59 (L) >60 mL/min/[1.73_m2]    Calcium 9.1 8.5 - 10.1 mg/dL   Lipid panel reflex to direct LDL Non-fasting     Status: Abnormal   Result Value Ref Range    Cholesterol 228 (H) <200 mg/dL    Triglycerides 86 <150 mg/dL    HDL Cholesterol 62 >49 mg/dL    LDL Cholesterol Calculated 149 (H) <100 mg/dL    Non HDL Cholesterol 166 (H) <130 mg/dL   CBC with platelets differential     Status: None   Result Value Ref Range    WBC 5.7 4.0 - 11.0 10e9/L    RBC Count 4.49 3.8 - 5.2 10e12/L    Hemoglobin 13.2 11.7 - 15.7 g/dL    Hematocrit 40.6 35.0 - 47.0 %    MCV 90 78 - 100 fl    MCH 29.4 26.5 - 33.0 pg    MCHC 32.5 31.5 - 36.5 g/dL    RDW 14.0 10.0 - 15.0 %    Platelet Count 236 150 - 450 10e9/L    % Neutrophils 57.7 %    % Lymphocytes 29.8 %    % Monocytes 9.4 %    % Eosinophils 2.4 %    % Basophils 0.7 %    Absolute Neutrophil 3.3 1.6 - 8.3 10e9/L    Absolute Lymphocytes 1.7 0.8 - 5.3 10e9/L    Absolute Monocytes 0.5 0.0 - 1.3 10e9/L    Absolute Eosinophils 0.1 0.0 - 0.7 10e9/L    Absolute Basophils 0.0 0.0 - 0.2 10e9/L    Diff Method Automated Method    Ferritin     Status: None   Result Value Ref Range    Ferritin 87 8 - 252 ng/mL   Vitamin B12     Status: None   Result Value Ref Range    Vitamin B12 449 193 - 986 pg/mL   Vitamin D Deficiency     Status: None   Result Value Ref Range    Vitamin D Deficiency screening 33 20 - 75 ug/L       ASSESSMENT/PLAN:   1. Routine general medical examination at a health care facility      2. Insomnia due to other mental disorder  Ok to increase dose  - traZODone (DESYREL) 50 MG tablet; Take 2-3 tablets (100-150 mg) by mouth At Bedtime  Dispense: 180 tablet; Refill: 3    3. S/P gastric bypass  Labs today  -  "BASIC METABOLIC PANEL  - CBC with platelets differential  - Ferritin  - Vitamin B12  - Vitamin D Deficiency    4. Generalized anxiety disorder  Stable, continue medications  - escitalopram (LEXAPRO) 10 MG tablet; Take 1 tablet (10 mg) by mouth daily  Dispense: 90 tablet; Refill: 1  - buPROPion (WELLBUTRIN XL) 150 MG 24 hr tablet; Take 1 tablet (150 mg) by mouth every morning  Dispense: 90 tablet; Refill: 1    5. Major depressive disorder, recurrent episode, moderate (H)  Stable continue medications  - buPROPion (WELLBUTRIN XL) 150 MG 24 hr tablet; Take 1 tablet (150 mg) by mouth every morning  Dispense: 90 tablet; Refill: 1    6. Bee sting allergy    - EPINEPHrine (AUVI-Q) 0.3 MG/0.3ML injection 2-pack; Inject 0.3 mLs (0.3 mg) into the muscle as needed for anaphylaxis  Dispense: 0.6 mL; Refill: 3    7. CARDIOVASCULAR SCREENING; LDL GOAL LESS THAN 160    - Lipid panel reflex to direct LDL Non-fasting    9. Encounter for screening mammogram for breast cancer    - MA SCREENING DIGITAL BILAT - Future  (s+30); Future    COUNSELING:  Reviewed preventive health counseling, as reflected in patient instructions       Regular exercise       Healthy diet/nutrition       Immunizations    Declined: Zoster due to Cost               Osteoporosis Prevention/Bone Health       Colon cancer screening    Estimated body mass index is 35.05 kg/m  as calculated from the following:    Height as of this encounter: 1.651 m (5' 5\").    Weight as of this encounter: 95.5 kg (210 lb 9.6 oz).    Weight management plan: Patient referred to endocrine and/or weight management specialty     reports that she has never smoked. She has never used smokeless tobacco.      Counseling Resources:  ATP IV Guidelines  Pooled Cohorts Equation Calculator  Breast Cancer Risk Calculator  FRAX Risk Assessment  ICSI Preventive Guidelines  Dietary Guidelines for Americans, 2010  USDA's MyPlate  ASA Prophylaxis  Lung CA Screening    Lesly Arteaga, APRN " CNP  [facility] [place]

## 2019-11-08 LAB — DEPRECATED CALCIDIOL+CALCIFEROL SERPL-MC: 33 UG/L (ref 20–75)

## 2019-11-13 ENCOUNTER — TELEPHONE (OUTPATIENT)
Dept: FAMILY MEDICINE | Facility: CLINIC | Age: 54
End: 2019-11-13

## 2019-11-13 DIAGNOSIS — Z91.030 BEE STING ALLERGY: ICD-10-CM

## 2019-11-13 NOTE — TELEPHONE ENCOUNTER
EPINEPHrine (AUVI-Q) 0.3 MG/0.3ML injection 2-pack 0.6 mL 3 11/7/2019  No   Sig - Route: Inject 0.3 mLs (0.3 mg) into the muscle as needed for anaphylaxis - Intramuscular   Sent to pharmacy as: EPINEPHrine (AUVI-Q) 0.3 MG/0.3ML injection 2-pack   Class: E-Prescribe   Order: [id number]   E-Prescribing Status: Receipt confirmed by pharmacy (11/7/2019 11:28 AM CST)     Clarified Rx.     Anne Marie Faustin RN

## 2019-11-13 NOTE — TELEPHONE ENCOUNTER
Reason for call:  Other   Patient called regarding (reason for call): call back  Additional comments: Antionette from [facility] is calling because she needs clarification on the quantity of the medication EPINEPHrine (AUVI-Q) 0.3 MG/0.3ML injection 2-pack. Please call back     Phone number to reach patient:  Other phone number:  [phone]    Best Time:  any    Can we leave a detailed message on this number?  YES

## 2019-12-09 DIAGNOSIS — F41.1 GENERALIZED ANXIETY DISORDER: Chronic | ICD-10-CM

## 2019-12-09 DIAGNOSIS — F32.0 MILD MAJOR DEPRESSION (H): ICD-10-CM

## 2019-12-09 NOTE — TELEPHONE ENCOUNTER
Please call patient. Where does she want Rx sent to? Was sent on 11/7/19 to [facility] in [place].     Anne Marie Faustin RN

## 2019-12-09 NOTE — TELEPHONE ENCOUNTER
Duplicate Request   Spoke to patient who stated that she just signed up for Auto refill request at [facility] pharmacy, please disregard this request     Leyda Daugherty MA on 12/9/2019 at 9:31 AM

## 2019-12-09 NOTE — TELEPHONE ENCOUNTER
Duplicate     Disp Refills Start End ADRIANNA   escitalopram (LEXAPRO) 10 MG tablet 90 tablet 1 11/7/2019  No   Sig - Route: Take 1 tablet (10 mg) by mouth daily - Oral   Sent to pharmacy as: escitalopram (LEXAPRO) 10 MG tablet   Class: E-Prescribe   Order: [id number]   E-Prescribing Status: Receipt confirmed by pharmacy (11/7/2019 11:20 AM CST)

## 2019-12-10 ENCOUNTER — ANCILLARY PROCEDURE (OUTPATIENT)
Dept: GENERAL RADIOLOGY | Facility: CLINIC | Age: 54
End: 2019-12-10
Attending: NURSE PRACTITIONER
Payer: COMMERCIAL

## 2019-12-10 ENCOUNTER — OFFICE VISIT (OUTPATIENT)
Dept: FAMILY MEDICINE | Facility: CLINIC | Age: 54
End: 2019-12-10
Payer: COMMERCIAL

## 2019-12-10 VITALS
HEIGHT: 65 IN | OXYGEN SATURATION: 99 % | SYSTOLIC BLOOD PRESSURE: 108 MMHG | HEART RATE: 64 BPM | BODY MASS INDEX: 34.99 KG/M2 | DIASTOLIC BLOOD PRESSURE: 74 MMHG | WEIGHT: 210 LBS | TEMPERATURE: 97.6 F | RESPIRATION RATE: 15 BRPM

## 2019-12-10 DIAGNOSIS — M25.561 ACUTE PAIN OF RIGHT KNEE: Primary | ICD-10-CM

## 2019-12-10 DIAGNOSIS — M25.561 ACUTE PAIN OF RIGHT KNEE: ICD-10-CM

## 2019-12-10 PROCEDURE — 73560 X-RAY EXAM OF KNEE 1 OR 2: CPT | Mod: RT

## 2019-12-10 PROCEDURE — 99214 OFFICE O/P EST MOD 30 MIN: CPT | Performed by: NURSE PRACTITIONER

## 2019-12-10 RX ORDER — ESCITALOPRAM OXALATE 10 MG/1
TABLET ORAL
Qty: 90 TABLET | Refills: 1 | OUTPATIENT
Start: 2019-12-10

## 2019-12-10 ASSESSMENT — MIFFLIN-ST. JEOR: SCORE: 1563.43

## 2019-12-10 NOTE — PATIENT INSTRUCTIONS
Patient Education     Reducing Knee Pain and Swelling    Many treatments can help reduce pain and swelling in your knee. Your healthcare provider or physical therapist may suggest one or more of the following treatments:    Icing your knee. This helps reduce swelling. You may be asked to ice your knee once a day or more. Apply ice for about 15 to 20 minutes at a time, with at least 40 minutes between sessions. To make an ice pack, put ice cubes in a plastic bag that seals at the top. Wrap the bag in a clean, thin towel or cloth. Never put ice or an ice pack directly on the skin.    Keeping your leg raised above your heart. This helps excess fluid flow out of your knee joint to reduce swelling.    Compression. This means wrapping an elastic bandage or neoprene sleeve snugly around your knees. It keeps fluid from collecting in and around your knee joint.    Electrical stimulation. This is done by a physical therapist or . It can help reduce excess fluid in your knee joint.    Anti-inflammatory medicines. These may be prescribed by your healthcare provider. You may take pills or get shots (injections) in your knee.    Isometric (tena) exercises. These strengthen the muscles that support your knee joint. They also help reduce excess fluid in your knee.    Massage. This helps fluid drain away from your knee.  Date Last Reviewed: 5/1/2018 2000-2018 [email or web address]. [address] [place] [address], [place] [address]. All rights reserved. This information is not intended as a substitute for professional medical care. Always follow your healthcare professional's instructions.

## 2019-12-10 NOTE — PROGRESS NOTES
"Sunshine Delgado is a 54 year old female here today with a complaint of right knee pain.  This pain started 1 weeks ago and is getting worse.  This injury was while hiking, left leg got caught under rock and so the right leg/knee got stretched \"like the splits\". Has been limping. Knee swells some no bruising didn't land on it.   Has been treating with ice heat ibuprofen resting elevating   Feels like knee is catching clicking popping .   The pain is aching and sharp and is 6 out of 10 on a 10 pain scale.    There is no history of previous injury to the area.    Leg is not giving out, no neuro deficits.     REVIEW OF SYSTEMS:  Musculoskeletal: positive for right knee pain  Constitutional:negative  Respiratory: negative  Cardiovascular: negative  Gastrointestinal: negative    Family History   Problem Relation Age of Onset     Eye Disorder Mother         lost eyesight; probable macular degeneration     Psychotic Disorder Mother         Dementia /Alzhimers     Neurologic Disorder Mother         seizures     Diabetes Mother      Hypertension Mother      Alzheimer Disease Mother      Arthritis Mother      Osteoporosis Mother      Obesity Mother      Diabetes Father      Depression Father      Hyperlipidemia Father      Obesity Father      Psychotic Disorder Sister         bipolar     Thyroid Disease Sister         h/o thyroid cancer     Depression Sister         Bipolar     Obesity Sister      Cancer Other         Brain cancer     Osteoporosis Maternal Grandmother      Anxiety Disorder Son      Depression Sister      Thyroid Disease Sister        Past Medical History:   Diagnosis Date     Bee sting allergy      Depressive disorder      Generalized anxiety disorder 10/15/2009    lexapro made things worse.       Morbid obesity (H)      Ocular migraine      MARIA GUADALUPE (obstructive sleep apnea)- severe (AHI 84) 09/09/2011     Voice fatigue 10/22/2009       Past Surgical History:   Procedure Laterality Date     COLONOSCOPY  2000     " HELEN GASTRIC SLEEVE       GENITOURINARY SURGERY  2007     HYSTERECTOMY, PAP NO LONGER INDICATED       HYSTERECTOMY, VAGINAL  2007    still has ovaries     LAPAROSCOPIC GASTRIC SLEEVE N/A 3/13/2018    Procedure: LAPAROSCOPIC GASTRIC SLEEVE;  Laparoscopic Sleeve Gastrectomy;  Surgeon: Kameron Joseph MD;  Location: [facility] OR         EXAM:  Gen: Alert, awake no acute distress  CV: S1 and S2 normal, no murmurs, clicks, gallops or rubs. Regular rate and rhythm.   Lungs: Chest is clear; no wheezes or rales. No edema or JVD.  Inspection:  right  knee appears mildly swollen  Palpitation:Palpation of front and behind knee reveals pain.  Range of motion: ROM as expected in all planes with pain on flexion    Xray findings: no fracture or dislocation noted.      ASSESSMENT: (M25.561) Acute pain of right knee  (primary encounter diagnosis)    Plan:   Knee brace given  Advised supportive care- rest, ice, elevate and NSAIDS.    Follow Up with ortho (referral placed): PRN and/or if condition worsens or changes    LEOPOLDO Perdomo CNP  .

## 2019-12-11 ENCOUNTER — OFFICE VISIT (OUTPATIENT)
Dept: ORTHOPEDICS | Facility: CLINIC | Age: 54
End: 2019-12-11
Payer: COMMERCIAL

## 2019-12-11 ENCOUNTER — ANCILLARY PROCEDURE (OUTPATIENT)
Dept: GENERAL RADIOLOGY | Facility: CLINIC | Age: 54
End: 2019-12-11
Attending: PHYSICIAN ASSISTANT
Payer: COMMERCIAL

## 2019-12-11 VITALS
DIASTOLIC BLOOD PRESSURE: 76 MMHG | BODY MASS INDEX: 34.99 KG/M2 | SYSTOLIC BLOOD PRESSURE: 110 MMHG | HEIGHT: 65 IN | WEIGHT: 210 LBS | RESPIRATION RATE: 16 BRPM

## 2019-12-11 DIAGNOSIS — M25.561 MEDIAL KNEE PAIN, RIGHT: ICD-10-CM

## 2019-12-11 DIAGNOSIS — F41.1 GENERALIZED ANXIETY DISORDER: Chronic | ICD-10-CM

## 2019-12-11 DIAGNOSIS — F32.0 MILD MAJOR DEPRESSION (H): ICD-10-CM

## 2019-12-11 DIAGNOSIS — S83.411A SPRAIN OF MEDIAL COLLATERAL LIGAMENT OF RIGHT KNEE, INITIAL ENCOUNTER: Primary | ICD-10-CM

## 2019-12-11 PROCEDURE — 99244 OFF/OP CNSLTJ NEW/EST MOD 40: CPT | Performed by: ORTHOPAEDIC SURGERY

## 2019-12-11 PROCEDURE — 73560 X-RAY EXAM OF KNEE 1 OR 2: CPT | Mod: RT

## 2019-12-11 RX ORDER — CITALOPRAM HYDROBROMIDE 40 MG/1
40 TABLET ORAL DAILY
Refills: 0 | COMMUNITY
Start: 2019-03-26 | End: 2020-02-10

## 2019-12-11 RX ORDER — ESCITALOPRAM OXALATE 10 MG/1
TABLET ORAL
Qty: 90 TABLET | Refills: 1
Start: 2019-12-11

## 2019-12-11 RX ORDER — LOSARTAN POTASSIUM 25 MG/1
TABLET ORAL
COMMUNITY
Start: 2019-12-09 | End: 2020-04-30

## 2019-12-11 RX ORDER — IBUPROFEN 600 MG/1
600 TABLET, FILM COATED ORAL
COMMUNITY
Start: 2012-03-21 | End: 2020-05-28

## 2019-12-11 RX ORDER — ACETAMINOPHEN 325 MG/1
325 TABLET ORAL EVERY 4 HOURS PRN
COMMUNITY

## 2019-12-11 ASSESSMENT — MIFFLIN-ST. JEOR: SCORE: 1563.43

## 2019-12-11 ASSESSMENT — PAIN SCALES - GENERAL: PAINLEVEL: MODERATE PAIN (5)

## 2019-12-11 NOTE — TELEPHONE ENCOUNTER
Spoke to [facility] and rx has been filled. Please disregard request.  Patsy EAGLE CMA ([facility])

## 2019-12-11 NOTE — LETTER
"    12/11/2019         RE: Sunshine Delgado  [address] [place] [phone]        Dear Colleague,    Thank you for referring your patient, Sunshine Delgado, to the [facility]. Please see a copy of my visit note below.    CHIEF COMPLAINT:   Chief Complaint   Patient presents with     Right Knee - Pain     Right medial and posterior knee pain. Onset: a week ago in [place]. While hiking her left foot was stuck and right foot was elevated and away from her, placing knee in a valgus type stress. She is wearing a knee brace since yesterday which is helping.      Sunshine Delgado is seen today in the [facility] for evaluation of right knee pain, injury at the request of LEOPOLDO Denson CNP        HISTORY OF PRESENT ILLNESS    Sunshine Delgado is a 54 year old female seen for evaluation of ongoing right knee pain with a known injury.   Pain has been present since 11/30/2019, hiking down [email or web address] in [place]. Was stepping down with the left foot and caught in a crevice, her right leg was up and stretched out in like a \"splits\" type mechanism. Onset of pain. Was have to hike back down carefully. Had pain, swelling. Since then has felt \"unstable\" like it will \"give out\". Seen in primary care yesterday with 2 view xrays of the right knee showing medial osteoarthritis, given a brace. She states the brace has made her knee feel better and more stable. Other treatment with ice, tylenol.      History of osteoarthritis and bursitis in hips treated with injections. Started to bother again recently from how she's walking with her knee but that seems better now as well since getting knee brace yesterday.    Present symptoms: pain medially  and posteriorly, pain sharp, dull/achy , moderate pain, mild swelling, +catching/popping, +giving way.    Pain severity: 5/10  Frequency of symptoms: frequently  Exacerbating Factors: weight bearing, twisting/turning.  Relieving " Factors: rest, sitting, brace  Night Pain: Yes  Pain while at rest: Yes   Numbness or tingling: No   Patient has tried:     NSAIDS: No      Physical Therapy: No      Activity modification: Yes      Bracing: Yes , 1day with relief     Injections: No     Ice: Yes      Assistive device:  No     Other: tylenol    Other PMH:  has a past medical history of Bee sting allergy, Depressive disorder, Generalized anxiety disorder (10/15/2009), Morbid obesity (H), Ocular migraine, MARIA GUADALUPE (obstructive sleep apnea)- severe (AHI 84) (09/09/2011), and Voice fatigue (10/22/2009). She also has no past medical history of Arthritis, Cancer (H), Cerebral infarction (H), Congestive heart failure (H), Congestive heart failure, unspecified, COPD (chronic obstructive pulmonary disease) (H), Coronary artery disease, CVA (cerebral infarction), Diabetes (H), Heart disease, History of blood transfusion, Hypertension, Thyroid disease, or Uncomplicated asthma.  Patient Active Problem List   Diagnosis     Generalized anxiety disorder     CARDIOVASCULAR SCREENING; LDL GOAL LESS THAN 160     MARIA GUADALUPE (obstructive sleep apnea)- severe (AHI 84)     Morbid obesity (H)     Health Care Home     Mild major depression (H)     Insomnia     Bee sting allergy     CKD (chronic kidney disease) stage 3, GFR 30-59 ml/min (H)       Surgical Hx:  has a past surgical history that includes hysterectomy, vaginal (2007); hysterectomy, pap no longer indicated; Laparoscopic gastric sleeve (N/A, 3/13/2018); colonoscopy (2000); Abdomen surgery (2007); and Davinci Gastric Sleeve.    Medications:   Current Outpatient Medications:      Ascorbic Acid (VITAMIN C PO), Take by mouth every morning, Disp: , Rfl:      buPROPion (WELLBUTRIN XL) 150 MG 24 hr tablet, Take 1 tablet (150 mg) by mouth every morning, Disp: 90 tablet, Rfl: 1     Cholecalciferol (VITAMIN D PO), Take by mouth every morning, Disp: , Rfl:      EPINEPHrine (AUVI-Q) 0.3 MG/0.3ML injection 2-pack, Inject 0.3 mLs (0.3 mg)  into the muscle as needed for anaphylaxis, Disp: 0.6 mL, Rfl: 3     escitalopram (LEXAPRO) 10 MG tablet, Take 1 tablet (10 mg) by mouth daily, Disp: 90 tablet, Rfl: 1     fluticasone (FLONASE) 50 MCG/ACT spray, Spray 2 sprays into both nostrils daily (Patient not taking: Reported on 11/7/2019), Disp: 16 g, Rfl: 1     meclizine (ANTIVERT) 25 MG tablet, Take 1 tablet (25 mg) by mouth 3 times daily as needed for dizziness, Disp: 30 tablet, Rfl: 1     multivitamin, therapeutic with minerals (MULTI-VITAMIN) TABS tablet, Take 1 tablet by mouth every morning, Disp: , Rfl:      order for DME, Equipment being ordered: knee brace, Disp: 1 Units, Rfl: 0     order for DME, Resmed Airsense 10 auto cpap 6-7 cm, Airfit P10 for her XS pillows, Disp: , Rfl:      traZODone (DESYREL) 50 MG tablet, Take 2-3 tablets (100-150 mg) by mouth At Bedtime, Disp: 180 tablet, Rfl: 3    Allergies:   Allergies   Allergen Reactions     Sulfa Drugs Hives     Contrast Dye Other (See Comments)     Patient had sneezing and an itchy throat following contrast injection of 100 mL Isovue-370.     Vicodin [Hydrocodone-Acetaminophen]      Wasps [Hornets] Swelling     Now carries Epi Pen     Zoloft [Sertraline]      suicidal ideation       Social Hx:    reports that she has never smoked. She has never used smokeless tobacco. She reports current alcohol use. She reports that she does not use drugs.    Family Hx: family history includes Alzheimer Disease in her mother; Anxiety Disorder in her son; Arthritis in her mother; Cancer in an other family member; Depression in her father, sister, and sister; Diabetes in her father and mother; Eye Disorder in her mother; Hyperlipidemia in her father; Hypertension in her mother; Neurologic Disorder in her mother; Obesity in her father, mother, and sister; Osteoporosis in her maternal grandmother and mother; Psychotic Disorder in her mother and sister; Thyroid Disease in her sister and sister.    REVIEW OF SYSTEMS: 10  "point ROS neg other than the symptoms noted above in the HPI and PMH. Notables include  CONSTITUTIONAL:NEGATIVE for fever, chills, change in weight  INTEGUMENTARY/SKIN: NEGATIVE for worrisome rashes, moles or lesions  MUSCULOSKELETAL:See HPI above  NEURO: NEGATIVE for weakness, dizziness or paresthesias    PHYSICAL EXAM:  /76   Resp 16   Ht 1.651 m (5' 5\")   Wt 95.3 kg (210 lb)   BMI 34.95 kg/m      GENERAL APPEARANCE: healthy, alert, no distress  SKIN: no suspicious lesions or rashes  NEURO: Normal strength and tone, mentation intact and speech normal  PSYCH:  mentation appears normal and affect normal, not anxious  RESPIRATORY: No increased work of breathing.  HANDS: no clubbing, nail pitting  LYMPH: no palpable popliteal lymphadenopathy.    BILATERAL LOWER EXTREMITIES:  Gait: subtle favors the right. Slow and cautious  Alignment: slight varus  No gross deformities or masses.  No Quad atrophy, strength normal.  Intact sensation deep peroneal nerve, superficial peroneal nerve, med/lat tibial nerve, sural nerve, saphenous nerve  Intact EHL, EDL, TA, FHL, GS, quadriceps hamstrings and hip flexors  Toes warm and well perfused, brisk capillary refill. Palpable 2+ dp pulses.  Bilateral calf soft and nttp or squeeze.  DTRs: achilles 2+, patella 2+.  Edema: trace    LEFT KNEE EXAM:    Skin: intact, no ecchymosis or erythema  ROM: full extension to 130+ flexion  Tight hamstrings on straight leg raise.  Effusion: none  Tender: NTTP med/lat joint line, anterior or posterior knee  McMurrays: negative    MCL: stable, and non-painful at both 0 and 30 degrees knee flexion  Varus stress: stable, and non-painful at both 0 and 30 degrees knee flexion  Lachmans: neg, firm endpoint  Posterior Drawer stable  Patellofemoral joint:                Apprehension: negative              Crepitations: minimal    RIGHT KNEE EXAM:    Skin: intact, no ecchymosis or erythema  Squat: 25% with pressure in front and back of knee, " medial.  ROM: full extension to 130 flexion, anterior and posterior discomfort.  Tight hamstrings on straight leg raise.  Effusion: none  Tender: proximal medial collateral ligament, medial epicondyle, slight tender to palpation medial joint line, prepatella; mild tender to palpation medial gastroc, popliteal fossa   nontender to palpation lat joint line  McMurrays: negative    MCL: stable, and mild-moderate -painful at both 0 and 30 degrees knee flexion  Varus stress: stable, and non-painful at both 0 and 30 degrees knee flexion  Lachmans: slight laxity, exam limited by guarding.  Posterior Drawer stable  Patellofemoral joint:                Apprehension: negative              Crepitations: mild   Grind: positive.    X-RAY:  2 views right knee previously from 12/10/2019, sunrise view 12/11/2019 were reviewed in clinic today. On my review, no obvious fractures or dislocations. There is mild medial compartment narrowing. Small patello-femoral marginal osteophytes.       ASSESSMENT/PLAN: Sunshine Delgado is a 54 year old female with acute right knee pain,  Likely medial collateral ligament sprain.     * images and exam findings reviewed. Consistent with medial collateral ligament sprain.  * cannot rule out medial meniscus tear or other internal injury. Knee feels stable, but exam somewhat limited by apprehension, guarding  * discussed MRI to further evaluate.  * at this time, since she's feeling better with brace, she'd like to see how her knee recovers  * will take ~6 weeks to heal up  * rest, ice, elevate, activity modification ( avoid side to side, valgus stressing moves), over the counter medications, brace  * Physical Therapy referral placed  * reassess 4 weeks, sooner if needed.        Miles Thompson M.D., M.S.  Dept. of Orthopaedic Surgery  [facility]        Again, thank you for allowing me to participate in the care of your patient.        Sincerely,        Miles Thompson MD

## 2019-12-11 NOTE — PROGRESS NOTES
"CHIEF COMPLAINT:   Chief Complaint   Patient presents with     Right Knee - Pain     Right medial and posterior knee pain. Onset: a week ago in [place]. While hiking her left foot was stuck and right foot was elevated and away from her, placing knee in a valgus type stress. She is wearing a knee brace since yesterday which is helping.      Sunshine Delgado is seen today in the [facility] for evaluation of right knee pain, injury at the request of LEOPOLDO Denson CNP        HISTORY OF PRESENT ILLNESS    Sunshine Delgado is a 54 year old female seen for evaluation of ongoing right knee pain with a known injury.   Pain has been present since 11/30/2019, hiking [address] in [place]. Was stepping down with the left foot and caught in a crevice, her right leg was up and stretched out in like a \"splits\" type mechanism. Onset of pain. Was have to hike back down carefully. Had pain, swelling. Since then has felt \"unstable\" like it will \"give out\". Seen in primary care yesterday with 2 view xrays of the right knee showing medial osteoarthritis, given a brace. She states the brace has made her knee feel better and more stable. Other treatment with ice, tylenol.      History of osteoarthritis and bursitis in hips treated with injections. Started to bother again recently from how she's walking with her knee but that seems better now as well since getting knee brace yesterday.    Present symptoms: pain medially  and posteriorly, pain sharp, dull/achy , moderate pain, mild swelling, +catching/popping, +giving way.    Pain severity: 5/10  Frequency of symptoms: frequently  Exacerbating Factors: weight bearing, twisting/turning.  Relieving Factors: rest, sitting, brace  Night Pain: Yes  Pain while at rest: Yes   Numbness or tingling: No   Patient has tried:     NSAIDS: No      Physical Therapy: No      Activity modification: Yes      Bracing: Yes , 1day with relief     Injections: No     " Ice: Yes      Assistive device:  No     Other: tylenol    Other PMH:  has a past medical history of Bee sting allergy, Depressive disorder, Generalized anxiety disorder (10/15/2009), Morbid obesity (H), Ocular migraine, MARIA GUADALUPE (obstructive sleep apnea)- severe (AHI 84) (09/09/2011), and Voice fatigue (10/22/2009). She also has no past medical history of Arthritis, Cancer (H), Cerebral infarction (H), Congestive heart failure (H), Congestive heart failure, unspecified, COPD (chronic obstructive pulmonary disease) (H), Coronary artery disease, CVA (cerebral infarction), Diabetes (H), Heart disease, History of blood transfusion, Hypertension, Thyroid disease, or Uncomplicated asthma.  Patient Active Problem List   Diagnosis     Generalized anxiety disorder     CARDIOVASCULAR SCREENING; LDL GOAL LESS THAN 160     MARIA GUADALUPE (obstructive sleep apnea)- severe (AHI 84)     Morbid obesity (H)     Health Care Home     Mild major depression (H)     Insomnia     Bee sting allergy     CKD (chronic kidney disease) stage 3, GFR 30-59 ml/min (H)       Surgical Hx:  has a past surgical history that includes hysterectomy, vaginal (2007); hysterectomy, pap no longer indicated; Laparoscopic gastric sleeve (N/A, 3/13/2018); colonoscopy (2000); Abdomen surgery (2007); and Davinci Gastric Sleeve.    Medications:   Current Outpatient Medications:      Ascorbic Acid (VITAMIN C PO), Take by mouth every morning, Disp: , Rfl:      buPROPion (WELLBUTRIN XL) 150 MG 24 hr tablet, Take 1 tablet (150 mg) by mouth every morning, Disp: 90 tablet, Rfl: 1     Cholecalciferol (VITAMIN D PO), Take by mouth every morning, Disp: , Rfl:      EPINEPHrine (AUVI-Q) 0.3 MG/0.3ML injection 2-pack, Inject 0.3 mLs (0.3 mg) into the muscle as needed for anaphylaxis, Disp: 0.6 mL, Rfl: 3     escitalopram (LEXAPRO) 10 MG tablet, Take 1 tablet (10 mg) by mouth daily, Disp: 90 tablet, Rfl: 1     fluticasone (FLONASE) 50 MCG/ACT spray, Spray 2 sprays into both nostrils daily  (Patient not taking: Reported on 11/7/2019), Disp: 16 g, Rfl: 1     meclizine (ANTIVERT) 25 MG tablet, Take 1 tablet (25 mg) by mouth 3 times daily as needed for dizziness, Disp: 30 tablet, Rfl: 1     multivitamin, therapeutic with minerals (MULTI-VITAMIN) TABS tablet, Take 1 tablet by mouth every morning, Disp: , Rfl:      order for DME, Equipment being ordered: knee brace, Disp: 1 Units, Rfl: 0     order for DME, Resmed Airsense 10 auto cpap 6-7 cm, Airfit P10 for her XS pillows, Disp: , Rfl:      traZODone (DESYREL) 50 MG tablet, Take 2-3 tablets (100-150 mg) by mouth At Bedtime, Disp: 180 tablet, Rfl: 3    Allergies:   Allergies   Allergen Reactions     Sulfa Drugs Hives     Contrast Dye Other (See Comments)     Patient had sneezing and an itchy throat following contrast injection of 100 mL Isovue-370.     Vicodin [Hydrocodone-Acetaminophen]      Wasps [Hornets] Swelling     Now carries Epi Pen     Zoloft [Sertraline]      suicidal ideation       Social Hx:    reports that she has never smoked. She has never used smokeless tobacco. She reports current alcohol use. She reports that she does not use drugs.    Family Hx: family history includes Alzheimer Disease in her mother; Anxiety Disorder in her son; Arthritis in her mother; Cancer in an other family member; Depression in her father, sister, and sister; Diabetes in her father and mother; Eye Disorder in her mother; Hyperlipidemia in her father; Hypertension in her mother; Neurologic Disorder in her mother; Obesity in her father, mother, and sister; Osteoporosis in her maternal grandmother and mother; Psychotic Disorder in her mother and sister; Thyroid Disease in her sister and sister.    REVIEW OF SYSTEMS: 10 point ROS neg other than the symptoms noted above in the HPI and PMH. Notables include  CONSTITUTIONAL:NEGATIVE for fever, chills, change in weight  INTEGUMENTARY/SKIN: NEGATIVE for worrisome rashes, moles or lesions  MUSCULOSKELETAL:See HPI  "above  NEURO: NEGATIVE for weakness, dizziness or paresthesias    PHYSICAL EXAM:  /76   Resp 16   Ht 1.651 m (5' 5\")   Wt 95.3 kg (210 lb)   BMI 34.95 kg/m     GENERAL APPEARANCE: healthy, alert, no distress  SKIN: no suspicious lesions or rashes  NEURO: Normal strength and tone, mentation intact and speech normal  PSYCH:  mentation appears normal and affect normal, not anxious  RESPIRATORY: No increased work of breathing.  HANDS: no clubbing, nail pitting  LYMPH: no palpable popliteal lymphadenopathy.    BILATERAL LOWER EXTREMITIES:  Gait: subtle favors the right. Slow and cautious  Alignment: slight varus  No gross deformities or masses.  No Quad atrophy, strength normal.  Intact sensation deep peroneal nerve, superficial peroneal nerve, med/lat tibial nerve, sural nerve, saphenous nerve  Intact EHL, EDL, TA, FHL, GS, quadriceps hamstrings and hip flexors  Toes warm and well perfused, brisk capillary refill. Palpable 2+ dp pulses.  Bilateral calf soft and nttp or squeeze.  DTRs: achilles 2+, patella 2+.  Edema: trace    LEFT KNEE EXAM:    Skin: intact, no ecchymosis or erythema  ROM: full extension to 130+ flexion  Tight hamstrings on straight leg raise.  Effusion: none  Tender: NTTP med/lat joint line, anterior or posterior knee  McMurrays: negative    MCL: stable, and non-painful at both 0 and 30 degrees knee flexion  Varus stress: stable, and non-painful at both 0 and 30 degrees knee flexion  Lachmans: neg, firm endpoint  Posterior Drawer stable  Patellofemoral joint:                Apprehension: negative              Crepitations: minimal    RIGHT KNEE EXAM:    Skin: intact, no ecchymosis or erythema  Squat: 25% with pressure in front and back of knee, medial.  ROM: full extension to 130 flexion, anterior and posterior discomfort.  Tight hamstrings on straight leg raise.  Effusion: none  Tender: proximal medial collateral ligament, medial epicondyle, slight tender to palpation medial joint line, " prepatella; mild tender to palpation medial gastroc, popliteal fossa   nontender to palpation lat joint line  McMurrays: negative    MCL: stable, and mild-moderate -painful at both 0 and 30 degrees knee flexion  Varus stress: stable, and non-painful at both 0 and 30 degrees knee flexion  Lachmans: slight laxity, exam limited by guarding.  Posterior Drawer stable  Patellofemoral joint:                Apprehension: negative              Crepitations: mild   Grind: positive.    X-RAY:  2 views right knee previously from 12/10/2019, sunrise view 12/11/2019 were reviewed in clinic today. On my review, no obvious fractures or dislocations. There is mild medial compartment narrowing. Small patello-femoral marginal osteophytes.       ASSESSMENT/PLAN: Sunshine Delgado is a 54 year old female with acute right knee pain,  Likely medial collateral ligament sprain.     * images and exam findings reviewed. Consistent with medial collateral ligament sprain.  * cannot rule out medial meniscus tear or other internal injury. Knee feels stable, but exam somewhat limited by apprehension, guarding  * discussed MRI to further evaluate.  * at this time, since she's feeling better with brace, she'd like to see how her knee recovers  * will take ~6 weeks to heal up  * rest, ice, elevate, activity modification ( avoid side to side, valgus stressing moves), over the counter medications, brace  * Physical Therapy referral placed  * reassess 4 weeks, sooner if needed.        Miles Thompson M.D., M.S.  Dept. of Orthopaedic Surgery  [facility]

## 2019-12-11 NOTE — TELEPHONE ENCOUNTER
Patient has active rx with [facility] in [place]. Will call patient after 9 to confirm which pharmacy patient would like to use.  Patsy EAGLE CMA ([facility])

## 2019-12-12 DIAGNOSIS — F41.1 GENERALIZED ANXIETY DISORDER: Chronic | ICD-10-CM

## 2019-12-12 DIAGNOSIS — F32.0 MILD MAJOR DEPRESSION (H): ICD-10-CM

## 2019-12-12 NOTE — TELEPHONE ENCOUNTER
Duplicate, 1st request.    escitalopram (LEXAPRO) 10 MG tablet 90 tablet 1 11/7/2019  No   Sig - Route: Take 1 tablet (10 mg) by mouth daily - Oral   Sent to pharmacy as: escitalopram (LEXAPRO) 10 MG tablet   Class: E-Prescribe   Order: [id number]   E-Prescribing Status: Receipt confirmed by pharmacy (11/7/2019 11:20 AM CST)     Patsy EAGLE CMA ([facility])

## 2019-12-13 RX ORDER — ESCITALOPRAM OXALATE 10 MG/1
TABLET ORAL
Qty: 90 TABLET | Refills: 1 | OUTPATIENT
Start: 2019-12-13

## 2019-12-13 NOTE — TELEPHONE ENCOUNTER
This was sent 11/7/19 to [facility] for a 6 month supply.  Contacted [facility] and they will contact [facility] to transfer over.    Yessy Li RN

## 2020-01-07 ENCOUNTER — OFFICE VISIT (OUTPATIENT)
Dept: FAMILY MEDICINE | Facility: CLINIC | Age: 55
End: 2020-01-07
Payer: COMMERCIAL

## 2020-01-07 VITALS
WEIGHT: 209.5 LBS | BODY MASS INDEX: 35.77 KG/M2 | SYSTOLIC BLOOD PRESSURE: 112 MMHG | DIASTOLIC BLOOD PRESSURE: 70 MMHG | HEIGHT: 64 IN | HEART RATE: 108 BPM | TEMPERATURE: 97.7 F | OXYGEN SATURATION: 97 % | RESPIRATION RATE: 20 BRPM

## 2020-01-07 DIAGNOSIS — J06.9 VIRAL UPPER RESPIRATORY TRACT INFECTION WITH COUGH: Primary | ICD-10-CM

## 2020-01-07 DIAGNOSIS — Z12.39 BREAST CANCER SCREENING: ICD-10-CM

## 2020-01-07 DIAGNOSIS — A08.4 VIRAL GASTROENTERITIS: ICD-10-CM

## 2020-01-07 LAB
DEPRECATED S PYO AG THROAT QL EIA: NORMAL
SPECIMEN SOURCE: NORMAL

## 2020-01-07 PROCEDURE — 99213 OFFICE O/P EST LOW 20 MIN: CPT | Performed by: PHYSICIAN ASSISTANT

## 2020-01-07 PROCEDURE — 87880 STREP A ASSAY W/OPTIC: CPT | Performed by: PHYSICIAN ASSISTANT

## 2020-01-07 PROCEDURE — 87081 CULTURE SCREEN ONLY: CPT | Performed by: PHYSICIAN ASSISTANT

## 2020-01-07 ASSESSMENT — ANXIETY QUESTIONNAIRES
5. BEING SO RESTLESS THAT IT IS HARD TO SIT STILL: NOT AT ALL
1. FEELING NERVOUS, ANXIOUS, OR ON EDGE: NOT AT ALL
6. BECOMING EASILY ANNOYED OR IRRITABLE: NOT AT ALL
3. WORRYING TOO MUCH ABOUT DIFFERENT THINGS: NOT AT ALL
GAD7 TOTAL SCORE: 0
7. FEELING AFRAID AS IF SOMETHING AWFUL MIGHT HAPPEN: NOT AT ALL
2. NOT BEING ABLE TO STOP OR CONTROL WORRYING: NOT AT ALL

## 2020-01-07 ASSESSMENT — MIFFLIN-ST. JEOR: SCORE: 1547.35

## 2020-01-07 ASSESSMENT — PATIENT HEALTH QUESTIONNAIRE - PHQ9
5. POOR APPETITE OR OVEREATING: NOT AT ALL
SUM OF ALL RESPONSES TO PHQ QUESTIONS 1-9: 3

## 2020-01-07 NOTE — PROGRESS NOTES
"Subjective     Sunshine Delgado is a 54 year old female who presents to clinic today for the following health issues:    HPI   ENT Symptoms             Symptoms: cc Present Absent Comment   Fever/Chills  x     Fatigue  x     Muscle Aches  x     Eye Irritation  x  Itchy and red eyes   Sneezing  x     Nasal Nacho/Drg  x     Sinus Pressure/Pain  x     Loss of smell   x    Dental pain  x     Sore Throat  x     Swollen Glands  x     Ear Pain/Fullness  x  Left ear   Cough   x    Wheeze   x    Chest Pain  x  Slightly   Shortness of breath   x    Rash   x    Other   x      Symptom duration:  2 weeks   Symptom severity:  severe   Treatments tried:  Tylenol    Contacts:  co-workers         Review of Systems   ROS COMP: Constitutional, HEENT, cardiovascular, pulmonary, gi and gu systems are negative, except as otherwise noted.      Objective    /70   Pulse 108   Temp 97.7  F (36.5  C) (Oral)   Resp 20   Ht 1.629 m (5' 4.13\")   Wt 95 kg (209 lb 8 oz)   SpO2 97%   BMI 35.81 kg/m    Body mass index is 35.81 kg/m .     Physical Exam   GENERAL: healthy, alert and no distress  HENT: normal cephalic/atraumatic, ear canals and TM's normal, nasal mucosa edematous  and tonsillar hypertrophy  NECK: bilateral anterior cervical adenopathy, no asymmetry, masses, or scars and thyroid normal to palpation  RESP: lungs clear to auscultation - no rales, rhonchi or wheezes and harsh BS  ABDOMEN: tenderness throughout, no organomegaly or masses and bowel sounds normal    Diagnostic Test Results:  none         Assessment & Plan     1. Viral upper respiratory tract infection with cough    2. Viral gastroenteritis    3. Breast cancer screening  - MA SCREENING DIGITAL BILAT - Future  (s+30); Future     Continue supportive OTC measures.  Follow up if symptoms should persist, change or worsen.  Patient education materials dispensed and reviewed.   Patient amenable to this follow up plan.     Love Campuzano PA-C  [facility] " [place]

## 2020-01-07 NOTE — LETTER
[address] [address]  Phone: [phone]    January 7, 2020        Sunshine Delgado  [facility] [place] [id number]          To whom it may concern:    RE: Sunshine Delgado    Patient was seen and treated today at our clinic.    Please contact me for questions or concerns.      Sincerely,        Love Campuzano PA-C

## 2020-01-07 NOTE — PATIENT INSTRUCTIONS
"  Patient Education     Viral Gastroenteritis (Adult)    Gastroenteritis is commonly called the \"stomach flu,\" although it has nothing to do with influenza. It is most often caused by a virus that affects the stomach and intestinal tract and usually lasts from 2 to 7 days. Common viruses causing gastroenteritis include norovirus, rotavirus, and hepatitis A. Non-viral causes of gastroenteritis include bacteria, parasites, and toxins.  The danger from repeated vomiting or diarrhea is dehydration. This is the loss of too much fluid from the body. When this occurs, body fluids must be replaced. Antibiotics don't help with this illness because it is usually viral. Simple home treatment will be helpful.  Symptoms of viral gastroenteritis may include:    Watery, loose stools    Stomach pain or abdominal cramps    Fever and chills    Nausea and vomiting    Loss of bowel control    Headache  Home care  Gastroenteritis is transmitted by contact with the stool or vomit of an infected person. This can occur from person to person or from contact with a contaminated surface.  Follow these guidelines when caring for yourself at home:    If symptoms are severe, rest at home for the next 24 hours or until you are feeling better.    Wash your hands with soap and water or use alcohol-based  to prevent the spread of infection. Wash your hands after touching anyone who is sick.    Wash your hands or use alcohol-based  after using the toilet and before meals. Clean the toilet after each use.  Remember these tips when preparing food:    People with diarrhea should not prepare or serve food to others. When preparing foods, wash your hands before and after.    Wash your hands after using cutting boards, countertops, knives, or utensils that have been in contact with raw food.    Dry your hands with a single use towel.    Keep uncooked meats away from cooked and ready-to-eat foods.  Medicine  You may use acetaminophen or " NSAID medicines like ibuprofen or naproxen to control fever unless another medicine was given. If you have chronic liver or kidney disease, talk with your healthcare provider before using these medicines. Also talk with your provider if you've had a stomach ulcer or gastrointestinal bleeding. Don't give aspirin to anyone under 18 years of age who is ill with a fever. It may cause severe liver damage. Don't use NSAIDS is you are already taking one for another condition (like arthritis) or are on aspirin (such as for heart disease or after a stroke).  If medicine for vomiting or diarrhea are prescribed, take these only as directed. Nausea and diarrhea medicines are generally OK unless you have bleeding, fever, or severe abdominal pain.  Diet  Follow these guidelines for food:    Water and liquids are important so you don't get dehydrated. Drink a small amount at a time or suck on ice chips if you are vomiting.    If you eat, avoid fatty, greasy, spicy, or fried foods.    Don't eat dairy if you have diarrhea. This can make diarrhea worse.    Avoid tobacco, alcohol, and caffeine which may worsen symptoms.  During the first 24 hours (the first full day), follow the diet below:    Beverages. Sports drinks, soft drinks without caffeine, ginger ale, mineral water (plain or flavored), decaffeinated tea and coffee. If you are very dehydrated, sports drinks aren't a good choice. They have too much sugar and not enough electrolytes. In this case, commercially available products called oral rehydration solutions, are best.    Soups. Eat clear broth, consommé, and bouillon.    Desserts. Eat gelatin, ice pops, and fruit juice bars.  During the next 24 hours (the second day), you may add the following to the above:    Hot cereal, plain toast, bread, rolls, and crackers    Plain noodles, rice, mashed potatoes, chicken noodle or rice soup    Unsweetened canned fruit (avoid pineapple), bananas    Limit fat intake to less than 15 grams  per day. Do this by avoiding margarine, butter, oils, mayonnaise, sauces, gravies, fried foods, peanut butter, meat, poultry, and fish.    Limit fiber and avoid raw or cooked vegetables, fresh fruits (except bananas), and bran cereals.    Limit caffeine and chocolate. Don't use spices or seasonings other than salt.    Limit dairy products.    Avoid alcohol.  During the next 24 hours:    Gradually resume a normal diet as you feel better and your symptoms improve.    If at any time it starts getting worse again, go back to clear liquids until you feel better.  Follow-up care  Follow up with your healthcare provider, or as advised. Call your provider if you don't get better within 24 hours or if diarrhea lasts more than a week. Also follow up if you are unable to keep down liquids and get dehydrated. If a stool (diarrhea) sample was taken, call as directed for the results.  Call 911  Call 911 if any of these occur:    Trouble breathing    Chest pain    Confused    Severe drowsiness or trouble awakening    Fainting or loss of consciousness    Rapid heart rate    Seizure    Stiff neck  When to seek medical advice  Call your healthcare provider right away if any of these occur:    Abdominal pain that gets worse    Continued vomiting (unable to keep liquids down)    Frequent diarrhea (more than 5 times a day)    Blood in vomit or stool (black or red color)    Dark urine, reduced urine output, or extreme thirst    Weakness or dizziness    Drowsiness    Fever of 100.4 F (38 C) or higher, or as directed by your healthcare provider    New rash  Date Last Reviewed: 6/1/2018 2000-2019 [employer]. [address] [place] [address], [place] [address]. All rights reserved. This information is not intended as a substitute for professional medical care. Always follow your healthcare professional's instructions.

## 2020-01-08 ENCOUNTER — MYC MEDICAL ADVICE (OUTPATIENT)
Dept: FAMILY MEDICINE | Facility: CLINIC | Age: 55
End: 2020-01-08

## 2020-01-08 DIAGNOSIS — J20.9 ACUTE BRONCHITIS WITH SYMPTOMS > 10 DAYS: Primary | ICD-10-CM

## 2020-01-08 LAB
BACTERIA SPEC CULT: NORMAL
SPECIMEN SOURCE: NORMAL

## 2020-01-08 ASSESSMENT — ANXIETY QUESTIONNAIRES: GAD7 TOTAL SCORE: 0

## 2020-01-08 NOTE — TELEPHONE ENCOUNTER
Patient was seen yesterday by SUPRIYA Winkler.   Please see [employer] message.   Do not see any results for positive influenza b?    Anne Marie Faustin RN

## 2020-01-09 ENCOUNTER — TELEPHONE (OUTPATIENT)
Dept: FAMILY MEDICINE | Facility: CLINIC | Age: 55
End: 2020-01-09

## 2020-01-09 RX ORDER — AZITHROMYCIN 500 MG/1
500 TABLET, FILM COATED ORAL DAILY
Qty: 3 TABLET | Refills: 0 | Status: SHIPPED | OUTPATIENT
Start: 2020-01-09 | End: 2020-01-11 | Stop reason: ALTCHOICE

## 2020-01-09 NOTE — TELEPHONE ENCOUNTER
Patient has been sick for 2 weeks and was seen on 1/7/2020.  She states she was diagnosed in clinic with influenza B but unable to locate lab, she did say strep testing was negative.  She continues to have sinus congestion, headache, fever, and low back pain.  She has had a fever the last 3 days and symptoms have stayed the same.  She is not eating but is drinking water and ginger ale. Denies cough.  She is wondering if she has a sinus infection?   Jasmin Alba RN

## 2020-01-09 NOTE — TELEPHONE ENCOUNTER
Reason for call:  Symptom   Symptom or request: Was diagnosed with Influenza B on Monday and says she is not getting better    Duration (how long have symptoms been present): 4 days  Have you been treated for this before? Yes    Additional comments: na    Phone number to reach patient:  Cell number on file:    Telephone Information:   Mobile [phone]       Best Time:  any    Can we leave a detailed message on this number?  YES

## 2020-01-11 ENCOUNTER — NURSE TRIAGE (OUTPATIENT)
Dept: NURSING | Facility: CLINIC | Age: 55
End: 2020-01-11

## 2020-01-11 ENCOUNTER — ANCILLARY PROCEDURE (OUTPATIENT)
Dept: GENERAL RADIOLOGY | Facility: CLINIC | Age: 55
End: 2020-01-11
Attending: FAMILY MEDICINE
Payer: COMMERCIAL

## 2020-01-11 ENCOUNTER — OFFICE VISIT (OUTPATIENT)
Dept: URGENT CARE | Facility: URGENT CARE | Age: 55
End: 2020-01-11
Payer: COMMERCIAL

## 2020-01-11 VITALS
OXYGEN SATURATION: 95 % | HEART RATE: 88 BPM | SYSTOLIC BLOOD PRESSURE: 115 MMHG | WEIGHT: 207.2 LBS | BODY MASS INDEX: 35.42 KG/M2 | DIASTOLIC BLOOD PRESSURE: 77 MMHG | TEMPERATURE: 99.4 F

## 2020-01-11 DIAGNOSIS — R05.9 COUGH: Primary | ICD-10-CM

## 2020-01-11 DIAGNOSIS — J06.9 UPPER RESPIRATORY TRACT INFECTION, UNSPECIFIED TYPE: ICD-10-CM

## 2020-01-11 DIAGNOSIS — R05.9 COUGH: ICD-10-CM

## 2020-01-11 PROCEDURE — 99213 OFFICE O/P EST LOW 20 MIN: CPT | Performed by: FAMILY MEDICINE

## 2020-01-11 PROCEDURE — 71046 X-RAY EXAM CHEST 2 VIEWS: CPT

## 2020-01-11 NOTE — PROGRESS NOTES
Subjective     Sunshine Delgado is a 54 year old female who presents to clinic today for the following health issues:    HPI    been sick for a few weeks. Was seen in the past, told she has Influenza symptoms. Been given 3 days for Zithromax. Did not get any better, continue to have fever, chills at night. Cough and tight in the chest. Has no headache, or diarrhea.  Patient reports she felt better when she took Zithromax for 5 for 3 days.    She has negative rapid strep.   She denies shortness of breath, denies wheezing.  She is not a smoker.  Has no diarrhea no vomiting.  Review of Systems   ROS COMP: Constitutional, HEENT, cardiovascular, pulmonary, gi and gu systems are negative, except as otherwise noted.      Objective    /77 (BP Location: Left arm, Patient Position: Chair, Cuff Size: Adult Regular)   Pulse 88   Temp 99.4  F (37.4  C) (Oral)   Wt 94 kg (207 lb 3.2 oz)   SpO2 95%   BMI 35.42 kg/m    Body mass index is 35.42 kg/m .  Physical Exam   GENERAL: healthy, alert and no distress  NECK: no adenopathy, no asymmetry, masses, or scars and thyroid normal to palpation  RESP: lungs clear to auscultation - no rales, rhonchi or wheezes  CV: regular rate and rhythm, normal S1 S2, no S3 or S4, no murmur, click or rub, no peripheral edema and peripheral pulses strong  ABDOMEN: soft, nontender, no hepatosplenomegaly, no masses and bowel sounds normal  MS: no gross musculoskeletal defects noted, no edema    Diagnostic Test Results:  Labs reviewed in Epic  CXR - negative        Assessment & Plan       ICD-10-CM    1. Cough R05 XR Chest 2 Views   2. Upper respiratory tract infection, unspecified type J06.9      Discussed with patient supportive care, continue increasing fluid intake, she may take  Tylenol as needed for fever and chills.    Your x-ray was negative.    She has specifically for a note to be off work and give her a note until January 15, 2020    There are no Patient Instructions on file for this  visit.    No follow-ups on file.    Evnagelist Pfeiffer MD  [facility]

## 2020-01-11 NOTE — LETTER
January 11, 2020      Sunshine Deglado  [address] [place] [id number]        To Whom It May Concern:    Sunshine Delgado  was seen on 01/11/2020.  Please excuse her  until 01/15/2020 due to illness.  Return to work on 01/15/2020        Sincerely,        Evangelist Pfeiffer MD

## 2020-01-11 NOTE — TELEPHONE ENCOUNTER
"\"I was seen in clinic on Thursday and put on antibiotics and I'm still running a fever and not feeling better\".  Caller was given 3 day course of azithromycin, she has stage 3 kidney disease.    Reason for Disposition    [1] Taking antibiotics > 24 hours AND [2] symptoms WORSE    Additional Information    Negative: Severe difficulty breathing (e.g., struggling for each breath, speaks in single words)    Negative: Sounds like a life-threatening emergency to the triager    Negative: [1] Recent hospitalization for pneumonia AND [2] taking an antibiotic    Negative: [1] Animal bite infection AND [2] taking an antibiotic    Negative: [1] Cellulitis AND [2] taking an antibiotic    Negative: [1] Ear  infection (Swimmer's Ear) AND [2] taking an antibiotic    Negative: [1] Ear  infection (Otitis Media) AND [2] taking an antibiotic    Negative: [1] Sinus infection AND [2] taking an antibiotic    Negative: [1] Strep throat AND [2] taking an antibiotic    Negative: [1] Urinary tract  infection (e.g., cystitis, pyelonephritis, urethritis, epididymitis) AND [2] male AND [3] taking an antibiotic    Negative: [1] Urinary tract  infection (e.g., cystitis, pyelonephritis, urethritis) AND [2] female AND [3] taking an antibiotic    Negative: [1] Wound infection AND [2] taking an antibiotic    Negative: MODERATE difficulty breathing (e.g., speaks in phrases, SOB even at rest, pulse 100-120)    Negative: Fever > 103 F (39.4 C)    Negative: Patient sounds very sick or weak to the triager    Protocols used: INFECTION ON ANTIBIOTIC FOLLOW-UP CALL-A-    Delisa Peralta RN  [facility]      "

## 2020-01-13 ENCOUNTER — OFFICE VISIT (OUTPATIENT)
Dept: FAMILY MEDICINE | Facility: CLINIC | Age: 55
End: 2020-01-13
Payer: COMMERCIAL

## 2020-01-13 VITALS
TEMPERATURE: 97.3 F | WEIGHT: 209 LBS | OXYGEN SATURATION: 97 % | SYSTOLIC BLOOD PRESSURE: 110 MMHG | HEART RATE: 92 BPM | BODY MASS INDEX: 35.73 KG/M2 | DIASTOLIC BLOOD PRESSURE: 60 MMHG

## 2020-01-13 DIAGNOSIS — F33.1 MAJOR DEPRESSIVE DISORDER, RECURRENT EPISODE, MODERATE (H): ICD-10-CM

## 2020-01-13 DIAGNOSIS — J03.90 TONSILLITIS: ICD-10-CM

## 2020-01-13 DIAGNOSIS — E66.01 MORBID OBESITY (H): ICD-10-CM

## 2020-01-13 DIAGNOSIS — R50.9 FEVER, UNSPECIFIED FEVER CAUSE: Primary | ICD-10-CM

## 2020-01-13 DIAGNOSIS — N18.30 CKD (CHRONIC KIDNEY DISEASE) STAGE 3, GFR 30-59 ML/MIN (H): ICD-10-CM

## 2020-01-13 DIAGNOSIS — R68.89 FLU-LIKE SYMPTOMS: ICD-10-CM

## 2020-01-13 LAB
BASOPHILS # BLD AUTO: 0 10E9/L (ref 0–0.2)
BASOPHILS NFR BLD AUTO: 0.8 %
DIFFERENTIAL METHOD BLD: NORMAL
EOSINOPHIL # BLD AUTO: 0 10E9/L (ref 0–0.7)
EOSINOPHIL NFR BLD AUTO: 0.5 %
ERYTHROCYTE [DISTWIDTH] IN BLOOD BY AUTOMATED COUNT: 14.5 % (ref 10–15)
FLUAV+FLUBV AG SPEC QL: NEGATIVE
FLUAV+FLUBV AG SPEC QL: NEGATIVE
HCT VFR BLD AUTO: 42.2 % (ref 35–47)
HETEROPH AB SER QL: NEGATIVE
HGB BLD-MCNC: 14 G/DL (ref 11.7–15.7)
LYMPHOCYTES # BLD AUTO: 1.7 10E9/L (ref 0.8–5.3)
LYMPHOCYTES NFR BLD AUTO: 42.3 %
MCH RBC QN AUTO: 29 PG (ref 26.5–33)
MCHC RBC AUTO-ENTMCNC: 33.2 G/DL (ref 31.5–36.5)
MCV RBC AUTO: 87 FL (ref 78–100)
MONOCYTES # BLD AUTO: 0.4 10E9/L (ref 0–1.3)
MONOCYTES NFR BLD AUTO: 10.3 %
NEUTROPHILS # BLD AUTO: 1.9 10E9/L (ref 1.6–8.3)
NEUTROPHILS NFR BLD AUTO: 46.1 %
PLATELET # BLD AUTO: 168 10E9/L (ref 150–450)
RBC # BLD AUTO: 4.83 10E12/L (ref 3.8–5.2)
SPECIMEN SOURCE: NORMAL
WBC # BLD AUTO: 4 10E9/L (ref 4–11)

## 2020-01-13 PROCEDURE — 99214 OFFICE O/P EST MOD 30 MIN: CPT | Performed by: FAMILY MEDICINE

## 2020-01-13 PROCEDURE — 87804 INFLUENZA ASSAY W/OPTIC: CPT | Mod: 59 | Performed by: FAMILY MEDICINE

## 2020-01-13 PROCEDURE — 86308 HETEROPHILE ANTIBODY SCREEN: CPT | Performed by: FAMILY MEDICINE

## 2020-01-13 PROCEDURE — 85025 COMPLETE CBC W/AUTO DIFF WBC: CPT | Performed by: FAMILY MEDICINE

## 2020-01-13 PROCEDURE — 36415 COLL VENOUS BLD VENIPUNCTURE: CPT | Performed by: FAMILY MEDICINE

## 2020-01-13 NOTE — PROGRESS NOTES
Subjective     Sunshine Delgado is a 54 year old female who presents to clinic today for the following health issues:    HPI   ED/UC Followup:    Facility:  [facility]  Date of visit: 1/11/20  Reason for visit: cough  Current Status: just a little better       been sick for a few weeks. Was seen in the past, told she has Influenza symptoms. Been given 3 days for Zithromax. Did not get any better, continue to have fever, chills at night. Cough and tight in the chest.  Keeps having fevers later in the day; initially was throughout the day  Also feels like swallowed glass and head hurts.      Patient Active Problem List   Diagnosis     Generalized anxiety disorder     CARDIOVASCULAR SCREENING; LDL GOAL LESS THAN 160     MARIA GUADALUPE (obstructive sleep apnea)- severe (AHI 84)     Morbid obesity (H)     Health Care Home     Mild major depression (H)     Insomnia     Bee sting allergy     CKD (chronic kidney disease) stage 3, GFR 30-59 ml/min (H)     Past Surgical History:   Procedure Laterality Date     COLONOSCOPY  2000     DAVINCI GASTRIC SLEEVE       GENITOURINARY SURGERY  2007     HYSTERECTOMY, PAP NO LONGER INDICATED       HYSTERECTOMY, VAGINAL  2007    still has ovaries     LAPAROSCOPIC GASTRIC SLEEVE N/A 3/13/2018    Procedure: LAPAROSCOPIC GASTRIC SLEEVE;  Laparoscopic Sleeve Gastrectomy;  Surgeon: Kameron Joseph MD;  Location:  OR       Social History     Tobacco Use     Smoking status: Never Smoker     Smokeless tobacco: Never Used   Substance Use Topics     Alcohol use: Yes     Comment: 1-2 per mo     Family History   Problem Relation Age of Onset     Eye Disorder Mother         lost eyesight; probable macular degeneration     Psychotic Disorder Mother         Dementia /Alzhimers     Neurologic Disorder Mother         seizures     Diabetes Mother      Hypertension Mother      Alzheimer Disease Mother      Arthritis Mother      Osteoporosis Mother      Obesity Mother      Diabetes Father      Depression Father       Hyperlipidemia Father      Obesity Father      Psychotic Disorder Sister         bipolar     Thyroid Disease Sister         h/o thyroid cancer     Depression Sister         Bipolar     Obesity Sister      Cancer Other         Brain cancer     Osteoporosis Maternal Grandmother      Anxiety Disorder Son      Depression Sister      Thyroid Disease Sister          Review of Systems    Constitutional, cardiovascular, gi and gu systems are negative, except as otherwise noted.      Objective    /60   Pulse 92   Temp 97.3  F (36.3  C) (Oral)   Wt 94.8 kg (209 lb)   SpO2 97%   BMI 35.73 kg/m    Body mass index is 35.73 kg/m .  Physical Exam   GENERAL: fatigue looking alert and no distress  EYES: Eyes grossly normal to inspection, PERRL and conjunctivae and sclerae normal  HENT: ear canals and TM's normal, Oropharynx: Tonsillar enlargement with exudate on the left side  NECK: no adenopathy and thyroid normal to palpation  RESP: lungs clear to auscultation - no rales, rhonchi or wheezes  CV: regular rate and rhythm, no murmur, click or rub, no peripheral edema   MS: no gross musculoskeletal defects noted, no edema    Diagnostic Test Results:  Results for orders placed or performed in visit on 01/13/20   CBC with platelets differential     Status: None   Result Value Ref Range    WBC 4.0 4.0 - 11.0 10e9/L    RBC Count 4.83 3.8 - 5.2 10e12/L    Hemoglobin 14.0 11.7 - 15.7 g/dL    Hematocrit 42.2 35.0 - 47.0 %    MCV 87 78 - 100 fl    MCH 29.0 26.5 - 33.0 pg    MCHC 33.2 31.5 - 36.5 g/dL    RDW 14.5 10.0 - 15.0 %    Platelet Count 168 150 - 450 10e9/L    Diff Method Automated Method     % Neutrophils 46.1 %    % Lymphocytes 42.3 %    % Monocytes 10.3 %    % Eosinophils 0.5 %    % Basophils 0.8 %    Absolute Neutrophil 1.9 1.6 - 8.3 10e9/L    Absolute Lymphocytes 1.7 0.8 - 5.3 10e9/L    Absolute Monocytes 0.4 0.0 - 1.3 10e9/L    Absolute Eosinophils 0.0 0.0 - 0.7 10e9/L    Absolute Basophils 0.0 0.0 - 0.2 10e9/L    Mononucleosis screen     Status: None   Result Value Ref Range    Mononucleosis Screen Negative NEG^Negative   Influenza A/B antigen     Status: None   Result Value Ref Range    Influenza A/B Agn Specimen Nasal     Influenza A Negative NEG^Negative    Influenza B Negative NEG^Negative     Assessment & Plan     Sunshine was seen today for er f/u.    Diagnoses and all orders for this visit:    Fever, unspecified fever cause  Differentials include, flu bronchitis, pharyngitis, mono , pneumonia.  Baseline labs are normal.  We will do symptomatic treatment, follow-up with update in 2 days. If develops new symptoms should call or if severe recommend going to ER  -     Influenza A/B antigen  -     CBC with platelets differential  -     Mononucleosis screen    Flu-like symptoms  -     Influenza A/B antigen  -     CBC with platelets differential  -     Mononucleosis screen    Major depressive disorder, recurrent episode, moderate (H)        -    Stable.    Morbid obesity (H)       -  BMI 35.73    CKD (chronic kidney disease) stage 3, GFR 30-59 ml/min (H)        -  At baseline    Return in about 2 days (around 1/15/2020) for call with update.    Camden Conklin MD  [facility]

## 2020-01-13 NOTE — PROGRESS NOTES
"Subjective     Sunshine Delgado is a 54 year old female who presents to clinic today for the following health issues:    HPI   RESPIRATORY SYMPTOMS      Duration: ***    Description  { :207705}    Severity: {MILD, MODERATE, SEVERE LOW:810408}    Accompanying signs and symptoms: {NONE DEFAULTED:996379::\"None\"}    History (predisposing factors):  { :331744::\"none\"}    Precipitating or alleviating factors: {NONE DEFAULTED:996319::\"None\"}    Therapies tried and outcome:  { :173305::\"none\"}    {additonal problems for provider to add (Optional):048239}    {HIST REVIEW/ LINKS 2 (Optional):537065}    {Additional problems for the provider to add (optional):751793}  Reviewed and updated as needed this visit by Provider         Review of Systems   {ROS COMP (Optional):150361}      Objective    There were no vitals taken for this visit.  There is no height or weight on file to calculate BMI.  Physical Exam   {Exam List (Optional):858384}    {Diagnostic Test Results (Optional):482217::\"Diagnostic Test Results:\",\"Labs reviewed in Epic\"}        {PROVIDER CHARTING PREFERENCE:084601}        "

## 2020-01-14 ENCOUNTER — TELEPHONE (OUTPATIENT)
Dept: FAMILY MEDICINE | Facility: CLINIC | Age: 55
End: 2020-01-14

## 2020-01-14 DIAGNOSIS — R50.9 FEVER, UNSPECIFIED FEVER CAUSE: ICD-10-CM

## 2020-01-14 DIAGNOSIS — J02.9 SORE THROAT: ICD-10-CM

## 2020-01-14 DIAGNOSIS — R10.30 LOWER ABDOMINAL PAIN: Primary | ICD-10-CM

## 2020-01-14 NOTE — TELEPHONE ENCOUNTER
Reason for Call:  Other Return to Work Note     Detailed comments: patient needs a note to return to work and would like to pick it up today in [place].    Patient missed work Tuesday 1-7-20 to Friday 1-10-20, and Monday 1-13-20, Tuesday 1-14-20.    Patient is returning to work Wednesday 1-15-20.    Thanks.    Phone Number Patient can be reached at: Cell number on file:    Telephone Information:   Mobile [phone]       Best Time: asap    Can we leave a detailed message on this number? YES    Call taken on 1/14/2020 at 1:00 PM by Holly Walden

## 2020-01-14 NOTE — TELEPHONE ENCOUNTER
Reason for call:  Symptom   Symptom or request: Fever, Virus    Duration (how long have symptoms been present): 2 weeks  Have you been treated for this before? No    Additional comments: Was seen yesterday and says she is not feeling any better.    Phone number to reach patient:  Home number on file [phone] (home)    Best Time:  any    Can we leave a detailed message on this number?  YES

## 2020-01-14 NOTE — TELEPHONE ENCOUNTER
"Called and spoke with patient.   Reports she continues to have the same symptoms that she had at yesterday's OV but has been also having some lower abdominal/pelvic pain and breast tenderness (patient reports she does not have a uterus) that started today. Patient reports her vaginal area feels \"tight and hot\". Denies dysuria, frequency/urgency, unusual discharge, cloudy urine, hematuria.  Reports these symptoms both started today. The abdominal pain is intermittent and brief but does feel slightly distended. Rates it 5-6/10. Reports she had some sharp pain in R side then it will go away and at one point it felt like a stabbing feeling but is very brief.   Has had a couple BMs today but they have been harder than usual.   Continues to spike fevers and just checked it about 45 minutes ago and was 101.0 and took Tylenol. Continues to chills, night sweats, throat feels raw, headaches and body aches.     Please advise.   Please route to RN Triage pool as RN will be leaving.    Anne Marie Faustin RN    "

## 2020-01-14 NOTE — LETTER
January 14, 2020      Sunshine Delgado  [address] [place] [phone]        To Whom It May Concern:    Sunshine Delgado was seen in our clinic on 1/13/2020 for illness that had been going on for one week. She may return to work 1/15/2020 without restrictions if feeling better.      Sincerely,        Camden Conklin MD

## 2020-01-15 ENCOUNTER — MYC MEDICAL ADVICE (OUTPATIENT)
Dept: FAMILY MEDICINE | Facility: CLINIC | Age: 55
End: 2020-01-15

## 2020-01-15 ENCOUNTER — ALLIED HEALTH/NURSE VISIT (OUTPATIENT)
Dept: NURSING | Facility: CLINIC | Age: 55
End: 2020-01-15
Payer: COMMERCIAL

## 2020-01-15 DIAGNOSIS — J02.9 SORE THROAT: ICD-10-CM

## 2020-01-15 DIAGNOSIS — R11.0 NAUSEA: Primary | ICD-10-CM

## 2020-01-15 DIAGNOSIS — R10.30 LOWER ABDOMINAL PAIN: ICD-10-CM

## 2020-01-15 DIAGNOSIS — J02.9 ACUTE PHARYNGITIS: Primary | ICD-10-CM

## 2020-01-15 DIAGNOSIS — R50.9 FEVER, UNSPECIFIED FEVER CAUSE: ICD-10-CM

## 2020-01-15 LAB
ALBUMIN UR-MCNC: NEGATIVE MG/DL
APPEARANCE UR: CLEAR
BASOPHILS # BLD AUTO: 0 10E9/L (ref 0–0.2)
BASOPHILS NFR BLD AUTO: 0.5 %
BILIRUB UR QL STRIP: NEGATIVE
COLOR UR AUTO: YELLOW
DEPRECATED S PYO AG THROAT QL EIA: NORMAL
DIFFERENTIAL METHOD BLD: NORMAL
EOSINOPHIL # BLD AUTO: 0 10E9/L (ref 0–0.7)
EOSINOPHIL NFR BLD AUTO: 0.5 %
GLUCOSE UR STRIP-MCNC: NEGATIVE MG/DL
HGB UR QL STRIP: NEGATIVE
KETONES UR STRIP-MCNC: ABNORMAL MG/DL
LEUKOCYTE ESTERASE UR QL STRIP: NEGATIVE
LYMPHOCYTES # BLD AUTO: 1.9 10E9/L (ref 0.8–5.3)
LYMPHOCYTES NFR BLD AUTO: 33.6 %
MONOCYTES # BLD AUTO: 0.6 10E9/L (ref 0–1.3)
MONOCYTES NFR BLD AUTO: 10.4 %
NEUTROPHILS # BLD AUTO: 3.2 10E9/L (ref 1.6–8.3)
NEUTROPHILS NFR BLD AUTO: 55 %
NITRATE UR QL: NEGATIVE
PH UR STRIP: 6 PH (ref 5–7)
PLATELET # BLD EST: NORMAL 10*3/UL
RBC MORPH BLD: NORMAL
SOURCE: ABNORMAL
SP GR UR STRIP: <=1.005 (ref 1–1.03)
SPECIMEN SOURCE: NORMAL
UROBILINOGEN UR STRIP-ACNC: 0.2 EU/DL (ref 0.2–1)
WBC # BLD AUTO: 5.7 10E9/L (ref 4–11)

## 2020-01-15 PROCEDURE — 81003 URINALYSIS AUTO W/O SCOPE: CPT | Performed by: FAMILY MEDICINE

## 2020-01-15 PROCEDURE — 85004 AUTOMATED DIFF WBC COUNT: CPT | Performed by: FAMILY MEDICINE

## 2020-01-15 PROCEDURE — 85048 AUTOMATED LEUKOCYTE COUNT: CPT | Performed by: FAMILY MEDICINE

## 2020-01-15 PROCEDURE — 87880 STREP A ASSAY W/OPTIC: CPT | Performed by: FAMILY MEDICINE

## 2020-01-15 PROCEDURE — 36415 COLL VENOUS BLD VENIPUNCTURE: CPT | Performed by: FAMILY MEDICINE

## 2020-01-15 PROCEDURE — 87081 CULTURE SCREEN ONLY: CPT | Performed by: FAMILY MEDICINE

## 2020-01-15 PROCEDURE — 99207 ZZC NO CHARGE NURSE ONLY: CPT

## 2020-01-15 RX ORDER — ONDANSETRON 8 MG/1
8 TABLET, FILM COATED ORAL EVERY 12 HOURS PRN
Qty: 10 TABLET | Refills: 0 | Status: SHIPPED | OUTPATIENT
Start: 2020-01-15 | End: 2020-05-28

## 2020-01-15 NOTE — TELEPHONE ENCOUNTER
Called patient; still having fevers, throat feels sore and has some abdominal discomfort   Agreed to do a urine check, WBC and throat swab; will come in and have these done.

## 2020-01-15 NOTE — TELEPHONE ENCOUNTER
Huddled with the provider.     Patient is still under the weather and is still ill. He would like to wait till patient is feeling better to complete the letter. Lupe Patino,

## 2020-01-15 NOTE — TELEPHONE ENCOUNTER
Will the abdominal pain, I will consider doing a CT scan, to rule out diverticulitis, though her colonoscopy from 2015 did not show diverticulitis. However if the abdominal pain worsen then will need to go to the ER.  Order placed

## 2020-01-15 NOTE — TELEPHONE ENCOUNTER
Pt was given providers message as written. Tried to give pt the number for imaging scheduling and she said she disagrees with plan. States it seems unnecessary to send her for testing for diverticulitis. Would diverticulitis cause a fever? Hasn't been feeling well since New Years. Hasn't been to work for 7 days. Please advise.     Patsy Raya RN  [facility]

## 2020-01-16 LAB
BACTERIA SPEC CULT: NORMAL
SPECIMEN SOURCE: NORMAL

## 2020-01-17 ENCOUNTER — MYC MEDICAL ADVICE (OUTPATIENT)
Dept: FAMILY MEDICINE | Facility: CLINIC | Age: 55
End: 2020-01-17

## 2020-01-17 NOTE — LETTER
January 17, 2020      Sunshine Delgado  [address] [place] [phone]        To Whom It May Concern:    Sunshine Delgado (1965) was seen in our clinic 1/13/2020 for illness that had been going on for one week. She may return to work 1/17/2020 without restrictions. Please excuse her from work 1/7/2020 - 01/17/2020.      Sincerely,        Camden Conklin MD

## 2020-01-19 DIAGNOSIS — F51.05 INSOMNIA DUE TO OTHER MENTAL DISORDER: ICD-10-CM

## 2020-01-19 DIAGNOSIS — F99 INSOMNIA DUE TO OTHER MENTAL DISORDER: ICD-10-CM

## 2020-01-20 RX ORDER — TRAZODONE HYDROCHLORIDE 50 MG/1
TABLET, FILM COATED ORAL
Qty: 270 TABLET | Refills: 0 | Status: SHIPPED | OUTPATIENT
Start: 2020-01-20 | End: 2020-04-30

## 2020-01-20 NOTE — TELEPHONE ENCOUNTER
Controlled Substance Refill Request for TRAZODONE 50 MG TABLET  Problem List Complete:  No     PROVIDER TO CONSIDER COMPLETION OF PROBLEM LIST AND OVERVIEW/CONTROLLED SUBSTANCE AGREEMENT    Last Written Prescription Date:  11/17/2019  Last Fill Quantity: 180,   # refills: 3    THE MOST RECENT OFFICE VISIT MUST BE WITHIN THE PAST 3 MONTHS. AT LEAST ONE FACE TO FACE VISIT MUST OCCUR EVERY 6 MONTHS. ADDITIONAL VISITS CAN BE VIRTUAL.  (THIS STATEMENT SHOULD BE DELETED.)    Last Office Visit with [facility] primary care provider: 1/13/2019    Future Office visit:     Controlled substance agreement:   Encounter-Level CSA:    There are no encounter-level csa.     Patient-Level CSA:    There are no patient-level csa.         Last Urine Drug Screen: No results found for: CDAUT, No results found for: COMDAT, No results found for: THC13, PCP13, COC13, MAMP13, OPI13, AMP13, BZO13, TCA13, MTD13, BAR13, OXY13, PPX13, BUP13     Processing:  unknown     [email or web address]       checked in past 3 months?  No, route to RN

## 2020-01-20 NOTE — TELEPHONE ENCOUNTER
Called and spoke with Sunshine. Patient printed this from [employer] on Friday 1/17/2020.     She asked that the signed copy get mailed out to her address in the chart.     Confirmed patient's address with her:    [address] [place] [phone]    Lupe Patino,

## 2020-01-21 NOTE — TELEPHONE ENCOUNTER
"Prescription approved per [facility] Refill Protocol.     Requested Prescriptions   Pending Prescriptions Disp Refills     traZODone (DESYREL) 50 MG tablet [Pharmacy Med Name: TRAZODONE 50 MG TABLET] 90 tablet 0     Sig: TAKE 1 TABLET BY MOUTH AT BEDTIME       Serotonin Modulators Passed - 1/20/2020  9:35 AM        Passed - Recent (12 mo) or future (30 days) visit within the authorizing provider's specialty     Patient has had an office visit with the authorizing provider or a provider within the authorizing providers department within the previous 12 mos or has a future within next 30 days. See \"Patient Info\" tab in inbasket, or \"Choose Columns\" in Meds & Orders section of the refill encounter.              Passed - Medication is active on med list        Passed - Patient is age 18 or older        Passed - No active pregnancy on record        Passed - No positive pregnancy test in past 12 months        "

## 2020-01-30 NOTE — PROGRESS NOTES
"Subjective     Sunshine Delgado is a 54 year old female who presents to clinic today for the following health issues:    HPI       Hospital Follow-up Visit:    Hospital/Nursing Home/IP Rehab Facility: [facility]  Date of Admission: 01/25/2020  Date of Discharge: 01/27/2020  Reason(s) for Admission: New onset atrial fibrillation, Elevated liver enzymes, Infectious mononucleosis            Problems taking medications regularly:  None       Medication changes since discharge: None       Problems adhering to non-medication therapy:  None    Summary of hospitalization:  CareEverywhere information obtained and reviewed  Diagnostic Tests/Treatments reviewed.  Follow up needed: Thyroid labs  Other Healthcare Providers Involved in Patient s Care:         Specialist appointment - Cardiology  Update since discharge: fluctuating course.  Additional issues: Cough began on Saturday, developed a fever again last night. Did have close exposure to Influenza but this was a few weeks ago.    Post Discharge Medication Reconciliation: discharge medications reconciled, continue medications without change.  Plan of care communicated with patient     Coding guidelines for this visit:  Type of Medical   Decision Making Face-to-Face Visit       within 7 Days of discharge Face-to-Face Visit        within 14 days of discharge   Moderate Complexity [id number]   High Complexity [id number]              Reviewed and updated as needed this visit by Provider         Review of Systems   ROS COMP: Constitutional, HEENT, cardiovascular, pulmonary, GI, , musculoskeletal, neuro, skin, endocrine and psych systems are negative, except as otherwise noted.      Objective    /72 (BP Location: Right arm, Patient Position: Chair, Cuff Size: Adult Large)   Pulse 72   Temp 98.5  F (36.9  C) (Oral)   Ht 1.629 m (5' 4.13\")   Wt 93 kg (205 lb)   SpO2 97%   BMI 35.05 kg/m    Body mass index is 35.05 kg/m .  Physical Exam   GENERAL: alert and " fatigued  EYES: Eyes grossly normal to inspection, PERRL and conjunctivae and sclerae normal  HENT: ear canals and TM's normal, nose and mouth without ulcers or lesions  NECK: bilateral anterior cervical adenopathy, no asymmetry, masses, or scars and thyroid normal to palpation  RESP: lungs clear to auscultation - no rales, rhonchi or wheezes  CV: regular rate and rhythm, normal S1 S2, no S3 or S4, no murmur, click or rub, no peripheral edema and peripheral pulses strong  ABDOMEN: soft, nontender, bowel sounds normal and spleen tip palpable    Diagnostic Test Results:  Labs reviewed in Epic  Results for orders placed or performed in visit on 02/03/20   Influenza A/B antigen     Status: None   Result Value Ref Range    Influenza A/B Agn Specimen Nasal     Influenza A Negative NEG^Negative    Influenza B Negative NEG^Negative           Assessment & Plan     1. Hospital discharge follow-up      2. Atrial fibrillation, unspecified type (H)  CHASVASC score of 1, patient to remain off  ASA until at least 4 weeks after  Mono symptoms began due to splenic enlargement/rupture risk. Could start in 2 weeks if she prefers, otherwise wait until Cardiology consult. Patient referred to in-network cardiologist  - CARDIOLOGY EVAL ADULT REFERRAL; Future    3. Mononucleosis syndrome  As above  Patient to remain off of work for the rest of the week. She will follow up with me on Friday to determine return to work date. FMLA forms filled out    4. Elevated TSH  Difficult to interpret in setting of illness, patient to follow up in 1 month for repeat labs.  - TSH with free T4 reflex; Future  - Thyroid peroxidase antibody; Future    5. Cough  Likely viral- rest, fluids. Reviewed warning signs and when to follow up  - Influenza A/B antigen       See Patient Instructions    Return in about 4 weeks (around 3/2/2020) for Lab Only Appointment.    LEOPOLDO Michelle [facility]

## 2020-02-02 DIAGNOSIS — F33.1 MAJOR DEPRESSIVE DISORDER, RECURRENT EPISODE, MODERATE (H): Chronic | ICD-10-CM

## 2020-02-02 DIAGNOSIS — F41.1 GENERALIZED ANXIETY DISORDER: Chronic | ICD-10-CM

## 2020-02-03 ENCOUNTER — OFFICE VISIT (OUTPATIENT)
Dept: FAMILY MEDICINE | Facility: CLINIC | Age: 55
End: 2020-02-03
Payer: COMMERCIAL

## 2020-02-03 VITALS
WEIGHT: 205 LBS | OXYGEN SATURATION: 97 % | DIASTOLIC BLOOD PRESSURE: 72 MMHG | BODY MASS INDEX: 35 KG/M2 | TEMPERATURE: 98.5 F | SYSTOLIC BLOOD PRESSURE: 108 MMHG | HEIGHT: 64 IN | HEART RATE: 72 BPM

## 2020-02-03 DIAGNOSIS — R05.9 COUGH: ICD-10-CM

## 2020-02-03 DIAGNOSIS — B27.90 MONONUCLEOSIS SYNDROME: ICD-10-CM

## 2020-02-03 DIAGNOSIS — R79.89 ELEVATED TSH: ICD-10-CM

## 2020-02-03 DIAGNOSIS — I48.91 ATRIAL FIBRILLATION, UNSPECIFIED TYPE (H): ICD-10-CM

## 2020-02-03 DIAGNOSIS — Z09 HOSPITAL DISCHARGE FOLLOW-UP: Primary | ICD-10-CM

## 2020-02-03 LAB
FLUAV+FLUBV AG SPEC QL: NEGATIVE
FLUAV+FLUBV AG SPEC QL: NEGATIVE
SPECIMEN SOURCE: NORMAL

## 2020-02-03 PROCEDURE — 99214 OFFICE O/P EST MOD 30 MIN: CPT | Performed by: NURSE PRACTITIONER

## 2020-02-03 PROCEDURE — 87804 INFLUENZA ASSAY W/OPTIC: CPT | Performed by: NURSE PRACTITIONER

## 2020-02-03 RX ORDER — METOPROLOL TARTRATE 25 MG/1
12.5 TABLET, FILM COATED ORAL DAILY
COMMUNITY
Start: 2020-01-27 | End: 2020-04-30

## 2020-02-03 ASSESSMENT — MIFFLIN-ST. JEOR: SCORE: 1526.93

## 2020-02-03 NOTE — LETTER
Certification of Health Care Provider  Family Medical Leave Act (FMLA)      Employer's name and contact: [employer]    Employee's job title: Childcare Specialist Regular work schedule: M-F 6:45-9:45am and 1-6pm    Employee's essential job functions: Direct care of children    Patient's name: Sunshine Delgado    Provider's name and business address:  Lesly Arteaga  [address] [address]  Phone: [phone]      PART A:  MEDICAL FACTS  1)  Approximate date condition commenced:  1/3/2020    Probable duration of condition:  6 weeks     Maxim below as applicable:  Was the patient admitted for an overnight stay in a hospital, hospice, or residential medical care facility?  yes:  date 1/25/2020-1/27/2020.    Date(s) you treated the patient for condition: 02/03/20     Will the patient need to have treatment visits at least twice per year due to the                     condition? no    Was medication, other than over-the-counter medication prescribed?  yes    Was the patient referred to other health care provider(s) for evaluation or treatment     (e.g., physical therapist)?  yes:  State the nature of such treatments and expected duration of treatment: Cardiology consult       2)  Is the medical condition pregnancy?  no.      3)  Is the employee unable to perform any of his/her job functions due to the     condition: yes:  Identify the job functions the employee is unable to perform: direct care of children due to contagious illness      4)  Describe other relevant medical facts, if any, related to the condition which the employee seeks leave (such as medical facts may include symptoms, diagnosis, or any regimen of continuing treatment such as the use of specialized equipment):     Patient diagnosed with Mono, Influenza-like illness, Atrial fibrillation      PART B: AMOUNT OF LEAVE NEEDED  5)  Will the employee be incapacitated for a single  continuous period of time due to his/her medical condition, including any time for treatment and recovery?  yes:  Estimate the beginning and ending dates for the period of incapacity: 1/3/2020- 2/14/2020    6)  Will the employee need to attend follow-up treatment appointments or work part-time or on a reduced schedule because of the employee's medical condition?  no.    7) Will the condition cause episodic flare ups periodically preventing the employee from performing his/her job functions? yes:  Is it medically necessary for the patient to be absent from work during the flare-ups?  no    Based upon the patient's medical history and your knowledge of the medical condition, estimate the frequency of flare-ups and the duration of related incapacity that the patient may have over the next six months (e.g., 1 episode every 3 months lasting 1-2 days):    Frequency:   Duration:       ADDITIONAL INFORMATION: IDENTIFY QUESTION NUMBER WITH YOUR ADDITIONAL ANSWER       ===========================================    IF EMPLOYEE'S FAMILY MEMBER IS THE PATIENT, PLEASE COMPLETE SECTION BELOW:        13) Does the patient/family member need the employee to provide basic medical or personal needs, or for safety or transportation?      14)  If no, would the employee's presence to provide psychological comfort be beneficial to the patient or assist in the patient's recovery?        ================================================================    TO BE COMPLETED BY THE HEALTH CARE PROVIDER:    Signature:  LEOPOLDO Michelle CNP ________________________________________  Date:   2/3/2020

## 2020-02-03 NOTE — TELEPHONE ENCOUNTER
Disp Refills Start End ADRIANNA   buPROPion (WELLBUTRIN XL) 150 MG 24 hr tablet 90 tablet 1 11/7/2019  No   Sig - Route: Take 1 tablet (150 mg) by mouth every morning - Oral   Sent to pharmacy as: buPROPion (WELLBUTRIN XL) 150 MG 24 hr tablet   Class: E-Prescribe   Order: [id number]   E-Prescribing Status: Receipt confirmed by pharmacy (11/7/2019 11:20 AM CST)     Leyda Daugherty MA on 2/3/2020 at 12:27 PM

## 2020-02-04 RX ORDER — BUPROPION HYDROCHLORIDE 150 MG/1
TABLET ORAL
Qty: 90 TABLET | Refills: 0 | Status: SHIPPED | OUTPATIENT
Start: 2020-02-04 | End: 2020-06-07

## 2020-02-10 ENCOUNTER — OFFICE VISIT (OUTPATIENT)
Dept: FAMILY MEDICINE | Facility: CLINIC | Age: 55
End: 2020-02-10
Payer: COMMERCIAL

## 2020-02-10 VITALS
WEIGHT: 202.8 LBS | BODY MASS INDEX: 34.62 KG/M2 | DIASTOLIC BLOOD PRESSURE: 60 MMHG | TEMPERATURE: 97.6 F | OXYGEN SATURATION: 97 % | SYSTOLIC BLOOD PRESSURE: 100 MMHG | HEART RATE: 74 BPM | RESPIRATION RATE: 16 BRPM | HEIGHT: 64 IN

## 2020-02-10 DIAGNOSIS — B27.90 MONONUCLEOSIS SYNDROME: Primary | ICD-10-CM

## 2020-02-10 PROCEDURE — 99213 OFFICE O/P EST LOW 20 MIN: CPT | Performed by: NURSE PRACTITIONER

## 2020-02-10 ASSESSMENT — MIFFLIN-ST. JEOR: SCORE: 1514.89

## 2020-02-10 NOTE — LETTER
February 10, 2020      Sunshine Delgado  [address] [place] [id number]        To Whom It May Concern:    Sunshine Delgado  was seen on 02/10/20.  She may return to work 2/11/20 without restrictions.      Sincerely,        LEOPOLDO Michelle CNP

## 2020-02-10 NOTE — PROGRESS NOTES
"Subjective     Sunshine Delgado is a 54 year old female who presents to clinic today for the following health issues:    HPI    Patient here to recheck mono, cough, and afib. Patient may need a letter to return to work.  Since seen last week, patient is doing better- cough resolved, no fevers. Still fatigued but feels ready to return to work.    Reviewed and updated as needed this visit by Provider         Review of Systems   ROS COMP: Constitutional, HEENT, cardiovascular, pulmonary, gi and gu systems are negative, except as otherwise noted.      Objective    /60   Pulse 74   Temp 97.6  F (36.4  C) (Oral)   Resp 16   Ht 1.626 m (5' 4\")   Wt 92 kg (202 lb 12.8 oz)   SpO2 97%   Breastfeeding No   BMI 34.81 kg/m    Body mass index is 34.81 kg/m .  Physical Exam   GENERAL: healthy, alert and no distress  RESP: lungs clear to auscultation - no rales, rhonchi or wheezes  CV: regular rate and rhythm, normal S1 S2, no S3 or S4, no murmur, click or rub, no peripheral edema and peripheral pulses strong  ABD: Spleen tip palpable but smaller than last week  PSYCH: mentation appears normal, affect normal/bright    Diagnostic Test Results:  Labs reviewed in Epic  No results found for any visits on 02/10/20.        Assessment & Plan     1. Mononucleosis syndrome  Letter written for patient to return to work tomorrow without restrictions. If any difficulty, can contact me for modified return to work plan.         See Patient Instructions    No follow-ups on file.    LEOPOLDO Michelle [facility]        "

## 2020-02-27 ENCOUNTER — TRANSFERRED RECORDS (OUTPATIENT)
Dept: HEALTH INFORMATION MANAGEMENT | Facility: CLINIC | Age: 55
End: 2020-02-27

## 2020-03-13 ENCOUNTER — TELEPHONE (OUTPATIENT)
Dept: CARDIOLOGY | Facility: CLINIC | Age: 55
End: 2020-03-13

## 2020-03-13 NOTE — TELEPHONE ENCOUNTER
Attempted to contact patient to discuss upcoming appointment on 3/17/20 with Dr. Fajardo at [facility] in [place], no answer, message left for patient to return clinic call by calling [phone] option #1.   If patient is stable, no new symptoms and feeling well please offer a telephone visit or reschedule for a later date (2 mo out or greater).    Writer also noted that patient did see cardiology at [facility] on 2/27/20.      Eusebia Alexander RN

## 2020-03-14 ENCOUNTER — MYC MEDICAL ADVICE (OUTPATIENT)
Dept: CARDIOLOGY | Facility: CLINIC | Age: 55
End: 2020-03-14

## 2020-03-22 ENCOUNTER — HEALTH MAINTENANCE LETTER (OUTPATIENT)
Age: 55
End: 2020-03-22

## 2020-04-17 ENCOUNTER — TELEPHONE (OUTPATIENT)
Dept: FAMILY MEDICINE | Facility: CLINIC | Age: 55
End: 2020-04-17

## 2020-04-17 NOTE — TELEPHONE ENCOUNTER
Panel Management Review      Patient has the following on her problem list:     Depression / Dysthymia review    Measure:  Needs PHQ-9 score of 4 or less during index window.  Administer PHQ-9 and if score is 5 or more, send encounter to provider for next steps.    5 - 7 month window range:     PHQ-9 SCORE 8/14/2019 10/28/2019 1/7/2020   PHQ-9 Total Score - - -   PHQ-9 Total Score MyChart - 6 (Mild depression) -   PHQ-9 Total Score 5 6 3       If PHQ-9 recheck is 5 or more, route to provider for next steps.    Patient is due for:  PHQ9      Composite cancer screening  Chart review shows that this patient is due/due soon for the following None  Summary:    Patient is due/failing the following:   PHQ9    Action needed:   Patient needs to do PHQ9.    Type of outreach:    Sent letter.    Questions for provider review:    None                                                                                                                                    Cinda Solitario MA

## 2020-04-17 NOTE — LETTER
April 17, 2020        Sunshine Delgado  [address] [place] [id number]        Dear Sunshine,     Your clinic record indicates that you are due for a depression update. We have a survey tool called a PHQ-9 (or Patient Health Quesionnaire) we use to measure the level of control of your depression. Please complete the enclosed questionnaire and mail it back to us in the self-addressed stamped envelope.     If you have questions about this letter please contact your provider.     Sincerely,    Your [facility]

## 2020-04-29 RX ORDER — ASPIRIN 325 MG
325 TABLET ORAL
COMMUNITY
Start: 2020-01-27 | End: 2020-04-30

## 2020-04-29 RX ORDER — LOSARTAN POTASSIUM 25 MG/1
TABLET ORAL
Status: CANCELLED | OUTPATIENT
Start: 2020-04-29

## 2020-04-29 NOTE — PROGRESS NOTES
"Sunshine Delgado is a 55 year old female who is being evaluated via a billable telephone visit.      The patient has been notified of following:     \"This telephone visit will be conducted via a call between you and your physician/provider. We have found that certain health care needs can be provided without the need for a physical exam.  This service lets us provide the care you need with a short phone conversation.  If a prescription is necessary we can send it directly to your pharmacy.  If lab work is needed we can place an order for that and you can then stop by our lab to have the test done at a later time.    Telephone visits are billed at different rates depending on your insurance coverage. During this emergency period, for some insurers they may be billed the same as an in-person visit.  Please reach out to your insurance provider with any questions.    If during the course of the call the physician/provider feels a telephone visit is not appropriate, you will not be charged for this service.\"    Patient has given verbal consent for Telephone visit?  Yes    How would you like to obtain your AVS? MyChart    Subjective     Sunshine Delgado is a 55 year old female who presents to clinic today for the following health issues:    HPI  Dizziness  Onset: 1 week an half    Description:   Do you feel faint:  YES  Does it feel like the surroundings (bed, room) are moving: no   Unsteady/off balance: YES  Have you passed out or fallen: no     Intensity: moderate    Progression of Symptoms:  worsening    Accompanying Signs & Symptoms:  Heart palpitations: YES  Nausea, vomiting: no   Weakness in arms or legs: no   Fatigue: YES  Vision or speech changes: no   Ringing in ears (Tinnitus): YES  Hearing Loss: no     History:   Head trauma/concussion hx: no   Previous similar symptoms: no   Recent bleeding history: no     Precipitating factors:   Worse with activity or head movement: no   Any new medications (BP?): " YES  Alcohol/drug abuse/withdrawal: no     Alleviating factors:   Does staying in a fixed position give relief:  YES    Therapies Tried and outcome:        When getting up/standing up, feels lightheaded. Has headaches  No BP cuff at home. Not taking Losartan, which is on med list.    In the last week, has had a small amount of light blood in stool. Has occurred previously related to popcorn or peanuts in diet. No pain with bowel movements, no masses. Having bowel movements most days, stools have been hard. Not getting enough fiber, getting more water.    Anxiety well controlled on Lexparo and would like to continue at same dose. Trazodone working well for sleep.    Patient was very ill in February with influenza- like illness but no positive flu swab and Mono, then developed new flu-like symptoms as she was recovering. Wonders if she could have had COVID-19?    Reviewed and updated as needed this visit by Provider         Review of Systems   ROS COMP: Constitutional, HEENT, cardiovascular, pulmonary, gi and gu systems are negative, except as otherwise noted.       Objective   Reported vitals:  There were no vitals taken for this visit.   healthy, alert and no distress  PSYCH: Alert and oriented times 3; coherent speech, normal   rate and volume, able to articulate logical thoughts, able   to abstract reason, no tangential thoughts, no hallucinations   or delusions  Her affect is normal  RESP: No cough, no audible wheezing, able to talk in full sentences  Remainder of exam unable to be completed due to telephone visits    Diagnostic Test Results:  Labs reviewed in Epic  No results found for this or any previous visit (from the past 24 hour(s)).        Assessment/Plan:  1. Dizziness  Very postural and suspect related to low BP as was low at last OV. Hesitant to stop metoprolol due to her A-fib. Will have her come in for BP check tomorrow and if BP low, will stop her beta blocker until she sees cardiology.  - CBC with  platelets and differential; Future    2. Atrial fibrillation, unspecified type (H)  As above- continue aspirin for now  - metoprolol tartrate (LOPRESSOR) 25 MG tablet; Take 0.5 tablets (12.5 mg) by mouth daily  Dispense: 45 tablet; Refill: 3  - aspirin (ASA) 325 MG tablet; Take 1 tablet (325 mg) by mouth daily  Dispense: 100 tablet; Refill: 3    3. BRBPR (bright red blood per rectum)  Minimal blood and occurred in context of constipation. Suspect hemorrhoid or fissure- use sitz baths two times daily, increase fiber and water in diet. If bleeding persists beyond next 1-2 weeks, will need follow up in clinic face to face.    4. Insomnia due to other mental disorder  Continue trazodone without change  - traZODone (DESYREL) 50 MG tablet; Take 2-3 tablets (100-150 mg) by mouth At Bedtime - Oral  Dispense: 270 tablet; Refill: 1    5. Generalized anxiety disorder  Well controlled, continue Lexapro  - escitalopram (LEXAPRO) 10 MG tablet; Take 1 tablet (10 mg) by mouth daily  Dispense: 90 tablet; Refill: 1    6. Flu-like symptoms  Will check antibody due to possible prior infection- patient aware this will need to be scheduled by central scheduler and she should receive a phone call.  - COVID-19 Virus (Coronavirus) Antibody; Future    Return in about 1 day (around 5/1/2020) for Ancillary appointment for orthostatic blood pressures, lab appt.      Phone call duration:  17 minutes    LEOPOLDO Michelle CNP

## 2020-04-30 ENCOUNTER — VIRTUAL VISIT (OUTPATIENT)
Dept: FAMILY MEDICINE | Facility: CLINIC | Age: 55
End: 2020-04-30
Payer: COMMERCIAL

## 2020-04-30 DIAGNOSIS — E06.3 HASHIMOTO'S THYROIDITIS: ICD-10-CM

## 2020-04-30 DIAGNOSIS — F99 INSOMNIA DUE TO OTHER MENTAL DISORDER: ICD-10-CM

## 2020-04-30 DIAGNOSIS — K62.5 BRBPR (BRIGHT RED BLOOD PER RECTUM): ICD-10-CM

## 2020-04-30 DIAGNOSIS — F41.1 GENERALIZED ANXIETY DISORDER: Chronic | ICD-10-CM

## 2020-04-30 DIAGNOSIS — I48.91 ATRIAL FIBRILLATION, UNSPECIFIED TYPE (H): ICD-10-CM

## 2020-04-30 DIAGNOSIS — R42 DIZZINESS: Primary | ICD-10-CM

## 2020-04-30 DIAGNOSIS — R68.89 FLU-LIKE SYMPTOMS: ICD-10-CM

## 2020-04-30 DIAGNOSIS — F51.05 INSOMNIA DUE TO OTHER MENTAL DISORDER: ICD-10-CM

## 2020-04-30 PROCEDURE — 99214 OFFICE O/P EST MOD 30 MIN: CPT | Mod: 95 | Performed by: NURSE PRACTITIONER

## 2020-04-30 RX ORDER — METOPROLOL TARTRATE 25 MG/1
12.5 TABLET, FILM COATED ORAL DAILY
Qty: 45 TABLET | Refills: 3 | Status: SHIPPED | OUTPATIENT
Start: 2020-04-30 | End: 2020-05-01 | Stop reason: SINTOL

## 2020-04-30 RX ORDER — TRAZODONE HYDROCHLORIDE 50 MG/1
TABLET, FILM COATED ORAL
Qty: 270 TABLET | Refills: 1 | Status: SHIPPED | OUTPATIENT
Start: 2020-04-30 | End: 2020-05-03

## 2020-04-30 RX ORDER — ASPIRIN 325 MG
325 TABLET ORAL DAILY
Qty: 100 TABLET | Refills: 3 | Status: SHIPPED | OUTPATIENT
Start: 2020-04-30 | End: 2020-05-28

## 2020-04-30 RX ORDER — ESCITALOPRAM OXALATE 10 MG/1
10 TABLET ORAL DAILY
Qty: 90 TABLET | Refills: 1 | Status: SHIPPED | OUTPATIENT
Start: 2020-04-30 | End: 2021-01-07

## 2020-05-01 ENCOUNTER — ALLIED HEALTH/NURSE VISIT (OUTPATIENT)
Dept: FAMILY MEDICINE | Facility: CLINIC | Age: 55
End: 2020-05-01

## 2020-05-01 ENCOUNTER — TELEPHONE (OUTPATIENT)
Dept: FAMILY MEDICINE | Facility: CLINIC | Age: 55
End: 2020-05-01

## 2020-05-01 VITALS — DIASTOLIC BLOOD PRESSURE: 60 MMHG | SYSTOLIC BLOOD PRESSURE: 100 MMHG

## 2020-05-01 DIAGNOSIS — R79.89 ELEVATED TSH: ICD-10-CM

## 2020-05-01 DIAGNOSIS — Z01.30 BP CHECK: Primary | ICD-10-CM

## 2020-05-01 DIAGNOSIS — R42 DIZZINESS: ICD-10-CM

## 2020-05-01 DIAGNOSIS — R68.89 FLU-LIKE SYMPTOMS: ICD-10-CM

## 2020-05-01 LAB
BASOPHILS # BLD AUTO: 0 10E9/L (ref 0–0.2)
BASOPHILS NFR BLD AUTO: 0.7 %
DIFFERENTIAL METHOD BLD: NORMAL
EOSINOPHIL # BLD AUTO: 0.1 10E9/L (ref 0–0.7)
EOSINOPHIL NFR BLD AUTO: 2.6 %
ERYTHROCYTE [DISTWIDTH] IN BLOOD BY AUTOMATED COUNT: 14.9 % (ref 10–15)
HCT VFR BLD AUTO: 44 % (ref 35–47)
HGB BLD-MCNC: 14.3 G/DL (ref 11.7–15.7)
LYMPHOCYTES # BLD AUTO: 1.9 10E9/L (ref 0.8–5.3)
LYMPHOCYTES NFR BLD AUTO: 34.9 %
MCH RBC QN AUTO: 28.5 PG (ref 26.5–33)
MCHC RBC AUTO-ENTMCNC: 32.5 G/DL (ref 31.5–36.5)
MCV RBC AUTO: 88 FL (ref 78–100)
MONOCYTES # BLD AUTO: 0.6 10E9/L (ref 0–1.3)
MONOCYTES NFR BLD AUTO: 11.4 %
NEUTROPHILS # BLD AUTO: 2.7 10E9/L (ref 1.6–8.3)
NEUTROPHILS NFR BLD AUTO: 50.4 %
PLATELET # BLD AUTO: 258 10E9/L (ref 150–450)
RBC # BLD AUTO: 5.01 10E12/L (ref 3.8–5.2)
T4 FREE SERPL-MCNC: 0.96 NG/DL (ref 0.76–1.46)
TSH SERPL DL<=0.005 MIU/L-ACNC: 9.37 MU/L (ref 0.4–4)
WBC # BLD AUTO: 5.4 10E9/L (ref 4–11)

## 2020-05-01 PROCEDURE — 99000 SPECIMEN HANDLING OFFICE-LAB: CPT | Performed by: NURSE PRACTITIONER

## 2020-05-01 PROCEDURE — 99207 ZZC NO CHARGE NURSE ONLY: CPT | Performed by: NURSE PRACTITIONER

## 2020-05-01 PROCEDURE — 84439 ASSAY OF FREE THYROXINE: CPT | Performed by: NURSE PRACTITIONER

## 2020-05-01 PROCEDURE — 86769 SARS-COV-2 COVID-19 ANTIBODY: CPT | Mod: 90 | Performed by: NURSE PRACTITIONER

## 2020-05-01 PROCEDURE — 84443 ASSAY THYROID STIM HORMONE: CPT | Performed by: NURSE PRACTITIONER

## 2020-05-01 PROCEDURE — 36415 COLL VENOUS BLD VENIPUNCTURE: CPT | Performed by: NURSE PRACTITIONER

## 2020-05-01 PROCEDURE — 86376 MICROSOMAL ANTIBODY EACH: CPT | Performed by: NURSE PRACTITIONER

## 2020-05-01 PROCEDURE — 85025 COMPLETE CBC W/AUTO DIFF WBC: CPT | Performed by: NURSE PRACTITIONER

## 2020-05-01 NOTE — TELEPHONE ENCOUNTER
Left detailed message per OK in demographics section for patient with information.   Advised to call back RN Hotline [phone] to let us know she received message or if any questions.     Anne Marie Faustin RN

## 2020-05-01 NOTE — TELEPHONE ENCOUNTER
BP was low today, CBC looks fine.  Please stop metoprolol and monitor pulse at home daily. Contact clinic if pulse consisently/often 90's or higher. Otherwise follow up with Cardiology as scheduled.    Lesly Arteaga, CNP

## 2020-05-01 NOTE — PROGRESS NOTES
Sunshine Delgado was evaluated at [facility] on May 1, 2020 at which time her blood pressure was:    BP Readings from Last 3 Encounters:   05/01/20 100/60   02/10/20 100/60   02/03/20 108/72     Pulse Readings from Last 3 Encounters:   02/10/20 74   02/03/20 72   01/13/20 92       Reviewed lifestyle modifications for blood pressure control and reduction: including making healthy food choices, managing weight, getting regular exercise, smoking cessation, reducing alcohol consumption, monitoring blood pressure regularly.     Symptoms: Dizziness    BP Goal:< 140/90 mmHg    BP Assessment:  BP at goal    Potential Reasons for BP too high: NA - Not applicable    BP Follow-Up Plan: Referral to PCP    Recommendation to Provider: Patient is experiencing dizziness upon standing.  Most of the time it is as she stands up but sometimes can occur when she is been standing for awhile. I told her I would refer all her information to PCP.    Note completed by: Thank you,  Jessica Arroyo, PharmD  [facility]  [phone]

## 2020-05-03 DIAGNOSIS — F51.05 INSOMNIA DUE TO OTHER MENTAL DISORDER: ICD-10-CM

## 2020-05-03 DIAGNOSIS — F99 INSOMNIA DUE TO OTHER MENTAL DISORDER: ICD-10-CM

## 2020-05-03 RX ORDER — TRAZODONE HYDROCHLORIDE 50 MG/1
TABLET, FILM COATED ORAL
Qty: 270 TABLET | Refills: 0 | Status: SHIPPED | OUTPATIENT
Start: 2020-05-03 | End: 2020-10-09

## 2020-05-04 LAB
COVID-19 SPIKE RBD ABY TITER: NORMAL
COVID-19 SPIKE RBD ABY: NEGATIVE

## 2020-05-05 LAB — THYROPEROXIDASE AB SERPL-ACNC: 996 IU/ML

## 2020-05-06 ENCOUNTER — MYC MEDICAL ADVICE (OUTPATIENT)
Dept: FAMILY MEDICINE | Facility: CLINIC | Age: 55
End: 2020-05-06

## 2020-05-06 PROBLEM — E06.3 HASHIMOTO'S THYROIDITIS: Status: ACTIVE | Noted: 2020-05-06

## 2020-05-06 RX ORDER — LEVOTHYROXINE SODIUM 25 UG/1
25 TABLET ORAL DAILY
Qty: 90 TABLET | Refills: 0 | Status: SHIPPED | OUTPATIENT
Start: 2020-05-06 | End: 2020-06-10

## 2020-05-19 ENCOUNTER — VIRTUAL VISIT (OUTPATIENT)
Dept: CARDIOLOGY | Facility: CLINIC | Age: 55
End: 2020-05-19
Payer: COMMERCIAL

## 2020-05-19 VITALS — BODY MASS INDEX: 33.99 KG/M2 | WEIGHT: 198 LBS

## 2020-05-19 DIAGNOSIS — Z86.19 HISTORY OF INFECTIOUS MONONUCLEOSIS: ICD-10-CM

## 2020-05-19 DIAGNOSIS — I48.0 PAROXYSMAL ATRIAL FIBRILLATION (H): Primary | ICD-10-CM

## 2020-05-19 DIAGNOSIS — Z86.79 HISTORY OF PERICARDITIS: ICD-10-CM

## 2020-05-19 DIAGNOSIS — E06.3 HASHIMOTO'S THYROIDITIS: ICD-10-CM

## 2020-05-19 PROBLEM — B27.90 INFECTIOUS MONONUCLEOSIS: Status: ACTIVE | Noted: 2020-01-27

## 2020-05-19 PROBLEM — I48.91 ATRIAL FIBRILLATION WITH RAPID VENTRICULAR RESPONSE (H): Status: ACTIVE | Noted: 2020-01-26

## 2020-05-19 PROBLEM — R74.8 ELEVATED LIVER ENZYMES: Status: ACTIVE | Noted: 2020-01-26

## 2020-05-19 PROCEDURE — 99202 OFFICE O/P NEW SF 15 MIN: CPT | Mod: 95 | Performed by: INTERNAL MEDICINE

## 2020-05-19 NOTE — NURSING NOTE
"Chief Complaint   Patient presents with     Atrial Fib     [facility] records in Jan. for Afib. patient reports occational flutters,dizziness ,fatigue        Initial Wt 89.8 kg (198 lb)   BMI 33.99 kg/m   Estimated body mass index is 33.99 kg/m  as calculated from the following:    Height as of 2/10/20: 1.626 m (5' 4\").    Weight as of this encounter: 89.8 kg (198 lb)..  BP completed using cuff size: oscar Contreras L.P.N.    "

## 2020-05-19 NOTE — PATIENT INSTRUCTIONS
Thank you for coming to the [facility]; please note the following instructions:    1.  Recommend to discontinue aspirin.    2.  Please let us know if you feel recurrent palpitations in that case we will send your house a heart monitor.    3.  Please continue to follow-up with your primary care provider.  We will see you as needed.        If you have any questions regarding your visit please contact your care team:     Cardiology  Telephone Number   Eusebia MCDONNELL, RN  Cheryl FRANCES,RN  Janee RUANO, OTILIO MTZ, BUCK CALIX, DUSTINN   [phone]   (select option 1)    *After hours: [phone]     For scheduling appts:     [phone] or    [phone] (select option 1)    *After hours: [phone]     For the Device Clinic (Pacemakers and ICD's)  RN's :  Laxmi Wen   During business hours: [phone]    *After business hours:  [phone] (select option 4)      Normal test result notifications will be released via [employer] or mailed within 7 business days.  All other test results, will be communicated via telephone once reviewed by your cardiologist.    If you need a medication refill please contact your pharmacy.  Please allow 3 business days for your refill to be completed.    As always, thank you for trusting us with your health care needs!

## 2020-05-19 NOTE — PROGRESS NOTES
".  Sunshine Delgado is a 55 year old female who is being evaluated via a billable video visit.      The patient has been notified of following:     \"This video visit will be conducted via a call between you and your physician/provider. We have found that certain health care needs can be provided without the need for an in-person physical exam.  This service lets us provide the care you need with a video conversation.  If a prescription is necessary we can send it directly to your pharmacy.  If lab work is needed we can place an order for that and you can then stop by our lab to have the test done at a later time.    Video visits are billed at different rates depending on your insurance coverage.  Please reach out to your insurance provider with any questions.    If during the course of the call the physician/provider feels a video visit is not appropriate, you will not be charged for this service.\"    Patient has given verbal consent for Video visit? Yes    How would you like to obtain your AVS? Ramseyharbrinda    Patient would like the video invitation sent by: Send to e-mail at: [email or web address]      Video Start Time:11:00 am     Video-Visit Details    Type of service:  Video Visit    Video End Time (time video stopped): 11:20 am    Originating Location (pt. Location): Home    Distant Location (provider location):  [facility]     Mode of Communication:  Video Conference via Doximity      HPI: Ms. Sunshine Delgado is a 55 year old  female with PMH significant for depression, anxiety, kidney stones.    Patient was admitted to [facility] in January of this year with abdominal pain.  She was found to have infectious mononucleosis.  She was treated with antibiotics for 2 weeks for tonsillitis prior to her ED presentation. Patient's EKG showed atrial fibrillation which spontaneously converted to sinus rhythm while she was in the hospital.  Patient underwent transthoracic " echocardiogram which showed a small pericardial effusion with normal EF and valve function.  Atrial fibrillation was attributed to mild pericarditis from infectious mononucleosis.  Patient was started on metoprolol and aspirin.  Metoprolol was recently discontinued by primary care physician due to frequent lightheadedness which has improved since then.    Patient does not have prior history of cardiac disease or atrial fibrillation except this 1 episode in January.  No prior history of hypertension, diabetes, stroke or sleep apnea.  She does not smoke.  She drinks alcohol occasionally. Patient was recently diagnosed with Hashimoto thyroiditis and started on levothyroxine.      Patient walks 7 to 8 miles every day with no difficulty.  Denies chest pain, shortness of breath, syncope.  Patient reports palpitations and checks her heart rate through apple watch.  So far heart rate was between 50 to 90s.  Patient did not feel any palpitations over the last 1 week.    Patient is currently on aspirin 325, bupropion, Lexapro, fluticasone nasal spray, levothyroxine, and trazodone.    Medications, personal, family, and social history reviewed with patient and revised.    PAST MEDICAL HISTORY:  Past Medical History:   Diagnosis Date     Atrial fibrillation with rapid ventricular response (H) 1/26/2020     Bee sting allergy      Depressive disorder      Generalized anxiety disorder 10/15/2009    lexapro made things worse.       Hashimoto's thyroiditis 5/6/2020     Morbid obesity (H)      Ocular migraine      MARIA GUADALUPE (obstructive sleep apnea)- severe (AHI 84) 09/09/2011     Voice fatigue 10/22/2009       CURRENT MEDICATIONS:  Current Outpatient Medications   Medication Sig Dispense Refill     acetaminophen (TYLENOL) 325 MG tablet        Ascorbic Acid (VITAMIN C PO) Take by mouth every morning       aspirin (ASA) 325 MG tablet Take 1 tablet (325 mg) by mouth daily 100 tablet 3     buPROPion (WELLBUTRIN XL) 150 MG 24 hr tablet TAKE ONE  TABLET BY MOUTH EVERY MORNING 90 tablet 0     Cholecalciferol (VITAMIN D PO) Take by mouth every morning       escitalopram (LEXAPRO) 10 MG tablet Take 1 tablet (10 mg) by mouth daily 90 tablet 1     fluticasone (FLONASE) 50 MCG/ACT spray Spray 2 sprays into both nostrils daily 16 g 1     ibuprofen (ADVIL/MOTRIN) 600 MG tablet Take 600 mg by mouth       levothyroxine (SYNTHROID/LEVOTHROID) 25 MCG tablet Take 1 tablet (25 mcg) by mouth daily 90 tablet 0     meclizine (ANTIVERT) 25 MG tablet Take 1 tablet (25 mg) by mouth 3 times daily as needed for dizziness 30 tablet 1     multivitamin, therapeutic with minerals (MULTI-VITAMIN) TABS tablet Take 1 tablet by mouth every morning       traZODone (DESYREL) 50 MG tablet TAKE 2-3 TABLETS BY MOUTH AT BEDTIME 270 tablet 0     EPINEPHrine (AUVI-Q) 0.3 MG/0.3ML injection 2-pack Inject 0.3 mLs (0.3 mg) into the muscle as needed for anaphylaxis 0.6 mL 3     order for DME Equipment being ordered: knee brace 1 Units 0     order for DME Resmed Airsense 10 auto cpap 6-7 cm, Airfit P10 for her XS pillows         PAST SURGICAL HISTORY:  Past Surgical History:   Procedure Laterality Date     COLONOSCOPY  2000     DAVINCI GASTRIC SLEEVE       GENITOURINARY SURGERY  2007     HYSTERECTOMY, PAP NO LONGER INDICATED       HYSTERECTOMY, VAGINAL  2007    still has ovaries     LAPAROSCOPIC GASTRIC SLEEVE N/A 3/13/2018    Procedure: LAPAROSCOPIC GASTRIC SLEEVE;  Laparoscopic Sleeve Gastrectomy;  Surgeon: Kameron Joseph MD;  Location:  OR       ALLERGIES:     Allergies   Allergen Reactions     Sulfa Drugs Hives     Cephalexin Hives     Cinnamon Hives     Strawberry Hives     Sulfamethoxazole-Trimethoprim Hives     Vicodin [Hydrocodone-Acetaminophen]      Wasp Venom Protein      Other reaction(s): Chest Pain     Wasps [Hornets] Swelling     Now carries Epi Pen     Zoloft [Sertraline]      suicidal ideation     Contrast Dye Other (See Comments) and Rash     Patient had sneezing and an  itchy throat following contrast injection of 100 mL Isovue-370.  From IV contrast dye       FAMILY HISTORY:  Family History   Problem Relation Age of Onset     Eye Disorder Mother         lost eyesight; probable macular degeneration     Psychotic Disorder Mother         Dementia /Alzhimers     Neurologic Disorder Mother         seizures     Diabetes Mother      Hypertension Mother      Alzheimer Disease Mother      Arthritis Mother      Osteoporosis Mother      Obesity Mother      Diabetes Father      Depression Father      Hyperlipidemia Father      Obesity Father      Psychotic Disorder Sister         bipolar     Thyroid Disease Sister         h/o thyroid cancer     Depression Sister         Bipolar     Obesity Sister      Cancer Other         Brain cancer     Osteoporosis Maternal Grandmother      Anxiety Disorder Son      Depression Sister      Thyroid Disease Sister          SOCIAL HISTORY:  Social History     Tobacco Use     Smoking status: Never Smoker     Smokeless tobacco: Never Used   Substance Use Topics     Alcohol use: Yes     Comment: 1-2 per mo     Drug use: No       ROS:   Constitutional: No fever, chills, or sweats. Weight stable.   ENT: No visual disturbance, ear ache, epistaxis, sore throat.   Cardiovascular: As per HPI.   Respiratory: No cough, hemoptysis.    GI: No nausea, vomiting, hematemesis, melena, or hematochezia.   : No hematuria.   Integument: Negative.   Psychiatric: Negative.   Hematologic:  No easy bruising, no easy bleeding.  Neuro: Negative.   Endocrinology: No significant heat or cold intolerance   Musculoskeletal: No myalgia.    Exam:  Physical Exam Elements attainable via telehealth:       Constitutional - alert and no distress    Eyes - no redness, no discharge    Respiratory - no cough, no labored breathing    Skin - no discoloration or lesions on the face or the arm.    Neurological -alert, normal speech,and affect, no tremor.     I have reviewed the labs and personally  reviewed the imaging below and made my comment in the assessment and plan.    Labs:  CBC RESULTS:   Lab Results   Component Value Date    WBC 5.4 05/01/2020    RBC 5.01 05/01/2020    HGB 14.3 05/01/2020    HCT 44.0 05/01/2020    MCV 88 05/01/2020    MCH 28.5 05/01/2020    MCHC 32.5 05/01/2020    RDW 14.9 05/01/2020     05/01/2020       BMP RESULTS:  Lab Results   Component Value Date     11/07/2019    POTASSIUM 4.2 11/07/2019    CHLORIDE 105 11/07/2019    CO2 29 11/07/2019    ANIONGAP 4 11/07/2019    GLC 86 11/07/2019    BUN 19 11/07/2019    CR 1.21 (H) 11/07/2019    GFRESTIMATED 51 (L) 11/07/2019    GFRESTBLACK 59 (L) 11/07/2019    CONCHA 9.1 11/07/2019        INR RESULTS:  Lab Results   Component Value Date    INR 0.9 (L) 02/27/2017       Echocardiogram 1/27/2020 [facility]  Normal left ventricular size.   Normal left ventricular systolic function.   No obvious regional wall motion abnormalities.   The ejection fraction is visually estimated at 55-60%.   The right ventricle is normal in size and function.   The left atrium is normal in size.   There is trace mitral regurgitation.   There is no significant tricuspid regurgitation.   Small pericardial effusion.   No echocardiographic findings to suggest hemodynamic effect of the pericardial fluid.   Estimated EF: 55-60%      EKG 1/26/2020 atrial fibrillation with RVR    EKG 2/27/2020 sinus rhythm      Assessment and Plan:   Ms. Sunshine Delgado is a 55 year old  female with PMH significant for depression, anxiety, kidney stones.      In January of this year patient developed infectious mononucleosis and mild pericarditis secondary to viral infection.  She presented to [facility] with abdominal pain and she was found to be in atrial fibrillation which spontaneously cardioverted to sinus rhythm.  No recurrence of atrial fibrillation since then.  The patient has been on metoprolol and aspirin 325 since then.  Metoprolol was recently discontinued by her  PCP due to frequent lightheadedness which has improved so far.  Today I recommended patient to discontinue aspirin as well since aspirin is not recommended for stroke prevention in atrial fibrillation.  Patient's chads VASC score is 0 therefore she does not need anticoagulation.      At this time she feels well with no palpitations.  I recommended patient to contact us if she feels recurrent palpitations.  In that case I will send her home ZIO Patch EKG recording.  She has a structurally normal heart as assessed by a normal echocardiogram at [facility] in January of this year.  She does not need further cardiac testing at this point.    Medication change today: Discontinue aspirin 325 mg.    Return to clinic as needed.    A total of 20 minutes spent face-to-face through video encounter today with greater than 50% of the time spent in counseling and coordinating cares of the issues above.     Please donot hesitate to contact me if you have any questions or concerns. Again, thank you for allowing me to participate in the care of your patient.    Rut JUAREZ MD  [facility] Division of Cardiology  Pager [phone]

## 2020-05-20 ENCOUNTER — MYC MEDICAL ADVICE (OUTPATIENT)
Dept: FAMILY MEDICINE | Facility: CLINIC | Age: 55
End: 2020-05-20

## 2020-05-28 ENCOUNTER — THERAPY VISIT (OUTPATIENT)
Dept: PHYSICAL THERAPY | Facility: CLINIC | Age: 55
End: 2020-05-28
Attending: NURSE PRACTITIONER
Payer: COMMERCIAL

## 2020-05-28 ENCOUNTER — TELEPHONE (OUTPATIENT)
Dept: FAMILY MEDICINE | Facility: CLINIC | Age: 55
End: 2020-05-28

## 2020-05-28 ENCOUNTER — OFFICE VISIT (OUTPATIENT)
Dept: FAMILY MEDICINE | Facility: CLINIC | Age: 55
End: 2020-05-28
Payer: COMMERCIAL

## 2020-05-28 VITALS
OXYGEN SATURATION: 97 % | TEMPERATURE: 97.7 F | HEART RATE: 80 BPM | SYSTOLIC BLOOD PRESSURE: 98 MMHG | RESPIRATION RATE: 14 BRPM | BODY MASS INDEX: 36.82 KG/M2 | DIASTOLIC BLOOD PRESSURE: 66 MMHG | WEIGHT: 214.5 LBS

## 2020-05-28 DIAGNOSIS — M25.511 ACUTE PAIN OF RIGHT SHOULDER: Primary | ICD-10-CM

## 2020-05-28 DIAGNOSIS — M75.41 IMPINGEMENT SYNDROME OF SHOULDER REGION, RIGHT: ICD-10-CM

## 2020-05-28 DIAGNOSIS — M25.511 ACUTE PAIN OF RIGHT SHOULDER: ICD-10-CM

## 2020-05-28 PROCEDURE — 97112 NEUROMUSCULAR REEDUCATION: CPT | Mod: GP

## 2020-05-28 PROCEDURE — 99213 OFFICE O/P EST LOW 20 MIN: CPT | Performed by: NURSE PRACTITIONER

## 2020-05-28 PROCEDURE — 97161 PT EVAL LOW COMPLEX 20 MIN: CPT | Mod: GP

## 2020-05-28 PROCEDURE — 97110 THERAPEUTIC EXERCISES: CPT | Mod: GP

## 2020-05-28 NOTE — TELEPHONE ENCOUNTER
Prior Authorization Retail Medication Request    Medication/Dose: Diclofenac Sodium 1% Gel   ICD code (if different than what is on RX):    Previously Tried and Failed:    Rationale:      Insurance Name:  [employer]   Insurance ID:  [id number]      Pharmacy Information (if different than what is on RX)  Name:  [facility]   Phone:  [phone]    USE 2 ALT GENERIC NSAIDS FIRST PER INSURANCE.       Thank You,   [facility]

## 2020-05-28 NOTE — TELEPHONE ENCOUNTER
Central Prior Authorization Team   Phone: [phone]      PA Initiation    Medication: Diclofenac Sodium 1% Gel -Initiated  Insurance Company: [employer] - Phone [phone] Fax [phone]  Pharmacy Filling the Rx: [facility] - [address] [facility]  Filling Pharmacy Phone: [phone]  Filling Pharmacy Fax:    Start Date: 5/28/2020

## 2020-05-28 NOTE — PROGRESS NOTES
[facility] Initial Evaluation  Subjective:    Therapist Generated HPI Evaluation  Problem details: Onset: 3 wks ago; insidious onset; worse with certain activity; anterior shoulder; ; worsening; RHD; some down arm; works as ; can bother her in sleep; .         Type of problem:  Right shoulder.    This is a new condition.  Condition occurred with:  Unknown cause.  Where condition occurred: for unknown reasons.  Patient reports pain:  Anterior and lateral.  Pain is described as aching, sharp, shooting and stabbing and is constant.  Pain is the same all the time.  Since onset symptoms are gradually worsening.  Associated symptoms:  Loss of strength and loss of motion/stiffness. Symptoms are exacerbated by lying on extremity, certain positions, lifting and using arm at shoulder level  Relieved by: avoidance; walking is ok                               Objective:  Standing Alignment:    Cervical/Thoracic:  Forward head  Shoulder/UE:  Rounded shoulders                  Flexibility/Screens:   Positive screens:  Cervical                             Shoulder Evaluation:  ROM:  AROM:    Flexion:  Right:  140    Abduction:  Right:  70      External Rotation:  Right:  30 (@45)                      Strength:  : MMT: grossly 4/5 but painful with flex, scap.                        Special Tests:      Right shoulder positive for the following special tests:Impingement  Palpation:  Palpation assessed shoulder: TTP R coracoid     Right shoulder tenderness present at: Biceps                                     General     ROS    Assessment/Plan:    Patient is a 55 year old female with right side shoulder complaints.    Patient has the following significant findings with corresponding treatment plan.                Diagnosis 1:  R sh pain; s& sx consistent with impingement and tendonosis  Decreased ROM/flexibility - manual therapy and therapeutic exercise  Decreased strength -  therapeutic exercise and therapeutic activities  Impaired muscle performance - neuro re-education  Decreased function - therapeutic activities    Therapy Evaluation Codes:     Previous and current functional limitations:  (See Goal Flow Sheet for this information)    Short term and Long term goals: (See Goal Flow Sheet for this information)     Communication ability:  Patient appears to be able to clearly communicate and understand verbal and written communication and follow directions correctly.  Treatment Explanation - The following has been discussed with the patient:   RX ordered/plan of care  Anticipated outcomes  Possible risks and side effects  This patient would benefit from PT intervention to resume normal activities.   Rehab potential is good.    Frequency:  1 X week, once daily  Duration:  for 8 weeks  Discharge Plan:  Achieve all LTG.  Independent in home treatment program.  Reach maximal therapeutic benefit.    Please refer to the daily flowsheet for treatment today, total treatment time and time spent performing 1:1 timed codes.

## 2020-05-28 NOTE — PROGRESS NOTES
Subjective     Sunshine Delgado is a 55 year old female who presents to clinic today for the following health issues:    HPI   Musculoskeletal problem/pain      Duration: a few weeks    Description  Location: right shoulder    Intensity:  Severe, depending on movement    Accompanying signs and symptoms: radiation of pain to arm    History  Previous similar problem: YES- fell on it around 7 years ago  Previous evaluation:  x-ray    Precipitating or alleviating factors:  Trauma or overuse: YES- fell on shoulder  Aggravating factors include: lifting, exercise and overuse    Therapies tried and outcome: ice and Tiger balm, Tylenol      Patient Active Problem List   Diagnosis     Generalized anxiety disorder     CARDIOVASCULAR SCREENING; LDL GOAL LESS THAN 160     MARIA GUADALUPE (obstructive sleep apnea)- severe (AHI 84)     Morbid obesity (H)     Health Care Home     Mild major depression (H)     Insomnia     Bee sting allergy     CKD (chronic kidney disease) stage 3, GFR 30-59 ml/min (H)     Hashimoto's thyroiditis     Atrial fibrillation with rapid ventricular response (H)     Elevated liver enzymes     Infectious mononucleosis     Past Surgical History:   Procedure Laterality Date     COLONOSCOPY  2000     DAVINCI GASTRIC SLEEVE       GENITOURINARY SURGERY  2007     HYSTERECTOMY, PAP NO LONGER INDICATED       HYSTERECTOMY, VAGINAL  2007    still has ovaries     LAPAROSCOPIC GASTRIC SLEEVE N/A 3/13/2018    Procedure: LAPAROSCOPIC GASTRIC SLEEVE;  Laparoscopic Sleeve Gastrectomy;  Surgeon: Kameron Joseph MD;  Location: [facility] OR       Social History     Tobacco Use     Smoking status: Never Smoker     Smokeless tobacco: Never Used   Substance Use Topics     Alcohol use: Yes     Comment: 1-2 per mo     Family History   Problem Relation Age of Onset     Eye Disorder Mother         lost eyesight; probable macular degeneration     Psychotic Disorder Mother         Dementia /Alzhimers     Neurologic Disorder Mother         seizures      Diabetes Mother      Hypertension Mother      Alzheimer Disease Mother      Arthritis Mother      Osteoporosis Mother      Obesity Mother      Diabetes Father      Depression Father      Hyperlipidemia Father      Obesity Father      Psychotic Disorder Sister         bipolar     Thyroid Disease Sister         h/o thyroid cancer     Depression Sister         Bipolar     Obesity Sister      Cancer Other         Brain cancer     Osteoporosis Maternal Grandmother      Anxiety Disorder Son      Depression Sister      Thyroid Disease Sister          Current Outpatient Medications   Medication Sig Dispense Refill     acetaminophen (TYLENOL) 325 MG tablet        Ascorbic Acid (VITAMIN C PO) Take by mouth every morning       buPROPion (WELLBUTRIN XL) 150 MG 24 hr tablet TAKE ONE TABLET BY MOUTH EVERY MORNING 90 tablet 0     Cholecalciferol (VITAMIN D PO) Take by mouth every morning       diclofenac (VOLTAREN) 1 % topical gel Place 4 g onto the skin 4 times daily as needed for moderate pain (to shoulder) 100 g 1     EPINEPHrine (AUVI-Q) 0.3 MG/0.3ML injection 2-pack Inject 0.3 mLs (0.3 mg) into the muscle as needed for anaphylaxis 0.6 mL 3     escitalopram (LEXAPRO) 10 MG tablet Take 1 tablet (10 mg) by mouth daily 90 tablet 1     fluticasone (FLONASE) 50 MCG/ACT spray Spray 2 sprays into both nostrils daily 16 g 1     levothyroxine (SYNTHROID/LEVOTHROID) 25 MCG tablet Take 1 tablet (25 mcg) by mouth daily 90 tablet 0     meclizine (ANTIVERT) 25 MG tablet Take 1 tablet (25 mg) by mouth 3 times daily as needed for dizziness 30 tablet 1     multivitamin, therapeutic with minerals (MULTI-VITAMIN) TABS tablet Take 1 tablet by mouth every morning       traZODone (DESYREL) 50 MG tablet TAKE 2-3 TABLETS BY MOUTH AT BEDTIME 270 tablet 0     order for DME Equipment being ordered: knee brace (Patient not taking: Reported on 5/28/2020) 1 Units 0     order for DME Resmed Airsense 10 auto cpap 6-7 cm, Airfit P10 for her XS pillows        Allergies   Allergen Reactions     Sulfa Drugs Hives     Cephalexin Hives     Cinnamon Hives     Strawberry Hives     Sulfamethoxazole-Trimethoprim Hives     Vicodin [Hydrocodone-Acetaminophen]      Wasp Venom Protein      Other reaction(s): Chest Pain     Wasps [Hornets] Swelling     Now carries Epi Pen     Zoloft [Sertraline]      suicidal ideation     Contrast Dye Other (See Comments) and Rash     Patient had sneezing and an itchy throat following contrast injection of 100 mL Isovue-370.  From IV contrast dye     BP Readings from Last 3 Encounters:   05/28/20 98/66   05/01/20 100/60   02/10/20 100/60    Wt Readings from Last 3 Encounters:   05/28/20 97.3 kg (214 lb 8 oz)   05/19/20 89.8 kg (198 lb)   02/10/20 92 kg (202 lb 12.8 oz)                      Reviewed and updated as needed this visit by Provider  Tobacco  Allergies  Meds  Problems  Med Hx  Surg Hx  Fam Hx         Review of Systems   Constitutional, HEENT, cardiovascular, pulmonary, gi and gu systems are negative, except as otherwise noted.      Objective    BP 98/66   Pulse 80   Temp 97.7  F (36.5  C) (Oral)   Resp 14   Wt 97.3 kg (214 lb 8 oz)   SpO2 97%   BMI 36.82 kg/m    Body mass index is 36.82 kg/m .  Physical Exam   GENERAL: healthy, alert and no distress  RESP: lungs clear to auscultation - no rales, rhonchi or wheezes  CV: regular rate and rhythm, normal S1 S2, no S3 or S4, no murmur, click or rub, no peripheral edema and peripheral pulses strong  MS: right shoulder: anterior shoulder, biceps tendon tender to palpation; decreased range of motion internal rotation, abduction;  negative Neer's, positive Hawkin's, positive Speed's.      Diagnostic Test Results:  none         Assessment & Plan     1. Acute pain of right shoulder  Patient to contact clinic if pain not improving with physical therapy.  Consider MRI in the future.  - [facility]; Future  - diclofenac (VOLTAREN) 1 % topical gel; Place 4 g  onto the skin 4 times daily as needed for moderate pain (to shoulder)  Dispense: 100 g; Refill: 1           Return in about 2 weeks (around 6/11/2020) for no improvement in symptoms..    LEOPOLDO Navarro [facility]

## 2020-05-28 NOTE — TELEPHONE ENCOUNTER
Prior Authorization Approval    Authorization Effective Date: 4/28/2020  Authorization Expiration Date: 5/28/2021  Medication: Diclofenac Sodium 1% Gel -APPROVED  Approved Dose/Quantity:   Reference #:     Insurance Company: [employer] - Phone [phone] Fax [phone]  Expected CoPay:       CoPay Card Available:      Foundation Assistance Needed:    Which Pharmacy is filling the prescription (Not needed for infusion/clinic administered): [facility] [place] - [address] [facility]  Pharmacy Notified: Yes  Patient Notified: No    Pharmacy will notify patient when medication is ready.

## 2020-06-05 DIAGNOSIS — F41.1 GENERALIZED ANXIETY DISORDER: Chronic | ICD-10-CM

## 2020-06-05 DIAGNOSIS — F33.1 MAJOR DEPRESSIVE DISORDER, RECURRENT EPISODE, MODERATE (H): Chronic | ICD-10-CM

## 2020-06-07 RX ORDER — BUPROPION HYDROCHLORIDE 150 MG/1
TABLET ORAL
Qty: 90 TABLET | Refills: 0 | Status: SHIPPED | OUTPATIENT
Start: 2020-06-07 | End: 2020-09-02

## 2020-06-07 NOTE — TELEPHONE ENCOUNTER
Prescription approved per [facility] Refill Protocol.    Arleth Alexander RN

## 2020-06-08 ENCOUNTER — HOSPITAL ENCOUNTER (EMERGENCY)
Facility: CLINIC | Age: 55
Discharge: HOME | End: 2020-06-08
Attending: EMERGENCY MEDICINE | Admitting: EMERGENCY MEDICINE
Payer: COMMERCIAL

## 2020-06-08 ENCOUNTER — APPOINTMENT (OUTPATIENT)
Dept: GENERAL RADIOLOGY | Facility: CLINIC | Age: 55
End: 2020-06-08
Attending: EMERGENCY MEDICINE
Payer: COMMERCIAL

## 2020-06-08 VITALS
HEIGHT: 64 IN | DIASTOLIC BLOOD PRESSURE: 59 MMHG | OXYGEN SATURATION: 96 % | SYSTOLIC BLOOD PRESSURE: 105 MMHG | RESPIRATION RATE: 16 BRPM | BODY MASS INDEX: 36.54 KG/M2 | TEMPERATURE: 98.1 F | HEART RATE: 62 BPM | WEIGHT: 214 LBS
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
## 2020-06-08 DIAGNOSIS — S50.02XA CONTUSION OF LEFT ELBOW, INITIAL ENCOUNTER: ICD-10-CM

## 2020-06-08 PROCEDURE — 99283 EMERGENCY DEPT VISIT LOW MDM: CPT | Performed by: EMERGENCY MEDICINE

## 2020-06-08 PROCEDURE — 73070 X-RAY EXAM OF ELBOW: CPT | Mod: LT

## 2020-06-08 PROCEDURE — 25000132 ZZH RX MED GY IP 250 OP 250 PS 637: Performed by: EMERGENCY MEDICINE

## 2020-06-08 PROCEDURE — 99283 EMERGENCY DEPT VISIT LOW MDM: CPT | Mod: Z6 | Performed by: EMERGENCY MEDICINE

## 2020-06-08 RX ORDER — OXYCODONE HYDROCHLORIDE 5 MG/1
5 TABLET ORAL ONCE
Status: COMPLETED | OUTPATIENT
Start: 2020-06-08 | End: 2020-06-08

## 2020-06-08 RX ORDER — CYCLOBENZAPRINE HCL 10 MG
10 TABLET ORAL 3 TIMES DAILY PRN
Qty: 20 TABLET | Refills: 0 | Status: SHIPPED | OUTPATIENT
Start: 2020-06-08 | End: 2020-06-10

## 2020-06-08 RX ADMIN — OXYCODONE HYDROCHLORIDE 5 MG: 5 TABLET ORAL at 16:09

## 2020-06-08 ASSESSMENT — MIFFLIN-ST. JEOR: SCORE: 1560.7
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
## 2020-06-08 NOTE — ED AVS SNAPSHOT
[facility], [place], Emergency Department  [address] [place] [address]  Phone:  [phone]                                    Sunshine Delgado   MRN: [id number]    Department:  [facility], Emergency Department   Date of Visit:  6/8/2020           After Visit Summary Signature Page    I have received my discharge instructions, and my questions have been answered. I have discussed any challenges I see with this plan with the nurse or doctor.    ..........................................................................................................................................  Patient/Patient Representative Signature      ..........................................................................................................................................  Patient Representative Print Name and Relationship to Patient    ..................................................               ................................................  Date                                   Time    ..........................................................................................................................................  Reviewed by Signature/Title    ...................................................              ..............................................  Date                                               Time          22EPIC Rev 08/18
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
## 2020-06-08 NOTE — ED NOTES
Bed: ED29  Expected date: 6/8/20  Expected time: 2:02 PM  Means of arrival: Ambulance  Comments:  60 f  Elbow trauma
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
## 2020-06-10 ENCOUNTER — OFFICE VISIT (OUTPATIENT)
Dept: FAMILY MEDICINE | Facility: CLINIC | Age: 55
End: 2020-06-10
Payer: COMMERCIAL

## 2020-06-10 VITALS
DIASTOLIC BLOOD PRESSURE: 68 MMHG | HEIGHT: 64 IN | TEMPERATURE: 97.7 F | WEIGHT: 220 LBS | BODY MASS INDEX: 37.56 KG/M2 | OXYGEN SATURATION: 97 % | SYSTOLIC BLOOD PRESSURE: 100 MMHG | HEART RATE: 96 BPM

## 2020-06-10 DIAGNOSIS — E06.3 HASHIMOTO'S THYROIDITIS: ICD-10-CM

## 2020-06-10 DIAGNOSIS — S46.912A SHOULDER STRAIN, LEFT, INITIAL ENCOUNTER: Primary | ICD-10-CM

## 2020-06-10 DIAGNOSIS — S50.312A ABRASION OF LEFT ELBOW, INITIAL ENCOUNTER: ICD-10-CM

## 2020-06-10 LAB
T4 FREE SERPL-MCNC: 1.03 NG/DL (ref 0.76–1.46)
TSH SERPL DL<=0.005 MIU/L-ACNC: 8.22 MU/L (ref 0.4–4)

## 2020-06-10 PROCEDURE — 84439 ASSAY OF FREE THYROXINE: CPT | Performed by: NURSE PRACTITIONER

## 2020-06-10 PROCEDURE — 84443 ASSAY THYROID STIM HORMONE: CPT | Performed by: NURSE PRACTITIONER

## 2020-06-10 PROCEDURE — 36415 COLL VENOUS BLD VENIPUNCTURE: CPT | Performed by: NURSE PRACTITIONER

## 2020-06-10 PROCEDURE — 99214 OFFICE O/P EST MOD 30 MIN: CPT | Performed by: NURSE PRACTITIONER

## 2020-06-10 RX ORDER — CYCLOBENZAPRINE HCL 10 MG
5-10 TABLET ORAL 3 TIMES DAILY PRN
Qty: 30 TABLET | Refills: 1 | Status: SHIPPED | OUTPATIENT
Start: 2020-06-10 | End: 2021-01-07

## 2020-06-10 RX ORDER — LEVOTHYROXINE SODIUM 50 UG/1
50 TABLET ORAL DAILY
Qty: 30 TABLET | Refills: 1 | Status: SHIPPED | OUTPATIENT
Start: 2020-06-10 | End: 2020-09-02

## 2020-06-10 ASSESSMENT — MIFFLIN-ST. JEOR: SCORE: 1587.91

## 2020-06-11 ENCOUNTER — THERAPY VISIT (OUTPATIENT)
Dept: PHYSICAL THERAPY | Facility: CLINIC | Age: 55
End: 2020-06-11
Attending: NURSE PRACTITIONER
Payer: COMMERCIAL

## 2020-06-11 DIAGNOSIS — M25.512 LEFT SHOULDER PAIN: Primary | ICD-10-CM

## 2020-06-11 DIAGNOSIS — S46.912A SHOULDER STRAIN, LEFT, INITIAL ENCOUNTER: ICD-10-CM

## 2020-06-11 PROCEDURE — 97161 PT EVAL LOW COMPLEX 20 MIN: CPT | Mod: GP | Performed by: PHYSICAL THERAPIST

## 2020-06-11 PROCEDURE — 97110 THERAPEUTIC EXERCISES: CPT | Mod: GP | Performed by: PHYSICAL THERAPIST
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
## 2020-06-11 NOTE — PROGRESS NOTES
[place] [facility] Initial Evaluation  Subjective:    Patient Health History           General health as reported by patient is good.  Pertinent medical history includes: overweight and thyroid problems.   Red flags:  None as reported by patient.  Medical allergies: none.   Surgeries include:  None.    Current medications:  Muscle relaxants and thyroid medication.    Current occupation is .   Primary job tasks include:  Computer work.                                    Objective:  System    Physical Exam    General     ROS    Assessment/Plan:

## 2020-07-15 ENCOUNTER — MYC MEDICAL ADVICE (OUTPATIENT)
Dept: FAMILY MEDICINE | Facility: CLINIC | Age: 55
End: 2020-07-15

## 2020-07-16 ENCOUNTER — OFFICE VISIT (OUTPATIENT)
Dept: ORTHOPEDICS | Facility: CLINIC | Age: 55
End: 2020-07-16
Attending: NURSE PRACTITIONER
Payer: COMMERCIAL

## 2020-07-16 ENCOUNTER — ANCILLARY PROCEDURE (OUTPATIENT)
Dept: GENERAL RADIOLOGY | Facility: CLINIC | Age: 55
End: 2020-07-16
Attending: PHYSICAL MEDICINE & REHABILITATION
Payer: COMMERCIAL

## 2020-07-16 VITALS
BODY MASS INDEX: 37.42 KG/M2 | SYSTOLIC BLOOD PRESSURE: 92 MMHG | DIASTOLIC BLOOD PRESSURE: 70 MMHG | WEIGHT: 219.2 LBS | HEIGHT: 64 IN

## 2020-07-16 DIAGNOSIS — M25.511 CHRONIC RIGHT SHOULDER PAIN: ICD-10-CM

## 2020-07-16 DIAGNOSIS — M25.511 CHRONIC RIGHT SHOULDER PAIN: Primary | ICD-10-CM

## 2020-07-16 DIAGNOSIS — M25.511 ACUTE PAIN OF RIGHT SHOULDER: ICD-10-CM

## 2020-07-16 DIAGNOSIS — G89.29 CHRONIC RIGHT SHOULDER PAIN: ICD-10-CM

## 2020-07-16 DIAGNOSIS — G89.29 CHRONIC RIGHT SHOULDER PAIN: Primary | ICD-10-CM

## 2020-07-16 PROCEDURE — 73030 X-RAY EXAM OF SHOULDER: CPT | Mod: RT

## 2020-07-16 PROCEDURE — 20610 DRAIN/INJ JOINT/BURSA W/O US: CPT | Performed by: PHYSICAL MEDICINE & REHABILITATION

## 2020-07-16 PROCEDURE — 99214 OFFICE O/P EST MOD 30 MIN: CPT | Mod: 25 | Performed by: PHYSICAL MEDICINE & REHABILITATION

## 2020-07-16 RX ORDER — TRIAMCINOLONE ACETONIDE 40 MG/ML
40 INJECTION, SUSPENSION INTRA-ARTICULAR; INTRAMUSCULAR
Status: DISCONTINUED | OUTPATIENT
Start: 2020-07-16 | End: 2022-12-26

## 2020-07-16 RX ADMIN — TRIAMCINOLONE ACETONIDE 40 MG: 40 INJECTION, SUSPENSION INTRA-ARTICULAR; INTRAMUSCULAR at 11:26

## 2020-07-16 ASSESSMENT — MIFFLIN-ST. JEOR: SCORE: 1584.28

## 2020-07-16 NOTE — PATIENT INSTRUCTIONS
-Steroid injection performed today.  Take it easy over the next few days. Keep in mind that the steroid may take up to 3 days to start working and up to 2 weeks to reach maximal effect.    -Ice or heat 15-20 minutes as needed (Avoid sleeping on a heating pad or ice).  -Patient's preferred over the counter medication as directed on packaging as needed for pain or soreness.  -Continue physical therapy.    -Also discussed MRI    -Follow up as needed if symptoms fail to improve or worsen.  Please call with questions or concerns.
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
## 2020-07-17 ENCOUNTER — VIRTUAL VISIT (OUTPATIENT)
Dept: FAMILY MEDICINE | Facility: OTHER | Age: 55
End: 2020-07-17

## 2020-07-17 DIAGNOSIS — Z20.822 SUSPECTED COVID-19 VIRUS INFECTION: Primary | ICD-10-CM

## 2020-07-17 NOTE — PROGRESS NOTES
"Date: 2020 11:13:58  Clinician: Reynold Falcon  Clinician NPI: [id number]  Patient: Sunshine Delgado  Patient : 1965  Patient Address: [address] [place] [address]  Patient Phone: [phone]  Visit Protocol: URI  Patient Summary:  Sunshine is a 55 year old ( : 1965 ) female who initiated a Visit for COVID-19 (Coronavirus) evaluation and screening. When asked the question \"Please sign me up to receive news, health information and promotions. \", Sunshine responded \"No\".    Sunshine states her symptoms started suddenly 3-4 days ago. After her symptoms started, they improved and then got worse again.   Her symptoms consist of diarrhea, malaise, a headache, a sore throat, enlarged lymph nodes, and a cough.   Symptom details     Cough: Sunshine coughs every 5-10 minutes and her cough is not more bothersome at night. Phlegm does not come into her throat when she coughs. She believes her cough is caused by post-nasal drip.     Sore throat: Sunshine reports having moderate throat pain (4-6 on a 10 point pain scale), does not have exudate on her tonsils, and can swallow liquids. The lymph nodes in her neck are enlarged. A rash has not appeared on the skin since the sore throat started.     Headache: She states the headache is moderate (4-6 on a 10 point pain scale).      Sunshine denies having wheezing, nausea, teeth pain, ageusia, vomiting, rhinitis, ear pain, chills, myalgias, anosmia, facial pain or pressure, fever, and nasal congestion. She also denies having recent facial or sinus surgery in the past 60 days and taking antibiotic medication in the past month. She is not experiencing dyspnea.   Precipitating events  Within the past week, Sunshine has not been exposed to someone with strep throat. She has not recently been exposed to someone with influenza. Sunshine has not been in close contact with any high risk individuals.   Pertinent COVID-19 (Coronavirus) information  In the past 14 days, Sunshine has not worked " in a congregate living setting.   She does not work or volunteer as healthcare worker or a  and does not work or volunteer in a healthcare facility.   Sunshine also has not lived in a congregate living setting in the past 14 days. She does not live with a healthcare worker.   Sunshine has not had a close contact with a laboratory-confirmed COVID-19 patient within 14 days of symptom onset.   Pertinent medical history  Sunshine had 2 sinus infections within the past year.   Sunshine typically gets a yeast infection when she takes antibiotics. She has used fluconazole (Diflucan) to treat previous yeast infections. 1 dose of fluconazole (Diflucan) has typically been sufficient for symptoms to resolve in the past.   Sunshine needs a return to work/school note.   Weight: 219 lbs   Sunshine does not smoke or use smokeless tobacco.   Weight: 219 lbs    MEDICATIONS: trazodone oral, Levaquin oral, bupropion HCl oral, ALLERGIES: Sulfa (Sulfonamide Antibiotics)  Clinician Response:  Dear Sunshine,   Your symptoms show that you may have coronavirus (COVID-19). This illness can cause fever, cough and trouble breathing. Many people get a mild case and get better on their own. Some people can get very sick.  What should I do?  We would like to test you for this virus.   1. Please call [phone] to schedule your visit. Explain that you were referred by [facility] to have a COVID-19 test. Be ready to share your [facility] visit ID number.  The following will serve as your written order for this COVID Test, ordered by me, for the indication of suspected COVID [Z20.828]: The test will be ordered in [employer], our electronic health record, after you are scheduled. It will show as ordered and authorized by Shailesh Salgado MD.  Order: COVID-19 (Coronavirus) PCR for SYMPTOMATIC testing from [facility].      2. When it's time for your COVID test:  Stay at least 6 feet away from others. (If someone will drive you to your test, stay in the backseat, as far away from  "the  as you can.)   Cover your mouth and nose with a mask, tissue or washcloth.  Go straight to the testing site. Don't make any stops on the way there or back.      3.Starting now: Stay home and away from others (self-isolate) until:   You've had no fever---and no medicine that reduces fever---for 3 full days (72 hours). And...   Your other symptoms have gotten better. For example, your cough or breathing has improved. And...   At least 10 days have passed since your symptoms started.       During this time, don't leave the house except for testing or medical care.   Stay in your own room, even for meals. Use your own bathroom if you can.   Stay away from others in your home. No hugging, kissing or shaking hands. No visitors.  Don't go to work, school or anywhere else.    Clean \"high touch\" surfaces often (doorknobs, counters, handles, etc.). Use a household cleaning spray or wipes. You'll find a full list of  on the EPA website: www.epa.gov/pesticide-registration/list-n-disinfectants-use-against-sars-cov-2.   Cover your mouth and nose with a mask, tissue or washcloth to avoid spreading germs.  Wash your hands and face often. Use soap and water.  Caregivers in these groups are at risk for severe illness due to COVID-19:  o People 65 years and older  o People who live in a nursing home or long-term care facility  o People with chronic disease (lung, heart, cancer, diabetes, kidney, liver, immunologic)  o People who have a weakened immune system, including those who:   Are in cancer treatment  Take medicine that weakens the immune system, such as corticosteroids  Had a bone marrow or organ transplant  Have an immune deficiency  Have poorly controlled HIV or AIDS  Are obese (body mass index of 40 or higher)  Smoke regularly   o Caregivers should wear gloves while washing dishes, handling laundry and cleaning bedrooms and bathrooms.  o Use caution when washing and drying laundry: Don't shake dirty " laundry, and use the warmest water setting that you can.  o For more tips, go to www.cdc.gov/coronavirus/2019-ncov/downloads/10Things.pdf.    4.Sign up for [employer]. We know it's scary to hear that you might have COVID-19. We want to track your symptoms to make sure you're okay over the next 2 weeks. Please look for an email from [employer]---this is a free, online program that we'll use to keep in touch. To sign up, follow the link in the email. Learn more at [email or web address]  How can I take care of myself?   Get lots of rest. Drink extra fluids (unless a doctor has told you not to).   Take Tylenol (acetaminophen) for fever or pain. If you have liver or kidney problems, ask your family doctor if it's okay to take Tylenol.   Adults can take either:    650 mg (two 325 mg pills) every 4 to 6 hours, or...   1,000 mg (two 500 mg pills) every 8 hours as needed.    Note: Don't take more than 3,000 mg in one day. Acetaminophen is found in many medicines (both prescribed and over-the-counter medicines). Read all labels to be sure you don't take too much.   For children, check the Tylenol bottle for the right dose. The dose is based on the child's age or weight.    If you have other health problems (like cancer, heart failure, an organ transplant or severe kidney disease): Call your specialty clinic if you don't feel better in the next 2 days.       Know when to call 911. Emergency warning signs include:    Trouble breathing or shortness of breath Pain or pressure in the chest that doesn't go away Feeling confused like you haven't felt before, or not being able to wake up Bluish-colored lips or face.  Where can I get more information?    [facility] -- About COVID-19: [email or web address]   CDC -- What to Do If You're Sick: www.cdc.gov/coronavirus/2019-ncov/about/steps-when-sick.html   CDC -- Ending Home Isolation: www.cdc.gov/coronavirus/2019-ncov/hcp/disposition-in-home-patients.html    CDC -- Caring for Someone: www.cdc.gov/coronavirus/2019-ncov/if-you-are-sick/care-for-someone.html   [facility] -- Interim Guidance for Hospital Discharge to Home: www.health.[facility].mn.us/diseases/coronavirus/hcp/hospdischarge.pdf   [facility] clinical trials (COVID-19 research studies): clinicalaffairs.[facility]-clinical-trials    Below are the COVID-19 hotlines at the [place] Department of Health ([facility]). Interpreters are available.    For health questions: Call 010-697-3615 or 1-429.151.9554 (7 a.m. to 7 p.m.) For questions about schools and childcare: Call 290-466-6508 or 1-608.102.7702 (7 a.m. to 7 p.m.)    Diagnosis: Cough  Diagnosis ICD: R05

## 2020-07-18 DIAGNOSIS — Z20.822 SUSPECTED COVID-19 VIRUS INFECTION: ICD-10-CM

## 2020-07-18 PROCEDURE — U0003 INFECTIOUS AGENT DETECTION BY NUCLEIC ACID (DNA OR RNA); SEVERE ACUTE RESPIRATORY SYNDROME CORONAVIRUS 2 (SARS-COV-2) (CORONAVIRUS DISEASE [COVID-19]), AMPLIFIED PROBE TECHNIQUE, MAKING USE OF HIGH THROUGHPUT TECHNOLOGIES AS DESCRIBED BY CMS-2020-01-R: HCPCS | Performed by: FAMILY MEDICINE

## 2020-07-18 NOTE — LETTER
July 22, 2020        Sunshine Delgado  [address] [place] [phone]    This letter provides a written record that you were tested for COVID-19 on 07/18/2020.       Your result was negative. This means that we didn t find the virus that causes COVID-19 in your sample. A test may show negative when you do actually have the virus. This can happen when the virus is in the early stages of infection, before you feel illness symptoms.    If you have symptoms   Stay home and away from others (self-isolate) until you meet ALL of the guidelines below:    You ve had no fever--and no medicine that reduces fever--for 3 full days (72 hours). And      Your other symptoms have gotten better. For example, your cough or breathing has improved. And     At least 10 days have passed since your symptoms started.    During this time:    Stay home. Don t go to work, school or anywhere else.     Stay in your own room, including for meals. Use your own bathroom if you can.    Stay away from others in your home. No hugging, kissing or shaking hands. No visitors.    Clean  high touch  surfaces often (doorknobs, counters, handles, etc.). Use a household cleaning spray or wipes. You can find a full list on the EPA website at www.epa.gov/pesticide-registration/list-n-disinfectants-use-against-sars-cov-2.    Cover your mouth and nose with a mask, tissue or washcloth to avoid spreading germs.    Wash your hands and face often with soap and water.    Going back to work  Check with your employer for any guidelines to follow for going back to work.    Employers: This document serves as formal notice that your employee tested negative for COVID-19, as of the testing date shown above.

## 2020-07-18 NOTE — LETTER
July 22, 2020        Sunshine Delgado  [address] [place] [phone]    This letter provides a written record that you were tested for COVID-19 on 7/18/20.       Your result was negative. This means that we didn t find the virus that causes COVID-19 in your sample. A test may show negative when you do actually have the virus. This can happen when the virus is in the early stages of infection, before you feel illness symptoms.    If you have symptoms   Stay home and away from others (self-isolate) until you meet ALL of the guidelines below:    You ve had no fever--and no medicine that reduces fever--for 3 full days (72 hours). And      Your other symptoms have gotten better. For example, your cough or breathing has improved. And     At least 10 days have passed since your symptoms started.    During this time:    Stay home. Don t go to work, school or anywhere else.     Stay in your own room, including for meals. Use your own bathroom if you can.    Stay away from others in your home. No hugging, kissing or shaking hands. No visitors.    Clean  high touch  surfaces often (doorknobs, counters, handles, etc.). Use a household cleaning spray or wipes. You can find a full list on the EPA website at www.epa.gov/pesticide-registration/list-n-disinfectants-use-against-sars-cov-2.    Cover your mouth and nose with a mask, tissue or washcloth to avoid spreading germs.    Wash your hands and face often with soap and water.    Going back to work  Check with your employer for any guidelines to follow for going back to work.    Employers: This document serves as formal notice that your employee tested negative for COVID-19, as of the testing date shown above.

## 2020-07-20 LAB
SARS-COV-2 RNA SPEC QL NAA+PROBE: NOT DETECTED
SPECIMEN SOURCE: NORMAL

## 2020-07-29 DIAGNOSIS — E06.3 HASHIMOTO'S THYROIDITIS: ICD-10-CM

## 2020-07-29 RX ORDER — LEVOTHYROXINE SODIUM 25 UG/1
TABLET ORAL
Qty: 30 TABLET | Refills: 0 | OUTPATIENT
Start: 2020-07-29

## 2020-07-29 NOTE — TELEPHONE ENCOUNTER
Routing refill request to provider for review/approval because:  Labs out of range:  TSH  Pt was to re-check TSH, but no upcoming lab appts scheduled.  TSH   Date Value Ref Range Status   06/10/2020 8.22 (H) 0.40 - 4.00 mU/L Final

## 2020-07-29 NOTE — TELEPHONE ENCOUNTER
Wrong dose- patient should be on Levothyroxine 50 mcg and is due for follow up labs. Please advise her to schedule.

## 2020-09-01 DIAGNOSIS — F41.1 GENERALIZED ANXIETY DISORDER: Chronic | ICD-10-CM

## 2020-09-01 DIAGNOSIS — F33.1 MAJOR DEPRESSIVE DISORDER, RECURRENT EPISODE, MODERATE (H): Chronic | ICD-10-CM

## 2020-09-02 ENCOUNTER — MYC REFILL (OUTPATIENT)
Dept: FAMILY MEDICINE | Facility: CLINIC | Age: 55
End: 2020-09-02

## 2020-09-02 DIAGNOSIS — E06.3 HASHIMOTO'S THYROIDITIS: ICD-10-CM

## 2020-09-02 DIAGNOSIS — F33.1 MAJOR DEPRESSIVE DISORDER, RECURRENT EPISODE, MODERATE (H): Chronic | ICD-10-CM

## 2020-09-02 DIAGNOSIS — F41.1 GENERALIZED ANXIETY DISORDER: Chronic | ICD-10-CM

## 2020-09-02 RX ORDER — BUPROPION HYDROCHLORIDE 150 MG/1
150 TABLET ORAL EVERY MORNING
Qty: 90 TABLET | Refills: 0 | Status: CANCELLED | OUTPATIENT
Start: 2020-09-02

## 2020-09-02 RX ORDER — LEVOTHYROXINE SODIUM 50 UG/1
50 TABLET ORAL DAILY
Qty: 30 TABLET | Refills: 0 | Status: SHIPPED | OUTPATIENT
Start: 2020-09-02 | End: 2020-10-28

## 2020-09-02 RX ORDER — BUPROPION HYDROCHLORIDE 150 MG/1
TABLET ORAL
Qty: 90 TABLET | Refills: 0 | Status: SHIPPED | OUTPATIENT
Start: 2020-09-02 | End: 2020-11-20

## 2020-09-02 NOTE — TELEPHONE ENCOUNTER
Routing refill request to provider for review/approval because:  Labs out of range:  TSH    TSH   Date Value Ref Range Status   06/10/2020 8.22 (H) 0.40 - 4.00 mU/L Final

## 2020-09-02 NOTE — TELEPHONE ENCOUNTER
"Routing refill request to provider for review/approval because:  Labs out of range:  PHQ-9    Requested Prescriptions   Pending Prescriptions Disp Refills     buPROPion (WELLBUTRIN XL) 150 MG 24 hr tablet 90 tablet 0     Sig: Take 1 tablet (150 mg) by mouth every morning       SSRIs Protocol Failed - 9/2/2020  8:23 AM        Failed - PHQ-9 score less than 5 in past 6 months     Please review last PHQ-9 score.           Passed - Medication is Bupropion     If the medication is Bupropion (Wellbutrin), and the patient is taking for smoking cessation; OK to refill.          Passed - Medication is active on med list        Passed - Patient is age 18 or older        Passed - No active pregnancy on record        Passed - No positive pregnancy test in last 12 months        Passed - Recent (6 mo) or future (30 days) visit within the authorizing provider's specialty     Patient had office visit in the last 6 months or has a visit in the next 30 days with authorizing provider or within the authorizing provider's specialty.  See \"Patient Info\" tab in inbasket, or \"Choose Columns\" in Meds & Orders section of the refill encounter.               Anne Marie Faustin RN  "

## 2020-10-09 ENCOUNTER — MYC REFILL (OUTPATIENT)
Dept: FAMILY MEDICINE | Facility: CLINIC | Age: 55
End: 2020-10-09

## 2020-10-09 DIAGNOSIS — E06.3 HASHIMOTO'S THYROIDITIS: ICD-10-CM

## 2020-10-09 DIAGNOSIS — F99 INSOMNIA DUE TO OTHER MENTAL DISORDER: ICD-10-CM

## 2020-10-09 DIAGNOSIS — F51.05 INSOMNIA DUE TO OTHER MENTAL DISORDER: ICD-10-CM

## 2020-10-09 RX ORDER — TRAZODONE HYDROCHLORIDE 50 MG/1
TABLET, FILM COATED ORAL
Qty: 270 TABLET | Refills: 0 | Status: SHIPPED | OUTPATIENT
Start: 2020-10-09 | End: 2020-12-29

## 2020-10-09 NOTE — LETTER
October 14, 2020      Sunshine Delgado  [address] [place] [phone]        Dear Sunshine,       Your provider has refused your refill request of levothyroxine (SYNTHROID/LEVOTHROID) 50 MCG tablet. You are due for a lab only appointment for refills. Please contact the clinic to scheduled for this appointment.      Sincerely,        Shana Jett MD

## 2020-10-09 NOTE — TELEPHONE ENCOUNTER
Routing refill request to provider for review/approval because:  Labs out of range:      TSH   Date Value Ref Range Status   06/10/2020 8.22 (H) 0.40 - 4.00 mU/L Final     Anisa Wolfe BSN, RN

## 2020-10-12 RX ORDER — LEVOTHYROXINE SODIUM 50 UG/1
50 TABLET ORAL DAILY
Qty: 30 TABLET | Refills: 0 | OUTPATIENT
Start: 2020-10-12

## 2020-10-12 NOTE — TELEPHONE ENCOUNTER
Called patient and left VM to call clinic. Please help schedule lab appointment if patient returns call.  Patsy EAGLE CMA ([facility])

## 2020-10-14 NOTE — TELEPHONE ENCOUNTER
Attempt 3, no appointment made. Please send letter.  Patsy EAGLE CMA ([facility])

## 2020-10-14 NOTE — TELEPHONE ENCOUNTER
Letter sent. Lupe Patino,       Refused Prescriptions     levothyroxine (SYNTHROID/LEVOTHROID) 50 MCG tablet         Sig: Take 1 tablet (50 mcg) by mouth daily . No further refills until follow-up lab appointment    Disp:  30 tablet    Refills:  0    Start: 10/12/2020    Class: E-Prescribe    Refused by: Christa Mcnamara APRN CNP    Refusal reason: Patient needs appointment    Non-formulary For: Hashimoto's thyroiditis        Fill requested from: [facility] - [id number] [facility]

## 2020-10-27 DIAGNOSIS — E06.3 HASHIMOTO'S THYROIDITIS: ICD-10-CM

## 2020-10-27 LAB — TSH SERPL DL<=0.005 MIU/L-ACNC: 2.93 MU/L (ref 0.4–4)

## 2020-10-27 PROCEDURE — 84443 ASSAY THYROID STIM HORMONE: CPT | Performed by: NURSE PRACTITIONER

## 2020-11-20 ENCOUNTER — MYC REFILL (OUTPATIENT)
Dept: FAMILY MEDICINE | Facility: CLINIC | Age: 55
End: 2020-11-20

## 2020-11-20 DIAGNOSIS — F41.1 GENERALIZED ANXIETY DISORDER: Chronic | ICD-10-CM

## 2020-11-20 DIAGNOSIS — F33.1 MAJOR DEPRESSIVE DISORDER, RECURRENT EPISODE, MODERATE (H): Chronic | ICD-10-CM

## 2020-11-25 DIAGNOSIS — F41.1 GENERALIZED ANXIETY DISORDER: Chronic | ICD-10-CM

## 2020-11-25 DIAGNOSIS — F33.1 MAJOR DEPRESSIVE DISORDER, RECURRENT EPISODE, MODERATE (H): Chronic | ICD-10-CM

## 2020-11-25 RX ORDER — BUPROPION HYDROCHLORIDE 150 MG/1
150 TABLET ORAL EVERY MORNING
Qty: 90 TABLET | Refills: 0 | Status: SHIPPED | OUTPATIENT
Start: 2020-11-25 | End: 2021-01-07

## 2020-11-25 NOTE — TELEPHONE ENCOUNTER
Routing refill request to provider for review/approval because:  Last PHQ-9=6 on 9/2/20202      Melissa Limon, BSN, RN

## 2020-11-26 RX ORDER — BUPROPION HYDROCHLORIDE 150 MG/1
TABLET ORAL
Qty: 90 TABLET | Refills: 0 | OUTPATIENT
Start: 2020-11-26

## 2020-12-29 ENCOUNTER — MYC REFILL (OUTPATIENT)
Dept: FAMILY MEDICINE | Facility: CLINIC | Age: 55
End: 2020-12-29

## 2020-12-29 DIAGNOSIS — F99 INSOMNIA DUE TO OTHER MENTAL DISORDER: ICD-10-CM

## 2020-12-29 DIAGNOSIS — F51.05 INSOMNIA DUE TO OTHER MENTAL DISORDER: ICD-10-CM

## 2020-12-30 RX ORDER — TRAZODONE HYDROCHLORIDE 50 MG/1
TABLET, FILM COATED ORAL
Qty: 270 TABLET | Refills: 0 | Status: SHIPPED | OUTPATIENT
Start: 2020-12-30 | End: 2021-01-07

## 2020-12-30 RX ORDER — TRAZODONE HYDROCHLORIDE 50 MG/1
TABLET, FILM COATED ORAL
Qty: 270 TABLET | Refills: 0 | OUTPATIENT
Start: 2020-12-30

## 2020-12-30 NOTE — TELEPHONE ENCOUNTER
Prescription approved per [facility] Refill Protocol.  Arleth Alexander RN

## 2021-01-04 NOTE — PROGRESS NOTES
"Sunshine Delgado is a 55 year old female who is being evaluated via a billable video visit.      How would you like to obtain your AVS? MyChart  If the video visit is dropped, the invitation should be resent by: Text to cell phone: [phone]  Will anyone else be joining your video visit? No        Assessment & Plan     Attention deficit hyperactivity disorder (ADHD), unspecified ADHD type  Discussed options for treatment including increase of Wellbutrin dose, Strattera, stimulants. Has significant symptoms despite current treatment with Wellbutrin and would benefit from stimulants as these are highest efficacy. Patient agreeable to E-consult with Cardiology for opinion on initiation of treatment due to her history of A-fib  - E-CONSULT TO CARDIOLOGY (ADULT OUTPATIENT PCP TO SPECIALIST)    Generalized anxiety disorder  Well controlled, medications refilled  - buPROPion (WELLBUTRIN XL) 150 MG 24 hr tablet; Take 1 tablet (150 mg) by mouth every morning  - escitalopram (LEXAPRO) 10 MG tablet; Take 1 tablet (10 mg) by mouth daily    Major depressive disorder, recurrent episode, moderate (H)  Doing well, medications refilled  - buPROPion (WELLBUTRIN XL) 150 MG 24 hr tablet; Take 1 tablet (150 mg) by mouth every morning    Insomnia due to other mental disorder  Doing well, medications refilled  - traZODone (DESYREL) 50 MG tablet; TAKE 2-3 TABLETS BY MOUTH AT BEDTIME    Atrial fibrillation with rapid ventricular response (H)  As above  - E-CONSULT TO CARDIOLOGY (ADULT OUTPATIENT PCP TO SPECIALIST)    Review of prior external note(s) from - Cardiology consult               BMI:   Estimated body mass index is 37.63 kg/m  as calculated from the following:    Height as of 7/16/20: 1.626 m (5' 4\").    Weight as of 7/16/20: 99.4 kg (219 lb 3.2 oz).             Return in about 2 weeks (around 1/21/2021) for Review Cardiology e-consult and med management.    LEOPOLDO [facility] " JONATAN    Subjective     Sunshine Delgado is a 55 year old female who presents to clinic today for the following health issues     HPI       Chief Complaint   Patient presents with     Discuss Recent Diagnosis     Diagnosed with ADD by Psychologist at [facility]     Psychologist was seeing her after grief of loss of her mother in August. Referred her for ADHD evaluation. Symptoms started in childhood and always suspected this may be the case- thought it was a self-esteem problem. Has caused some relationship problems.  Sister and son both have ADHD.      History of A-fib spontaneously converted and not anticoagulation recommended by Cardiology. Still has some occasional flutters and able to bring herself out of it with deep breathing. Sometimes has some anxiety and chest hurting.      Review of Systems   Constitutional, HEENT, cardiovascular, pulmonary, GI, , musculoskeletal, neuro, skin, endocrine and psych systems are negative, except as otherwise noted.      Objective           Vitals:  No vitals were obtained today due to virtual visit.    Physical Exam   GENERAL: Healthy, alert and no distress  EYES: Eyes grossly normal to inspection.  No discharge or erythema, or obvious scleral/conjunctival abnormalities.  RESP: No audible wheeze, cough, or visible cyanosis.  No visible retractions or increased work of breathing.    SKIN: Visible skin clear. No significant rash, abnormal pigmentation or lesions.  NEURO: Cranial nerves grossly intact.  Mentation and speech appropriate for age.  PSYCH: Mentation appears normal, affect normal/bright, judgement and insight intact, normal speech and appearance well-groomed.    Orders Only on 10/27/2020   Component Date Value Ref Range Status     TSH 10/27/2020 2.93  0.40 - 4.00 mU/L Final               Video-Visit Details    Type of service:  Video Visit/Telephone Visit    Video End Time: 3 minutes spent on video but due to poor audio quality, changed to telephone visit   16 minute  phone call    Originating Location (pt. Location): Other Work    Distant Location (provider location):  [facility]     Platform used for Video Visit: Unable to complete video visit

## 2021-01-07 ENCOUNTER — E-CONSULT (OUTPATIENT)
Dept: CARDIOLOGY | Facility: CLINIC | Age: 56
End: 2021-01-07
Payer: COMMERCIAL

## 2021-01-07 ENCOUNTER — VIRTUAL VISIT (OUTPATIENT)
Dept: FAMILY MEDICINE | Facility: CLINIC | Age: 56
End: 2021-01-07
Payer: COMMERCIAL

## 2021-01-07 DIAGNOSIS — F90.9 ATTENTION DEFICIT HYPERACTIVITY DISORDER (ADHD), UNSPECIFIED ADHD TYPE: Primary | ICD-10-CM

## 2021-01-07 DIAGNOSIS — F99 INSOMNIA DUE TO OTHER MENTAL DISORDER: ICD-10-CM

## 2021-01-07 DIAGNOSIS — F41.1 GENERALIZED ANXIETY DISORDER: Chronic | ICD-10-CM

## 2021-01-07 DIAGNOSIS — I48.91 ATRIAL FIBRILLATION WITH RAPID VENTRICULAR RESPONSE (H): ICD-10-CM

## 2021-01-07 DIAGNOSIS — F51.05 INSOMNIA DUE TO OTHER MENTAL DISORDER: ICD-10-CM

## 2021-01-07 DIAGNOSIS — F33.1 MAJOR DEPRESSIVE DISORDER, RECURRENT EPISODE, MODERATE (H): Chronic | ICD-10-CM

## 2021-01-07 PROCEDURE — 99214 OFFICE O/P EST MOD 30 MIN: CPT | Mod: 95 | Performed by: NURSE PRACTITIONER

## 2021-01-07 PROCEDURE — 99451 NTRPROF PH1/NTRNET/EHR 5/>: CPT | Performed by: INTERNAL MEDICINE

## 2021-01-07 RX ORDER — TRAZODONE HYDROCHLORIDE 50 MG/1
TABLET, FILM COATED ORAL
Qty: 270 TABLET | Refills: 1 | Status: SHIPPED | OUTPATIENT
Start: 2021-01-07 | End: 2021-09-21

## 2021-01-07 RX ORDER — BUPROPION HYDROCHLORIDE 150 MG/1
150 TABLET ORAL EVERY MORNING
Qty: 90 TABLET | Refills: 1 | Status: SHIPPED | OUTPATIENT
Start: 2021-01-07 | End: 2021-01-08

## 2021-01-07 RX ORDER — ESCITALOPRAM OXALATE 10 MG/1
10 TABLET ORAL DAILY
Qty: 90 TABLET | Refills: 1 | Status: SHIPPED | OUTPATIENT
Start: 2021-01-07 | End: 2021-04-22

## 2021-01-07 NOTE — PROGRESS NOTES
1/7/2021     E-Consult has been accepted.    Interprofessional consultation requested by: Lesly Arteaga CNP    Clinical Question/Purpose: Isolated episode of atrial fibrillation, ADHD, question appropriateness of stimulant therapy    Patient assessment and information reviewed: History, notes    Recommendations: Overall, in a patient who has had an episode of atrial fibrillation (without a clear inciting cause), stimulants for ADHD would be relatively contraindicated since they could make A.fib more likely to recur. If non-stimulant options are viable for her, that would be the best. If there is no other effective treatment for her ADHD, I would monitor with a 14-day ZioPatch monitor. It would also be reasonable to have her seen by an Electrophysiologist in that case.    I agree that it is reasonable to not anticoagulate at this time as she has a [id number] score of 1 (1 point for being female.)    Hope that was helpful.  Thanks,  Nomi Lange MD  Cardiology       The recommendations provided in this E-Consult are based on the clinical data available to me at this time, and are furnished without the benefit of a comprehensive in-person or virtual patient evaluation, Any new clinical issues or changes in patient status since the filing of this E-Consult will need to be taken into account when assessing these recommendations. Please contact me if you have further questions.    My total time spent reviewing clinical information and formulating assessment was 15 minutes.    Report sent automatically to requesting provider once signed.     Charge codes: [id number] (5+ minutes)                  [id number] (No Charge code)    Nomi Lange MD

## 2021-01-08 ENCOUNTER — TELEPHONE (OUTPATIENT)
Dept: FAMILY MEDICINE | Facility: CLINIC | Age: 56
End: 2021-01-08

## 2021-01-08 DIAGNOSIS — F41.1 GENERALIZED ANXIETY DISORDER: Chronic | ICD-10-CM

## 2021-01-08 DIAGNOSIS — F90.9 ATTENTION DEFICIT HYPERACTIVITY DISORDER (ADHD), UNSPECIFIED ADHD TYPE: Primary | ICD-10-CM

## 2021-01-08 DIAGNOSIS — F33.1 MAJOR DEPRESSIVE DISORDER, RECURRENT EPISODE, MODERATE (H): Chronic | ICD-10-CM

## 2021-01-08 RX ORDER — BUPROPION HYDROCHLORIDE 300 MG/1
300 TABLET ORAL EVERY MORNING
Qty: 90 TABLET | Refills: 1 | Status: SHIPPED | OUTPATIENT
Start: 2021-01-08 | End: 2021-06-24

## 2021-01-08 NOTE — TELEPHONE ENCOUNTER
I called patient and reviewed recommendations from Cardiology eConsult with patient. She agrees to increasing Wellbutrin to 300 mg and follow up in 3 months.  Could also consider Clonidine or changing Lexapro to Effexor, although less evidence to support efficacy of these medications.  Lesly Arteaga, CNP

## 2021-01-15 ENCOUNTER — HEALTH MAINTENANCE LETTER (OUTPATIENT)
Age: 56
End: 2021-01-15

## 2021-02-04 ENCOUNTER — DOCUMENTATION ONLY (OUTPATIENT)
Dept: LAB | Facility: CLINIC | Age: 56
End: 2021-02-04

## 2021-02-04 DIAGNOSIS — E06.3 HASHIMOTO'S THYROIDITIS: Primary | ICD-10-CM

## 2021-02-04 DIAGNOSIS — N18.30 STAGE 3 CHRONIC KIDNEY DISEASE, UNSPECIFIED WHETHER STAGE 3A OR 3B CKD (H): ICD-10-CM

## 2021-02-04 DIAGNOSIS — Z13.6 CARDIOVASCULAR SCREENING; LDL GOAL LESS THAN 160: Chronic | ICD-10-CM

## 2021-02-09 DIAGNOSIS — N18.30 STAGE 3 CHRONIC KIDNEY DISEASE, UNSPECIFIED WHETHER STAGE 3A OR 3B CKD (H): ICD-10-CM

## 2021-02-09 DIAGNOSIS — Z13.6 CARDIOVASCULAR SCREENING; LDL GOAL LESS THAN 160: Chronic | ICD-10-CM

## 2021-02-09 DIAGNOSIS — E06.3 HASHIMOTO'S THYROIDITIS: ICD-10-CM

## 2021-02-09 LAB
ANION GAP SERPL CALCULATED.3IONS-SCNC: 4 MMOL/L (ref 3–14)
BUN SERPL-MCNC: 32 MG/DL (ref 7–30)
CALCIUM SERPL-MCNC: 9.7 MG/DL (ref 8.5–10.1)
CHLORIDE SERPL-SCNC: 106 MMOL/L (ref 94–109)
CHOLEST SERPL-MCNC: 227 MG/DL
CO2 SERPL-SCNC: 31 MMOL/L (ref 20–32)
CREAT SERPL-MCNC: 1.11 MG/DL (ref 0.52–1.04)
GFR SERPL CREATININE-BSD FRML MDRD: 56 ML/MIN/{1.73_M2}
GLUCOSE SERPL-MCNC: 93 MG/DL (ref 70–99)
HDLC SERPL-MCNC: 59 MG/DL
LDLC SERPL CALC-MCNC: 135 MG/DL
NONHDLC SERPL-MCNC: 168 MG/DL
POTASSIUM SERPL-SCNC: 4.3 MMOL/L (ref 3.4–5.3)
SODIUM SERPL-SCNC: 141 MMOL/L (ref 133–144)
T4 FREE SERPL-MCNC: 0.91 NG/DL (ref 0.76–1.46)
TRIGL SERPL-MCNC: 163 MG/DL
TSH SERPL DL<=0.005 MIU/L-ACNC: 5.03 MU/L (ref 0.4–4)

## 2021-02-09 PROCEDURE — 80048 BASIC METABOLIC PNL TOTAL CA: CPT | Performed by: NURSE PRACTITIONER

## 2021-02-09 PROCEDURE — 80061 LIPID PANEL: CPT | Performed by: NURSE PRACTITIONER

## 2021-02-09 PROCEDURE — 84439 ASSAY OF FREE THYROXINE: CPT | Performed by: NURSE PRACTITIONER

## 2021-02-09 PROCEDURE — 84443 ASSAY THYROID STIM HORMONE: CPT | Performed by: NURSE PRACTITIONER

## 2021-02-09 PROCEDURE — 36415 COLL VENOUS BLD VENIPUNCTURE: CPT | Performed by: NURSE PRACTITIONER

## 2021-02-10 RX ORDER — LISINOPRIL 2.5 MG/1
2.5 TABLET ORAL DAILY
Qty: 90 TABLET | Refills: 3 | Status: SHIPPED | OUTPATIENT
Start: 2021-02-10 | End: 2021-03-17

## 2021-02-10 RX ORDER — LEVOTHYROXINE SODIUM 25 UG/1
12.5 TABLET ORAL DAILY
Qty: 45 TABLET | Refills: 0 | Status: SHIPPED | OUTPATIENT
Start: 2021-02-10 | End: 2021-03-18 | Stop reason: DRUGHIGH

## 2021-02-10 RX ORDER — LEVOTHYROXINE SODIUM 50 UG/1
50 TABLET ORAL DAILY
Qty: 90 TABLET | Refills: 0 | Status: SHIPPED | OUTPATIENT
Start: 2021-02-10 | End: 2021-03-18 | Stop reason: DRUGHIGH

## 2021-02-12 ENCOUNTER — MYC MEDICAL ADVICE (OUTPATIENT)
Dept: FAMILY MEDICINE | Facility: CLINIC | Age: 56
End: 2021-02-12

## 2021-03-17 ENCOUNTER — OFFICE VISIT (OUTPATIENT)
Dept: CARDIOLOGY | Facility: CLINIC | Age: 56
End: 2021-03-17
Payer: COMMERCIAL

## 2021-03-17 ENCOUNTER — HOSPITAL ENCOUNTER (OUTPATIENT)
Facility: CLINIC | Age: 56
End: 2021-03-17
Payer: COMMERCIAL

## 2021-03-17 VITALS
HEART RATE: 93 BPM | SYSTOLIC BLOOD PRESSURE: 99 MMHG | BODY MASS INDEX: 38.96 KG/M2 | WEIGHT: 227 LBS | DIASTOLIC BLOOD PRESSURE: 67 MMHG | OXYGEN SATURATION: 96 %

## 2021-03-17 DIAGNOSIS — R00.2 PALPITATIONS: ICD-10-CM

## 2021-03-17 DIAGNOSIS — I48.19 PERSISTENT ATRIAL FIBRILLATION (H): Primary | ICD-10-CM

## 2021-03-17 DIAGNOSIS — Z11.52 ENCOUNTER FOR PREPROCEDURE SCREENING LABORATORY TESTING FOR COVID-19: ICD-10-CM

## 2021-03-17 DIAGNOSIS — E06.3 HASHIMOTO'S THYROIDITIS: ICD-10-CM

## 2021-03-17 DIAGNOSIS — N18.31 STAGE 3A CHRONIC KIDNEY DISEASE (H): ICD-10-CM

## 2021-03-17 DIAGNOSIS — Z01.812 ENCOUNTER FOR PREPROCEDURE SCREENING LABORATORY TESTING FOR COVID-19: ICD-10-CM

## 2021-03-17 DIAGNOSIS — N18.30 STAGE 3 CHRONIC KIDNEY DISEASE, UNSPECIFIED WHETHER STAGE 3A OR 3B CKD (H): ICD-10-CM

## 2021-03-17 DIAGNOSIS — I48.19 PERSISTENT ATRIAL FIBRILLATION (H): ICD-10-CM

## 2021-03-17 LAB
ALBUMIN SERPL-MCNC: 3.9 G/DL (ref 3.4–5)
ALP SERPL-CCNC: 61 U/L (ref 40–150)
ALT SERPL W P-5'-P-CCNC: 22 U/L (ref 0–50)
ANION GAP SERPL CALCULATED.3IONS-SCNC: 7 MMOL/L (ref 3–14)
AST SERPL W P-5'-P-CCNC: 12 U/L (ref 0–45)
BILIRUB DIRECT SERPL-MCNC: <0.1 MG/DL (ref 0–0.2)
BILIRUB SERPL-MCNC: 0.3 MG/DL (ref 0.2–1.3)
BUN SERPL-MCNC: 35 MG/DL (ref 7–30)
CALCIUM SERPL-MCNC: 9.4 MG/DL (ref 8.5–10.1)
CHLORIDE SERPL-SCNC: 108 MMOL/L (ref 94–109)
CO2 SERPL-SCNC: 26 MMOL/L (ref 20–32)
CREAT SERPL-MCNC: 1.02 MG/DL (ref 0.52–1.04)
GFR SERPL CREATININE-BSD FRML MDRD: 62 ML/MIN/{1.73_M2}
GLUCOSE SERPL-MCNC: 93 MG/DL (ref 70–99)
INR PPP: 0.91 (ref 0.86–1.14)
POTASSIUM SERPL-SCNC: 4.1 MMOL/L (ref 3.4–5.3)
PROT SERPL-MCNC: 7.3 G/DL (ref 6.8–8.8)
SARS-COV-2 RNA RESP QL NAA+PROBE: NORMAL
SODIUM SERPL-SCNC: 141 MMOL/L (ref 133–144)
SPECIMEN SOURCE: NORMAL
T4 FREE SERPL-MCNC: 0.89 NG/DL (ref 0.76–1.46)
TSH SERPL DL<=0.005 MIU/L-ACNC: 5 MU/L (ref 0.4–4)

## 2021-03-17 PROCEDURE — 84443 ASSAY THYROID STIM HORMONE: CPT | Performed by: NURSE PRACTITIONER

## 2021-03-17 PROCEDURE — 80048 BASIC METABOLIC PNL TOTAL CA: CPT | Performed by: NURSE PRACTITIONER

## 2021-03-17 PROCEDURE — U0005 INFEC AGEN DETEC AMPLI PROBE: HCPCS | Performed by: INTERNAL MEDICINE

## 2021-03-17 PROCEDURE — 85610 PROTHROMBIN TIME: CPT | Performed by: INTERNAL MEDICINE

## 2021-03-17 PROCEDURE — 99215 OFFICE O/P EST HI 40 MIN: CPT | Performed by: INTERNAL MEDICINE

## 2021-03-17 PROCEDURE — 84439 ASSAY OF FREE THYROXINE: CPT | Performed by: NURSE PRACTITIONER

## 2021-03-17 PROCEDURE — 36415 COLL VENOUS BLD VENIPUNCTURE: CPT | Performed by: INTERNAL MEDICINE

## 2021-03-17 PROCEDURE — 80076 HEPATIC FUNCTION PANEL: CPT | Performed by: INTERNAL MEDICINE

## 2021-03-17 PROCEDURE — U0003 INFECTIOUS AGENT DETECTION BY NUCLEIC ACID (DNA OR RNA); SEVERE ACUTE RESPIRATORY SYNDROME CORONAVIRUS 2 (SARS-COV-2) (CORONAVIRUS DISEASE [COVID-19]), AMPLIFIED PROBE TECHNIQUE, MAKING USE OF HIGH THROUGHPUT TECHNOLOGIES AS DESCRIBED BY CMS-2020-01-R: HCPCS | Performed by: INTERNAL MEDICINE

## 2021-03-17 PROCEDURE — 93000 ELECTROCARDIOGRAM COMPLETE: CPT | Performed by: INTERNAL MEDICINE

## 2021-03-17 RX ORDER — POTASSIUM CHLORIDE 1500 MG/1
20 TABLET, EXTENDED RELEASE ORAL
Status: CANCELLED | OUTPATIENT
Start: 2021-03-17

## 2021-03-17 RX ORDER — POTASSIUM CHLORIDE 1500 MG/1
40 TABLET, EXTENDED RELEASE ORAL
Status: CANCELLED | OUTPATIENT
Start: 2021-03-17

## 2021-03-17 RX ORDER — ATENOLOL 25 MG/1
25 TABLET ORAL DAILY
Qty: 30 TABLET | Refills: 0 | Status: SHIPPED | OUTPATIENT
Start: 2021-03-17 | End: 2021-03-17

## 2021-03-17 RX ORDER — LIDOCAINE 40 MG/G
CREAM TOPICAL
Status: CANCELLED | OUTPATIENT
Start: 2021-03-17

## 2021-03-17 RX ORDER — MAGNESIUM SULFATE HEPTAHYDRATE 40 MG/ML
2 INJECTION, SOLUTION INTRAVENOUS
Status: CANCELLED | OUTPATIENT
Start: 2021-03-17

## 2021-03-17 RX ORDER — ATENOLOL 25 MG/1
TABLET ORAL
Qty: 30 TABLET | Refills: 0 | COMMUNITY
Start: 2021-03-17 | End: 2021-04-09

## 2021-03-17 ASSESSMENT — PATIENT HEALTH QUESTIONNAIRE - PHQ9: SUM OF ALL RESPONSES TO PHQ QUESTIONS 1-9: 1

## 2021-03-17 NOTE — LETTER
3/17/2021      RE: Sunshine Delgado  [address] [facility] [address]       Dear Colleague,    Thank you for the opportunity to participate in the care of your patient, Sunshine Delgado, at the [facility] at [facility]. Please see a copy of my visit note below.    HPI: Ms. Sunshine Delgado is a 55 year old  female with PMH significant for depression, anxiety, kidney stones.    Patient was admitted to [facility] in January of this year with abdominal pain.  She was found to have infectious mononucleosis.  She was treated with antibiotics for 2 weeks for tonsillitis prior to her ED presentation. Patient's EKG showed atrial fibrillation which spontaneously converted to sinus rhythm while she was in the hospital.  Patient underwent transthoracic echocardiogram which showed a small pericardial effusion with normal EF and valve function.  Atrial fibrillation was attributed to mild pericarditis from infectious mononucleosis.  Patient was started on metoprolol and aspirin.  Metoprolol was recently discontinued by primary care physician due to frequent lightheadedness which has improved since then.    Patient does not have prior history of cardiac disease or atrial fibrillation except this 1 episode in January.  No prior history of hypertension, diabetes, stroke or sleep apnea.  She does not smoke.  She drinks alcohol occasionally. Patient was recently diagnosed with Hashimoto thyroiditis and started on levothyroxine.      Patient walks 7 to 8 miles every day with no difficulty.  Denies chest pain, shortness of breath, syncope.  Patient reports palpitations and checks her heart rate through apple watch.  So far heart rate was between 50 to 90s.  Patient did not feel any palpitations over the last 1 week.    Patient is currently on aspirin 325, bupropion, Lexapro, fluticasone nasal spray, levothyroxine, and trazodone.    Medications,  personal, family, and social history reviewed with patient and revised.    Interval history 3/17/2021:  Patient seen today for follow-up.  She tells me that since I saw her a year ago she has been feeling palpitations on and off.  However a few weeks ago she had 3-day lasting palpitations which was more severe than before.  She did feel chest pressure, shortness of breath and almost presyncopal.  Denies syncope.  She feels something is not right in her chest.  Today EKG in clinic shows atrial fibrillation heart rate 89 bpm.  Denies alcohol.  No prior history of sleep apnea.  She has been started on lisinopril 2.5 mg a month ago by her primary care physician for chronic kidney disease.  She tells me that she is probably more lightheaded since then.  But she acknowledges lightheadedness even before she started lisinopril.  She tells me that she had kidney infection in 2016 and likely she has chronic kidney disease due to that.    PAST MEDICAL HISTORY:  Past Medical History:   Diagnosis Date     Atrial fibrillation with rapid ventricular response (H) 1/26/2020     Bee sting allergy      Depressive disorder      Generalized anxiety disorder 10/15/2009    lexapro made things worse.       Hashimoto's thyroiditis 5/6/2020     Morbid obesity (H)      Ocular migraine      MARIA GUADALUPE (obstructive sleep apnea)- severe (AHI 84) 09/09/2011     Voice fatigue 10/22/2009       CURRENT MEDICATIONS:  Current Outpatient Medications   Medication Sig Dispense Refill     acetaminophen (TYLENOL) 325 MG tablet        Ascorbic Acid (VITAMIN C PO) Take by mouth every morning       buPROPion (WELLBUTRIN XL) 300 MG 24 hr tablet Take 1 tablet (300 mg) by mouth every morning 90 tablet 1     Cholecalciferol (VITAMIN D PO) Take by mouth every morning       EPINEPHrine (AUVI-Q) 0.3 MG/0.3ML injection 2-pack Inject 0.3 mLs (0.3 mg) into the muscle as needed for anaphylaxis 0.6 mL 3     escitalopram (LEXAPRO) 10 MG tablet Take 1 tablet (10 mg) by mouth  daily 90 tablet 1     fluticasone (FLONASE) 50 MCG/ACT spray Spray 2 sprays into both nostrils daily 16 g 1     levothyroxine (SYNTHROID/LEVOTHROID) 25 MCG tablet Take 0.5 tablets (12.5 mcg) by mouth daily Take with 50 mcg tablet 45 tablet 0     levothyroxine (SYNTHROID/LEVOTHROID) 50 MCG tablet Take 1 tablet (50 mcg) by mouth daily Take with 12.5 mg tablet 90 tablet 0     lisinopril (ZESTRIL) 2.5 MG tablet Take 1 tablet (2.5 mg) by mouth daily 90 tablet 3     multivitamin, therapeutic with minerals (MULTI-VITAMIN) TABS tablet Take 1 tablet by mouth every morning       order for DME Resmed Airsense 10 auto cpap 6-7 cm, Airfit P10 for her XS pillows       traZODone (DESYREL) 50 MG tablet TAKE 2-3 TABLETS BY MOUTH AT BEDTIME 270 tablet 1       PAST SURGICAL HISTORY:  Past Surgical History:   Procedure Laterality Date     COLONOSCOPY  2000     DAVINCI GASTRIC SLEEVE       GENITOURINARY SURGERY  2007     HYSTERECTOMY, PAP NO LONGER INDICATED       HYSTERECTOMY, VAGINAL  2007    still has ovaries     LAPAROSCOPIC GASTRIC SLEEVE N/A 3/13/2018    Procedure: LAPAROSCOPIC GASTRIC SLEEVE;  Laparoscopic Sleeve Gastrectomy;  Surgeon: Kameron Joseph MD;  Location: [facility] OR       ALLERGIES:     Allergies   Allergen Reactions     Sulfa Drugs Hives     Cephalexin Hives     Cinnamon Hives     Strawberry Hives     Sulfamethoxazole-Trimethoprim Hives     Vicodin [Hydrocodone-Acetaminophen]      Wasp Venom Protein      Other reaction(s): Chest Pain     Wasps [Hornets] Swelling     Now carries Epi Pen     Zoloft [Sertraline]      suicidal ideation     Contrast Dye Other (See Comments) and Rash     Patient had sneezing and an itchy throat following contrast injection of 100 mL Isovue-370.  From IV contrast dye       FAMILY HISTORY:  Family History   Problem Relation Age of Onset     Eye Disorder Mother         lost eyesight; probable macular degeneration     Psychotic Disorder Mother         Dementia /Alzhimers     Neurologic  Disorder Mother         seizures     Diabetes Mother      Hypertension Mother      Alzheimer Disease Mother      Arthritis Mother      Osteoporosis Mother      Obesity Mother      Diabetes Father      Depression Father      Hyperlipidemia Father      Obesity Father      Psychotic Disorder Sister         bipolar     Thyroid Disease Sister         h/o thyroid cancer     Depression Sister         Bipolar     Obesity Sister      Cancer Other         Brain cancer     Osteoporosis Maternal Grandmother      Anxiety Disorder Son      Depression Sister      Thyroid Disease Sister          SOCIAL HISTORY:  Social History     Tobacco Use     Smoking status: Never Smoker     Smokeless tobacco: Never Used   Substance Use Topics     Alcohol use: Yes     Comment: 1-2 per mo     Drug use: No       ROS:   Constitutional: No fever, chills, or sweats. Weight stable.   ENT: No visual disturbance, ear ache, epistaxis, sore throat.   Cardiovascular: As per HPI.   Respiratory: No cough, hemoptysis.    GI: No nausea, vomiting, hematemesis, melena, or hematochezia.   : No hematuria.   Integument: Negative.   Psychiatric: Negative.   Hematologic:  No easy bruising, no easy bleeding.  Neuro: Negative.   Musculoskeletal: No myalgia.    Exam:  BP 99/67 (BP Location: Left arm, Patient Position: Chair, Cuff Size: Adult Large)   Pulse 93   Wt 103 kg (227 lb)   SpO2 96%   BMI 38.96 kg/m    GENERAL APPEARANCE: alert and no distress  HEENT: no icterus, no central cyanosis  LYMPH/NECK: no adenopathy, no asymmetry, JVP not elevated, no carotid bruits.  RESPIRATORY: lungs clear to auscultation - no rales, rhonchi or wheezes, no use of accessory muscles, no retractions, respirations are unlabored, normal respiratory rate  CARDIOVASCULAR: irregular rhythm, normal S1, S2, no S3 or S4 and no murmur, click or rub, precordium quiet with normal PMI.  GI: soft, non tender  EXTREMITIES:  no edema  NEURO: alert, normal speech,and affect  SKIN: no  ecchymoses, no rashes     I have reviewed the labs and personally reviewed the imaging below and made my comment in the assessment and plan.    Labs:  CBC RESULTS:   Lab Results   Component Value Date    WBC 5.4 05/01/2020    RBC 5.01 05/01/2020    HGB 14.3 05/01/2020    HCT 44.0 05/01/2020    MCV 88 05/01/2020    MCH 28.5 05/01/2020    MCHC 32.5 05/01/2020    RDW 14.9 05/01/2020     05/01/2020       BMP RESULTS:  Lab Results   Component Value Date     02/09/2021    POTASSIUM 4.3 02/09/2021    CHLORIDE 106 02/09/2021    CO2 31 02/09/2021    ANIONGAP 4 02/09/2021    GLC 93 02/09/2021    BUN 32 (H) 02/09/2021    CR 1.11 (H) 02/09/2021    GFRESTIMATED 56 (L) 02/09/2021    GFRESTBLACK 65 02/09/2021    CONCHA 9.7 02/09/2021        INR RESULTS:  Lab Results   Component Value Date    INR 0.9 (L) 02/27/2017       Echocardiogram 1/27/2020 zhouwu  Normal left ventricular size.   Normal left ventricular systolic function.   No obvious regional wall motion abnormalities.   The ejection fraction is visually estimated at 55-60%.   The right ventricle is normal in size and function.   The left atrium is normal in size.   There is trace mitral regurgitation.   There is no significant tricuspid regurgitation.   Small pericardial effusion.   No echocardiographic findings to suggest hemodynamic effect of the pericardial fluid.   Estimated EF: 55-60%    EKG 1/26/2020 atrial fibrillation with RVR    EKG 2/27/2020 sinus rhythm    EKG 3/17/2021 personally reviewed in clinic shows atrial fibrillation heart rate well controlled at 89 bpm.    Assessment and Plan:   Ms. Sunshine Delgado is a 55 year old  female with PMH significant for depression, anxiety, kidney stones, CKD and paroxysmal atrial fibrillation in January 2021.    Patient is being seen today for recurrent palpitations over the last few weeks.  EKG in clinic today shows atrial fibrillation HR 89 bpm (patient not on AV luis blocking agent)  She has not been feeling  well over the last few weeks.  She feels mildly lightheaded but denies chest pain, dyspnea on exertion, or syncope.    #Persistent atrial fibrillation  -Likely ongoing over the last few weeks, patient symptomatic  -Recommend YIFAN cardioversion  -Start Eliquis 5 mg twice daily, [id number] score is 0 therefore she will likely need anticoagulation a month after cardioversion.   -Labs today for INR and LFT.  -Atenolol 12.5 mg as needed (the patient was not able to tolerate metoprolol in the past.  She felt presyncopal with metoprolol)  -Recommended to lose weight (BMI is 39).    #Chronic kidney disease stage III  -Patient was on lisinopril 2.5 mg for CKD however she is feeling lightheaded and she has soft blood pressure.  I think atrial fibrillation is also contributing to soft blood pressure.  Recommend to stop lisinopril for now.  Once we restore sinus rhythm she might be able to get back on to lisinopril.     #Obesity, sleep problem  -Recommended sleep study to rule out sleep apnea    RTC a month after cardioversion.    Total time spent 40 minutes including precharting, face-to-face clinic visit and medical documentation.    Please donot hesitate to contact me if you have any questions or concerns. Again, thank you for allowing me to participate in the care of your patient.    Rut JUAREZ MD  [facility] Division of Cardiology  Pager [phone]

## 2021-03-17 NOTE — PATIENT INSTRUCTIONS
Thank you for coming to the [facility]; please note the following instructions:    1. STOP: Lisinopril    2. START: Eliquis 5mg twice daily    3. Sleep study referral     You are scheduled for a Transesophageal Echocardiogram followed by a Cardioversion at the [facility] ([address], [place] [address], [phone]).       You will need to undergo a COVID-19 PCR swab test within 4 days of procedure.   Follow these instructions:    1. Report to the [facility] waiting room in the [facility] hospital on: ___Friday March 19th_11:30 am arrival______    2. Please do not eat anything for 8 hours prior to your procedure. You may have sips of water up until 2 hours prior to your arrival.    3. The morning of your procedure you may take your scheduled medications with a SIP of water. If you take diabetic medications or a diuretic, you may hold these.     4. You will receive medication that makes you sleepy; you will need a  and someone to stay with you for 24 hours following this procedure.    You should not make any legal decisions for 24 hours following discharge.       If you have further questions, please utilize [employer] to contact us.   If your question concerns the above instructions, contact:  Eusebia Alexander RN   Electrophysiology Nurse Coordinator.  [phone]    If your question concerns the schedule/appointment times, contact:  [facility]   [phone]            If you have any questions regarding your visit please contact your care team:     Cardiology  Telephone Number   Eusebia MCDONNELL, VELMA FRANCES, RN   Janee RUANO, OTILIO MTZ, OTILIO CALIX, LPN   [phone] (option 1)   For scheduling appts:     [phone] (select option 1)       For the Device Clinic (Pacemakers and ICD's)  RN's :  Laxmi Wen   During business hours: [phone]    *After business hours:  [phone] (select option 4)      Normal test result  notifications will be released via [employer] or mailed within 7 business days.  All other test results, will be communicated via telephone once reviewed by your cardiologist.    If you need a medication refill please contact your pharmacy.  Please allow 3 business days for your refill to be completed.    As always, thank you for trusting us with your health care needs!  \  Patient Education     Text SUPPORT1 to 94862 to learn if you may be eligible for financial support with your medication(s).    Msg & Data Rates May Apply. Msg freq varies. Terms apply. Text HELP for help. Text STOP to end.   Apixaban oral tablets  Brand Name: Eliquis  What is this medicine?  APIXABAN (a PIX a ban) is an anticoagulant (blood thinner). It is used to lower the chance of stroke in people with a medical condition called atrial fibrillation. It is also used to treat or prevent blood clots in the lungs or in the veins.  How should I use this medicine?  Take this medicine by mouth with a glass of water. Follow the directions on the prescription label. You can take it with or without food. If it upsets your stomach, take it with food. Take your medicine at regular intervals. Do not take it more often than directed. Do not stop taking except on your doctor's advice. Stopping this medicine may increase your risk of a blood clot. Be sure to refill your prescription before you run out of medicine.  Talk to your pediatrician regarding the use of this medicine in children. Special care may be needed.  What side effects may I notice from receiving this medicine?  Side effects that you should report to your doctor or health care professional as soon as possible:    allergic reactions like skin rash, itching or hives, swelling of the face, lips, or tongue    signs and symptoms of bleeding such as bloody or black, tarry stools; red or dark-brown urine; spitting up blood or brown material that looks like coffee grounds; red spots on the skin; unusual  bruising or bleeding from the eye, gums, or nose  What may interact with this medicine?  This medicine may interact with the following:    aspirin and aspirin-like medicines    certain medicines for fungal infections like ketoconazole and itraconazole    certain medicines for seizures like carbamazepine and phenytoin    certain medicines that treat or prevent blood clots like warfarin, enoxaparin, and dalteparin    clarithromycin    NSAIDs, medicines for pain and inflammation, like ibuprofen or naproxen    rifampin    ritonavir    Greentop's wort  What if I miss a dose?  If you miss a dose, take it as soon as you can. If it is almost time for your next dose, take only that dose. Do not take double or extra doses.  Where should I keep my medicine?  Keep out of the reach of children.  Store at room temperature between 20 and 25 degrees C (68 and 77 degrees F). Throw away any unused medicine after the expiration date.  What should I tell my health care provider before I take this medicine?  They need to know if you have any of these conditions:    bleeding disorders    bleeding in the brain    blood in your stools (black or tarry stools) or if you have blood in your vomit    history of stomach bleeding    kidney disease    liver disease    mechanical heart valve    an unusual or allergic reaction to apixaban, other medicines, foods, dyes, or preservatives    pregnant or trying to get pregnant    breast-feeding  What should I watch for while using this medicine?  Visit your doctor or health care professional for regular checks on your progress.  Notify your doctor or health care professional and seek emergency treatment if you develop breathing problems; changes in vision; chest pain; severe, sudden headache; pain, swelling, warmth in the leg; trouble speaking; sudden numbness or weakness of the face, arm or leg. These can be signs that your condition has gotten worse.  If you are going to have surgery or other  procedure, tell your doctor that you are taking this medicine.  NOTE:This sheet is a summary. It may not cover all possible information. If you have questions about this medicine, talk to your doctor, pharmacist, or health care provider. Copyright  2020 [employer]           Patient Education     Text SUPPORT1 to 10164 to learn if you may be eligible for financial support with your medication(s).    Msg & Data Rates May Apply. Msg freq varies. Terms apply. Text HELP for help. Text STOP to end.   Apixaban oral tablets  Brand Name: Eliquis  What is this medicine?  APIXABAN (a PIX a ban) is an anticoagulant (blood thinner). It is used to lower the chance of stroke in people with a medical condition called atrial fibrillation. It is also used to treat or prevent blood clots in the lungs or in the veins.  How should I use this medicine?  Take this medicine by mouth with a glass of water. Follow the directions on the prescription label. You can take it with or without food. If it upsets your stomach, take it with food. Take your medicine at regular intervals. Do not take it more often than directed. Do not stop taking except on your doctor's advice. Stopping this medicine may increase your risk of a blood clot. Be sure to refill your prescription before you run out of medicine.  Talk to your pediatrician regarding the use of this medicine in children. Special care may be needed.  What side effects may I notice from receiving this medicine?  Side effects that you should report to your doctor or health care professional as soon as possible:    allergic reactions like skin rash, itching or hives, swelling of the face, lips, or tongue    signs and symptoms of bleeding such as bloody or black, tarry stools; red or dark-brown urine; spitting up blood or brown material that looks like coffee grounds; red spots on the skin; unusual bruising or bleeding from the eye, gums, or nose  What may interact with this medicine?  This medicine  may interact with the following:    aspirin and aspirin-like medicines    certain medicines for fungal infections like ketoconazole and itraconazole    certain medicines for seizures like carbamazepine and phenytoin    certain medicines that treat or prevent blood clots like warfarin, enoxaparin, and dalteparin    clarithromycin    NSAIDs, medicines for pain and inflammation, like ibuprofen or naproxen    rifampin    ritonavir    Jean's wort  What if I miss a dose?  If you miss a dose, take it as soon as you can. If it is almost time for your next dose, take only that dose. Do not take double or extra doses.  Where should I keep my medicine?  Keep out of the reach of children.  Store at room temperature between 20 and 25 degrees C (68 and 77 degrees F). Throw away any unused medicine after the expiration date.  What should I tell my health care provider before I take this medicine?  They need to know if you have any of these conditions:    bleeding disorders    bleeding in the brain    blood in your stools (black or tarry stools) or if you have blood in your vomit    history of stomach bleeding    kidney disease    liver disease    mechanical heart valve    an unusual or allergic reaction to apixaban, other medicines, foods, dyes, or preservatives    pregnant or trying to get pregnant    breast-feeding  What should I watch for while using this medicine?  Visit your doctor or health care professional for regular checks on your progress.  Notify your doctor or health care professional and seek emergency treatment if you develop breathing problems; changes in vision; chest pain; severe, sudden headache; pain, swelling, warmth in the leg; trouble speaking; sudden numbness or weakness of the face, arm or leg. These can be signs that your condition has gotten worse.  If you are going to have surgery or other procedure, tell your doctor that you are taking this medicine.  NOTE:This sheet is a summary. It may not cover  all possible information. If you have questions about this medicine, talk to your doctor, pharmacist, or health care provider. Copyright  2020 [employer]

## 2021-03-17 NOTE — PROGRESS NOTES
HPI: Ms. Sunshine Delgado is a 55 year old  female with PMH significant for depression, anxiety, kidney stones.    Patient was admitted to [facility] in January of this year with abdominal pain.  She was found to have infectious mononucleosis.  She was treated with antibiotics for 2 weeks for tonsillitis prior to her ED presentation. Patient's EKG showed atrial fibrillation which spontaneously converted to sinus rhythm while she was in the hospital.  Patient underwent transthoracic echocardiogram which showed a small pericardial effusion with normal EF and valve function.  Atrial fibrillation was attributed to mild pericarditis from infectious mononucleosis.  Patient was started on metoprolol and aspirin.  Metoprolol was recently discontinued by primary care physician due to frequent lightheadedness which has improved since then.    Patient does not have prior history of cardiac disease or atrial fibrillation except this 1 episode in January.  No prior history of hypertension, diabetes, stroke or sleep apnea.  She does not smoke.  She drinks alcohol occasionally. Patient was recently diagnosed with Hashimoto thyroiditis and started on levothyroxine.      Patient walks 7 to 8 miles every day with no difficulty.  Denies chest pain, shortness of breath, syncope.  Patient reports palpitations and checks her heart rate through apple watch.  So far heart rate was between 50 to 90s.  Patient did not feel any palpitations over the last 1 week.    Patient is currently on aspirin 325, bupropion, Lexapro, fluticasone nasal spray, levothyroxine, and trazodone.    Medications, personal, family, and social history reviewed with patient and revised.    Interval history 3/17/2021:  Patient seen today for follow-up.  She tells me that since I saw her a year ago she has been feeling palpitations on and off.  However a few weeks ago she had 3-day lasting palpitations which was more severe than before.  She did feel chest pressure,  shortness of breath and almost presyncopal.  Denies syncope.  She feels something is not right in her chest.  Today EKG in clinic shows atrial fibrillation heart rate 89 bpm.  Denies alcohol.  No prior history of sleep apnea.  She has been started on lisinopril 2.5 mg a month ago by her primary care physician for chronic kidney disease.  She tells me that she is probably more lightheaded since then.  But she acknowledges lightheadedness even before she started lisinopril.  She tells me that she had kidney infection in 2016 and likely she has chronic kidney disease due to that.    PAST MEDICAL HISTORY:  Past Medical History:   Diagnosis Date     Atrial fibrillation with rapid ventricular response (H) 1/26/2020     Bee sting allergy      Depressive disorder      Generalized anxiety disorder 10/15/2009    lexapro made things worse.       Hashimoto's thyroiditis 5/6/2020     Morbid obesity (H)      Ocular migraine      MARIA GUADALUPE (obstructive sleep apnea)- severe (AHI 84) 09/09/2011     Voice fatigue 10/22/2009       CURRENT MEDICATIONS:  Current Outpatient Medications   Medication Sig Dispense Refill     acetaminophen (TYLENOL) 325 MG tablet        Ascorbic Acid (VITAMIN C PO) Take by mouth every morning       buPROPion (WELLBUTRIN XL) 300 MG 24 hr tablet Take 1 tablet (300 mg) by mouth every morning 90 tablet 1     Cholecalciferol (VITAMIN D PO) Take by mouth every morning       EPINEPHrine (AUVI-Q) 0.3 MG/0.3ML injection 2-pack Inject 0.3 mLs (0.3 mg) into the muscle as needed for anaphylaxis 0.6 mL 3     escitalopram (LEXAPRO) 10 MG tablet Take 1 tablet (10 mg) by mouth daily 90 tablet 1     fluticasone (FLONASE) 50 MCG/ACT spray Spray 2 sprays into both nostrils daily 16 g 1     levothyroxine (SYNTHROID/LEVOTHROID) 25 MCG tablet Take 0.5 tablets (12.5 mcg) by mouth daily Take with 50 mcg tablet 45 tablet 0     levothyroxine (SYNTHROID/LEVOTHROID) 50 MCG tablet Take 1 tablet (50 mcg) by mouth daily Take with 12.5 mg tablet  90 tablet 0     lisinopril (ZESTRIL) 2.5 MG tablet Take 1 tablet (2.5 mg) by mouth daily 90 tablet 3     multivitamin, therapeutic with minerals (MULTI-VITAMIN) TABS tablet Take 1 tablet by mouth every morning       order for DME [employer]med Airsense 10 auto cpap 6-7 cm, Airfit P10 for her XS pillows       traZODone (DESYREL) 50 MG tablet TAKE 2-3 TABLETS BY MOUTH AT BEDTIME 270 tablet 1       PAST SURGICAL HISTORY:  Past Surgical History:   Procedure Laterality Date     COLONOSCOPY  2000     DAVINCI GASTRIC SLEEVE       GENITOURINARY SURGERY  2007     HYSTERECTOMY, PAP NO LONGER INDICATED       HYSTERECTOMY, VAGINAL  2007    still has ovaries     LAPAROSCOPIC GASTRIC SLEEVE N/A 3/13/2018    Procedure: LAPAROSCOPIC GASTRIC SLEEVE;  Laparoscopic Sleeve Gastrectomy;  Surgeon: Kameron Joseph MD;  Location: [facility] OR       ALLERGIES:     Allergies   Allergen Reactions     Sulfa Drugs Hives     Cephalexin Hives     Cinnamon Hives     Strawberry Hives     Sulfamethoxazole-Trimethoprim Hives     Vicodin [Hydrocodone-Acetaminophen]      Wasp Venom Protein      Other reaction(s): Chest Pain     Wasps [Hornets] Swelling     Now carries Epi Pen     Zoloft [Sertraline]      suicidal ideation     Contrast Dye Other (See Comments) and Rash     Patient had sneezing and an itchy throat following contrast injection of 100 mL Isovue-370.  From IV contrast dye       FAMILY HISTORY:  Family History   Problem Relation Age of Onset     Eye Disorder Mother         lost eyesight; probable macular degeneration     Psychotic Disorder Mother         Dementia /Alzhimers     Neurologic Disorder Mother         seizures     Diabetes Mother      Hypertension Mother      Alzheimer Disease Mother      Arthritis Mother      Osteoporosis Mother      Obesity Mother      Diabetes Father      Depression Father      Hyperlipidemia Father      Obesity Father      Psychotic Disorder Sister         bipolar     Thyroid Disease Sister         h/o thyroid cancer      Depression Sister         Bipolar     Obesity Sister      Cancer Other         Brain cancer     Osteoporosis Maternal Grandmother      Anxiety Disorder Son      Depression Sister      Thyroid Disease Sister          SOCIAL HISTORY:  Social History     Tobacco Use     Smoking status: Never Smoker     Smokeless tobacco: Never Used   Substance Use Topics     Alcohol use: Yes     Comment: 1-2 per mo     Drug use: No       ROS:   Constitutional: No fever, chills, or sweats. Weight stable.   ENT: No visual disturbance, ear ache, epistaxis, sore throat.   Cardiovascular: As per HPI.   Respiratory: No cough, hemoptysis.    GI: No nausea, vomiting, hematemesis, melena, or hematochezia.   : No hematuria.   Integument: Negative.   Psychiatric: Negative.   Hematologic:  No easy bruising, no easy bleeding.  Neuro: Negative.   Musculoskeletal: No myalgia.    Exam:  BP 99/67 (BP Location: Left arm, Patient Position: Chair, Cuff Size: Adult Large)   Pulse 93   Wt 103 kg (227 lb)   SpO2 96%   BMI 38.96 kg/m    GENERAL APPEARANCE: alert and no distress  HEENT: no icterus, no central cyanosis  LYMPH/NECK: no adenopathy, no asymmetry, JVP not elevated, no carotid bruits.  RESPIRATORY: lungs clear to auscultation - no rales, rhonchi or wheezes, no use of accessory muscles, no retractions, respirations are unlabored, normal respiratory rate  CARDIOVASCULAR: irregular rhythm, normal S1, S2, no S3 or S4 and no murmur, click or rub, precordium quiet with normal PMI.  GI: soft, non tender  EXTREMITIES:  no edema  NEURO: alert, normal speech,and affect  SKIN: no ecchymoses, no rashes     I have reviewed the labs and personally reviewed the imaging below and made my comment in the assessment and plan.    Labs:  CBC RESULTS:   Lab Results   Component Value Date    WBC 5.4 05/01/2020    RBC 5.01 05/01/2020    HGB 14.3 05/01/2020    HCT 44.0 05/01/2020    MCV 88 05/01/2020    MCH 28.5 05/01/2020    MCHC 32.5 05/01/2020    RDW 14.9  05/01/2020     05/01/2020       BMP RESULTS:  Lab Results   Component Value Date     02/09/2021    POTASSIUM 4.3 02/09/2021    CHLORIDE 106 02/09/2021    CO2 31 02/09/2021    ANIONGAP 4 02/09/2021    GLC 93 02/09/2021    BUN 32 (H) 02/09/2021    CR 1.11 (H) 02/09/2021    GFRESTIMATED 56 (L) 02/09/2021    GFRESTBLACK 65 02/09/2021    CONCHA 9.7 02/09/2021        INR RESULTS:  Lab Results   Component Value Date    INR 0.9 (L) 02/27/2017       Echocardiogram 1/27/2020 [facility]  Normal left ventricular size.   Normal left ventricular systolic function.   No obvious regional wall motion abnormalities.   The ejection fraction is visually estimated at 55-60%.   The right ventricle is normal in size and function.   The left atrium is normal in size.   There is trace mitral regurgitation.   There is no significant tricuspid regurgitation.   Small pericardial effusion.   No echocardiographic findings to suggest hemodynamic effect of the pericardial fluid.   Estimated EF: 55-60%    EKG 1/26/2020 atrial fibrillation with RVR    EKG 2/27/2020 sinus rhythm    EKG 3/17/2021 personally reviewed in clinic shows atrial fibrillation heart rate well controlled at 89 bpm.    Assessment and Plan:   Ms. Sunshine Delgado is a 55 year old  female with PMH significant for depression, anxiety, kidney stones, CKD and paroxysmal atrial fibrillation in January 2021.    Patient is being seen today for recurrent palpitations over the last few weeks.  EKG in clinic today shows atrial fibrillation HR 89 bpm (patient not on AV luis blocking agent)  She has not been feeling well over the last few weeks.  She feels mildly lightheaded but denies chest pain, dyspnea on exertion, or syncope.    #Persistent atrial fibrillation  -Likely ongoing over the last few weeks, patient symptomatic  -Recommend YIFAN cardioversion  -Start Eliquis 5 mg twice daily, QLG1CG7-RCFt score is 0 therefore she will likely need anticoagulation a month after  cardioversion.   -Labs today for INR and LFT.  -Atenolol 12.5 mg as needed (the patient was not able to tolerate metoprolol in the past.  She felt presyncopal with metoprolol)  -Recommended to lose weight (BMI is 39).    #Chronic kidney disease stage III  -Patient was on lisinopril 2.5 mg for CKD however she is feeling lightheaded and she has soft blood pressure.  I think atrial fibrillation is also contributing to soft blood pressure.  Recommend to stop lisinopril for now.  Once we restore sinus rhythm she might be able to get back on to lisinopril.     #Obesity, sleep problem  -Recommended sleep study to rule out sleep apnea    RTC a month after cardioversion.    Total time spent 40 minutes including precharting, face-to-face clinic visit and medical documentation.    Please donot hesitate to contact me if you have any questions or concerns. Again, thank you for allowing me to participate in the care of your patient.    Rut JUAREZ MD  [facility] Division of Cardiology  Pager [phone]

## 2021-03-17 NOTE — NURSING NOTE
"Chief Complaint   Patient presents with     RECHECK     Afib episodes, heart hurting, lightheadedness, near syncope, MCHUGH.        Initial BP 99/67 (BP Location: Left arm, Patient Position: Chair, Cuff Size: Adult Large)   Pulse 93   Wt 103 kg (227 lb)   SpO2 96%   BMI 38.96 kg/m   Estimated body mass index is 38.96 kg/m  as calculated from the following:    Height as of 7/16/20: 1.626 m (5' 4\").    Weight as of this encounter: 103 kg (227 lb)..  BP completed using cuff size: oscar Alberto MA  "

## 2021-03-18 DIAGNOSIS — E06.3 HASHIMOTO'S THYROIDITIS: Primary | ICD-10-CM

## 2021-03-18 LAB
LABORATORY COMMENT REPORT: NORMAL
SARS-COV-2 RNA RESP QL NAA+PROBE: NEGATIVE
SPECIMEN SOURCE: NORMAL

## 2021-03-18 RX ORDER — LEVOTHYROXINE SODIUM 75 UG/1
75 TABLET ORAL DAILY
Qty: 30 TABLET | Refills: 1 | Status: SHIPPED | OUTPATIENT
Start: 2021-03-18 | End: 2021-06-07

## 2021-03-19 ENCOUNTER — APPOINTMENT (OUTPATIENT)
Dept: MEDSURG UNIT | Facility: CLINIC | Age: 56
End: 2021-03-19
Payer: COMMERCIAL

## 2021-03-29 ENCOUNTER — MYC MEDICAL ADVICE (OUTPATIENT)
Dept: CARDIOLOGY | Facility: CLINIC | Age: 56
End: 2021-03-29

## 2021-03-29 DIAGNOSIS — I48.0 PAF (PAROXYSMAL ATRIAL FIBRILLATION) (H): Primary | ICD-10-CM

## 2021-04-02 ENCOUNTER — ANCILLARY PROCEDURE (OUTPATIENT)
Dept: CARDIOLOGY | Facility: CLINIC | Age: 56
End: 2021-04-02
Attending: INTERNAL MEDICINE
Payer: COMMERCIAL

## 2021-04-02 DIAGNOSIS — I48.0 PAF (PAROXYSMAL ATRIAL FIBRILLATION) (H): ICD-10-CM

## 2021-04-02 PROCEDURE — 93306 TTE W/DOPPLER COMPLETE: CPT | Performed by: INTERNAL MEDICINE

## 2021-04-02 NOTE — PROGRESS NOTES
2 week ZioXT applied to patient today. Instructions on use and removal given and mail back instructions discussed. All questions answered. Consent form signed. Device registered. Form faxed to [employer].  Device number: [id number].    Sasha Alberto MA

## 2021-04-06 ENCOUNTER — MYC MEDICAL ADVICE (OUTPATIENT)
Dept: FAMILY MEDICINE | Facility: CLINIC | Age: 56
End: 2021-04-06

## 2021-04-09 ENCOUNTER — TELEPHONE (OUTPATIENT)
Dept: CARDIOLOGY | Facility: CLINIC | Age: 56
End: 2021-04-09

## 2021-04-09 DIAGNOSIS — R00.2 PALPITATIONS: ICD-10-CM

## 2021-04-09 RX ORDER — ATENOLOL 25 MG/1
12.5 TABLET ORAL DAILY
Qty: 45 TABLET | Refills: 3 | Status: SHIPPED | OUTPATIENT
Start: 2021-04-09 | End: 2021-06-08

## 2021-04-09 RX ORDER — ATENOLOL 25 MG/1
12.5 TABLET ORAL DAILY
Qty: 45 TABLET | Refills: 3 | Status: SHIPPED | OUTPATIENT
Start: 2021-04-09 | End: 2021-04-09

## 2021-04-14 ENCOUNTER — E-VISIT (OUTPATIENT)
Dept: FAMILY MEDICINE | Facility: CLINIC | Age: 56
End: 2021-04-14
Payer: COMMERCIAL

## 2021-04-14 DIAGNOSIS — N39.0 ACUTE UTI (URINARY TRACT INFECTION): Primary | ICD-10-CM

## 2021-04-14 PROCEDURE — 99421 OL DIG E/M SVC 5-10 MIN: CPT | Performed by: NURSE PRACTITIONER

## 2021-04-14 RX ORDER — NITROFURANTOIN 25; 75 MG/1; MG/1
100 CAPSULE ORAL 2 TIMES DAILY
Qty: 10 CAPSULE | Refills: 0 | Status: SHIPPED | OUTPATIENT
Start: 2021-04-14 | End: 2021-04-19

## 2021-04-14 NOTE — PATIENT INSTRUCTIONS
Dear Sunshine Delgado    After reviewing your responses, I've been able to diagnose you with a urinary tract infection, which is a common infection of the bladder with bacteria.  This is not a sexually transmitted infection, though urinating immediately after intercourse can help prevent infections.  Drinking lots of fluids is also helpful to clear your current infection and prevent the next one.      I have sent a prescription for antibiotics to your pharmacy to treat this infection.    It is important that you take all of your prescribed medication even if your symptoms are improving after a few doses.  Taking all of your medicine helps prevent the symptoms from returning.     If your symptoms worsen, you develop pain in your back or stomach, develop fevers, or are not improving in 5 days, please contact your primary care provider for an appointment or visit any of our convenient Walk-in or Urgent Care Centers to be seen, which can be found on our website here.    Thanks again for choosing us as your health care partner,    LEOPOLDO Michelle CNP

## 2021-04-20 ENCOUNTER — OFFICE VISIT (OUTPATIENT)
Dept: OPHTHALMOLOGY | Facility: CLINIC | Age: 56
End: 2021-04-20
Payer: COMMERCIAL

## 2021-04-20 DIAGNOSIS — H43.811 POSTERIOR VITREOUS DETACHMENT OF RIGHT EYE: ICD-10-CM

## 2021-04-20 DIAGNOSIS — Z98.890 HISTORY OF PHOTOREFRACTIVE KERATECTOMY: Primary | ICD-10-CM

## 2021-04-20 PROCEDURE — 92004 COMPRE OPH EXAM NEW PT 1/>: CPT | Performed by: STUDENT IN AN ORGANIZED HEALTH CARE EDUCATION/TRAINING PROGRAM

## 2021-04-20 ASSESSMENT — SLIT LAMP EXAM - LIDS
COMMENTS: NORMAL
COMMENTS: NORMAL

## 2021-04-20 ASSESSMENT — TONOMETRY
IOP_METHOD: APPLANATION
OD_IOP_MMHG: 21
OS_IOP_MMHG: 19

## 2021-04-20 ASSESSMENT — VISUAL ACUITY
OD_PH_SC: 20/25+3
OD_SC: 20/30-2
OS_SC: 20/25-1
METHOD: SNELLEN - LINEAR

## 2021-04-20 ASSESSMENT — CUP TO DISC RATIO
OS_RATIO: 0.4
OD_RATIO: 0.25

## 2021-04-20 ASSESSMENT — EXTERNAL EXAM - LEFT EYE: OS_EXAM: NORMAL

## 2021-04-20 ASSESSMENT — EXTERNAL EXAM - RIGHT EYE: OD_EXAM: NORMAL

## 2021-04-20 NOTE — PATIENT INSTRUCTIONS
Signs and symptoms of retinal detachment (shower of black floaters, frequent flashes of light, curtain over part of visual field) discussed.    Sinai Caldera MD  [phone]

## 2021-04-20 NOTE — PROGRESS NOTES
Current Eye Medications:  no     Subjective: Floaters and cloudy vision right for about the past 3 weeks.  Pt has hx of refractive surgery in each eye about 5 years ago.     Objective:  See Ophthalmology Exam.      Assessment:  Sunshine Delgado is a 56 year old female who presents with:   Encounter Diagnoses   Name Primary?     History of Photorefractive keratectomy (PRK)- Both Eyes      Posterior vitreous detachment of right eye - Right Eye Acute right eye.        Plan:  Signs and symptoms of retinal detachment (shower of black floaters, frequent flashes of light, curtain over part of visual field) discussed.    Sinai Cadlera MD  [phone]

## 2021-04-20 NOTE — LETTER
4/20/2021         RE: Sunshine Delgado  [address] [place] [id number]        Dear Colleague,    Thank you for referring your patient, Sunshine Delgado, to the [facility]. Please see a copy of my visit note below.     Current Eye Medications:  no     Subjective: Floaters and cloudy vision right for about the past 3 weeks.  Pt has hx of refractive surgery in each eye about 5 years ago.     Objective:  See Ophthalmology Exam.      Assessment:  Sunshine Delgado is a 56 year old female who presents with:   Encounter Diagnoses   Name Primary?     History of Photorefractive keratectomy (PRK)- Both Eyes      Posterior vitreous detachment of right eye - Right Eye Acute right eye.        Plan:  Signs and symptoms of retinal detachment (shower of black floaters, frequent flashes of light, curtain over part of visual field) discussed.    Sinai Caldera MD  [phone]                   Again, thank you for allowing me to participate in the care of your patient.        Sincerely,        Sinai Caldera MD

## 2021-04-22 ENCOUNTER — OFFICE VISIT (OUTPATIENT)
Dept: FAMILY MEDICINE | Facility: CLINIC | Age: 56
End: 2021-04-22
Payer: COMMERCIAL

## 2021-04-22 ENCOUNTER — MYC MEDICAL ADVICE (OUTPATIENT)
Dept: FAMILY MEDICINE | Facility: CLINIC | Age: 56
End: 2021-04-22

## 2021-04-22 VITALS
WEIGHT: 224.03 LBS | HEART RATE: 76 BPM | OXYGEN SATURATION: 98 % | BODY MASS INDEX: 38.45 KG/M2 | TEMPERATURE: 98.1 F | DIASTOLIC BLOOD PRESSURE: 60 MMHG | SYSTOLIC BLOOD PRESSURE: 99 MMHG

## 2021-04-22 DIAGNOSIS — R30.0 DYSURIA: ICD-10-CM

## 2021-04-22 DIAGNOSIS — N30.00 ACUTE CYSTITIS WITHOUT HEMATURIA: Primary | ICD-10-CM

## 2021-04-22 LAB
ALBUMIN UR-MCNC: NEGATIVE MG/DL
APPEARANCE UR: CLEAR
BILIRUB UR QL STRIP: NEGATIVE
COLOR UR AUTO: YELLOW
GLUCOSE UR STRIP-MCNC: NEGATIVE MG/DL
HGB UR QL STRIP: NEGATIVE
KETONES UR STRIP-MCNC: NEGATIVE MG/DL
LEUKOCYTE ESTERASE UR QL STRIP: ABNORMAL
NITRATE UR QL: NEGATIVE
NON-SQ EPI CELLS #/AREA URNS LPF: NORMAL /LPF
PH UR STRIP: 5.5 PH (ref 5–7)
RBC #/AREA URNS AUTO: NORMAL /HPF
SOURCE: ABNORMAL
SP GR UR STRIP: 1.02 (ref 1–1.03)
UROBILINOGEN UR STRIP-ACNC: 1 EU/DL (ref 0.2–1)
WBC #/AREA URNS AUTO: NORMAL /HPF

## 2021-04-22 PROCEDURE — 81001 URINALYSIS AUTO W/SCOPE: CPT | Performed by: PHYSICIAN ASSISTANT

## 2021-04-22 PROCEDURE — 99213 OFFICE O/P EST LOW 20 MIN: CPT | Performed by: PHYSICIAN ASSISTANT

## 2021-04-22 RX ORDER — CIPROFLOXACIN 500 MG/1
500 TABLET, FILM COATED ORAL 2 TIMES DAILY
Qty: 10 TABLET | Refills: 0 | Status: SHIPPED | OUTPATIENT
Start: 2021-04-22 | End: 2021-04-27

## 2021-04-22 ASSESSMENT — PAIN SCALES - GENERAL: PAINLEVEL: MODERATE PAIN (4)

## 2021-04-22 NOTE — PROGRESS NOTES
"    Assessment & Plan     Acute cystitis without hematuria  Dysuria  Has pain with/after urination. Sometimes feels incomplete emptying. Trace LE on UA. Suggest another course of antibiotics, but recommend close follow up if not improving. If no improvement, suggest urology referral. Patient agrees with plan. I discussed with the patient risks and benefits of the new medication prescribed including potential side effects.  The patient had opportunity to ask questions and is comfortable with and interested in medications as prescribed.   - ciprofloxacin (CIPRO) 500 MG tablet; Take 1 tablet (500 mg) by mouth 2 times daily for 5 days  - UA reflex to Microscopic and Culture  - Urine Microscopic      BMI:   Estimated body mass index is 38.45 kg/m  as calculated from the following:    Height as of 7/16/20: 1.626 m (5' 4\").    Weight as of this encounter: 101.6 kg (224 lb 0.5 oz).   Weight management plan: Discussed healthy diet and exercise guidelines      No follow-ups on file.    Linn Montemayor PA-C  [facility] JONATAN Gonsalez is a 56 year old who presents for the following health issues     HPI     Concern - Recheck UTI  Onset: 2 weeks   Description: Pressure, does not finish urinating, pain  Intensity: moderate  Progression of Symptoms:  same  Accompanying Signs & Symptoms: Lower back pain  Previous history of similar problem: One UTI in the past  Precipitating factors:        Worsened by: Sitting to standing  Alleviating factors:        Improved by: None  Therapies tried and outcome: Antibiotics, no relief         Review of Systems   Constitutional, HEENT, cardiovascular, pulmonary, gi and gu systems are negative, except as otherwise noted.      Objective    BP 99/60 (BP Location: Right arm, Patient Position: Chair, Cuff Size: Adult Regular)   Pulse 76   Temp 98.1  F (36.7  C) (Oral)   Wt 101.6 kg (224 lb 0.5 oz)   SpO2 98%   BMI 38.45 kg/m    Body mass index is 38.45 " kg/m .  Physical Exam   GENERAL: healthy, alert and no distress  NECK: no adenopathy, no asymmetry, masses, or scars and thyroid normal to palpation  RESP: lungs clear to auscultation - no rales, rhonchi or wheezes  CV: regular rate and rhythm, normal S1 S2, no S3 or S4, no murmur, click or rub, no peripheral edema and peripheral pulses strong  ABDOMEN: soft, nontender, no hepatosplenomegaly, no masses and bowel sounds normal  MS: no gross musculoskeletal defects noted, no edema    Results for orders placed or performed in visit on 04/22/21 (from the past 24 hour(s))   UA reflex to Microscopic and Culture    Specimen: Midstream Urine   Result Value Ref Range    Color Urine Yellow     Appearance Urine Clear     Glucose Urine Negative NEG^Negative mg/dL    Bilirubin Urine Negative NEG^Negative    Ketones Urine Negative NEG^Negative mg/dL    Specific Gravity Urine 1.020 1.003 - 1.035    Blood Urine Negative NEG^Negative    pH Urine 5.5 5.0 - 7.0 pH    Protein Albumin Urine Negative NEG^Negative mg/dL    Urobilinogen Urine 1.0 0.2 - 1.0 EU/dL    Nitrite Urine Negative NEG^Negative    Leukocyte Esterase Urine Trace (A) NEG^Negative    Source Midstream Urine    Urine Microscopic   Result Value Ref Range    WBC Urine 0 - 5 OTO5^0 - 5 /HPF    RBC Urine O - 2 OTO2^O - 2 /HPF    Squamous Epithelial /LPF Urine Few FEW^Few /LPF

## 2021-04-28 ENCOUNTER — OFFICE VISIT (OUTPATIENT)
Dept: CARDIOLOGY | Facility: CLINIC | Age: 56
End: 2021-04-28
Payer: COMMERCIAL

## 2021-04-28 ENCOUNTER — TELEPHONE (OUTPATIENT)
Dept: CARDIOLOGY | Facility: CLINIC | Age: 56
End: 2021-04-28

## 2021-04-28 VITALS
WEIGHT: 222 LBS | HEART RATE: 52 BPM | SYSTOLIC BLOOD PRESSURE: 102 MMHG | BODY MASS INDEX: 38.11 KG/M2 | OXYGEN SATURATION: 97 % | DIASTOLIC BLOOD PRESSURE: 69 MMHG

## 2021-04-28 DIAGNOSIS — Z51.81 ENCOUNTER FOR MONITORING FLECAINIDE THERAPY: ICD-10-CM

## 2021-04-28 DIAGNOSIS — Z79.899 ENCOUNTER FOR MONITORING FLECAINIDE THERAPY: ICD-10-CM

## 2021-04-28 DIAGNOSIS — I48.0 PAF (PAROXYSMAL ATRIAL FIBRILLATION) (H): Primary | ICD-10-CM

## 2021-04-28 PROCEDURE — 93000 ELECTROCARDIOGRAM COMPLETE: CPT | Performed by: INTERNAL MEDICINE

## 2021-04-28 PROCEDURE — 99215 OFFICE O/P EST HI 40 MIN: CPT | Performed by: INTERNAL MEDICINE

## 2021-04-28 RX ORDER — FLECAINIDE ACETATE 50 MG/1
50 TABLET ORAL 2 TIMES DAILY
Qty: 180 TABLET | Refills: 3 | Status: SHIPPED | OUTPATIENT
Start: 2021-04-28 | End: 2021-06-08

## 2021-04-28 NOTE — TELEPHONE ENCOUNTER
[facility] Call Center    Phone Message    May a detailed message be left on voicemail: yes     Reason for Call: Other: Cary calling with results from dates 4/2-4/16.  Please call her back to discuss     Action Taken: Message routed to:  [facility] ([facility]    Travel Screening: Not Applicable

## 2021-04-28 NOTE — PATIENT INSTRUCTIONS
Thank you for coming to the [facility]; please note the following instructions:      1. New medication: Flecainide 50 mg twice daily.     2. Start taking your atenolol every day. You should take 12.5 mg at bedtime    3. In 2 weeks we will schedule a treadmill stress test. You should hold your atenolol 2 days prior to this test    4. In 1 month please send Dr Fajardo a my chart message with an update on how you are feeling. Include how often you are having atrial fibrillation.     5. You will be scheduled for a follow up visit with Dr Fajardo in 3 months.     6. Once we have the heart monitor results we will contact you by my chart      . Preventive Care:    Breast Cancer Screening: During our visit today, we discussed that it is recommended you receive breast cancer screening. Please call or make an appointment with your primary care provider to discuss this with them. You may also call the [facility] scheduling line ([phone]) to set up a mammography appointment at the Breast Center within the [facility].            If you have any questions regarding your visit please contact your care team:     Cardiology  Telephone Number   Eusebia MCDONNELL, RN  Cheryl FRANCES,RN  Janee RUANO, OTILIO MTZ, MA  Zev CALIX, DUSTINN   [phone]   (select option 1)    *After hours: [phone]     For scheduling appts:     [phone] or    [phone] (select option 1)    *After hours: [phone]     For the Device Clinic (Pacemakers and ICD's)  RN's :  Laxmi Wen   During business hours: [phone]    *After business hours:  [phone] (select option 4)      Normal test result notifications will be released via [employer] or mailed within 7 business days.  All other test results, will be communicated via telephone once reviewed by your cardiologist.    If you need a medication refill please contact your pharmacy.  Please allow 3 business days for your refill to be  completed.    As always, thank you for trusting us with your health care needs!

## 2021-04-28 NOTE — PROGRESS NOTES
HPI: Ms. Sunshine Delgado is a 55 year old  female with PMH significant for depression, anxiety, kidney stones.    Patient was admitted to [facility] in January of this year with abdominal pain.  She was found to have infectious mononucleosis.  She was treated with antibiotics for 2 weeks for tonsillitis prior to her ED presentation. Patient's EKG showed atrial fibrillation which spontaneously converted to sinus rhythm while she was in the hospital.  Patient underwent transthoracic echocardiogram which showed a small pericardial effusion with normal EF and valve function.  Atrial fibrillation was attributed to mild pericarditis from infectious mononucleosis.  Patient was started on metoprolol and aspirin.  Metoprolol was recently discontinued by primary care physician due to frequent lightheadedness which has improved since then.    Patient does not have prior history of cardiac disease or atrial fibrillation except this 1 episode in January.  No prior history of hypertension, diabetes, stroke or sleep apnea.  She does not smoke.  She drinks alcohol occasionally. Patient was recently diagnosed with Hashimoto thyroiditis and started on levothyroxine.      Patient walks 7 to 8 miles every day with no difficulty.  Denies chest pain, shortness of breath, syncope.  Patient reports palpitations and checks her heart rate through apple watch.  So far heart rate was between 50 to 90s.  Patient did not feel any palpitations over the last 1 week.    Patient is currently on aspirin 325, bupropion, Lexapro, fluticasone nasal spray, levothyroxine, and trazodone.    Medications, personal, family, and social history reviewed with patient and revised.    Interval history 3/17/2021:  Patient seen today for follow-up.  She tells me that since I saw her a year ago she has been feeling palpitations on and off.  However a few weeks ago she had 3-day lasting palpitations which was more severe than before.  She did feel chest pressure,  shortness of breath and almost presyncopal.  Denies syncope.  She feels something is not right in her chest.  Today EKG in clinic shows atrial fibrillation heart rate 89 bpm.  Denies alcohol.  No prior history of sleep apnea.  She has been started on lisinopril 2.5 mg a month ago by her primary care physician for chronic kidney disease.  She tells me that she is probably more lightheaded since then.  But she acknowledges lightheadedness even before she started lisinopril.  She tells me that she had kidney infection in 2016 and likely she has chronic kidney disease due to that.    Interval history 4/28/2021:  Patient is being seen today for follow-up on paroxysmal atrial fibrillation.  Early April she wore a heart monitor for 2 weeks however I do not have the results of the Zio patch today.  She told me that she was in atrial fibrillation all the time when she was wearing the heart monitor.  She gets very frequent atrial fibrillation episodes almost daily.  Lasting for several hours.  She takes atenolol 12.5 g usually at night because if she takes during the day it makes her tired.  When she is in atrial fibrillation she feels chest pain and lightheaded.  Otherwise denies chest pain, dizziness, shortness of breath or syncope.   She has made some lifestyle changes and lost some weight.  Does not drink alcohol.  No smoking.     I have reviewed patient's EKG in clinic today which shows sinus rhythm heart rate 53 bpm (of note she took atenolol 12.5 mg last night).    PAST MEDICAL HISTORY:  Past Medical History:   Diagnosis Date     Atrial fibrillation with rapid ventricular response (H) 1/26/2020     Bee sting allergy      Depressive disorder      Generalized anxiety disorder 10/15/2009    lexapro made things worse.       Hashimoto's thyroiditis 5/6/2020     Morbid obesity (H)      Ocular migraine      MARIA GUADALUPE (obstructive sleep apnea)- severe (AHI 84) 09/09/2011     Voice fatigue 10/22/2009       CURRENT  MEDICATIONS:  Current Outpatient Medications   Medication Sig Dispense Refill     acetaminophen (TYLENOL) 325 MG tablet        apixaban ANTICOAGULANT (ELIQUIS ANTICOAGULANT) 5 MG tablet Take 1 tablet (5 mg) by mouth 2 times daily 180 tablet 1     Ascorbic Acid (VITAMIN C PO) Take by mouth every morning       atenolol (TENORMIN) 25 MG tablet Take 0.5 tablets (12.5 mg) by mouth daily 45 tablet 3     buPROPion (WELLBUTRIN XL) 300 MG 24 hr tablet Take 1 tablet (300 mg) by mouth every morning 90 tablet 1     Cholecalciferol (VITAMIN D PO) Take by mouth every morning       EPINEPHrine (AUVI-Q) 0.3 MG/0.3ML injection 2-pack Inject 0.3 mLs (0.3 mg) into the muscle as needed for anaphylaxis 0.6 mL 3     fluticasone (FLONASE) 50 MCG/ACT spray Spray 2 sprays into both nostrils daily (Patient not taking: Reported on 3/17/2021) 16 g 1     levothyroxine (SYNTHROID/LEVOTHROID) 75 MCG tablet Take 1 tablet (75 mcg) by mouth daily 30 tablet 1     multivitamin, therapeutic with minerals (MULTI-VITAMIN) TABS tablet Take 1 tablet by mouth every morning       order for DME Resmed Airsense 10 auto cpap 6-7 cm, Airfit P10 for her XS pillows       traZODone (DESYREL) 50 MG tablet TAKE 2-3 TABLETS BY MOUTH AT BEDTIME 270 tablet 1       PAST SURGICAL HISTORY:  Past Surgical History:   Procedure Laterality Date     COLONOSCOPY  2000     DAVINCI GASTRIC SLEEVE       GENITOURINARY SURGERY  2007     HYSTERECTOMY, PAP NO LONGER INDICATED       HYSTERECTOMY, VAGINAL  2007    still has ovaries     LAPAROSCOPIC GASTRIC SLEEVE N/A 3/13/2018    Procedure: LAPAROSCOPIC GASTRIC SLEEVE;  Laparoscopic Sleeve Gastrectomy;  Surgeon: Kameron Joseph MD;  Location:  OR       ALLERGIES:     Allergies   Allergen Reactions     Sulfa Drugs Hives     Cephalexin Hives     Cinnamon Hives     Strawberry Hives     Sulfamethoxazole-Trimethoprim Hives     Vicodin [Hydrocodone-Acetaminophen]      Wasp Venom Protein      Other reaction(s): Chest Pain     Wasps  [Hornets] Swelling     Now carries Epi Pen     Zoloft [Sertraline]      suicidal ideation     Contrast Dye Other (See Comments) and Rash     Patient had sneezing and an itchy throat following contrast injection of 100 mL Isovue-370.  From IV contrast dye       FAMILY HISTORY:  Family History   Problem Relation Age of Onset     Eye Disorder Mother         lost eyesight; probable macular degeneration     Psychotic Disorder Mother         Dementia /Alzhimers     Neurologic Disorder Mother         seizures     Diabetes Mother      Hypertension Mother      Alzheimer Disease Mother      Arthritis Mother      Osteoporosis Mother      Obesity Mother      Diabetes Father      Depression Father      Hyperlipidemia Father      Obesity Father      Psychotic Disorder Sister         bipolar     Thyroid Disease Sister         h/o thyroid cancer     Depression Sister         Bipolar     Obesity Sister      Cancer Other         Brain cancer     Osteoporosis Maternal Grandmother      Anxiety Disorder Son      Depression Sister      Thyroid Disease Sister          SOCIAL HISTORY:  Social History     Tobacco Use     Smoking status: Never Smoker     Smokeless tobacco: Never Used   Substance Use Topics     Alcohol use: Yes     Comment: 1-2 per mo     Drug use: No       ROS:   Constitutional: No fever, chills, or sweats. Weight stable.   ENT: No visual disturbance, ear ache, epistaxis, sore throat.   Cardiovascular: As per HPI.   Respiratory: No cough, hemoptysis.    GI: No nausea, vomiting, hematemesis, melena, or hematochezia.   : No hematuria.   Integument: Negative.   Psychiatric: Negative.   Hematologic:  No easy bruising, no easy bleeding.  Neuro: Negative.   Musculoskeletal: No myalgia.    Exam:  /69 (BP Location: Right arm, Patient Position: Sitting, Cuff Size: Adult Large)   Pulse 52   Wt 100.7 kg (222 lb)   SpO2 97%   BMI 38.11 kg/m    GENERAL APPEARANCE: alert and no distress  HEENT: no icterus, no central  cyanosis  LYMPH/NECK: no adenopathy, no asymmetry, JVP not elevated, no carotid bruits.  RESPIRATORY: lungs clear to auscultation - no rales, rhonchi or wheezes, no use of accessory muscles, no retractions, respirations are unlabored, normal respiratory rate  CARDIOVASCULAR: regular rhythm, normal S1, S2, no S3 or S4 and no murmur, click or rub, precordium quiet with normal PMI.  GI: soft, non tender  EXTREMITIES:  no edema  NEURO: alert, normal speech,and affect  SKIN: no ecchymoses, no rashes     I have reviewed the labs and personally reviewed the imaging below and made my comment in the assessment and plan.    Labs:  CBC RESULTS:   Lab Results   Component Value Date    WBC 5.4 05/01/2020    RBC 5.01 05/01/2020    HGB 14.3 05/01/2020    HCT 44.0 05/01/2020    MCV 88 05/01/2020    MCH 28.5 05/01/2020    MCHC 32.5 05/01/2020    RDW 14.9 05/01/2020     05/01/2020       BMP RESULTS:  Lab Results   Component Value Date     03/17/2021    POTASSIUM 4.1 03/17/2021    CHLORIDE 108 03/17/2021    CO2 26 03/17/2021    ANIONGAP 7 03/17/2021    GLC 93 03/17/2021    BUN 35 (H) 03/17/2021    CR 1.02 03/17/2021    GFRESTIMATED 62 03/17/2021    GFRESTBLACK 72 03/17/2021    CONCHA 9.4 03/17/2021        INR RESULTS:  Lab Results   Component Value Date    INR 0.91 03/17/2021    INR 0.9 (L) 02/27/2017     Echocardiogram 4/2/2021  Global and regional left ventricular function is normal with an EF of 60-65%.  Right ventricular function, chamber size, wall motion, and thickness are  normal.  Both atria appear normal.  The inferior vena cava is normal.  No pericardial effusion is present.  There is no prior study for direct comparison    Echocardiogram 1/27/2020 [facility]  Normal left ventricular size.   Normal left ventricular systolic function.   No obvious regional wall motion abnormalities.   The ejection fraction is visually estimated at 55-60%.   The right ventricle is normal in size and function.   The left atrium is  normal in size.   There is trace mitral regurgitation.   There is no significant tricuspid regurgitation.   Small pericardial effusion.   No echocardiographic findings to suggest hemodynamic effect of the pericardial fluid.   Estimated EF: 55-60%    EKG 1/26/2020 atrial fibrillation with RVR    EKG 2/27/2020 sinus rhythm    EKG 3/17/2021 personally reviewed in clinic shows atrial fibrillation heart rate well controlled at 89 bpm.    Assessment and Plan:   Ms. Sunshine Delgado is a 55 year old  female with PMH significant for depression, anxiety, kidney stones, CKD, obesity and paroxysmal atrial fibrillation since January 2021.    Patient is being seen today for follow-up.  EKG in clinic today shows sinus rhythm.    #Paroxysmal atrial fibrillation  -Frequent episodes almost daily affecting quality of life.  -Sinus rhythm in clinic today  -Currently on atenolol 12.5 mg as needed.  -Recommend rhythm control with flecainide 50 mg twice daily  -Start atenolol 12.5 mg every day  -Continue Eliquis 5 mg twice daily (the patient has very long and very frequent A. fib episodes at this time.)  -Stress test with treadmill in 2 weeks to monitor for flecainide toxicity  -Send [employer] message in 1 month to update on A. fib episodes  -Follow-up on Zio patch results which was done early April.    #Obesity, sleep problem  -Has not completed sleep study yet  -Patient is on lifestyle changes.    Return to clinic 3 months.    Total time spent 40 minutes including precharting, face-to-face clinic visit and medical documentation.    Please donot hesitate to contact me if you have any questions or concerns. Again, thank you for allowing me to participate in the care of your patient.    Rut JUAREZ MD  [facility] Division of Cardiology  Pager [phone]

## 2021-04-28 NOTE — TELEPHONE ENCOUNTER
I called patient twice but cannot reach.  I left voicemail.  I told her about the results of the Zio patch which showed 30% atrial fibrillation burden.  The longest episode was 10 hours.  Heart rate range was 38 to 240 bpm.  The patient was symptomatic with atrial fibrillation.  Recommended to continue current plan from today.

## 2021-04-28 NOTE — TELEPHONE ENCOUNTER
linda monitor preliminary given to Dr. Fajardo to review.  Patient was seen in clinic today.  Eusebia Alexander RN

## 2021-04-28 NOTE — LETTER
4/28/2021      RE: Sunshine Delgado  [address] [place] [id number]       Dear Colleague,    Thank you for the opportunity to participate in the care of your patient, Sunshine Delgado, at the [facility]. Please see a copy of my visit note below.    HPI: Ms. Sunshine Delgado is a 55 year old  female with PMH significant for depression, anxiety, kidney stones.    Patient was admitted to [facility] in January of this year with abdominal pain.  She was found to have infectious mononucleosis.  She was treated with antibiotics for 2 weeks for tonsillitis prior to her ED presentation. Patient's EKG showed atrial fibrillation which spontaneously converted to sinus rhythm while she was in the hospital.  Patient underwent transthoracic echocardiogram which showed a small pericardial effusion with normal EF and valve function.  Atrial fibrillation was attributed to mild pericarditis from infectious mononucleosis.  Patient was started on metoprolol and aspirin.  Metoprolol was recently discontinued by primary care physician due to frequent lightheadedness which has improved since then.    Patient does not have prior history of cardiac disease or atrial fibrillation except this 1 episode in January.  No prior history of hypertension, diabetes, stroke or sleep apnea.  She does not smoke.  She drinks alcohol occasionally. Patient was recently diagnosed with Hashimoto thyroiditis and started on levothyroxine.      Patient walks 7 to 8 miles every day with no difficulty.  Denies chest pain, shortness of breath, syncope.  Patient reports palpitations and checks her heart rate through apple watch.  So far heart rate was between 50 to 90s.  Patient did not feel any palpitations over the last 1 week.    Patient is currently on aspirin 325, bupropion, Lexapro, fluticasone nasal spray, levothyroxine, and trazodone.    Medications,  personal, family, and social history reviewed with patient and revised.    Interval history 3/17/2021:  Patient seen today for follow-up.  She tells me that since I saw her a year ago she has been feeling palpitations on and off.  However a few weeks ago she had 3-day lasting palpitations which was more severe than before.  She did feel chest pressure, shortness of breath and almost presyncopal.  Denies syncope.  She feels something is not right in her chest.  Today EKG in clinic shows atrial fibrillation heart rate 89 bpm.  Denies alcohol.  No prior history of sleep apnea.  She has been started on lisinopril 2.5 mg a month ago by her primary care physician for chronic kidney disease.  She tells me that she is probably more lightheaded since then.  But she acknowledges lightheadedness even before she started lisinopril.  She tells me that she had kidney infection in 2016 and likely she has chronic kidney disease due to that.    Interval history 4/28/2021:  Patient is being seen today for follow-up on paroxysmal atrial fibrillation.  Early April she wore a heart monitor for 2 weeks however I do not have the results of the Zio patch today.  She told me that she was in atrial fibrillation all the time when she was wearing the heart monitor.  She gets very frequent atrial fibrillation episodes almost daily.  Lasting for several hours.  She takes atenolol 12.5 g usually at night because if she takes during the day it makes her tired.  When she is in atrial fibrillation she feels chest pain and lightheaded.  Otherwise denies chest pain, dizziness, shortness of breath or syncope.   She has made some lifestyle changes and lost some weight.  Does not drink alcohol.  No smoking.     I have reviewed patient's EKG in clinic today which shows sinus rhythm heart rate 53 bpm (of note she took atenolol 12.5 mg last night).    PAST MEDICAL HISTORY:  Past Medical History:   Diagnosis Date     Atrial fibrillation with rapid  ventricular response (H) 1/26/2020     Bee sting allergy      Depressive disorder      Generalized anxiety disorder 10/15/2009    lexapro made things worse.       Hashimoto's thyroiditis 5/6/2020     Morbid obesity (H)      Ocular migraine      MARIA GUADALUPE (obstructive sleep apnea)- severe (AHI 84) 09/09/2011     Voice fatigue 10/22/2009       CURRENT MEDICATIONS:  Current Outpatient Medications   Medication Sig Dispense Refill     acetaminophen (TYLENOL) 325 MG tablet        apixaban ANTICOAGULANT (ELIQUIS ANTICOAGULANT) 5 MG tablet Take 1 tablet (5 mg) by mouth 2 times daily 180 tablet 1     Ascorbic Acid (VITAMIN C PO) Take by mouth every morning       atenolol (TENORMIN) 25 MG tablet Take 0.5 tablets (12.5 mg) by mouth daily 45 tablet 3     buPROPion (WELLBUTRIN XL) 300 MG 24 hr tablet Take 1 tablet (300 mg) by mouth every morning 90 tablet 1     Cholecalciferol (VITAMIN D PO) Take by mouth every morning       EPINEPHrine (AUVI-Q) 0.3 MG/0.3ML injection 2-pack Inject 0.3 mLs (0.3 mg) into the muscle as needed for anaphylaxis 0.6 mL 3     fluticasone (FLONASE) 50 MCG/ACT spray Spray 2 sprays into both nostrils daily (Patient not taking: Reported on 3/17/2021) 16 g 1     levothyroxine (SYNTHROID/LEVOTHROID) 75 MCG tablet Take 1 tablet (75 mcg) by mouth daily 30 tablet 1     multivitamin, therapeutic with minerals (MULTI-VITAMIN) TABS tablet Take 1 tablet by mouth every morning       order for DME Resmed Airsense 10 auto cpap 6-7 cm, Airfit P10 for her XS pillows       traZODone (DESYREL) 50 MG tablet TAKE 2-3 TABLETS BY MOUTH AT BEDTIME 270 tablet 1       PAST SURGICAL HISTORY:  Past Surgical History:   Procedure Laterality Date     COLONOSCOPY  2000     DAVINCI GASTRIC SLEEVE       GENITOURINARY SURGERY  2007     HYSTERECTOMY, PAP NO LONGER INDICATED       HYSTERECTOMY, VAGINAL  2007    still has ovaries     LAPAROSCOPIC GASTRIC SLEEVE N/A 3/13/2018    Procedure: LAPAROSCOPIC GASTRIC SLEEVE;  Laparoscopic Sleeve  Gastrectomy;  Surgeon: Kameron Joseph MD;  Location: [facility] OR       ALLERGIES:     Allergies   Allergen Reactions     Sulfa Drugs Hives     Cephalexin Hives     Cinnamon Hives     Strawberry Hives     Sulfamethoxazole-Trimethoprim Hives     Vicodin [Hydrocodone-Acetaminophen]      Wasp Venom Protein      Other reaction(s): Chest Pain     Wasps [Hornets] Swelling     Now carries Epi Pen     Zoloft [Sertraline]      suicidal ideation     Contrast Dye Other (See Comments) and Rash     Patient had sneezing and an itchy throat following contrast injection of 100 mL Isovue-370.  From IV contrast dye       FAMILY HISTORY:  Family History   Problem Relation Age of Onset     Eye Disorder Mother         lost eyesight; probable macular degeneration     Psychotic Disorder Mother         Dementia /Alzhimers     Neurologic Disorder Mother         seizures     Diabetes Mother      Hypertension Mother      Alzheimer Disease Mother      Arthritis Mother      Osteoporosis Mother      Obesity Mother      Diabetes Father      Depression Father      Hyperlipidemia Father      Obesity Father      Psychotic Disorder Sister         bipolar     Thyroid Disease Sister         h/o thyroid cancer     Depression Sister         Bipolar     Obesity Sister      Cancer Other         Brain cancer     Osteoporosis Maternal Grandmother      Anxiety Disorder Son      Depression Sister      Thyroid Disease Sister          SOCIAL HISTORY:  Social History     Tobacco Use     Smoking status: Never Smoker     Smokeless tobacco: Never Used   Substance Use Topics     Alcohol use: Yes     Comment: 1-2 per mo     Drug use: No       ROS:   Constitutional: No fever, chills, or sweats. Weight stable.   ENT: No visual disturbance, ear ache, epistaxis, sore throat.   Cardiovascular: As per HPI.   Respiratory: No cough, hemoptysis.    GI: No nausea, vomiting, hematemesis, melena, or hematochezia.   : No hematuria.   Integument: Negative.   Psychiatric:  Negative.   Hematologic:  No easy bruising, no easy bleeding.  Neuro: Negative.   Musculoskeletal: No myalgia.    Exam:  /69 (BP Location: Right arm, Patient Position: Sitting, Cuff Size: Adult Large)   Pulse 52   Wt 100.7 kg (222 lb)   SpO2 97%   BMI 38.11 kg/m    GENERAL APPEARANCE: alert and no distress  HEENT: no icterus, no central cyanosis  LYMPH/NECK: no adenopathy, no asymmetry, JVP not elevated, no carotid bruits.  RESPIRATORY: lungs clear to auscultation - no rales, rhonchi or wheezes, no use of accessory muscles, no retractions, respirations are unlabored, normal respiratory rate  CARDIOVASCULAR: regular rhythm, normal S1, S2, no S3 or S4 and no murmur, click or rub, precordium quiet with normal PMI.  GI: soft, non tender  EXTREMITIES:  no edema  NEURO: alert, normal speech,and affect  SKIN: no ecchymoses, no rashes     I have reviewed the labs and personally reviewed the imaging below and made my comment in the assessment and plan.    Labs:  CBC RESULTS:   Lab Results   Component Value Date    WBC 5.4 05/01/2020    RBC 5.01 05/01/2020    HGB 14.3 05/01/2020    HCT 44.0 05/01/2020    MCV 88 05/01/2020    MCH 28.5 05/01/2020    MCHC 32.5 05/01/2020    RDW 14.9 05/01/2020     05/01/2020       BMP RESULTS:  Lab Results   Component Value Date     03/17/2021    POTASSIUM 4.1 03/17/2021    CHLORIDE 108 03/17/2021    CO2 26 03/17/2021    ANIONGAP 7 03/17/2021    GLC 93 03/17/2021    BUN 35 (H) 03/17/2021    CR 1.02 03/17/2021    GFRESTIMATED 62 03/17/2021    GFRESTBLACK 72 03/17/2021    CONCHA 9.4 03/17/2021        INR RESULTS:  Lab Results   Component Value Date    INR 0.91 03/17/2021    INR 0.9 (L) 02/27/2017     Echocardiogram 4/2/2021  Global and regional left ventricular function is normal with an EF of 60-65%.  Right ventricular function, chamber size, wall motion, and thickness are  normal.  Both atria appear normal.  The inferior vena cava is normal.  No pericardial effusion is  present.  There is no prior study for direct comparison    Echocardiogram 1/27/2020 [employer]  Normal left ventricular size.   Normal left ventricular systolic function.   No obvious regional wall motion abnormalities.   The ejection fraction is visually estimated at 55-60%.   The right ventricle is normal in size and function.   The left atrium is normal in size.   There is trace mitral regurgitation.   There is no significant tricuspid regurgitation.   Small pericardial effusion.   No echocardiographic findings to suggest hemodynamic effect of the pericardial fluid.   Estimated EF: 55-60%    EKG 1/26/2020 atrial fibrillation with RVR    EKG 2/27/2020 sinus rhythm    EKG 3/17/2021 personally reviewed in clinic shows atrial fibrillation heart rate well controlled at 89 bpm.    Assessment and Plan:   Ms. Sunshine Delgado is a 55 year old  female with PMH significant for depression, anxiety, kidney stones, CKD, obesity and paroxysmal atrial fibrillation since January 2021.    Patient is being seen today for follow-up.  EKG in clinic today shows sinus rhythm.    #Paroxysmal atrial fibrillation  -Frequent episodes almost daily affecting quality of life.  -Sinus rhythm in clinic today  -Currently on atenolol 12.5 mg as needed.  -Recommend rhythm control with flecainide 50 mg twice daily  -Start atenolol 12.5 mg every day  -Continue Eliquis 5 mg twice daily (the patient has very long and very frequent A. fib episodes at this time.)  -Stress test with treadmill in 2 weeks to monitor for flecainide toxicity  -Send [employer] message in 1 month to update on A. fib episodes  -Follow-up on Zio patch results which was done early April.    #Obesity, sleep problem  -Has not completed sleep study yet  -Patient is on lifestyle changes.    Return to clinic 3 months.    Total time spent 40 minutes including precharting, face-to-face clinic visit and medical documentation.    Please donot hesitate to contact me if you have any questions  or concerns. Again, thank you for allowing me to participate in the care of your patient.    Rut JUAREZ MD  [facility] Division of Cardiology  Pager [phone]

## 2021-04-28 NOTE — NURSING NOTE
"Chief Complaint   Patient presents with     Atrial Fib     per patient chest discomfort,palpitations, lightheaded, fatigue        Initial /69 (BP Location: Right arm, Patient Position: Sitting, Cuff Size: Adult Large)   Pulse 52   Wt 100.7 kg (222 lb)   SpO2 97%   BMI 38.11 kg/m   Estimated body mass index is 38.11 kg/m  as calculated from the following:    Height as of 7/16/20: 1.626 m (5' 4\").    Weight as of this encounter: 100.7 kg (222 lb)..  BP completed using cuff size: large    Zev Contrersa L.P.N.    "

## 2021-04-28 NOTE — TELEPHONE ENCOUNTER
Spoke to rep who reports that patient had a 36% burden of afib/aflutter.  Report will be posted in 1/2 hour for review.  Will check zio website shortly.    Eusebia Alexander RN

## 2021-04-29 DIAGNOSIS — R00.2 PALPITATIONS: ICD-10-CM

## 2021-04-29 DIAGNOSIS — I48.0 PAF (PAROXYSMAL ATRIAL FIBRILLATION) (H): ICD-10-CM

## 2021-04-29 PROCEDURE — 93000 ELECTROCARDIOGRAM COMPLETE: CPT | Performed by: INTERNAL MEDICINE

## 2021-05-09 ENCOUNTER — HEALTH MAINTENANCE LETTER (OUTPATIENT)
Age: 56
End: 2021-05-09

## 2021-05-11 ENCOUNTER — ANCILLARY PROCEDURE (OUTPATIENT)
Dept: CARDIOLOGY | Facility: CLINIC | Age: 56
End: 2021-05-11
Attending: INTERNAL MEDICINE
Payer: COMMERCIAL

## 2021-05-11 DIAGNOSIS — Z79.899 ENCOUNTER FOR MONITORING FLECAINIDE THERAPY: ICD-10-CM

## 2021-05-11 DIAGNOSIS — Z51.81 ENCOUNTER FOR MONITORING FLECAINIDE THERAPY: ICD-10-CM

## 2021-05-11 PROCEDURE — 93016 CV STRESS TEST SUPVJ ONLY: CPT | Performed by: INTERNAL MEDICINE

## 2021-05-11 PROCEDURE — 93017 CV STRESS TEST TRACING ONLY: CPT | Performed by: INTERNAL MEDICINE

## 2021-05-11 PROCEDURE — 93018 CV STRESS TEST I&R ONLY: CPT | Performed by: INTERNAL MEDICINE

## 2021-05-13 ENCOUNTER — MYC MEDICAL ADVICE (OUTPATIENT)
Dept: CARDIOLOGY | Facility: CLINIC | Age: 56
End: 2021-05-13

## 2021-06-01 ENCOUNTER — MYC MEDICAL ADVICE (OUTPATIENT)
Dept: CARDIOLOGY | Facility: CLINIC | Age: 56
End: 2021-06-01

## 2021-06-03 DIAGNOSIS — E06.3 HASHIMOTO'S THYROIDITIS: ICD-10-CM

## 2021-06-03 NOTE — LETTER
June 7, 2021      Sunshine Delgado  [address] [place] [id number]            Your provider has sent a 30 day minseh refill of levothyroxine (SYNTHROID/LEVOTHROID) 75 MCG tablet. You are due for a lab only appointment for further refills. Please contact the clinic to schedule an appointment for further refills.      Sincerely,       [facility] Carter / JOJO

## 2021-06-07 RX ORDER — LEVOTHYROXINE SODIUM 75 UG/1
75 TABLET ORAL DAILY
Qty: 30 TABLET | Refills: 0 | Status: SHIPPED | OUTPATIENT
Start: 2021-06-07 | End: 2021-07-01

## 2021-06-08 ENCOUNTER — OFFICE VISIT (OUTPATIENT)
Dept: CARDIOLOGY | Facility: CLINIC | Age: 56
End: 2021-06-08
Payer: COMMERCIAL

## 2021-06-08 VITALS
OXYGEN SATURATION: 96 % | HEART RATE: 68 BPM | DIASTOLIC BLOOD PRESSURE: 76 MMHG | BODY MASS INDEX: 38.79 KG/M2 | WEIGHT: 226 LBS | SYSTOLIC BLOOD PRESSURE: 105 MMHG

## 2021-06-08 DIAGNOSIS — R00.2 PALPITATIONS: ICD-10-CM

## 2021-06-08 DIAGNOSIS — E66.09 CLASS 2 OBESITY DUE TO EXCESS CALORIES WITHOUT SERIOUS COMORBIDITY WITH BODY MASS INDEX (BMI) OF 38.0 TO 38.9 IN ADULT: ICD-10-CM

## 2021-06-08 DIAGNOSIS — E66.812 CLASS 2 OBESITY DUE TO EXCESS CALORIES WITHOUT SERIOUS COMORBIDITY WITH BODY MASS INDEX (BMI) OF 38.0 TO 38.9 IN ADULT: ICD-10-CM

## 2021-06-08 DIAGNOSIS — R07.89 TIGHTNESS IN CHEST: ICD-10-CM

## 2021-06-08 DIAGNOSIS — I48.92 ATRIAL FLUTTER BY ELECTROCARDIOGRAM (H): ICD-10-CM

## 2021-06-08 DIAGNOSIS — R42 DIZZINESS: ICD-10-CM

## 2021-06-08 DIAGNOSIS — I48.0 PAF (PAROXYSMAL ATRIAL FIBRILLATION) (H): Primary | ICD-10-CM

## 2021-06-08 PROCEDURE — 99215 OFFICE O/P EST HI 40 MIN: CPT | Performed by: INTERNAL MEDICINE

## 2021-06-08 PROCEDURE — 93000 ELECTROCARDIOGRAM COMPLETE: CPT | Performed by: INTERNAL MEDICINE

## 2021-06-08 RX ORDER — METHYLPREDNISOLONE 32 MG/1
TABLET ORAL
Qty: 2 TABLET | Refills: 0 | Status: SHIPPED | OUTPATIENT
Start: 2021-06-08 | End: 2021-11-08

## 2021-06-08 RX ORDER — FLECAINIDE ACETATE 100 MG/1
100 TABLET ORAL 2 TIMES DAILY
Qty: 180 TABLET | Refills: 3 | Status: SHIPPED | OUTPATIENT
Start: 2021-06-08 | End: 2021-06-14

## 2021-06-08 RX ORDER — ATENOLOL 25 MG/1
25 TABLET ORAL DAILY
Qty: 45 TABLET | Refills: 3 | COMMUNITY
Start: 2021-06-08 | End: 2021-06-14

## 2021-06-08 RX ORDER — DIPHENHYDRAMINE HCL 50 MG
CAPSULE ORAL
Qty: 1 CAPSULE | Refills: 0 | Status: SHIPPED | OUTPATIENT
Start: 2021-06-08 | End: 2021-11-08

## 2021-06-08 NOTE — NURSING NOTE
Ekg. Test procedure explained to patient .Ekg.test performed today per Provider order.Then Ekg. Result relayed  to provider for review.  Zev Contreras L.P.N.

## 2021-06-08 NOTE — PROGRESS NOTES
HPI: Ms. Sunshine Delgado is a 55 year old  female with PMH significant for depression, anxiety, kidney stones.    Patient was admitted to [facility] in January of this year with abdominal pain.  She was found to have infectious mononucleosis.  She was treated with antibiotics for 2 weeks for tonsillitis prior to her ED presentation. Patient's EKG showed atrial fibrillation which spontaneously converted to sinus rhythm while she was in the hospital.  Patient underwent transthoracic echocardiogram which showed a small pericardial effusion with normal EF and valve function.  Atrial fibrillation was attributed to mild pericarditis from infectious mononucleosis.  Patient was started on metoprolol and aspirin.  Metoprolol was recently discontinued by primary care physician due to frequent lightheadedness which has improved since then.    Patient does not have prior history of cardiac disease or atrial fibrillation except this 1 episode in January.  No prior history of hypertension, diabetes, stroke or sleep apnea.  She does not smoke.  She drinks alcohol occasionally. Patient was recently diagnosed with Hashimoto thyroiditis and started on levothyroxine.      Patient walks 7 to 8 miles every day with no difficulty.  Denies chest pain, shortness of breath, syncope.  Patient reports palpitations and checks her heart rate through apple watch.  So far heart rate was between 50 to 90s.  Patient did not feel any palpitations over the last 1 week.    Patient is currently on aspirin 325, bupropion, Lexapro, fluticasone nasal spray, levothyroxine, and trazodone.    Medications, personal, family, and social history reviewed with patient and revised.    Interval history 3/17/2021:  Patient seen today for follow-up.  She tells me that since I saw her a year ago she has been feeling palpitations on and off.  However a few weeks ago she had 3-day lasting palpitations which was more severe than before.  She did feel chest pressure,  shortness of breath and almost presyncopal.  Denies syncope.  She feels something is not right in her chest.  Today EKG in clinic shows atrial fibrillation heart rate 89 bpm.  Denies alcohol.  No prior history of sleep apnea.  She has been started on lisinopril 2.5 mg a month ago by her primary care physician for chronic kidney disease.  She tells me that she is probably more lightheaded since then.  But she acknowledges lightheadedness even before she started lisinopril.  She tells me that she had kidney infection in 2016 and likely she has chronic kidney disease due to that.    Interval history 4/28/2021:  Patient is being seen today for follow-up on paroxysmal atrial fibrillation.  Early April she wore a heart monitor for 2 weeks however I do not have the results of the Zio patch today.  She told me that she was in atrial fibrillation all the time when she was wearing the heart monitor.  She gets very frequent atrial fibrillation episodes almost daily.  Lasting for several hours.  She takes atenolol 12.5 g usually at night because if she takes during the day it makes her tired.  When she is in atrial fibrillation she feels chest pain and lightheaded.  Otherwise denies chest pain, dizziness, shortness of breath or syncope.   She has made some lifestyle changes and lost some weight.  Does not drink alcohol.  No smoking.     I have reviewed patient's EKG in clinic today which shows sinus rhythm heart rate 53 bpm (of note she took atenolol 12.5 mg last night).    Interval history 6/8/2021:  When I started patient on flecainide end of April (50 mg twice daily) atrial fibrillation episodes completely subsided for a month till the end of May per patient report.  She tells me that she was feeling very well as if she was completely new person.  Over the last 2 weeks she started feeling chest tightness and dizziness.  She tells me that she is in atrial fibrillation almost every day now.  She takes atenolol in the evening  in addition to flecainide 50 mg twice daily.  She continues to stay active walking every day but sometimes feels like she is going to pass out and chest hurts when she tries to climb uphill.  She feels dizzy when she gets up from chair.  Did not pass out so far.  Denies fever or cough.  Feels like her chest is squeezing.    PAST MEDICAL HISTORY:  Past Medical History:   Diagnosis Date     Atrial fibrillation with rapid ventricular response (H) 1/26/2020     Bee sting allergy      Depressive disorder      Generalized anxiety disorder 10/15/2009    lexapro made things worse.       Hashimoto's thyroiditis 5/6/2020     Morbid obesity (H)      Ocular migraine      MARIA GUADALUPE (obstructive sleep apnea)- severe (AHI 84) 09/09/2011     Voice fatigue 10/22/2009       CURRENT MEDICATIONS:  Current Outpatient Medications   Medication Sig Dispense Refill     acetaminophen (TYLENOL) 325 MG tablet        apixaban ANTICOAGULANT (ELIQUIS ANTICOAGULANT) 5 MG tablet Take 1 tablet (5 mg) by mouth 2 times daily 180 tablet 3     Ascorbic Acid (VITAMIN C PO) Take by mouth every morning       atenolol (TENORMIN) 25 MG tablet Take 0.5 tablets (12.5 mg) by mouth daily 45 tablet 3     buPROPion (WELLBUTRIN XL) 300 MG 24 hr tablet Take 1 tablet (300 mg) by mouth every morning 90 tablet 1     Cholecalciferol (VITAMIN D PO) Take by mouth every morning       EPINEPHrine (AUVI-Q) 0.3 MG/0.3ML injection 2-pack Inject 0.3 mLs (0.3 mg) into the muscle as needed for anaphylaxis 0.6 mL 3     flecainide (TAMBOCOR) 50 MG tablet Take 1 tablet (50 mg) by mouth 2 times daily 180 tablet 3     fluticasone (FLONASE) 50 MCG/ACT spray Spray 2 sprays into both nostrils daily 16 g 1     levothyroxine (SYNTHROID/LEVOTHROID) 75 MCG tablet Take 1 tablet (75 mcg) by mouth daily +++NEED LABS+++ 30 tablet 0     multivitamin, therapeutic with minerals (MULTI-VITAMIN) TABS tablet Take 1 tablet by mouth every morning       order for DME Resmed Airsense 10 auto cpap 6-7 cm, Airfit  P10 for her XS pillows       traZODone (DESYREL) 50 MG tablet TAKE 2-3 TABLETS BY MOUTH AT BEDTIME 270 tablet 1       PAST SURGICAL HISTORY:  Past Surgical History:   Procedure Laterality Date     COLONOSCOPY  2000     DAVINCI GASTRIC SLEEVE       GENITOURINARY SURGERY  2007     HYSTERECTOMY, PAP NO LONGER INDICATED       HYSTERECTOMY, VAGINAL  2007    still has ovaries     LAPAROSCOPIC GASTRIC SLEEVE N/A 3/13/2018    Procedure: LAPAROSCOPIC GASTRIC SLEEVE;  Laparoscopic Sleeve Gastrectomy;  Surgeon: Kameron Joseph MD;  Location: [facility] OR       ALLERGIES:     Allergies   Allergen Reactions     Sulfa Drugs Hives     Cephalexin Hives     Cinnamon Hives     Strawberry Hives     Sulfamethoxazole-Trimethoprim Hives     Vicodin [Hydrocodone-Acetaminophen]      Wasp Venom Protein      Other reaction(s): Chest Pain     Wasps [Hornets] Swelling     Now carries Epi Pen     Zoloft [Sertraline]      suicidal ideation     Contrast Dye Other (See Comments) and Rash     Patient had sneezing and an itchy throat following contrast injection of 100 mL Isovue-370.  From IV contrast dye       FAMILY HISTORY:  Family History   Problem Relation Age of Onset     Eye Disorder Mother         lost eyesight; probable macular degeneration     Psychotic Disorder Mother         Dementia /Alzhimers     Neurologic Disorder Mother         seizures     Diabetes Mother      Hypertension Mother      Alzheimer Disease Mother      Arthritis Mother      Osteoporosis Mother      Obesity Mother      Diabetes Father      Depression Father      Hyperlipidemia Father      Obesity Father      Psychotic Disorder Sister         bipolar     Thyroid Disease Sister         h/o thyroid cancer     Depression Sister         Bipolar     Obesity Sister      Cancer Other         Brain cancer     Osteoporosis Maternal Grandmother      Anxiety Disorder Son      Depression Sister      Thyroid Disease Sister          SOCIAL HISTORY:  Social History     Tobacco Use      Smoking status: Never Smoker     Smokeless tobacco: Never Used   Substance Use Topics     Alcohol use: Yes     Comment: 1-2 per mo     Drug use: No       ROS:   Constitutional: No fever, chills, or sweats. Weight stable.   Cardiovascular: As per HPI.   Respiratory: No cough, hemoptysis.    Hematologic:  No easy bruising, no easy bleeding.  Neuro: Negative.       Exam:  /76 (BP Location: Left arm, Patient Position: Sitting, Cuff Size: Adult Large)   Pulse 68   Wt 102.5 kg (226 lb)   SpO2 96%   BMI 38.79 kg/m    GENERAL APPEARANCE: alert and no distress  HEENT: no icterus, no central cyanosis  RESPIRATORY: lungs clear to auscultation - no rales, rhonchi or wheezes, no use of accessory muscles, no retractions, respirations are unlabored, normal respiratory rate  CARDIOVASCULAR: irregular rhythm, normal S1, S2, no S3 or S4 and no murmur, click or rub, precordium quiet with normal PMI.  GI: soft, non tender  EXTREMITIES:  no edema  NEURO: alert, normal speech,and affect  SKIN: no ecchymoses, no rashes     I have reviewed the labs and personally reviewed the imaging below and made my comment in the assessment and plan.    Labs:  CBC RESULTS:   Lab Results   Component Value Date    WBC 5.4 05/01/2020    RBC 5.01 05/01/2020    HGB 14.3 05/01/2020    HCT 44.0 05/01/2020    MCV 88 05/01/2020    MCH 28.5 05/01/2020    MCHC 32.5 05/01/2020    RDW 14.9 05/01/2020     05/01/2020       BMP RESULTS:  Lab Results   Component Value Date     03/17/2021    POTASSIUM 4.1 03/17/2021    CHLORIDE 108 03/17/2021    CO2 26 03/17/2021    ANIONGAP 7 03/17/2021    GLC 93 03/17/2021    BUN 35 (H) 03/17/2021    CR 1.02 03/17/2021    GFRESTIMATED 62 03/17/2021    GFRESTBLACK 72 03/17/2021    CONCHA 9.4 03/17/2021        INR RESULTS:  Lab Results   Component Value Date    INR 0.91 03/17/2021    INR 0.9 (L) 02/27/2017     Echocardiogram 4/2/2021  Global and regional left ventricular function is normal with an EF of 60-65%.  Right  ventricular function, chamber size, wall motion, and thickness are  normal.  Both atria appear normal.  The inferior vena cava is normal.  No pericardial effusion is present.  There is no prior study for direct comparison    Echocardiogram 1/27/2020 [facility]  Normal left ventricular size.   Normal left ventricular systolic function.   No obvious regional wall motion abnormalities.   The ejection fraction is visually estimated at 55-60%.   The right ventricle is normal in size and function.   The left atrium is normal in size.   There is trace mitral regurgitation.   There is no significant tricuspid regurgitation.   Small pericardial effusion.   No echocardiographic findings to suggest hemodynamic effect of the pericardial fluid.   Estimated EF: 55-60%    EKG 1/26/2020 atrial fibrillation with RVR    EKG 2/27/2020 sinus rhythm    EKG 3/17/2021 personally reviewed in clinic shows atrial fibrillation heart rate well controlled at 89 bpm.    Assessment and Plan:   Ms. Sunshine Delgado is a 55 year old  female with PMH significant for depression, anxiety, kidney stones, CKD, obesity and paroxysmal atrial fibrillation since January 2021.    Patient is being seen today for follow-up.  EKG in clinic today shows atrial flutter with variable AV block [id number]).    #Paroxysmal atrial fibrillation  #Paroxysmal atrial flutter  -A. fib episode per patient almost every day over the last 2 weeks  -I am suspecting chest tightness and dizziness might be due to persistent atrial flutter since patient's EKG shows atrial flutter in clinic today.  -Recommend to increase flecainide to 100 mg twice daily  -Continue atenolol 25 mg daily.  Take additional atenolol 12.5 mg as needed for palpitations  -EKG in clinic scheduled on 6/11/2021.  If she is still in atrial flutter I will schedule her for DC cardioversion.  She is on Eliquis therefore she does not require YFIAN.  -If she continues to have recurrent A. fib episodes despite higher-dose  flecainide I will refer her for catheter ablation of A. fib plus atrial flutter.  Patient is agreeable with this plan.  She wants to try higher dose of flecainide rather than going straight for catheter ablation.    #Exertional chest pain  -New over the last 2 weeks. Chest tightness might be due to atrial flutter as well but cannot complete rule out CAD.  -Recommended CT coronary angiogram.  Since she is on flecainide I think this is very important to rule out coronary artery disease.      #Obesity, sleep problem  -Has not completed sleep study yet  -Patient is on lifestyle changes.    Return to clinic 3 months or earlier as needed.    Total time spent 50 minutes including precharting, face-to-face clinic visit and medical documentation.    Please donot hesitate to contact me if you have any questions or concerns. Again, thank you for allowing me to participate in the care of your patient.    Rut JUAREZ MD  [facility] Division of Cardiology  Pager [phone]

## 2021-06-08 NOTE — LETTER
6/8/2021      RE: Sunshine Delgado  [address] [place] [id number]       Dear Colleague,    Thank you for the opportunity to participate in the care of your patient, Sunshine Delgado, at the [facility]. Please see a copy of my visit note below.    HPI: Ms. Sunshine Delgado is a 55 year old  female with PMH significant for depression, anxiety, kidney stones.    Patient was admitted to [facility] in January of this year with abdominal pain.  She was found to have infectious mononucleosis.  She was treated with antibiotics for 2 weeks for tonsillitis prior to her ED presentation. Patient's EKG showed atrial fibrillation which spontaneously converted to sinus rhythm while she was in the hospital.  Patient underwent transthoracic echocardiogram which showed a small pericardial effusion with normal EF and valve function.  Atrial fibrillation was attributed to mild pericarditis from infectious mononucleosis.  Patient was started on metoprolol and aspirin.  Metoprolol was recently discontinued by primary care physician due to frequent lightheadedness which has improved since then.    Patient does not have prior history of cardiac disease or atrial fibrillation except this 1 episode in January.  No prior history of hypertension, diabetes, stroke or sleep apnea.  She does not smoke.  She drinks alcohol occasionally. Patient was recently diagnosed with Hashimoto thyroiditis and started on levothyroxine.      Patient walks 7 to 8 miles every day with no difficulty.  Denies chest pain, shortness of breath, syncope.  Patient reports palpitations and checks her heart rate through apple watch.  So far heart rate was between 50 to 90s.  Patient did not feel any palpitations over the last 1 week.    Patient is currently on aspirin 325, bupropion, Lexapro, fluticasone nasal spray, levothyroxine, and trazodone.    Medications,  personal, family, and social history reviewed with patient and revised.    Interval history 3/17/2021:  Patient seen today for follow-up.  She tells me that since I saw her a year ago she has been feeling palpitations on and off.  However a few weeks ago she had 3-day lasting palpitations which was more severe than before.  She did feel chest pressure, shortness of breath and almost presyncopal.  Denies syncope.  She feels something is not right in her chest.  Today EKG in clinic shows atrial fibrillation heart rate 89 bpm.  Denies alcohol.  No prior history of sleep apnea.  She has been started on lisinopril 2.5 mg a month ago by her primary care physician for chronic kidney disease.  She tells me that she is probably more lightheaded since then.  But she acknowledges lightheadedness even before she started lisinopril.  She tells me that she had kidney infection in 2016 and likely she has chronic kidney disease due to that.    Interval history 4/28/2021:  Patient is being seen today for follow-up on paroxysmal atrial fibrillation.  Early April she wore a heart monitor for 2 weeks however I do not have the results of the Zio patch today.  She told me that she was in atrial fibrillation all the time when she was wearing the heart monitor.  She gets very frequent atrial fibrillation episodes almost daily.  Lasting for several hours.  She takes atenolol 12.5 g usually at night because if she takes during the day it makes her tired.  When she is in atrial fibrillation she feels chest pain and lightheaded.  Otherwise denies chest pain, dizziness, shortness of breath or syncope.   She has made some lifestyle changes and lost some weight.  Does not drink alcohol.  No smoking.     I have reviewed patient's EKG in clinic today which shows sinus rhythm heart rate 53 bpm (of note she took atenolol 12.5 mg last night).    Interval history 6/8/2021:  When I started patient on flecainide end of April (50 mg twice daily) atrial  fibrillation episodes completely subsided for a month till the end of May per patient report.  She tells me that she was feeling very well as if she was completely new person.  Over the last 2 weeks she started feeling chest tightness and dizziness.  She tells me that she is in atrial fibrillation almost every day now.  She takes atenolol in the evening in addition to flecainide 50 mg twice daily.  She continues to stay active walking every day but sometimes feels like she is going to pass out and chest hurts when she tries to climb uphill.  She feels dizzy when she gets up from chair.  Did not pass out so far.  Denies fever or cough.  Feels like her chest is squeezing.    PAST MEDICAL HISTORY:  Past Medical History:   Diagnosis Date     Atrial fibrillation with rapid ventricular response (H) 1/26/2020     Bee sting allergy      Depressive disorder      Generalized anxiety disorder 10/15/2009    lexapro made things worse.       Hashimoto's thyroiditis 5/6/2020     Morbid obesity (H)      Ocular migraine      MARIA GUADALUPE (obstructive sleep apnea)- severe (AHI 84) 09/09/2011     Voice fatigue 10/22/2009       CURRENT MEDICATIONS:  Current Outpatient Medications   Medication Sig Dispense Refill     acetaminophen (TYLENOL) 325 MG tablet        apixaban ANTICOAGULANT (ELIQUIS ANTICOAGULANT) 5 MG tablet Take 1 tablet (5 mg) by mouth 2 times daily 180 tablet 3     Ascorbic Acid (VITAMIN C PO) Take by mouth every morning       atenolol (TENORMIN) 25 MG tablet Take 0.5 tablets (12.5 mg) by mouth daily 45 tablet 3     buPROPion (WELLBUTRIN XL) 300 MG 24 hr tablet Take 1 tablet (300 mg) by mouth every morning 90 tablet 1     Cholecalciferol (VITAMIN D PO) Take by mouth every morning       EPINEPHrine (AUVI-Q) 0.3 MG/0.3ML injection 2-pack Inject 0.3 mLs (0.3 mg) into the muscle as needed for anaphylaxis 0.6 mL 3     flecainide (TAMBOCOR) 50 MG tablet Take 1 tablet (50 mg) by mouth 2 times daily 180 tablet 3     fluticasone (FLONASE)  50 MCG/ACT spray Spray 2 sprays into both nostrils daily 16 g 1     levothyroxine (SYNTHROID/LEVOTHROID) 75 MCG tablet Take 1 tablet (75 mcg) by mouth daily +++NEED LABS+++ 30 tablet 0     multivitamin, therapeutic with minerals (MULTI-VITAMIN) TABS tablet Take 1 tablet by mouth every morning       order for DME Resmed Airsense 10 auto cpap 6-7 cm, Airfit P10 for her XS pillows       traZODone (DESYREL) 50 MG tablet TAKE 2-3 TABLETS BY MOUTH AT BEDTIME 270 tablet 1       PAST SURGICAL HISTORY:  Past Surgical History:   Procedure Laterality Date     COLONOSCOPY  2000     DAVINCI GASTRIC SLEEVE       GENITOURINARY SURGERY  2007     HYSTERECTOMY, PAP NO LONGER INDICATED       HYSTERECTOMY, VAGINAL  2007    still has ovaries     LAPAROSCOPIC GASTRIC SLEEVE N/A 3/13/2018    Procedure: LAPAROSCOPIC GASTRIC SLEEVE;  Laparoscopic Sleeve Gastrectomy;  Surgeon: Kameron Joseph MD;  Location: [facility] OR       ALLERGIES:     Allergies   Allergen Reactions     Sulfa Drugs Hives     Cephalexin Hives     Cinnamon Hives     Strawberry Hives     Sulfamethoxazole-Trimethoprim Hives     Vicodin [Hydrocodone-Acetaminophen]      Wasp Venom Protein      Other reaction(s): Chest Pain     Wasps [Hornets] Swelling     Now carries Epi Pen     Zoloft [Sertraline]      suicidal ideation     Contrast Dye Other (See Comments) and Rash     Patient had sneezing and an itchy throat following contrast injection of 100 mL Isovue-370.  From IV contrast dye       FAMILY HISTORY:  Family History   Problem Relation Age of Onset     Eye Disorder Mother         lost eyesight; probable macular degeneration     Psychotic Disorder Mother         Dementia /Alzhimers     Neurologic Disorder Mother         seizures     Diabetes Mother      Hypertension Mother      Alzheimer Disease Mother      Arthritis Mother      Osteoporosis Mother      Obesity Mother      Diabetes Father      Depression Father      Hyperlipidemia Father      Obesity Father      Psychotic  Disorder Sister         bipolar     Thyroid Disease Sister         h/o thyroid cancer     Depression Sister         Bipolar     Obesity Sister      Cancer Other         Brain cancer     Osteoporosis Maternal Grandmother      Anxiety Disorder Son      Depression Sister      Thyroid Disease Sister          SOCIAL HISTORY:  Social History     Tobacco Use     Smoking status: Never Smoker     Smokeless tobacco: Never Used   Substance Use Topics     Alcohol use: Yes     Comment: 1-2 per mo     Drug use: No       ROS:   Constitutional: No fever, chills, or sweats. Weight stable.   Cardiovascular: As per HPI.   Respiratory: No cough, hemoptysis.    Hematologic:  No easy bruising, no easy bleeding.  Neuro: Negative.       Exam:  /76 (BP Location: Left arm, Patient Position: Sitting, Cuff Size: Adult Large)   Pulse 68   Wt 102.5 kg (226 lb)   SpO2 96%   BMI 38.79 kg/m    GENERAL APPEARANCE: alert and no distress  HEENT: no icterus, no central cyanosis  RESPIRATORY: lungs clear to auscultation - no rales, rhonchi or wheezes, no use of accessory muscles, no retractions, respirations are unlabored, normal respiratory rate  CARDIOVASCULAR: irregular rhythm, normal S1, S2, no S3 or S4 and no murmur, click or rub, precordium quiet with normal PMI.  GI: soft, non tender  EXTREMITIES:  no edema  NEURO: alert, normal speech,and affect  SKIN: no ecchymoses, no rashes     I have reviewed the labs and personally reviewed the imaging below and made my comment in the assessment and plan.    Labs:  CBC RESULTS:   Lab Results   Component Value Date    WBC 5.4 05/01/2020    RBC 5.01 05/01/2020    HGB 14.3 05/01/2020    HCT 44.0 05/01/2020    MCV 88 05/01/2020    MCH 28.5 05/01/2020    MCHC 32.5 05/01/2020    RDW 14.9 05/01/2020     05/01/2020       BMP RESULTS:  Lab Results   Component Value Date     03/17/2021    POTASSIUM 4.1 03/17/2021    CHLORIDE 108 03/17/2021    CO2 26 03/17/2021    ANIONGAP 7 03/17/2021    GLC 93  03/17/2021    BUN 35 (H) 03/17/2021    CR 1.02 03/17/2021    GFRESTIMATED 62 03/17/2021    GFRESTBLACK 72 03/17/2021    CONCHA 9.4 03/17/2021        INR RESULTS:  Lab Results   Component Value Date    INR 0.91 03/17/2021    INR 0.9 (L) 02/27/2017     Echocardiogram 4/2/2021  Global and regional left ventricular function is normal with an EF of 60-65%.  Right ventricular function, chamber size, wall motion, and thickness are  normal.  Both atria appear normal.  The inferior vena cava is normal.  No pericardial effusion is present.  There is no prior study for direct comparison    Echocardiogram 1/27/2020 [facility]  Normal left ventricular size.   Normal left ventricular systolic function.   No obvious regional wall motion abnormalities.   The ejection fraction is visually estimated at 55-60%.   The right ventricle is normal in size and function.   The left atrium is normal in size.   There is trace mitral regurgitation.   There is no significant tricuspid regurgitation.   Small pericardial effusion.   No echocardiographic findings to suggest hemodynamic effect of the pericardial fluid.   Estimated EF: 55-60%    EKG 1/26/2020 atrial fibrillation with RVR    EKG 2/27/2020 sinus rhythm    EKG 3/17/2021 personally reviewed in clinic shows atrial fibrillation heart rate well controlled at 89 bpm.    Assessment and Plan:   Ms. Sunshine Delgado is a 55 year old  female with PMH significant for depression, anxiety, kidney stones, CKD, obesity and paroxysmal atrial fibrillation since January 2021.    Patient is being seen today for follow-up.  EKG in clinic today shows atrial flutter with variable AV block [id number]).    #Paroxysmal atrial fibrillation  #Paroxysmal atrial flutter  -A. fib episode per patient almost every day over the last 2 weeks  -I am suspecting chest tightness and dizziness might be due to persistent atrial flutter since patient's EKG shows atrial flutter in clinic today.  -Recommend to increase flecainide to  100 mg twice daily  -Continue atenolol 25 mg daily.  Take additional atenolol 12.5 mg as needed for palpitations  -EKG in clinic scheduled on 6/11/2021.  If she is still in atrial flutter I will schedule her for DC cardioversion.  She is on Eliquis therefore she does not require YIFAN.  -If she continues to have recurrent A. fib episodes despite higher-dose flecainide I will refer her for catheter ablation of A. fib plus atrial flutter.  Patient is agreeable with this plan.  She wants to try higher dose of flecainide rather than going straight for catheter ablation.    #Exertional chest pain  -New over the last 2 weeks. Chest tightness might be due to atrial flutter as well but cannot complete rule out CAD.  -Recommended CT coronary angiogram.  Since she is on flecainide I think this is very important to rule out coronary artery disease.      #Obesity, sleep problem  -Has not completed sleep study yet  -Patient is on lifestyle changes.    Return to clinic 3 months or earlier as needed.    Total time spent 50 minutes including precharting, face-to-face clinic visit and medical documentation.    Please donot hesitate to contact me if you have any questions or concerns. Again, thank you for allowing me to participate in the care of your patient.    Rut JUAREZ MD  [facility] Division of Cardiology  Pager [phone]

## 2021-06-08 NOTE — PATIENT INSTRUCTIONS
Thank you for coming to the [facility]; please note the following instructions:    1. Preventive Care:    Breast Cancer Screening: During our visit today, we discussed that it is recommended you receive breast cancer screening. Please call or make an appointment with your primary care provider to discuss this with them. You may also call the [facility] scheduling line ([phone]) to set up a mammography appointment at the Breast Center within the [facility].    2.Ct. Coronary angiogram scheduled , premedication sent to pharmacy due to your allergy to CT. Dye     3.MEDICATION CHANGES TODAY:    * START Flecanide 100 mg twice a day , take first dose today of 100 mg   ,Your new prescription will be at the Pharmacy you prefer.    Atenolol 12.5 mg every day  As needed if palpitations occur     4.  My chart message a update to Rut Fajardo MD.,Cardiology  In one to two weeks     5. Nurse visit for ekg. Friday 06/11/21    6. Dr. Rut Fajardo has requested you to follow up in 3 month; please see following pages for appointment detail information.      If you have any questions regarding your visit please contact your care team:     Cardiology  Telephone Number   Eusebia GODOY., RN  Cheryl FRANCES,RN  Janee RUANO, OTILIO MTZ, MA  Zev CALIX, N   [phone]   (select option 1)    *After hours: [phone]     For scheduling appts:     [phone] or    [phone] (select option 1)    *After hours: [phone]     For the Device Clinic (Pacemakers and ICD's)  RN's :  Laxmi Wen   During business hours: [phone]    *After business hours:  [phone] (select option 4)      Normal test result notifications will be released via OPPRTUNITY or mailed within 7 business days.  All other test results, will be communicated via telephone once reviewed by your cardiologist.    If you need a medication refill please contact your pharmacy.  Please allow 3 business days for  your refill to be completed.    As always, thank you for trusting us with your health care needs!

## 2021-06-08 NOTE — NURSING NOTE
"Chief Complaint   Patient presents with     Atrial Fib     per patient chest pain,palpitations,lightheaded at times        Initial /76 (BP Location: Left arm, Patient Position: Sitting, Cuff Size: Adult Large)   Pulse 68   Wt 102.5 kg (226 lb)   SpO2 96%   BMI 38.79 kg/m   Estimated body mass index is 38.79 kg/m  as calculated from the following:    Height as of 7/16/20: 1.626 m (5' 4\").    Weight as of this encounter: 102.5 kg (226 lb)..  BP completed using cuff size: large    Zev Contreras L.P.N.    "

## 2021-06-10 ENCOUNTER — HOSPITAL ENCOUNTER (EMERGENCY)
Facility: CLINIC | Age: 56
Discharge: HOME | End: 2021-06-10
Attending: EMERGENCY MEDICINE | Admitting: EMERGENCY MEDICINE
Payer: COMMERCIAL

## 2021-06-10 ENCOUNTER — MYC MEDICAL ADVICE (OUTPATIENT)
Dept: CARDIOLOGY | Facility: CLINIC | Age: 56
End: 2021-06-10

## 2021-06-10 VITALS
OXYGEN SATURATION: 97 % | SYSTOLIC BLOOD PRESSURE: 111 MMHG | RESPIRATION RATE: 22 BRPM | BODY MASS INDEX: 37.69 KG/M2 | HEIGHT: 65 IN | HEART RATE: 67 BPM | WEIGHT: 226.2 LBS | DIASTOLIC BLOOD PRESSURE: 68 MMHG | TEMPERATURE: 98.2 F

## 2021-06-10 DIAGNOSIS — I48.0 PAROXYSMAL ATRIAL FIBRILLATION (H): ICD-10-CM

## 2021-06-10 LAB
ANION GAP SERPL CALCULATED.3IONS-SCNC: 8 MMOL/L (ref 3–14)
BASOPHILS # BLD AUTO: 0 10E9/L (ref 0–0.2)
BASOPHILS NFR BLD AUTO: 0.5 %
BUN SERPL-MCNC: 15 MG/DL (ref 7–30)
CALCIUM SERPL-MCNC: 8.9 MG/DL (ref 8.5–10.1)
CHLORIDE SERPL-SCNC: 103 MMOL/L (ref 94–109)
CO2 SERPL-SCNC: 25 MMOL/L (ref 20–32)
CREAT SERPL-MCNC: 1.22 MG/DL (ref 0.52–1.04)
DIFFERENTIAL METHOD BLD: NORMAL
EOSINOPHIL # BLD AUTO: 0.1 10E9/L (ref 0–0.7)
EOSINOPHIL NFR BLD AUTO: 1.5 %
ERYTHROCYTE [DISTWIDTH] IN BLOOD BY AUTOMATED COUNT: 14.2 % (ref 10–15)
GFR SERPL CREATININE-BSD FRML MDRD: 50 ML/MIN/{1.73_M2}
GLUCOSE SERPL-MCNC: 139 MG/DL (ref 70–99)
HCT VFR BLD AUTO: 44.8 % (ref 35–47)
HGB BLD-MCNC: 14.6 G/DL (ref 11.7–15.7)
IMM GRANULOCYTES # BLD: 0 10E9/L (ref 0–0.4)
IMM GRANULOCYTES NFR BLD: 0.4 %
LYMPHOCYTES # BLD AUTO: 2.3 10E9/L (ref 0.8–5.3)
LYMPHOCYTES NFR BLD AUTO: 29 %
MCH RBC QN AUTO: 28.8 PG (ref 26.5–33)
MCHC RBC AUTO-ENTMCNC: 32.6 G/DL (ref 31.5–36.5)
MCV RBC AUTO: 88 FL (ref 78–100)
MONOCYTES # BLD AUTO: 0.4 10E9/L (ref 0–1.3)
MONOCYTES NFR BLD AUTO: 5.3 %
NEUTROPHILS # BLD AUTO: 5.1 10E9/L (ref 1.6–8.3)
NEUTROPHILS NFR BLD AUTO: 63.3 %
NRBC # BLD AUTO: 0 10*3/UL
NRBC BLD AUTO-RTO: 0 /100
PLATELET # BLD AUTO: 288 10E9/L (ref 150–450)
POTASSIUM SERPL-SCNC: 3.6 MMOL/L (ref 3.4–5.3)
RBC # BLD AUTO: 5.07 10E12/L (ref 3.8–5.2)
SODIUM SERPL-SCNC: 137 MMOL/L (ref 133–144)
TROPONIN I SERPL-MCNC: <0.015 UG/L (ref 0–0.04)
WBC # BLD AUTO: 8.1 10E9/L (ref 4–11)

## 2021-06-10 PROCEDURE — 84484 ASSAY OF TROPONIN QUANT: CPT | Performed by: EMERGENCY MEDICINE

## 2021-06-10 PROCEDURE — 93005 ELECTROCARDIOGRAM TRACING: CPT | Mod: 59 | Performed by: EMERGENCY MEDICINE

## 2021-06-10 PROCEDURE — 99285 EMERGENCY DEPT VISIT HI MDM: CPT | Mod: 25 | Performed by: EMERGENCY MEDICINE

## 2021-06-10 PROCEDURE — 80048 BASIC METABOLIC PNL TOTAL CA: CPT | Performed by: EMERGENCY MEDICINE

## 2021-06-10 PROCEDURE — 92960 CARDIOVERSION ELECTRIC EXT: CPT | Performed by: EMERGENCY MEDICINE

## 2021-06-10 PROCEDURE — 93010 ELECTROCARDIOGRAM REPORT: CPT | Mod: 59 | Performed by: EMERGENCY MEDICINE

## 2021-06-10 PROCEDURE — 85025 COMPLETE CBC W/AUTO DIFF WBC: CPT | Performed by: EMERGENCY MEDICINE

## 2021-06-10 PROCEDURE — 250N000009 HC RX 250: Performed by: EMERGENCY MEDICINE

## 2021-06-10 RX ORDER — ETOMIDATE 2 MG/ML
10 INJECTION INTRAVENOUS ONCE
Status: COMPLETED | OUTPATIENT
Start: 2021-06-10 | End: 2021-06-10

## 2021-06-10 RX ADMIN — ETOMIDATE 10 MG: 2 INJECTION INTRAVENOUS at 21:00

## 2021-06-10 ASSESSMENT — MIFFLIN-ST. JEOR: SCORE: 1626.92

## 2021-06-11 ENCOUNTER — TELEPHONE (OUTPATIENT)
Dept: CARDIOLOGY | Facility: CLINIC | Age: 56
End: 2021-06-11

## 2021-06-11 ENCOUNTER — MYC MEDICAL ADVICE (OUTPATIENT)
Dept: CARDIOLOGY | Facility: CLINIC | Age: 56
End: 2021-06-11

## 2021-06-11 LAB
INTERPRETATION ECG - MUSE: NORMAL

## 2021-06-11 NOTE — TELEPHONE ENCOUNTER
Noted pt in ED last night, cardioverted.   LPN to call and cancel EKG for today and schedule follow up with Dr Fajardo in 1 month

## 2021-06-11 NOTE — TELEPHONE ENCOUNTER
----- Message from Rut Fajardo MD sent at 6/11/2021  8:08 AM CDT -----  Patient had cardioversion in the ER Last night.     She does not need to come to [facility] for ECG today. Please call and cancel it.    Please schedule a one month FOLLOW UP with me.

## 2021-06-11 NOTE — TELEPHONE ENCOUNTER
Call to pt: left a message informing EKG today not needed and to schedule follow up .    EKG cancelled- pt did not show up    My chart msg sent to pt. Follow up scheduled 6/30

## 2021-06-11 NOTE — ED PROVIDER NOTES
[place] EMERGENCY DEPARTMENT ([facility])  Maricarmen 10, 2021    ED Provider Note  [facility]      History     Chief Complaint   Patient presents with     Irregular Heart Beat     HPI  Sunshine Delgado is a 56 year old female with a history of paroxysmal atrial fibrillation on Eliquis, hypothyroidism who presents with palpitations.  Patient reports several weeks of on and off palpitations, dizziness, and nausea.  She has a known history of paroxysmal atrial fibrillation and has been following with cardiology who has started her on anticoagulation and flecainide.  She states that her atrial fibrillation has felt more constant over the last few weeks and today she felt especially ill.  Cardiology recommended she present to the emergency department for possible cardioversion.  She also notes some chest tightness.  No recent fevers or chills.  No leg swelling. She has been on Eliquis since March/April.     Past Medical History  Past Medical History:   Diagnosis Date     Atrial fibrillation with rapid ventricular response (H) 1/26/2020     Bee sting allergy      Depressive disorder      Generalized anxiety disorder 10/15/2009    lexapro made things worse.       Hashimoto's thyroiditis 5/6/2020     Morbid obesity (H)      Ocular migraine      MARIA GUADALUPE (obstructive sleep apnea)- severe (AHI 84) 09/09/2011     Voice fatigue 10/22/2009     Past Surgical History:   Procedure Laterality Date     COLONOSCOPY  2000     DAVINCI GASTRIC SLEEVE       GENITOURINARY SURGERY  2007     HYSTERECTOMY, PAP NO LONGER INDICATED       HYSTERECTOMY, VAGINAL  2007    still has ovaries     LAPAROSCOPIC GASTRIC SLEEVE N/A 3/13/2018    Procedure: LAPAROSCOPIC GASTRIC SLEEVE;  Laparoscopic Sleeve Gastrectomy;  Surgeon: Kameron Joseph MD;  Location: [facility] OR     apixaban ANTICOAGULANT (ELIQUIS ANTICOAGULANT) 5 MG tablet  flecainide (TAMBOCOR) 100 MG tablet  acetaminophen (TYLENOL) 325 MG tablet  Ascorbic Acid  (VITAMIN C PO)  atenolol (TENORMIN) 25 MG tablet  buPROPion (WELLBUTRIN XL) 300 MG 24 hr tablet  Cholecalciferol (VITAMIN D PO)  diphenhydrAMINE (BENADRYL) 50 MG capsule  EPINEPHrine (AUVI-Q) 0.3 MG/0.3ML injection 2-pack  fluticasone (FLONASE) 50 MCG/ACT spray  levothyroxine (SYNTHROID/LEVOTHROID) 75 MCG tablet  methylPREDNISolone (MEDROL) 32 MG tablet  multivitamin, therapeutic with minerals (MULTI-VITAMIN) TABS tablet  order for DME  traZODone (DESYREL) 50 MG tablet      Allergies   Allergen Reactions     Sulfa Drugs Hives     Cephalexin Hives     Cinnamon Hives     Strawberry Hives     Sulfamethoxazole-Trimethoprim Hives     Vicodin [Hydrocodone-Acetaminophen]      Wasp Venom Protein      Other reaction(s): Chest Pain     Wasps [Hornets] Swelling     Now carries Epi Pen     Zoloft [Sertraline]      suicidal ideation     Contrast Dye Other (See Comments) and Rash     Patient had sneezing and an itchy throat following contrast injection of 100 mL Isovue-370.  From IV contrast dye     Family History  Family History   Problem Relation Age of Onset     Eye Disorder Mother         lost eyesight; probable macular degeneration     Psychotic Disorder Mother         Dementia /Alzhimers     Neurologic Disorder Mother         seizures     Diabetes Mother      Hypertension Mother      Alzheimer Disease Mother      Arthritis Mother      Osteoporosis Mother      Obesity Mother      Diabetes Father      Depression Father      Hyperlipidemia Father      Obesity Father      Psychotic Disorder Sister         bipolar     Thyroid Disease Sister         h/o thyroid cancer     Depression Sister         Bipolar     Obesity Sister      Cancer Other         Brain cancer     Osteoporosis Maternal Grandmother      Anxiety Disorder Son      Depression Sister      Thyroid Disease Sister      Social History   Social History     Tobacco Use     Smoking status: Never Smoker     Smokeless tobacco: Never Used   Substance Use Topics     Alcohol  "use: Yes     Comment: 1-2 per mo     Drug use: No      Past medical history, past surgical history, medications, allergies, family history, and social history were reviewed with the patient. No additional pertinent items.       Review of Systems  A complete review of systems was performed with pertinent positives and negatives noted in the HPI, and all other systems negative.    Physical Exam   BP: 118/52  Pulse: 93  Temp: 98.2  F (36.8  C)  Resp: 16  Height: 165.1 cm (5' 5\")  Weight: 102.6 kg (226 lb 3.2 oz)  SpO2: 95 %  Physical Exam  Vitals signs and nursing note reviewed.   Constitutional:       General: She is not in acute distress.     Appearance: Normal appearance. She is not diaphoretic.   HENT:      Head: Normocephalic and atraumatic.      Mouth/Throat:      Pharynx: No oropharyngeal exudate.   Eyes:      General: No scleral icterus.     Pupils: Pupils are equal, round, and reactive to light.   Neck:      Musculoskeletal: Neck supple.   Cardiovascular:      Rate and Rhythm: Tachycardia present. Rhythm irregular.      Pulses: Normal pulses.      Heart sounds: Normal heart sounds.   Pulmonary:      Effort: Pulmonary effort is normal. No respiratory distress.      Breath sounds: Normal breath sounds.   Abdominal:      General: Bowel sounds are normal.      Palpations: Abdomen is soft.      Tenderness: There is no abdominal tenderness.   Musculoskeletal:         General: No tenderness.      Right lower leg: No edema.      Left lower leg: No edema.   Skin:     General: Skin is warm.      Capillary Refill: Capillary refill takes less than 2 seconds.      Findings: No rash.   Neurological:      General: No focal deficit present.      Mental Status: She is alert and oriented to person, place, and time.   Psychiatric:         Mood and Affect: Affect normal. Mood is anxious.         Behavior: Behavior normal.         ED Course      [facility]    -Cardioversion " External    Date/Time: 6/10/2021 10:46 PM  Performed by: Indira Angel DO  Authorized by: Indira Angel DO     ED EVALUATION:      I have performed an Emergency Department Evaluation including taking a history and physical examination, this evaluation will be documented in the electronic medical record for this ED encounter.      ASA Class: Class 2- mild systemic disease, no acute problems, no functional limitations    Mallampati: Grade 1- soft palate, uvula, tonsillar pillars, and posterior pharyngeal wall visible    NPO Status: appropriately NPO for procedure  UNIVERSAL PROTOCOL   Site Marked: NA  Prior Images Obtained and Reviewed:  NA  Required items: Required blood products, implants, devices and special equipment available    Patient identity confirmed:  Verbally with patient, arm band and provided demographic data  Patient was reevaluated immediately before administering moderate or deep sedation or anesthesia  Confirmation Checklist:  Patient's identity using two indicators, relevant allergies, procedure was appropriate and matched the consent or emergent situation and correct equipment/implants were available  Time out: Immediately prior to the procedure a time out was called    Universal Protocol: the Joint Commission Universal Protocol was followed    Preparation: Patient was prepped and draped in usual sterile fashion          SEDATION    Patient Sedated: Yes    Sedation Type:  Moderate (conscious) sedation  Sedation:  Etomidate  Vital signs: Vital signs monitored during sedation      PRE-PROCEDURE DETAILS:     Cardioversion basis:  Elective    Rhythm:  Atrial fibrillation    Electrode placement:  Anterior-posterior  Attempt one:     Cardioversion mode:  Synchronous    Waveform:  Biphasic    Shock (Joules):  100    Shock outcome:  Conversion to normal sinus rhythm  Post-procedure details:     Patient status:  Awake    PROCEDURE   Patient Tolerance:  Patient tolerated the procedure well with no  immediate complications    Length of time physician/provider present for 1:1 monitoring during sedation: 15               EKG Interpretation:      Interpreted by Indira Angel DO  Time reviewed: 1915  Symptoms at time of EKG: Palpitations   Rhythm: atrial fibrillation - rapid  Rate: 120-130  Axis: Right Axis Deviation  Ectopy: none  Conduction: normal  ST Segments/ T Waves: No ST-T wave changes  Q Waves: none  Comparison to prior: Now in atrial fibrillation compared to 3/19/21    Clinical Impression: atrial fibrillation with rapid ventricular response           EKG Interpretation:      Interpreted by Indira Angel DO  Time reviewed: 2115 (post cardioversion)  Symptoms at time of EKG: None   Rhythm: Normal sinus   Rate: Normal  Axis: Normal  Ectopy: None  Conduction: Normal  ST Segments/ T Waves: No ST-T wave changes and No acute ischemic changes  Q Waves: None  Comparison to prior: Atrial fibrillation now resolved compared to earlier ECG on same day    Clinical Impression: normal EKG         Results for orders placed or performed during the hospital encounter of 06/10/21   Troponin I     Status: None   Result Value Ref Range    Troponin I ES <0.015 0.000 - 0.045 ug/L   Basic metabolic panel     Status: Abnormal   Result Value Ref Range    Sodium 137 133 - 144 mmol/L    Potassium 3.6 3.4 - 5.3 mmol/L    Chloride 103 94 - 109 mmol/L    Carbon Dioxide 25 20 - 32 mmol/L    Anion Gap 8 3 - 14 mmol/L    Glucose 139 (H) 70 - 99 mg/dL    Urea Nitrogen 15 7 - 30 mg/dL    Creatinine 1.22 (H) 0.52 - 1.04 mg/dL    GFR Estimate 50 (L) >60 mL/min/[1.73_m2]    GFR Estimate If Black 58 (L) >60 mL/min/[1.73_m2]    Calcium 8.9 8.5 - 10.1 mg/dL   CBC with platelets differential     Status: None   Result Value Ref Range    WBC 8.1 4.0 - 11.0 10e9/L    RBC Count 5.07 3.8 - 5.2 10e12/L    Hemoglobin 14.6 11.7 - 15.7 g/dL    Hematocrit 44.8 35.0 - 47.0 %    MCV 88 78 - 100 fl    MCH 28.8 26.5 - 33.0 pg    MCHC 32.6 31.5 - 36.5 g/dL     RDW 14.2 10.0 - 15.0 %    Platelet Count 288 150 - 450 10e9/L    Diff Method Automated Method     % Neutrophils 63.3 %    % Lymphocytes 29.0 %    % Monocytes 5.3 %    % Eosinophils 1.5 %    % Basophils 0.5 %    % Immature Granulocytes 0.4 %    Nucleated RBCs 0 0 /100    Absolute Neutrophil 5.1 1.6 - 8.3 10e9/L    Absolute Lymphocytes 2.3 0.8 - 5.3 10e9/L    Absolute Monocytes 0.4 0.0 - 1.3 10e9/L    Absolute Eosinophils 0.1 0.0 - 0.7 10e9/L    Absolute Basophils 0.0 0.0 - 0.2 10e9/L    Abs Immature Granulocytes 0.0 0 - 0.4 10e9/L    Absolute Nucleated RBC 0.0    EKG 12-lead, tracing only     Status: None (Preliminary result)   Result Value Ref Range    Interpretation ECG Click View Image link to view waveform and result    EKG 12-lead, tracing only     Status: None (Preliminary result)   Result Value Ref Range    Interpretation ECG Click View Image link to view waveform and result      Medications   etomidate (AMIDATE) injection 10 mg (10 mg Intravenous Given 6/10/21 2100)        Assessments & Plan (with Medical Decision Making)   Patient was seen and examined.  EKG shows atrial fibrillation with rapid ventricular response.  Blood pressure normal.  Patient has no mental status changes.  Labs were drawn showing a normal troponin.  I discussed with the patient the risks and benefits of elective cardioversion in the emergency department.  Overall, I believe she is a good candidate for this as she has been anticoagulated for several months and is in general good health.  Patient is in agreement and would like to proceed with cardioversion. Patient was successfully cardioverted without complication. Please see procedure note above for details.  Repeat EKG shows normal sinus rhythm.  Patient was monitored for about 45 minutes after the procedure and had no adverse side effects and overall felt significantly improved.  Patient will be discharged home.  She was instructed to continue all of her medications and follow-up  closely with her cardiologist, Dr. Fajardo.  She will return to the emergency department for any new or worsening symptoms.  Discharged in stable condition.    I have reviewed the nursing notes. I have reviewed the findings, diagnosis, plan and need for follow up with the patient.    Discharge Medication List as of 6/10/2021  9:54 PM          Final diagnoses:   Paroxysmal atrial fibrillation (H)       --  [facility] EMERGENCY DEPARTMENT  6/10/2021     Indira Angel DO  06/10/21 3912

## 2021-06-14 ENCOUNTER — HOSPITAL ENCOUNTER (EMERGENCY)
Facility: CLINIC | Age: 56
Discharge: HOME | End: 2021-06-14
Attending: EMERGENCY MEDICINE | Admitting: EMERGENCY MEDICINE
Payer: COMMERCIAL

## 2021-06-14 ENCOUNTER — MYC MEDICAL ADVICE (OUTPATIENT)
Dept: CARDIOLOGY | Facility: CLINIC | Age: 56
End: 2021-06-14

## 2021-06-14 ENCOUNTER — TELEPHONE (OUTPATIENT)
Dept: CARDIOLOGY | Facility: CLINIC | Age: 56
End: 2021-06-14

## 2021-06-14 VITALS
WEIGHT: 226 LBS | DIASTOLIC BLOOD PRESSURE: 72 MMHG | HEIGHT: 65 IN | BODY MASS INDEX: 37.65 KG/M2 | SYSTOLIC BLOOD PRESSURE: 114 MMHG | RESPIRATION RATE: 14 BRPM | OXYGEN SATURATION: 97 % | HEART RATE: 58 BPM | TEMPERATURE: 97.4 F

## 2021-06-14 DIAGNOSIS — I48.92 PAROXYSMAL ATRIAL FLUTTER (H): ICD-10-CM

## 2021-06-14 DIAGNOSIS — I48.0 PAROXYSMAL ATRIAL FIBRILLATION (H): Primary | ICD-10-CM

## 2021-06-14 LAB
ANION GAP SERPL CALCULATED.3IONS-SCNC: 4 MMOL/L (ref 3–14)
BASOPHILS # BLD AUTO: 0.1 10E9/L (ref 0–0.2)
BASOPHILS NFR BLD AUTO: 0.8 %
BUN SERPL-MCNC: 14 MG/DL (ref 7–30)
CALCIUM SERPL-MCNC: 9.2 MG/DL (ref 8.5–10.1)
CHLORIDE SERPL-SCNC: 107 MMOL/L (ref 94–109)
CO2 SERPL-SCNC: 27 MMOL/L (ref 20–32)
CREAT SERPL-MCNC: 1.22 MG/DL (ref 0.52–1.04)
DIFFERENTIAL METHOD BLD: NORMAL
EOSINOPHIL # BLD AUTO: 0.1 10E9/L (ref 0–0.7)
EOSINOPHIL NFR BLD AUTO: 1.2 %
ERYTHROCYTE [DISTWIDTH] IN BLOOD BY AUTOMATED COUNT: 14.1 % (ref 10–15)
GFR SERPL CREATININE-BSD FRML MDRD: 50 ML/MIN/{1.73_M2}
GLUCOSE SERPL-MCNC: 78 MG/DL (ref 70–99)
HCT VFR BLD AUTO: 42.9 % (ref 35–47)
HGB BLD-MCNC: 14.4 G/DL (ref 11.7–15.7)
IMM GRANULOCYTES # BLD: 0 10E9/L (ref 0–0.4)
IMM GRANULOCYTES NFR BLD: 0.3 %
INTERPRETATION ECG - MUSE: NORMAL
LYMPHOCYTES # BLD AUTO: 1.6 10E9/L (ref 0.8–5.3)
LYMPHOCYTES NFR BLD AUTO: 24.9 %
MCH RBC QN AUTO: 29.4 PG (ref 26.5–33)
MCHC RBC AUTO-ENTMCNC: 33.6 G/DL (ref 31.5–36.5)
MCV RBC AUTO: 88 FL (ref 78–100)
MONOCYTES # BLD AUTO: 0.6 10E9/L (ref 0–1.3)
MONOCYTES NFR BLD AUTO: 9.8 %
NEUTROPHILS # BLD AUTO: 4.1 10E9/L (ref 1.6–8.3)
NEUTROPHILS NFR BLD AUTO: 63 %
NRBC # BLD AUTO: 0 10*3/UL
NRBC BLD AUTO-RTO: 0 /100
PLATELET # BLD AUTO: 269 10E9/L (ref 150–450)
POTASSIUM SERPL-SCNC: 4.2 MMOL/L (ref 3.4–5.3)
RBC # BLD AUTO: 4.89 10E12/L (ref 3.8–5.2)
SODIUM SERPL-SCNC: 138 MMOL/L (ref 133–144)
TROPONIN I SERPL-MCNC: <0.015 UG/L (ref 0–0.04)
TSH SERPL DL<=0.005 MIU/L-ACNC: 2.86 MU/L (ref 0.4–4)
WBC # BLD AUTO: 6.5 10E9/L (ref 4–11)

## 2021-06-14 PROCEDURE — 99215 OFFICE O/P EST HI 40 MIN: CPT | Mod: GC | Performed by: INTERNAL MEDICINE

## 2021-06-14 PROCEDURE — 250N000009 HC RX 250: Performed by: EMERGENCY MEDICINE

## 2021-06-14 PROCEDURE — 92960 CARDIOVERSION ELECTRIC EXT: CPT | Performed by: EMERGENCY MEDICINE

## 2021-06-14 PROCEDURE — 93010 ELECTROCARDIOGRAM REPORT: CPT | Performed by: EMERGENCY MEDICINE

## 2021-06-14 PROCEDURE — 99285 EMERGENCY DEPT VISIT HI MDM: CPT | Mod: 25 | Performed by: EMERGENCY MEDICINE

## 2021-06-14 PROCEDURE — 84443 ASSAY THYROID STIM HORMONE: CPT | Performed by: EMERGENCY MEDICINE

## 2021-06-14 PROCEDURE — 96375 TX/PRO/DX INJ NEW DRUG ADDON: CPT | Performed by: EMERGENCY MEDICINE

## 2021-06-14 PROCEDURE — 80048 BASIC METABOLIC PNL TOTAL CA: CPT | Performed by: EMERGENCY MEDICINE

## 2021-06-14 PROCEDURE — 93005 ELECTROCARDIOGRAM TRACING: CPT | Mod: 59 | Performed by: EMERGENCY MEDICINE

## 2021-06-14 PROCEDURE — 84484 ASSAY OF TROPONIN QUANT: CPT | Performed by: EMERGENCY MEDICINE

## 2021-06-14 PROCEDURE — 96374 THER/PROPH/DIAG INJ IV PUSH: CPT | Performed by: EMERGENCY MEDICINE

## 2021-06-14 PROCEDURE — 85025 COMPLETE CBC W/AUTO DIFF WBC: CPT | Performed by: EMERGENCY MEDICINE

## 2021-06-14 PROCEDURE — 250N000011 HC RX IP 250 OP 636: Performed by: EMERGENCY MEDICINE

## 2021-06-14 PROCEDURE — 99291 CRITICAL CARE FIRST HOUR: CPT | Mod: 25 | Performed by: EMERGENCY MEDICINE

## 2021-06-14 RX ORDER — ETOMIDATE 2 MG/ML
10 INJECTION INTRAVENOUS ONCE
Status: COMPLETED | OUTPATIENT
Start: 2021-06-14 | End: 2021-06-14

## 2021-06-14 RX ORDER — FENTANYL CITRATE 50 UG/ML
50 INJECTION, SOLUTION INTRAMUSCULAR; INTRAVENOUS ONCE
Status: COMPLETED | OUTPATIENT
Start: 2021-06-14 | End: 2021-06-14

## 2021-06-14 RX ORDER — AMIODARONE HYDROCHLORIDE 200 MG/1
TABLET ORAL
Qty: 90 TABLET | Refills: 3 | Status: SHIPPED | OUTPATIENT
Start: 2021-06-14 | End: 2021-06-16

## 2021-06-14 RX ADMIN — ETOMIDATE 10 MG: 40 INJECTION, SOLUTION INTRAVENOUS at 12:32

## 2021-06-14 RX ADMIN — FENTANYL CITRATE 50 MCG: 50 INJECTION, SOLUTION INTRAMUSCULAR; INTRAVENOUS at 12:31

## 2021-06-14 ASSESSMENT — ENCOUNTER SYMPTOMS
WEAKNESS: 1
PALPITATIONS: 1
DIZZINESS: 1

## 2021-06-14 ASSESSMENT — MIFFLIN-ST. JEOR: SCORE: 1626.01

## 2021-06-14 NOTE — ED TRIAGE NOTES
Pt arrives to triage with complaints of chest pain and shortness of breath. Pt was seen in ER on Thursday for atrial fibrillation and was cardioverted. Pt reports she went back in to atrial fibrillation saturday afternoon. Currently takes blood thinners. A&O, vitals stable in triage.

## 2021-06-14 NOTE — LETTER
June 14, 2021      RE: Sunshine Delgado  [address] [facility] [address]       To Whom It May Concern:    Sunshine Delgado is being followed by the Cardiology Department at the [facility] by Dr. Rut Fajardo. Based on patient's cardiac assessment, Sunshine Delgado will not be able to work 6/14-6/18.  He may return to work on 6/21/21.      Sincerely,        Dr. Rut Fajardo Cardiologist  [facility]   Card[facility] Mail Code [phone] [place] [address]

## 2021-06-14 NOTE — CONFIDENTIAL NOTE
Dr. Fajardo, patient is agreeable to start amiodarone in place of flecainide.  I told her you would send her a [employer] message when you send the prescription. Spoke with patient and she also would like a work letter to excuse her for this week.    Letter printed out and placed on your desk to review and sign.    Eusebia Alexander RN  Cardiology Care Coordinator  [facility]  [phone] option 1

## 2021-06-14 NOTE — ED PROVIDER NOTES
[place] EMERGENCY DEPARTMENT ([facility])  6/14/21  History     Chief Complaint   Patient presents with     Chest Pain     Shortness of Breath     The history is provided by the patient and medical records.     Sunshine Delgado is a 56 year old female with a history notable for proximal atrial fibrillation (on Eliquis) and hypothyroidism who presents to the ED for evaluation of palpitations.  Patient underwent her first cardioversion on Thursday, 4 days ago which she states improved her symptoms until Saturday, 2 days ago at approximately 4 PM.  Patient notes feeling constantly dizzy and experiences squeezing chest pain and weakness with ambulating.  This began the patient was on a casual walk and her Apple Watch detected a heart rate of 150.  Currently her heart rate is 109 via her apple watch.  She states her heart rate is typically in the 60s-70s.  The patient does report increasing her dose of flecainide from 50 mg twice daily to 100 mg twice daily on 6/8 by recommendation of her cardiologist Dr. Fajardo.    Per chart review, patient was evaluated in the ED on 6/10, 4 days ago due to experiencing palpitations.  Labs were drawn which were relatively unremarkable including a normal troponin level.  Patient underwent successful cardioversion without complication.  Repeat EKG showed normal sinus rhythm patient was discharged to follow-up with cardiologist.  Patient underwent a stress test on 5/11 with complete summary pasted below.  Patient also underwent an echocardiogram on 4/2/2021 with complete summary pasted below.      STRESS TEST - 5/11/2021  Interpretation Summary  Submaximal ECG stress test 65% maximal predicted HR achieved. The test was terminated due to fatigue.  Normal blood pressure response to exercise. The patient did not report any symptoms during exercise.  No ECG evidence of ischemia. No QRS prolongation at peak exercise compared to rest. No supraventricular or ventricular arrhythmias. Good  exercise tolerance. The patient achieved 10 METs at peak exercise.     ECHOCARDIOGRAM - 4/2/2021  Interpretation Summary  Global and regional left ventricular function is normal with an EF of 60-65%. Right ventricular function, chamber size, wall motion, and thickness are normal. Both atria appear normal. The inferior vena cava is normal. No pericardial effusion is present. There is no prior study for direct comparison.    I have reviewed the Medications, Allergies, Past Medical and Surgical History, and Social History in the [employer] system.  PAST MEDICAL HISTORY:   Past Medical History:   Diagnosis Date     Atrial fibrillation with rapid ventricular response (H) 1/26/2020     Bee sting allergy      Depressive disorder      Generalized anxiety disorder 10/15/2009    lexapro made things worse.       Hashimoto's thyroiditis 5/6/2020     Morbid obesity (H)      Ocular migraine      MARIA GUADALUPE (obstructive sleep apnea)- severe (AHI 84) 09/09/2011     Voice fatigue 10/22/2009       PAST SURGICAL HISTORY:   Past Surgical History:   Procedure Laterality Date     COLONOSCOPY  2000     DAVINCI GASTRIC SLEEVE       GENITOURINARY SURGERY  2007     HYSTERECTOMY, PAP NO LONGER INDICATED       HYSTERECTOMY, VAGINAL  2007    still has ovaries     LAPAROSCOPIC GASTRIC SLEEVE N/A 3/13/2018    Procedure: LAPAROSCOPIC GASTRIC SLEEVE;  Laparoscopic Sleeve Gastrectomy;  Surgeon: Kameron Joseph MD;  Location: [facility] OR       Past medical history, past surgical history, medications, and allergies were reviewed with the patient. Additional pertinent items: None    FAMILY HISTORY:   Family History   Problem Relation Age of Onset     Eye Disorder Mother         lost eyesight; probable macular degeneration     Psychotic Disorder Mother         Dementia /Alzhimers     Neurologic Disorder Mother         seizures     Diabetes Mother      Hypertension Mother      Alzheimer Disease Mother      Arthritis Mother      Osteoporosis Mother      Obesity Mother       Diabetes Father      Depression Father      Hyperlipidemia Father      Obesity Father      Psychotic Disorder Sister         bipolar     Thyroid Disease Sister         h/o thyroid cancer     Depression Sister         Bipolar     Obesity Sister      Cancer Other         Brain cancer     Osteoporosis Maternal Grandmother      Anxiety Disorder Son      Depression Sister      Thyroid Disease Sister        SOCIAL HISTORY:   Social History     Tobacco Use     Smoking status: Never Smoker     Smokeless tobacco: Never Used   Substance Use Topics     Alcohol use: Yes     Comment: 1-2 per mo     Social history was reviewed with the patient. Additional pertinent items: None      Patient's Medications   New Prescriptions    No medications on file   Previous Medications    ACETAMINOPHEN (TYLENOL) 325 MG TABLET        APIXABAN ANTICOAGULANT (ELIQUIS ANTICOAGULANT) 5 MG TABLET    Take 1 tablet (5 mg) by mouth 2 times daily    ASCORBIC ACID (VITAMIN C PO)    Take by mouth every morning    ATENOLOL (TENORMIN) 25 MG TABLET    Take 1 tablet (25 mg) by mouth daily    BUPROPION (WELLBUTRIN XL) 300 MG 24 HR TABLET    Take 1 tablet (300 mg) by mouth every morning    CHOLECALCIFEROL (VITAMIN D PO)    Take by mouth every morning    DIPHENHYDRAMINE (BENADRYL) 50 MG CAPSULE    Administer 1 HOUR PRIOR TO - IV contrast injection    EPINEPHRINE (AUVI-Q) 0.3 MG/0.3ML INJECTION 2-PACK    Inject 0.3 mLs (0.3 mg) into the muscle as needed for anaphylaxis    FLECAINIDE (TAMBOCOR) 100 MG TABLET    Take 1 tablet (100 mg) by mouth 2 times daily    FLUTICASONE (FLONASE) 50 MCG/ACT SPRAY    Spray 2 sprays into both nostrils daily    LEVOTHYROXINE (SYNTHROID/LEVOTHROID) 75 MCG TABLET    Take 1 tablet (75 mcg) by mouth daily +++NEED LABS+++    METHYLPREDNISOLONE (MEDROL) 32 MG TABLET    FIRST DOSE 12 hours prior to the procedure with IV contrast  SECOND DOSE 2 HOURS PRIOR TP CONTRAST    MULTIVITAMIN, THERAPEUTIC WITH MINERALS (MULTI-VITAMIN) TABS  "TABLET    Take 1 tablet by mouth every morning    ORDER FOR DME    Resmed Airsense 10 auto cpap 6-7 cm, Airfit P10 for her XS pillows    TRAZODONE (DESYREL) 50 MG TABLET    TAKE 2-3 TABLETS BY MOUTH AT BEDTIME   Modified Medications    No medications on file   Discontinued Medications    No medications on file          Allergies   Allergen Reactions     Sulfa Drugs Hives     Cephalexin Hives     Cinnamon Hives     Strawberry Hives     Sulfamethoxazole-Trimethoprim Hives     Vicodin [Hydrocodone-Acetaminophen]      Wasp Venom Protein      Other reaction(s): Chest Pain     Wasps [Hornets] Swelling     Now carries Epi Pen     Zoloft [Sertraline]      suicidal ideation     Contrast Dye Other (See Comments) and Rash     Patient had sneezing and an itchy throat following contrast injection of 100 mL Isovue-370.  From IV contrast dye        Review of Systems   Cardiovascular: Positive for chest pain and palpitations.   Neurological: Positive for dizziness and weakness.     A complete review of systems was performed with pertinent positives and negatives noted in the HPI, and all other systems negative.    Physical Exam   BP: (!) 124/97  Pulse: 113  Temp: 97.4  F (36.3  C)  Resp: 18  Height: 165.1 cm (5' 5\")  Weight: 102.5 kg (226 lb)  SpO2: 96 %      Physical Exam  Vitals signs and nursing note reviewed.   Constitutional:       General: She is not in acute distress.     Appearance: She is well-developed.   HENT:      Head: Normocephalic and atraumatic.      Mouth/Throat:      Mouth: Mucous membranes are moist.   Eyes:      General: No scleral icterus.     Conjunctiva/sclera: Conjunctivae normal.      Pupils: Pupils are equal, round, and reactive to light.   Neck:      Musculoskeletal: Neck supple.   Cardiovascular:      Rate and Rhythm: Regular rhythm. Tachycardia present.      Heart sounds: Normal heart sounds.   Pulmonary:      Effort: Pulmonary effort is normal. No respiratory distress.      Breath sounds: Normal breath " sounds. No wheezing.   Abdominal:      General: Abdomen is flat.      Palpations: Abdomen is soft.   Skin:     General: Skin is warm and dry.   Neurological:      General: No focal deficit present.      Mental Status: She is alert and oriented to person, place, and time.      Cranial Nerves: No cranial nerve deficit.   Psychiatric:         Mood and Affect: Mood normal.         Behavior: Behavior normal.         ED Course   9:59 AM  The patient was seen and examined by Dr. Don Alejo in Room ED 21.      Seen by cardiac electrophysiology who request that we do electrical cardioversion and increase flecainide to 150 mg twice daily.  Procedures             EKG Interpretation:      Interpreted by Don Alejo MD  Time reviewed: 1010  Symptoms at time of EKG: palpitations   Rhythm: normal sinus   Rate: normal  Axis: normal  Ectopy: none  Conduction: normal  ST Segments/ T Waves: No ST-T wave changes  Q Waves: none  Comparison to prior: new A-flutter    Clinical Impression: A-flutter 2:1 block              Results for orders placed or performed during the hospital encounter of 06/14/21 (from the past 24 hour(s))   EKG 12-lead, tracing only   Result Value Ref Range    Interpretation ECG Click View Image link to view waveform and result    CBC with platelets differential   Result Value Ref Range    WBC 6.5 4.0 - 11.0 10e9/L    RBC Count 4.89 3.8 - 5.2 10e12/L    Hemoglobin 14.4 11.7 - 15.7 g/dL    Hematocrit 42.9 35.0 - 47.0 %    MCV 88 78 - 100 fl    MCH 29.4 26.5 - 33.0 pg    MCHC 33.6 31.5 - 36.5 g/dL    RDW 14.1 10.0 - 15.0 %    Platelet Count 269 150 - 450 10e9/L    Diff Method Automated Method     % Neutrophils 63.0 %    % Lymphocytes 24.9 %    % Monocytes 9.8 %    % Eosinophils 1.2 %    % Basophils 0.8 %    % Immature Granulocytes 0.3 %    Nucleated RBCs 0 0 /100    Absolute Neutrophil 4.1 1.6 - 8.3 10e9/L    Absolute Lymphocytes 1.6 0.8 - 5.3 10e9/L    Absolute Monocytes 0.6 0.0 - 1.3 10e9/L     Absolute Eosinophils 0.1 0.0 - 0.7 10e9/L    Absolute Basophils 0.1 0.0 - 0.2 10e9/L    Abs Immature Granulocytes 0.0 0 - 0.4 10e9/L    Absolute Nucleated RBC 0.0    Basic metabolic panel   Result Value Ref Range    Sodium 138 133 - 144 mmol/L    Potassium 4.2 3.4 - 5.3 mmol/L    Chloride 107 94 - 109 mmol/L    Carbon Dioxide 27 20 - 32 mmol/L    Anion Gap 4 3 - 14 mmol/L    Glucose 78 70 - 99 mg/dL    Urea Nitrogen 14 7 - 30 mg/dL    Creatinine 1.22 (H) 0.52 - 1.04 mg/dL    GFR Estimate 50 (L) >60 mL/min/[1.73_m2]    GFR Estimate If Black 58 (L) >60 mL/min/[1.73_m2]    Calcium 9.2 8.5 - 10.1 mg/dL   Troponin I   Result Value Ref Range    Troponin I ES <0.015 0.000 - 0.045 ug/L   TSH with free T4 reflex   Result Value Ref Range    TSH 2.86 0.40 - 4.00 mU/L   Cardiology Electrophysiology (EP) IP Consult: Patient to be seen: Routine within 24 hrs; Call back #: [id number]; AFL; Consultant may enter orders: Yes; Requesting provider? Attending physician    Sy Pritchett MD     2021 12:07 PM           Electrophysiology Consult                                                               2021  Sunshine Delgado MRN: [id number]  Age: 56 year old, : 1965        Reason for consult:      Paroxysmal atrial flutter / fibrillation        Assessment and Recommendation:     Ms. Delgado is a 56 year old woman with pAF/pAFL s/p DCCV 6/10   presenting with recurrent palpitations and found to be in   symptomatic atrial flutter.    #Symptomatic atrial flutter:  #h/o paroxysmal atrial fibrillation:  At this time given that she is highly symptomatic for her atrial   tachyarrhythmia, recommend cardioversion.  No indication for   precardioversion transesophageal echocardiogram given that she   has been therapeutically anticoagulated for least 4 weeks.    Given that she has failed maintenance of sinus rhythm with   flecainide, neck step would be both a CTI and PVI ablation. In   the mean time, we can  uptitrate her antiarrhythmic therapy.    Recommendations:  -DCCV for atrial flutter  -No indication for YIFAN prior to DCCV  -Increase flecanide from 100 mg PO BID to 150 mg PO BID  -Continue atenolol 25 mg PO every day, continue apirxaban  -We will follow-up Ms. Delgado as outpatient for ablation        Patient discussed with staff attending, Dr. Lay and the note   reflects our joint plan. Thank you for consulting the   cardiovascular services at the [facility]. Please do not hesitate to call with questions or   concerns.     Sy Nowak MD    PGY-6, Cardiology Fellow  Pager: [phone]        History of Present Illness:     Ms. Delgado is a 56 year old woman with pAF/pAFL s/p DCCV 6/10   presenting with recurrent palpitations.    After her cardioversion on Maricarmen 10, she felt fatigued but   otherwise was feeling well after being discharged in normal sinus   rhythm    Around 4 PM on Saturday, June 12 she acutely felt an onset of   palpitations, lightheadedness, dizziness, presyncope.  She is   continue to feel lightheadedness since the onset of the symptoms   and additionally has new dyspnea on exertion such that walking   from her garage to her kitchen she has to stop and catch her   breath.    She reports being on apixaban taking it every day, twice a day   since March without missing any doses.      Past Medical History:     Patient Active Problem List   Diagnosis     Generalized anxiety disorder     CARDIOVASCULAR SCREENING; LDL GOAL LESS THAN 160     MARIA GUADALUPE (obstructive sleep apnea)- severe (AHI 84)     Morbid obesity (H)     Health Care Home     Mild major depression (H)     Insomnia     Bee sting allergy     CKD (chronic kidney disease) stage 3, GFR 30-59 ml/min     Hashimoto's thyroiditis     Atrial fibrillation with rapid ventricular response (H)     Elevated liver enzymes     Infectious mononucleosis     Acute pain of right shoulder     Impingement syndrome of shoulder region,  right     Attention deficit hyperactivity disorder (ADHD), unspecified   ADHD type         Past Surgical History:      Past Surgical History:   Procedure Laterality Date     COLONOSCOPY  2000     DAVINCI GASTRIC SLEEVE       GENITOURINARY SURGERY  2007     HYSTERECTOMY, PAP NO LONGER INDICATED       HYSTERECTOMY, VAGINAL  2007    still has ovaries     LAPAROSCOPIC GASTRIC SLEEVE N/A 3/13/2018    Procedure: LAPAROSCOPIC GASTRIC SLEEVE;  Laparoscopic Sleeve   Gastrectomy;  Surgeon: Kameron Joseph MD;  Location:  OR           Social History:     Social History     Socioeconomic History     Marital status:      Spouse name: Not on file     Number of children: 1     Years of education: Not on file     Highest education level: Not on file   Occupational History     Occupation:      Comment: [employer]     Employer: [employer]   Social Needs     Financial resource strain: Not on file     Food insecurity     Worry: Not on file     Inability: Not on file     Transportation needs     Medical: Not on file     Non-medical: Not on file   Tobacco Use     Smoking status: Never Smoker     Smokeless tobacco: Never Used   Substance and Sexual Activity     Alcohol use: Yes     Comment: 1-2 per mo     Drug use: No     Sexual activity: Yes     Partners: Male     Birth control/protection: Female Surgical   Lifestyle     Physical activity     Days per week: Not on file     Minutes per session: Not on file     Stress: Not on file   Relationships     Social connections     Talks on phone: Not on file     Gets together: Not on file     Attends Christian service: Not on file     Active member of club or organization: Not on file     Attends meetings of clubs or organizations: Not on file     Relationship status: Not on file     Intimate partner violence     Fear of current or ex partner: Not on file     Emotionally abused: Not on file     Physically abused: Not on file     Forced  "sexual activity: Not on file   Other Topics Concern     Parent/sibling w/ CABG, MI or angioplasty before 65F 55M? No   Social History Narrative    ,  had glioblastoma,      [employer], [employer]    Started the \"[employer]\"        Reggie, born , son. Has aspergers                 Family History:     Family History   Problem Relation Age of Onset     Eye Disorder Mother         lost eyesight; probable macular degeneration     Psychotic Disorder Mother         Dementia /Alzhimers     Neurologic Disorder Mother         seizures     Diabetes Mother      Hypertension Mother      Alzheimer Disease Mother      Arthritis Mother      Osteoporosis Mother      Obesity Mother      Diabetes Father      Depression Father      Hyperlipidemia Father      Obesity Father      Psychotic Disorder Sister         bipolar     Thyroid Disease Sister         h/o thyroid cancer     Depression Sister         Bipolar     Obesity Sister      Cancer Other         Brain cancer     Osteoporosis Maternal Grandmother      Anxiety Disorder Son      Depression Sister      Thyroid Disease Sister          Allergies:     Allergies   Allergen Reactions     Sulfa Drugs Hives     Cephalexin Hives     Cinnamon Hives     Strawberry Hives     Sulfamethoxazole-Trimethoprim Hives     Vicodin [Hydrocodone-Acetaminophen]      Wasp Venom Protein      Other reaction(s): Chest Pain     Wasps [Hornets] Swelling     Now carries Epi Pen     Zoloft [Sertraline]      suicidal ideation     Contrast Dye Other (See Comments) and Rash     Patient had sneezing and an itchy throat following contrast   injection of 100 mL Isovue-370.  From IV contrast dye         Medications:     triamcinolone (KENALOG-40) injection 40 mg    acetaminophen (TYLENOL) 325 MG tablet,   apixaban ANTICOAGULANT (ELIQUIS ANTICOAGULANT) 5 MG tablet, Take   1 tablet (5 mg) by mouth 2 times daily  Ascorbic Acid (VITAMIN C PO), Take by mouth every " "morning  atenolol (TENORMIN) 25 MG tablet, Take 1 tablet (25 mg) by mouth   daily  buPROPion (WELLBUTRIN XL) 300 MG 24 hr tablet, Take 1 tablet (300   mg) by mouth every morning  Cholecalciferol (VITAMIN D PO), Take by mouth every morning  diphenhydrAMINE (BENADRYL) 50 MG capsule, Administer 1 HOUR PRIOR   TO - IV contrast injection  EPINEPHrine (AUVI-Q) 0.3 MG/0.3ML injection 2-pack, Inject 0.3   mLs (0.3 mg) into the muscle as needed for anaphylaxis  flecainide (TAMBOCOR) 100 MG tablet, Take 1 tablet (100 mg) by   mouth 2 times daily  fluticasone (FLONASE) 50 MCG/ACT spray, Spray 2 sprays into both   nostrils daily  levothyroxine (SYNTHROID/LEVOTHROID) 75 MCG tablet, Take 1 tablet   (75 mcg) by mouth daily +++NEED LABS+++  methylPREDNISolone (MEDROL) 32 MG tablet, FIRST DOSE 12 hours   prior to the procedure with IV contrast  SECOND DOSE 2 HOURS PRIOR TP CONTRAST  multivitamin, therapeutic with minerals (MULTI-VITAMIN) TABS   tablet, Take 1 tablet by mouth every morning  order for DME, Resmed Airsense 10 auto cpap 6-7 cm, Airfit P10   for her XS pillows  traZODone (DESYREL) 50 MG tablet, TAKE 2-3 TABLETS BY MOUTH AT   BEDTIME            Physical Exam:     Vital signs:  Temp: 97.4  F (36.3  C) Temp src: Oral BP: 96/79 Pulse: 84     Resp: 12 SpO2: 98 % O2 Device: None (Room air)   Height: 165.1 cm   (5' 5\") Weight: 102.5 kg (226 lb)  Estimated body mass index is 37.61 kg/m  as calculated from the   following:    Height as of this encounter: 1.651 m (5' 5\").    Weight as of this encounter: 102.5 kg (226 lb).            Wt Readings from Last 4 Encounters:   06/14/21 102.5 kg (226 lb)   06/10/21 102.6 kg (226 lb 3.2 oz)   06/08/21 102.5 kg (226 lb)   04/28/21 100.7 kg (222 lb)       No intake or output data in the 24 hours ending 06/14/21 3239    Gen: AA&Ox3, no acute distress  HEENT:AT/ NC, PERRL b/l, EOM grossly intact, mucous membranes   pink, moist without plaque or exudate, no cervical adenopathy  PULM/THORAX: " Clear to auscultation bilaterally, no   rales/rhonchi/wheezes  CV: tachycardic, irregularly irregular; S1 and S2 appreciated, no   extra heart sounds, murmurs or rub auscultated. No JVD  ABD:soft, nontender, nondistended. Normoactive bowel sounds x 4,   no HSM appreciated.  EXT: No edema, clubbing or cyanosis. No asymmetrical edema or   tenderness to palpation in calves bilaterally.  NEURO: CN II-XII intact, strength 5/5 throughout      Data:     Labs Reviewed on Admission    Troponin   Lab Results   Component Value Date    TROPI <0.015 06/14/2021    TROPI <0.015 06/10/2021    TROPONIN 0.00 02/21/2013     BMP  Recent Labs   Lab 06/14/21  1021 06/10/21  1905    137   POTASSIUM 4.2 3.6   CHLORIDE 107 103   CONCHA 9.2 8.9   CO2 27 25   BUN 14 15   CR 1.22* 1.22*   GLC 78 139*     CBC  Recent Labs   Lab 06/14/21  1021 06/10/21  1905   WBC 6.5 8.1   RBC 4.89 5.07   HGB 14.4 14.6   HCT 42.9 44.8   MCV 88 88   MCH 29.4 28.8   MCHC 33.6 32.6   RDW 14.1 14.2    288     INRNo lab results found in last 7 days.   Hepatic Panel   Lab Results   Component Value Date    AST 12 03/17/2021     Lab Results   Component Value Date    ALT 22 03/17/2021     Lab Results   Component Value Date    BILICONJ 0.0 03/04/2013      Lab Results   Component Value Date    BILITOTAL 0.3 03/17/2021     Lab Results   Component Value Date    ALBUMIN 3.9 03/17/2021     Lab Results   Component Value Date    PROTTOTAL 7.3 03/17/2021      Lab Results   Component Value Date    ALKPHOS 61 03/17/2021           Most Recent Imaging:     Interpretation Summary  Submaximal ECG stress test  65% maximal predicted HR achieved. The test was terminated due to   fatigue.  Normal blood pressure response to exercise.  The patient did not report any symptoms during exercise.  No ECG evidence of ischemia.  No QRS prolongation at peak exercise compared to rest.  No supraventricular or ventricular arrhythmias.  Good exercise tolerance. The patient achieved 10 METs at  "peak   exercise.                       Medications   etomidate (AMIDATE) injection 10 mg (has no administration in time range)   fentaNYL (PF) (SUBLIMAZE) injection 50 mcg (has no administration in time range)             Cardioversion/Defibrillation:      Performed by Don Alejo MD     Pre Procedure Rhythm:  Atrial Flutter  Consent: Consent given by: Patient who states understanding of the procedure being performed after discussing the  risks, benefits and alternatives.  Time out: Immediately prior to procedure a \"time out\" was called to verify the correct patient, procedure, equipment, support staff and site/side marked as required.    Sedation:  Patient sedated with Etomidate and Fentanyl  Vital signs, airway and pulse oximetry were monitored and remained stable throughout the procedure and sedation was maintained until the procedure was complete.  The patient was monitored with staff at the bedside until she fully regained consciousness.  Procedure: The patient was placed in the supine position and the chest area was exposed. The cardioversion pads were applied in the standard manner and configuration.     The Biphasic defibrillator was set on the Synchronized mode and the patient was shocked at 100 Joules, resulting in Sinus Rhythm.     The Patient tolerated the procedure well with no immediate complications.             ECG Post Procedure:      EKG Number: 2.  Done at 12:48 PM  hours.  Interpreted by Don Alejo MD  Symptoms at time of EKG: None   Rhythm: Normal sinus   Rate: Normal  Axis: Normal  Ectopy: None  Conduction: Normal  ST Segments/ T Waves: No ST-T wave changes and No acute ischemic changes  Q Waves: None  Comparison to prior: converted to NSR from A flutter    Clinical Impression: normal EKG          Medications   etomidate (AMIDATE) injection 10 mg (has no administration in time range)   fentaNYL (PF) (SUBLIMAZE) injection 50 mcg (has no administration in time range) "         Assessments & Plan (with Medical Decision Making)   56-year-old female with history of atrial fibrillation/flutter was cardioverted 5 days ago and now is back in atrial flutter.  EP consultation was obtained and they requested we do another electrocardioversion and she will follow-up for evaluation of possible ablation.  She was appropriately n.p.o. consent was signed she was pretreated with fentanyl and then 10 mg of etomidate she was then synchronized cardioverted with 100 J back to sinus rhythm she tolerated the procedure well there are no immediate complications will discharge home on new dose of flecainide 150 mg twice daily and the cardiologist will be in contact with her.    I spent 30 minutes in critical care time with this patient in direct patient care frequent reassessments conscious sedation and cardioversion.    I have reviewed the nursing notes.    I have reviewed the findings, diagnosis, plan and need for follow up with the patient.    New Prescriptions    No medications on file       Final diagnoses:   Paroxysmal atrial flutter (H)     IKameron, am serving as a trained medical scribe to document services personally performed by Don Alejo MD, based on the provider's statements to me.      IDon MD, was physically present and have reviewed and verified the accuracy of this note documented by Kameron Villanueva.     6/14/2021   [facility] EMERGENCY DEPARTMENT     Don Alejo MD  06/14/21 0341

## 2021-06-14 NOTE — ED NOTES
Patient cardioverted from A-fib to NS in 1 attempt with synchronized cardioversion at 100j. Md, RN X 2 and RT at bedside. Pt tolerated procedure well. No additional interventions required.

## 2021-06-14 NOTE — CONSULTS
Electrophysiology Consult                                                               2021  Sunshine Delgado MRN: [id number]  Age: 56 year old, : 1965        Reason for consult:      Paroxysmal atrial flutter / fibrillation        Assessment and Recommendation:     Ms. Delgado is a 56 year old woman with pAF/pAFL s/p DCCV 6/10 presenting with recurrent palpitations and found to be in symptomatic atrial flutter.    #Symptomatic atrial flutter:  #h/o paroxysmal atrial fibrillation:  At this time given that she is highly symptomatic for her atrial tachyarrhythmia, recommend cardioversion.  No indication for precardioversion transesophageal echocardiogram given that she has been therapeutically anticoagulated for least 4 weeks.    Given that she has failed maintenance of sinus rhythm with flecainide, neck step would be both a CTI and PVI ablation. In the mean time, we can uptitrate her antiarrhythmic therapy.    Recommendations:  -DCCV for atrial flutter  -No indication for YIFAN prior to DCCV  -Increase flecanide from 100 mg PO BID to 150 mg PO BID  -Continue atenolol 25 mg PO every day, continue apirxaban  -We will follow-up Ms. Delgado as outpatient for ablation        Patient discussed with staff attending, Dr. Lay and the note reflects our joint plan. Thank you for consulting the cardiovascular services at the [facility]. Please do not hesitate to call with questions or concerns.     Sy Nowak MD    PGY-6, Cardiology Fellow  Pager: [phone]        History of Present Illness:     Ms. Delgado is a 56 year old woman with pAF/pAFL s/p DCCV 6/10 presenting with recurrent palpitations.    After her cardioversion on Maricarmen 10, she felt fatigued but otherwise was feeling well after being discharged in normal sinus rhythm    Around 4 PM on Saturday,  she acutely felt an onset of palpitations, lightheadedness, dizziness, presyncope.  She is continue to feel  lightheadedness since the onset of the symptoms and additionally has new dyspnea on exertion such that walking from her garage to her kitchen she has to stop and catch her breath.    She reports being on apixaban taking it every day, twice a day since March without missing any doses.      Past Medical History:     Patient Active Problem List   Diagnosis     Generalized anxiety disorder     CARDIOVASCULAR SCREENING; LDL GOAL LESS THAN 160     MARIA GUADALUPE (obstructive sleep apnea)- severe (AHI 84)     Morbid obesity (H)     Health Care Home     Mild major depression (H)     Insomnia     Bee sting allergy     CKD (chronic kidney disease) stage 3, GFR 30-59 ml/min     Hashimoto's thyroiditis     Atrial fibrillation with rapid ventricular response (H)     Elevated liver enzymes     Infectious mononucleosis     Acute pain of right shoulder     Impingement syndrome of shoulder region, right     Attention deficit hyperactivity disorder (ADHD), unspecified ADHD type         Past Surgical History:      Past Surgical History:   Procedure Laterality Date     COLONOSCOPY  2000     DAVINCI GASTRIC SLEEVE       GENITOURINARY SURGERY  2007     HYSTERECTOMY, PAP NO LONGER INDICATED       HYSTERECTOMY, VAGINAL  2007    still has ovaries     LAPAROSCOPIC GASTRIC SLEEVE N/A 3/13/2018    Procedure: LAPAROSCOPIC GASTRIC SLEEVE;  Laparoscopic Sleeve Gastrectomy;  Surgeon: Kamreon Joseph MD;  Location:  OR         Social History:     Social History     Socioeconomic History     Marital status:      Spouse name: Not on file     Number of children: 1     Years of education: Not on file     Highest education level: Not on file   Occupational History     Occupation:      Comment: [employer]     Employer: [employer]   Social Needs     Financial resource strain: Not on file     Food insecurity     Worry: Not on file     Inability: Not on file     Transportation needs     Medical: Not on file      "Non-medical: Not on file   Tobacco Use     Smoking status: Never Smoker     Smokeless tobacco: Never Used   Substance and Sexual Activity     Alcohol use: Yes     Comment: 1-2 per mo     Drug use: No     Sexual activity: Yes     Partners: Male     Birth control/protection: Female Surgical   Lifestyle     Physical activity     Days per week: Not on file     Minutes per session: Not on file     Stress: Not on file   Relationships     Social connections     Talks on phone: Not on file     Gets together: Not on file     Attends Congregational service: Not on file     Active member of club or organization: Not on file     Attends meetings of clubs or organizations: Not on file     Relationship status: Not on file     Intimate partner violence     Fear of current or ex partner: Not on file     Emotionally abused: Not on file     Physically abused: Not on file     Forced sexual activity: Not on file   Other Topics Concern     Parent/sibling w/ CABG, MI or angioplasty before 65F 55M? No   Social History Narrative    ,  had glioblastoma,      [place], Makes jewelry    Started the \"[employer] project\"        Reggie, born , son. Has aspergers                 Family History:     Family History   Problem Relation Age of Onset     Eye Disorder Mother         lost eyesight; probable macular degeneration     Psychotic Disorder Mother         Dementia /Alzhimers     Neurologic Disorder Mother         seizures     Diabetes Mother      Hypertension Mother      Alzheimer Disease Mother      Arthritis Mother      Osteoporosis Mother      Obesity Mother      Diabetes Father      Depression Father      Hyperlipidemia Father      Obesity Father      Psychotic Disorder Sister         bipolar     Thyroid Disease Sister         h/o thyroid cancer     Depression Sister         Bipolar     Obesity Sister      Cancer Other         Brain cancer     Osteoporosis Maternal Grandmother      Anxiety Disorder Son  "     Depression Sister      Thyroid Disease Sister          Allergies:     Allergies   Allergen Reactions     Sulfa Drugs Hives     Cephalexin Hives     Cinnamon Hives     Strawberry Hives     Sulfamethoxazole-Trimethoprim Hives     Vicodin [Hydrocodone-Acetaminophen]      Wasp Venom Protein      Other reaction(s): Chest Pain     Wasps [Hornets] Swelling     Now carries Epi Pen     Zoloft [Sertraline]      suicidal ideation     Contrast Dye Other (See Comments) and Rash     Patient had sneezing and an itchy throat following contrast injection of 100 mL Isovue-370.  From IV contrast dye         Medications:     triamcinolone (KENALOG-40) injection 40 mg    acetaminophen (TYLENOL) 325 MG tablet,   apixaban ANTICOAGULANT (ELIQUIS ANTICOAGULANT) 5 MG tablet, Take 1 tablet (5 mg) by mouth 2 times daily  Ascorbic Acid (VITAMIN C PO), Take by mouth every morning  atenolol (TENORMIN) 25 MG tablet, Take 1 tablet (25 mg) by mouth daily  buPROPion (WELLBUTRIN XL) 300 MG 24 hr tablet, Take 1 tablet (300 mg) by mouth every morning  Cholecalciferol (VITAMIN D PO), Take by mouth every morning  diphenhydrAMINE (BENADRYL) 50 MG capsule, Administer 1 HOUR PRIOR TO - IV contrast injection  EPINEPHrine (AUVI-Q) 0.3 MG/0.3ML injection 2-pack, Inject 0.3 mLs (0.3 mg) into the muscle as needed for anaphylaxis  flecainide (TAMBOCOR) 100 MG tablet, Take 1 tablet (100 mg) by mouth 2 times daily  fluticasone (FLONASE) 50 MCG/ACT spray, Spray 2 sprays into both nostrils daily  levothyroxine (SYNTHROID/LEVOTHROID) 75 MCG tablet, Take 1 tablet (75 mcg) by mouth daily +++NEED LABS+++  methylPREDNISolone (MEDROL) 32 MG tablet, FIRST DOSE 12 hours prior to the procedure with IV contrast  SECOND DOSE 2 HOURS PRIOR TP CONTRAST  multivitamin, therapeutic with minerals (MULTI-VITAMIN) TABS tablet, Take 1 tablet by mouth every morning  order for DME, Resmed Airsense 10 auto cpap 6-7 cm, Airfit P10 for her XS pillows  traZODone (DESYREL) 50 MG tablet,  "TAKE 2-3 TABLETS BY MOUTH AT BEDTIME            Physical Exam:     Vital signs:  Temp: 97.4  F (36.3  C) Temp src: Oral BP: 96/79 Pulse: 84   Resp: 12 SpO2: 98 % O2 Device: None (Room air)   Height: 165.1 cm (5' 5\") Weight: 102.5 kg (226 lb)  Estimated body mass index is 37.61 kg/m  as calculated from the following:    Height as of this encounter: 1.651 m (5' 5\").    Weight as of this encounter: 102.5 kg (226 lb).            Wt Readings from Last 4 Encounters:   06/14/21 102.5 kg (226 lb)   06/10/21 102.6 kg (226 lb 3.2 oz)   06/08/21 102.5 kg (226 lb)   04/28/21 100.7 kg (222 lb)       No intake or output data in the 24 hours ending 06/14/21 1159    Gen: AA&Ox3, no acute distress  HEENT:AT/ NC, PERRL b/l, EOM grossly intact, mucous membranes pink, moist without plaque or exudate, no cervical adenopathy  PULM/THORAX: Clear to auscultation bilaterally, no rales/rhonchi/wheezes  CV: tachycardic, irregularly irregular; S1 and S2 appreciated, no extra heart sounds, murmurs or rub auscultated. No JVD  ABD:soft, nontender, nondistended. Normoactive bowel sounds x 4, no HSM appreciated.  EXT: No edema, clubbing or cyanosis. No asymmetrical edema or tenderness to palpation in calves bilaterally.  NEURO: CN II-XII intact, strength 5/5 throughout      Data:     Labs Reviewed on Admission    Troponin   Lab Results   Component Value Date    TROPI <0.015 06/14/2021    TROPI <0.015 06/10/2021    TROPONIN 0.00 02/21/2013     BMP  Recent Labs   Lab 06/14/21  1021 06/10/21  1905    137   POTASSIUM 4.2 3.6   CHLORIDE 107 103   CONCHA 9.2 8.9   CO2 27 25   BUN 14 15   CR 1.22* 1.22*   GLC 78 139*     CBC  Recent Labs   Lab 06/14/21  1021 06/10/21  1905   WBC 6.5 8.1   RBC 4.89 5.07   HGB 14.4 14.6   HCT 42.9 44.8   MCV 88 88   MCH 29.4 28.8   MCHC 33.6 32.6   RDW 14.1 14.2    288     INRNo lab results found in last 7 days.   Hepatic Panel   Lab Results   Component Value Date    AST 12 03/17/2021     Lab Results   Component " Value Date    ALT 22 03/17/2021     Lab Results   Component Value Date    BILICONJ 0.0 03/04/2013      Lab Results   Component Value Date    BILITOTAL 0.3 03/17/2021     Lab Results   Component Value Date    ALBUMIN 3.9 03/17/2021     Lab Results   Component Value Date    PROTTOTAL 7.3 03/17/2021      Lab Results   Component Value Date    ALKPHOS 61 03/17/2021           Most Recent Imaging:     Interpretation Summary  Submaximal ECG stress test  65% maximal predicted HR achieved. The test was terminated due to fatigue.  Normal blood pressure response to exercise.  The patient did not report any symptoms during exercise.  No ECG evidence of ischemia.  No QRS prolongation at peak exercise compared to rest.  No supraventricular or ventricular arrhythmias.  Good exercise tolerance. The patient achieved 10 METs at peak exercise.

## 2021-06-14 NOTE — TELEPHONE ENCOUNTER
I called and talked to the patient.  I recommended switching from flecainide to amiodarone until she sees EP for ablation.  I discussed the possible side effects from amiodarone including lung injury, skin sensitive to sun, liver injury and thyroid problems.  The patient is agreeable to proceed.     I told her to stop atenolol and flecainide.    Return to clinic 6 months.

## 2021-06-14 NOTE — DISCHARGE INSTRUCTIONS
Increase flecainide to 150 mg twice daily  You will be contacted by cardiology regarding evaluation for possible ablation  Return to the ER if you are not doing well, develop symptoms and palpitations.

## 2021-06-15 ENCOUNTER — MYC MEDICAL ADVICE (OUTPATIENT)
Dept: CARDIOLOGY | Facility: CLINIC | Age: 56
End: 2021-06-15

## 2021-06-16 ENCOUNTER — OFFICE VISIT (OUTPATIENT)
Dept: CARDIOLOGY | Facility: CLINIC | Age: 56
End: 2021-06-16
Attending: INTERNAL MEDICINE
Payer: COMMERCIAL

## 2021-06-16 ENCOUNTER — MYC MEDICAL ADVICE (OUTPATIENT)
Dept: CARDIOLOGY | Facility: CLINIC | Age: 56
End: 2021-06-16

## 2021-06-16 VITALS — HEIGHT: 65 IN | WEIGHT: 226 LBS | BODY MASS INDEX: 37.65 KG/M2 | OXYGEN SATURATION: 95 % | HEART RATE: 128 BPM

## 2021-06-16 DIAGNOSIS — Z11.9 SPECIAL SCREENING EXAMINATION FOR INFECTIOUS DISEASES: ICD-10-CM

## 2021-06-16 DIAGNOSIS — I48.0 PAROXYSMAL ATRIAL FIBRILLATION (H): Primary | ICD-10-CM

## 2021-06-16 LAB
INTERPRETATION ECG - MUSE: NORMAL
LABORATORY COMMENT REPORT: NORMAL
SARS-COV-2 RNA RESP QL NAA+PROBE: NEGATIVE
SARS-COV-2 RNA RESP QL NAA+PROBE: NORMAL
SPECIMEN SOURCE: NORMAL
SPECIMEN SOURCE: NORMAL

## 2021-06-16 PROCEDURE — U0005 INFEC AGEN DETEC AMPLI PROBE: HCPCS | Mod: 90 | Performed by: PATHOLOGY

## 2021-06-16 PROCEDURE — 93005 ELECTROCARDIOGRAM TRACING: CPT

## 2021-06-16 PROCEDURE — G0463 HOSPITAL OUTPT CLINIC VISIT: HCPCS | Mod: 25

## 2021-06-16 PROCEDURE — U0003 INFECTIOUS AGENT DETECTION BY NUCLEIC ACID (DNA OR RNA); SEVERE ACUTE RESPIRATORY SYNDROME CORONAVIRUS 2 (SARS-COV-2) (CORONAVIRUS DISEASE [COVID-19]), AMPLIFIED PROBE TECHNIQUE, MAKING USE OF HIGH THROUGHPUT TECHNOLOGIES AS DESCRIBED BY CMS-2020-01-R: HCPCS | Mod: 90 | Performed by: PATHOLOGY

## 2021-06-16 PROCEDURE — 99205 OFFICE O/P NEW HI 60 MIN: CPT | Performed by: INTERNAL MEDICINE

## 2021-06-16 RX ORDER — MAGNESIUM SULFATE HEPTAHYDRATE 40 MG/ML
2 INJECTION, SOLUTION INTRAVENOUS
Status: CANCELLED | OUTPATIENT
Start: 2021-06-16

## 2021-06-16 RX ORDER — LIDOCAINE 40 MG/G
CREAM TOPICAL
Status: CANCELLED | OUTPATIENT
Start: 2021-06-16

## 2021-06-16 RX ORDER — AMIODARONE HYDROCHLORIDE 400 MG/1
400 TABLET ORAL 2 TIMES DAILY
Qty: 28 TABLET | Refills: 0 | Status: SHIPPED | OUTPATIENT
Start: 2021-06-16 | End: 2021-07-19

## 2021-06-16 RX ORDER — POTASSIUM CHLORIDE 1500 MG/1
20 TABLET, EXTENDED RELEASE ORAL
Status: CANCELLED | OUTPATIENT
Start: 2021-06-16

## 2021-06-16 RX ORDER — POTASSIUM CHLORIDE 1500 MG/1
40 TABLET, EXTENDED RELEASE ORAL
Status: CANCELLED | OUTPATIENT
Start: 2021-06-16

## 2021-06-16 RX ORDER — AMIODARONE HYDROCHLORIDE 200 MG/1
200 TABLET ORAL DAILY
Qty: 90 TABLET | Refills: 3 | Status: ON HOLD | OUTPATIENT
Start: 2021-06-30 | End: 2021-07-22

## 2021-06-16 ASSESSMENT — MIFFLIN-ST. JEOR: SCORE: 1626.01

## 2021-06-16 ASSESSMENT — PAIN SCALES - GENERAL: PAINLEVEL: WORST PAIN (10)

## 2021-06-16 NOTE — NURSING NOTE
Chief Complaint   Patient presents with     New Patient     AF Ablation      Vitals were taken and medications where reconciled. EKG was performed   YESENIA Gann  2:01 PM

## 2021-06-16 NOTE — PROGRESS NOTES
Electrophysiology Clinic Note  HPI:   Ms. Delgado is a 56 year old female who has a past medical history significant for hashimoto's thyroiditis, MARIA GUADALUPE (uses CPAP), CKD, PAF (CHADSVASC 1 Eliquis) s/p DCCV, anxiety, and depression.     She was admitted in 1/2020 to OSH with abdominal pain and was found to have infectious mononucleosis. At that time her ECG showed AF and she spontaneously converted while in the hospital. An echo showed normal LVEF, no significant valvular abnormalities, and small pericardial effusion. She was started on metoprolol and ASA. Her metoprolol was later stopped by PCP due to frequent lightheadedness. She established care with Dr. Fajardo. She followed up in 3/2021 and reported feeling palpitations on and off and one episode lasting up to 3 days. She felt some chest pressure, shortness of breath, and presyncope. ECG showed AF vent rate 89 bpm. She had been started on lisinopril in 2/2021 by PCP for CKD and had felt a little more lightheaded since then, but also notes she had lightheadedness predating the lisinopril. A zio patch monitor from 4/2/21-4/16/21 showed 29% AF/AFL burden up to 10 hours 54 minutes and 39 symptom activations showed mostly AF/AFL or PSVT and sometimes sinus without ectopy. She was placed on atenolol and reported fatigue with this. Flecainide 50 mg BID was added. Subsequent ECG stress testing was negative for ischemia or QRS widening. Her PAF episodes had completely subsided for about 1 month after starting the Flecainide until end of 5/2021. She felt much improved when maintaining sinus. She then started developing increased AF symptoms, occurring daily. She has symptoms of chest tightness, dizziness, and palpitations with her AF.  She then presented to [facility] on 6/10/21 with symptomatic AF and underwent DCCV. Her Flecainide was increased to 100 mg BID. She represented to [facility] on 6/14/21 again with symptomatic AF and underwent DCCV. Her Flecainide and atenolol were stopped and  she was changed to amiodarone. She was then referred to EP for consideration for ablation.     She reports feeling poorly. She is back in AFL today and reports feeling fatigue, MCHUGH, lightheadedness/dizziness, and generally unwell. She denies any chest pain/pressures, leg/ankle swelling, PND, orthopnea, or syncopal symptoms. Presenting 12 lead ECG shows AFL Vent Rate 114 bpm,  ms, QTc 424 ms. Current cardiac medications include: Amiodarone and Eliquis.     PAST MEDICAL HISTORY:  Past Medical History:   Diagnosis Date     Atrial fibrillation with rapid ventricular response (H) 1/26/2020     Bee sting allergy      Depressive disorder      Generalized anxiety disorder 10/15/2009    lexapro made things worse.       Hashimoto's thyroiditis 5/6/2020     Morbid obesity (H)      Ocular migraine      MARIA GUADALUPE (obstructive sleep apnea)- severe (AHI 84) 09/09/2011     Voice fatigue 10/22/2009       CURRENT MEDICATIONS:  Current Outpatient Medications   Medication Sig Dispense Refill     acetaminophen (TYLENOL) 325 MG tablet        amiodarone (PACERONE) 200 MG tablet Amiodarone 200 mg 3 times daily for 2 weeks, then decrease the dose to 2 times daily for 5 days.  Thereafter continue to take 1 tablet/day. 90 tablet 3     apixaban ANTICOAGULANT (ELIQUIS ANTICOAGULANT) 5 MG tablet Take 1 tablet (5 mg) by mouth 2 times daily 180 tablet 3     Ascorbic Acid (VITAMIN C PO) Take by mouth every morning       buPROPion (WELLBUTRIN XL) 300 MG 24 hr tablet Take 1 tablet (300 mg) by mouth every morning 90 tablet 1     Cholecalciferol (VITAMIN D PO) Take by mouth every morning       diphenhydrAMINE (BENADRYL) 50 MG capsule Administer 1 HOUR PRIOR TO - IV contrast injection 1 capsule 0     EPINEPHrine (AUVI-Q) 0.3 MG/0.3ML injection 2-pack Inject 0.3 mLs (0.3 mg) into the muscle as needed for anaphylaxis 0.6 mL 3     fluticasone (FLONASE) 50 MCG/ACT spray Spray 2 sprays into both nostrils daily 16 g 1     levothyroxine (SYNTHROID/LEVOTHROID)  75 MCG tablet Take 1 tablet (75 mcg) by mouth daily +++NEED LABS+++ 30 tablet 0     methylPREDNISolone (MEDROL) 32 MG tablet FIRST DOSE 12 hours prior to the procedure with IV contrast  SECOND DOSE 2 HOURS PRIOR TP CONTRAST 2 tablet 0     multivitamin, therapeutic with minerals (MULTI-VITAMIN) TABS tablet Take 1 tablet by mouth every morning       order for DME Resmed Airsense 10 auto cpap 6-7 cm, Airfit P10 for her XS pillows       traZODone (DESYREL) 50 MG tablet TAKE 2-3 TABLETS BY MOUTH AT BEDTIME 270 tablet 1       PAST SURGICAL HISTORY:  Past Surgical History:   Procedure Laterality Date     COLONOSCOPY  2000     DAVINCI GASTRIC SLEEVE       GENITOURINARY SURGERY  2007     HYSTERECTOMY, PAP NO LONGER INDICATED       HYSTERECTOMY, VAGINAL  2007    still has ovaries     LAPAROSCOPIC GASTRIC SLEEVE N/A 3/13/2018    Procedure: LAPAROSCOPIC GASTRIC SLEEVE;  Laparoscopic Sleeve Gastrectomy;  Surgeon: Kameron Joseph MD;  Location: [facility] OR       ALLERGIES:     Allergies   Allergen Reactions     Sulfa Drugs Hives     Cephalexin Hives     Cinnamon Hives     Strawberry Hives     Sulfamethoxazole-Trimethoprim Hives     Vicodin [Hydrocodone-Acetaminophen]      Wasp Venom Protein      Other reaction(s): Chest Pain     Wasps [Hornets] Swelling     Now carries Epi Pen     Zoloft [Sertraline]      suicidal ideation     Contrast Dye Other (See Comments) and Rash     Patient had sneezing and an itchy throat following contrast injection of 100 mL Isovue-370.  From IV contrast dye       FAMILY HISTORY:  Family History   Problem Relation Age of Onset     Eye Disorder Mother         lost eyesight; probable macular degeneration     Psychotic Disorder Mother         Dementia /Alzhimers     Neurologic Disorder Mother         seizures     Diabetes Mother      Hypertension Mother      Alzheimer Disease Mother      Arthritis Mother      Osteoporosis Mother      Obesity Mother      Diabetes Father      Depression Father       "Hyperlipidemia Father      Obesity Father      Psychotic Disorder Sister         bipolar     Thyroid Disease Sister         h/o thyroid cancer     Depression Sister         Bipolar     Obesity Sister      Cancer Other         Brain cancer     Osteoporosis Maternal Grandmother      Anxiety Disorder Son      Depression Sister      Thyroid Disease Sister        SOCIAL HISTORY:  Social History     Tobacco Use     Smoking status: Never Smoker     Smokeless tobacco: Never Used   Substance Use Topics     Alcohol use: Yes     Comment: 1-2 per mo     Drug use: No       ROS:   A comprehensive 10 point review of systems negative other than as mentioned in HPI.  Exam:  Pulse 128   Ht 1.651 m (5' 5\")   Wt 102.5 kg (226 lb)   SpO2 95%   BMI 37.61 kg/m    GENERAL APPEARANCE: alert and no distress  HEENT: MMM. PERRLA.  NECK: supple.   RESPIRATORY: lungs clear to auscultation - no rales, rhonchi or wheezes, no use of accessory muscles, no retractions, respirations are unlabored, normal respiratory rate  CARDIOVASCULAR: tachy, irregular, no murmur.   ABDOMEN: soft, NT, ND, +BS.  EXTREMITIES: peripheral pulses normal, no edema  NEURO: alert and oriented to person/place/time, normal speech, gait and affect  SKIN: no ecchymoses, no rashes  PSYCH: normal affect, cooperative    Labs:  Reviewed.     Testing/Procedures:  5/11/21 STRESS ECHOCARDIOGRAM:  Interpretation Summary  Submaximal ECG stress test  65% maximal predicted HR achieved. The test was terminated due to fatigue.  Normal blood pressure response to exercise.  The patient did not report any symptoms during exercise.  No ECG evidence of ischemia.  No QRS prolongation at peak exercise compared to rest.  No supraventricular or ventricular arrhythmias.  Good exercise tolerance. The patient achieved 10 METs at peak exercise.    1/27/20 ECHOCARDIOGRAM:   Final Conclusion   Normal left ventricular size.   Normal left ventricular systolic function.   No obvious regional wall motion " abnormalities.   The ejection fraction is visually estimated at 55-60%.   The right ventricle is normal in size and function.   The left atrium is normal in size.   There is trace mitral regurgitation.   There is no significant tricuspid regurgitation.   Small pericardial effusion.   No echocardiographic findings to suggest hemodynamic effect of the pericardial fluid.   Estimated EF: 55-60%      Assessment and Plan:   Ms. Delgado is a 56 year old female who has a past medical history significant for hashimoto's thyroiditis, MARIA GUADALUPE (uses CPAP), CKD, PAF (CHADSVASC 1 Eliquis) s/p DCCV, anxiety, and depression. She was first diagnosed with AF in 1/2020. An echo showed normal LVEF, no significant valvular abnormalities, and small pericardial effusion. She was started on metoprolol and ASA. Her metoprolol was later stopped by PCP due to frequent lightheadedness. She established care with Dr. Fajardo. She had recurrent PAF symptoms that were very symptomatic. A zio patch monitor from 4/2/21-4/16/21 showed 29% AF/AFL burden up to 10 hours 54 minutes and 39 symptom activations showed mostly AF/AFL or PSVT and sometimes sinus without ectopy. She was placed on atenolol and reported fatigue with this. Flecainide 50 mg BID was added. Subsequent ECG stress testing was negative for ischemia or QRS widening. Her PAF episodes had completely subsided for about 1 month after starting the Flecainide until end of 5/2021. She felt much improved when maintaining sinus. She then started developing increased AF symptoms, occurring daily. She has symptoms of chest tightness, dizziness, and palpitations with her AF.  She then presented to [facility] on 6/10/21 with symptomatic AF and underwent DCCV. Her Flecainide was increased to 100 mg BID. She represented to [facility] on 6/14/21 again with symptomatic AF and underwent DCCV. Her Flecainide and atenolol were stopped and she was changed to amiodarone. She was then referred to EP for consideration for ablation.  She reports feeling poorly. She is back in AFL today and reports feeling fatigue, MCHUGH, lightheadedness/dizziness, and generally unwell. She denies any chest pain/pressures, leg/ankle swelling, PND, orthopnea, or syncopal symptoms. Presenting 12 lead ECG shows AFL Vent Rate 114 bpm,  ms, QTc 424 ms. Current cardiac medications include: Amiodarone and Eliquis.     Paroxsymal Atrial Fibrillation/Flutter:   We discussed in detail with the patient management/treatment options for Skip includin. Stroke Prophylaxis:  CHADSVASC=+gender  1, corresponding to a 1.3% annual stroke / systemic emolism event rate. She is on Eliquis d/t recent DCCVs.   2. Rate Control: She had been on Metoprolol with fatigue and it was stopped. She had been on Atenolol which was recently stopped. Can consider CCB if needed.   3. Rhythm Control: Cardioversion, Antiarrhythmics and/or ablation are options for rhythm control. She has been having frequent PAF/AFL that is highly symptomatic. She has had a couple DCCVs with continued recurrences. She had been on Flecainide which was not effective. She was recently switched to amiodarone. We discussed that given her younger age, we do not favor keeping her on this long term. We discussed alternative AATs vs. Ablation. We discussed ablation indications, risks, and benefits in detail. The procedural risk of EP study and ablation were discussed in detail. Risks discussed include: vascular complications, CVA, AVB, pericardial effusion and tamponade, esophageal fistula, PV stenosis. AF ablation has 70% success at 1 year, 50% success at 5 years, and 30% need another ablation.  Even if ablation is successful anticoagulation may be needed lifelong.She would like to pursue ablation. We will work to arrange this in near future for her. In meantime, given she is in AFL, we will have her increase amiodarone to 400 mg BID X2 weeks then down to 200 mg daily thereafter. We will arrange for her to have  another DCCV to help improve symptoms now and then do PIV/CTI ablation in near future. We would plan to wean off amiodarone after ablation.     4. Risk Factor Management: tight BP control, and MARIA GUADALUPE evaluation as indicated.      Follow up in 3 months.   The patient states understanding and is agreeable with plan.   This patient was seen and evaluated with Dr. Craig. The above note reflects our joint assessment and plan.   LEOPOLDO Colin CNP  Pager: [phone]    EP STAFF NOTE  I have seen and examined the patient as part of a shared visit with JANY Colin NP.  I agree with the note above. I reviewed today's vital signs and medications. I have reviewed and discussed with the advanced practice provider their physical examination, assessment, and plan   Briefly, symptomatic PAF, failed flecainide and now on amio  My key history/exam findings are: AF.   The key management decisions made by me: AF/AFL ablation.    Vicente Criag MD [facility]  Cardiology - Electrophysiology

## 2021-06-16 NOTE — PATIENT INSTRUCTIONS
Plan:    Take Amiodarone 400 mg twice daily for 2 weeks then decrease to 200 mg daily thereafter    Cardioversion tomorrow (see below)    We will call you to arrange an ablation.     Please call us at below numbers or send [employer] message if further symptoms/questions.  Cardiovascular Clinic:   [address] [place] [address]  Your Care Team:  EP Cardiology   Telephone Number     Arleth Craig [phone]   Xiao Andrade RN [phone]     For scheduling appts or procedures:    Grisel Smart   [phone]     As always, Thank you for trusting us with your health care needs!    Cardioversion Instructions:   You are scheduled for a Transesophageal Echocardiogram followed by a Cardioversion at the [facility] ([address], [place] [address], [phone]).       You will need to undergo a COVID-19 PCR swab test within 4 days of procedure. You will receive a phone call with more information. If you do not hear from the COVID scheduling team, please call: [phone] to schedule.    Follow these instructions:    1. Report to the [facility] waiting room in the [facility] on: __________    2. Please do not eat anything for 8 hours prior to your procedure. You may have sips of water up until 2 hours prior to your arrival.    3. The morning of your procedure you may take your scheduled medications with a SIP of water. If you take diabetic medications or a diuretic, you may hold these.     4. You will receive medication that makes you sleepy; you will need a  and someone to stay with you for 24 hours following this procedure.    You should not make any legal decisions for 24 hours following discharge.       If you have further questions, please utilize [employer] to contact us.   If your question concerns the above instructions, contact:  Xiao Andrade, VELMA   Electrophysiology Nurse Coordinator.  [phone]    If your question concerns the  schedule/appointment times, contact:  JANY [facility] Procedure   [phone]

## 2021-06-16 NOTE — LETTER
6/16/2021      RE: Sunshine Delgado  [address] [facility] [address]       Dear Colleague,    Thank you for the opportunity to participate in the care of your patient, Sunshine Delgado, at the [facility] [place] [facility]. Please see a copy of my visit note below.    Electrophysiology Clinic Note  HPI:   Ms. Delgado is a 56 year old female who has a past medical history significant for hashimoto's thyroiditis, MARIA GUADALUPE (uses CPAP), CKD, PAF (CHADSVASC 1 Eliquis) s/p DCCV, anxiety, and depression.     She was admitted in 1/2020 to OSH with abdominal pain and was found to have infectious mononucleosis. At that time her ECG showed AF and she spontaneously converted while in the hospital. An echo showed normal LVEF, no significant valvular abnormalities, and small pericardial effusion. She was started on metoprolol and ASA. Her metoprolol was later stopped by PCP due to frequent lightheadedness. She established care with Dr. Fajardo. She followed up in 3/2021 and reported feeling palpitations on and off and one episode lasting up to 3 days. She felt some chest pressure, shortness of breath, and presyncope. ECG showed AF vent rate 89 bpm. She had been started on lisinopril in 2/2021 by PCP for CKD and had felt a little more lightheaded since then, but also notes she had lightheadedness predating the lisinopril. A zio patch monitor from 4/2/21-4/16/21 showed 29% AF/AFL burden up to 10 hours 54 minutes and 39 symptom activations showed mostly AF/AFL or PSVT and sometimes sinus without ectopy. She was placed on atenolol and reported fatigue with this. Flecainide 50 mg BID was added. Subsequent ECG stress testing was negative for ischemia or QRS widening. Her PAF episodes had completely subsided for about 1 month after starting the Flecainide until end of 5/2021. She felt much improved when maintaining sinus. She then started developing increased AF  symptoms, occurring daily. She has symptoms of chest tightness, dizziness, and palpitations with her AF.  She then presented to [facility] on 6/10/21 with symptomatic AF and underwent DCCV. Her Flecainide was increased to 100 mg BID. She represented to [facility] on 6/14/21 again with symptomatic AF and underwent DCCV. Her Flecainide and atenolol were stopped and she was changed to amiodarone. She was then referred to EP for consideration for ablation.     She reports feeling poorly. She is back in AFL today and reports feeling fatigue, MCHUGH, lightheadedness/dizziness, and generally unwell. She denies any chest pain/pressures, leg/ankle swelling, PND, orthopnea, or syncopal symptoms. Presenting 12 lead ECG shows AFL Vent Rate 114 bpm,  ms, QTc 424 ms. Current cardiac medications include: Amiodarone and Eliquis.     PAST MEDICAL HISTORY:  Past Medical History:   Diagnosis Date     Atrial fibrillation with rapid ventricular response (H) 1/26/2020     Bee sting allergy      Depressive disorder      Generalized anxiety disorder 10/15/2009    lexapro made things worse.       Hashimoto's thyroiditis 5/6/2020     Morbid obesity (H)      Ocular migraine      MARIA GUADALUPE (obstructive sleep apnea)- severe (AHI 84) 09/09/2011     Voice fatigue 10/22/2009       CURRENT MEDICATIONS:  Current Outpatient Medications   Medication Sig Dispense Refill     acetaminophen (TYLENOL) 325 MG tablet        amiodarone (PACERONE) 200 MG tablet Amiodarone 200 mg 3 times daily for 2 weeks, then decrease the dose to 2 times daily for 5 days.  Thereafter continue to take 1 tablet/day. 90 tablet 3     apixaban ANTICOAGULANT (ELIQUIS ANTICOAGULANT) 5 MG tablet Take 1 tablet (5 mg) by mouth 2 times daily 180 tablet 3     Ascorbic Acid (VITAMIN C PO) Take by mouth every morning       buPROPion (WELLBUTRIN XL) 300 MG 24 hr tablet Take 1 tablet (300 mg) by mouth every morning 90 tablet 1     Cholecalciferol (VITAMIN D PO) Take by mouth every morning        diphenhydrAMINE (BENADRYL) 50 MG capsule Administer 1 HOUR PRIOR TO - IV contrast injection 1 capsule 0     EPINEPHrine (AUVI-Q) 0.3 MG/0.3ML injection 2-pack Inject 0.3 mLs (0.3 mg) into the muscle as needed for anaphylaxis 0.6 mL 3     fluticasone (FLONASE) 50 MCG/ACT spray Spray 2 sprays into both nostrils daily 16 g 1     levothyroxine (SYNTHROID/LEVOTHROID) 75 MCG tablet Take 1 tablet (75 mcg) by mouth daily +++NEED LABS+++ 30 tablet 0     methylPREDNISolone (MEDROL) 32 MG tablet FIRST DOSE 12 hours prior to the procedure with IV contrast  SECOND DOSE 2 HOURS PRIOR TP CONTRAST 2 tablet 0     multivitamin, therapeutic with minerals (MULTI-VITAMIN) TABS tablet Take 1 tablet by mouth every morning       order for DME Resmed Airsense 10 auto cpap 6-7 cm, Airfit P10 for her XS pillows       traZODone (DESYREL) 50 MG tablet TAKE 2-3 TABLETS BY MOUTH AT BEDTIME 270 tablet 1       PAST SURGICAL HISTORY:  Past Surgical History:   Procedure Laterality Date     COLONOSCOPY  2000     DAVINCI GASTRIC SLEEVE       GENITOURINARY SURGERY  2007     HYSTERECTOMY, PAP NO LONGER INDICATED       HYSTERECTOMY, VAGINAL  2007    still has ovaries     LAPAROSCOPIC GASTRIC SLEEVE N/A 3/13/2018    Procedure: LAPAROSCOPIC GASTRIC SLEEVE;  Laparoscopic Sleeve Gastrectomy;  Surgeon: Kameron Joseph MD;  Location: [facility]       ALLERGIES:     Allergies   Allergen Reactions     Sulfa Drugs Hives     Cephalexin Hives     Cinnamon Hives     Strawberry Hives     Sulfamethoxazole-Trimethoprim Hives     Vicodin [Hydrocodone-Acetaminophen]      Wasp Venom Protein      Other reaction(s): Chest Pain     Wasps [Hornets] Swelling     Now carries Epi Pen     Zoloft [Sertraline]      suicidal ideation     Contrast Dye Other (See Comments) and Rash     Patient had sneezing and an itchy throat following contrast injection of 100 mL Isovue-370.  From IV contrast dye       FAMILY HISTORY:  Family History   Problem Relation Age of Onset     Eye Disorder  "Mother         lost eyesight; probable macular degeneration     Psychotic Disorder Mother         Dementia /Alzhimers     Neurologic Disorder Mother         seizures     Diabetes Mother      Hypertension Mother      Alzheimer Disease Mother      Arthritis Mother      Osteoporosis Mother      Obesity Mother      Diabetes Father      Depression Father      Hyperlipidemia Father      Obesity Father      Psychotic Disorder Sister         bipolar     Thyroid Disease Sister         h/o thyroid cancer     Depression Sister         Bipolar     Obesity Sister      Cancer Other         Brain cancer     Osteoporosis Maternal Grandmother      Anxiety Disorder Son      Depression Sister      Thyroid Disease Sister        SOCIAL HISTORY:  Social History     Tobacco Use     Smoking status: Never Smoker     Smokeless tobacco: Never Used   Substance Use Topics     Alcohol use: Yes     Comment: 1-2 per mo     Drug use: No       ROS:   A comprehensive 10 point review of systems negative other than as mentioned in HPI.  Exam:  Pulse 128   Ht 1.651 m (5' 5\")   Wt 102.5 kg (226 lb)   SpO2 95%   BMI 37.61 kg/m    GENERAL APPEARANCE: alert and no distress  HEENT: MMM. PERRLA.  NECK: supple.   RESPIRATORY: lungs clear to auscultation - no rales, rhonchi or wheezes, no use of accessory muscles, no retractions, respirations are unlabored, normal respiratory rate  CARDIOVASCULAR: tachy, irregular, no murmur.   ABDOMEN: soft, NT, ND, +BS.  EXTREMITIES: peripheral pulses normal, no edema  NEURO: alert and oriented to person/place/time, normal speech, gait and affect  SKIN: no ecchymoses, no rashes  PSYCH: normal affect, cooperative    Labs:  Reviewed.     Testing/Procedures:  5/11/21 STRESS ECHOCARDIOGRAM:  Interpretation Summary  Submaximal ECG stress test  65% maximal predicted HR achieved. The test was terminated due to fatigue.  Normal blood pressure response to exercise.  The patient did not report any symptoms during exercise.  No ECG " evidence of ischemia.  No QRS prolongation at peak exercise compared to rest.  No supraventricular or ventricular arrhythmias.  Good exercise tolerance. The patient achieved 10 METs at peak exercise.    1/27/20 ECHOCARDIOGRAM:   Final Conclusion   Normal left ventricular size.   Normal left ventricular systolic function.   No obvious regional wall motion abnormalities.   The ejection fraction is visually estimated at 55-60%.   The right ventricle is normal in size and function.   The left atrium is normal in size.   There is trace mitral regurgitation.   There is no significant tricuspid regurgitation.   Small pericardial effusion.   No echocardiographic findings to suggest hemodynamic effect of the pericardial fluid.   Estimated EF: 55-60%      Assessment and Plan:   Ms. Delgado is a 56 year old female who has a past medical history significant for hashimoto's thyroiditis, MARIA GUADALUPE (uses CPAP), CKD, PAF (CHADSVASC 1 Eliquis) s/p DCCV, anxiety, and depression. She was first diagnosed with AF in 1/2020. An echo showed normal LVEF, no significant valvular abnormalities, and small pericardial effusion. She was started on metoprolol and ASA. Her metoprolol was later stopped by PCP due to frequent lightheadedness. She established care with Dr. Fajardo. She had recurrent PAF symptoms that were very symptomatic. A zio patch monitor from 4/2/21-4/16/21 showed 29% AF/AFL burden up to 10 hours 54 minutes and 39 symptom activations showed mostly AF/AFL or PSVT and sometimes sinus without ectopy. She was placed on atenolol and reported fatigue with this. Flecainide 50 mg BID was added. Subsequent ECG stress testing was negative for ischemia or QRS widening. Her PAF episodes had completely subsided for about 1 month after starting the Flecainide until end of 5/2021. She felt much improved when maintaining sinus. She then started developing increased AF symptoms, occurring daily. She has symptoms of chest tightness, dizziness, and  palpitations with her AF.  She then presented to [facility] on 6/10/21 with symptomatic AF and underwent DCCV. Her Flecainide was increased to 100 mg BID. She represented to [facility] on 21 again with symptomatic AF and underwent DCCV. Her Flecainide and atenolol were stopped and she was changed to amiodarone. She was then referred to EP for consideration for ablation. She reports feeling poorly. She is back in AFL today and reports feeling fatigue, MCHUGH, lightheadedness/dizziness, and generally unwell. She denies any chest pain/pressures, leg/ankle swelling, PND, orthopnea, or syncopal symptoms. Presenting 12 lead ECG shows AFL Vent Rate 114 bpm,  ms, QTc 424 ms. Current cardiac medications include: Amiodarone and Eliquis.     Paroxsymal Atrial Fibrillation/Flutter:   We discussed in detail with the patient management/treatment options for A.deedee includin. Stroke Prophylaxis:  CHADSVASC=+gender  1, corresponding to a 1.3% annual stroke / systemic emolism event rate. She is on Eliquis d/t recent DCCVs.   2. Rate Control: She had been on Metoprolol with fatigue and it was stopped. She had been on Atenolol which was recently stopped. Can consider CCB if needed.   3. Rhythm Control: Cardioversion, Antiarrhythmics and/or ablation are options for rhythm control. She has been having frequent PAF/AFL that is highly symptomatic. She has had a couple DCCVs with continued recurrences. She had been on Flecainide which was not effective. She was recently switched to amiodarone. We discussed that given her younger age, we do not favor keeping her on this long term. We discussed alternative AATs vs. Ablation. We discussed ablation indications, risks, and benefits in detail. The procedural risk of EP study and ablation were discussed in detail. Risks discussed include: vascular complications, CVA, AVB, pericardial effusion and tamponade, esophageal fistula, PV stenosis. AF ablation has 70% success at 1 year, 50% success at 5  years, and 30% need another ablation.  Even if ablation is successful anticoagulation may be needed lifelong.She would like to pursue ablation. We will work to arrange this in near future for her. In meantime, given she is in AFL, we will have her increase amiodarone to 400 mg BID X2 weeks then down to 200 mg daily thereafter. We will arrange for her to have another DCCV to help improve symptoms now and then do PIV/CTI ablation in near future. We would plan to wean off amiodarone after ablation.     4. Risk Factor Management: tight BP control, and MARIA GUADALUPE evaluation as indicated.      Follow up in 3 months.   The patient states understanding and is agreeable with plan.   This patient was seen and evaluated with Dr. Craig. The above note reflects our joint assessment and plan.   LEOPOLDO Colin CNP  Pager: [phone]    EP STAFF NOTE  I have seen and examined the patient as part of a shared visit with JANY Colin NP.  I agree with the note above. I reviewed today's vital signs and medications. I have reviewed and discussed with the advanced practice provider their physical examination, assessment, and plan   Briefly, symptomatic PAF, failed flecainide and now on amio  My key history/exam findings are: AF.   The key management decisions made by me: AF/AFL ablation.    Vicente Craig MD [facility]  Cardiology - Electrophysiology

## 2021-06-17 ENCOUNTER — HOSPITAL ENCOUNTER (OUTPATIENT)
Facility: CLINIC | Age: 56
Discharge: HOME | End: 2021-06-17
Attending: INTERNAL MEDICINE | Admitting: INTERNAL MEDICINE
Payer: COMMERCIAL

## 2021-06-17 ENCOUNTER — APPOINTMENT (OUTPATIENT)
Dept: MEDSURG UNIT | Facility: CLINIC | Age: 56
End: 2021-06-17
Attending: INTERNAL MEDICINE
Payer: COMMERCIAL

## 2021-06-17 PROCEDURE — 999N000054 HC STATISTIC EKG NON-CHARGEABLE

## 2021-06-17 PROCEDURE — 93005 ELECTROCARDIOGRAM TRACING: CPT

## 2021-06-24 DIAGNOSIS — F33.1 MAJOR DEPRESSIVE DISORDER, RECURRENT EPISODE, MODERATE (H): Chronic | ICD-10-CM

## 2021-06-24 DIAGNOSIS — F90.9 ATTENTION DEFICIT HYPERACTIVITY DISORDER (ADHD), UNSPECIFIED ADHD TYPE: ICD-10-CM

## 2021-06-24 DIAGNOSIS — F41.1 GENERALIZED ANXIETY DISORDER: Chronic | ICD-10-CM

## 2021-06-24 DIAGNOSIS — I48.0 PAROXYSMAL ATRIAL FIBRILLATION (H): Primary | ICD-10-CM

## 2021-06-24 RX ORDER — SODIUM CHLORIDE 9 MG/ML
INJECTION, SOLUTION INTRAVENOUS CONTINUOUS
Status: CANCELLED | OUTPATIENT
Start: 2021-06-24

## 2021-06-24 RX ORDER — BUPROPION HYDROCHLORIDE 300 MG/1
300 TABLET ORAL EVERY MORNING
Qty: 90 TABLET | Refills: 0 | Status: SHIPPED | OUTPATIENT
Start: 2021-06-24 | End: 2021-09-21

## 2021-06-24 RX ORDER — LIDOCAINE 40 MG/G
CREAM TOPICAL
Status: CANCELLED | OUTPATIENT
Start: 2021-06-24

## 2021-06-24 NOTE — TELEPHONE ENCOUNTER
Prescription approved per [facility] Refill Protocol.  Arleth Alexander RN

## 2021-06-25 ENCOUNTER — CARE COORDINATION (OUTPATIENT)
Dept: CARDIOLOGY | Facility: CLINIC | Age: 56
End: 2021-06-25

## 2021-06-25 NOTE — PROGRESS NOTES
Msgs sent to team to re-order and reschedule CTA coronary angiogram with pre-medication.   Letter for AF ablation started, EP  to schedule. Will instruct closer to date.       Message  Received: Yesterday  Message Contents   Arleth Johnson APRN CNP Gutzman, Debra; Xiao Andrade, RN             Hello,   This patient needs to be scheduled for AF ablation with Dr. Craig. Change to Pradaxa 1 week before and continue for 3 weeks after (then back to Eliquis). Hold amiodarone 3 days prior.   Dr. Fajardo's team had ordered a CTA for coronary eval already so we can use this for the pre-ablation scan. If it is within a couple days of ablation then no need to do YIFAN, otherwise intraop anesthesia YIFAN day of. She has contrast dye allergy and needs to be pre-medicated for the CTA. (She apparently showed up for the CT yesterday and was turned away due to the allergy and she would like to get it rescheduled).   Thanks,   LVW

## 2021-06-25 NOTE — LETTER
July 5, 2021      TO: Sunshine Delgado  [address] [place] [id number]         Dear Sunshine,    You are scheduled for an Atrial Fibrillation Ablation, at [facility], [place], with Dr. Vicente Craig. The hospital is located at [address] [place] [address] on [place].  If you need to cancel this procedure, please call [phone].       **Medication Change: 1 week prior to ablation, stop Eliquis and start Pradaxa 150mg twice a day. You will be given a 30 day supply. Continue taking until you run out, and then switch back to Eliquis. A 30 day voucher will be called into your pharmacy.        You will need to undergo a COVID-19 PCR swab test within 4 days of procedure. You will receive a phone call with more information. If you do not hear from the COVID scheduling team, please call: [phone] to schedule.    Your appointment is scheduled for July 27, 2021 at 3:15pm. Location: [facility]    Pre-Anesthesia Phone Call will occur 1-2 days prior to procedure date.  You do not need to come to the [facility].    Please disregard any automated, reminder phone calls regarding arrival times. Follow the below arrival times.     Date: _July 28, 2021_  Time: __10:45 am___Arrive to the [facility] Waiting Room at the [facility]  Cardiac MRI   1. You will be required to lay flat and follow breath-hold instructions.   2. You will need to remove all metal and answer a safety questionnaire.   How do I prepare for my exam? (Food and drink instructions)    The day before your exam, drink extra fluids--at least six 8-ounce glasses (unless your doctor wants you to limit your fluids).  Stop all caffeine 12 hours before the test. This includes coffee, tea, soda, chocolate and certain medicines (such as Anacin, Excedrin and NoDoz). Also avoid decaf coffee and tea, as these contain small amounts of caffeine.  Do not eat or drink for 3 hours before your exam. You may drink water and take  your morning medicines.      Date: July 29, 2021  Time: __5:30 am_____Arrive to [address] at the [facility]  Atrial Fibrillation Ablation     1. Please review the attached instructions on showering before your procedure at the end of this letter.  2. Your history and physical will be completed by our nurse practitioner when you arrive.  3. Please do not eat anything for 8 hours prior to your procedure. You may have sips of water up until 2 hours prior to your arrival.  4. The morning of your procedure, you may take your scheduled medications with a sip of water - continue your blood thinner (Pradaxa). If you take Pradaxa, please take your AM dose before you come to the hospital.  HOLD:  - Amiodarone 3 days prior  - Diuretic (if taking) day of for comfort   5. If the imaging shows a clot in your heart, the procedure will be canceled.   6. You will receive general anesthesia for this procedure.   7. You will likely discharge the same day and need a .      Post-Procedure Instructions  Care of groin site:    Remove the Band-Aid after 24 hours. If there is minor oozing, apply another Band-aid and remove it after 12 hours.     Do NOT take a bath, use a hot tub, pool, or submerse in water for at least 3 days. You may shower.     It is normal to have a small bruise or lump at the site.    Do not scrub the site.    Do not use lotion or powder near the puncture site for 3 days.    If you start bleeding from the site in your groin: Lie down flat and press firmly on the site. Call your physician immediately, or, come to the emergency room.  Call 911 right away if you have bleeding that is heavy or does not stop.    Call your doctor/provider if:     You have a large or growing hard lump around the site.     The site is red, swollen, hot or tender.     Blood or fluid is draining from the site.     You have chills or a fever greater than 101 F (38 C).     Your leg or arm turns bluish, feels numb or cool.     You have  hives, a rash or unusual itching.      Activity Restrictions    For the first 2 days: Do not stoop or squat. When you cough, sneeze or move your bowels, hold your hand over the puncture site and press gently.    Do not lift more than 10 pounds or exertional activity for 10 days.  - No driving for 24 hours after (with or without general anesthesia).       Date: _Nov 3, 2021__at 10:30____:   Follow up appointment with Arleth Nolasco NP      Please do not hesitate to utilize [employer] or call us if you have any questions or concerns.    Xiao Andrade RN  Electrophysiology Nurse Coordinator  [phone]    [facility] Procedure   [phone]                Showering Before Surgery   Your surgeon has asked you to take 2 showers before surgery.  Why is this important?  It is normal for bacteria (germs) to be on your skin. The skin protects us from these germs. When you have surgery, we cut the skin. Sometimes germs get into the cuts and cause infection (illness caused by germs). By following the instructions below and using special soap, you will lower the number of germs on your skin. This decreases your chance of infection.  Special soap  Buy or get 8 ounces of antiseptic surgical soap called 4% CHG. Common name brands of this soap are Hibiclens and Exidine.   You can find it at your local pharmacy, clinic or retail store. If you have trouble, ask your pharmacist to help you find the right substitute.   A note about shaving:  Do not shave within 12 inches of your incision (surgical cut) area for at least 3 days before surgery. Shaving can make small cuts in the skin. This puts you at a higher risk of infection.  Items you will need for each shower:    1 newly washed towel    4 ounces of one of the above soaps  Follow these instructions:  The evening before surgery   1. Wash your hair and body with your regular shampoo and soap. Make sure you rinse the shampoo and soap from your hair and body.   2. Using  clean hands, apply about 2 ounces of soap gently on your skin from the neck to your toes. Use on your groin area last. Do not use this soap on your face or head. If you get any soap in your eyes, ears or mouth, rinse right away.   3. Repeat step 2. It is very important to let the soap stay on your skin for at least 1 minute.   4. Rinse well and dry off using a clean towel.If you feel any tingling, itching or other irritation, rinse right away. It is normal to feel some coolness on the skin after using the antiseptic soap. Your skin may feel a bit dry after the shower, but do not use any lotions, creams or moisturizers. Do not use hair spray or other products in your hair.  5. Dress in freshly washed clothes or pajamas. Use fresh pillowcases and sheets on your bed.      The morning of surgery  1. Wash your hair and body with your regular shampoo and soap. Make sure you rinse the shampoo and soap from your hair and body.   2. Using clean hands, apply about 2 ounces of soap gently on your skin from the neck to your toes. Use on your groin area last. Do not use this soap on your face or head. If you get any soap in your eyes, ears or mouth, rinse right away.   3. Repeat step 2. It is very important to let the soap stay on your skin for at least 1 minute.   4. Rinse well and dry off using a clean towel.If you feel any tingling, itching or other irritation, rinse right away. It is normal to feel some coolness on the skin after using the antiseptic soap. Your skin may feel a bit dry after the shower, but do not use any lotions, creams or moisturizers. Do not use hair spray or other products in your hair.  5. Dress in clean clothes.  If you have any questions about showering or an allergy to CHG soap, please call the Preadmissions Nursing Department at the hospital where you are having your surgery.  [facility], [place] ([place]): [phone]  This phone number will be answered between the  hours of 8:00 a.m. and 6:30 p.m. Monday through Friday.

## 2021-06-29 ENCOUNTER — MYC MEDICAL ADVICE (OUTPATIENT)
Dept: CARDIOLOGY | Facility: CLINIC | Age: 56
End: 2021-06-29

## 2021-06-29 NOTE — CONFIDENTIAL NOTE
Per Dr. Fajardo, we need to determine if patient is having chest discomfort.  If she is not having chest pain, she does not need a CTA coronary artery at this time.    Left message for patient to call clinic.  [employer] message also sent.    Eusebia Alexander RN

## 2021-06-29 NOTE — CONFIDENTIAL NOTE
"Spoke to patient who reports that she is feeling much better on amiodarone.  She knows she is in afib but is able to \"live a better life\".  When asked if she is still having chest discomfort, she states \"not really\" and that \"sometimes my chest hurts but not like 2 weeks ago when it felt unbearable like someone was squeezing my heart\"    Writer discussed situation with Dr. Fajardo.  Per Dr. Fajardo, patient does not need the CTA coronary artery at this time.  This was communicated with patient who is agreeable with the plan.    Writer will also inform Dr. Craig and team of the plan.    Eusebia Alexander RN  Cardiology Care Coordinator  [facility]  [phone] option 1      "

## 2021-07-01 DIAGNOSIS — I48.91 ATRIAL FIBRILLATION WITH RAPID VENTRICULAR RESPONSE (H): Primary | ICD-10-CM

## 2021-07-01 DIAGNOSIS — I48.0 PAROXYSMAL ATRIAL FIBRILLATION (H): ICD-10-CM

## 2021-07-01 DIAGNOSIS — Z11.59 ENCOUNTER FOR SCREENING FOR OTHER VIRAL DISEASES: ICD-10-CM

## 2021-07-19 ENCOUNTER — TELEPHONE (OUTPATIENT)
Dept: LAB | Facility: CLINIC | Age: 56
End: 2021-07-19

## 2021-07-19 NOTE — TELEPHONE ENCOUNTER
Called and spoke to pharmacy who is working on applying the 30 day free voucher included in the Rx. No PA will be needed.

## 2021-07-19 NOTE — TELEPHONE ENCOUNTER
Prior Authorization Retail Medication Request    Medication/Dose: Pradaxa 150mg  ICD code (if different than what is on RX):  I48.0  Previously Tried and Failed:  unknown  Rationale:  Product/service not covered    Insurance Name:  [employer]  Insurance ID:  [id number]  [phone]      Pharmacy Information (if different than what is on RX)  Name:  [facility]  Phone:  [phone]  Thank You,  Mariam Posadas [facility] [place]

## 2021-07-20 DIAGNOSIS — T50.8X5A ALLERGIC REACTION TO CONTRAST DYE: Primary | ICD-10-CM

## 2021-07-20 DIAGNOSIS — I48.0 PAROXYSMAL ATRIAL FIBRILLATION (H): ICD-10-CM

## 2021-07-20 RX ORDER — PREDNISONE, DIPHENHYDRAMINE
1 KIT CONTINUOUS PRN
Qty: 1 KIT | Refills: 0 | Status: SHIPPED | OUTPATIENT
Start: 2021-07-20 | End: 2021-11-08

## 2021-07-21 ENCOUNTER — ANESTHESIA EVENT (OUTPATIENT)
Dept: CARDIOLOGY | Facility: CLINIC | Age: 56
End: 2021-07-21
Payer: COMMERCIAL

## 2021-07-21 ENCOUNTER — LAB (OUTPATIENT)
Dept: LAB | Facility: CLINIC | Age: 56
End: 2021-07-21

## 2021-07-21 ENCOUNTER — HOSPITAL ENCOUNTER (OUTPATIENT)
Dept: CT IMAGING | Facility: CLINIC | Age: 56
Discharge: HOME | End: 2021-07-21
Attending: INTERNAL MEDICINE | Admitting: INTERNAL MEDICINE
Payer: COMMERCIAL

## 2021-07-21 DIAGNOSIS — Z11.59 ENCOUNTER FOR SCREENING FOR OTHER VIRAL DISEASES: ICD-10-CM

## 2021-07-21 DIAGNOSIS — I48.0 PAROXYSMAL ATRIAL FIBRILLATION (H): ICD-10-CM

## 2021-07-21 LAB — SARS-COV-2 RNA RESP QL NAA+PROBE: NEGATIVE

## 2021-07-21 PROCEDURE — 250N000011 HC RX IP 250 OP 636: Performed by: STUDENT IN AN ORGANIZED HEALTH CARE EDUCATION/TRAINING PROGRAM

## 2021-07-21 PROCEDURE — 75572 CT HRT W/3D IMAGE: CPT | Mod: 26 | Performed by: STUDENT IN AN ORGANIZED HEALTH CARE EDUCATION/TRAINING PROGRAM

## 2021-07-21 PROCEDURE — 75572 CT HRT W/3D IMAGE: CPT

## 2021-07-21 PROCEDURE — U0003 INFECTIOUS AGENT DETECTION BY NUCLEIC ACID (DNA OR RNA); SEVERE ACUTE RESPIRATORY SYNDROME CORONAVIRUS 2 (SARS-COV-2) (CORONAVIRUS DISEASE [COVID-19]), AMPLIFIED PROBE TECHNIQUE, MAKING USE OF HIGH THROUGHPUT TECHNOLOGIES AS DESCRIBED BY CMS-2020-01-R: HCPCS | Performed by: INTERNAL MEDICINE

## 2021-07-21 RX ORDER — IOPAMIDOL 755 MG/ML
120 INJECTION, SOLUTION INTRAVASCULAR ONCE
Status: COMPLETED | OUTPATIENT
Start: 2021-07-21 | End: 2021-07-21

## 2021-07-21 RX ADMIN — IOPAMIDOL 120 ML: 755 INJECTION, SOLUTION INTRAVENOUS at 09:29

## 2021-07-22 ENCOUNTER — ANESTHESIA (OUTPATIENT)
Dept: CARDIOLOGY | Facility: CLINIC | Age: 56
End: 2021-07-22
Payer: COMMERCIAL

## 2021-07-22 ENCOUNTER — HOSPITAL ENCOUNTER (OUTPATIENT)
Facility: CLINIC | Age: 56
Discharge: HOME | End: 2021-07-22
Attending: INTERNAL MEDICINE | Admitting: INTERNAL MEDICINE
Payer: COMMERCIAL

## 2021-07-22 VITALS
HEIGHT: 65 IN | BODY MASS INDEX: 37.43 KG/M2 | TEMPERATURE: 97.1 F | WEIGHT: 224.65 LBS | HEART RATE: 62 BPM | SYSTOLIC BLOOD PRESSURE: 111 MMHG | DIASTOLIC BLOOD PRESSURE: 62 MMHG | RESPIRATION RATE: 16 BRPM | OXYGEN SATURATION: 97 %

## 2021-07-22 DIAGNOSIS — I48.0 PAROXYSMAL ATRIAL FIBRILLATION (H): Primary | ICD-10-CM

## 2021-07-22 DIAGNOSIS — I48.0 PAROXYSMAL ATRIAL FIBRILLATION (H): ICD-10-CM

## 2021-07-22 LAB
ABO/RH(D): NORMAL
ACT BLD: 167 SECONDS (ref 74–150)
ACT BLD: 179 SECONDS (ref 74–150)
ACT BLD: 292 SECONDS (ref 74–150)
ACT BLD: 348 SECONDS (ref 74–150)
ACT BLD: 352 SECONDS (ref 74–150)
ACT BLD: 376 SECONDS (ref 74–150)
ACT BLD: 380 SECONDS (ref 74–150)
ANION GAP SERPL CALCULATED.3IONS-SCNC: 8 MMOL/L (ref 3–14)
ANTIBODY SCREEN: NEGATIVE
ATRIAL RATE - MUSE: 56 BPM
BUN SERPL-MCNC: 16 MG/DL (ref 7–30)
CALCIUM SERPL-MCNC: 9 MG/DL (ref 8.5–10.1)
CHLORIDE BLD-SCNC: 109 MMOL/L (ref 94–109)
CO2 SERPL-SCNC: 27 MMOL/L (ref 20–32)
CREAT SERPL-MCNC: 1.09 MG/DL (ref 0.52–1.04)
DIASTOLIC BLOOD PRESSURE - MUSE: NORMAL MMHG
ERYTHROCYTE [DISTWIDTH] IN BLOOD BY AUTOMATED COUNT: 14.6 % (ref 10–15)
GFR SERPL CREATININE-BSD FRML MDRD: 58 ML/MIN/1.73M2
GLUCOSE BLD-MCNC: 96 MG/DL (ref 70–99)
GLUCOSE BLDC GLUCOMTR-MCNC: 102 MG/DL (ref 70–99)
HCT VFR BLD AUTO: 39.5 % (ref 35–47)
HGB BLD-MCNC: 12.6 G/DL (ref 11.7–15.7)
INR PPP: 1.38 (ref 0.85–1.15)
INTERPRETATION ECG - MUSE: NORMAL
MCH RBC QN AUTO: 28.9 PG (ref 26.5–33)
MCHC RBC AUTO-ENTMCNC: 31.9 G/DL (ref 31.5–36.5)
MCV RBC AUTO: 91 FL (ref 78–100)
P AXIS - MUSE: 44 DEGREES
PLATELET # BLD AUTO: 251 10E3/UL (ref 150–450)
POTASSIUM BLD-SCNC: 3.6 MMOL/L (ref 3.4–5.3)
PR INTERVAL - MUSE: 182 MS
QRS DURATION - MUSE: 112 MS
QT - MUSE: 468 MS
QTC - MUSE: 451 MS
R AXIS - MUSE: 72 DEGREES
RBC # BLD AUTO: 4.36 10E6/UL (ref 3.8–5.2)
SODIUM SERPL-SCNC: 144 MMOL/L (ref 133–144)
SPECIMEN EXPIRATION DATE: NORMAL
SYSTOLIC BLOOD PRESSURE - MUSE: NORMAL MMHG
T AXIS - MUSE: 26 DEGREES
VENTRICULAR RATE- MUSE: 56 BPM
WBC # BLD AUTO: 13.5 10E3/UL (ref 4–11)

## 2021-07-22 PROCEDURE — C1730 CATH, EP, 19 OR FEW ELECT: HCPCS | Performed by: INTERNAL MEDICINE

## 2021-07-22 PROCEDURE — 258N000003 HC RX IP 258 OP 636: Performed by: NURSE ANESTHETIST, CERTIFIED REGISTERED

## 2021-07-22 PROCEDURE — C1887 CATHETER, GUIDING: HCPCS | Performed by: INTERNAL MEDICINE

## 2021-07-22 PROCEDURE — 85347 COAGULATION TIME ACTIVATED: CPT

## 2021-07-22 PROCEDURE — C1732 CATH, EP, DIAG/ABL, 3D/VECT: HCPCS | Performed by: INTERNAL MEDICINE

## 2021-07-22 PROCEDURE — 93621 COMP EP EVL L PAC&REC C SINS: CPT | Performed by: INTERNAL MEDICINE

## 2021-07-22 PROCEDURE — 272N000002 HC OR SUPPLY OTHER OPNP: Performed by: INTERNAL MEDICINE

## 2021-07-22 PROCEDURE — 250N000011 HC RX IP 250 OP 636: Performed by: ANESTHESIOLOGY

## 2021-07-22 PROCEDURE — 258N000003 HC RX IP 258 OP 636: Performed by: ANESTHESIOLOGY

## 2021-07-22 PROCEDURE — 272N000001 HC OR GENERAL SUPPLY STERILE: Performed by: INTERNAL MEDICINE

## 2021-07-22 PROCEDURE — 93613 INTRACARDIAC EPHYS 3D MAPG: CPT | Performed by: INTERNAL MEDICINE

## 2021-07-22 PROCEDURE — 93010 ELECTROCARDIOGRAM REPORT: CPT | Mod: 76 | Performed by: INTERNAL MEDICINE

## 2021-07-22 PROCEDURE — 250N000009 HC RX 250: Performed by: NURSE ANESTHETIST, CERTIFIED REGISTERED

## 2021-07-22 PROCEDURE — C1759 CATH, INTRA ECHOCARDIOGRAPHY: HCPCS | Performed by: INTERNAL MEDICINE

## 2021-07-22 PROCEDURE — 96372 THER/PROPH/DIAG INJ SC/IM: CPT | Performed by: INTERNAL MEDICINE

## 2021-07-22 PROCEDURE — 36415 COLL VENOUS BLD VENIPUNCTURE: CPT | Performed by: NURSE PRACTITIONER

## 2021-07-22 PROCEDURE — 82565 ASSAY OF CREATININE: CPT | Performed by: NURSE PRACTITIONER

## 2021-07-22 PROCEDURE — 370N000017 HC ANESTHESIA TECHNICAL FEE, PER MIN: Performed by: INTERNAL MEDICINE

## 2021-07-22 PROCEDURE — 93662 INTRACARDIAC ECG (ICE): CPT | Performed by: INTERNAL MEDICINE

## 2021-07-22 PROCEDURE — 93656 COMPRE EP EVAL ABLTJ ATR FIB: CPT | Performed by: INTERNAL MEDICINE

## 2021-07-22 PROCEDURE — C1733 CATH, EP, OTHR THAN COOL-TIP: HCPCS | Performed by: INTERNAL MEDICINE

## 2021-07-22 PROCEDURE — 85610 PROTHROMBIN TIME: CPT | Performed by: NURSE PRACTITIONER

## 2021-07-22 PROCEDURE — 250N000011 HC RX IP 250 OP 636: Performed by: NURSE ANESTHETIST, CERTIFIED REGISTERED

## 2021-07-22 PROCEDURE — 999N000054 HC STATISTIC EKG NON-CHARGEABLE

## 2021-07-22 PROCEDURE — 93655 ICAR CATH ABLTJ DSCRT ARRHYT: CPT | Performed by: INTERNAL MEDICINE

## 2021-07-22 PROCEDURE — 85027 COMPLETE CBC AUTOMATED: CPT | Performed by: NURSE PRACTITIONER

## 2021-07-22 PROCEDURE — 250N000011 HC RX IP 250 OP 636: Performed by: INTERNAL MEDICINE

## 2021-07-22 PROCEDURE — C1894 INTRO/SHEATH, NON-LASER: HCPCS | Performed by: INTERNAL MEDICINE

## 2021-07-22 PROCEDURE — 86900 BLOOD TYPING SEROLOGIC ABO: CPT | Performed by: ANESTHESIOLOGY

## 2021-07-22 RX ORDER — LEVOTHYROXINE SODIUM 75 UG/1
75 TABLET ORAL DAILY
Status: DISCONTINUED | OUTPATIENT
Start: 2021-07-22 | End: 2021-07-22 | Stop reason: HOSPADM

## 2021-07-22 RX ORDER — ACETAMINOPHEN 325 MG/1
325 TABLET ORAL EVERY 4 HOURS PRN
Status: DISCONTINUED | OUTPATIENT
Start: 2021-07-22 | End: 2021-07-22 | Stop reason: HOSPADM

## 2021-07-22 RX ORDER — SODIUM CHLORIDE 9 MG/ML
INJECTION, SOLUTION INTRAVENOUS CONTINUOUS PRN
Status: DISCONTINUED | OUTPATIENT
Start: 2021-07-22 | End: 2021-07-22

## 2021-07-22 RX ORDER — NALOXONE HYDROCHLORIDE 0.4 MG/ML
0.2 INJECTION, SOLUTION INTRAMUSCULAR; INTRAVENOUS; SUBCUTANEOUS
Status: DISCONTINUED | OUTPATIENT
Start: 2021-07-22 | End: 2021-07-22 | Stop reason: HOSPADM

## 2021-07-22 RX ORDER — FENTANYL CITRATE 50 UG/ML
INJECTION, SOLUTION INTRAMUSCULAR; INTRAVENOUS PRN
Status: DISCONTINUED | OUTPATIENT
Start: 2021-07-22 | End: 2021-07-22

## 2021-07-22 RX ORDER — LIDOCAINE 40 MG/G
CREAM TOPICAL
Status: DISCONTINUED | OUTPATIENT
Start: 2021-07-22 | End: 2021-07-22 | Stop reason: HOSPADM

## 2021-07-22 RX ORDER — SODIUM CHLORIDE, SODIUM LACTATE, POTASSIUM CHLORIDE, CALCIUM CHLORIDE 600; 310; 30; 20 MG/100ML; MG/100ML; MG/100ML; MG/100ML
INJECTION, SOLUTION INTRAVENOUS CONTINUOUS
Status: DISCONTINUED | OUTPATIENT
Start: 2021-07-22 | End: 2021-07-22 | Stop reason: HOSPADM

## 2021-07-22 RX ORDER — PROPOFOL 10 MG/ML
INJECTION, EMULSION INTRAVENOUS PRN
Status: DISCONTINUED | OUTPATIENT
Start: 2021-07-22 | End: 2021-07-22

## 2021-07-22 RX ORDER — SODIUM CHLORIDE 9 MG/ML
INJECTION, SOLUTION INTRAVENOUS CONTINUOUS
Status: DISCONTINUED | OUTPATIENT
Start: 2021-07-22 | End: 2021-07-22 | Stop reason: HOSPADM

## 2021-07-22 RX ORDER — METHYLPREDNISOLONE SODIUM SUCCINATE 500 MG/8ML
INJECTION INTRAMUSCULAR; INTRAVENOUS PRN
Status: DISCONTINUED | OUTPATIENT
Start: 2021-07-22 | End: 2021-07-22

## 2021-07-22 RX ORDER — ONDANSETRON 2 MG/ML
4 INJECTION INTRAMUSCULAR; INTRAVENOUS EVERY 30 MIN PRN
Status: DISCONTINUED | OUTPATIENT
Start: 2021-07-22 | End: 2021-07-22 | Stop reason: HOSPADM

## 2021-07-22 RX ORDER — LIDOCAINE HYDROCHLORIDE 20 MG/ML
INJECTION, SOLUTION INFILTRATION; PERINEURAL PRN
Status: DISCONTINUED | OUTPATIENT
Start: 2021-07-22 | End: 2021-07-22

## 2021-07-22 RX ORDER — TRAZODONE HYDROCHLORIDE 100 MG/1
100 TABLET ORAL
Status: DISCONTINUED | OUTPATIENT
Start: 2021-07-22 | End: 2021-07-22 | Stop reason: HOSPADM

## 2021-07-22 RX ORDER — NALOXONE HYDROCHLORIDE 0.4 MG/ML
0.4 INJECTION, SOLUTION INTRAMUSCULAR; INTRAVENOUS; SUBCUTANEOUS
Status: DISCONTINUED | OUTPATIENT
Start: 2021-07-22 | End: 2021-07-22 | Stop reason: HOSPADM

## 2021-07-22 RX ORDER — PROTAMINE SULFATE 10 MG/ML
INJECTION, SOLUTION INTRAVENOUS PRN
Status: DISCONTINUED | OUTPATIENT
Start: 2021-07-22 | End: 2021-07-22

## 2021-07-22 RX ORDER — BUPIVACAINE HYDROCHLORIDE 2.5 MG/ML
15 INJECTION, SOLUTION EPIDURAL; INFILTRATION; INTRACAUDAL ONCE
Status: COMPLETED | OUTPATIENT
Start: 2021-07-22 | End: 2021-07-22

## 2021-07-22 RX ORDER — ONDANSETRON 4 MG/1
4 TABLET, ORALLY DISINTEGRATING ORAL EVERY 30 MIN PRN
Status: DISCONTINUED | OUTPATIENT
Start: 2021-07-22 | End: 2021-07-22 | Stop reason: HOSPADM

## 2021-07-22 RX ORDER — AMIODARONE HYDROCHLORIDE 200 MG/1
100 TABLET ORAL DAILY
Qty: 90 TABLET | Refills: 3
Start: 2021-07-22 | End: 2021-08-31

## 2021-07-22 RX ORDER — MEPERIDINE HYDROCHLORIDE 25 MG/ML
12.5 INJECTION INTRAMUSCULAR; INTRAVENOUS; SUBCUTANEOUS
Status: DISCONTINUED | OUTPATIENT
Start: 2021-07-22 | End: 2021-07-22 | Stop reason: HOSPADM

## 2021-07-22 RX ORDER — HEPARIN SODIUM 1000 [USP'U]/ML
INJECTION, SOLUTION INTRAVENOUS; SUBCUTANEOUS PRN
Status: DISCONTINUED | OUTPATIENT
Start: 2021-07-22 | End: 2021-07-22

## 2021-07-22 RX ORDER — SODIUM CHLORIDE, SODIUM LACTATE, POTASSIUM CHLORIDE, CALCIUM CHLORIDE 600; 310; 30; 20 MG/100ML; MG/100ML; MG/100ML; MG/100ML
INJECTION, SOLUTION INTRAVENOUS CONTINUOUS PRN
Status: DISCONTINUED | OUTPATIENT
Start: 2021-07-22 | End: 2021-07-22

## 2021-07-22 RX ORDER — BUPROPION HYDROCHLORIDE 300 MG/1
300 TABLET ORAL EVERY MORNING
Status: DISCONTINUED | OUTPATIENT
Start: 2021-07-23 | End: 2021-07-22 | Stop reason: HOSPADM

## 2021-07-22 RX ORDER — LABETALOL HYDROCHLORIDE 5 MG/ML
5 INJECTION, SOLUTION INTRAVENOUS EVERY 5 MIN PRN
Status: DISCONTINUED | OUTPATIENT
Start: 2021-07-22 | End: 2021-07-22 | Stop reason: HOSPADM

## 2021-07-22 RX ORDER — FENTANYL CITRATE 50 UG/ML
50 INJECTION, SOLUTION INTRAMUSCULAR; INTRAVENOUS EVERY 5 MIN PRN
Status: DISCONTINUED | OUTPATIENT
Start: 2021-07-22 | End: 2021-07-22 | Stop reason: HOSPADM

## 2021-07-22 RX ORDER — ONDANSETRON 2 MG/ML
INJECTION INTRAMUSCULAR; INTRAVENOUS PRN
Status: DISCONTINUED | OUTPATIENT
Start: 2021-07-22 | End: 2021-07-22

## 2021-07-22 RX ORDER — EPHEDRINE SULFATE 50 MG/ML
INJECTION, SOLUTION INTRAMUSCULAR; INTRAVENOUS; SUBCUTANEOUS PRN
Status: DISCONTINUED | OUTPATIENT
Start: 2021-07-22 | End: 2021-07-22

## 2021-07-22 RX ORDER — DABIGATRAN ETEXILATE 150 MG/1
150 CAPSULE ORAL 2 TIMES DAILY
Status: DISCONTINUED | OUTPATIENT
Start: 2021-07-22 | End: 2021-07-22 | Stop reason: HOSPADM

## 2021-07-22 RX ORDER — ALBUTEROL SULFATE 0.83 MG/ML
2.5 SOLUTION RESPIRATORY (INHALATION) EVERY 4 HOURS PRN
Status: DISCONTINUED | OUTPATIENT
Start: 2021-07-22 | End: 2021-07-22 | Stop reason: HOSPADM

## 2021-07-22 RX ADMIN — Medication 5 MG: at 07:56

## 2021-07-22 RX ADMIN — ROCURONIUM BROMIDE 20 MG: 10 INJECTION INTRAVENOUS at 11:10

## 2021-07-22 RX ADMIN — Medication 10 MG: at 07:53

## 2021-07-22 RX ADMIN — ROCURONIUM BROMIDE 20 MG: 10 INJECTION INTRAVENOUS at 12:38

## 2021-07-22 RX ADMIN — HEPARIN SODIUM 10000 UNITS: 1000 INJECTION, SOLUTION INTRAVENOUS; SUBCUTANEOUS at 10:06

## 2021-07-22 RX ADMIN — SODIUM CHLORIDE: 9 INJECTION, SOLUTION INTRAVENOUS at 07:30

## 2021-07-22 RX ADMIN — PROPOFOL 150 MG: 10 INJECTION, EMULSION INTRAVENOUS at 07:39

## 2021-07-22 RX ADMIN — MIDAZOLAM 2 MG: 1 INJECTION INTRAMUSCULAR; INTRAVENOUS at 07:23

## 2021-07-22 RX ADMIN — SODIUM CHLORIDE, POTASSIUM CHLORIDE, SODIUM LACTATE AND CALCIUM CHLORIDE: 600; 310; 30; 20 INJECTION, SOLUTION INTRAVENOUS at 14:14

## 2021-07-22 RX ADMIN — FENTANYL CITRATE 50 MCG: 50 INJECTION, SOLUTION INTRAMUSCULAR; INTRAVENOUS at 07:30

## 2021-07-22 RX ADMIN — ROCURONIUM BROMIDE 20 MG: 10 INJECTION INTRAVENOUS at 10:32

## 2021-07-22 RX ADMIN — FENTANYL CITRATE 50 MCG: 50 INJECTION, SOLUTION INTRAMUSCULAR; INTRAVENOUS at 13:31

## 2021-07-22 RX ADMIN — LIDOCAINE HYDROCHLORIDE 100 MG: 20 INJECTION, SOLUTION INFILTRATION; PERINEURAL at 07:39

## 2021-07-22 RX ADMIN — METHYLPREDNISOLONE SODIUM SUCCINATE 125 MG: 500 INJECTION, POWDER, FOR SOLUTION INTRAMUSCULAR; INTRAVENOUS at 12:57

## 2021-07-22 RX ADMIN — ROCURONIUM BROMIDE 20 MG: 10 INJECTION INTRAVENOUS at 11:47

## 2021-07-22 RX ADMIN — HEPARIN SODIUM 3000 UNITS: 1000 INJECTION, SOLUTION INTRAVENOUS; SUBCUTANEOUS at 11:52

## 2021-07-22 RX ADMIN — ROCURONIUM BROMIDE 20 MG: 10 INJECTION INTRAVENOUS at 09:41

## 2021-07-22 RX ADMIN — ROCURONIUM BROMIDE 20 MG: 10 INJECTION INTRAVENOUS at 09:11

## 2021-07-22 RX ADMIN — SUGAMMADEX 200 MG: 100 INJECTION, SOLUTION INTRAVENOUS at 13:04

## 2021-07-22 RX ADMIN — ROCURONIUM BROMIDE 20 MG: 10 INJECTION INTRAVENOUS at 08:37

## 2021-07-22 RX ADMIN — PROTAMINE SULFATE 10 MG: 10 INJECTION, SOLUTION INTRAVENOUS at 12:51

## 2021-07-22 RX ADMIN — Medication 10 MG: at 08:04

## 2021-07-22 RX ADMIN — FENTANYL CITRATE 50 MCG: 50 INJECTION, SOLUTION INTRAMUSCULAR; INTRAVENOUS at 09:18

## 2021-07-22 RX ADMIN — ROCURONIUM BROMIDE 50 MG: 10 INJECTION INTRAVENOUS at 07:39

## 2021-07-22 RX ADMIN — HEPARIN SODIUM 3000 UNITS: 1000 INJECTION, SOLUTION INTRAVENOUS; SUBCUTANEOUS at 10:50

## 2021-07-22 RX ADMIN — ONDANSETRON 4 MG: 2 INJECTION INTRAMUSCULAR; INTRAVENOUS at 12:51

## 2021-07-22 RX ADMIN — FENTANYL CITRATE 50 MCG: 50 INJECTION, SOLUTION INTRAMUSCULAR; INTRAVENOUS at 11:04

## 2021-07-22 RX ADMIN — HEPARIN SODIUM 4000 UNITS: 1000 INJECTION, SOLUTION INTRAVENOUS; SUBCUTANEOUS at 10:30

## 2021-07-22 RX ADMIN — ROCURONIUM BROMIDE 20 MG: 10 INJECTION INTRAVENOUS at 07:55

## 2021-07-22 RX ADMIN — BUPIVACAINE HYDROCHLORIDE 37.5 MG: 2.5 INJECTION, SOLUTION EPIDURAL; INFILTRATION; INTRACAUDAL at 12:54

## 2021-07-22 RX ADMIN — FENTANYL CITRATE 50 MCG: 50 INJECTION, SOLUTION INTRAMUSCULAR; INTRAVENOUS at 14:03

## 2021-07-22 RX ADMIN — SODIUM CHLORIDE, POTASSIUM CHLORIDE, SODIUM LACTATE AND CALCIUM CHLORIDE: 600; 310; 30; 20 INJECTION, SOLUTION INTRAVENOUS at 07:23

## 2021-07-22 ASSESSMENT — ENCOUNTER SYMPTOMS: DYSRHYTHMIAS: 1

## 2021-07-22 ASSESSMENT — MIFFLIN-ST. JEOR: SCORE: 1619.88

## 2021-07-22 NOTE — PROGRESS NOTES
"D/I/A: Pt roomed on 3C in bay 34.  Arrived via litter and accompanied by RN On/Off: On monitor.  VSSA.  Rhythm upon arrival SR on monitor.  Denies pain or sob.  Reviewed activity restrictions and when to notify RN, ie-changes to breathing or increased chest pressure or chest pain.  CCL access:  Bilateral groin sites, sutures removed upon arrival; sites C/D?I; new dressings applied; no bleeding or hematoma noted at this time; CMS intact bilaterally.  Reese, patient's ride \"on their way to hospital\" per RN to RN report.  P: Continue to monitor status.  Discharge to home once meeting criteria.    "

## 2021-07-22 NOTE — Clinical Note
Potential access sites were evaluated for patency using ultrasound.   The left femoral vein was selected. Access was obtained under with Sonosite guidance using a standard micropuncture 21 guage needle with direct visualization of needle entry.

## 2021-07-22 NOTE — PROGRESS NOTES
D/I/A:  Patient is tolerating liquids and foods, ambulating, urinating, puncture sites are stable (no bleeding and no hematoma) and patient has a .  A+O x4 and making needs known.  CCL access sites C/D/I; no bleeding or hematoma; CMS intact.  VSSA.  SR on monitor.  IV access removed.  Education completed and outlined in AVS or handout: medications reviewed with patient.  Questions answered prior to discharge.  Belongings returned to patient at discharge.    P: Discharged to self care.  Patient to follow up with appts as per discharge instruction.

## 2021-07-22 NOTE — DISCHARGE INSTRUCTIONS
Post-Ablation Discharge Instructions    Plan:  Decrease amiodarone to 100 mg daily    Care of groin site:         Remove the Band-Aid after 24 hours. If there is minor oozing, apply another Band-aid and remove it after 12 hours.          Do NOT take a bath, use a hot tub, pool, or submerse in water for at least 3 days You may shower.          It is normal to have a small bruise or lump at the site.         Do not scrub the site.         Do not use lotion or powder near the puncture site for 3 days.         For the first 2 days: Do not stoop or squat. When you cough, sneeze or move your bowels, hold your hand over the puncture site and press gently.         Do not lift more than 10 pounds or exertional activity for 10 days.      If you start bleeding from the site in your groin:  Lie down flat and press firmly on the site.  Call your physician immediately, or, come to the emergency room.    Call 911 right away if you have bleeding that is heavy or does not stop.     Call your doctor/provider if:         You have a large or growing hard lump around the site.         The site is red, swollen, hot or tender.         Blood or fluid is draining from the site.         You have chills or a fever greater than 101 F (38 C).         Your leg or arm turns bluish, feels numb or cool.         You have hives, a rash or unusual itching.     Cardiovascular Clinic:   [address] [place] [address]. [place] [address]  Your Care Team:  EP Cardiology   Telephone Number     Arleth Craig [phone]   Xiao Coombs RN  [phone]     For scheduling appts or procedures:    Grisel Smart   [phone]   For the Device Clinic (Pacemakers and ICD's)   RN's :   Laxmi Pan During business hours: [phone]     After business hours:   [phone]- select option 4 and ask for job code [id number].       As always, Thank you for trusting us with your health care needs

## 2021-07-22 NOTE — Clinical Note
Potential access sites were evaluated for patency using ultrasound.   The right femoral vein was selected. Access was obtained under with Sonosite guidance using a standard Micropuncture 21 guage needle with direct visualization of needle entry.

## 2021-07-22 NOTE — ANESTHESIA CARE TRANSFER NOTE
Patient: Sunshine Delgado    Procedure(s):  EP ABLATION FOCAL AFIB    Diagnosis: atrial fibrillation/flutter  Diagnosis Additional Information: No value filed.    Anesthesia Type:   General     Note:    Oropharynx: oropharynx clear of all foreign objects  Level of Consciousness: awake and drowsy  Oxygen Supplementation: nasal cannula  Level of Supplemental Oxygen (L/min / FiO2): 2  Independent Airway: airway patency satisfactory and stable  Dentition: dentition unchanged  Vital Signs Stable: post-procedure vital signs reviewed and stable  Report to RN Given: handoff report given  Patient transferred to: PACU    Handoff Report: Identifed the Patient, Identified the Reponsible Provider, Reviewed the pertinent medical history, Discussed the surgical course, Reviewed Intra-OP anesthesia mangement and issues during anesthesia, Set expectations for post-procedure period and Allowed opportunity for questions and acknowledgement of understanding      Vitals:  Vitals Value Taken Time   /53    Temp     Pulse 70 07/22/21 1321   Resp     SpO2 94 % 07/22/21 1321   Vitals shown include unvalidated device data.    Electronically Signed By: LEOPOLDO Alonzo CRNA  July 22, 2021  1:22 PM

## 2021-07-22 NOTE — ANESTHESIA POSTPROCEDURE EVALUATION
Patient: Sunshine Delgado    Procedure(s):  EP ABLATION FOCAL AFIB    Diagnosis:atrial fibrillation/flutter  Diagnosis Additional Information: No value filed.    Anesthesia Type:  General    Note:  Disposition: Admission   Postop Pain Control: Uneventful            Sign Out: Well controlled pain   PONV: No   Neuro/Psych: Uneventful            Sign Out: Acceptable/Baseline neuro status   Airway/Respiratory: Uneventful            Sign Out: Acceptable/Baseline resp. status   CV/Hemodynamics: Uneventful            Sign Out: Acceptable CV status; No obvious hypovolemia; No obvious fluid overload   Other NRE: NONE   DID A NON-ROUTINE EVENT OCCUR? No           Last vitals:  Vitals Value Taken Time   BP 90/49 07/22/21 1345   Temp 36.5  C (97.7  F) 07/22/21 1320   Pulse 66 07/22/21 1348   Resp 16 07/22/21 1320   SpO2 95 % 07/22/21 1348   Vitals shown include unvalidated device data.    Electronically Signed By: Duarte Llanes MD  July 22, 2021  1:49 PM

## 2021-07-22 NOTE — ANESTHESIA PROCEDURE NOTES
Airway       Patient location during procedure: OR       Procedure Start/Stop Times: 7/22/2021 7:43 AM  Staff -        CRNA: Steven Almaguer APRN CRNA       Performed By: CRNA  Consent for Airway        Urgency: elective  Indications and Patient Condition       Indications for airway management: bayron-procedural       Induction type:intravenous       Mask difficulty assessment: 2 - vent by mask + OA or adjuvant +/- NMBA    Final Airway Details       Final airway type: endotracheal airway       Successful airway: ETT - single  Endotracheal Airway Details        ETT size (mm): 7.0       Cuffed: yes       Successful intubation technique: direct laryngoscopy       DL Blade Type: Ruffin 2       Grade View of Cords: 1       Adjucts: stylet       Position: Right       Measured from: lips       Secured at (cm): 21       Bite block used: None    Post intubation assessment        Placement verified by: capnometry, equal breath sounds and chest rise        Number of attempts at approach: 1       Number of other approaches attempted: 0       Secured with: pink tape       Ease of procedure: easy       Dentition: Intact and Unchanged

## 2021-07-22 NOTE — H&P
Electrophysiology Pre-Procedure History and Physical    Sunshine Delgado MRN# [id number]   Age: 56 year old YOB: 1965      Date of Procedure: 7/22/2021  [facility]      Date of Exam 7/22/2021 Facility (Same day)       Home clinic: [facility]  Primary care provider: Lesly Arteaga  HPI:  Ms. Delgado is a 56 year old female who has a past medical history significant for hashimoto's thyroiditis, MARIA GUADALUPE (uses CPAP), CKD, PAF (CHADSVASC 1 Eliquis) s/p DCCV, anxiety, and depression.      She was admitted in 1/2020 to OSH with abdominal pain and was found to have infectious mononucleosis. At that time her ECG showed AF and she spontaneously converted while in the hospital. An echo showed normal LVEF, no significant valvular abnormalities, and small pericardial effusion. She was started on metoprolol and ASA. Her metoprolol was later stopped by PCP due to frequent lightheadedness. She established care with Dr. Fajardo. She followed up in 3/2021 and reported feeling palpitations on and off and one episode lasting up to 3 days. She felt some chest pressure, shortness of breath, and presyncope. ECG showed AF vent rate 89 bpm. She had been started on lisinopril in 2/2021 by PCP for CKD and had felt a little more lightheaded since then, but also notes she had lightheadedness predating the lisinopril. A zio patch monitor from 4/2/21-4/16/21 showed 29% AF/AFL burden up to 10 hours 54 minutes and 39 symptom activations showed mostly AF/AFL or PSVT and sometimes sinus without ectopy. She was placed on atenolol and reported fatigue with this. Flecainide 50 mg BID was added. Subsequent ECG stress testing was negative for ischemia or QRS widening. Her PAF episodes had completely subsided for about 1 month after starting the Flecainide until end of 5/2021. She felt much improved when maintaining sinus. She then started developing increased AF  symptoms, occurring daily. She has symptoms of chest tightness, dizziness, and palpitations with her AF.  She then presented to [facility] on 6/10/21 with symptomatic AF and underwent DCCV. Her Flecainide was increased to 100 mg BID. She represented to [facility] on 6/14/21 again with symptomatic AF and underwent DCCV. Her Flecainide and atenolol were stopped and she was changed to amiodarone. She was then referred to EP for consideration for ablation. At EP appointment on 6/16/21, she was in AFL and feeling fatigue, MCHUGH, lightheadedness/dizziness, and generally unwell. We loaded her with amiodarone and arranged for another DCCV. She presented to [facility] in sinus and it was canceled. She wanted to get off amiodarone if possible and elected to pursue ablation for which she presents today. She was changed to Pradaxa in preporation for this and reports taking this morning's dose.        Active problem list:     Patient Active Problem List    Diagnosis Date Noted     Paroxysmal atrial fibrillation (H) 06/24/2021     Priority: Medium     Added automatically from request for surgery [id number]       Attention deficit hyperactivity disorder (ADHD), unspecified ADHD type 01/08/2021     Priority: Medium     Acute pain of right shoulder 05/28/2020     Priority: Medium     Impingement syndrome of shoulder region, right 05/28/2020     Priority: Medium     Hashimoto's thyroiditis 05/06/2020     Priority: Medium     Infectious mononucleosis 01/27/2020     Priority: Medium     Atrial fibrillation with rapid ventricular response (H) 01/26/2020     Priority: Medium     Elevated liver enzymes 01/26/2020     Priority: Medium     CKD (chronic kidney disease) stage 3, GFR 30-59 ml/min 10/02/2018     Priority: Medium     Bee sting allergy      Priority: Medium     Insomnia 12/09/2015     Priority: Medium     Mild major depression (H) 03/27/2015     Priority: Medium      is terminally ill. Going through a lot.       Health Care Home 01/26/2015      Priority: Medium     No Active Care Coordination 3/16/2015  Care Coordinator: Isamar Watson LSW, MSW  See letters for Health Care Home care plan        Morbid obesity (H) 03/21/2013     Priority: Medium     MARIA GUADALUPE (obstructive sleep apnea)- severe (AHI 84) 09/09/2011     Priority: Medium     Sleep study 5/10/2011 (226#)- AHI 24, RDI 30, lowest oxygen saturation was 88%, CPAP 8 cm was curative.   Study Date: 9/30/2016- (248.0 lbs) apnea/hypopnea index 84.4.  REM AHI n/a, supine AHI 88.8 events per hour. Lowest oxygen saturation 80.2%.  Time spent less than or equal to 88% 26.1 minutes. CPAP optimal pressure 5 cmH2O with AHI of 5.2 events per hour.  There were residual non-specific arousals. Time in REM supine on final pressure 72.5 minutes.            CARDIOVASCULAR SCREENING; LDL GOAL LESS THAN 160 05/09/2010     Priority: Medium     Generalized anxiety disorder 10/15/2009     Priority: Medium     lexapro made things worse.               Medications (include herbals and vitamins):      Current Facility-Administered Medications   Medication     lidocaine (LMX4) cream     lidocaine (LMX4) cream     lidocaine 1 % 0.1-1 mL     lidocaine 1 % 0.1-1 mL     sodium chloride (PF) 0.9% PF flush 3 mL     sodium chloride (PF) 0.9% PF flush 3 mL     sodium chloride (PF) 0.9% PF flush 3 mL     sodium chloride (PF) 0.9% PF flush 3 mL     sodium chloride (PF) 0.9% PF flush 3 mL     sodium chloride 0.9% infusion         Sunshine Delgado   Home Medication Instructions LEONARDO:[id number]    Printed on:07/22/21 0651   Medication Information                      acetaminophen (TYLENOL) 325 MG tablet  Take 325 mg by mouth every 4 hours as needed              amiodarone (PACERONE) 200 MG tablet  Take 1 tablet (200 mg) by mouth daily             apixaban ANTICOAGULANT (ELIQUIS ANTICOAGULANT) 5 MG tablet  Take 1 tablet (5 mg) by mouth 2 times daily             Ascorbic Acid (VITAMIN C PO)  Take 500 mg by mouth daily              buPROPion  (WELLBUTRIN XL) 300 MG 24 hr tablet  TAKE 1 TABLET (300 MG) BY MOUTH EVERY MORNING             Cholecalciferol (VITAMIN D PO)  Take 3,000 Units by mouth every morning              dabigatran ANTICOAGULANT (PRADAXA ANTICOAGULANT) 150 MG capsule  Take 1 capsule (150 mg) by mouth 2 times daily Start 1 week prior to ablation instead of Eliquis. Restart Eliquis once Pradaxa supply runs out.             diphenhydrAMINE (BENADRYL) 50 MG capsule  Administer 1 HOUR PRIOR TO - IV contrast injection             EPINEPHrine (AUVI-Q) 0.3 MG/0.3ML injection 2-pack  Inject 0.3 mLs (0.3 mg) into the muscle as needed for anaphylaxis             fluticasone (FLONASE) 50 MCG/ACT spray  Spray 2 sprays into both nostrils daily             levothyroxine (SYNTHROID/LEVOTHROID) 75 MCG tablet  Take 1 tablet (75 mcg) by mouth daily             methylPREDNISolone (MEDROL) 32 MG tablet  FIRST DOSE 12 hours prior to the procedure with IV contrast  SECOND DOSE 2 HOURS PRIOR TP CONTRAST             multivitamin, therapeutic with minerals (MULTI-VITAMIN) TABS tablet  Take 1 tablet by mouth every morning             order for DME  Resmed Airsense 10 auto cpap 6-7 cm, Airfit P10 for her XS pillows             predniSONE & diphenhydrAMINE (CONTRAST ALLERGY PREMED PACK) 3 x 50 MG & 1 x 50 MG KIT  Take 1 kit by mouth continuous prn (Prior to CT) Take prednisone 50mg 12 hours prior to CT, then prednisone 50mg 7 hours prior, then prednisone 50mg 1 hour prior. Take diphenhydramine 50mg 1 hour prior.             traZODone (DESYREL) 50 MG tablet  TAKE 2-3 TABLETS BY MOUTH AT BEDTIME                      Allergies:      Allergies   Allergen Reactions     Sulfa Drugs Hives     Cephalexin Hives     Cinnamon Hives     Strawberry Hives     Sulfamethoxazole-Trimethoprim Hives     Vicodin [Hydrocodone-Acetaminophen]      Wasp Venom Protein      Other reaction(s): Chest Pain     Wasps [Hornets] Swelling     Now carries Epi Pen     Zoloft [Sertraline]      suicidal  "ideation     Contrast Dye Other (See Comments) and Rash     Patient had sneezing and an itchy throat following contrast injection of 100 mL Isovue-370.  From IV contrast dye             Social History:     Social History     Tobacco Use     Smoking status: Never Smoker     Smokeless tobacco: Never Used   Substance Use Topics     Alcohol use: Yes     Comment: 1-2 per mo            Physical Exam:   All vitals have been reviewed  Patient Vitals for the past 8 hrs:   BP Temp Temp src Pulse Resp SpO2 Height Weight   07/22/21 0600 121/69 97.9  F (36.6  C) Oral 55 16 98 % 1.651 m (5' 5\") 101.9 kg (224 lb 10.4 oz)     No intake/output data recorded.  Airway assessment:   Patient is able to open mouth wide  Patient is able to stick out tongue}      ENT:   Normocephalic, without obvious abnormality, atraumatic, sinuses nontender on palpation, external ears without lesions, oral pharynx with moist mucous membranes, tonsils without erythema or exudates, gums normal and good dentition.     Neck:   Supple, symmetrical, trachea midline, no adenopathy, thyroid symmetric, not enlarged and no tenderness, skin normal     Lungs:   No increased work of breathing, good air exchange, clear to auscultation bilaterally, no crackles or wheezing     Cardiovascular:   Normal apical impulse, regular rate and rhythm, normal S1 and S2, no S3 or S4, and no murmur noted             Lab / Radiology Results:     Lab Results   Component Value Date    WBC 13.5 07/22/2021    WBC 6.5 06/14/2021    RBC 4.36 07/22/2021    RBC 4.89 06/14/2021    HGB 12.6 07/22/2021    HGB 14.4 06/14/2021    HCT 39.5 07/22/2021    HCT 42.9 06/14/2021    MCV 91 07/22/2021    MCV 88 06/14/2021    RDW 14.6 07/22/2021    RDW 14.1 06/14/2021     07/22/2021     06/14/2021      Lab Results   Component Value Date    WBC 13.5 07/22/2021    WBC 6.5 06/14/2021     Lab Results   Component Value Date     07/22/2021     06/14/2021     Lab Results   Component " Value Date    HGB 12.6 07/22/2021    HGB 14.4 06/14/2021    HCT 39.5 07/22/2021    HCT 42.9 06/14/2021     Lab Results   Component Value Date     07/22/2021     06/14/2021    CO2 27 06/14/2021    BUN 14 06/14/2021    CREAT 1.1 07/24/2018     Lab Results   Component Value Date     07/22/2021     06/14/2021    CO2 27 06/14/2021    BUN 14 06/14/2021    CREAT 1.1 07/24/2018     Lab Results   Component Value Date     07/22/2021     06/14/2021     Lab Results   Component Value Date    BUN 14 06/14/2021    CREAT 1.1 07/24/2018     Lab Results   Component Value Date    TSH 2.86 06/14/2021             Plan:   Patient's active problems diagnostically and therapeutically optimized for the planned procedure. Patient here for PVI/CTI ablation for PAF/AFL. The procedural risk of EP study and ablation were discussed in detail. Risks discussed include: vascular complications, CVA, AVB, pericardial effusion and tamponade, esophageal fistula, PV stenosis. AF ablation has 70% success at 1 year, 50% success at 5 years, and 30% need another ablation.  Even if ablation is successful anticoagulation may be needed lifelong. Procedure explained in detail to patient including indications, risks, and benefits. He states understanding and wishes to procced.     LEOPOLDO Colin CNP  Electrophysiology Consult Service  Pager: [phone]

## 2021-07-22 NOTE — ANESTHESIA PREPROCEDURE EVALUATION
Anesthesia Pre-Procedure Evaluation    Patient: Sunshine Delgado   MRN: [id number] : 1965        Preoperative Diagnosis: atrial fibrillation/flutter   Procedure : Procedure(s):  EP ABLATION FOCAL AFIB     Past Medical History:   Diagnosis Date     Antiplatelet or antithrombotic long-term use      Arrhythmia      Atrial fibrillation with rapid ventricular response (H) 2020     Bee sting allergy      Depressive disorder      Generalized anxiety disorder 10/15/2009    lexapro made things worse.       Hashimoto's thyroiditis 2020     Morbid obesity (H)      Ocular migraine      MARIA GUADALUPE (obstructive sleep apnea)- severe (AHI 84) 2011    patient not using since      Voice fatigue 10/22/2009      Past Surgical History:   Procedure Laterality Date     COLONOSCOPY       DAVINCI GASTRIC SLEEVE       GENITOURINARY SURGERY       HYSTERECTOMY, PAP NO LONGER INDICATED       HYSTERECTOMY, VAGINAL      still has ovaries     LAPAROSCOPIC GASTRIC SLEEVE N/A 3/13/2018    Procedure: LAPAROSCOPIC GASTRIC SLEEVE;  Laparoscopic Sleeve Gastrectomy;  Surgeon: Kameron Joseph MD;  Location: [facility] OR      Allergies   Allergen Reactions     Sulfa Drugs Hives     Cephalexin Hives     Cinnamon Hives     Strawberry Hives     Sulfamethoxazole-Trimethoprim Hives     Vicodin [Hydrocodone-Acetaminophen]      Wasp Venom Protein      Other reaction(s): Chest Pain     Wasps [Hornets] Swelling     Now carries Epi Pen     Zoloft [Sertraline]      suicidal ideation     Contrast Dye Other (See Comments) and Rash     Patient had sneezing and an itchy throat following contrast injection of 100 mL Isovue-370.  From IV contrast dye      Social History     Tobacco Use     Smoking status: Never Smoker     Smokeless tobacco: Never Used   Substance Use Topics     Alcohol use: Yes     Comment: 1-2 per mo      Wt Readings from Last 1 Encounters:   21 101.9 kg (224 lb 10.4 oz)        Anesthesia Evaluation   Pt has had prior  anesthetic. Type: General.    No history of anesthetic complications       ROS/MED HX  ENT/Pulmonary: Comment: AHI 84 (severe)    (+) sleep apnea, doesn't use CPAP,     Neurologic: Comment: Impingement syndrome right shoulder    (+) migraines,     Cardiovascular: Comment: Denotes left sided chest pain that radiates to left scapula since stopping amiodarone    (+) -----Taking blood thinners dysrhythmias, a-fib,  (-) murmur   METS/Exercise Tolerance: 3 - Able to walk 1-2 blocks without stopping    Hematologic:       Musculoskeletal:  - neg musculoskeletal ROS     GI/Hepatic: Comment: Denies nausea   (-) GERD   Renal/Genitourinary:     (+) renal disease, type: CRI, Pt does not require dialysis,     Endo:     (+) thyroid problem, hypothyroidism, Obesity,     Psychiatric/Substance Use:     (+) psychiatric history anxiety     Infectious Disease:  - neg infectious disease ROS     Malignancy:  - neg malignancy ROS     Other:            Physical Exam    Airway        Mallampati: III   TM distance: > 3 FB   Neck ROM: full   Mouth opening: > 3 cm    Respiratory Devices and Support         Dental  no notable dental history         Cardiovascular          Rhythm and rate: irregular and normal (-) no murmur    Pulmonary           breath sounds clear to auscultation           OUTSIDE LABS:  CBC:   Lab Results   Component Value Date    WBC 6.5 06/14/2021    WBC 8.1 06/10/2021    HGB 14.4 06/14/2021    HGB 14.6 06/10/2021    HCT 42.9 06/14/2021    HCT 44.8 06/10/2021     06/14/2021     06/10/2021     BMP:   Lab Results   Component Value Date     06/14/2021     06/10/2021    POTASSIUM 4.2 06/14/2021    POTASSIUM 3.6 06/10/2021    CHLORIDE 107 06/14/2021    CHLORIDE 103 06/10/2021    CO2 27 06/14/2021    CO2 25 06/10/2021    BUN 14 06/14/2021    BUN 15 06/10/2021    CR 1.22 (H) 06/14/2021    CR 1.22 (H) 06/10/2021    GLC 78 06/14/2021     (H) 06/10/2021     COAGS:   Lab Results   Component Value Date     INR 0.91 03/17/2021     POC:   Lab Results   Component Value Date    BGM 90 03/14/2018     HEPATIC:   Lab Results   Component Value Date    ALBUMIN 3.9 03/17/2021    PROTTOTAL 7.3 03/17/2021    ALT 22 03/17/2021    AST 12 03/17/2021    ALKPHOS 61 03/17/2021    BILITOTAL 0.3 03/17/2021     OTHER:   Lab Results   Component Value Date    LACT 0.7 01/20/2015    A1C 5.7 10/15/2009    CONCHA 9.2 06/14/2021    MAG 2.0 09/28/2015    LIPASE 130 10/01/2018    TSH 2.86 06/14/2021    T4 0.89 03/17/2021    T3 90 04/08/2016    CRP 8.9 (H) 10/01/2018    SED 13 10/01/2018       Anesthesia Plan    ASA Status:  3   NPO Status:  NPO Appropriate    Anesthesia Type: General.     - Airway: ETT   Induction: Intravenous.   Maintenance: Inhalation.   Techniques and Equipment:     - Lines/Monitors: 2nd IV     Consents    Anesthesia Plan(s) and associated risks, benefits, and realistic alternatives discussed. Questions answered and patient/representative(s) expressed understanding.     - Discussed with:  Patient      - Extended Intubation/Ventilatory Support Discussed: No.      - Patient is DNR/DNI Status: No    Use of blood products discussed: No .     Postoperative Care    Pain management: Oral pain medications, IV analgesics.   PONV prophylaxis: Ondansetron (or other 5HT-3)     Comments:    Patient seen and examined.  Risks, benefits and alternatives to GETA discussed.  Questions answered and patient wishes to proceed.  Should post op narcotics be needed then admission to observation should be considered given severe MARIA GUADALUPE       H&P reviewed: Unable to attach H&P to encounter due to EHR limitations. H&P Update: appropriate H&P reviewed, patient examined. No interval changes since H&P (within 30 days).         Duarte Llanes MD

## 2021-07-23 LAB
ATRIAL RATE - MUSE: 65 BPM
DIASTOLIC BLOOD PRESSURE - MUSE: NORMAL MMHG
INTERPRETATION ECG - MUSE: NORMAL
P AXIS - MUSE: 51 DEGREES
PR INTERVAL - MUSE: 178 MS
QRS DURATION - MUSE: 96 MS
QT - MUSE: 490 MS
QTC - MUSE: 509 MS
R AXIS - MUSE: 65 DEGREES
SYSTOLIC BLOOD PRESSURE - MUSE: NORMAL MMHG
T AXIS - MUSE: 22 DEGREES
VENTRICULAR RATE- MUSE: 65 BPM

## 2021-07-27 ENCOUNTER — MYC MEDICAL ADVICE (OUTPATIENT)
Dept: CARDIOLOGY | Facility: CLINIC | Age: 56
End: 2021-07-27

## 2021-08-11 DIAGNOSIS — I48.0 PAROXYSMAL ATRIAL FIBRILLATION (H): ICD-10-CM

## 2021-08-13 RX ORDER — ATENOLOL 100 MG/1
TABLET ORAL
Qty: 60 CAPSULE | Refills: 0
Start: 2021-08-13

## 2021-08-13 NOTE — TELEPHONE ENCOUNTER
dabigatran ANTICOAGULANT (PRADAXA ANTICOAGULANT) 150 MG     Last Written Prescription Date:  7/22/21  Last Fill Quantity: 60,   # refills: 0  Last Office Visit : 6/16/21  Future Office visit:  11/3/21  Routing refill request to provider for review/approval because:  Please see RF directions?  Does Card want to discontinue med or RF ?

## 2021-09-16 ENCOUNTER — TELEPHONE (OUTPATIENT)
Dept: FAMILY MEDICINE | Facility: CLINIC | Age: 56
End: 2021-09-16

## 2021-09-16 DIAGNOSIS — F33.1 MAJOR DEPRESSIVE DISORDER, RECURRENT EPISODE, MODERATE (H): Chronic | ICD-10-CM

## 2021-09-16 DIAGNOSIS — F99 INSOMNIA DUE TO OTHER MENTAL DISORDER: ICD-10-CM

## 2021-09-16 DIAGNOSIS — F90.9 ATTENTION DEFICIT HYPERACTIVITY DISORDER (ADHD), UNSPECIFIED ADHD TYPE: ICD-10-CM

## 2021-09-16 DIAGNOSIS — F41.1 GENERALIZED ANXIETY DISORDER: Chronic | ICD-10-CM

## 2021-09-16 DIAGNOSIS — F51.05 INSOMNIA DUE TO OTHER MENTAL DISORDER: ICD-10-CM

## 2021-09-20 NOTE — TELEPHONE ENCOUNTER
"Called patient.  She has been feeling down/depressed for the past 3-4 weeks.  She stated that her depression is typically situational but this time, she cannot think of any specific situation that could have worsened her depression.  Last saw a psychologist about 1 year ago after a family member passed.  Therapy was helpful.  She last had thoughts of not being around yesterday.  Denies any plans of suicide.  She stated that these are just thoughts but consciously, she knows \"it is wrong\" and would never act on it.  Does have support and feels safe at home  Zemantat message sent with crisis hotline numbers.  Also advised that she can call the clinic 24/7 to discuss her concerns with triage when needed.  Advised to go to ED if ever having any active thoughts of suicide.  Advised her to reach out to her previous psychologist or if she wants a new referral to someone else, she can discuss this further at upcoming visit  Patient verbalized understanding.    Has virtual appointment scheduled with Lesly Arteaga CNP on 9/23/2021 and plans on keeping that appointment.     Alfredito Tolbert RN  [facility]        "

## 2021-09-20 NOTE — TELEPHONE ENCOUNTER
Routing refill request to provider for review/approval because:  Drug not on the [facility] refill protocol   PHQ-9 score:    PHQ 9/19/2021   PHQ-9 Total Score 20   Q9: Thoughts of better off dead/self-harm past 2 weeks Several days   F/U: Thoughts of suicide or self-harm No   F/U: Safety concerns No

## 2021-09-21 RX ORDER — TRAZODONE HYDROCHLORIDE 50 MG/1
TABLET, FILM COATED ORAL
Qty: 270 TABLET | Refills: 0 | Status: SHIPPED | OUTPATIENT
Start: 2021-09-21 | End: 2021-12-20

## 2021-09-21 RX ORDER — BUPROPION HYDROCHLORIDE 300 MG/1
300 TABLET ORAL EVERY MORNING
Qty: 90 TABLET | Refills: 0 | Status: SHIPPED | OUTPATIENT
Start: 2021-09-21 | End: 2021-12-20

## 2021-09-23 ENCOUNTER — TELEPHONE (OUTPATIENT)
Dept: FAMILY MEDICINE | Facility: CLINIC | Age: 56
End: 2021-09-23

## 2021-09-23 ENCOUNTER — VIRTUAL VISIT (OUTPATIENT)
Dept: FAMILY MEDICINE | Facility: CLINIC | Age: 56
End: 2021-09-23
Payer: COMMERCIAL

## 2021-09-23 DIAGNOSIS — F41.1 GENERALIZED ANXIETY DISORDER: Primary | Chronic | ICD-10-CM

## 2021-09-23 DIAGNOSIS — F32.0 MILD MAJOR DEPRESSION (H): ICD-10-CM

## 2021-09-23 PROCEDURE — 96127 BRIEF EMOTIONAL/BEHAV ASSMT: CPT | Mod: 59 | Performed by: NURSE PRACTITIONER

## 2021-09-23 PROCEDURE — 99214 OFFICE O/P EST MOD 30 MIN: CPT | Mod: 95 | Performed by: NURSE PRACTITIONER

## 2021-09-23 RX ORDER — DESVENLAFAXINE 50 MG/1
50 TABLET, FILM COATED, EXTENDED RELEASE ORAL DAILY
Qty: 90 TABLET | Refills: 0 | Status: SHIPPED | OUTPATIENT
Start: 2021-09-23 | End: 2021-09-23

## 2021-09-23 ASSESSMENT — ANXIETY QUESTIONNAIRES
6. BECOMING EASILY ANNOYED OR IRRITABLE: NEARLY EVERY DAY
IF YOU CHECKED OFF ANY PROBLEMS ON THIS QUESTIONNAIRE, HOW DIFFICULT HAVE THESE PROBLEMS MADE IT FOR YOU TO DO YOUR WORK, TAKE CARE OF THINGS AT HOME, OR GET ALONG WITH OTHER PEOPLE: VERY DIFFICULT
GAD7 TOTAL SCORE: 17
2. NOT BEING ABLE TO STOP OR CONTROL WORRYING: NEARLY EVERY DAY
5. BEING SO RESTLESS THAT IT IS HARD TO SIT STILL: NOT AT ALL
3. WORRYING TOO MUCH ABOUT DIFFERENT THINGS: NEARLY EVERY DAY
1. FEELING NERVOUS, ANXIOUS, OR ON EDGE: NEARLY EVERY DAY
7. FEELING AFRAID AS IF SOMETHING AWFUL MIGHT HAPPEN: MORE THAN HALF THE DAYS

## 2021-09-23 ASSESSMENT — PATIENT HEALTH QUESTIONNAIRE - PHQ9
SUM OF ALL RESPONSES TO PHQ QUESTIONS 1-9: 18
5. POOR APPETITE OR OVEREATING: NEARLY EVERY DAY

## 2021-09-23 NOTE — TELEPHONE ENCOUNTER
Prior Authorization Retail Medication Request    Medication/Dose: Pristiq (Desvenlafaxine) 50 mg er Tabs  ICD code (if different than what is on RX):  F41.1  Previously Tried and Failed:  n/a    Insurance Name:  [employer]   Insurance ID:  [id number]        Pharmacy Information (if different than what is on RX)  Name:  [facility]  Phone:  [phone]      [place]  [facility]   [address] [place] [id number]   Phone: [phone]  Fax: [phone]

## 2021-09-23 NOTE — PROGRESS NOTES
"Sunshine is a 56 year old who is being evaluated via a billable telephone visit.      What phone number would you like to be contacted at? [phone]  How would you like to obtain your AVS? MyChart    Assessment & Plan     Generalized anxiety disorder  Uncontrolled, patient requests Pristiq, which was sent to pharmacy, but patient called back stating this is too costly. Effexor sent instead. Patient to follow up in 6 weeks and reach out to her therapist for appt.    Mild major depression (H)  As above    Prescription drug management         See Patient Instructions    Return in about 6 weeks (around 11/4/2021) for Physical, Depression, Anxiety.    Lesly Arteaga, LEOPOLDO CNP  [facility]    Subjective   Sunshine is a 56 year old who presents for the following health issues     HPI     Anxiety Follow-Up    How are you doing with your anxiety since your last visit? No change    Are you having other symptoms that might be associated with anxiety? Yes:  A Fib, but the Diltiazem is helping    Have you had a significant life event? Selling house     Are you feeling depressed? Yes:  as above    Do you have any concerns with your use of alcohol or other drugs? No    Social History     Tobacco Use     Smoking status: Never Smoker     Smokeless tobacco: Never Used   Substance Use Topics     Alcohol use: Yes     Comment: 1-2 per mo     Drug use: Not Currently     Types: Marijuana     Comment: 12/20 last dose     ADRIANNE-7 SCORE 6/7/2019 1/7/2020 9/23/2021   Total Score - - -   Total Score 3 0 17     PHQ 3/17/2021 9/19/2021 9/23/2021   PHQ-9 Total Score 1 20 18   Q9: Thoughts of better off dead/self-harm past 2 weeks Not at all Several days Not at all   F/U: Thoughts of suicide or self-harm - No -   F/U: Safety concerns - No -         Full of anxiety, worried about everything, can't control it. A fib, job change, moving, decreased ability to concentrate.  Has had thoughts of \"Maybe it would be better if you " "weren't here\" but her rational side was able to talk her out of this.  Has done therapy previously and has a therapist she can reach out to.  Previously took Celexa (worked ok), Sertraline (caused suicidal ideation).    Review of Systems   Constitutional, HEENT, cardiovascular, pulmonary, gi and gu systems are negative, except as otherwise noted.      Objective           Vitals:  No vitals were obtained today due to virtual visit.    Physical Exam   healthy, alert and no distress  PSYCH: Alert and oriented times 3; coherent speech, normal   rate and volume, able to articulate logical thoughts, able   to abstract reason, no tangential thoughts, no hallucinations   or delusions  Her affect is normal  RESP: No cough, no audible wheezing, able to talk in full sentences  Remainder of exam unable to be completed due to telephone visits    No results found for any visits on 09/23/21.            Phone call duration: 11 minutes  "

## 2021-09-23 NOTE — TELEPHONE ENCOUNTER
Prior Authorization Approval    Authorization Effective Date: 8/24/2021  Authorization Expiration Date: 9/23/2022  Medication: Pristiq (Desvenlafaxine) 50 mg er Tabs-APPROVED  Approved Dose/Quantity:   Reference #:     Insurance Company: [employer] - Phone [phone] Fax [phone]  Expected CoPay:       CoPay Card Available:      Foundation Assistance Needed:    Which Pharmacy is filling the prescription (Not needed for infusion/clinic administered): [facility] - [address] [facility]  Pharmacy Notified: Yes  Patient Notified: No    Pharmacy will notify patient when medication is ready.

## 2021-09-23 NOTE — TELEPHONE ENCOUNTER
Central Prior Authorization Team   Phone: [phone]      PA Initiation    Medication: Pristiq (Desvenlafaxine) 50 mg er Tabs-  Insurance Company: [employer] - Phone [phone] Fax [phone]  Pharmacy Filling the Rx: [facility]  [address] [facility]  Filling Pharmacy Phone: [phone]  Filling Pharmacy Fax:    Start Date: 9/23/2021

## 2021-09-24 ASSESSMENT — ANXIETY QUESTIONNAIRES: GAD7 TOTAL SCORE: 17

## 2021-10-05 ENCOUNTER — ANCILLARY PROCEDURE (OUTPATIENT)
Dept: CARDIOLOGY | Facility: CLINIC | Age: 56
End: 2021-10-05
Attending: INTERNAL MEDICINE
Payer: COMMERCIAL

## 2021-10-05 DIAGNOSIS — I48.0 PAROXYSMAL ATRIAL FIBRILLATION (H): ICD-10-CM

## 2021-10-05 PROCEDURE — 93242 EXT ECG>48HR<7D RECORDING: CPT

## 2021-10-05 PROCEDURE — 93227 XTRNL ECG REC<48 HR R&I: CPT | Performed by: INTERNAL MEDICINE

## 2021-10-05 NOTE — PROGRESS NOTES
Per Dr. Craig, patient to have 7-day Zio monitor placed.  Diagnosis: paroxysmal atrial fibrillation  Monitor placed: Yes  Patient Instructed: Yes  Patient verbalized understanding: Yes  Holter # [id number]    Monitor was placed by Nacho Camilo.  11:11 AM

## 2021-10-06 DIAGNOSIS — I48.0 PAROXYSMAL ATRIAL FIBRILLATION (H): ICD-10-CM

## 2021-10-08 ENCOUNTER — TELEPHONE (OUTPATIENT)
Dept: CARDIOLOGY | Facility: CLINIC | Age: 56
End: 2021-10-08

## 2021-10-08 RX ORDER — AMIODARONE HYDROCHLORIDE 200 MG/1
TABLET ORAL
Qty: 45 TABLET | Refills: 0 | OUTPATIENT
Start: 2021-10-08

## 2021-10-08 NOTE — TELEPHONE ENCOUNTER
Called Sunshine and confirmed that she sent her previous defective Ziopatch monitor back to [facility]. Placed a new enrollment for a new monitor to be mailed to Sunshine overnight. Sy is aware.     Ian

## 2021-10-19 ENCOUNTER — APPOINTMENT (OUTPATIENT)
Dept: URGENT CARE | Facility: CLINIC | Age: 56
End: 2021-10-19
Payer: MEDICAID

## 2021-10-24 ENCOUNTER — HEALTH MAINTENANCE LETTER (OUTPATIENT)
Age: 56
End: 2021-10-24

## 2021-11-07 ENCOUNTER — MYC MEDICAL ADVICE (OUTPATIENT)
Dept: FAMILY MEDICINE | Facility: CLINIC | Age: 56
End: 2021-11-07
Payer: MEDICAID

## 2021-11-08 ENCOUNTER — OFFICE VISIT (OUTPATIENT)
Dept: FAMILY MEDICINE | Facility: CLINIC | Age: 56
End: 2021-11-08
Payer: COMMERCIAL

## 2021-11-08 ENCOUNTER — TELEPHONE (OUTPATIENT)
Dept: FAMILY MEDICINE | Facility: CLINIC | Age: 56
End: 2021-11-08

## 2021-11-08 ENCOUNTER — TELEPHONE (OUTPATIENT)
Dept: FAMILY MEDICINE | Facility: CLINIC | Age: 56
End: 2021-11-08
Payer: MEDICAID

## 2021-11-08 ENCOUNTER — ANCILLARY PROCEDURE (OUTPATIENT)
Dept: GENERAL RADIOLOGY | Facility: CLINIC | Age: 56
End: 2021-11-08
Attending: INTERNAL MEDICINE
Payer: COMMERCIAL

## 2021-11-08 ENCOUNTER — NURSE TRIAGE (OUTPATIENT)
Dept: NURSING | Facility: CLINIC | Age: 56
End: 2021-11-08

## 2021-11-08 VITALS
OXYGEN SATURATION: 98 % | SYSTOLIC BLOOD PRESSURE: 126 MMHG | HEART RATE: 96 BPM | TEMPERATURE: 98.1 F | DIASTOLIC BLOOD PRESSURE: 88 MMHG

## 2021-11-08 DIAGNOSIS — E66.01 MORBID OBESITY (H): Chronic | ICD-10-CM

## 2021-11-08 DIAGNOSIS — J01.80 OTHER ACUTE SINUSITIS, RECURRENCE NOT SPECIFIED: Primary | ICD-10-CM

## 2021-11-08 DIAGNOSIS — U07.1 BRONCHITIS DUE TO COVID-19 VIRUS: ICD-10-CM

## 2021-11-08 DIAGNOSIS — I48.91 ATRIAL FIBRILLATION WITH CONTROLLED VENTRICULAR RATE (H): ICD-10-CM

## 2021-11-08 DIAGNOSIS — Z86.16 HISTORY OF 2019 NOVEL CORONAVIRUS DISEASE (COVID-19): ICD-10-CM

## 2021-11-08 DIAGNOSIS — J40 BRONCHITIS DUE TO COVID-19 VIRUS: ICD-10-CM

## 2021-11-08 PROCEDURE — 71046 X-RAY EXAM CHEST 2 VIEWS: CPT | Performed by: RADIOLOGY

## 2021-11-08 PROCEDURE — 99000 SPECIMEN HANDLING OFFICE-LAB: CPT | Performed by: INTERNAL MEDICINE

## 2021-11-08 PROCEDURE — 99214 OFFICE O/P EST MOD 30 MIN: CPT | Performed by: INTERNAL MEDICINE

## 2021-11-08 PROCEDURE — 70220 X-RAY EXAM OF SINUSES: CPT | Performed by: RADIOLOGY

## 2021-11-08 PROCEDURE — U0003 INFECTIOUS AGENT DETECTION BY NUCLEIC ACID (DNA OR RNA); SEVERE ACUTE RESPIRATORY SYNDROME CORONAVIRUS 2 (SARS-COV-2) (CORONAVIRUS DISEASE [COVID-19]), AMPLIFIED PROBE TECHNIQUE, MAKING USE OF HIGH THROUGHPUT TECHNOLOGIES AS DESCRIBED BY CMS-2020-01-R: HCPCS | Mod: 90 | Performed by: INTERNAL MEDICINE

## 2021-11-08 RX ORDER — PREDNISONE 20 MG/1
20 TABLET ORAL DAILY
Qty: 40 TABLET | Refills: 2 | Status: SHIPPED | OUTPATIENT
Start: 2021-11-08 | End: 2021-11-08

## 2021-11-08 RX ORDER — PREDNISONE 20 MG/1
20 TABLET ORAL DAILY
Qty: 40 TABLET | Refills: 2 | Status: SHIPPED | OUTPATIENT
Start: 2021-11-08 | End: 2022-01-06

## 2021-11-08 RX ORDER — AZITHROMYCIN 250 MG/1
TABLET, FILM COATED ORAL
Qty: 6 TABLET | Refills: 2 | Status: SHIPPED | OUTPATIENT
Start: 2021-11-08 | End: 2022-01-06

## 2021-11-08 NOTE — TELEPHONE ENCOUNTER
Petert sent.  Patient to schedule a telephone appointment for a COVID-19 cough.      Chance Rod RN

## 2021-11-08 NOTE — LETTER
REPORT OF WORK ABILITY    [address] [address]  [phone]      PATIENT DATA    Employee Name: Sunshine Delgado      : 1965     #: [id number]    Today's date: 2021  Date of first visit: 2021    PROVIDER EVALUATION: Please fill in as needed.  Please give copy to employee for employer.    1. Diagnosis: COVID bronchitis                        Acute sinusitis                        Hx of COVID-19 infection (diagnosed last 10/19/2021).    2. Treatment: Rest and pharmacotherapy.  3. Medication: Prednisone and Azithromycin.  NOTE: When ordering a medication, [place] or WC on prescriptions.    4. No work from 2021 to 2021.  5. Return to work date: 11/15/2021   ** WITH RESTRICTIONS? No restrictions      RESTRICTIONS: Unlimited unless listed.  Restrictions apply to home and leisure also.  If work restrictions is not available, the employee is totally disabled.    Maximum Medical Improvement (Date): 11/15/2021  Any Permanent Partial Disability? 0%    Medical Examiner: Jacques López MD  License [place]: [id number]    Comments: A repeat nasal PCR test was performed during this visit.     Next appointment: 1 month    CC: Employer, Managed Care Plan/Payor, Patient

## 2021-11-08 NOTE — TELEPHONE ENCOUNTER
Patient called coughing and stated she has a dry hacking cough post Covid-19 and is unable to sleep or work. Patient says she tried every other over the counter cough medication. Patient says she was told that she couldn't come in and be seen because she was coughing.  Patient advised to make telephone visit today with any available provider and transferred to scheduling to set up.  Lesly Casanova RN on 11/8/2021 at 9:44 AM

## 2021-11-08 NOTE — TELEPHONE ENCOUNTER
Patient called earlier today regarding a cough she is having. Patient had covid-19 diagnosed on 10/20. She reports that the cough has worsened. Patient spoke with a nurse earlier today and was scheduled for a phone visit but patient was never called. Patient is wondering what to do.    Patient is advised on re-scheduling a phone visit or using urgent care. Patient is agreeable to using urgent care at this time. She denies further questions or concerns.    Breana Galvin RN  [facility]      Reason for Disposition    [1] PERSISTING SYMPTOMS OF COVID-19 AND [2] symptoms WORSE    Additional Information    Negative: [1] PERSISTING SYMPTOMS OF COVID-19 AND [2] NEW symptom AND [3] could be serious    Negative: [1] Caller has URGENT question AND [2] triager unable to answer question    Negative: [1] MILD difficulty breathing (e.g., minimal/no SOB at rest, SOB with walking, pulse <100) AND [2] new-onset    Negative: Oxygen level (e.g., pulse oximetry) 91 to 94 percent    Negative: MODERATE difficulty breathing (e.g., speaks in phrases, SOB even at rest, pulse 100-120)    Negative: [1] Drinking very little AND [2] dehydration suspected (e.g., no urine > 12 hours, very dry mouth, very lightheaded)    Negative: Patient sounds very sick or weak to the triager    Negative: [1] MODERATE difficulty breathing (e.g., speaks in phrases, SOB even at rest, pulse 100-120) AND [2] new-onset or WORSE    Negative: [1] MODERATE difficulty breathing AND [2] oxygen level (e.g., pulse oximetry) 91 to 94 percent    Negative: Oxygen level (e.g., pulse oximetry) 90 percent or lower    Negative: [1] Typical COVID-19 symptoms AND [2] lasting less than 3 weeks    Negative: [1] Chest pain, pressure, or tightness AND [2] new-onset or worsening    Negative: [1] Fever AND [2] new-onset or worsening    Negative: SEVERE difficulty breathing (e.g., struggling for each breath, speaks in single words)    Negative: [1] SEVERE weakness  (e.g., can't stand or can barely walk) AND [2] new-onset or WORSE    Negative: Difficult to awaken or acting confused (e.g., disoriented, slurred speech)    Negative: Bluish (or gray) lips or face now    Negative: Sounds like a life-threatening emergency to the triager    Protocols used: CORONAVIRUS (COVID-19) PERSISTING SYMPTOMS [facility]

## 2021-11-08 NOTE — PROGRESS NOTES
[facility] Internal Medicine Progress Note           Assessment    (J01.80) Other acute sinusitis, recurrence not specified  (primary encounter diagnosis)    (Z20.822) Suspected 2019 novel coronavirus infection    (J40) Bronchitis    (I48.91) Atrial fibrillation with controlled ventricular rate (H)    (E66.01) Morbid obesity (H)    Plan:  1. Treat empirically with macrolide antibiotics and oral corticosteroids. No reason to delay treatment due to risk of lower respiratory tract infections, which are arrhythmogenic.  Ideal drug is Cefdinir, but patient is allergic Cephalexin.  2. Obtain sinus x-rays and chest x-rays.  3. Repeat nasal PCR for 2019 novel coronavirus infection.  4. Off work from 11/8/2021 to 11/12/2021. Work ability letter provided to the patient.  5. Follow-up within one week of condition does not improve.  6. Continue Diltiazem  mg once daily. Patient still has atrial fibrillation despite EP ablation last 7/22/2021 (patient has underlying sleep apnea).   7. Continue Eliquis 5 mg BID>        Interval History:     RESPIRATORY SYMPTOMS      Duration: 6 days    Description  nasal congestion, rhinorrhea, cough, ear pain left, headache and fatigue/malaise    Severity: moderate    Accompanying signs and symptoms: anosmia (due to recent COVID-19 infection - tested positive at an outside facility last 10/19/2021).    History (predisposing factors):  As above.    Precipitating or alleviating factors: Not immunosuppressed, no chronic lung disease nor seasonal allergies.    Therapies tried and outcome:  rest and fluids              Significant Problems:   Patient Active Problem List   Diagnosis     Generalized anxiety disorder     CARDIOVASCULAR SCREENING; LDL GOAL LESS THAN 160     MARIA GUADALUPE (obstructive sleep apnea)- severe (AHI 84)     Morbid obesity (H)     Health Care Home     Mild major depression (H)     Insomnia     Bee sting allergy     CKD (chronic kidney disease) stage 3, GFR 30-59 ml/min  (H)     Hashimoto's thyroiditis     Atrial fibrillation with rapid ventricular response (H)     Elevated liver enzymes     Infectious mononucleosis     Acute pain of right shoulder     Impingement syndrome of shoulder region, right     Attention deficit hyperactivity disorder (ADHD), unspecified ADHD type     Paroxysmal atrial fibrillation (H)              Review of Systems:   CONSTITUTIONAL:POSITIVE  for fatigue and NEGATIVE  for chills, myalgias and weight gain  INTEGUMENTARY/SKIN: NEGATIVE for worrisome rashes, moles or lesions  EYES: NEGATIVE for vision changes or irritation  ENT/MOUTH: POSITIVE for earache left and NEGATIVE for epistaxis and postnasal drainage  RESP:NEGATIVE for hemoptysis and wheezing  BREAST: NEGATIVE for masses, tenderness or discharge  CV: NEGATIVE for chest pain, palpitations or peripheral edema  GI: NEGATIVE for nausea, abdominal pain, heartburn, or change in bowel habits  : NEGATIVE for frequency, dysuria, or hematuria  MUSCULOSKELETAL: NEGATIVE for significant arthralgias or myalgia  NEURO: NEGATIVE for weakness, dizziness or paresthesias  ENDOCRINE: NEGATIVE for temperature intolerance, skin/hair changes  HEME: NEGATIVE for bleeding problems  PSYCHIATRIC: NEGATIVE for changes in mood or affect            Medications:     Current Outpatient Medications   Medication Sig     acetaminophen (TYLENOL) 325 MG tablet Take 325 mg by mouth every 4 hours as needed      amiodarone (PACERONE) 200 MG tablet Take 0.5 tablets (100 mg) by mouth daily     apixaban ANTICOAGULANT (ELIQUIS ANTICOAGULANT) 5 MG tablet Take 1 tablet (5 mg) by mouth 2 times daily     buPROPion (WELLBUTRIN XL) 300 MG 24 hr tablet TAKE 1 TABLET (300 MG) BY MOUTH EVERY MORNING     diltiazem ER COATED BEADS (CARTIA XT) 120 MG 24 hr capsule Take 1 capsule (120 mg) by mouth daily     diphenhydrAMINE (BENADRYL) 50 MG capsule Administer 1 HOUR PRIOR TO - IV contrast injection (Patient not taking: Reported on 9/23/2021)      EPINEPHrine (AUVI-Q) 0.3 MG/0.3ML injection 2-pack Inject 0.3 mLs (0.3 mg) into the muscle as needed for anaphylaxis     fluticasone (FLONASE) 50 MCG/ACT spray Spray 2 sprays into both nostrils daily     furosemide (LASIX) 20 MG tablet Take 1 tablet (20 mg) by mouth daily (Patient not taking: Reported on 9/23/2021)     levothyroxine (SYNTHROID/LEVOTHROID) 75 MCG tablet Take 1 tablet (75 mcg) by mouth daily     methylPREDNISolone (MEDROL) 32 MG tablet FIRST DOSE 12 hours prior to the procedure with IV contrast  SECOND DOSE 2 HOURS PRIOR TP CONTRAST (Patient not taking: Reported on 9/23/2021)     multivitamin, therapeutic with minerals (MULTI-VITAMIN) TABS tablet Take 1 tablet by mouth every morning     order for DME Resmed Airsense 10 auto cpap 6-7 cm, Airfit P10 for her XS pillows (Patient not taking: Reported on 9/23/2021)     predniSONE & diphenhydrAMINE (CONTRAST ALLERGY PREMED PACK) 3 x 50 MG & 1 x 50 MG KIT Take 1 kit by mouth continuous prn (Prior to CT) Take prednisone 50mg 12 hours prior to CT, then prednisone 50mg 7 hours prior, then prednisone 50mg 1 hour prior. Take diphenhydramine 50mg 1 hour prior. (Patient not taking: Reported on 9/23/2021)     traZODone (DESYREL) 50 MG tablet TAKE TWO TO THREE TABLETS BY MOUTH AT BEDTIME     venlafaxine (EFFEXOR-ER) 37.5 MG 24 hr tablet Take 1 tablet (37.5 mg) by mouth daily     Current Facility-Administered Medications   Medication     triamcinolone (KENALOG-40) injection 40 mg             Physical Exam:   /88 (BP Location: Left arm, Patient Position: Sitting, Cuff Size: Adult Regular)   Pulse 96   Temp 98.1  F (36.7  C)   SpO2 98%   Constitutional: fatigued, alert, cooperative, no apparent distress and appears stated age  Eyes: extra-ocular muscles intact and sclera clear  ENT: normocepalic, without obvious abnormality  Lungs: Harsh breath sounds bilaterally without any adventitious sounds.  Cardiovascular: irregularly irregular rhythm  Neurologic: Mental  Status Exam:  Level of Alertness:   awake  Orientation:   person, place, time  Memory:   normal  Fund of Knowledge:  normal  Attention/Concentration:  normal  Language:  normal  Motor Exam:  Motor exam is symmetrical 5 out of 5 all extremities bilaterally  Neuropsychiatric: Normal affect, mood, orientation, memory and insight.  Skin: No rashes, erythema, pallor, petechia or purpura.          Data:            Disposition:      Jacques López MD  Internal Medicine  [facility] Team

## 2021-11-08 NOTE — LETTER
REPORT OF WORK ABILITY    [address] [address]  [phone]      PATIENT DATA    Employee Name: Sunshine Delgado      : 1965     #: [id number]    Today's date: 2021  Date of first visit: 2021    PROVIDER EVALUATION: Please fill in as needed.  Please give copy to employee for employer.    1. Diagnosis: Acute bronchitis                        Acute sinusitis                        Hx of COVID-19 infection (diagnosed last 10/19/2021).    2. Treatment: Rest and pharmacotherapy.  3. Medication: Prednisone and Azithromycin.  NOTE: When ordering a medication, [place] or [place] on prescriptions.    4. No work from 2021 to 2021.  5. Return to work date: 11/15/2021   ** WITH RESTRICTIONS? No restrictions      RESTRICTIONS: Unlimited unless listed.  Restrictions apply to home and leisure also.  If work restrictions is not available, the employee is totally disabled.    Maximum Medical Improvement (Date): 11/15/2021  Any Permanent Partial Disability? 0%    Medical Examiner: Jacques López MD  [id number]    Comments: A repeat nasal PCR test was performed during this visit.     Next appointment: 1 month    CC: Employer, Managed Care Plan/Payor, Patient

## 2021-11-08 NOTE — TELEPHONE ENCOUNTER
[facility] pharmacy calling on the medication:  predniSONE (DELTASONE) 20 MG tablet  Wants to know if this a taper dose?  Jane Mccoy [facility]  2nd Floor  Primary Care

## 2021-11-11 LAB — SARS-COV-2 RNA RESP QL NAA+PROBE: NOT DETECTED

## 2021-11-15 DIAGNOSIS — I48.0 PAROXYSMAL ATRIAL FIBRILLATION (H): Primary | ICD-10-CM

## 2021-11-15 RX ORDER — POTASSIUM CHLORIDE 1500 MG/1
20 TABLET, EXTENDED RELEASE ORAL
Status: CANCELLED | OUTPATIENT
Start: 2021-11-15

## 2021-11-15 RX ORDER — POTASSIUM CHLORIDE 1500 MG/1
40 TABLET, EXTENDED RELEASE ORAL
Status: CANCELLED | OUTPATIENT
Start: 2021-11-15

## 2021-11-15 RX ORDER — MAGNESIUM SULFATE HEPTAHYDRATE 40 MG/ML
2 INJECTION, SOLUTION INTRAVENOUS
Status: CANCELLED | OUTPATIENT
Start: 2021-11-15

## 2021-11-15 RX ORDER — LIDOCAINE 40 MG/G
CREAM TOPICAL
Status: CANCELLED | OUTPATIENT
Start: 2021-11-15

## 2021-11-16 ENCOUNTER — TELEPHONE (OUTPATIENT)
Dept: FAMILY MEDICINE | Facility: CLINIC | Age: 56
End: 2021-11-16
Payer: COMMERCIAL

## 2021-11-16 NOTE — TELEPHONE ENCOUNTER
Sunshine Delgado - FW: Appointment Request    FW: Appointment Request   Ynes Ocasio   Sent: Mon November 15, 2021  5:14 PM   To: CASA  Team Red    Sunshine Delgado   MRN: [id number] : 1965   Pt Home: [phone]     Entered: [phone]          Message       ----- Message -----   From: Sunshine Delgado   Sent: 2021   7:38 AM CST   To:  Central Mychart Scheduling Pool   Subject: Appointment Request                                was diagnosed with Covid to n Oct 19 th stayed home for 2 weeks , went back to work for 2-3 days and started to have a dry hacking cough that I couldn t control On Much bd  I ended up in the [place] urgent care they did chest and sinus ex rays. He put me on an antibiotic and Prednisone, On Thursday this week I had all the classic signs of a heart attack so I went by ambulance . They ruled out heart attack . But said that the bronchitis may be causing all the pain. i m still coughing though not as much as on Monday. He told me I may need another round of antibiotics.  I also went into A-Fib and haven t been out of it all week. I m so tired .    Sunshine          Appointment Request    Ynes Ocasio routed conversation to  Team Red 17 hours ago (5:15 PM)     Danny Sunshine Laxmi Garduno 4 days ago       was diagnosed with Covid to n Oct 19 th stayed home for 2 weeks , went back to work for 2-3 days and started to have a dry hacking cough that I couldn t control On Much bd  I ended up in the [place] urgent care they did chest and sinus ex rays. He put me on an antibiotic and Prednisone, On Thursday this week I had all the classic signs of a heart attack so I went by ambulance . They ruled out heart attack . But said that the bronchitis may be causing all the pain. i m still coughing though not as much as on Monday. He told me I may need another round of antibiotics.  I also went into A-Fib and haven t been out of it all week. I m so tired .    Laxmi Salamanca Paula M 7 days ago     JE      Good Afternoon,    I see an appointment was already made for this request, please let us know if you need anything else.    Thank you  Laxmi Aldridge   [facility]  Central Scheduler           Sunshine Delgado  Patient Appointment Schedule Request Pool 8 days ago       Appointment Request From: Sunshine Delgado     With Provider: LEOPOLDO Michelle CNP [[facility]]     Preferred Date Range: 11/8/2021 - 11/8/2021     Preferred Times: Any Time     Reason for visit: Request an Appointment     Comments:  I am having residual symptoms from covid . Can t get any sleep from coughing. It s a deep hacking cough.I d like to do an E visit today so I can get some sleep tonight!

## 2021-11-17 ENCOUNTER — TELEPHONE (OUTPATIENT)
Dept: FAMILY MEDICINE | Facility: CLINIC | Age: 56
End: 2021-11-17
Payer: COMMERCIAL

## 2021-11-17 NOTE — TELEPHONE ENCOUNTER
In the original message below. A message was sent for Sunshine to call back the clinic or send a Medical question message to the care team.    Lupe Patino,

## 2021-11-19 ENCOUNTER — TELEPHONE (OUTPATIENT)
Dept: CARDIOLOGY | Facility: CLINIC | Age: 56
End: 2021-11-19
Payer: COMMERCIAL

## 2021-11-19 DIAGNOSIS — Z11.59 ENCOUNTER FOR SCREENING FOR OTHER VIRAL DISEASES: ICD-10-CM

## 2021-11-19 NOTE — TELEPHONE ENCOUNTER
EP Scheduling called the patient to schedule cardioversion. The number [phone] was left for the patient to return the call and schedule the procedure.    Grisel Smart  Periop Electrophysiology   [phone]

## 2021-11-20 ENCOUNTER — LAB (OUTPATIENT)
Dept: LAB | Facility: CLINIC | Age: 56
End: 2021-11-20
Payer: COMMERCIAL

## 2021-11-20 DIAGNOSIS — Z11.59 ENCOUNTER FOR SCREENING FOR OTHER VIRAL DISEASES: ICD-10-CM

## 2021-11-20 PROCEDURE — U0003 INFECTIOUS AGENT DETECTION BY NUCLEIC ACID (DNA OR RNA); SEVERE ACUTE RESPIRATORY SYNDROME CORONAVIRUS 2 (SARS-COV-2) (CORONAVIRUS DISEASE [COVID-19]), AMPLIFIED PROBE TECHNIQUE, MAKING USE OF HIGH THROUGHPUT TECHNOLOGIES AS DESCRIBED BY CMS-2020-01-R: HCPCS

## 2021-11-20 PROCEDURE — U0005 INFEC AGEN DETEC AMPLI PROBE: HCPCS

## 2021-11-21 ENCOUNTER — ANESTHESIA EVENT (OUTPATIENT)
Dept: SURGERY | Facility: CLINIC | Age: 56
End: 2021-11-21
Payer: COMMERCIAL

## 2021-11-21 LAB — SARS-COV-2 RNA RESP QL NAA+PROBE: NEGATIVE

## 2021-11-22 ENCOUNTER — APPOINTMENT (OUTPATIENT)
Dept: MEDSURG UNIT | Facility: CLINIC | Age: 56
End: 2021-11-22
Attending: INTERNAL MEDICINE
Payer: COMMERCIAL

## 2021-11-22 ENCOUNTER — HOSPITAL ENCOUNTER (OUTPATIENT)
Dept: CARDIOLOGY | Facility: CLINIC | Age: 56
Discharge: HOME | End: 2021-11-22
Attending: INTERNAL MEDICINE | Admitting: INTERNAL MEDICINE
Payer: COMMERCIAL

## 2021-11-22 ENCOUNTER — ANESTHESIA (OUTPATIENT)
Dept: SURGERY | Facility: CLINIC | Age: 56
End: 2021-11-22
Payer: COMMERCIAL

## 2021-11-22 ENCOUNTER — TELEPHONE (OUTPATIENT)
Dept: FAMILY MEDICINE | Facility: CLINIC | Age: 56
End: 2021-11-22

## 2021-11-22 ENCOUNTER — HOSPITAL ENCOUNTER (OUTPATIENT)
Facility: CLINIC | Age: 56
Discharge: HOME | End: 2021-11-22
Attending: INTERNAL MEDICINE | Admitting: INTERNAL MEDICINE
Payer: COMMERCIAL

## 2021-11-22 ENCOUNTER — APPOINTMENT (OUTPATIENT)
Dept: LAB | Facility: CLINIC | Age: 56
End: 2021-11-22
Attending: INTERNAL MEDICINE
Payer: COMMERCIAL

## 2021-11-22 VITALS
OXYGEN SATURATION: 98 % | DIASTOLIC BLOOD PRESSURE: 58 MMHG | SYSTOLIC BLOOD PRESSURE: 105 MMHG | RESPIRATION RATE: 16 BRPM | HEART RATE: 75 BPM

## 2021-11-22 VITALS
SYSTOLIC BLOOD PRESSURE: 115 MMHG | HEART RATE: 67 BPM | DIASTOLIC BLOOD PRESSURE: 70 MMHG | OXYGEN SATURATION: 100 % | RESPIRATION RATE: 14 BRPM

## 2021-11-22 LAB
MAGNESIUM SERPL-MCNC: 2.4 MG/DL (ref 1.6–2.3)
POTASSIUM BLD-SCNC: 4 MMOL/L (ref 3.4–5.3)

## 2021-11-22 PROCEDURE — 999N000054 HC STATISTIC EKG NON-CHARGEABLE

## 2021-11-22 PROCEDURE — 92960 CARDIOVERSION ELECTRIC EXT: CPT

## 2021-11-22 PROCEDURE — 370N000017 HC ANESTHESIA TECHNICAL FEE, PER MIN

## 2021-11-22 PROCEDURE — 36415 COLL VENOUS BLD VENIPUNCTURE: CPT | Performed by: INTERNAL MEDICINE

## 2021-11-22 PROCEDURE — 93010 ELECTROCARDIOGRAM REPORT: CPT | Mod: 76 | Performed by: INTERNAL MEDICINE

## 2021-11-22 PROCEDURE — 250N000013 HC RX MED GY IP 250 OP 250 PS 637: Performed by: NURSE PRACTITIONER

## 2021-11-22 PROCEDURE — 999N000132 HC STATISTIC PP CARE STAGE 1

## 2021-11-22 PROCEDURE — 92960 CARDIOVERSION ELECTRIC EXT: CPT | Mod: GC | Performed by: INTERNAL MEDICINE

## 2021-11-22 PROCEDURE — 250N000009 HC RX 250

## 2021-11-22 PROCEDURE — 84132 ASSAY OF SERUM POTASSIUM: CPT | Performed by: INTERNAL MEDICINE

## 2021-11-22 PROCEDURE — 93005 ELECTROCARDIOGRAM TRACING: CPT

## 2021-11-22 PROCEDURE — 83735 ASSAY OF MAGNESIUM: CPT | Performed by: INTERNAL MEDICINE

## 2021-11-22 RX ORDER — LIDOCAINE 40 MG/G
CREAM TOPICAL
Status: DISCONTINUED | OUTPATIENT
Start: 2021-11-22 | End: 2021-11-23 | Stop reason: HOSPADM

## 2021-11-22 RX ORDER — POTASSIUM CHLORIDE 1500 MG/1
20 TABLET, EXTENDED RELEASE ORAL
Status: COMPLETED | OUTPATIENT
Start: 2021-11-22 | End: 2021-11-22

## 2021-11-22 RX ORDER — POTASSIUM CHLORIDE 1500 MG/1
40 TABLET, EXTENDED RELEASE ORAL
Status: DISCONTINUED | OUTPATIENT
Start: 2021-11-22 | End: 2021-11-23 | Stop reason: HOSPADM

## 2021-11-22 RX ORDER — MAGNESIUM SULFATE HEPTAHYDRATE 40 MG/ML
2 INJECTION, SOLUTION INTRAVENOUS
Status: DISCONTINUED | OUTPATIENT
Start: 2021-11-22 | End: 2021-11-23 | Stop reason: HOSPADM

## 2021-11-22 RX ADMIN — POTASSIUM CHLORIDE 20 MEQ: 750 TABLET, EXTENDED RELEASE ORAL at 13:49

## 2021-11-22 RX ADMIN — LIDOCAINE HYDROCHLORIDE 50 MG: 10 INJECTION, SOLUTION EPIDURAL; INFILTRATION; INTRACAUDAL; PERINEURAL at 14:33

## 2021-11-22 ASSESSMENT — ENCOUNTER SYMPTOMS: DYSRHYTHMIAS: 1

## 2021-11-22 NOTE — SEDATION DOCUMENTATION
Pt arrived in ECHO department  for scheduled cardioversion.  Procedure explained, questions answered and consent signed. Discharge instructions discussed with patient.  Patient denies any missed doses of her Eliquis.  20 mEq potassium was given PO for a lab potassium of 4.0 today.  Anesthesia gave pt 50 mg IV brevitol for sedation and pt was DCCV at 150 Joules to a SR.  Pt denied pain after procedure and transferred to unit 2A for recovery after awake and VSS.    Report given to 2A RN's Soni.

## 2021-11-22 NOTE — ANESTHESIA CARE TRANSFER NOTE
Patient: Sunshine Delgado    Procedure: Procedure(s):  ANESTHESIA, FOR CARDIOVERSION@1515       Diagnosis: Paroxysmal atrial fibrillation (H) [I48.0]  Diagnosis Additional Information: No value filed.    Anesthesia Type:   No value filed.     Note:      Level of Consciousness: awake  Oxygen Supplementation: nasal cannula  Level of Supplemental Oxygen (L/min / FiO2): 4  Independent Airway: airway patency satisfactory and stable  Dentition: dentition unchanged  Vital Signs Stable: post-procedure vital signs reviewed and stable  Report to RN Given: handoff report given  Destination: ECHO lab.          Vitals:  Vitals Value Taken Time   BP     Temp     Pulse     Resp     SpO2         Electronically Signed By: LEOPOLDO Garcia CRNA  November 22, 2021  2:35 PM

## 2021-11-22 NOTE — TELEPHONE ENCOUNTER
Workers compensation forms received via fax. Forms received from [facility] the work comp experts. Forms placed in provider's bin to address.

## 2021-11-22 NOTE — PROGRESS NOTES
Pt tolerated oral intake. Discharge instructions reviewed by Echo nurse, copy given to pt. Pt tolerated ambulation. Voided. PIV dc'd. Pt will discharge home accompanied by friend.

## 2021-11-22 NOTE — PROCEDURES
[facility]    Procedure: *Cardioversion    Date/Time: 11/22/2021 3:52 PM  Performed by: Jerrell Godwin MD  Authorized by: Jerrell Godwin MD       UNIVERSAL PROTOCOL   Site Marked: Yes  Prior Images Obtained and Reviewed:  Yes  Required items: Required blood products, implants, devices and special equipment available    Patient identity confirmed:  Verbally with patient and arm band  Patient was reevaluated immediately before administering moderate or deep sedation or anesthesia  Confirmation Checklist:  Patient's identity using two indicators  Time out: Immediately prior to the procedure a time out was called    Universal Protocol: the Joint Commission Universal Protocol was followed      PROCEDURE DETAILS  Cardioversion basis: elective  Indications: failure of anti-arrhythmic medications  Pre-procedure rhythm: atrial fibrillation  Patient position: patient was placed in a supine position  Chest area: chest area exposed  Electrodes: pads  Electrodes placed: anterior-posterior  Number of attempts: 1    Details of Attempts:  Shock at 200J  Post-procedure rhythm: normal sinus rhythm  Complications: no complications      PROCEDURE    Patient Tolerance:  Patient tolerated the procedure well with no immediate complications  Length of time physician/provider present for 1:1 monitoring during sedation: 0    EP STAFF NOTE  I was present throughout the procedure. I agree with the note above by the EP fellow.  Vicente [place] MD [facility]  Cardiology - Electrophysiology

## 2021-11-22 NOTE — DISCHARGE INSTRUCTIONS
GOING HOME AFTER YOUR CARDIOVERSION    FOR NEXT 24 HOURS:    An adult should stay with you.    Relax and take it easy.    DO NOT make any important legal decisions.    DO NOT drive or operate machines at home or at work.    Resume your regular diet and drink plenty of fluids.    If you have any redness/skin sorness where the patches were placed, you may use aloe vera gel or 1% hydrocortisone cream to the skin (sold at drug stores)    Take Tylenol (Acetaminophen) per your provider's recommendations as needed to help relieve local pain.     CALL YOUR HEALTHCARE PROVIDER IF:    You develop nausea or vomiting    You develop hives or a rash or any unexplained itching    Have irregular heartbeat or fast pulse    Feel faint, dizzy, or lightheaded    Have chest pain with increased activity    Have bleeding issues from blood-thinning medicines    CALL 911 RIGHT AWAY IF YOU HAVE:    Pain in your chest, arm, shoulder, neck, or upper back    Shortness of breath    Loss of vision, speech, or strength or coordination in any body part    Weakness in your arm or leg    Uncontrolled bleeding    Feel unstable in any way     FOLLOW UP APPOINTMENT:      Follow up as scheduled with EP/Cardiology Provider in 4 weeks    ADDITIONAL INFORMATION:  Cardiovascular Clinic:   [address] [place] [address]. [place] [address]  Your Care Team:  EP Cardiology   Telephone Number     Arleth Nolasco NP, Dr. *** [phone]   Xiao Coombs RN  [phone]     For scheduling appts or procedures:    Grisel Smart   [phone]   For the Device Clinic (Pacemakers and ICD's)   RN's :   Laxmi Wen  During business hours: [phone]     After business hours:   [phone]- select option 4 and ask for job code [id number].       As always, Thank you for trusting us with your health care needs!

## 2021-11-22 NOTE — ANESTHESIA PREPROCEDURE EVALUATION
Anesthesia Pre-Procedure Evaluation    Patient: Sunshine Delgado   MRN: [id number] : 1965        Preoperative Diagnosis: Paroxysmal atrial fibrillation (H) [I48.0]    Procedure : Procedure(s):  ANESTHESIA, FOR CARDIOVERSION@1515          Past Medical History:   Diagnosis Date     Antiplatelet or antithrombotic long-term use      Arrhythmia      Atrial fibrillation with rapid ventricular response (H) 2020     Bee sting allergy      Depressive disorder      Generalized anxiety disorder 10/15/2009    lexapro made things worse.       Hashimoto's thyroiditis 2020     Morbid obesity (H)      Ocular migraine      MARIA GUADALUPE (obstructive sleep apnea)- severe (AHI 84) 2011    patient not using since      Voice fatigue 10/22/2009      Past Surgical History:   Procedure Laterality Date     COLONOSCOPY       DAVINCI GASTRIC SLEEVE       EP ABLATION FOCAL AFIB N/A 2021    Procedure: EP ABLATION FOCAL AFIB;  Surgeon: Vicente [place] MD;  Location:  [facility] CATH LAB     GENITOURINARY SURGERY       HYSTERECTOMY, PAP NO LONGER INDICATED       HYSTERECTOMY, VAGINAL      still has ovaries     LAPAROSCOPIC GASTRIC SLEEVE N/A 3/13/2018    Procedure: LAPAROSCOPIC GASTRIC SLEEVE;  Laparoscopic Sleeve Gastrectomy;  Surgeon: Kameron Joseph MD;  Location:  OR      Allergies   Allergen Reactions     Sulfa Drugs Hives     Cephalexin Hives     Cinnamon Hives     Strawberry Hives     Sulfamethoxazole-Trimethoprim Hives     Vicodin [Hydrocodone-Acetaminophen]      Wasp Venom Protein      Other reaction(s): Chest Pain     Wasps [Hornets] Swelling     Now carries Epi Pen     Zoloft [Sertraline]      suicidal ideation     Contrast Dye Other (See Comments) and Rash     Patient had sneezing and an itchy throat following contrast injection of 100 mL Isovue-370.  From IV contrast dye      Social History     Tobacco Use     Smoking status: Never Smoker     Smokeless tobacco: Never Used   Substance Use  Topics     Alcohol use: Yes     Comment: 1-2 per mo      Wt Readings from Last 1 Encounters:   07/22/21 101.9 kg (224 lb 10.4 oz)        Anesthesia Evaluation   Pt has had prior anesthetic.         ROS/MED HX  ENT/Pulmonary:     (+) sleep apnea,     Neurologic:       Cardiovascular:     (+) -----Taking blood thinners dysrhythmias, a-fib,     METS/Exercise Tolerance:     Hematologic:       Musculoskeletal:       GI/Hepatic:       Renal/Genitourinary:       Endo:     (+) Obesity,     Psychiatric/Substance Use:       Infectious Disease:       Malignancy:       Other:            Physical Exam    Airway         TM distance: > 3 FB      Respiratory Devices and Support         Dental           Cardiovascular          Rhythm and rate: irregular and normal     Pulmonary   pulmonary exam normal        breath sounds clear to auscultation           OUTSIDE LABS:  CBC:   Lab Results   Component Value Date    WBC 13.5 (H) 07/22/2021    WBC 6.5 06/14/2021    HGB 12.6 07/22/2021    HGB 14.4 06/14/2021    HCT 39.5 07/22/2021    HCT 42.9 06/14/2021     07/22/2021     06/14/2021     BMP:   Lab Results   Component Value Date     07/22/2021     06/14/2021    POTASSIUM 4.0 11/22/2021    POTASSIUM 3.6 07/22/2021    CHLORIDE 109 07/22/2021    CHLORIDE 107 06/14/2021    CO2 27 07/22/2021    CO2 27 06/14/2021    BUN 16 07/22/2021    BUN 14 06/14/2021    CR 1.09 (H) 07/22/2021    CR 1.22 (H) 06/14/2021    GLC 96 07/22/2021     (H) 07/22/2021     COAGS:   Lab Results   Component Value Date    INR 1.38 (H) 07/22/2021     POC:   Lab Results   Component Value Date    BGM 90 03/14/2018     HEPATIC:   Lab Results   Component Value Date    ALBUMIN 3.9 03/17/2021    PROTTOTAL 7.3 03/17/2021    ALT 22 03/17/2021    AST 12 03/17/2021    ALKPHOS 61 03/17/2021    BILITOTAL 0.3 03/17/2021     OTHER:   Lab Results   Component Value Date    LACT 0.7 01/20/2015    A1C 5.7 10/15/2009    CONCHA 9.0 07/22/2021    MAG 2.4 (H)  11/22/2021    LIPASE 130 10/01/2018    TSH 2.86 06/14/2021    T4 0.89 03/17/2021    T3 90 04/08/2016    CRP 8.9 (H) 10/01/2018    SED 13 10/01/2018       Anesthesia Plan    ASA Status:  3   NPO Status:  NPO Appropriate    Anesthesia Type: General.     - Airway: Native airway      Maintenance: TIVA.        Consents    Anesthesia Plan(s) and associated risks, benefits, and realistic alternatives discussed. Questions answered and patient/representative(s) expressed understanding.    - Discussed:     - Discussed with:  Patient         Postoperative Care    Pain management: Multi-modal analgesia.        Comments:                Jose Gunderson MD

## 2021-11-23 LAB
ATRIAL RATE - MUSE: 288 BPM
ATRIAL RATE - MUSE: 66 BPM
DIASTOLIC BLOOD PRESSURE - MUSE: NORMAL MMHG
DIASTOLIC BLOOD PRESSURE - MUSE: NORMAL MMHG
INTERPRETATION ECG - MUSE: NORMAL
INTERPRETATION ECG - MUSE: NORMAL
P AXIS - MUSE: 68 DEGREES
P AXIS - MUSE: 87 DEGREES
PR INTERVAL - MUSE: 182 MS
PR INTERVAL - MUSE: NORMAL MS
QRS DURATION - MUSE: 92 MS
QRS DURATION - MUSE: 96 MS
QT - MUSE: 386 MS
QT - MUSE: 444 MS
QTC - MUSE: 436 MS
QTC - MUSE: 465 MS
R AXIS - MUSE: 94 DEGREES
R AXIS - MUSE: 94 DEGREES
SYSTOLIC BLOOD PRESSURE - MUSE: NORMAL MMHG
SYSTOLIC BLOOD PRESSURE - MUSE: NORMAL MMHG
T AXIS - MUSE: 53 DEGREES
T AXIS - MUSE: 55 DEGREES
VENTRICULAR RATE- MUSE: 66 BPM
VENTRICULAR RATE- MUSE: 77 BPM

## 2021-11-26 NOTE — ANESTHESIA POSTPROCEDURE EVALUATION
Patient: Sunshine Delgado    Procedure: Procedure(s):  ANESTHESIA, FOR CARDIOVERSION@0529       Diagnosis:Paroxysmal atrial fibrillation (H) [I48.0]  Diagnosis Additional Information: No value filed.    Anesthesia Type:  No value filed.    Note:  Disposition: Outpatient   Postop Pain Control: Uneventful            Sign Out: Well controlled pain   PONV: No   Neuro/Psych: Uneventful            Sign Out: Acceptable/Baseline neuro status   Airway/Respiratory: Uneventful            Sign Out: Acceptable/Baseline resp. status   CV/Hemodynamics: Uneventful            Sign Out: Acceptable CV status; No obvious hypovolemia; No obvious fluid overload   Other NRE: NONE   DID A NON-ROUTINE EVENT OCCUR? No           Last vitals:  Vitals Value Taken Time   BP     Temp     Pulse     Resp     SpO2         Electronically Signed By: Jose Gunderson MD  November 26, 2021  7:03 AM

## 2021-12-01 NOTE — TELEPHONE ENCOUNTER
Patient has since been seen by cardiology and underwent cardioversion for Afib.    RN to close encounter.     Rosalind Wolf RN, BSN, PHN  [facility]: [place]

## 2021-12-06 NOTE — TELEPHONE ENCOUNTER
Form faxed back to [facility] [phone]. Copy placed in abstract and original placed in tc bin

## 2021-12-14 DIAGNOSIS — F90.9 ATTENTION DEFICIT HYPERACTIVITY DISORDER (ADHD), UNSPECIFIED ADHD TYPE: ICD-10-CM

## 2021-12-14 DIAGNOSIS — F41.1 GENERALIZED ANXIETY DISORDER: Chronic | ICD-10-CM

## 2021-12-14 DIAGNOSIS — F51.05 INSOMNIA DUE TO OTHER MENTAL DISORDER: ICD-10-CM

## 2021-12-14 DIAGNOSIS — F33.1 MAJOR DEPRESSIVE DISORDER, RECURRENT EPISODE, MODERATE (H): Chronic | ICD-10-CM

## 2021-12-14 DIAGNOSIS — F99 INSOMNIA DUE TO OTHER MENTAL DISORDER: ICD-10-CM

## 2021-12-16 RX ORDER — BUPROPION HYDROCHLORIDE 300 MG/1
300 TABLET ORAL EVERY MORNING
Qty: 90 TABLET | Refills: 0 | OUTPATIENT
Start: 2021-12-16

## 2021-12-16 RX ORDER — VENLAFAXINE HYDROCHLORIDE 37.5 MG/1
CAPSULE, EXTENDED RELEASE ORAL
Qty: 90 CAPSULE | Refills: 0 | OUTPATIENT
Start: 2021-12-16

## 2021-12-16 RX ORDER — TRAZODONE HYDROCHLORIDE 50 MG/1
TABLET, FILM COATED ORAL
Qty: 270 TABLET | Refills: 1 | OUTPATIENT
Start: 2021-12-16

## 2021-12-16 NOTE — TELEPHONE ENCOUNTER
"Routing refill request to provider for review/approval because:  PHQ-9 and Labs out of range:  CR  Drug interaction warning    Requested Prescriptions   Pending Prescriptions Disp Refills     venlafaxine (EFFEXOR-XR) 37.5 MG 24 hr capsule [Pharmacy Med Name: VENLAFAXINE HCL ER 37.5MG CP24] 90 capsule 0     Sig: TAKE ONE CAPSULE BY MOUTH ONCE DAILY       Serotonin-Norepinephrine Reuptake Inhibitors  Failed - 12/14/2021  5:01 AM        Failed - PHQ-9 score of less than 5 in past 6 months     Please review last PHQ-9 score.           Failed - Normal serum creatinine on file in past 12 months     Recent Labs   Lab Test 07/22/21  0644 10/01/18  1451 07/24/18  2348   CR 1.09*   < >  --    CREAT  --   --  1.1*    < > = values in this interval not displayed.       Ok to refill medication if creatinine is low          Passed - Blood pressure under 140/90 in past 12 months     BP Readings from Last 3 Encounters:   11/22/21 115/70   11/22/21 105/58   11/08/21 126/88                 Passed - Medication is active on med list        Passed - Patient is age 18 or older        Passed - No active pregnancy on record        Passed - No positive pregnancy test in past 12 months        Passed - Recent (6 mo) or future (30 days) visit within the authorizing provider's specialty     Patient had office visit in the last 6 months or has a visit in the next 30 days with authorizing provider or within the authorizing provider's specialty.  See \"Patient Info\" tab in inbasket, or \"Choose Columns\" in Meds & Orders section of the refill encounter.               buPROPion (WELLBUTRIN XL) 300 MG 24 hr tablet [Pharmacy Med Name: BUPROPION HCL ER (XL) 300MG TB24] 90 tablet 0     Sig: TAKE 1 TABLET (300 MG) BY MOUTH EVERY MORNING       SSRIs Protocol Failed - 12/14/2021  5:01 AM        Failed - PHQ-9 score less than 5 in past 6 months     Please review last PHQ-9 score.     PHQ 3/17/2021 9/19/2021 9/23/2021   PHQ-9 Total Score 1 20 18   Q9: Thoughts " "of better off dead/self-harm past 2 weeks Not at all Several days Not at all   F/U: Thoughts of suicide or self-harm - No -   F/U: Safety concerns - No -                   Passed - Medication is Bupropion     If the medication is Bupropion (Wellbutrin), and the patient is taking for smoking cessation; OK to refill.          Passed - Medication is active on med list        Passed - Patient is age 18 or older        Passed - No active pregnancy on record        Passed - No positive pregnancy test in last 12 months        Passed - Recent (6 mo) or future (30 days) visit within the authorizing provider's specialty     Patient had office visit in the last 6 months or has a visit in the next 30 days with authorizing provider or within the authorizing provider's specialty.  See \"Patient Info\" tab in inbasket, or \"Choose Columns\" in Meds & Orders section of the refill encounter.               traZODone (DESYREL) 50 MG tablet [Pharmacy Med Name: TRAZODONE HCL 50MG TABS] 270 tablet 0     Sig: TAKE TWO TO THREE TABLETS BY MOUTH NIGHTLY AT BEDTIME       Serotonin Modulators Passed - 12/14/2021  5:01 AM        Passed - Recent (12 mo) or future (30 days) visit within the authorizing provider's specialty     Patient has had an office visit with the authorizing provider or a provider within the authorizing providers department within the previous 12 mos or has a future within next 30 days. See \"Patient Info\" tab in inbasket, or \"Choose Columns\" in Meds & Orders section of the refill encounter.              Passed - Medication is active on med list        Passed - Patient is age 18 or older        Passed - No active pregnancy on record        Passed - No positive pregnancy test in past 12 months           Lesly Casanova RN on 12/16/2021 at 10:51 AM    "

## 2021-12-16 NOTE — TELEPHONE ENCOUNTER
Needs follow up appointment- face to face, video, or telephone. Ok to give refill once scheduled.  Lesly Arteaga, CNP

## 2021-12-16 NOTE — TELEPHONE ENCOUNTER
Left a message for Sunshine to call the clinic to schedule an appointment. Please help the patient schedule for Medication an appointment.  [place]-  Raul

## 2021-12-20 RX ORDER — TRAZODONE HYDROCHLORIDE 50 MG/1
TABLET, FILM COATED ORAL
Qty: 270 TABLET | Refills: 0 | Status: SHIPPED | OUTPATIENT
Start: 2021-12-20 | End: 2022-03-16

## 2021-12-20 RX ORDER — VENLAFAXINE HYDROCHLORIDE 37.5 MG/1
37.5 TABLET, EXTENDED RELEASE ORAL DAILY
Qty: 90 TABLET | Refills: 0 | Status: SHIPPED | OUTPATIENT
Start: 2021-12-20 | End: 2022-01-06

## 2021-12-20 RX ORDER — BUPROPION HYDROCHLORIDE 300 MG/1
300 TABLET ORAL EVERY MORNING
Qty: 90 TABLET | Refills: 0 | Status: SHIPPED | OUTPATIENT
Start: 2021-12-20 | End: 2022-03-16

## 2022-01-06 ENCOUNTER — VIRTUAL VISIT (OUTPATIENT)
Dept: FAMILY MEDICINE | Facility: CLINIC | Age: 57
End: 2022-01-06
Payer: COMMERCIAL

## 2022-01-06 DIAGNOSIS — F33.1 MAJOR DEPRESSIVE DISORDER, RECURRENT EPISODE, MODERATE (H): Primary | Chronic | ICD-10-CM

## 2022-01-06 DIAGNOSIS — N95.1 MENOPAUSAL SYNDROME (HOT FLASHES): ICD-10-CM

## 2022-01-06 DIAGNOSIS — E06.3 HASHIMOTO'S THYROIDITIS: ICD-10-CM

## 2022-01-06 PROCEDURE — 99214 OFFICE O/P EST MOD 30 MIN: CPT | Mod: TEL | Performed by: NURSE PRACTITIONER

## 2022-01-06 PROCEDURE — 96127 BRIEF EMOTIONAL/BEHAV ASSMT: CPT | Mod: 95 | Performed by: NURSE PRACTITIONER

## 2022-01-06 RX ORDER — VENLAFAXINE HYDROCHLORIDE 75 MG/1
75 TABLET, EXTENDED RELEASE ORAL DAILY
Qty: 90 TABLET | Refills: 0 | Status: SHIPPED | OUTPATIENT
Start: 2022-01-06 | End: 2022-03-23

## 2022-01-06 ASSESSMENT — ANXIETY QUESTIONNAIRES
2. NOT BEING ABLE TO STOP OR CONTROL WORRYING: NEARLY EVERY DAY
7. FEELING AFRAID AS IF SOMETHING AWFUL MIGHT HAPPEN: NOT AT ALL
6. BECOMING EASILY ANNOYED OR IRRITABLE: NEARLY EVERY DAY
5. BEING SO RESTLESS THAT IT IS HARD TO SIT STILL: NEARLY EVERY DAY
1. FEELING NERVOUS, ANXIOUS, OR ON EDGE: NEARLY EVERY DAY
IF YOU CHECKED OFF ANY PROBLEMS ON THIS QUESTIONNAIRE, HOW DIFFICULT HAVE THESE PROBLEMS MADE IT FOR YOU TO DO YOUR WORK, TAKE CARE OF THINGS AT HOME, OR GET ALONG WITH OTHER PEOPLE: EXTREMELY DIFFICULT
3. WORRYING TOO MUCH ABOUT DIFFERENT THINGS: NEARLY EVERY DAY
GAD7 TOTAL SCORE: 18

## 2022-01-06 ASSESSMENT — PATIENT HEALTH QUESTIONNAIRE - PHQ9
SUM OF ALL RESPONSES TO PHQ QUESTIONS 1-9: 12
5. POOR APPETITE OR OVEREATING: NEARLY EVERY DAY

## 2022-01-06 NOTE — PROGRESS NOTES
"Sunshine is a 56 year old who is being evaluated via a billable telephone visit.      What phone number would you like to be contacted at? [phone]  How would you like to obtain your AVS? Petert    Assessment & Plan     Major depressive disorder, recurrent episode, moderate (H)  Uncontrolled, increase Effexor dose. Continue mental health therapy. Overdue for TSH check and question if hypothyroidism and/or perimenopause contributing.  - venlafaxine (EFFEXOR-ER) 75 MG 24 hr tablet; Take 1 tablet (75 mg) by mouth daily    Hashimoto's thyroiditis  Patient will follow up for labs  - TSH with free T4 reflex; Future    Menopausal syndrome (hot flashes)  Discussed common triggers for hot flashes, increase Effexor as above      Ordering of each unique test  Prescription drug management         BMI:   Estimated body mass index is 37.38 kg/m  as calculated from the following:    Height as of 7/22/21: 1.651 m (5' 5\").    Weight as of 7/22/21: 101.9 kg (224 lb 10.4 oz).   Weight management plan: Discussed healthy diet and exercise guidelines    See Patient Instructions    Return in about 6 weeks (around 2/17/2022).    LEOPOLDO Michelle CNP  [facility]    Fransisco Gonsalez is a 56 year old who presents for the following health issues     HPI         Depression and Anxiety Follow-Up    How are you doing with your depression since your last visit? Worsened     How are you doing with your anxiety since your last visit?  Worsened     Are you having other symptoms that might be associated with depression or anxiety? Yes:  hot flashes    Have you had a significant life event? No     Do you have any concerns with your use of alcohol or other drugs? No    Social History     Tobacco Use     Smoking status: Never Smoker     Smokeless tobacco: Never Used   Substance Use Topics     Alcohol use: Yes     Comment: 1-2 per mo     Drug use: Not Currently     Types: Marijuana     Comment: 12/20 last dose     PHQ " "9/19/2021 9/23/2021 1/6/2022   PHQ-9 Total Score 20 18 12   Q9: Thoughts of better off dead/self-harm past 2 weeks Several days Not at all Not at all   F/U: Thoughts of suicide or self-harm No - -   F/U: Safety concerns No - -     ADRIANNE-7 SCORE 1/7/2020 9/23/2021 1/6/2022   Total Score - - -   Total Score 0 17 18     Was in [employer] and felt like she wanted to \"crawl out of my skin\", couldn't stand to be around the people, was boiling hot (has been having hot flashes), emotions are hard to control. Felt like she \"wanted to strangle my boyfriend\" and clarifies she means this figuratively- no thoughts of harming others or self. Stomach issues, itching, headaches. Effexor really seemed to help initially.    Review of Systems   Constitutional, HEENT, cardiovascular, pulmonary, gi and gu systems are negative, except as otherwise noted.      Objective           Vitals:  No vitals were obtained today due to virtual visit.    Physical Exam   healthy, alert and no distress  PSYCH: Alert and oriented times 3; coherent speech, normal   rate and volume, able to articulate logical thoughts, able   to abstract reason, no tangential thoughts, no hallucinations   or delusions  Her affect is normal  RESP: No cough, no audible wheezing, able to talk in full sentences  Remainder of exam unable to be completed due to telephone visits    No results found for any visits on 01/06/22.            Phone call duration: 9 minutes  "

## 2022-01-07 ASSESSMENT — ANXIETY QUESTIONNAIRES: GAD7 TOTAL SCORE: 18

## 2022-01-12 ENCOUNTER — LAB (OUTPATIENT)
Dept: LAB | Facility: CLINIC | Age: 57
End: 2022-01-12
Payer: COMMERCIAL

## 2022-01-12 ENCOUNTER — OFFICE VISIT (OUTPATIENT)
Dept: CARDIOLOGY | Facility: CLINIC | Age: 57
End: 2022-01-12
Attending: INTERNAL MEDICINE
Payer: COMMERCIAL

## 2022-01-12 VITALS
HEART RATE: 79 BPM | SYSTOLIC BLOOD PRESSURE: 106 MMHG | OXYGEN SATURATION: 97 % | WEIGHT: 225.5 LBS | HEIGHT: 64 IN | BODY MASS INDEX: 38.5 KG/M2 | DIASTOLIC BLOOD PRESSURE: 77 MMHG

## 2022-01-12 DIAGNOSIS — E06.3 HASHIMOTO'S THYROIDITIS: ICD-10-CM

## 2022-01-12 DIAGNOSIS — I48.0 PAROXYSMAL ATRIAL FIBRILLATION (H): ICD-10-CM

## 2022-01-12 DIAGNOSIS — I48.4 ATYPICAL ATRIAL FLUTTER (H): Primary | ICD-10-CM

## 2022-01-12 LAB — TSH SERPL DL<=0.005 MIU/L-ACNC: 1.83 MU/L (ref 0.4–4)

## 2022-01-12 PROCEDURE — 84443 ASSAY THYROID STIM HORMONE: CPT | Performed by: PATHOLOGY

## 2022-01-12 PROCEDURE — 93005 ELECTROCARDIOGRAM TRACING: CPT

## 2022-01-12 PROCEDURE — 99215 OFFICE O/P EST HI 40 MIN: CPT | Performed by: INTERNAL MEDICINE

## 2022-01-12 PROCEDURE — 36415 COLL VENOUS BLD VENIPUNCTURE: CPT | Performed by: PATHOLOGY

## 2022-01-12 PROCEDURE — G0463 HOSPITAL OUTPT CLINIC VISIT: HCPCS | Mod: 25

## 2022-01-12 RX ORDER — SODIUM CHLORIDE 9 MG/ML
INJECTION, SOLUTION INTRAVENOUS CONTINUOUS
Status: CANCELLED | OUTPATIENT
Start: 2022-01-12

## 2022-01-12 RX ORDER — LIDOCAINE 40 MG/G
CREAM TOPICAL
Status: CANCELLED | OUTPATIENT
Start: 2022-01-12

## 2022-01-12 ASSESSMENT — MIFFLIN-ST. JEOR: SCORE: 1610.61

## 2022-01-12 ASSESSMENT — PAIN SCALES - GENERAL: PAINLEVEL: NO PAIN (0)

## 2022-01-12 NOTE — PATIENT INSTRUCTIONS
You are scheduled for an Atypical Atrial Flutter Ablation, at [facility], [place], with Dr. Vicente [place]. The hospital is located at [address] [place] [address] on the [place] bank of the campus.  If you need to cancel this procedure, please call [phone].       You will need to undergo a COVID-19 PCR swab test within 4 days of procedure. You will receive a phone call with more information. If you do not hear from the COVID scheduling team, please call: [phone] to schedule.      Pre-Anesthesia Phone Call will occur 1-2 days prior to procedure date.  You do not need to come to the [facility].    Please disregard any automated, reminder phone calls regarding arrival times. Follow the below arrival times.     Date:   Time: ________________Arrive to [address] at the [facility]  Atypical Atrial Flutter Ablation     1. Please review the attached instructions on showering before your procedure at the end of this letter.  2. Your history and physical will be completed by our nurse practitioner when you arrive.  3. Please do not eat anything for 8 hours prior to your procedure. You may have sips of water up until 2 hours prior to your arrival.  4. The morning of your procedure, you may take your scheduled medications with a sip of water - continue your blood thinner (Eliquis). Please take your AM dose before you come to the hospital.  5. If the imaging shows a clot in your heart, the procedure will be canceled.   6. You will receive general anesthesia for this procedure.   7. You will likely discharge the same day and need a .      Post-Procedure Instructions  Care of groin site:    Remove the Band-Aid after 24 hours. If there is minor oozing, apply another Band-aid and remove it after 12 hours.     Do NOT take a bath, use a hot tub, pool, or submerse in water for at least 3 days. You may shower.     It is normal to have a small bruise or lump at the site.    Do not scrub the  site.    Do not use lotion or powder near the puncture site for 3 days.    If you start bleeding from the site in your groin: Lie down flat and press firmly on the site. Call your physician immediately, or, come to the emergency room.  Call 911 right away if you have bleeding that is heavy or does not stop.    Call your doctor/provider if:     You have a large or growing hard lump around the site.     The site is red, swollen, hot or tender.     Blood or fluid is draining from the site.     You have chills or a fever greater than 101 F (38 C).     Your leg or arm turns bluish, feels numb or cool.     You have hives, a rash or unusual itching.      Activity Restrictions    For the first 2 days: Do not stoop or squat. When you cough, sneeze or move your bowels, hold your hand over the puncture site and press gently.    Do not lift more than 10 pounds or exertional activity for 10 days.  - No driving for 24 hours after (with or without general anesthesia).       Date: __________:   Follow up appointment      Please do not hesitate to utilize [employer] or call us if you have any questions or concerns.    Xiao Andrade RN  Electrophysiology Nurse Coordinator  [phone]    [facility] Procedure   [phone]                Showering Before Surgery   Your surgeon has asked you to take 2 showers before surgery.  Why is this important?  It is normal for bacteria (germs) to be on your skin. The skin protects us from these germs. When you have surgery, we cut the skin. Sometimes germs get into the cuts and cause infection (illness caused by germs). By following the instructions below and using special soap, you will lower the number of germs on your skin. This decreases your chance of infection.  Special soap  Buy or get 8 ounces of antiseptic surgical soap called 4% CHG. Common name brands of this soap are Hibiclens and Exidine.   You can find it at your local pharmacy, clinic or retail store. If you have  trouble, ask your pharmacist to help you find the right substitute.   A note about shaving:  Do not shave within 12 inches of your incision (surgical cut) area for at least 3 days before surgery. Shaving can make small cuts in the skin. This puts you at a higher risk of infection.  Items you will need for each shower:    1 newly washed towel    4 ounces of one of the above soaps  Follow these instructions:  The evening before surgery   1. Wash your hair and body with your regular shampoo and soap. Make sure you rinse the shampoo and soap from your hair and body.   2. Using clean hands, apply about 2 ounces of soap gently on your skin from the neck to your toes. Use on your groin area last. Do not use this soap on your face or head. If you get any soap in your eyes, ears or mouth, rinse right away.   3. Repeat step 2. It is very important to let the soap stay on your skin for at least 1 minute.   4. Rinse well and dry off using a clean towel.If you feel any tingling, itching or other irritation, rinse right away. It is normal to feel some coolness on the skin after using the antiseptic soap. Your skin may feel a bit dry after the shower, but do not use any lotions, creams or moisturizers. Do not use hair spray or other products in your hair.  5. Dress in freshly washed clothes or pajamas. Use fresh pillowcases and sheets on your bed.      The morning of surgery  1. Wash your hair and body with your regular shampoo and soap. Make sure you rinse the shampoo and soap from your hair and body.   2. Using clean hands, apply about 2 ounces of soap gently on your skin from the neck to your toes. Use on your groin area last. Do not use this soap on your face or head. If you get any soap in your eyes, ears or mouth, rinse right away.   3. Repeat step 2. It is very important to let the soap stay on your skin for at least 1 minute.   4. Rinse well and dry off using a clean towel.If you feel any tingling, itching or other  irritation, rinse right away. It is normal to feel some coolness on the skin after using the antiseptic soap. Your skin may feel a bit dry after the shower, but do not use any lotions, creams or moisturizers. Do not use hair spray or other products in your hair.  5. Dress in clean clothes.  If you have any questions about showering or an allergy to CHG soap, please call the Preadmissions Nursing Department at the hospital where you are having your surgery.  [facility], [place] ([place]): [phone]  This phone number will be answered between the hours of 8:00 a.m. and 6:30 p.m. Monday through Friday.

## 2022-01-12 NOTE — PROGRESS NOTES
ELECTROPHYSIOLOGY CLINIC VISIT    Assessment/Recommendations   Assessment/Plan:    Ms. Delgado is a 56 year old female who has a past medical history significant for hashimoto's thyroiditis, MARIA GUADALUPE (uses CPAP), CKD, PAF (CHADSVASC 1 Eliquis) s/p DCCVs s/p PVI/CTI 21 s/p DCCV 21, anxiety, and depression.   Paroxsymal Atrial Fibrillation/Flutter:   We discussed in detail with the patient management/treatment options for Skip includin. Stroke Prophylaxis:  CHADSVASC=+gender  1, corresponding to a 1.3% annual stroke / systemic emolism event rate. She is on Eliquis d/t recent DCCVs.   2. Rate Control: She had been on Metoprolol with fatigue and it was stopped. She had been on Atenolol which was recently stopped. Can consider CCB if needed.   3. Rhythm Control: Cardioversion, Antiarrhythmics and/or ablation are options for rhythm control. She has been having frequent PAF/AFL that is highly symptomatic. She has had a couple DCCVs with continued recurrences. She had been on Flecainide which was not effective. She was recently switched to amiodarone. We discussed that given her younger age, we do not favor keeping her on this long term. We discussed alternative AATs vs. Ablation. She elected to pursue ablation which she had PVI/CTI on 21. She had recurrent persistent AF/AFL with zio from 10/2021 showing AF throughout and had DCCV on 21. Now with recurrent AFL. We discussed repeat ablation or AAT. She favored repeating ablation. The procedural risk of EP study and ablation were discussed in detail. Risks discussed include: vascular complications, CVA, AVB, pericardial effusion and tamponade, esophageal fistula, PV stenosis. AF ablation has 70% success at 1 year, 50% success at 5 years, and 30% need another ablation.  Even if ablation is successful anticoagulation may be needed lifelong. She states understanding and wants to pursue ablation. We will work to arrange this with her.      4. Risk Factor  Management: tight BP control, and MARIA GUADALUPE evaluation as indicated.    Follow up 3 months after ablation.        History of Present Illness/Subjective    Ms. Sunshine Delgado is a 56 year old female who comes in today for EP follow-up of AF/AFL.    Ms. Delgado is a 56 year old female who has a past medical history significant for hashimoto's thyroiditis, MARIA GUADALUPE (uses CPAP), CKD, PAF (CHADSVASC 1 Eliquis) s/p DCCV s/p PVI 7/22/21, anxiety, and depression.      She was admitted in 1/2020 to OSH with abdominal pain and was found to have infectious mononucleosis. At that time her ECG showed AF and she spontaneously converted while in the hospital. An echo showed normal LVEF, no significant valvular abnormalities, and small pericardial effusion. She was started on metoprolol and ASA. Her metoprolol was later stopped by PCP due to frequent lightheadedness. She established care with Dr. Fajardo. She followed up in 3/2021 and reported feeling palpitations on and off and one episode lasting up to 3 days. She felt some chest pressure, shortness of breath, and presyncope. ECG showed AF vent rate 89 bpm. She had been started on lisinopril in 2/2021 by PCP for CKD and had felt a little more lightheaded since then, but also notes she had lightheadedness predating the lisinopril. A zio patch monitor from 4/2/21-4/16/21 showed 29% AF/AFL burden up to 10 hours 54 minutes and 39 symptom activations showed mostly AF/AFL or PSVT and sometimes sinus without ectopy. She was placed on atenolol and reported fatigue with this. Flecainide 50 mg BID was added. Subsequent ECG stress testing was negative for ischemia or QRS widening. Her PAF episodes had completely subsided for about 1 month after starting the Flecainide until end of 5/2021. She felt much improved when maintaining sinus. She then started developing increased AF symptoms, occurring daily. She has symptoms of chest tightness, dizziness, and palpitations with her AF.  She then presented to [facility]  on 6/10/21 with symptomatic AF and underwent DCCV. Her Flecainide was increased to 100 mg BID. She represented to [facility] on 6/14/21 again with symptomatic AF and underwent DCCV. Her Flecainide and atenolol were stopped and she was changed to amiodarone. She was then referred to EP for consideration for ablation. At EP appointment on 6/16/21, she was in AFL and feeling fatigue, MCHUGH, lightheadedness/dizziness, and generally unwell. We loaded her with amiodarone and arranged for another DCCV. She presented to [facility] in sinus and it was canceled. She wanted to get off amiodarone if possible and elected to pursue ablation and had PVI/CTI on 7/22/21.     She presents today for follow up after ablation. Groin sites well healed. A zio patch monitor from 10/2021 showed AF throughout poor HR control with avg  bpm. She underwent DCCV on 11/22/21. She reports feeling OK. She notes feeling less symptomatic now with her AFL then before ablation. She does still have palpitations and decreased stamina. She denies chest discomfort, abdominal fullness/bloating or peripheral edema, shortness of breath, paroxysmal nocturnal dyspnea, orthopnea, lightheadedness, dizziness, pre-syncope, or syncope. Presenting 12 lead ECG shows AFL Vent Rate 78 bpm, QRS 74 ms, QTc 424 ms. Current cardiac medications include: Diltiazem.         I have reviewed and updated the patient's Past Medical History, Social History, Family History and Medication List.     Cardiographics (Personally Reviewed) :   5/11/21 STRESS ECHOCARDIOGRAM:  Interpretation Summary  Submaximal ECG stress test  65% maximal predicted HR achieved. The test was terminated due to fatigue.  Normal blood pressure response to exercise.  The patient did not report any symptoms during exercise.  No ECG evidence of ischemia.  No QRS prolongation at peak exercise compared to rest.  No supraventricular or ventricular arrhythmias.  Good exercise tolerance. The patient achieved 10 METs at peak  "exercise.     1/27/20 ECHOCARDIOGRAM:   Final Conclusion   Normal left ventricular size.   Normal left ventricular systolic function.   No obvious regional wall motion abnormalities.   The ejection fraction is visually estimated at 55-60%.   The right ventricle is normal in size and function.   The left atrium is normal in size.   There is trace mitral regurgitation.   There is no significant tricuspid regurgitation.   Small pericardial effusion.   No echocardiographic findings to suggest hemodynamic effect of the pericardial fluid.   Estimated EF: 55-60%  7/2021 CTA:  IMPRESSION:  1. Normal pulmonary venous anatomy normal variant right middle  pulmonary vein. All pulmonary veins draining into the left atrium       Physical Examination   /77 (BP Location: Right arm, Patient Position: Chair, Cuff Size: Adult Large)   Pulse 79   Ht 1.63 m (5' 4.17\")   Wt 102.3 kg (225 lb 8 oz)   SpO2 97%   BMI 38.50 kg/m    Wt Readings from Last 3 Encounters:   07/22/21 101.9 kg (224 lb 10.4 oz)   06/16/21 102.5 kg (226 lb)   06/14/21 102.5 kg (226 lb)     General Appearance:   Alert, well-appearing and in no acute distress.   HEENT: Atraumatic, normocephalic. PERRL.  MMM.   Chest/Lungs:   Respirations unlabored.  Lungs are clear to auscultation.   Cardiovascular:   Regularly irregular.    Abdomen:  Soft, nontender, nondistended.   Extremities: No cyanosis or clubbing. No edema.    Musculoskeletal: Moves all extremities.  Gait normal.   Skin: Warm, dry, intact.    Neurologic: Mood and affect are appropriate.  Alert and oriented to person, place, time, and situation.          Medications  Allergies   Current Outpatient Medications   Medication Sig Dispense Refill     acetaminophen (TYLENOL) 325 MG tablet Take 325 mg by mouth every 4 hours as needed        apixaban ANTICOAGULANT (ELIQUIS ANTICOAGULANT) 5 MG tablet Take 1 tablet (5 mg) by mouth 2 times daily 180 tablet 3     buPROPion (WELLBUTRIN XL) 300 MG 24 hr tablet Take 1 " tablet (300 mg) by mouth every morning 90 tablet 0     diltiazem ER COATED BEADS (CARTIA XT) 120 MG 24 hr capsule Take 1 capsule (120 mg) by mouth daily (Patient not taking: Reported on 1/6/2022) 30 capsule 11     EPINEPHrine (AUVI-Q) 0.3 MG/0.3ML injection 2-pack Inject 0.3 mLs (0.3 mg) into the muscle as needed for anaphylaxis (Patient not taking: Reported on 1/6/2022) 0.6 mL 3     fluticasone (FLONASE) 50 MCG/ACT spray Spray 2 sprays into both nostrils daily (Patient not taking: Reported on 1/6/2022) 16 g 1     levothyroxine (SYNTHROID/LEVOTHROID) 75 MCG tablet Take 1 tablet (75 mcg) by mouth daily 30 tablet 11     multivitamin, therapeutic with minerals (MULTI-VITAMIN) TABS tablet Take 1 tablet by mouth every morning       traZODone (DESYREL) 50 MG tablet TAKE TWO TO THREE TABLETS BY MOUTH AT BEDTIME 270 tablet 0     venlafaxine (EFFEXOR-ER) 75 MG 24 hr tablet Take 1 tablet (75 mg) by mouth daily 90 tablet 0    Allergies   Allergen Reactions     Sulfa Drugs Hives     Cephalexin Hives     Cinnamon Hives     Strawberry Hives     Sulfamethoxazole-Trimethoprim Hives     Vicodin [Hydrocodone-Acetaminophen]      Wasp Venom Protein      Other reaction(s): Chest Pain     Wasps [Hornets] Swelling     Now carries Epi Pen     Zoloft [Sertraline]      suicidal ideation     Contrast Dye Other (See Comments) and Rash     Patient had sneezing and an itchy throat following contrast injection of 100 mL Isovue-370.  From IV contrast dye         Lab Results (Personally Reviewed)    Chemistry/lipid CBC Cardiac Enzymes/BNP/TSH/INR   Lab Results   Component Value Date    BUN 16 07/22/2021     07/22/2021    CO2 27 07/22/2021     Creatinine   Date Value Ref Range Status   07/22/2021 1.09 (H) 0.52 - 1.04 mg/dL Final   06/14/2021 1.22 (H) 0.52 - 1.04 mg/dL Final       Lab Results   Component Value Date    CHOL 227 (H) 02/09/2021    HDL 59 02/09/2021     (H) 02/09/2021      Lab Results   Component Value Date    WBC 13.5 (H)  07/22/2021    HGB 12.6 07/22/2021    HCT 39.5 07/22/2021    MCV 91 07/22/2021     07/22/2021    Lab Results   Component Value Date    TSH 2.86 06/14/2021    INR 1.38 (H) 07/22/2021        The patient states understanding and is agreeable with the plan.   Vicente Craig MD [facility]  Cardiology - Electrophysiology    Total time spent on patient visit, reviewing notes, imaging, labs, orders, and completing necessary documentation: 60 minutes.

## 2022-01-12 NOTE — NURSING NOTE
Medication Reconciliation:  Rooming staff reviewed and verified all current medications with patient. An updated med list was included in the AVS.    Test Results Reviewed:  EKG results were reviewed by provider with patient today.     EP Procedure:  Patient was given instructions regarding atypical AFL ablation. Patient received an instruction letter today for reference. Discussed purpose, preparation, and procedure with patient. Patient verbalized understanding and agreed to call with further questions or concerns. The EP  was notified of need to schedule procedure and post op follow up appointments.    Return appointment:   Patient given instructions regarding scheduling next clinic visit. Patient verbalized understanding.       Xiao Andrade RN on 1/12/2022 at 10:15 AM

## 2022-01-12 NOTE — LETTER
2022      RE: Sunshine Delgado  [address]   [facility] [address]       Dear Colleague,    Thank you for the opportunity to participate in the care of your patient, Sunshine Delgado, at the [facility] [place] at [facility]. Please see a copy of my visit note below.        ELECTROPHYSIOLOGY CLINIC VISIT    Assessment/Recommendations   Assessment/Plan:    Ms. Delgado is a 56 year old female who has a past medical history significant for hashimoto's thyroiditis, MARIA GUADALUPE (uses CPAP), CKD, PAF (CHADSVASC 1 Eliquis) s/p DCCVs s/p PVI/CTI 21 s/p DCCV 21, anxiety, and depression.   Paroxsymal Atrial Fibrillation/Flutter:   We discussed in detail with the patient management/treatment options for Skip includin. Stroke Prophylaxis:  CHADSVASC=+gender  1, corresponding to a 1.3% annual stroke / systemic emolism event rate. She is on Eliquis d/t recent DCCVs.   2. Rate Control: She had been on Metoprolol with fatigue and it was stopped. She had been on Atenolol which was recently stopped. Can consider CCB if needed.   3. Rhythm Control: Cardioversion, Antiarrhythmics and/or ablation are options for rhythm control. She has been having frequent PAF/AFL that is highly symptomatic. She has had a couple DCCVs with continued recurrences. She had been on Flecainide which was not effective. She was recently switched to amiodarone. We discussed that given her younger age, we do not favor keeping her on this long term. We discussed alternative AATs vs. Ablation. She elected to pursue ablation which she had PVI/CTI on 21. She had recurrent persistent AF/AFL with zio from 10/2021 showing AF throughout and had DCCV on 21. Now with recurrent AFL. We discussed repeat ablation or AAT. She favored repeating ablation. The procedural risk of EP study and ablation were discussed in detail. Risks discussed include: vascular complications, CVA, AVB,  pericardial effusion and tamponade, esophageal fistula, PV stenosis. AF ablation has 70% success at 1 year, 50% success at 5 years, and 30% need another ablation.  Even if ablation is successful anticoagulation may be needed lifelong. She states understanding and wants to pursue ablation. We will work to arrange this with her.      4. Risk Factor Management: tight BP control, and MARIA GUADALUPE evaluation as indicated.    Follow up 3 months after ablation.        History of Present Illness/Subjective    Ms. Sunshine Delgado is a 56 year old female who comes in today for EP follow-up of AF/AFL.    Ms. Delgado is a 56 year old female who has a past medical history significant for hashimoto's thyroiditis, MARIA GUADALUPE (uses CPAP), CKD, PAF (CHADSVASC 1 Eliquis) s/p DCCV s/p PVI 7/22/21, anxiety, and depression.      She was admitted in 1/2020 to OSH with abdominal pain and was found to have infectious mononucleosis. At that time her ECG showed AF and she spontaneously converted while in the hospital. An echo showed normal LVEF, no significant valvular abnormalities, and small pericardial effusion. She was started on metoprolol and ASA. Her metoprolol was later stopped by PCP due to frequent lightheadedness. She established care with Dr. Fajardo. She followed up in 3/2021 and reported feeling palpitations on and off and one episode lasting up to 3 days. She felt some chest pressure, shortness of breath, and presyncope. ECG showed AF vent rate 89 bpm. She had been started on lisinopril in 2/2021 by PCP for CKD and had felt a little more lightheaded since then, but also notes she had lightheadedness predating the lisinopril. A zio patch monitor from 4/2/21-4/16/21 showed 29% AF/AFL burden up to 10 hours 54 minutes and 39 symptom activations showed mostly AF/AFL or PSVT and sometimes sinus without ectopy. She was placed on atenolol and reported fatigue with this. Flecainide 50 mg BID was added. Subsequent ECG stress testing was negative for ischemia  or QRS widening. Her PAF episodes had completely subsided for about 1 month after starting the Flecainide until end of 5/2021. She felt much improved when maintaining sinus. She then started developing increased AF symptoms, occurring daily. She has symptoms of chest tightness, dizziness, and palpitations with her AF.  She then presented to [facility] on 6/10/21 with symptomatic AF and underwent DCCV. Her Flecainide was increased to 100 mg BID. She represented to [facility] on 6/14/21 again with symptomatic AF and underwent DCCV. Her Flecainide and atenolol were stopped and she was changed to amiodarone. She was then referred to EP for consideration for ablation. At EP appointment on 6/16/21, she was in AFL and feeling fatigue, MCHUGH, lightheadedness/dizziness, and generally unwell. We loaded her with amiodarone and arranged for another DCCV. She presented to [facility] in sinus and it was canceled. She wanted to get off amiodarone if possible and elected to pursue ablation and had PVI/CTI on 7/22/21.     She presents today for follow up after ablation. Groin sites well healed. A zio patch monitor from 10/2021 showed AF throughout poor HR control with avg  bpm. She underwent DCCV on 11/22/21. She reports feeling OK. She notes feeling less symptomatic now with her AFL then before ablation. She does still have palpitations and decreased stamina. She denies chest discomfort, abdominal fullness/bloating or peripheral edema, shortness of breath, paroxysmal nocturnal dyspnea, orthopnea, lightheadedness, dizziness, pre-syncope, or syncope. Presenting 12 lead ECG shows AFL Vent Rate 78 bpm, QRS 74 ms, QTc 424 ms. Current cardiac medications include: Diltiazem.         I have reviewed and updated the patient's Past Medical History, Social History, Family History and Medication List.     Cardiographics (Personally Reviewed) :   5/11/21 STRESS ECHOCARDIOGRAM:  Interpretation Summary  Submaximal ECG stress test  65% maximal predicted HR  "achieved. The test was terminated due to fatigue.  Normal blood pressure response to exercise.  The patient did not report any symptoms during exercise.  No ECG evidence of ischemia.  No QRS prolongation at peak exercise compared to rest.  No supraventricular or ventricular arrhythmias.  Good exercise tolerance. The patient achieved 10 METs at peak exercise.     1/27/20 ECHOCARDIOGRAM:   Final Conclusion   Normal left ventricular size.   Normal left ventricular systolic function.   No obvious regional wall motion abnormalities.   The ejection fraction is visually estimated at 55-60%.   The right ventricle is normal in size and function.   The left atrium is normal in size.   There is trace mitral regurgitation.   There is no significant tricuspid regurgitation.   Small pericardial effusion.   No echocardiographic findings to suggest hemodynamic effect of the pericardial fluid.   Estimated EF: 55-60%  7/2021 CTA:  IMPRESSION:  1. Normal pulmonary venous anatomy normal variant right middle  pulmonary vein. All pulmonary veins draining into the left atrium       Physical Examination   /77 (BP Location: Right arm, Patient Position: Chair, Cuff Size: Adult Large)   Pulse 79   Ht 1.63 m (5' 4.17\")   Wt 102.3 kg (225 lb 8 oz)   SpO2 97%   BMI 38.50 kg/m    Wt Readings from Last 3 Encounters:   07/22/21 101.9 kg (224 lb 10.4 oz)   06/16/21 102.5 kg (226 lb)   06/14/21 102.5 kg (226 lb)     General Appearance:   Alert, well-appearing and in no acute distress.   HEENT: Atraumatic, normocephalic. PERRL.  MMM.   Chest/Lungs:   Respirations unlabored.  Lungs are clear to auscultation.   Cardiovascular:   Regularly irregular.    Abdomen:  Soft, nontender, nondistended.   Extremities: No cyanosis or clubbing. No edema.    Musculoskeletal: Moves all extremities.  Gait normal.   Skin: Warm, dry, intact.    Neurologic: Mood and affect are appropriate.  Alert and oriented to person, place, time, and situation.    "       Medications  Allergies   Current Outpatient Medications   Medication Sig Dispense Refill     acetaminophen (TYLENOL) 325 MG tablet Take 325 mg by mouth every 4 hours as needed        apixaban ANTICOAGULANT (ELIQUIS ANTICOAGULANT) 5 MG tablet Take 1 tablet (5 mg) by mouth 2 times daily 180 tablet 3     buPROPion (WELLBUTRIN XL) 300 MG 24 hr tablet Take 1 tablet (300 mg) by mouth every morning 90 tablet 0     diltiazem ER COATED BEADS (CARTIA XT) 120 MG 24 hr capsule Take 1 capsule (120 mg) by mouth daily (Patient not taking: Reported on 1/6/2022) 30 capsule 11     EPINEPHrine (AUVI-Q) 0.3 MG/0.3ML injection 2-pack Inject 0.3 mLs (0.3 mg) into the muscle as needed for anaphylaxis (Patient not taking: Reported on 1/6/2022) 0.6 mL 3     fluticasone (FLONASE) 50 MCG/ACT spray Spray 2 sprays into both nostrils daily (Patient not taking: Reported on 1/6/2022) 16 g 1     levothyroxine (SYNTHROID/LEVOTHROID) 75 MCG tablet Take 1 tablet (75 mcg) by mouth daily 30 tablet 11     multivitamin, therapeutic with minerals (MULTI-VITAMIN) TABS tablet Take 1 tablet by mouth every morning       traZODone (DESYREL) 50 MG tablet TAKE TWO TO THREE TABLETS BY MOUTH AT BEDTIME 270 tablet 0     venlafaxine (EFFEXOR-ER) 75 MG 24 hr tablet Take 1 tablet (75 mg) by mouth daily 90 tablet 0    Allergies   Allergen Reactions     Sulfa Drugs Hives     Cephalexin Hives     Cinnamon Hives     Strawberry Hives     Sulfamethoxazole-Trimethoprim Hives     Vicodin [Hydrocodone-Acetaminophen]      Wasp Venom Protein      Other reaction(s): Chest Pain     Wasps [Hornets] Swelling     Now carries Epi Pen     Zoloft [Sertraline]      suicidal ideation     Contrast Dye Other (See Comments) and Rash     Patient had sneezing and an itchy throat following contrast injection of 100 mL Isovue-370.  From IV contrast dye         Lab Results (Personally Reviewed)    Chemistry/lipid CBC Cardiac Enzymes/BNP/TSH/INR   Lab Results   Component Value Date    BUN 16  07/22/2021     07/22/2021    CO2 27 07/22/2021     Creatinine   Date Value Ref Range Status   07/22/2021 1.09 (H) 0.52 - 1.04 mg/dL Final   06/14/2021 1.22 (H) 0.52 - 1.04 mg/dL Final       Lab Results   Component Value Date    CHOL 227 (H) 02/09/2021    HDL 59 02/09/2021     (H) 02/09/2021      Lab Results   Component Value Date    WBC 13.5 (H) 07/22/2021    HGB 12.6 07/22/2021    HCT 39.5 07/22/2021    MCV 91 07/22/2021     07/22/2021    Lab Results   Component Value Date    TSH 2.86 06/14/2021    INR 1.38 (H) 07/22/2021        The patient states understanding and is agreeable with the plan.   Vicente Craig MD [facility]  Cardiology - Electrophysiology    Total time spent on patient visit, reviewing notes, imaging, labs, orders, and completing necessary documentation: 60 minutes.

## 2022-01-14 LAB
ATRIAL RATE - MUSE: 312 BPM
DIASTOLIC BLOOD PRESSURE - MUSE: NORMAL MMHG
INTERPRETATION ECG - MUSE: NORMAL
P AXIS - MUSE: NORMAL DEGREES
PR INTERVAL - MUSE: NORMAL MS
QRS DURATION - MUSE: 74 MS
QT - MUSE: 372 MS
QTC - MUSE: 424 MS
R AXIS - MUSE: 92 DEGREES
SYSTOLIC BLOOD PRESSURE - MUSE: NORMAL MMHG
T AXIS - MUSE: 62 DEGREES
VENTRICULAR RATE- MUSE: 78 BPM

## 2022-01-18 ENCOUNTER — TELEPHONE (OUTPATIENT)
Dept: CARDIOLOGY | Facility: CLINIC | Age: 57
End: 2022-01-18
Payer: COMMERCIAL

## 2022-01-18 NOTE — TELEPHONE ENCOUNTER
EP Scheduling called the patient to schedule ablation with Dr. Craig. The number [phone] was left for the patient to return the call and schedule the procedure.    Grisel Smart  Periop Electrophysiology   [phone]

## 2022-02-13 ENCOUNTER — HEALTH MAINTENANCE LETTER (OUTPATIENT)
Age: 57
End: 2022-02-13

## 2022-02-21 DIAGNOSIS — Z11.59 ENCOUNTER FOR SCREENING FOR OTHER VIRAL DISEASES: Primary | ICD-10-CM

## 2022-02-23 ENCOUNTER — MYC REFILL (OUTPATIENT)
Dept: FAMILY MEDICINE | Facility: CLINIC | Age: 57
End: 2022-02-23
Payer: COMMERCIAL

## 2022-02-23 DIAGNOSIS — I48.91 ATRIAL FIBRILLATION WITH CONTROLLED VENTRICULAR RATE (H): ICD-10-CM

## 2022-02-23 DIAGNOSIS — F33.1 MAJOR DEPRESSIVE DISORDER, RECURRENT EPISODE, MODERATE (H): Chronic | ICD-10-CM

## 2022-02-23 DIAGNOSIS — N18.30 STAGE 3 CHRONIC KIDNEY DISEASE, UNSPECIFIED WHETHER STAGE 3A OR 3B CKD (H): ICD-10-CM

## 2022-02-23 DIAGNOSIS — Z13.6 CARDIOVASCULAR SCREENING; LDL GOAL LESS THAN 160: Primary | ICD-10-CM

## 2022-02-24 NOTE — TELEPHONE ENCOUNTER
Routing refill request to provider for review/approval because:  PHQ-9  Creatinine    PHQ-9 score:    PHQ 1/6/2022   PHQ-9 Total Score 12   Q9: Thoughts of better off dead/self-harm past 2 weeks Not at all   F/U: Thoughts of suicide or self-harm -   F/U: Safety concerns -              Creatinine   Date Value Ref Range Status   07/22/2021 1.09 (H) 0.52 - 1.04 mg/dL Final   06/14/2021 1.22 (H) 0.52 - 1.04 mg/dL Final              Pending Prescriptions:                       Disp   Refills    venlafaxine (EFFEXOR-ER) 75 MG 24 hr [id number] tab*0        Sig: Take 1 tablet (75 mg) by mouth daily        Chance Rod RN

## 2022-02-25 ENCOUNTER — MYC MEDICAL ADVICE (OUTPATIENT)
Dept: FAMILY MEDICINE | Facility: CLINIC | Age: 57
End: 2022-02-25
Payer: COMMERCIAL

## 2022-02-25 NOTE — TELEPHONE ENCOUNTER
Yes, this is fine for virtual and lab appt. We made a change at her last appt and I asked her to follow up with me in 6 weeks.  Lesly Arteaga, CNP

## 2022-02-25 NOTE — TELEPHONE ENCOUNTER
Patient called back. She states she recently had a virtual visit and does not know why she needs to be seen again. Per note below, PHQ-9 is due and creatinine lab. Patient would like virtual visit for med check and to come back for labs. Please advise if this is ok    Nurse-Patient would like phone call back.    Celine NICOLE RN  [facility]

## 2022-02-25 NOTE — TELEPHONE ENCOUNTER
Called and left a message for pt to call the clinic back; she needs to schedule an appt for further refills.  Lorena Olivarez MA

## 2022-02-27 RX ORDER — VENLAFAXINE HYDROCHLORIDE 75 MG/1
75 TABLET, EXTENDED RELEASE ORAL DAILY
Qty: 90 TABLET | Refills: 0 | Status: CANCELLED | OUTPATIENT
Start: 2022-02-27

## 2022-02-28 NOTE — TELEPHONE ENCOUNTER
3 month supply of effexor was sent on 1/6/2022.   Should not be out yet.   Called and notified patient on this.  She agreed to a follow-up appointment.  Phone visit scheduled with Lesly Arteaga CNP's nextg available on 3/23/2022.    Lab appointment scheduled for 3/7/2022  Patient needs Cr and is also due for fasting labs.  Please place orders all labs needed    Alfredito Tolbert RN  [facility]

## 2022-03-07 ENCOUNTER — LAB (OUTPATIENT)
Dept: LAB | Facility: CLINIC | Age: 57
End: 2022-03-07
Payer: COMMERCIAL

## 2022-03-07 DIAGNOSIS — Z13.6 CARDIOVASCULAR SCREENING; LDL GOAL LESS THAN 160: ICD-10-CM

## 2022-03-07 DIAGNOSIS — N18.30 STAGE 3 CHRONIC KIDNEY DISEASE, UNSPECIFIED WHETHER STAGE 3A OR 3B CKD (H): ICD-10-CM

## 2022-03-07 DIAGNOSIS — I48.91 ATRIAL FIBRILLATION WITH CONTROLLED VENTRICULAR RATE (H): ICD-10-CM

## 2022-03-07 LAB
ANION GAP SERPL CALCULATED.3IONS-SCNC: 4 MMOL/L (ref 3–14)
BUN SERPL-MCNC: 14 MG/DL (ref 7–30)
CALCIUM SERPL-MCNC: 9 MG/DL (ref 8.5–10.1)
CHLORIDE BLD-SCNC: 108 MMOL/L (ref 94–109)
CHOLEST SERPL-MCNC: 219 MG/DL
CO2 SERPL-SCNC: 29 MMOL/L (ref 20–32)
CREAT SERPL-MCNC: 1.18 MG/DL (ref 0.52–1.04)
CREAT UR-MCNC: 194 MG/DL
FASTING STATUS PATIENT QL REPORTED: YES
GFR SERPL CREATININE-BSD FRML MDRD: 54 ML/MIN/1.73M2
GLUCOSE BLD-MCNC: 94 MG/DL (ref 70–99)
HDLC SERPL-MCNC: 62 MG/DL
LDLC SERPL CALC-MCNC: 129 MG/DL
MICROALBUMIN UR-MCNC: 12 MG/L
MICROALBUMIN/CREAT UR: 6.19 MG/G CR (ref 0–25)
NONHDLC SERPL-MCNC: 157 MG/DL
POTASSIUM BLD-SCNC: 4.1 MMOL/L (ref 3.4–5.3)
SODIUM SERPL-SCNC: 141 MMOL/L (ref 133–144)
TRIGL SERPL-MCNC: 142 MG/DL

## 2022-03-07 PROCEDURE — 82043 UR ALBUMIN QUANTITATIVE: CPT

## 2022-03-07 PROCEDURE — 80048 BASIC METABOLIC PNL TOTAL CA: CPT

## 2022-03-07 PROCEDURE — 80061 LIPID PANEL: CPT

## 2022-03-07 PROCEDURE — 36415 COLL VENOUS BLD VENIPUNCTURE: CPT

## 2022-03-07 ASSESSMENT — PATIENT HEALTH QUESTIONNAIRE - PHQ9
SUM OF ALL RESPONSES TO PHQ QUESTIONS 1-9: 2
SUM OF ALL RESPONSES TO PHQ QUESTIONS 1-9: 2
10. IF YOU CHECKED OFF ANY PROBLEMS, HOW DIFFICULT HAVE THESE PROBLEMS MADE IT FOR YOU TO DO YOUR WORK, TAKE CARE OF THINGS AT HOME, OR GET ALONG WITH OTHER PEOPLE: SOMEWHAT DIFFICULT

## 2022-03-14 ASSESSMENT — PATIENT HEALTH QUESTIONNAIRE - PHQ9: SUM OF ALL RESPONSES TO PHQ QUESTIONS 1-9: 2

## 2022-03-15 DIAGNOSIS — F90.9 ATTENTION DEFICIT HYPERACTIVITY DISORDER (ADHD), UNSPECIFIED ADHD TYPE: ICD-10-CM

## 2022-03-15 DIAGNOSIS — F41.1 GENERALIZED ANXIETY DISORDER: Chronic | ICD-10-CM

## 2022-03-15 DIAGNOSIS — F51.05 INSOMNIA DUE TO OTHER MENTAL DISORDER: ICD-10-CM

## 2022-03-15 DIAGNOSIS — F99 INSOMNIA DUE TO OTHER MENTAL DISORDER: ICD-10-CM

## 2022-03-15 DIAGNOSIS — F33.1 MAJOR DEPRESSIVE DISORDER, RECURRENT EPISODE, MODERATE (H): Chronic | ICD-10-CM

## 2022-03-16 RX ORDER — TRAZODONE HYDROCHLORIDE 50 MG/1
TABLET, FILM COATED ORAL
Qty: 270 TABLET | Refills: 2 | Status: SHIPPED | OUTPATIENT
Start: 2022-03-16 | End: 2022-12-06

## 2022-03-16 RX ORDER — BUPROPION HYDROCHLORIDE 300 MG/1
300 TABLET ORAL EVERY MORNING
Qty: 90 TABLET | Refills: 0 | Status: SHIPPED | OUTPATIENT
Start: 2022-03-16 | End: 2022-06-16

## 2022-03-16 RX ORDER — VENLAFAXINE HYDROCHLORIDE 37.5 MG/1
CAPSULE, EXTENDED RELEASE ORAL
Qty: 90 CAPSULE | Refills: 0 | Status: SHIPPED | OUTPATIENT
Start: 2022-03-16 | End: 2022-03-23

## 2022-03-16 NOTE — TELEPHONE ENCOUNTER
"Routing refill request to provider for review/approval because:  Labs out of range:  Cr.       Requested Prescriptions   Pending Prescriptions Disp Refills     venlafaxine (EFFEXOR-XR) 37.5 MG 24 hr capsule [Pharmacy Med Name: VENLAFAXINE HCL ER 37.5MG CP24] 90 capsule 0     Sig: TAKE ONE CAPSULE BY MOUTH ONCE DAILY, DUE FOR OFFICE VISIT       Serotonin-Norepinephrine Reuptake Inhibitors  Failed - 3/15/2022  5:01 AM        Failed - Normal serum creatinine on file in past 12 months     Recent Labs   Lab Test 03/07/22  1041 10/01/18  1451 07/24/18  2348   CR 1.18*   < >  --    CREAT  --   --  1.1*    < > = values in this interval not displayed.       Ok to refill medication if creatinine is low          Passed - Blood pressure under 140/90 in past 12 months     BP Readings from Last 3 Encounters:   01/12/22 106/77   11/22/21 115/70   11/22/21 105/58                 Passed - PHQ-9 score of less than 5 in past 6 months     Please review last PHQ-9 score.           Passed - Medication is active on med list        Passed - Patient is age 18 or older        Passed - No active pregnancy on record        Passed - No positive pregnancy test in past 12 months        Passed - Recent (6 mo) or future (30 days) visit within the authorizing provider's specialty     Patient had office visit in the last 6 months or has a visit in the next 30 days with authorizing provider or within the authorizing provider's specialty.  See \"Patient Info\" tab in inbasket, or \"Choose Columns\" in Meds & Orders section of the refill encounter.             Signed Prescriptions Disp Refills    buPROPion (WELLBUTRIN XL) 300 MG 24 hr tablet 90 tablet 0     Sig: Take 1 tablet (300 mg) by mouth every morning       SSRIs Protocol Passed - 3/15/2022  5:01 AM        Passed - PHQ-9 score less than 5 in past 6 months     Please review last PHQ-9 score.           Passed - Medication is Bupropion     If the medication is Bupropion (Wellbutrin), and the patient is " "taking for smoking cessation; OK to refill.          Passed - Medication is active on med list        Passed - Patient is age 18 or older        Passed - No active pregnancy on record        Passed - No positive pregnancy test in last 12 months        Passed - Recent (6 mo) or future (30 days) visit within the authorizing provider's specialty     Patient had office visit in the last 6 months or has a visit in the next 30 days with authorizing provider or within the authorizing provider's specialty.  See \"Patient Info\" tab in inbasket, or \"Choose Columns\" in Meds & Orders section of the refill encounter.              traZODone (DESYREL) 50 MG tablet 270 tablet 2     Sig: TAKE TWO TO THREE TABLETS BY MOUTH AT BEDTIME       Serotonin Modulators Passed - 3/15/2022  5:01 AM        Passed - Recent (12 mo) or future (30 days) visit within the authorizing provider's specialty     Patient has had an office visit with the authorizing provider or a provider within the authorizing providers department within the previous 12 mos or has a future within next 30 days. See \"Patient Info\" tab in inbasket, or \"Choose Columns\" in Meds & Orders section of the refill encounter.              Passed - Medication is active on med list        Passed - Patient is age 18 or older        Passed - No active pregnancy on record        Passed - No positive pregnancy test in past 12 months           Jasmin Alegria RN    "

## 2022-03-23 ENCOUNTER — VIRTUAL VISIT (OUTPATIENT)
Dept: FAMILY MEDICINE | Facility: CLINIC | Age: 57
End: 2022-03-23
Payer: COMMERCIAL

## 2022-03-23 DIAGNOSIS — Z12.31 VISIT FOR SCREENING MAMMOGRAM: ICD-10-CM

## 2022-03-23 DIAGNOSIS — F33.1 MAJOR DEPRESSIVE DISORDER, RECURRENT EPISODE, MODERATE (H): Primary | Chronic | ICD-10-CM

## 2022-03-23 PROCEDURE — 96127 BRIEF EMOTIONAL/BEHAV ASSMT: CPT | Performed by: NURSE PRACTITIONER

## 2022-03-23 PROCEDURE — 99213 OFFICE O/P EST LOW 20 MIN: CPT | Mod: 95 | Performed by: NURSE PRACTITIONER

## 2022-03-23 RX ORDER — VENLAFAXINE HYDROCHLORIDE 75 MG/1
75 TABLET, EXTENDED RELEASE ORAL DAILY
Qty: 90 TABLET | Refills: 1 | Status: SHIPPED | OUTPATIENT
Start: 2022-03-23 | End: 2022-09-14

## 2022-03-23 ASSESSMENT — PATIENT HEALTH QUESTIONNAIRE - PHQ9: SUM OF ALL RESPONSES TO PHQ QUESTIONS 1-9: 2

## 2022-03-23 NOTE — PROGRESS NOTES
Sunshine is a 57 year old who is being evaluated via a billable telephone visit.      What phone number would you like to be contacted at? [phone]  How would you like to obtain your AVS? MyChart    Assessment & Plan     Major depressive disorder, recurrent episode, moderate (H)  Doing well, continue current medications without change  - venlafaxine (EFFEXOR-ER) 75 MG 24 hr tablet; Take 1 tablet (75 mg) by mouth daily    Visit for screening mammogram    - *MA Screening Digital Bilateral; Future    Prescription drug management         See Patient Instructions    Return in about 6 months (around 9/23/2022) for Physical.    Lesly Arteaga, LEOPOLDO CNP  [facility] FRIBERTHA Gonsalez is a 57 year old who presents for the following health issues     HPI     Depression Followup    How are you doing with your depression since your last visit? Improved     Are you having other symptoms that might be associated with depression? No    Have you had a significant life event?  No     Are you feeling anxious or having panic attacks?   No    Do you have any concerns with your use of alcohol or other drugs? No    Social History     Tobacco Use     Smoking status: Never Smoker     Smokeless tobacco: Never Used   Substance Use Topics     Alcohol use: Yes     Comment: 1-2 per mo     Drug use: Not Currently     Types: Marijuana     Comment: 12/20 last dose     PHQ 1/6/2022 3/7/2022 3/23/2022   PHQ-9 Total Score 12 2 2   Q9: Thoughts of better off dead/self-harm past 2 weeks Not at all Not at all Not at all   F/U: Thoughts of suicide or self-harm - - -   F/U: Safety concerns - - -     ADRIANNE-7 SCORE 1/7/2020 9/23/2021 1/6/2022   Total Score - - -   Total Score 0 17 18         Mood has been much better- more relaxed and not anxious.  Will have ablation next week for A fib.    Review of Systems   Constitutional, HEENT, cardiovascular, pulmonary, gi and gu systems are negative, except as otherwise noted.       Objective           Vitals:  No vitals were obtained today due to virtual visit.    Physical Exam   healthy, alert and no distress  PSYCH: Alert and oriented times 3; coherent speech, normal   rate and volume, able to articulate logical thoughts, able   to abstract reason, no tangential thoughts, no hallucinations   or delusions  Her affect is normal  RESP: No cough, no audible wheezing, able to talk in full sentences  Remainder of exam unable to be completed due to telephone visits    No results found for any visits on 03/23/22.            Phone call duration: 5 minutes

## 2022-03-29 ENCOUNTER — LAB (OUTPATIENT)
Dept: LAB | Facility: CLINIC | Age: 57
End: 2022-03-29
Payer: COMMERCIAL

## 2022-03-29 DIAGNOSIS — Z11.59 ENCOUNTER FOR SCREENING FOR OTHER VIRAL DISEASES: ICD-10-CM

## 2022-03-29 PROCEDURE — U0003 INFECTIOUS AGENT DETECTION BY NUCLEIC ACID (DNA OR RNA); SEVERE ACUTE RESPIRATORY SYNDROME CORONAVIRUS 2 (SARS-COV-2) (CORONAVIRUS DISEASE [COVID-19]), AMPLIFIED PROBE TECHNIQUE, MAKING USE OF HIGH THROUGHPUT TECHNOLOGIES AS DESCRIBED BY CMS-2020-01-R: HCPCS

## 2022-03-29 PROCEDURE — U0005 INFEC AGEN DETEC AMPLI PROBE: HCPCS

## 2022-03-30 ENCOUNTER — ANESTHESIA EVENT (OUTPATIENT)
Dept: CARDIOLOGY | Facility: CLINIC | Age: 57
End: 2022-03-30
Payer: COMMERCIAL

## 2022-03-30 LAB — SARS-COV-2 RNA RESP QL NAA+PROBE: NEGATIVE

## 2022-03-31 ENCOUNTER — ANESTHESIA (OUTPATIENT)
Dept: CARDIOLOGY | Facility: CLINIC | Age: 57
End: 2022-03-31
Payer: COMMERCIAL

## 2022-03-31 ENCOUNTER — HOSPITAL ENCOUNTER (OUTPATIENT)
Facility: CLINIC | Age: 57
Discharge: HOME | End: 2022-03-31
Attending: INTERNAL MEDICINE | Admitting: INTERNAL MEDICINE
Payer: COMMERCIAL

## 2022-03-31 VITALS
SYSTOLIC BLOOD PRESSURE: 128 MMHG | TEMPERATURE: 98 F | HEIGHT: 65 IN | BODY MASS INDEX: 38.46 KG/M2 | HEART RATE: 85 BPM | RESPIRATION RATE: 16 BRPM | DIASTOLIC BLOOD PRESSURE: 72 MMHG | OXYGEN SATURATION: 94 % | WEIGHT: 230.82 LBS

## 2022-03-31 DIAGNOSIS — I48.4 ATYPICAL ATRIAL FLUTTER (H): ICD-10-CM

## 2022-03-31 LAB
ABO/RH(D): NORMAL
ACT BLD: 152 SECONDS (ref 74–150)
ACT BLD: 169 SECONDS (ref 74–150)
ACT BLD: 313 SECONDS (ref 74–150)
ACT BLD: 326 SECONDS (ref 74–150)
ACT BLD: 390 SECONDS (ref 74–150)
ACT BLD: 411 SECONDS (ref 74–150)
ANION GAP SERPL CALCULATED.3IONS-SCNC: 3 MMOL/L (ref 3–14)
ANTIBODY SCREEN: NEGATIVE
BUN SERPL-MCNC: 19 MG/DL (ref 7–30)
CALCIUM SERPL-MCNC: 9.4 MG/DL (ref 8.5–10.1)
CHLORIDE BLD-SCNC: 107 MMOL/L (ref 94–109)
CO2 SERPL-SCNC: 30 MMOL/L (ref 20–32)
CREAT SERPL-MCNC: 1.12 MG/DL (ref 0.52–1.04)
ERYTHROCYTE [DISTWIDTH] IN BLOOD BY AUTOMATED COUNT: 13.9 % (ref 10–15)
GFR SERPL CREATININE-BSD FRML MDRD: 58 ML/MIN/1.73M2
GLUCOSE BLD-MCNC: 93 MG/DL (ref 70–99)
GLUCOSE BLDC GLUCOMTR-MCNC: 97 MG/DL (ref 70–99)
HCT VFR BLD AUTO: 41.5 % (ref 35–47)
HGB BLD-MCNC: 13.5 G/DL (ref 11.7–15.7)
INR PPP: 1.27 (ref 0.85–1.15)
MCH RBC QN AUTO: 29.3 PG (ref 26.5–33)
MCHC RBC AUTO-ENTMCNC: 32.5 G/DL (ref 31.5–36.5)
MCV RBC AUTO: 90 FL (ref 78–100)
PLATELET # BLD AUTO: 255 10E3/UL (ref 150–450)
POTASSIUM BLD-SCNC: 4.3 MMOL/L (ref 3.4–5.3)
RBC # BLD AUTO: 4.6 10E6/UL (ref 3.8–5.2)
SODIUM SERPL-SCNC: 140 MMOL/L (ref 133–144)
SPECIMEN EXPIRATION DATE: NORMAL
WBC # BLD AUTO: 5.8 10E3/UL (ref 4–11)

## 2022-03-31 PROCEDURE — 370N000017 HC ANESTHESIA TECHNICAL FEE, PER MIN: Performed by: INTERNAL MEDICINE

## 2022-03-31 PROCEDURE — 80048 BASIC METABOLIC PNL TOTAL CA: CPT | Performed by: INTERNAL MEDICINE

## 2022-03-31 PROCEDURE — 85347 COAGULATION TIME ACTIVATED: CPT

## 2022-03-31 PROCEDURE — 250N000011 HC RX IP 250 OP 636: Performed by: STUDENT IN AN ORGANIZED HEALTH CARE EDUCATION/TRAINING PROGRAM

## 2022-03-31 PROCEDURE — 36415 COLL VENOUS BLD VENIPUNCTURE: CPT | Performed by: INTERNAL MEDICINE

## 2022-03-31 PROCEDURE — 85610 PROTHROMBIN TIME: CPT | Performed by: ANESTHESIOLOGY

## 2022-03-31 PROCEDURE — 82962 GLUCOSE BLOOD TEST: CPT

## 2022-03-31 PROCEDURE — 85048 AUTOMATED LEUKOCYTE COUNT: CPT | Performed by: INTERNAL MEDICINE

## 2022-03-31 PROCEDURE — 93010 ELECTROCARDIOGRAM REPORT: CPT | Mod: 59 | Performed by: INTERNAL MEDICINE

## 2022-03-31 PROCEDURE — 999N000054 HC STATISTIC EKG NON-CHARGEABLE

## 2022-03-31 PROCEDURE — 93656 COMPRE EP EVAL ABLTJ ATR FIB: CPT | Performed by: INTERNAL MEDICINE

## 2022-03-31 PROCEDURE — 86901 BLOOD TYPING SEROLOGIC RH(D): CPT | Performed by: INTERNAL MEDICINE

## 2022-03-31 PROCEDURE — 93655 ICAR CATH ABLTJ DSCRT ARRHYT: CPT | Performed by: INTERNAL MEDICINE

## 2022-03-31 PROCEDURE — 258N000003 HC RX IP 258 OP 636: Performed by: NURSE ANESTHETIST, CERTIFIED REGISTERED

## 2022-03-31 PROCEDURE — C1759 CATH, INTRA ECHOCARDIOGRAPHY: HCPCS | Performed by: INTERNAL MEDICINE

## 2022-03-31 PROCEDURE — C1730 CATH, EP, 19 OR FEW ELECT: HCPCS | Performed by: INTERNAL MEDICINE

## 2022-03-31 PROCEDURE — 272N000001 HC OR GENERAL SUPPLY STERILE: Performed by: INTERNAL MEDICINE

## 2022-03-31 PROCEDURE — C1732 CATH, EP, DIAG/ABL, 3D/VECT: HCPCS | Performed by: INTERNAL MEDICINE

## 2022-03-31 PROCEDURE — 250N000009 HC RX 250: Performed by: NURSE ANESTHETIST, CERTIFIED REGISTERED

## 2022-03-31 PROCEDURE — 93005 ELECTROCARDIOGRAM TRACING: CPT

## 2022-03-31 PROCEDURE — C1733 CATH, EP, OTHR THAN COOL-TIP: HCPCS | Performed by: INTERNAL MEDICINE

## 2022-03-31 PROCEDURE — 250N000011 HC RX IP 250 OP 636: Performed by: NURSE ANESTHETIST, CERTIFIED REGISTERED

## 2022-03-31 PROCEDURE — C1894 INTRO/SHEATH, NON-LASER: HCPCS | Performed by: INTERNAL MEDICINE

## 2022-03-31 PROCEDURE — 258N000003 HC RX IP 258 OP 636: Performed by: INTERNAL MEDICINE

## 2022-03-31 RX ORDER — ONDANSETRON 2 MG/ML
INJECTION INTRAMUSCULAR; INTRAVENOUS PRN
Status: DISCONTINUED | OUTPATIENT
Start: 2022-03-31 | End: 2022-03-31

## 2022-03-31 RX ORDER — LEVOTHYROXINE SODIUM 75 UG/1
75 TABLET ORAL DAILY
Status: CANCELLED | OUTPATIENT
Start: 2022-03-31

## 2022-03-31 RX ORDER — SODIUM CHLORIDE, SODIUM LACTATE, POTASSIUM CHLORIDE, CALCIUM CHLORIDE 600; 310; 30; 20 MG/100ML; MG/100ML; MG/100ML; MG/100ML
INJECTION, SOLUTION INTRAVENOUS CONTINUOUS PRN
Status: DISCONTINUED | OUTPATIENT
Start: 2022-03-31 | End: 2022-03-31

## 2022-03-31 RX ORDER — LABETALOL HYDROCHLORIDE 5 MG/ML
10 INJECTION, SOLUTION INTRAVENOUS
Status: DISCONTINUED | OUTPATIENT
Start: 2022-03-31 | End: 2022-03-31 | Stop reason: HOSPADM

## 2022-03-31 RX ORDER — NALOXONE HYDROCHLORIDE 0.4 MG/ML
0.2 INJECTION, SOLUTION INTRAMUSCULAR; INTRAVENOUS; SUBCUTANEOUS
Status: DISCONTINUED | OUTPATIENT
Start: 2022-03-31 | End: 2022-03-31 | Stop reason: HOSPADM

## 2022-03-31 RX ORDER — HYDROMORPHONE HYDROCHLORIDE 1 MG/ML
0.2 INJECTION, SOLUTION INTRAMUSCULAR; INTRAVENOUS; SUBCUTANEOUS EVERY 5 MIN PRN
Status: DISCONTINUED | OUTPATIENT
Start: 2022-03-31 | End: 2022-03-31 | Stop reason: HOSPADM

## 2022-03-31 RX ORDER — ONDANSETRON 2 MG/ML
4 INJECTION INTRAMUSCULAR; INTRAVENOUS EVERY 30 MIN PRN
Status: DISCONTINUED | OUTPATIENT
Start: 2022-03-31 | End: 2022-03-31 | Stop reason: HOSPADM

## 2022-03-31 RX ORDER — DEXAMETHASONE SODIUM PHOSPHATE 4 MG/ML
INJECTION, SOLUTION INTRA-ARTICULAR; INTRALESIONAL; INTRAMUSCULAR; INTRAVENOUS; SOFT TISSUE PRN
Status: DISCONTINUED | OUTPATIENT
Start: 2022-03-31 | End: 2022-03-31

## 2022-03-31 RX ORDER — BUPROPION HYDROCHLORIDE 300 MG/1
300 TABLET ORAL EVERY MORNING
Status: CANCELLED | OUTPATIENT
Start: 2022-03-31

## 2022-03-31 RX ORDER — LIDOCAINE 40 MG/G
CREAM TOPICAL
Status: DISCONTINUED | OUTPATIENT
Start: 2022-03-31 | End: 2022-03-31 | Stop reason: HOSPADM

## 2022-03-31 RX ORDER — OXYCODONE AND ACETAMINOPHEN 5; 325 MG/1; MG/1
1 TABLET ORAL EVERY 4 HOURS PRN
Status: DISCONTINUED | OUTPATIENT
Start: 2022-03-31 | End: 2022-03-31 | Stop reason: HOSPADM

## 2022-03-31 RX ORDER — ONDANSETRON 4 MG/1
4 TABLET, ORALLY DISINTEGRATING ORAL EVERY 30 MIN PRN
Status: DISCONTINUED | OUTPATIENT
Start: 2022-03-31 | End: 2022-03-31 | Stop reason: HOSPADM

## 2022-03-31 RX ORDER — FENTANYL CITRATE 50 UG/ML
25 INJECTION, SOLUTION INTRAMUSCULAR; INTRAVENOUS EVERY 5 MIN PRN
Status: DISCONTINUED | OUTPATIENT
Start: 2022-03-31 | End: 2022-03-31 | Stop reason: HOSPADM

## 2022-03-31 RX ORDER — SODIUM CHLORIDE, SODIUM LACTATE, POTASSIUM CHLORIDE, CALCIUM CHLORIDE 600; 310; 30; 20 MG/100ML; MG/100ML; MG/100ML; MG/100ML
INJECTION, SOLUTION INTRAVENOUS CONTINUOUS
Status: DISCONTINUED | OUTPATIENT
Start: 2022-03-31 | End: 2022-03-31 | Stop reason: HOSPADM

## 2022-03-31 RX ORDER — HEPARIN SODIUM 1000 [USP'U]/ML
INJECTION, SOLUTION INTRAVENOUS; SUBCUTANEOUS PRN
Status: DISCONTINUED | OUTPATIENT
Start: 2022-03-31 | End: 2022-03-31

## 2022-03-31 RX ORDER — PROPOFOL 10 MG/ML
INJECTION, EMULSION INTRAVENOUS PRN
Status: DISCONTINUED | OUTPATIENT
Start: 2022-03-31 | End: 2022-03-31

## 2022-03-31 RX ORDER — SODIUM CHLORIDE 9 MG/ML
INJECTION, SOLUTION INTRAVENOUS CONTINUOUS PRN
Status: DISCONTINUED | OUTPATIENT
Start: 2022-03-31 | End: 2022-03-31

## 2022-03-31 RX ORDER — VENLAFAXINE HYDROCHLORIDE 75 MG/1
75 TABLET, EXTENDED RELEASE ORAL DAILY
Status: CANCELLED | OUTPATIENT
Start: 2022-03-31

## 2022-03-31 RX ORDER — METHYLPREDNISOLONE SODIUM SUCCINATE 500 MG/8ML
INJECTION INTRAMUSCULAR; INTRAVENOUS PRN
Status: DISCONTINUED | OUTPATIENT
Start: 2022-03-31 | End: 2022-03-31

## 2022-03-31 RX ORDER — HYDRALAZINE HYDROCHLORIDE 20 MG/ML
2.5-5 INJECTION INTRAMUSCULAR; INTRAVENOUS EVERY 10 MIN PRN
Status: DISCONTINUED | OUTPATIENT
Start: 2022-03-31 | End: 2022-03-31 | Stop reason: HOSPADM

## 2022-03-31 RX ORDER — FENTANYL CITRATE 50 UG/ML
INJECTION, SOLUTION INTRAMUSCULAR; INTRAVENOUS PRN
Status: DISCONTINUED | OUTPATIENT
Start: 2022-03-31 | End: 2022-03-31

## 2022-03-31 RX ORDER — TRAZODONE HYDROCHLORIDE 100 MG/1
100 TABLET ORAL AT BEDTIME
Status: CANCELLED | OUTPATIENT
Start: 2022-03-31

## 2022-03-31 RX ORDER — NALOXONE HYDROCHLORIDE 0.4 MG/ML
0.4 INJECTION, SOLUTION INTRAMUSCULAR; INTRAVENOUS; SUBCUTANEOUS
Status: DISCONTINUED | OUTPATIENT
Start: 2022-03-31 | End: 2022-03-31 | Stop reason: HOSPADM

## 2022-03-31 RX ORDER — SODIUM CHLORIDE 9 MG/ML
INJECTION, SOLUTION INTRAVENOUS CONTINUOUS
Status: DISCONTINUED | OUTPATIENT
Start: 2022-03-31 | End: 2022-03-31 | Stop reason: HOSPADM

## 2022-03-31 RX ORDER — ACETAMINOPHEN 325 MG/1
325 TABLET ORAL EVERY 4 HOURS PRN
Status: CANCELLED | OUTPATIENT
Start: 2022-03-31

## 2022-03-31 RX ORDER — PROTAMINE SULFATE 10 MG/ML
INJECTION, SOLUTION INTRAVENOUS PRN
Status: DISCONTINUED | OUTPATIENT
Start: 2022-03-31 | End: 2022-03-31

## 2022-03-31 RX ADMIN — HEPARIN SODIUM 4000 UNITS: 1000 INJECTION, SOLUTION INTRAVENOUS; SUBCUTANEOUS at 10:21

## 2022-03-31 RX ADMIN — ROCURONIUM BROMIDE 10 MG: 50 INJECTION, SOLUTION INTRAVENOUS at 09:08

## 2022-03-31 RX ADMIN — HYDROMORPHONE HYDROCHLORIDE 0.2 MG: 1 INJECTION, SOLUTION INTRAMUSCULAR; INTRAVENOUS; SUBCUTANEOUS at 13:29

## 2022-03-31 RX ADMIN — ROCURONIUM BROMIDE 30 MG: 50 INJECTION, SOLUTION INTRAVENOUS at 08:30

## 2022-03-31 RX ADMIN — PROTAMINE SULFATE 50 MG: 10 INJECTION, SOLUTION INTRAVENOUS at 11:44

## 2022-03-31 RX ADMIN — METHYLPREDNISOLONE SODIUM SUCCINATE 125 MG: 500 INJECTION, POWDER, FOR SOLUTION INTRAMUSCULAR; INTRAVENOUS at 11:56

## 2022-03-31 RX ADMIN — SUGAMMADEX 200 MG: 100 INJECTION, SOLUTION INTRAVENOUS at 11:59

## 2022-03-31 RX ADMIN — SODIUM CHLORIDE, POTASSIUM CHLORIDE, SODIUM LACTATE AND CALCIUM CHLORIDE: 600; 310; 30; 20 INJECTION, SOLUTION INTRAVENOUS at 07:27

## 2022-03-31 RX ADMIN — FENTANYL CITRATE 50 MCG: 50 INJECTION, SOLUTION INTRAMUSCULAR; INTRAVENOUS at 10:51

## 2022-03-31 RX ADMIN — PROPOFOL 20 MG: 10 INJECTION, EMULSION INTRAVENOUS at 08:30

## 2022-03-31 RX ADMIN — SODIUM CHLORIDE: 9 INJECTION, SOLUTION INTRAVENOUS at 07:00

## 2022-03-31 RX ADMIN — HEPARIN SODIUM 10000 UNITS: 1000 INJECTION, SOLUTION INTRAVENOUS; SUBCUTANEOUS at 10:05

## 2022-03-31 RX ADMIN — HEPARIN SODIUM 3000 UNITS: 1000 INJECTION, SOLUTION INTRAVENOUS; SUBCUTANEOUS at 11:02

## 2022-03-31 RX ADMIN — ONDANSETRON 4 MG: 2 INJECTION INTRAMUSCULAR; INTRAVENOUS at 11:53

## 2022-03-31 RX ADMIN — MIDAZOLAM 2 MG: 1 INJECTION INTRAMUSCULAR; INTRAVENOUS at 07:26

## 2022-03-31 RX ADMIN — ROCURONIUM BROMIDE 50 MG: 50 INJECTION, SOLUTION INTRAVENOUS at 07:41

## 2022-03-31 RX ADMIN — PHENYLEPHRINE HYDROCHLORIDE 100 MCG: 10 INJECTION INTRAVENOUS at 10:10

## 2022-03-31 RX ADMIN — DEXAMETHASONE SODIUM PHOSPHATE 6 MG: 4 INJECTION, SOLUTION INTRA-ARTICULAR; INTRALESIONAL; INTRAMUSCULAR; INTRAVENOUS; SOFT TISSUE at 10:11

## 2022-03-31 RX ADMIN — FENTANYL CITRATE 50 MCG: 50 INJECTION, SOLUTION INTRAMUSCULAR; INTRAVENOUS at 08:30

## 2022-03-31 RX ADMIN — PROPOFOL 200 MG: 10 INJECTION, EMULSION INTRAVENOUS at 07:41

## 2022-03-31 RX ADMIN — PHENYLEPHRINE HYDROCHLORIDE 100 MCG: 10 INJECTION INTRAVENOUS at 10:22

## 2022-03-31 RX ADMIN — FENTANYL CITRATE 25 MCG: 50 INJECTION INTRAMUSCULAR; INTRAVENOUS at 13:08

## 2022-03-31 RX ADMIN — SODIUM CHLORIDE: 9 INJECTION, SOLUTION INTRAVENOUS at 06:50

## 2022-03-31 NOTE — ANESTHESIA PROCEDURE NOTES
Perioperative YIFAN Procedure Note    Staff -        Anesthesiologist:  Van Ling MD       Resident/Fellow: Danielle Watson MD       Performed By: anesthesiologist and fellow  Preanesthesia Checklist:  Patient identified, IV assessed, risks and benefits discussed, monitors and equipment assessed, procedure being performed at surgeon's request and anesthesia consent obtained.    YIFAN Probe Insertion  Probe Number: [id number] loaner  Probe Status PRE Insertion: NO obvious damage  Probe type:  Adult 3D  Bite block used:   Oral Airway  Insertion Technique: Easy, no oropharyngeal manipulation  Insertion complications: None obvious  Billing Report:YIFAN report by Anesthesiologist (See Separate Report note)  Probe Status POST Removal: NO obvious damage    YIFAN Report  General Procedure Information  Images for this study have been archived.  Modalities: 2D, 3D, PW Doppler and Color flow mapping    Post Intervention Findings  Procedure(s) performed:  Other (see comments). Regional wall motion:. Surgeon(s) notified of all postintervention findings: Yes.       Echocardiogram Comments  Echocardiogram comments: Focussed YIFAN to rule out LA Appendage clot- Normal LA/LV Size, Normal Biventricular function, Mild MR, LA Appendage is open and no clot seen, no spontaneous echo contrast seen . LA Velocity- 52 cm/s, Mild TR . Small pericardial effusion seen..

## 2022-03-31 NOTE — ANESTHESIA PREPROCEDURE EVALUATION
Anesthesia Pre-Procedure Evaluation    Patient: Sunshine Delgado   MRN: [id number] : 1965        Procedure : Procedure(s):  Ablation Focal Atrial Fibrillation          Past Medical History:   Diagnosis Date     Antiplatelet or antithrombotic long-term use      Arrhythmia      Atrial fibrillation with rapid ventricular response (H) 2020     Bee sting allergy      Depressive disorder      Generalized anxiety disorder 10/15/2009    lexapro made things worse.       Hashimoto's thyroiditis 2020     Morbid obesity (H)      Ocular migraine      MARIA GUADALUPE (obstructive sleep apnea)- severe (AHI 84) 2011    patient not using since      Renal disease      Voice fatigue 10/22/2009      Past Surgical History:   Procedure Laterality Date     ANESTHESIA CARDIOVERSION N/A 2021    Procedure: ANESTHESIA, FOR CARDIOVERSION@1515;  Surgeon: GENERIC ANESTHESIA PROVIDER;  Location: [facility] OR     COLONOSCOPY       DAVINCI GASTRIC SLEEVE       EP ABLATION FOCAL AFIB N/A 2021    Procedure: EP ABLATION FOCAL AFIB;  Surgeon: Vicente Craig MD;  [facility]     GENITOURINARY SURGERY       HYSTERECTOMY, PAP NO LONGER INDICATED       HYSTERECTOMY, VAGINAL      still has ovaries     LAPAROSCOPIC GASTRIC SLEEVE N/A 3/13/2018    Procedure: LAPAROSCOPIC GASTRIC SLEEVE;  Laparoscopic Sleeve Gastrectomy;  Surgeon: Kameron Joseph MD;  Location: [facility] OR      Allergies   Allergen Reactions     Sulfa Drugs Hives     Cephalexin Hives     Cinnamon Hives     Strawberry Hives     Sulfamethoxazole-Trimethoprim Hives     Vicodin [Hydrocodone-Acetaminophen]      Wasp Venom Protein      Other reaction(s): Chest Pain     Wasps [Hornets] Swelling     Now carries Epi Pen     Zoloft [Sertraline]      suicidal ideation     Contrast Dye Other (See Comments) and Rash     Patient had sneezing and an itchy throat following contrast injection of 100 mL Isovue-370.  From IV contrast dye      Social History      Tobacco Use     Smoking status: Never Smoker     Smokeless tobacco: Never Used   Substance Use Topics     Alcohol use: Yes     Comment: 1-2 per mo      Wt Readings from Last 1 Encounters:   03/31/22 104.7 kg (230 lb 13.2 oz)        Anesthesia Evaluation   Pt has had prior anesthetic. Type: General.    No history of anesthetic complications       ROS/MED HX  ENT/Pulmonary:     (+) sleep apnea, doesn't use CPAP,     Neurologic:       Cardiovascular:     (+) -----Taking blood thinners Pt has received instructions: Instructions Given to patient: continuing anticoagulation till day of surgery. Previous cardiac testing     METS/Exercise Tolerance: >4 METS    Hematologic:       Musculoskeletal:       GI/Hepatic:       Renal/Genitourinary:       Endo:     (+) Obesity,     Psychiatric/Substance Use:       Infectious Disease:       Malignancy:       Other:            Physical Exam    Airway        Mallampati: II   TM distance: > 3 FB   Neck ROM: full   Mouth opening: > 3 cm    Respiratory Devices and Support         Dental  no notable dental history         Cardiovascular          Rhythm and rate: regular and normal     Pulmonary           breath sounds clear to auscultation           OUTSIDE LABS:  CBC:   Lab Results   Component Value Date    WBC 5.8 03/31/2022    WBC 13.5 (H) 07/22/2021    HGB 13.5 03/31/2022    HGB 12.6 07/22/2021    HCT 41.5 03/31/2022    HCT 39.5 07/22/2021     03/31/2022     07/22/2021     BMP:   Lab Results   Component Value Date     03/31/2022     03/07/2022    POTASSIUM 4.3 03/31/2022    POTASSIUM 4.1 03/07/2022    CHLORIDE 107 03/31/2022    CHLORIDE 108 03/07/2022    CO2 30 03/31/2022    CO2 29 03/07/2022    BUN 19 03/31/2022    BUN 14 03/07/2022    CR 1.12 (H) 03/31/2022    CR 1.18 (H) 03/07/2022    GLC 93 03/31/2022    GLC 97 03/31/2022     COAGS:   Lab Results   Component Value Date    INR 1.27 (H) 03/31/2022     POC:   Lab Results   Component Value Date    BGM 90  03/14/2018     HEPATIC:   Lab Results   Component Value Date    ALBUMIN 3.9 03/17/2021    PROTTOTAL 7.3 03/17/2021    ALT 22 03/17/2021    AST 12 03/17/2021    ALKPHOS 61 03/17/2021    BILITOTAL 0.3 03/17/2021     OTHER:   Lab Results   Component Value Date    LACT 0.7 01/20/2015    A1C 5.7 10/15/2009    CONCHA 9.4 03/31/2022    MAG 2.4 (H) 11/22/2021    LIPASE 130 10/01/2018    TSH 1.83 01/12/2022    T4 0.89 03/17/2021    T3 90 04/08/2016    CRP 8.9 (H) 10/01/2018    SED 13 10/01/2018       Anesthesia Plan    ASA Status:  3   NPO Status:  NPO Appropriate    Anesthesia Type: General.     - Airway: ETT   Induction: Intravenous.   Maintenance: Balanced.   Techniques and Equipment:     - Lines/Monitors: 2nd IV, Arterial Line, Central Line     Consents    Anesthesia Plan(s) and associated risks, benefits, and realistic alternatives discussed. Questions answered and patient/representative(s) expressed understanding.    - Discussed:     - Discussed with:  Patient, Spouse      - Extended Intubation/Ventilatory Support Discussed: No.      - Patient is DNR/DNI Status: No    Use of blood products discussed: Yes.     - Discussed with: Patient.     - Consented: consented to blood products            Reason for refusal: other.     Postoperative Care    Pain management: IV analgesics, Oral pain medications.   PONV prophylaxis: Ondansetron (or other 5HT-3), Dexamethasone or Solumedrol     Comments:                Van Ling MD

## 2022-03-31 NOTE — OR NURSING
Nick Roland NP paged: Sunshine Danny PACU 15 EKG is complete, will transfer to  soon. FYI no d/c order in. Thx! *[phone]

## 2022-03-31 NOTE — PROGRESS NOTES
"/72   Pulse 85   Temp 98  F (36.7  C) (Oral)   Resp 16   Ht 1.651 m (5' 5\")   Wt 104.7 kg (230 lb 13.2 oz)   SpO2 94%   BMI 38.41 kg/m    Condition is stable. Vital Signs are stable/WNL. Bilateral sites clean, dry and intact, cms intact.  Discharge instructions reviewed with patient and questions answered. Patient verbalizes understanding. IV removed. Pain under control. Patient is ambulating and voiding spontaneously. Patient is tolerating regular diet and denies any N/V. Patient to be discharged to home via boyfriend. Patient has all belongings.    "

## 2022-03-31 NOTE — ANESTHESIA POSTPROCEDURE EVALUATION
Patient: Sunshine Delgado    Procedure: Procedure(s):  Ablation Focal Atrial Fibrillation       Anesthesia Type:  No value filed.    Note:  Disposition: Outpatient   Postop Pain Control: Uneventful            Sign Out: Well controlled pain   PONV: No   Neuro/Psych: Uneventful            Sign Out: Acceptable/Baseline neuro status   Airway/Respiratory: Uneventful            Sign Out: Acceptable/Baseline resp. status   CV/Hemodynamics: Uneventful            Sign Out: Acceptable CV status; No obvious hypovolemia; No obvious fluid overload   Other NRE: NONE   DID A NON-ROUTINE EVENT OCCUR? No           Last vitals:  Vitals Value Taken Time   /71 03/31/22 1320   Temp 37.1  C (98.8  F) 03/31/22 1300   Pulse 86 03/31/22 1329   Resp 18 03/31/22 1330   SpO2 92 % 03/31/22 1330   Vitals shown include unvalidated device data.    Electronically Signed By: Duarte Alexander MD  March 31, 2022  1:31 PM

## 2022-03-31 NOTE — PROGRESS NOTES
D/I/A: Pt roomed on 3C in bay 33.  Arrived via litter and accompanied by PACU RN On/Off: On monitor.  VSSA.  Rhythm upon arrival SR on monitor.  Denies pain or sob.  Reviewed activity restrictions and when to notify RN, ie-changes to breathing or increased chest pressure or chest pain.  CCL access:  Bilateral groin sites-sutures in place-bedrest until 1410.  P: Continue to monitor status.  Discharge to home once meeting criteria.

## 2022-03-31 NOTE — Clinical Note
Potential access sites were evaluated for patency using ultrasound.   The left femoral artery, right femoral vein and left femoral vein were selected. Access was obtained under with Sonosite guidance using a micropuncture 21 gauge needle with direct visualization of needle entry.

## 2022-03-31 NOTE — ANESTHESIA PROCEDURE NOTES
Airway       Patient location during procedure: OR       Procedure Start/Stop Times: 3/31/2022 7:46 AM  Staff -        CRNA: Lashonda Crow APRN CRNA       Performed By: CRNAIndications and Patient Condition       Indications for airway management: bayron-procedural       Induction type:intravenous       Mask difficulty assessment: 1 - vent by mask    Final Airway Details       Final airway type: endotracheal airway       Successful airway: ETT - single  Endotracheal Airway Details        ETT size (mm): 7.0       Cuffed: yes       Successful intubation technique: direct laryngoscopy       DL Blade Type: MAC 3       Grade View of Cords: 1       Adjucts: stylet       Position: Right       Measured from: lips       Secured at (cm): 22       Bite block used: None    Post intubation assessment        Placement verified by: capnometry and equal breath sounds        Number of attempts at approach: 1       Secured with: pink tape       Ease of procedure: easy       Dentition: Intact

## 2022-03-31 NOTE — DISCHARGE INSTRUCTIONS
Post-Ablation Discharge Instructions    Care of groin site:         Remove the Band-Aid after 24 hours. If there is minor oozing, apply another Band-aid and remove it after 12 hours.          Do NOT take a bath, use a hot tub, pool, or submerse in water for at least 3 days You may shower.          It is normal to have a small bruise or lump at the site.         Do not scrub the site.         Do not use lotion or powder near the puncture site for 3 days.         For the first 2 days: Do not stoop or squat. When you cough, sneeze or move your bowels, hold your hand over the puncture site and press gently.         Do not lift more than 10 pounds or exertional activity for 10 days.      If you start bleeding from the site in your groin:  Lie down flat and press firmly on the site.  Call your physician immediately, or, come to the emergency room.    Call 911 right away if you have bleeding that is heavy or does not stop.     Call your doctor/provider if:         You have a large or growing hard lump around the site.         The site is red, swollen, hot or tender.         Blood or fluid is draining from the site.         You have chills or a fever greater than 101 F (38 C).         Your leg or arm turns bluish, feels numb or cool.         You have hives, a rash or unusual itching.     [facility]:   [address] [place] [address]. [place] [address]  Your Care Team:  [facility]   Telephone Number     Arleth Craig [phone]   Xiao Coombs RN  [phone]     For scheduling appts or procedures:    Grisel Smart   [phone]     As always, Thank you for trusting us with your health care needs

## 2022-03-31 NOTE — Clinical Note
dry, intact, no bleeding and no hematoma. RFV (7Fr, 8.5Fr x2), LFV (9Fr), LFA (4Fr)- removed, figure of eight sutured closed, pressure held, primapore.

## 2022-03-31 NOTE — H&P
Electrophysiology Pre-Procedure History and Physical    Sunshine Delgado MRN# [id number]   Age: 57 year old YOB: 1965      Date of Procedure: 3/31/2022  [facility]      Date of Exam 3/31/2022 Facility (Same day)       Home clinic: [facility]  Primary care provider: Lesly Arteaga  HPI:  Ms. Delgado is a 56 year old female who has a past medical history significant for hashimoto's thyroiditis, MARIA GUADALUPE (uses CPAP), CKD, PAF (CHADSVASC 1 Eliquis) s/p DCCV s/p PVI 7/22/21, anxiety, and depression.      She was admitted in 1/2020 to OSH with abdominal pain and was found to have infectious mononucleosis. At that time her ECG showed AF and she spontaneously converted while in the hospital. An echo showed normal LVEF, no significant valvular abnormalities, and small pericardial effusion. She was started on metoprolol and ASA. Her metoprolol was later stopped by PCP due to frequent lightheadedness. She established care with Dr. Fajardo. She followed up in 3/2021 and reported feeling palpitations on and off and one episode lasting up to 3 days. She felt some chest pressure, shortness of breath, and presyncope. ECG showed AF vent rate 89 bpm. She had been started on lisinopril in 2/2021 by PCP for CKD and had felt a little more lightheaded since then, but also notes she had lightheadedness predating the lisinopril. A zio patch monitor from 4/2/21-4/16/21 showed 29% AF/AFL burden up to 10 hours 54 minutes and 39 symptom activations showed mostly AF/AFL or PSVT and sometimes sinus without ectopy. She was placed on atenolol and reported fatigue with this. Flecainide 50 mg BID was added. Subsequent ECG stress testing was negative for ischemia or QRS widening. Her PAF episodes had completely subsided for about 1 month after starting the Flecainide until end of 5/2021. She felt much improved when maintaining sinus. She then started developing  increased AF symptoms, occurring daily. She has symptoms of chest tightness, dizziness, and palpitations with her AF.  She then presented to [facility] on 6/10/21 with symptomatic AF and underwent DCCV. Her Flecainide was increased to 100 mg BID. She represented to [facility] on 6/14/21 again with symptomatic AF and underwent DCCV. Her Flecainide and atenolol were stopped and she was changed to amiodarone. She was then referred to EP for consideration for ablation. At EP appointment on 6/16/21, she was in AFL and feeling fatigue, MCHUGH, lightheadedness/dizziness, and generally unwell. We loaded her with amiodarone and arranged for another DCCV. [facility] and [facility] was canceled. She wanted to get off amiodarone if possible and elected to pursue ablation and had PVI/CTI on 7/22/21.  A zio patch monitor from 10/2021 showed AF throughout poor HR control with avg  bpm. She underwent DCCV on 11/22/21. Management options were discussed and she elected to pursue repeat ablation for which she presents today.        Active problem list:     Patient Active Problem List    Diagnosis Date Noted     Atrial fibrillation with controlled ventricular rate (H) 06/24/2021     Priority: Medium     Added automatically from request for surgery [id number]       Attention deficit hyperactivity disorder (ADHD), unspecified ADHD type 01/08/2021     Priority: Medium     Acute pain of right shoulder 05/28/2020     Priority: Medium     Impingement syndrome of shoulder region, right 05/28/2020     Priority: Medium     Hashimoto's thyroiditis 05/06/2020     Priority: Medium     Infectious mononucleosis 01/27/2020     Priority: Medium     Elevated liver enzymes 01/26/2020     Priority: Medium     CKD (chronic kidney disease) stage 3, GFR 30-59 ml/min (H) 10/02/2018     Priority: Medium     Bee sting allergy      Priority: Medium     Insomnia 12/09/2015     Priority: Medium     Mild major depression (H) 03/27/2015     Priority: Medium       is terminally ill. Going through a lot.       Health Care Home 01/26/2015     Priority: Medium     No Active Care Coordination 3/16/2015  Care Coordinator: Isamar SANDERS, MSW  See letters for Health Care Home care plan        Morbid obesity (H) 03/21/2013     Priority: Medium     MARIA GUADALUPE (obstructive sleep apnea)- severe (AHI 84) 09/09/2011     Priority: Medium     Sleep study 5/10/2011 (226#)- AHI 24, RDI 30, lowest oxygen saturation was 88%, CPAP 8 cm was curative.   Study Date: 9/30/2016- (248.0 lbs) apnea/hypopnea index 84.4.  REM AHI n/a, supine AHI 88.8 events per hour. Lowest oxygen saturation 80.2%.  Time spent less than or equal to 88% 26.1 minutes. CPAP optimal pressure 5 cmH2O with AHI of 5.2 events per hour.  There were residual non-specific arousals. Time in REM supine on final pressure 72.5 minutes.            CARDIOVASCULAR SCREENING; LDL GOAL LESS THAN 160 05/09/2010     Priority: Medium     Generalized anxiety disorder 10/15/2009     Priority: Medium     lexapro made things worse.               Medications (include herbals and vitamins):      Current Facility-Administered Medications   Medication     lidocaine (LMX4) cream     lidocaine 1 % 0.1-1 mL     sodium chloride (PF) 0.9% PF flush 3 mL     sodium chloride (PF) 0.9% PF flush 3 mL     sodium chloride (PF) 0.9% PF flush 3 mL     sodium chloride 0.9% infusion           Medication List      There are no discharge medications for this visit.              Allergies:      Allergies   Allergen Reactions     Sulfa Drugs Hives     Cephalexin Hives     Cinnamon Hives     Strawberry Hives     Sulfamethoxazole-Trimethoprim Hives     Vicodin [Hydrocodone-Acetaminophen]      Wasp Venom Protein      Other reaction(s): Chest Pain     Wasps [Hornets] Swelling     Now carries Epi Pen     Zoloft [Sertraline]      suicidal ideation     Contrast Dye Other (See Comments) and Rash     Patient had sneezing and an itchy throat following contrast injection of  "100 mL Isovue-370.  From IV contrast dye             Social History:     Social History     Tobacco Use     Smoking status: Never Smoker     Smokeless tobacco: Never Used   Substance Use Topics     Alcohol use: Yes     Comment: 1-2 per mo            Physical Exam:   All vitals have been reviewed  Patient Vitals for the past 8 hrs:   BP Temp Pulse Resp SpO2 Height Weight   03/31/22 0616 112/76 97.6  F (36.4  C) 69 16 96 % 1.651 m (5' 5\") 104.7 kg (230 lb 13.2 oz)     No intake/output data recorded.  Airway assessment:   Patient is able to open mouth wide  Patient is able to stick out tongue}      ENT:   Normocephalic, without obvious abnormality, atraumatic, sinuses nontender on palpation, external ears without lesions, oral pharynx with moist mucous membranes, tonsils without erythema or exudates, gums normal and good dentition.     Neck:   Supple, symmetrical, trachea midline, no adenopathy, thyroid symmetric, not enlarged and no tenderness, skin normal     Lungs:   No increased work of breathing, good air exchange, clear to auscultation bilaterally, no crackles or wheezing     Cardiovascular:   Normal apical impulse, regular rate and rhythm, normal S1 and S2, no S3 or S4, and no murmur noted             Lab / Radiology Results:     Lab Results   Component Value Date    WBC 5.8 03/31/2022    WBC 6.5 06/14/2021    RBC 4.60 03/31/2022    RBC 4.89 06/14/2021    HGB 13.5 03/31/2022    HGB 14.4 06/14/2021    HCT 41.5 03/31/2022    HCT 42.9 06/14/2021    MCV 90 03/31/2022    MCV 88 06/14/2021    RDW 13.9 03/31/2022    RDW 14.1 06/14/2021     03/31/2022     06/14/2021      Lab Results   Component Value Date    WBC 5.8 03/31/2022    WBC 6.5 06/14/2021     Lab Results   Component Value Date     03/31/2022     06/14/2021     Lab Results   Component Value Date    HGB 13.5 03/31/2022    HGB 14.4 06/14/2021    HCT 41.5 03/31/2022    HCT 42.9 06/14/2021     Lab Results   Component Value Date     " 03/31/2022     06/14/2021    CO2 30 03/31/2022    CO2 27 06/14/2021    BUN 19 03/31/2022    BUN 14 06/14/2021    CREAT 1.1 07/24/2018     Lab Results   Component Value Date     03/31/2022     06/14/2021    CO2 30 03/31/2022    CO2 27 06/14/2021    BUN 19 03/31/2022    BUN 14 06/14/2021    CREAT 1.1 07/24/2018     Lab Results   Component Value Date     03/31/2022     06/14/2021     Lab Results   Component Value Date    BUN 19 03/31/2022    BUN 14 06/14/2021    CREAT 1.1 07/24/2018     Lab Results   Component Value Date    TSH 1.83 01/12/2022    TSH 2.86 06/14/2021             Plan:   Patient's active problems diagnostically and therapeutically optimized for the planned procedure. Patient here for PVI ablation. Procedure explained in detail to patient including indications, risks, and benefits. The procedural risk of EP study and ablation were discussed in detail. Risks discussed include: vascular complications, CVA, AVB, pericardial effusion and tamponade, esophageal fistula, PV stenosis. AF ablation has 70% success at 1 year, 50% success at 5 years, and 30% need another ablation.  Even if ablation is successful anticoagulation may be needed lifelong. She states understanding and wishes to procced.     LEOPOLDO Colin CNP  Electrophysiology Consult Service  Pager: [phone]

## 2022-03-31 NOTE — ANESTHESIA CARE TRANSFER NOTE
Patient: Sunshine Delgado    Procedure: Procedure(s):  Ablation Focal Atrial Fibrillation       Diagnosis: atypical atrial flutter  Diagnosis Additional Information: No value filed.    Anesthesia Type:   No value filed.     Note:    Oropharynx: oropharynx clear of all foreign objects and spontaneously breathing  Level of Consciousness: drowsy  Oxygen Supplementation: nasal cannula  Level of Supplemental Oxygen (L/min / FiO2): 3  Independent Airway: airway patency satisfactory and stable  Dentition: dentition unchanged  Vital Signs Stable: post-procedure vital signs reviewed and stable  Report to RN Given: handoff report given  Patient transferred to: PACU    Handoff Report: Identifed the Patient, Identified the Reponsible Provider, Reviewed the pertinent medical history, Discussed the surgical course, Reviewed Intra-OP anesthesia mangement and issues during anesthesia, Set expectations for post-procedure period and Allowed opportunity for questions and acknowledgement of understanding      Vitals:  Vitals Value Taken Time   /68 03/31/22 1231   Temp     Pulse 89 03/31/22 1236   Resp 0 03/31/22 1236   SpO2 98 % 03/31/22 1236   Vitals shown include unvalidated device data.    Electronically Signed By: LEOPOLDO Burton CRNA  March 31, 2022  12:37 PM

## 2022-04-01 LAB
ATRIAL RATE - MUSE: 65 BPM
DIASTOLIC BLOOD PRESSURE - MUSE: NORMAL MMHG
INTERPRETATION ECG - MUSE: NORMAL
P AXIS - MUSE: 48 DEGREES
PR INTERVAL - MUSE: 176 MS
QRS DURATION - MUSE: 98 MS
QT - MUSE: 418 MS
QTC - MUSE: 434 MS
R AXIS - MUSE: 63 DEGREES
SYSTOLIC BLOOD PRESSURE - MUSE: NORMAL MMHG
T AXIS - MUSE: 42 DEGREES
VENTRICULAR RATE- MUSE: 65 BPM

## 2022-04-04 LAB
ATRIAL RATE - MUSE: 87 BPM
DIASTOLIC BLOOD PRESSURE - MUSE: NORMAL MMHG
INTERPRETATION ECG - MUSE: NORMAL
P AXIS - MUSE: 79 DEGREES
PR INTERVAL - MUSE: 176 MS
QRS DURATION - MUSE: 88 MS
QT - MUSE: 400 MS
QTC - MUSE: 481 MS
R AXIS - MUSE: 85 DEGREES
SYSTOLIC BLOOD PRESSURE - MUSE: NORMAL MMHG
T AXIS - MUSE: 52 DEGREES
VENTRICULAR RATE- MUSE: 87 BPM

## 2022-04-15 ENCOUNTER — OFFICE VISIT (OUTPATIENT)
Dept: FAMILY MEDICINE | Facility: CLINIC | Age: 57
End: 2022-04-15
Payer: COMMERCIAL

## 2022-04-15 VITALS
TEMPERATURE: 98.9 F | DIASTOLIC BLOOD PRESSURE: 60 MMHG | WEIGHT: 235.5 LBS | BODY MASS INDEX: 39.19 KG/M2 | OXYGEN SATURATION: 97 % | HEART RATE: 75 BPM | SYSTOLIC BLOOD PRESSURE: 110 MMHG

## 2022-04-15 DIAGNOSIS — J01.90 ACUTE SINUSITIS WITH SYMPTOMS > 10 DAYS: Primary | ICD-10-CM

## 2022-04-15 DIAGNOSIS — J02.9 SORE THROAT: ICD-10-CM

## 2022-04-15 LAB
DEPRECATED S PYO AG THROAT QL EIA: NEGATIVE
GROUP A STREP BY PCR: NOT DETECTED

## 2022-04-15 PROCEDURE — U0003 INFECTIOUS AGENT DETECTION BY NUCLEIC ACID (DNA OR RNA); SEVERE ACUTE RESPIRATORY SYNDROME CORONAVIRUS 2 (SARS-COV-2) (CORONAVIRUS DISEASE [COVID-19]), AMPLIFIED PROBE TECHNIQUE, MAKING USE OF HIGH THROUGHPUT TECHNOLOGIES AS DESCRIBED BY CMS-2020-01-R: HCPCS | Performed by: FAMILY MEDICINE

## 2022-04-15 PROCEDURE — 87651 STREP A DNA AMP PROBE: CPT | Performed by: FAMILY MEDICINE

## 2022-04-15 PROCEDURE — 99213 OFFICE O/P EST LOW 20 MIN: CPT | Performed by: FAMILY MEDICINE

## 2022-04-15 PROCEDURE — U0005 INFEC AGEN DETEC AMPLI PROBE: HCPCS | Performed by: FAMILY MEDICINE

## 2022-04-15 RX ORDER — FLUTICASONE PROPIONATE 50 MCG
2 SPRAY, SUSPENSION (ML) NASAL DAILY
Qty: 16 G | Refills: 1 | Status: SHIPPED | OUTPATIENT
Start: 2022-04-15 | End: 2022-05-30

## 2022-04-15 ASSESSMENT — ANXIETY QUESTIONNAIRES
GAD7 TOTAL SCORE: 1
1. FEELING NERVOUS, ANXIOUS, OR ON EDGE: NOT AT ALL
GAD7 TOTAL SCORE: 1
7. FEELING AFRAID AS IF SOMETHING AWFUL MIGHT HAPPEN: NOT AT ALL
7. FEELING AFRAID AS IF SOMETHING AWFUL MIGHT HAPPEN: NOT AT ALL
3. WORRYING TOO MUCH ABOUT DIFFERENT THINGS: NOT AT ALL
GAD7 TOTAL SCORE: 1
5. BEING SO RESTLESS THAT IT IS HARD TO SIT STILL: NOT AT ALL
2. NOT BEING ABLE TO STOP OR CONTROL WORRYING: NOT AT ALL
4. TROUBLE RELAXING: SEVERAL DAYS
6. BECOMING EASILY ANNOYED OR IRRITABLE: NOT AT ALL

## 2022-04-15 ASSESSMENT — PATIENT HEALTH QUESTIONNAIRE - PHQ9
SUM OF ALL RESPONSES TO PHQ QUESTIONS 1-9: 5
10. IF YOU CHECKED OFF ANY PROBLEMS, HOW DIFFICULT HAVE THESE PROBLEMS MADE IT FOR YOU TO DO YOUR WORK, TAKE CARE OF THINGS AT HOME, OR GET ALONG WITH OTHER PEOPLE: SOMEWHAT DIFFICULT
SUM OF ALL RESPONSES TO PHQ QUESTIONS 1-9: 5

## 2022-04-15 NOTE — PROGRESS NOTES
Assessment & Plan     Acute sinusitis with symptoms > 10 days  Covid testing obtained.  Overall, symptoms are consistent with acute sinusitis.  Prescription for Augmentin twice daily for 10 days sent to pharmacy.  Counseled on use of medication and side effects.  Recommend that she also use fluticasone nasal spray.  Encouraged increased rest and fluids.  Follow-up if symptoms worsen, do not improve or if new concerns develop.  - amoxicillin-clavulanate (AUGMENTIN) 875-125 MG tablet  Dispense: 20 tablet; Refill: 0  - fluticasone (FLONASE) 50 MCG/ACT nasal spray  Dispense: 16 g; Refill: 1    Sore throat  Rapid strep is negative.  Sore throat most likely due to postnasal drainage from acute sinusitis  - Streptococcus A Rapid Scr w Reflx to PCR - Lab Collect  - Streptococcus A Rapid Scr w Reflx to PCR - Lab Collect  - Symptomatic; Unknown COVID-19 Virus (Coronavirus) by PCR Nose  - Group A Streptococcus PCR Throat Swab                   No follow-ups on file.    Anne Marie Steiner MD  [facility]    Fransisco Gonsalez is a 57 year old who presents for the following health issues     HPI   She comes in today complaining of headache, body aches, sore throat and ear pain.  Symptoms have been going on for past couple of weeks.  About 2 weeks ago she noticed pain and plugged feeling in her left ear.  Then developed a sore throat about a week and a half ago.  Last week she developed soreness in the gums on the left side as well as in her left jaw.  She has had a headache across her forehead.  She does have postnasal drainage.  Has had slight runny nose.  Has had occasional cough.  No fevers.  Has been taking over-the-counter Tylenol sinus medication.  She has not had a Covid test.  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  We reviewed her medications and allergies.  Review of systems is otherwise negative.        Review of Systems          Objective    /60 (BP Location: Left arm, Cuff Size: Adult Regular)   Pulse 75   Temp 98.9  F (37.2  C) (Temporal)   Wt 106.8 kg (235 lb 8 oz)   SpO2 97%   BMI 39.19 kg/m    Body mass index is 39.19 kg/m .  Physical Exam   GENERAL: healthy, alert and no distress  EYES: Eyes grossly normal to inspection, PERRL and conjunctivae and sclerae normal  HENT: ear canals and TM's normal, nose and mouth without ulcers or lesions  RESP: lungs clear to auscultation - no rales, rhonchi or wheezes  CV: regular rate and rhythm, normal S1 S2, no S3 or S4, no murmur, click or rub, no peripheral edema and peripheral pulses strong  PSYCH: mentation appears normal, affect normal/bright    Results for orders placed or performed in visit on 04/15/22 (from the past 24 hour(s))   Streptococcus A Rapid Scr w Reflx to PCR - Lab Collect    Specimen: Throat; Swab   Result Value Ref Range    Group A Strep antigen Negative Negative

## 2022-04-16 LAB — SARS-COV-2 RNA RESP QL NAA+PROBE: NEGATIVE

## 2022-04-16 ASSESSMENT — ANXIETY QUESTIONNAIRES: GAD7 TOTAL SCORE: 1

## 2022-04-16 ASSESSMENT — PATIENT HEALTH QUESTIONNAIRE - PHQ9: SUM OF ALL RESPONSES TO PHQ QUESTIONS 1-9: 5

## 2022-05-05 ENCOUNTER — E-VISIT (OUTPATIENT)
Dept: FAMILY MEDICINE | Facility: CLINIC | Age: 57
End: 2022-05-05
Payer: COMMERCIAL

## 2022-05-05 DIAGNOSIS — M79.673 HEEL PAIN, UNSPECIFIED LATERALITY: Primary | ICD-10-CM

## 2022-05-05 PROCEDURE — 99207 PR NON-BILLABLE SERV PER CHARTING: CPT | Performed by: NURSE PRACTITIONER

## 2022-05-05 NOTE — PATIENT INSTRUCTIONS
Thank you for choosing us for your care. I think an in-clinic visit would be best next steps based on your symptoms. Please schedule a clinic appointment; you won t be charged for this eVisit.      You can schedule an appointment right here in [facility], or call [phone]

## 2022-05-24 NOTE — TELEPHONE ENCOUNTER
NURIA to call clinic.  Sujata GRIFFIN CMA    
Patient returned call. Patient aware that letter was written stating patient was high risk. Roseann NICOLE MA  
Patient returning call, please call to advise.   
Please notify patient that eLsly Arteaga CNP is not in office this week.  A letter was provided stating that she was high risk 2 days ago.  Was she able to give this to her employer through [employer]?    Indira Pressley CNP    
No excercise/No heavy lifting/No sports/gym/No weight bearing/Elevate extremity

## 2022-05-25 ENCOUNTER — OFFICE VISIT (OUTPATIENT)
Dept: FAMILY MEDICINE | Facility: CLINIC | Age: 57
End: 2022-05-25
Payer: COMMERCIAL

## 2022-05-25 VITALS
OXYGEN SATURATION: 98 % | RESPIRATION RATE: 16 BRPM | WEIGHT: 237 LBS | TEMPERATURE: 98.5 F | SYSTOLIC BLOOD PRESSURE: 126 MMHG | DIASTOLIC BLOOD PRESSURE: 82 MMHG | HEART RATE: 67 BPM | HEIGHT: 64 IN | BODY MASS INDEX: 40.46 KG/M2

## 2022-05-25 DIAGNOSIS — M25.561 ACUTE PAIN OF RIGHT KNEE: ICD-10-CM

## 2022-05-25 DIAGNOSIS — M25.559 HIP PAIN: ICD-10-CM

## 2022-05-25 DIAGNOSIS — M79.672 PAIN OF LEFT HEEL: Primary | ICD-10-CM

## 2022-05-25 PROCEDURE — 99214 OFFICE O/P EST MOD 30 MIN: CPT | Performed by: PHYSICIAN ASSISTANT

## 2022-05-25 ASSESSMENT — ENCOUNTER SYMPTOMS
CARDIOVASCULAR NEGATIVE: 1
RESPIRATORY NEGATIVE: 1
CONSTITUTIONAL NEGATIVE: 1

## 2022-05-25 ASSESSMENT — PAIN SCALES - GENERAL: PAINLEVEL: EXTREME PAIN (8)

## 2022-05-25 NOTE — PATIENT INSTRUCTIONS
Please call the radiology dep at [facility] to schedule your test today at [phone]

## 2022-05-25 NOTE — PROGRESS NOTES
Progress Note  5/25/22     Chief Complaint   Patient presents with     Foot Problems     Constant pain in left heel after no injury x 1 mo     Knee Problem     Right inner knee is painful with movement and is swelling after no injury x 2 wk     Hip Problem     Chronic pain with arthritis in bilateral hips has used cortisone inj in the past for relief         HPI    Sunshine Delgado is a pleasant  57 year old year old female  who present to the clinic today for evaluation bilateral hip pain, knee pain, and heel pain.    She has been having chronic ongoing bilateral hip pain for about 5 or 6 years.  She did undergo some x-rays and it did show some arthritis in her hips along with bursitis.  She followed up with Ortho and they did bilateral hip injections.  She did well with this.  The last few months her hip pain has worsened again.  No injury to her hips.  She has been using Tylenol and massage which helps sent for bit.  She states that her hip pain is worse throughout the day.  Getting up also makes her hip pain worse.    She is also been having right knee pain for about 2 weeks.  No injury or falls that she can think of that precipitated her pain.  Her pain is worse from going from a sitting to a standing position or lifting her leg up in bed.  Majority of the pain is located in the medial aspect and she also has some mild swelling that has associated with it.  She also notes that the leg pops at times and feels like it is weak and would give out.  Again she is doing massage and Tylenol which does help for a few hours.  She has not noticed any redness or warmth to the knee.    She has been having some heel pain to her left heel for about 2 months.  She describes as a sharp shooting pain and it does radiate down her foot.  She is on her feet a lot on concrete at work.  No injury that she can think of.  She has been doing massage and Tylenol which helps.  ROS    Review of Systems   Constitutional: Negative.     Respiratory: Negative.    Cardiovascular: Negative.    Genitourinary: Negative.    Musculoskeletal:        Bilateral hip pain-chronic getting worse.  Had injections 4 to 5 years ago.  No injury  Left heel pain x2 months.  No injury  Right knee pain x2 weeks.  No injury            Patient Active Problem List   Diagnosis     Generalized anxiety disorder     CARDIOVASCULAR SCREENING; LDL GOAL LESS THAN 160     MARIA GUADALUPE (obstructive sleep apnea)- severe (AHI 84)     Morbid obesity (H)     Health Care Home     Mild major depression (H)     Insomnia     Bee sting allergy     CKD (chronic kidney disease) stage 3, GFR 30-59 ml/min (H)     Hashimoto's thyroiditis     Elevated liver enzymes     Infectious mononucleosis     Acute pain of right shoulder     Impingement syndrome of shoulder region, right     Attention deficit hyperactivity disorder (ADHD), unspecified ADHD type     Atrial fibrillation with controlled ventricular rate (H)        Past Medical History:   Diagnosis Date     Antiplatelet or antithrombotic long-term use      Arrhythmia      Atrial fibrillation with rapid ventricular response (H) 1/26/2020     Bee sting allergy      Depressive disorder      Generalized anxiety disorder 10/15/2009    lexapro made things worse.       Hashimoto's thyroiditis 5/6/2020     Morbid obesity (H)      Ocular migraine      MARIA GUADALUPE (obstructive sleep apnea)- severe (AHI 84) 09/09/2011    patient not using since 2019     Renal disease      Voice fatigue 10/22/2009          Social History     Socioeconomic History     Marital status:      Spouse name: Not on file     Number of children: 1     Years of education: Not on file     Highest education level: Not on file   Occupational History     Occupation:      Comment: [employer]     Employer: [employer]   Tobacco Use     Smoking status: Never Smoker     Smokeless tobacco: Never Used   Substance and Sexual Activity     Alcohol use: Yes      "Comment: 1-2 per mo     Drug use: Not Currently     Types: Marijuana     Comment:  last dose     Sexual activity: Yes     Partners: Male     Birth control/protection: Female Surgical   Other Topics Concern     Parent/sibling w/ CABG, MI or angioplasty before 65F 55M? No   Social History Narrative    ,  had glioblastoma,  2015    [place], Makes jewelry    Started the \"[employer] project\"        Reggie, born , son. Has aspergalcides             Social Determinants of Health     Financial Resource Strain: Not on file   Food Insecurity: Not on file   Transportation Needs: Not on file   Physical Activity: Not on file   Stress: Not on file   Social Connections: Not on file   Intimate Partner Violence: Not on file   Housing Stability: Not on file           Allergies   Allergen Reactions     Cephalexin Hives     Strawberry Hives     Sulfa Drugs Hives     Cinnamon Hives     Sulfamethoxazole-Trimethoprim Hives     Vicodin [Hydrocodone-Acetaminophen]      Wasp Venom Protein      Other reaction(s): Chest Pain     Wasps [Hornets] Swelling     Now carries Epi Pen     Zoloft [Sertraline]      suicidal ideation     Contrast Dye Other (See Comments) and Rash     Patient had sneezing and an itchy throat following contrast injection of 100 mL Isovue-370.  From IV contrast dye          Current Outpatient Medications   Medication     acetaminophen (TYLENOL) 325 MG tablet     apixaban ANTICOAGULANT (ELIQUIS ANTICOAGULANT) 5 MG tablet     buPROPion (WELLBUTRIN XL) 300 MG 24 hr tablet     EPINEPHrine (AUVI-Q) 0.3 MG/0.3ML injection 2-pack     fluticasone (FLONASE) 50 MCG/ACT nasal spray     levothyroxine (SYNTHROID/LEVOTHROID) 75 MCG tablet     traZODone (DESYREL) 50 MG tablet     venlafaxine (EFFEXOR-ER) 75 MG 24 hr tablet     Current Facility-Administered Medications   Medication     triamcinolone (KENALOG-40) injection 40 mg            /82 (BP Location: Left arm, Patient Position: Sitting, " "Cuff Size: Adult Regular)   Pulse 67   Temp 98.5  F (36.9  C) (Oral)   Resp 16   Ht 1.626 m (5' 4\")   Wt 107.5 kg (237 lb)   SpO2 98%   BMI 40.68 kg/m       No results found for this or any previous visit (from the past 240 hour(s)).       Physical Exam:     Physical Exam  Vitals and nursing note reviewed.   Constitutional:       General: She is not in acute distress.     Appearance: She is not ill-appearing, toxic-appearing or diaphoretic.   HENT:      Head: Normocephalic and atraumatic.   Cardiovascular:      Rate and Rhythm: Normal rate and regular rhythm.      Heart sounds: No murmur heard.    No friction rub. No gallop.   Pulmonary:      Effort: Pulmonary effort is normal.      Breath sounds: No wheezing, rhonchi or rales.   Musculoskeletal:      Right hip: No deformity, tenderness or bony tenderness. Normal range of motion.      Left hip: No deformity, tenderness or bony tenderness. Normal range of motion.      Right upper leg: Normal.      Left upper leg: Normal.      Right knee: Swelling present. No effusion, erythema, lacerations or bony tenderness. Normal range of motion. Tenderness present over the medial joint line. No lateral joint line, MCL, LCL, ACL, PCL or patellar tendon tenderness.      Comments: Bilateral hip pain-benign exam today.  No pain with palpation.    Right knee: Mild swelling to the medial aspect with medial joint line pain.  Positive valgus and Garry's test.  No redness or warmth noted.  Full range of motion to the knee but does elicit pain with flexion.  Weakness to the lower extremity    Calcaneus-pain over the left calcaneus and posterior heel.  Pain does radiate to the midfoot.  No redness, warmth, or swelling.  No other injury noted.  Full sensation to the foot.  CMS to the foot and heel are intact   Neurological:      Mental Status: She is alert.              Assessment/Plan:       ICD-10-CM    1. Pain of left heel  M79.672 XR Calcaneus Left G/E 2 Views   2. Acute pain " of right knee  M25.561 MR Knee Right w/o Contrast     CANCELED: XR Knee Left 3 Views   3. Hip pain  M25.559 Orthopedic  Referral       #1 bilateral hip pain-chronic in nature.  No injury noted.  She does have history of arthritis bilaterally with bursitis.  Physical exam is benign today.  She has had done injections in the past and she would like to follow-up with Ortho to trial an injection.  Ortho referral has been placed.    #2 right knee pain- On physical exam she has significant tenderness to the medial joint line along with some swelling.  She did have a positive valgus test and a positive Springer test as well.  With the positive test and the swelling and the location of her pain I would be worried about a meniscus injury.  We will start with an x-ray today to rule out any fractures.  Also place an order for an MRI.  I encouraged her to continue with Tylenol and can apply a topical Biofreeze or Aspercreme for pain.  Recommended picking up a splint for support.  Continue to monitor symptoms and let me know if things worsen between now and getting her MRI.    #3 left heel pain-I do suspect that this is Planter fasciitis.  We will get an x-ray to rule out any spurring or any other abnormalities.  Recommending icing continue with Tylenol and massage for the meantime.  If she is continuing to do these supportive cares for the next 2 to 3 weeks and there is no improvement we could have her see podiatry for night splints.       Hadley Grace PA-C

## 2022-05-29 DIAGNOSIS — I48.0 PAF (PAROXYSMAL ATRIAL FIBRILLATION) (H): ICD-10-CM

## 2022-05-29 DIAGNOSIS — J01.90 ACUTE SINUSITIS WITH SYMPTOMS > 10 DAYS: ICD-10-CM

## 2022-05-30 RX ORDER — FLUTICASONE PROPIONATE 50 MCG
2 SPRAY, SUSPENSION (ML) NASAL DAILY
Qty: 16 G | Refills: 11 | Status: SHIPPED | OUTPATIENT
Start: 2022-05-30 | End: 2023-04-25

## 2022-05-31 NOTE — TELEPHONE ENCOUNTER
"Last Written Prescription Date:  4/15/22  Last Fill Quantity: 16,  # refills: 1   Last office visit provider:  5/25/22     Requested Prescriptions   Pending Prescriptions Disp Refills     fluticasone (FLONASE) 50 MCG/ACT nasal spray [Pharmacy Med Name: FLUTICASONE PROPIONATE 50 SUSP]  1     Sig: SPRAY 2 SPRAYS INTO BOTH NOSTRILS DAILY       Nasal Allergy Protocol Passed - 5/29/2022  5:02 AM        Passed - Patient is age 12 or older        Passed - Recent (12 mo) or future (30 days) visit within the authorizing provider's specialty     Patient has had an office visit with the authorizing provider or a provider within the authorizing providers department within the previous 12 mos or has a future within next 30 days. See \"Patient Info\" tab in inbasket, or \"Choose Columns\" in Meds & Orders section of the refill encounter.              Passed - Medication is active on med list             Kelly oGetz RN 05/30/22 9:35 PM  "

## 2022-06-05 ENCOUNTER — HEALTH MAINTENANCE LETTER (OUTPATIENT)
Age: 57
End: 2022-06-05

## 2022-06-13 ENCOUNTER — OFFICE VISIT (OUTPATIENT)
Dept: FAMILY MEDICINE | Facility: CLINIC | Age: 57
End: 2022-06-13
Payer: COMMERCIAL

## 2022-06-13 VITALS
DIASTOLIC BLOOD PRESSURE: 72 MMHG | SYSTOLIC BLOOD PRESSURE: 124 MMHG | WEIGHT: 237 LBS | OXYGEN SATURATION: 96 % | BODY MASS INDEX: 40.68 KG/M2 | TEMPERATURE: 98.6 F | HEART RATE: 89 BPM

## 2022-06-13 DIAGNOSIS — H65.191 OTHER ACUTE NONSUPPURATIVE OTITIS MEDIA OF RIGHT EAR, RECURRENCE NOT SPECIFIED: Primary | ICD-10-CM

## 2022-06-13 DIAGNOSIS — N18.31 STAGE 3A CHRONIC KIDNEY DISEASE (H): ICD-10-CM

## 2022-06-13 DIAGNOSIS — E66.01 MORBID OBESITY (H): ICD-10-CM

## 2022-06-13 DIAGNOSIS — I48.91 ATRIAL FIBRILLATION WITH CONTROLLED VENTRICULAR RATE (H): ICD-10-CM

## 2022-06-13 PROCEDURE — 99213 OFFICE O/P EST LOW 20 MIN: CPT | Performed by: PHYSICIAN ASSISTANT

## 2022-06-13 ASSESSMENT — PAIN SCALES - GENERAL: PAINLEVEL: EXTREME PAIN (8)

## 2022-06-13 NOTE — PROGRESS NOTES
Assessment & Plan     Other acute nonsuppurative otitis media of right ear, recurrence not specified  Symptoms appear to be related to acute otitis media with surrounding lymphadenopathy. Will treat with antibiotics for 10 days. If not improving, she should return for further follow up. No further quetsions today.  - amoxicillin-clavulanate (AUGMENTIN) 875-125 MG tablet; Take 1 tablet by mouth 2 times daily for 10 days    Atrial fibrillation with controlled ventricular rate (H)  Stable. Recent ablation.    Stage 3a chronic kidney disease (H)  Last GFR was 58.    Morbid obesity (H)  Stable.                   Return in about 1 week (around 6/20/2022) for worsening symptoms or failure to improve..    LINDA [facility] JONATAN Gonsalez is a 57 year old who presents for the following health issues     History of Present Illness       Reason for visit:  Painful lump behind my ear. Sores or blisters on my head. Swollen glands and body aches all over mostly joints.  Symptom onset:  More than a month  Symptoms include:  Pain swollen glands headaches eye twitching. Scalp hurts. Lump behind my ear with swelling and pain  Symptom intensity:  Moderate  Symptom progression:  Worsening  Had these symptoms before:  No  What makes it worse:  It s just constant and no relief.  What makes it better:  Nope    She eats 2-3 servings of fruits and vegetables daily.She consumes 0 sweetened beverage(s) daily.She exercises with enough effort to increase her heart rate 20 to 29 minutes per day.  She is missing 7 dose(s) of medications per week.     Scalp feels rashy. Lump behind right ear. Left eye twitching.   No fever, chills. Feels like glands are swollen.   Headache.     Ringing in ears on Thurs/Fri. Feel full.   Was in [place] the past weekend.   No tick bites that she is aware of.        Review of Systems   Constitutional, HEENT, cardiovascular, pulmonary, gi and gu systems are negative,  except as otherwise noted.      Objective    /72 (BP Location: Right arm, Patient Position: Chair, Cuff Size: Adult Large)   Pulse 89   Temp 98.6  F (37  C) (Oral)   Wt 107.5 kg (237 lb)   SpO2 96%   BMI 40.68 kg/m    Body mass index is 40.68 kg/m .  Physical Exam   GENERAL: healthy, alert and no distress  EYES: Eyes grossly normal to inspection, PERRL and conjunctivae and sclerae normal  HENT: Right ear canal erythematous, TM bulging and erythematous. Left ear canal erythematous, TM normal. nose and mouth without ulcers or lesions  NECK: no adenopathy, no asymmetry, masses, or scars and thyroid normal to palpation  RESP: lungs clear to auscultation - no rales, rhonchi or wheezes  CV: regular rate and rhythm, normal S1 S2, no S3 or S4, no murmur, click or rub, no peripheral edema and peripheral pulses strong  MS: no gross musculoskeletal defects noted, no edema

## 2022-06-16 DIAGNOSIS — F90.9 ATTENTION DEFICIT HYPERACTIVITY DISORDER (ADHD), UNSPECIFIED ADHD TYPE: ICD-10-CM

## 2022-06-16 DIAGNOSIS — F41.1 GENERALIZED ANXIETY DISORDER: Chronic | ICD-10-CM

## 2022-06-16 DIAGNOSIS — F33.1 MAJOR DEPRESSIVE DISORDER, RECURRENT EPISODE, MODERATE (H): Chronic | ICD-10-CM

## 2022-06-16 RX ORDER — BUPROPION HYDROCHLORIDE 300 MG/1
300 TABLET ORAL EVERY MORNING
Qty: 90 TABLET | Refills: 0 | Status: SHIPPED | OUTPATIENT
Start: 2022-06-16 | End: 2022-09-14

## 2022-06-16 NOTE — TELEPHONE ENCOUNTER
"Requested Prescriptions   Pending Prescriptions Disp Refills     buPROPion (WELLBUTRIN XL) 300 MG 24 hr tablet [Pharmacy Med Name: BUPROPION HCL ER (XL) 300MG TB24] 90 tablet 0     Sig: TAKE 1 TABLET (300 MG) BY MOUTH EVERY MORNING       SSRIs Protocol Failed - 6/16/2022  5:02 AM        Failed - PHQ-9 score less than 5 in past 6 months     Please review last PHQ-9 score.           Passed - Medication is Bupropion     If the medication is Bupropion (Wellbutrin), and the patient is taking for smoking cessation; OK to refill.          Passed - Medication is active on med list        Passed - Patient is age 18 or older        Passed - No active pregnancy on record        Passed - No positive pregnancy test in last 12 months        Passed - Recent (6 mo) or future (30 days) visit within the authorizing provider's specialty     Patient had office visit in the last 6 months or has a visit in the next 30 days with authorizing provider or within the authorizing provider's specialty.  See \"Patient Info\" tab in inbasket, or \"Choose Columns\" in Meds & Orders section of the refill encounter.                   Thanks,  VELMA Harvey  [facility]     "

## 2022-06-17 ENCOUNTER — ANCILLARY PROCEDURE (OUTPATIENT)
Dept: MRI IMAGING | Facility: CLINIC | Age: 57
End: 2022-06-17
Attending: PHYSICIAN ASSISTANT
Payer: COMMERCIAL

## 2022-06-17 DIAGNOSIS — M25.561 ACUTE PAIN OF RIGHT KNEE: ICD-10-CM

## 2022-06-17 PROCEDURE — 73721 MRI JNT OF LWR EXTRE W/O DYE: CPT | Mod: RT | Performed by: RADIOLOGY

## 2022-06-19 DIAGNOSIS — M25.562 LEFT KNEE PAIN, UNSPECIFIED CHRONICITY: Primary | ICD-10-CM

## 2022-07-06 NOTE — TELEPHONE ENCOUNTER
DIAGNOSIS: RT knee   APPOINTMENT DATE: 07/14/2022   NOTES STATUS DETAILS   OFFICE NOTE from referring provider Internal 05/25/2022 Dr [facility]    OFFICE NOTE from other specialist N/A    DISCHARGE SUMMARY from hospital N/A    DISCHARGE REPORT from the ER N/A    OPERATIVE REPORT N/A    EMG report N/A    MEDICATION LIST N/A    MRI Internal 06/17/2022 RT knee   DEXA (osteoporosis/bone health) N/A    CT SCAN N/A    XRAYS (IMAGES & REPORTS) Internal 05/25/2022 RT knee

## 2022-07-12 ENCOUNTER — OFFICE VISIT (OUTPATIENT)
Dept: URGENT CARE | Facility: URGENT CARE | Age: 57
End: 2022-07-12
Payer: COMMERCIAL

## 2022-07-12 VITALS
HEART RATE: 80 BPM | SYSTOLIC BLOOD PRESSURE: 115 MMHG | OXYGEN SATURATION: 95 % | WEIGHT: 237 LBS | BODY MASS INDEX: 40.68 KG/M2 | TEMPERATURE: 99.4 F | DIASTOLIC BLOOD PRESSURE: 82 MMHG

## 2022-07-12 DIAGNOSIS — H69.93 DYSFUNCTION OF BOTH EUSTACHIAN TUBES: ICD-10-CM

## 2022-07-12 DIAGNOSIS — J01.90 ACUTE SINUSITIS WITH COEXISTING CONDITION, NEED PROPHYLACTIC TREATMENT: Primary | ICD-10-CM

## 2022-07-12 PROCEDURE — 99213 OFFICE O/P EST LOW 20 MIN: CPT | Performed by: PHYSICIAN ASSISTANT

## 2022-07-12 RX ORDER — DOXYCYCLINE 100 MG/1
100 CAPSULE ORAL 2 TIMES DAILY WITH MEALS
Qty: 20 CAPSULE | Refills: 0 | Status: SHIPPED | OUTPATIENT
Start: 2022-07-12 | End: 2022-07-22

## 2022-07-12 ASSESSMENT — ENCOUNTER SYMPTOMS
CARDIOVASCULAR NEGATIVE: 1
GASTROINTESTINAL NEGATIVE: 1
WHEEZING: 0
RHINORRHEA: 1
CHILLS: 0
SINUS PRESSURE: 1
SINUS PAIN: 1
SHORTNESS OF BREATH: 0
PALPITATIONS: 0
SORE THROAT: 1
FATIGUE: 0
CONSTITUTIONAL NEGATIVE: 1
COUGH: 1
FEVER: 0

## 2022-07-12 NOTE — PROGRESS NOTES
Fransisco Gonsalez is a 57 year old, presenting for the following health issues:  Sinus Problem    HPI   Acute Illness  Acute illness concerns:   Onset/Duration: 1week or so  Symptoms:  Fever: No  Chills/Sweats: No  Headache (location?): YES  Sinus Pressure: YES, teeth pain  Conjunctivitis:  No  Ear Pain: YES: bilateral  Rhinorrhea: YES  Congestion: YES  Sore Throat: YES from post nasal drainage  Cough: YES-non-productive  Wheeze: No  Decreased Appetite: No  Nausea: No  Vomiting: No  Diarrhea: No  Dysuria/Freq.: No  Dysuria or Hematuria: No  Fatigue/Achiness: No  Sick/Strep Exposure: No  Therapies tried and outcome: rest, fluids, mucinex, tylenol, nasonex with minimal relief    Patient Active Problem List   Diagnosis     Generalized anxiety disorder     CARDIOVASCULAR SCREENING; LDL GOAL LESS THAN 160     MARIA GUADALUPE (obstructive sleep apnea)- severe (AHI 84)     Morbid obesity (H)     Health Care Home     Mild major depression (H)     Insomnia     Bee sting allergy     CKD (chronic kidney disease) stage 3, GFR 30-59 ml/min (H)     Hashimoto's thyroiditis     Elevated liver enzymes     Infectious mononucleosis     Acute pain of right shoulder     Impingement syndrome of shoulder region, right     Attention deficit hyperactivity disorder (ADHD), unspecified ADHD type     Atrial fibrillation with controlled ventricular rate (H)     Current Outpatient Medications   Medication     acetaminophen (TYLENOL) 325 MG tablet     apixaban ANTICOAGULANT (ELIQUIS ANTICOAGULANT) 5 MG tablet     buPROPion (WELLBUTRIN XL) 300 MG 24 hr tablet     EPINEPHrine (AUVI-Q) 0.3 MG/0.3ML injection 2-pack     fluticasone (FLONASE) 50 MCG/ACT nasal spray     levothyroxine (SYNTHROID/LEVOTHROID) 75 MCG tablet     traZODone (DESYREL) 50 MG tablet     venlafaxine (EFFEXOR XR) 37.5 MG 24 hr capsule     venlafaxine (EFFEXOR-ER) 75 MG 24 hr tablet     Current Facility-Administered Medications   Medication     triamcinolone (KENALOG-40) injection 40 mg         Allergies   Allergen Reactions     Cephalexin Hives     Strawberry Hives     Sulfa Drugs Hives     Cinnamon Hives     Sulfamethoxazole-Trimethoprim Hives     Vicodin [Hydrocodone-Acetaminophen]      Wasp Venom Protein      Other reaction(s): Chest Pain     Wasps [Hornets] Swelling     Now carries Epi Pen     Zoloft [Sertraline]      suicidal ideation     Contrast Dye Other (See Comments) and Rash     Patient had sneezing and an itchy throat following contrast injection of 100 mL Isovue-370.  From IV contrast dye       Review of Systems   Constitutional: Negative.  Negative for chills, fatigue and fever.   HENT: Positive for congestion, ear pain, rhinorrhea, sinus pressure, sinus pain and sore throat. Negative for ear discharge and hearing loss.    Respiratory: Positive for cough. Negative for shortness of breath and wheezing.    Cardiovascular: Negative.  Negative for chest pain, palpitations and peripheral edema.   Gastrointestinal: Negative.    All other systems reviewed and are negative.           Objective    /82   Pulse 80   Temp 99.4  F (37.4  C) (Tympanic)   Wt 107.5 kg (237 lb)   SpO2 95%   BMI 40.68 kg/m    Body mass index is 40.68 kg/m .  Physical Exam  Vitals and nursing note reviewed.   Constitutional:       General: She is not in acute distress.     Appearance: Normal appearance. She is well-developed. She is obese. She is not ill-appearing.   HENT:      Head: Normocephalic and atraumatic.      Comments: TMs are intact without any erythema or bulging bilaterally.  Airway is patent.     Nose: Congestion and rhinorrhea present. Rhinorrhea is purulent.      Right Turbinates: Swollen.      Left Turbinates: Swollen.      Right Sinus: Maxillary sinus tenderness and frontal sinus tenderness present.      Left Sinus: Maxillary sinus tenderness and frontal sinus tenderness present.      Mouth/Throat:      Lips: Pink.      Mouth: Mucous membranes are moist.      Pharynx: Oropharynx is clear.  Uvula midline. No pharyngeal swelling, oropharyngeal exudate or posterior oropharyngeal erythema.      Tonsils: No tonsillar exudate.   Eyes:      General: No scleral icterus.     Extraocular Movements: Extraocular movements intact.      Conjunctiva/sclera: Conjunctivae normal.      Pupils: Pupils are equal, round, and reactive to light.   Neck:      Thyroid: No thyromegaly.   Cardiovascular:      Rate and Rhythm: Normal rate and regular rhythm.      Pulses: Normal pulses.      Heart sounds: Normal heart sounds, S1 normal and S2 normal. No murmur heard.    No friction rub. No gallop.   Pulmonary:      Effort: Pulmonary effort is normal. No accessory muscle usage, respiratory distress or retractions.      Breath sounds: Normal breath sounds and air entry. No stridor. No decreased breath sounds, wheezing, rhonchi or rales.   Musculoskeletal:      Cervical back: Normal range of motion and neck supple.   Lymphadenopathy:      Cervical: No cervical adenopathy.   Skin:     General: Skin is warm and dry.      Nails: There is no clubbing.   Neurological:      Mental Status: She is alert and oriented to person, place, and time.   Psychiatric:         Mood and Affect: Mood normal.         Behavior: Behavior normal.         Thought Content: Thought content normal.         Judgment: Judgment normal.          Assessment/Plan:  Acute sinusitis with coexisting condition, need prophylactic treatment: Will treat with [id number] for sinusitis and take with food/probiotics to minimize GI upset.  Recommend tylenol/ibuprofen prn pain/fever and zyrtec/pseudofed for sinus congestion.   Rest, fluids, chicken soup.  Recheck in clinic if symptoms worsen or if symptoms do not improve.    -     doxycycline monohydrate (MONODOX) 100 MG capsule; Take 1 capsule (100 mg) by mouth 2 times daily (with meals) for 10 days Increases risk of heartburn and also sun sensitivity or sun burn. Contraindicated in pregnancy.    Dysfunction of both eustachian  gladis Abraham See LINDA Michael              .  ..

## 2022-07-13 ENCOUNTER — OFFICE VISIT (OUTPATIENT)
Dept: CARDIOLOGY | Facility: CLINIC | Age: 57
End: 2022-07-13
Attending: NURSE PRACTITIONER
Payer: COMMERCIAL

## 2022-07-13 VITALS
OXYGEN SATURATION: 97 % | SYSTOLIC BLOOD PRESSURE: 114 MMHG | WEIGHT: 239.6 LBS | HEART RATE: 78 BPM | HEIGHT: 65 IN | DIASTOLIC BLOOD PRESSURE: 79 MMHG | BODY MASS INDEX: 39.92 KG/M2

## 2022-07-13 DIAGNOSIS — I48.91 ATRIAL FIBRILLATION, UNSPECIFIED TYPE (H): Primary | ICD-10-CM

## 2022-07-13 DIAGNOSIS — I48.91 ATRIAL FIBRILLATION WITH CONTROLLED VENTRICULAR RATE (H): ICD-10-CM

## 2022-07-13 PROCEDURE — G0463 HOSPITAL OUTPT CLINIC VISIT: HCPCS | Mod: 25

## 2022-07-13 PROCEDURE — 99213 OFFICE O/P EST LOW 20 MIN: CPT | Performed by: NURSE PRACTITIONER

## 2022-07-13 PROCEDURE — 93005 ELECTROCARDIOGRAM TRACING: CPT

## 2022-07-13 ASSESSMENT — PAIN SCALES - GENERAL: PAINLEVEL: NO PAIN (0)

## 2022-07-13 NOTE — PROGRESS NOTES
ELECTROPHYSIOLOGY CLINIC VISIT    Assessment/Recommendations   Assessment/Plan:    Ms. Delgado is a 56 year old female who has a past medical history significant for hashimoto's thyroiditis, MARIA GUADALUPE (uses CPAP), CKD, PAF (CHADSVASC 1 Eliquis) s/p DCCVs s/p PVI/CTI 21 s/p DCCV 21 s/p PVI/CTI 3/31/22, anxiety, and depression. She presents today for follow up. She had repeat PVI/CTI ablation on 3/31/22. Groin sites well healed. She reports feeling well. She has some minor occasional palpitations lasting seconds. No known AF/AFL recurrences. She denies chest discomfort, abdominal fullness/bloating or peripheral edema, shortness of breath, paroxysmal nocturnal dyspnea, orthopnea, lightheadedness, dizziness, pre-syncope, or syncope. Presenting 12 lead ECG shows NSR Vent Rate 72 bpm,  ms, QRS 88 ms, QTc 424 ms. Current cardiac medications include: Eliquis.     Paroxsymal Atrial Fibrillation/Flutter:   We discussed in detail with the patient management/treatment options for Skip includin. Stroke Prophylaxis:  CHADSVASC=+gender  1, corresponding to a 1.3% annual stroke / systemic emolism event rate. She is on Eliquis d/t ablation, but can stop now given 3 months post ablation. Can take 81 mg ASA for 1 month and then stop.    2. Rate Control: She had been on Metoprolol with fatigue and it was stopped. She had been on Atenolol which was recently stopped. Can consider CCB if needed.   3. Rhythm Control: Cardioversion, Antiarrhythmics and/or ablation are options for rhythm control. She has been having frequent PAF/AFL that is highly symptomatic. She has had a couple DCCVs with continued recurrences. She had been on Flecainide which was not effective. She was recently switched to amiodarone. We discussed that given her younger age, we do not favor keeping her on this long term. We discussed alternative AATs vs. Ablation. She elected to pursue ablation which she had PVI/CTI on 21. She had recurrent  persistent AF/AFL with zio from 10/2021 showing AF throughout and had DCCV on 11/22/21. She had recurrent AF/AFl and had a repeat PVI/CTI ablation on 3/31/22. Doing well now without recurrences.      4. Risk Factor Management: tight BP control, and MARIA GUADALUPE evaluation as indicated.    Follow up with Dr. Craig in 6 months.        History of Present Illness/Subjective    Ms. Sunshine Delgado is a 56 year old female who comes in today for EP follow-up of AF/AFL.    Ms. Delgado is a 56 year old female who has a past medical history significant for hashimoto's thyroiditis, MARIA GUADALUPE (uses CPAP), CKD, PAF (CHADSVASC 1 Eliquis) s/p DCCV s/p PVI 7/22/21 s/p PVI/CTI 3/31/22, anxiety, and depression.      She was admitted in 1/2020 to OSH with abdominal pain and was found to have infectious mononucleosis. At that time her ECG showed AF and she spontaneously converted while in the hospital. An echo showed normal LVEF, no significant valvular abnormalities, and small pericardial effusion. She was started on metoprolol and ASA. Her metoprolol was later stopped by PCP due to frequent lightheadedness. She established care with Dr. Fajardo. She followed up in 3/2021 and reported feeling palpitations on and off and one episode lasting up to 3 days. She felt some chest pressure, shortness of breath, and presyncope. ECG showed AF vent rate 89 bpm. She had been started on lisinopril in 2/2021 by PCP for CKD and had felt a little more lightheaded since then, but also notes she had lightheadedness predating the lisinopril. A zio patch monitor from 4/2/21-4/16/21 showed 29% AF/AFL burden up to 10 hours 54 minutes and 39 symptom activations showed mostly AF/AFL or PSVT and sometimes sinus without ectopy. She was placed on atenolol and reported fatigue with this. Flecainide 50 mg BID was added. Subsequent ECG stress testing was negative for ischemia or QRS widening. Her PAF episodes had completely subsided for about 1 month after starting the Flecainide  until end of 5/2021. She felt much improved when maintaining sinus. She then started developing increased AF symptoms, occurring daily. She has symptoms of chest tightness, dizziness, and palpitations with her AF.  She then presented to [facility] on 6/10/21 with symptomatic AF and underwent DCCV. Her Flecainide was increased to 100 mg BID. She represented to [facility] on 6/14/21 again with symptomatic AF and underwent DCCV. Her Flecainide and atenolol were stopped and she was changed to amiodarone. She was then referred to EP for consideration for ablation. At EP appointment on 6/16/21, she was in AFL and feeling fatigue, MCHUGH, lightheadedness/dizziness, and generally unwell. We loaded her with amiodarone and arranged for another DCCV. She presented to [facility] in sinus and it was canceled. She wanted to get off amiodarone if possible and elected to pursue ablation and had PVI/CTI on 7/22/21.     EP Visit 1/12/22: She presents today for follow up after ablation. Groin sites well healed. A zio patch monitor from 10/2021 showed AF throughout poor HR control with avg  bpm. She underwent DCCV on 11/22/21. She reports feeling OK. She notes feeling less symptomatic now with her AFL then before ablation. She does still have palpitations and decreased stamina. She denies chest discomfort, abdominal fullness/bloating or peripheral edema, shortness of breath, paroxysmal nocturnal dyspnea, orthopnea, lightheadedness, dizziness, pre-syncope, or syncope. Presenting 12 lead ECG shows AFL Vent Rate 78 bpm, QRS 74 ms, QTc 424 ms. Current cardiac medications include: Diltiazem.     She presents today for follow up. She had repeat PVI/CTI ablation on 3/31/22. Groin sites well healed. She reports feeling well. She has some minor occasional palpitations lasting seconds. No known AF/AFL recurrences. She denies chest discomfort, abdominal fullness/bloating or peripheral edema, shortness of breath, paroxysmal nocturnal dyspnea, orthopnea,  "lightheadedness, dizziness, pre-syncope, or syncope. Presenting 12 lead ECG shows NSR Vent Rate 72 bpm,  ms, QRS 88 ms, QTc 424 ms. Current cardiac medications include: Eliquis.       I have reviewed and updated the patient's Past Medical History, Social History, Family History and Medication List.     Cardiographics (Personally Reviewed) :   5/11/21 STRESS ECHOCARDIOGRAM:  Interpretation Summary  Submaximal ECG stress test  65% maximal predicted HR achieved. The test was terminated due to fatigue.  Normal blood pressure response to exercise.  The patient did not report any symptoms during exercise.  No ECG evidence of ischemia.  No QRS prolongation at peak exercise compared to rest.  No supraventricular or ventricular arrhythmias.  Good exercise tolerance. The patient achieved 10 METs at peak exercise.     1/27/20 ECHOCARDIOGRAM:   Final Conclusion   Normal left ventricular size.   Normal left ventricular systolic function.   No obvious regional wall motion abnormalities.   The ejection fraction is visually estimated at 55-60%.   The right ventricle is normal in size and function.   The left atrium is normal in size.   There is trace mitral regurgitation.   There is no significant tricuspid regurgitation.   Small pericardial effusion.   No echocardiographic findings to suggest hemodynamic effect of the pericardial fluid.   Estimated EF: 55-60%  7/2021 CTA:  IMPRESSION:  1. Normal pulmonary venous anatomy normal variant right middle  pulmonary vein. All pulmonary veins draining into the left atrium       Physical Examination   /79 (BP Location: Left arm, Patient Position: Chair, Cuff Size: Adult Regular)   Pulse 78   Ht 1.651 m (5' 5\")   Wt 108.7 kg (239 lb 9.6 oz)   SpO2 97%   BMI 39.87 kg/m    Wt Readings from Last 3 Encounters:   07/12/22 107.5 kg (237 lb)   06/13/22 107.5 kg (237 lb)   05/25/22 107.5 kg (237 lb)     General Appearance:   Alert, well-appearing and in no acute distress.   HEENT: " Atraumatic, normocephalic. PERRL.  MMM.   Chest/Lungs:   Respirations unlabored.  Lungs are clear to auscultation.   Cardiovascular:   Regular, S1/S2, no murmur.    Abdomen:  Soft, nontender, nondistended.   Extremities: No cyanosis or clubbing. No edema.    Musculoskeletal: Moves all extremities.  Gait normal.   Skin: Warm, dry, intact.    Neurologic: Mood and affect are appropriate.  Alert and oriented to person, place, time, and situation.          Medications  Allergies   Current Outpatient Medications   Medication Sig Dispense Refill     acetaminophen (TYLENOL) 325 MG tablet Take 325 mg by mouth every 4 hours as needed        apixaban ANTICOAGULANT (ELIQUIS ANTICOAGULANT) 5 MG tablet Take 1 tablet (5 mg) by mouth 2 times daily 180 tablet 1     buPROPion (WELLBUTRIN XL) 300 MG 24 hr tablet TAKE 1 TABLET (300 MG) BY MOUTH EVERY MORNING 90 tablet 0     doxycycline monohydrate (MONODOX) 100 MG capsule Take 1 capsule (100 mg) by mouth 2 times daily (with meals) for 10 days Increases risk of heartburn and also sun sensitivity or sun burn. Contraindicated in pregnancy. 20 capsule 0     EPINEPHrine (AUVI-Q) 0.3 MG/0.3ML injection 2-pack Inject 0.3 mLs (0.3 mg) into the muscle as needed for anaphylaxis 0.6 mL 3     fluticasone (FLONASE) 50 MCG/ACT nasal spray SPRAY 2 SPRAYS INTO BOTH NOSTRILS DAILY 16 g 11     levothyroxine (SYNTHROID/LEVOTHROID) 75 MCG tablet Take 1 tablet (75 mcg) by mouth daily 30 tablet 11     traZODone (DESYREL) 50 MG tablet TAKE TWO TO THREE TABLETS BY MOUTH AT BEDTIME 270 tablet 2     venlafaxine (EFFEXOR XR) 37.5 MG 24 hr capsule Take 1 capsule (37.5 mg) by mouth daily 90 capsule 0     venlafaxine (EFFEXOR-ER) 75 MG 24 hr tablet Take 1 tablet (75 mg) by mouth daily 90 tablet 1    Allergies   Allergen Reactions     Cephalexin Hives     Strawberry Hives     Sulfa Drugs Hives     Cinnamon Hives     Sulfamethoxazole-Trimethoprim Hives     Vicodin [Hydrocodone-Acetaminophen]      Wasp Venom Protein       Other reaction(s): Chest Pain     Wasps [Hornets] Swelling     Now carries Epi Pen     Zoloft [Sertraline]      suicidal ideation     Contrast Dye Other (See Comments) and Rash     Patient had sneezing and an itchy throat following contrast injection of 100 mL Isovue-370.  From IV contrast dye         Lab Results (Personally Reviewed)    Chemistry/lipid CBC Cardiac Enzymes/BNP/TSH/INR   Lab Results   Component Value Date    BUN 19 03/31/2022     03/31/2022    CO2 30 03/31/2022     Creatinine   Date Value Ref Range Status   03/31/2022 1.12 (H) 0.52 - 1.04 mg/dL Final   06/14/2021 1.22 (H) 0.52 - 1.04 mg/dL Final       Lab Results   Component Value Date    CHOL 219 (H) 03/07/2022    HDL 62 03/07/2022     (H) 03/07/2022      Lab Results   Component Value Date    WBC 5.8 03/31/2022    HGB 13.5 03/31/2022    HCT 41.5 03/31/2022    MCV 90 03/31/2022     03/31/2022    Lab Results   Component Value Date    TSH 1.83 01/12/2022    INR 1.27 (H) 03/31/2022        The patient states understanding and is agreeable with the plan.   LEOPOLDO Colin CNP  Electrophysiology Consult Service  Pager: [phone]

## 2022-07-13 NOTE — LETTER
2022       RE: Sunshine Delgado  [address]   [facility] [address]       Dear Colleague,    Thank you for the opportunity to participate in the care of your patient, Sunshine Delgado, at the [facility] [place] at [facility]. Please see a copy of my visit note below.        ELECTROPHYSIOLOGY CLINIC VISIT    Assessment/Recommendations   Assessment/Plan:    Ms. Delgado is a 56 year old female who has a past medical history significant for hashimoto's thyroiditis, MARIA GUADALUPE (uses CPAP), CKD, PAF (CHADSVASC 1 Eliquis) s/p DCCVs s/p PVI/CTI 21 s/p DCCV 21 s/p PVI/CTI 3/31/22, anxiety, and depression. She presents today for follow up. She had repeat PVI/CTI ablation on 3/31/22. Groin sites well healed. She reports feeling well. She has some minor occasional palpitations lasting seconds. No known AF/AFL recurrences. She denies chest discomfort, abdominal fullness/bloating or peripheral edema, shortness of breath, paroxysmal nocturnal dyspnea, orthopnea, lightheadedness, dizziness, pre-syncope, or syncope. Presenting 12 lead ECG shows NSR Vent Rate 72 bpm,  ms, QRS 88 ms, QTc 424 ms. Current cardiac medications include: Eliquis.     Paroxsymal Atrial Fibrillation/Flutter:   We discussed in detail with the patient management/treatment options for Skip includin. Stroke Prophylaxis:  CHADSVASC=+gender  1, corresponding to a 1.3% annual stroke / systemic emolism event rate. She is on Eliquis d/t ablation, but can stop now given 3 months post ablation. Can take 81 mg ASA for 1 month and then stop.    2. Rate Control: She had been on Metoprolol with fatigue and it was stopped. She had been on Atenolol which was recently stopped. Can consider CCB if needed.   3. Rhythm Control: Cardioversion, Antiarrhythmics and/or ablation are options for rhythm control. She has been having frequent PAF/AFL that is highly symptomatic. She has had a couple DCCVs  with continued recurrences. She had been on Flecainide which was not effective. She was recently switched to amiodarone. We discussed that given her younger age, we do not favor keeping her on this long term. We discussed alternative AATs vs. Ablation. She elected to pursue ablation which she had PVI/CTI on 7/22/21. She had recurrent persistent AF/AFL with zio from 10/2021 showing AF throughout and had DCCV on 11/22/21. She had recurrent AF/AFl and had a repeat PVI/CTI ablation on 3/31/22. Doing well now without recurrences.      4. Risk Factor Management: tight BP control, and MARIA GUADALUPE evaluation as indicated.    Follow up with Dr. Craig in 6 months.        History of Present Illness/Subjective    Ms. Sunshine Delgado is a 56 year old female who comes in today for EP follow-up of AF/AFL.    Ms. Delgado is a 56 year old female who has a past medical history significant for hashimoto's thyroiditis, MARIA GUADALUPE (uses CPAP), CKD, PAF (CHADSVASC 1 Eliquis) s/p DCCV s/p PVI 7/22/21 s/p PVI/CTI 3/31/22, anxiety, and depression.      She was admitted in 1/2020 to OSH with abdominal pain and was found to have infectious mononucleosis. At that time her ECG showed AF and she spontaneously converted while in the hospital. An echo showed normal LVEF, no significant valvular abnormalities, and small pericardial effusion. She was started on metoprolol and ASA. Her metoprolol was later stopped by PCP due to frequent lightheadedness. She established care with Dr. Fajardo. She followed up in 3/2021 and reported feeling palpitations on and off and one episode lasting up to 3 days. She felt some chest pressure, shortness of breath, and presyncope. ECG showed AF vent rate 89 bpm. She had been started on lisinopril in 2/2021 by PCP for CKD and had felt a little more lightheaded since then, but also notes she had lightheadedness predating the lisinopril. A zio patch monitor from 4/2/21-4/16/21 showed 29% AF/AFL burden up to 10 hours 54 minutes and 39  symptom activations showed mostly AF/AFL or PSVT and sometimes sinus without ectopy. She was placed on atenolol and reported fatigue with this. Flecainide 50 mg BID was added. Subsequent ECG stress testing was negative for ischemia or QRS widening. Her PAF episodes had completely subsided for about 1 month after starting the Flecainide until end of 5/2021. She felt much improved when maintaining sinus. She then started developing increased AF symptoms, occurring daily. She has symptoms of chest tightness, dizziness, and palpitations with her AF.  She then presented to [facility] [place] on 6/10/21 with symptomatic AF and underwent DCCV. Her Flecainide was increased to 100 mg BID. She represented to [facility] [place] on 6/14/21 again with symptomatic AF and underwent DCCV. Her Flecainide and atenolol were stopped and she was changed to amiodarone. She was then referred to EP for consideration for ablation. At EP appointment on 6/16/21, she was in AFL and feeling fatigue, MCHUGH, lightheadedness/dizziness, and generally unwell. We loaded her with amiodarone and arranged for another DCCV. She presented to [facility] in sinus and it was canceled. She wanted to get off amiodarone if possible and elected to pursue ablation and had PVI/CTI on 7/22/21.     EP Visit 1/12/22: She presents today for follow up after ablation. Groin sites well healed. A zio patch monitor from 10/2021 showed AF throughout poor HR control with avg  bpm. She underwent DCCV on 11/22/21. She reports feeling OK. She notes feeling less symptomatic now with her AFL then before ablation. She does still have palpitations and decreased stamina. She denies chest discomfort, abdominal fullness/bloating or peripheral edema, shortness of breath, paroxysmal nocturnal dyspnea, orthopnea, lightheadedness, dizziness, pre-syncope, or syncope. Presenting 12 lead ECG shows AFL Vent Rate 78 bpm, QRS 74 ms, QTc 424 ms. Current cardiac medications include: Diltiazem.     She presents today for follow  up. She had repeat PVI/CTI ablation on 3/31/22. Groin sites well healed. She reports feeling well. She has some minor occasional palpitations lasting seconds. No known AF/AFL recurrences. She denies chest discomfort, abdominal fullness/bloating or peripheral edema, shortness of breath, paroxysmal nocturnal dyspnea, orthopnea, lightheadedness, dizziness, pre-syncope, or syncope. Presenting 12 lead ECG shows NSR Vent Rate 72 bpm,  ms, QRS 88 ms, QTc 424 ms. Current cardiac medications include: Eliquis.       I have reviewed and updated the patient's Past Medical History, Social History, Family History and Medication List.     Cardiographics (Personally Reviewed) :   5/11/21 STRESS ECHOCARDIOGRAM:  Interpretation Summary  Submaximal ECG stress test  65% maximal predicted HR achieved. The test was terminated due to fatigue.  Normal blood pressure response to exercise.  The patient did not report any symptoms during exercise.  No ECG evidence of ischemia.  No QRS prolongation at peak exercise compared to rest.  No supraventricular or ventricular arrhythmias.  Good exercise tolerance. The patient achieved 10 METs at peak exercise.     1/27/20 ECHOCARDIOGRAM:   Final Conclusion   Normal left ventricular size.   Normal left ventricular systolic function.   No obvious regional wall motion abnormalities.   The ejection fraction is visually estimated at 55-60%.   The right ventricle is normal in size and function.   The left atrium is normal in size.   There is trace mitral regurgitation.   There is no significant tricuspid regurgitation.   Small pericardial effusion.   No echocardiographic findings to suggest hemodynamic effect of the pericardial fluid.   Estimated EF: 55-60%  7/2021 CTA:  IMPRESSION:  1. Normal pulmonary venous anatomy normal variant right middle  pulmonary vein. All pulmonary veins draining into the left atrium       Physical Examination   /79 (BP Location: Left arm, Patient Position: Chair,  "Cuff Size: Adult Regular)   Pulse 78   Ht 1.651 m (5' 5\")   Wt 108.7 kg (239 lb 9.6 oz)   SpO2 97%   BMI 39.87 kg/m    Wt Readings from Last 3 Encounters:   07/12/22 107.5 kg (237 lb)   06/13/22 107.5 kg (237 lb)   05/25/22 107.5 kg (237 lb)     General Appearance:   Alert, well-appearing and in no acute distress.   HEENT: Atraumatic, normocephalic. PERRL.  MMM.   Chest/Lungs:   Respirations unlabored.  Lungs are clear to auscultation.   Cardiovascular:   Regular, S1/S2, no murmur.    Abdomen:  Soft, nontender, nondistended.   Extremities: No cyanosis or clubbing. No edema.    Musculoskeletal: Moves all extremities.  Gait normal.   Skin: Warm, dry, intact.    Neurologic: Mood and affect are appropriate.  Alert and oriented to person, place, time, and situation.          Medications  Allergies   Current Outpatient Medications   Medication Sig Dispense Refill     acetaminophen (TYLENOL) 325 MG tablet Take 325 mg by mouth every 4 hours as needed        apixaban ANTICOAGULANT (ELIQUIS ANTICOAGULANT) 5 MG tablet Take 1 tablet (5 mg) by mouth 2 times daily 180 tablet 1     buPROPion (WELLBUTRIN XL) 300 MG 24 hr tablet TAKE 1 TABLET (300 MG) BY MOUTH EVERY MORNING 90 tablet 0     doxycycline monohydrate (MONODOX) 100 MG capsule Take 1 capsule (100 mg) by mouth 2 times daily (with meals) for 10 days Increases risk of heartburn and also sun sensitivity or sun burn. Contraindicated in pregnancy. 20 capsule 0     EPINEPHrine (AUVI-Q) 0.3 MG/0.3ML injection 2-pack Inject 0.3 mLs (0.3 mg) into the muscle as needed for anaphylaxis 0.6 mL 3     fluticasone (FLONASE) 50 MCG/ACT nasal spray SPRAY 2 SPRAYS INTO BOTH NOSTRILS DAILY 16 g 11     levothyroxine (SYNTHROID/LEVOTHROID) 75 MCG tablet Take 1 tablet (75 mcg) by mouth daily 30 tablet 11     traZODone (DESYREL) 50 MG tablet TAKE TWO TO THREE TABLETS BY MOUTH AT BEDTIME 270 tablet 2     venlafaxine (EFFEXOR XR) 37.5 MG 24 hr capsule Take 1 capsule (37.5 mg) by mouth daily 90 " capsule 0     venlafaxine (EFFEXOR-ER) 75 MG 24 hr tablet Take 1 tablet (75 mg) by mouth daily 90 tablet 1    Allergies   Allergen Reactions     Cephalexin Hives     Strawberry Hives     Sulfa Drugs Hives     Cinnamon Hives     Sulfamethoxazole-Trimethoprim Hives     Vicodin [Hydrocodone-Acetaminophen]      Wasp Venom Protein      Other reaction(s): Chest Pain     Wasps [Hornets] Swelling     Now carries Epi Pen     Zoloft [Sertraline]      suicidal ideation     Contrast Dye Other (See Comments) and Rash     Patient had sneezing and an itchy throat following contrast injection of 100 mL Isovue-370.  From IV contrast dye         Lab Results (Personally Reviewed)    Chemistry/lipid CBC Cardiac Enzymes/BNP/TSH/INR   Lab Results   Component Value Date    BUN 19 03/31/2022     03/31/2022    CO2 30 03/31/2022     Creatinine   Date Value Ref Range Status   03/31/2022 1.12 (H) 0.52 - 1.04 mg/dL Final   06/14/2021 1.22 (H) 0.52 - 1.04 mg/dL Final       Lab Results   Component Value Date    CHOL 219 (H) 03/07/2022    HDL 62 03/07/2022     (H) 03/07/2022      Lab Results   Component Value Date    WBC 5.8 03/31/2022    HGB 13.5 03/31/2022    HCT 41.5 03/31/2022    MCV 90 03/31/2022     03/31/2022    Lab Results   Component Value Date    TSH 1.83 01/12/2022    INR 1.27 (H) 03/31/2022        The patient states understanding and is agreeable with the plan.   LEOPOLDO Colin CNP  Electrophysiology Consult Service  Pager: [phone]

## 2022-07-13 NOTE — NURSING NOTE
Chief Complaint   Patient presents with     Follow Up     3 mo post afib ablation     Vitals were taken and medications were reconciled. EKG was performed   YESENIA Gann  11:28 AM

## 2022-07-14 ENCOUNTER — PRE VISIT (OUTPATIENT)
Dept: ORTHOPEDICS | Facility: CLINIC | Age: 57
End: 2022-07-14

## 2022-07-14 LAB
ATRIAL RATE - MUSE: 72 BPM
DIASTOLIC BLOOD PRESSURE - MUSE: NORMAL MMHG
INTERPRETATION ECG - MUSE: NORMAL
P AXIS - MUSE: 49 DEGREES
PR INTERVAL - MUSE: 166 MS
QRS DURATION - MUSE: 88 MS
QT - MUSE: 388 MS
QTC - MUSE: 424 MS
R AXIS - MUSE: 72 DEGREES
SYSTOLIC BLOOD PRESSURE - MUSE: NORMAL MMHG
T AXIS - MUSE: 39 DEGREES
VENTRICULAR RATE- MUSE: 72 BPM

## 2022-07-19 NOTE — TELEPHONE ENCOUNTER
CARDIOLOGY NOTE      7/19/2022    RE: Norma Gutierrez  (1947)                               TO:  Dr. Yuri Nelson DO            Willey Curling is a 70 y.o. female who was seen today for management of coronary artery disease history of CABG , dementia, hyperlipidemia, hypertension, history of carotid endarterectomy                                    HPI:                   Pt has h/o coronary artery bypass surgery, history of carotid endarterectomy, history of coronary bypass surgery, CVA in January of this year hyperlipidemia, hypertension, seen today for establishing. Pt has  increasing [place] has the following history recorded in care path:  Patient Active Problem List    Diagnosis Date Noted    Diverticulosis 10/30/2019    Hyperlipidemia with target low density lipoprotein (LDL) cholesterol less than 70 mg/dL 10/30/2019    Emesis 04/03/2022    Moderate malnutrition (HCC) 03/16/2022    Gross hematuria 01/09/2022    Dementia ([facility] [address] 01/09/2022    Diarrhea 01/09/2022    Hypertension 01/09/2022    Recurrent stenosis of left carotid artery     Acute kidney injury due to COVID-19 [facility]) 01/03/2022    Expressive aphasia     Cerebrovascular accident ([facility] [address] 10/25/2021    CAD (coronary artery disease)      Current Outpatient Medications   Medication Sig Dispense Refill    memantine (NAMENDA) 5 MG tablet Take 5 mg by mouth 2 times daily      donepezil (ARICEPT) 10 MG tablet Take 10 mg by mouth nightly      rosuvastatin (CRESTOR) 20 MG tablet Take 1 tablet by mouth nightly 30 tablet 3    apixaban (ELIQUIS) 5 MG TABS tablet Take 1 tablet by mouth 2 times daily 60 tablet 5    amLODIPine (NORVASC) 10 MG tablet Take 1 tablet by mouth daily 1 tablet 0    clopidogrel (PLAVIX) 75 MG tablet Take 1 tablet by mouth daily 30 tablet 3     No current facility-administered medications for this visit.      Allergies: Penicillins and Sulfa antibiotics  Past Medical History:   Diagnosis Date Script we got for atenolol has 2 directions 1/2 tablet and 1 tablet daily. Please calrify.  Thank you   Geovanna Fox. Pharmacy Technician   [facility]     Acute cerebrovascular accident (CVA) ([facility] [address] 2021    ARF (acute renal failure) ([facility] [address] 3/15/2022    Blood in stool 4/3/2022    CAD (coronary artery disease)     COVID-19 virus infection 2022    Hypertension     Hypertensive urgency 10/30/2019     Past Surgical History:   Procedure Laterality Date    CAROTID ENDARTERECTOMY      CHOLECYSTECTOMY      CORONARY ARTERY BYPASS GRAFT      HYSTERECTOMY, TOTAL ABDOMINAL (CERVIX REMOVED)      IR NONTUNNELED VASCULAR CATHETER  2022    IR NONTUNNELED VASCULAR CATHETER 2022 [facility] SPECIAL PROCEDURES    NECK SURGERY        As reviewed   Family History   Problem Relation Age of Onset    Heart Disease Mother     Diabetes Father     Other Child      Social History     Tobacco Use    Smoking status: Former     Packs/day: 3.00     Years: 50.00     Pack years: 150.00     Types: Cigarettes     Quit date: 2022     Years since quittin.5    Smokeless tobacco: Never   Substance Use Topics    Alcohol use: No     Comment: caffeine 1 soda a day        Objective:    Vitals:    22 0946   BP: 132/84   Pulse: 77   Weight: 121 lb 9.6 oz (55.2 kg)   Height: 4' 11\" (1.499 m)     /84   Pulse 77   Ht 4' 11\" (1.499 m)   Wt 121 lb 9.6 oz (55.2 kg)   BMI 24.56 kg/m²     No flowsheet data found. Wt Readings from Last 3 Encounters:   22 121 lb 9.6 oz (55.2 kg)   22 121 lb 12.8 oz (55.2 kg)   22 130 lb (59 kg)     Body mass index is 24.56 kg/m². GENERAL - Alert, oriented, pleasant, in no apparent distress. EYES: No jaundice, no conjunctival pallor. SKIN: It is warm & dry. No rashes. No Echhymosis    HEENT - No clinically significant abnormalities seen. Neck - Supple. No jugular venous distention noted. No carotid bruits. Cardiovascular - Normal S1 and S2 without obvious murmur or gallop. Extremities - No cyanosis, clubbing, or significant edema. Pulmonary - No respiratory distress. No wheezes or rales.     Abdomen - No masses, tenderness, or organomegaly. Musculoskeletal - No significant edema. No joint deformities. No muscle wasting. Neurologic - Cranial nerves II through XII are grossly intact. There were no gross focal neurologic abnormalities. Lab Review   Lab Results   Component Value Date/Time    CKTOTAL 34 01/19/2022 04:22 PM    TROPONINT <0.010 04/03/2022 08:43 AM     BNP:  No results found for: BNP  PT/INR:    Lab Results   Component Value Date    INR 1.22 04/03/2022     Lab Results   Component Value Date    LABA1C 6.0 12/22/2021     Lab Results   Component Value Date    WBC 7.7 04/04/2022    HCT 28.2 (L) 04/04/2022    MCV 91.9 04/04/2022     04/04/2022     Lab Results   Component Value Date    CHOL 234 (H) 12/22/2021    TRIG 233 (H) 12/22/2021    HDL 41 12/22/2021    LDLDIRECT 150 (H) 12/22/2021     Lab Results   Component Value Date    ALT 16 04/03/2022    AST 27 04/03/2022     BMP:    Lab Results   Component Value Date/Time     04/04/2022 06:07 AM    K 3.9 04/04/2022 06:07 AM     04/04/2022 06:07 AM    CO2 27 04/04/2022 06:07 AM    BUN 13 04/04/2022 06:07 AM    CREATININE 0.7 04/04/2022 06:07 AM     CMP:   Lab Results   Component Value Date/Time     04/04/2022 06:07 AM    K 3.9 04/04/2022 06:07 AM     04/04/2022 06:07 AM    CO2 27 04/04/2022 06:07 AM    BUN 13 04/04/2022 06:07 AM    PROT 7.5 04/03/2022 08:43 AM     TSH:    Lab Results   Component Value Date/Time    TSHHS 1.680 01/19/2022 04:22 PM           Assessment & Plan:    Had CABG X3 LIMA lad , SVG to diag , SVG to Cx   Occluded rca   2010        -     CORONARY ARTERY DISEASE:  stable post cabg symptomatic     All available  tests in chart reviewed. Management discussed . Testing ordered  lexiscan               on aspirin and Plavix we will continue to monitor                   -  Hypertension: Patients blood pressure is normal. Patient is advised about low sodium diet. Present medical regimen will not be changed.     On Norvasc 10 mg daily compliant we will continue to monitor    -  LIPID MANAGEMENT:  Importance of lipid levels discussed with patient   and patient was given dietary advice. NCEP- ATP III guidelines reviewed with patient. -   Changes  in medicines made: No     On Crestor 20 mg p.o. daily we will continue to monitor                         -Carotid endarterectomy last carotid ultrasound as follows 11/21  Bilateral carotid artery plaque formation. However, there is no   hemodynamically significant carotid stenosis, as the degree of luminal   narrowing measures less than 50% bilaterally       -CVA  From January of this year  New acute infarcts in the right middle cerebral and left posterior   cerebral artery territories. Evolving infarcts in the left middle cerebral   artery territory. No acute hemorrhage given artifact. Liliam Cortés MD    [facility]    Please note this report has been partially produced using speech recognition software and may contain errors related to that system including errors in grammar, punctuation, and spelling, as well as words and phrases that may be inappropriate. If there are any questions or concerns please feel free to contact the dictating provider for clarification.

## 2022-08-01 DIAGNOSIS — E06.3 HASHIMOTO'S THYROIDITIS: ICD-10-CM

## 2022-08-01 RX ORDER — LEVOTHYROXINE SODIUM 75 UG/1
75 TABLET ORAL DAILY
Qty: 30 TABLET | Refills: 11 | Status: SHIPPED | OUTPATIENT
Start: 2022-08-01 | End: 2023-04-25

## 2022-08-01 NOTE — TELEPHONE ENCOUNTER
"Requested Prescriptions   Signed Prescriptions Disp Refills    levothyroxine (SYNTHROID/LEVOTHROID) 75 MCG tablet 30 tablet 11     Sig: TAKE 1 TABLET (75 MCG) BY MOUTH DAILY       Thyroid Protocol Passed - 8/1/2022 11:55 AM        Passed - Patient is 12 years or older        Passed - Recent (12 mo) or future (30 days) visit within the authorizing provider's specialty     Patient has had an office visit with the authorizing provider or a provider within the authorizing providers department within the previous 12 mos or has a future within next 30 days. See \"Patient Info\" tab in inbasket, or \"Choose Columns\" in Meds & Orders section of the refill encounter.              Passed - Medication is active on med list        Passed - Normal TSH on file in past 12 months     Recent Labs   Lab Test 01/12/22  1026   TSH 1.83              Passed - No active pregnancy on record     If patient is pregnant or has had a positive pregnancy test, please check TSH.          Passed - No positive pregnancy test in past 12 months     If patient is pregnant or has had a positive pregnancy test, please check TSH.             Thanks,  VELMA Harvey  [facility]     "

## 2022-09-07 ENCOUNTER — MYC MEDICAL ADVICE (OUTPATIENT)
Dept: FAMILY MEDICINE | Facility: CLINIC | Age: 57
End: 2022-09-07

## 2022-09-12 ENCOUNTER — APPOINTMENT (OUTPATIENT)
Dept: ULTRASOUND IMAGING | Facility: CLINIC | Age: 57
End: 2022-09-12
Attending: EMERGENCY MEDICINE
Payer: COMMERCIAL

## 2022-09-12 ENCOUNTER — NURSE TRIAGE (OUTPATIENT)
Dept: NURSING | Facility: CLINIC | Age: 57
End: 2022-09-12

## 2022-09-12 ENCOUNTER — APPOINTMENT (OUTPATIENT)
Dept: CT IMAGING | Facility: CLINIC | Age: 57
End: 2022-09-12
Attending: PHYSICIAN ASSISTANT
Payer: COMMERCIAL

## 2022-09-12 ENCOUNTER — APPOINTMENT (OUTPATIENT)
Dept: GENERAL RADIOLOGY | Facility: CLINIC | Age: 57
End: 2022-09-12
Attending: EMERGENCY MEDICINE
Payer: COMMERCIAL

## 2022-09-12 ENCOUNTER — HOSPITAL ENCOUNTER (EMERGENCY)
Facility: CLINIC | Age: 57
Discharge: HOME | End: 2022-09-12
Attending: EMERGENCY MEDICINE | Admitting: EMERGENCY MEDICINE
Payer: COMMERCIAL

## 2022-09-12 VITALS
TEMPERATURE: 98.3 F | HEART RATE: 63 BPM | SYSTOLIC BLOOD PRESSURE: 120 MMHG | BODY MASS INDEX: 40.12 KG/M2 | RESPIRATION RATE: 16 BRPM | HEIGHT: 64 IN | DIASTOLIC BLOOD PRESSURE: 59 MMHG | WEIGHT: 235 LBS | OXYGEN SATURATION: 96 %

## 2022-09-12 DIAGNOSIS — R10.11 RUQ ABDOMINAL PAIN: ICD-10-CM

## 2022-09-12 LAB
ALBUMIN SERPL BCG-MCNC: 4.3 G/DL (ref 3.5–5.2)
ALBUMIN UR-MCNC: NEGATIVE MG/DL
ALP SERPL-CCNC: 79 U/L (ref 35–104)
ALT SERPL W P-5'-P-CCNC: 12 U/L (ref 10–35)
ANION GAP SERPL CALCULATED.3IONS-SCNC: 8 MMOL/L (ref 7–15)
APPEARANCE UR: CLEAR
AST SERPL W P-5'-P-CCNC: ABNORMAL U/L
ATRIAL RATE - MUSE: 66 BPM
BACTERIA #/AREA URNS HPF: ABNORMAL /HPF
BASOPHILS # BLD AUTO: 0.1 10E3/UL (ref 0–0.2)
BASOPHILS NFR BLD AUTO: 1 %
BILIRUB SERPL-MCNC: 0.4 MG/DL
BILIRUB UR QL STRIP: NEGATIVE
BUN SERPL-MCNC: 11.5 MG/DL (ref 6–20)
CALCIUM SERPL-MCNC: 9.6 MG/DL (ref 8.6–10)
CHLORIDE SERPL-SCNC: 102 MMOL/L (ref 98–107)
COLOR UR AUTO: ABNORMAL
CREAT SERPL-MCNC: 1.24 MG/DL (ref 0.51–0.95)
DEPRECATED HCO3 PLAS-SCNC: 29 MMOL/L (ref 22–29)
DIASTOLIC BLOOD PRESSURE - MUSE: NORMAL MMHG
EOSINOPHIL # BLD AUTO: 0.1 10E3/UL (ref 0–0.7)
EOSINOPHIL NFR BLD AUTO: 1 %
ERYTHROCYTE [DISTWIDTH] IN BLOOD BY AUTOMATED COUNT: 14 % (ref 10–15)
GFR SERPL CREATININE-BSD FRML MDRD: 51 ML/MIN/1.73M2
GLUCOSE SERPL-MCNC: 89 MG/DL (ref 70–99)
GLUCOSE UR STRIP-MCNC: NEGATIVE MG/DL
HCT VFR BLD AUTO: 43.4 % (ref 35–47)
HGB BLD-MCNC: 14 G/DL (ref 11.7–15.7)
HGB UR QL STRIP: NEGATIVE
HOLD SPECIMEN: NORMAL
HOLD SPECIMEN: NORMAL
IMM GRANULOCYTES # BLD: 0 10E3/UL
IMM GRANULOCYTES NFR BLD: 1 %
INTERPRETATION ECG - MUSE: NORMAL
KETONES UR STRIP-MCNC: NEGATIVE MG/DL
LEUKOCYTE ESTERASE UR QL STRIP: NEGATIVE
LIPASE SERPL-CCNC: 27 U/L (ref 13–60)
LYMPHOCYTES # BLD AUTO: 2.1 10E3/UL (ref 0.8–5.3)
LYMPHOCYTES NFR BLD AUTO: 27 %
MCH RBC QN AUTO: 28.7 PG (ref 26.5–33)
MCHC RBC AUTO-ENTMCNC: 32.3 G/DL (ref 31.5–36.5)
MCV RBC AUTO: 89 FL (ref 78–100)
MONOCYTES # BLD AUTO: 0.6 10E3/UL (ref 0–1.3)
MONOCYTES NFR BLD AUTO: 8 %
MUCOUS THREADS #/AREA URNS LPF: PRESENT /LPF
NEUTROPHILS # BLD AUTO: 5.1 10E3/UL (ref 1.6–8.3)
NEUTROPHILS NFR BLD AUTO: 62 %
NITRATE UR QL: NEGATIVE
NRBC # BLD AUTO: 0 10E3/UL
NRBC BLD AUTO-RTO: 0 /100
P AXIS - MUSE: 55 DEGREES
PH UR STRIP: 6 [PH] (ref 5–7)
PLATELET # BLD AUTO: 278 10E3/UL (ref 150–450)
POTASSIUM SERPL-SCNC: 4.4 MMOL/L (ref 3.4–5.3)
PR INTERVAL - MUSE: 158 MS
PROT SERPL-MCNC: 6.9 G/DL (ref 6.4–8.3)
QRS DURATION - MUSE: 88 MS
QT - MUSE: 392 MS
QTC - MUSE: 410 MS
R AXIS - MUSE: 39 DEGREES
RBC # BLD AUTO: 4.88 10E6/UL (ref 3.8–5.2)
RBC URINE: <1 /HPF
SODIUM SERPL-SCNC: 139 MMOL/L (ref 136–145)
SP GR UR STRIP: 1.01 (ref 1–1.03)
SQUAMOUS EPITHELIAL: 2 /HPF
SYSTOLIC BLOOD PRESSURE - MUSE: NORMAL MMHG
T AXIS - MUSE: 20 DEGREES
TROPONIN T SERPL HS-MCNC: 9 NG/L
UROBILINOGEN UR STRIP-MCNC: NORMAL MG/DL
VENTRICULAR RATE- MUSE: 66 BPM
WBC # BLD AUTO: 8.1 10E3/UL (ref 4–11)
WBC URINE: <1 /HPF

## 2022-09-12 PROCEDURE — 93005 ELECTROCARDIOGRAM TRACING: CPT | Performed by: PHYSICIAN ASSISTANT

## 2022-09-12 PROCEDURE — 76705 ECHO EXAM OF ABDOMEN: CPT

## 2022-09-12 PROCEDURE — 93010 ELECTROCARDIOGRAM REPORT: CPT | Performed by: PHYSICIAN ASSISTANT

## 2022-09-12 PROCEDURE — 71046 X-RAY EXAM CHEST 2 VIEWS: CPT | Mod: 26 | Performed by: RADIOLOGY

## 2022-09-12 PROCEDURE — 250N000011 HC RX IP 250 OP 636: Performed by: EMERGENCY MEDICINE

## 2022-09-12 PROCEDURE — 71046 X-RAY EXAM CHEST 2 VIEWS: CPT

## 2022-09-12 PROCEDURE — 74176 CT ABD & PELVIS W/O CONTRAST: CPT

## 2022-09-12 PROCEDURE — 250N000013 HC RX MED GY IP 250 OP 250 PS 637: Performed by: EMERGENCY MEDICINE

## 2022-09-12 PROCEDURE — 81001 URINALYSIS AUTO W/SCOPE: CPT | Performed by: EMERGENCY MEDICINE

## 2022-09-12 PROCEDURE — 99285 EMERGENCY DEPT VISIT HI MDM: CPT | Mod: 25 | Performed by: PHYSICIAN ASSISTANT

## 2022-09-12 PROCEDURE — 84484 ASSAY OF TROPONIN QUANT: CPT | Performed by: PHYSICIAN ASSISTANT

## 2022-09-12 PROCEDURE — 74176 CT ABD & PELVIS W/O CONTRAST: CPT | Mod: 26 | Performed by: STUDENT IN AN ORGANIZED HEALTH CARE EDUCATION/TRAINING PROGRAM

## 2022-09-12 PROCEDURE — 36415 COLL VENOUS BLD VENIPUNCTURE: CPT | Performed by: EMERGENCY MEDICINE

## 2022-09-12 PROCEDURE — 85025 COMPLETE CBC W/AUTO DIFF WBC: CPT | Performed by: EMERGENCY MEDICINE

## 2022-09-12 PROCEDURE — 76705 ECHO EXAM OF ABDOMEN: CPT | Mod: 26 | Performed by: STUDENT IN AN ORGANIZED HEALTH CARE EDUCATION/TRAINING PROGRAM

## 2022-09-12 PROCEDURE — 96376 TX/PRO/DX INJ SAME DRUG ADON: CPT | Performed by: PHYSICIAN ASSISTANT

## 2022-09-12 PROCEDURE — 96374 THER/PROPH/DIAG INJ IV PUSH: CPT | Performed by: PHYSICIAN ASSISTANT

## 2022-09-12 PROCEDURE — 83690 ASSAY OF LIPASE: CPT | Performed by: EMERGENCY MEDICINE

## 2022-09-12 PROCEDURE — 84155 ASSAY OF PROTEIN SERUM: CPT | Performed by: EMERGENCY MEDICINE

## 2022-09-12 RX ORDER — ONDANSETRON 2 MG/ML
4 INJECTION INTRAMUSCULAR; INTRAVENOUS EVERY 30 MIN PRN
Status: DISCONTINUED | OUTPATIENT
Start: 2022-09-12 | End: 2022-09-12 | Stop reason: HOSPADM

## 2022-09-12 RX ORDER — ACETAMINOPHEN 500 MG
1000 TABLET ORAL
Status: COMPLETED | OUTPATIENT
Start: 2022-09-12 | End: 2022-09-12

## 2022-09-12 RX ORDER — ONDANSETRON 4 MG/1
4 TABLET, FILM COATED ORAL EVERY 8 HOURS PRN
Qty: 10 TABLET | Refills: 0 | Status: SHIPPED | OUTPATIENT
Start: 2022-09-12 | End: 2023-04-25

## 2022-09-12 RX ADMIN — ONDANSETRON 4 MG: 2 INJECTION INTRAMUSCULAR; INTRAVENOUS at 14:02

## 2022-09-12 RX ADMIN — ACETAMINOPHEN 1000 MG: 500 TABLET ORAL at 14:02

## 2022-09-12 RX ADMIN — ONDANSETRON 4 MG: 2 INJECTION INTRAMUSCULAR; INTRAVENOUS at 19:57

## 2022-09-12 ASSESSMENT — ACTIVITIES OF DAILY LIVING (ADL)
ADLS_ACUITY_SCORE: 35

## 2022-09-12 NOTE — TELEPHONE ENCOUNTER
DIAGNOSIS: RT knee pain   APPOINTMENT DATE: 09/23/2022   NOTES STATUS DETAILS   OFFICE NOTE from referring provider Internal 05/25/2022 RT knee   OFFICE NOTE from other specialist Internal 12/11/2019 Dr Thompson [facility]   12/10/2019 Dr Ferrell [facility]    DISCHARGE SUMMARY from hospital N/A    DISCHARGE REPORT from the ER N/A    OPERATIVE REPORT N/A    EMG report N/A    MEDICATION LIST N/A    MRI Internal 06/17/2022 RT knee   DEXA (osteoporosis/bone health) N/A    CT SCAN N/A    XRAYS (IMAGES & REPORTS) Internal 05/25/2022 RT knee  12/11/2019 RT knee

## 2022-09-12 NOTE — ED TRIAGE NOTES
Pt arrives with complaints of chest pain that started this past weekend. Pt endorses it wrapping around the band of the bra and radiates into the shoulder. Currently pt is endorsing RUQ abdominal pain and nausea

## 2022-09-12 NOTE — ED PROVIDER NOTES
"ED Provider Note  [facility]      History     Chief Complaint   Patient presents with     Chest Pain     HPI  Sunshine Delgado is a 57 year old female past medical history significant for gastric sleeve 2018 presents to the emergency department today with concerns for who right upper quadrant/upper abdominal pain over the last 2 weeks.  Patient notes symptoms are constant, more dull and achy at rest, and exacerbated after having bowel movements and with urination.  Patient describes this as a \"twisting\" type feeling that seems to occur intermittently.  This is with some associated nausea.  Patient denies any associated fevers, runny nose, cough, shortness of breath, or chest pain.  Patient denies any urinary symptoms.  She does report some intermittent diarrhea over the last few weeks, without regular watery or loose stools.  Patient reports history of gastric sleeve, hysterectomy, denies any other history abdominal problems in the past.  She did take some Tylenol today which seem to help with the pain.    Patient denies any history of lung problems in the past, she reports a history of A. fib which has not bothered her since ablation.  No other history of heart problems.  Past medical history significant for gastric sleeve presenting with concerns for    Past Medical History  Past Medical History:   Diagnosis Date     Antiplatelet or antithrombotic long-term use      Arrhythmia      Atrial fibrillation with rapid ventricular response (H) 1/26/2020     Bee sting allergy      Depressive disorder      Generalized anxiety disorder 10/15/2009    lexapro made things worse.       Hashimoto's thyroiditis 5/6/2020     Morbid obesity (H)      Ocular migraine      MARIA GUADALUPE (obstructive sleep apnea)- severe (AHI 84) 09/09/2011    patient not using since 2019     Renal disease      Voice fatigue 10/22/2009     Past Surgical History:   Procedure Laterality Date     ANESTHESIA CARDIOVERSION N/A 11/22/2021    " Procedure: ANESTHESIA, FOR CARDIOVERSION@1515;  Surgeon: GENERIC ANESTHESIA PROVIDER;  Location: [facility] OR     COLONOSCOPY  2000     DAVINCI GASTRIC SLEEVE       EP ABLATION FOCAL AFIB N/A 7/22/2021    Procedure: EP ABLATION FOCAL AFIB;  Surgeon: Vicente Craig MD;  Location:  [facility]ARDI[facility] CATH LAB     EP ABLATION FOCAL AFIB N/A 3/31/2022    Procedure: Ablation Focal Atrial Fibrillation;  Surgeon: Vicente Craig MD;  Location:  HEART CARDIAC CATH LAB     GENITOURINARY SURGERY  2007     HYSTERECTOMY, PAP NO LONGER INDICATED       HYSTERECTOMY, VAGINAL  2007    still has ovaries     LAPAROSCOPIC GASTRIC SLEEVE N/A 3/13/2018    Procedure: LAPAROSCOPIC GASTRIC SLEEVE;  Laparoscopic Sleeve Gastrectomy;  Surgeon: Kameron Joseph MD;  Location:  OR     ondansetron (ZOFRAN) 4 MG tablet  acetaminophen (TYLENOL) 325 MG tablet  buPROPion (WELLBUTRIN XL) 300 MG 24 hr tablet  EPINEPHrine (AUVI-Q) 0.3 MG/0.3ML injection 2-pack  fluticasone (FLONASE) 50 MCG/ACT nasal spray  levothyroxine (SYNTHROID/LEVOTHROID) 75 MCG tablet  traZODone (DESYREL) 50 MG tablet  venlafaxine (EFFEXOR XR) 37.5 MG 24 hr capsule  venlafaxine (EFFEXOR-ER) 75 MG 24 hr tablet      Allergies   Allergen Reactions     Cephalexin Hives     Strawberry Hives     Sulfa Drugs Hives     Cinnamon Hives     Sulfamethoxazole-Trimethoprim Hives     Vicodin [Hydrocodone-Acetaminophen]      Wasp Venom Protein      Other reaction(s): Chest Pain     Wasps [Hornets] Swelling     Now carries Epi Pen     Zoloft [Sertraline]      suicidal ideation     Contrast Dye Other (See Comments) and Rash     Patient had sneezing and an itchy throat following contrast injection of 100 mL Isovue-370.  From IV contrast dye     Family History  Family History   Problem Relation Age of Onset     Eye Disorder Mother         lost eyesight; probable macular degeneration     Psychotic Disorder Mother         Dementia /Alzhimers     Neurologic Disorder Mother         seizures     Diabetes  "Mother      Hypertension Mother      Alzheimer Disease Mother      Arthritis Mother      Osteoporosis Mother      Obesity Mother      Diabetes Father      Depression Father      Hyperlipidemia Father      Obesity Father      Psychotic Disorder Sister         bipolar     Thyroid Disease Sister         h/o thyroid cancer     Depression Sister         Bipolar     Obesity Sister      Cancer Other         Brain cancer     Osteoporosis Maternal Grandmother      Anxiety Disorder Son      Depression Sister      Thyroid Disease Sister      Social History   Social History     Tobacco Use     Smoking status: Never Smoker     Smokeless tobacco: Never Used   Substance Use Topics     Alcohol use: Yes     Comment: 1-2 per mo     Drug use: Not Currently     Types: Marijuana     Comment: 12/20 last dose      Past medical history, past surgical history, medications, allergies, family history, and social history were reviewed with the patient. No additional pertinent items.       Review of Systems  A complete review of systems was performed with pertinent positives and negatives noted in the HPI, and all other systems negative.    Physical Exam   BP: 127/83  Pulse: 76  Temp: 97.9  F (36.6  C)  Resp: 18  Height: 162.6 cm (5' 4\")  Weight: 106.6 kg (235 lb)  SpO2: 96 %  Physical Exam    GENERAL APPEARANCE: The patient is well developed, well appearing, and in no acute distress.  HEAD:  Normocephalic and atraumatic.   EENT: Voice normal.  NECK: Trachea is midline.No lymphadenopathy or tenderness.  LUNGS: Breath sounds are equal and clear bilaterally. No wheezes, rhonchi, or rales.  HEART: Regular rate and normal rhythm.  Radial pulses 2+ bilaterally.  ABDOMEN: Patient does have some mild tenderness to palpation of the right upper quadrant and epigastrium, with negative Kowalski sign, no lower abdominal tenderness.  EXTREMITIES: No cyanosis, clubbing, or edema.  NEUROLOGIC: No focal sensory or motor deficits are noted.  PSYCHIATRIC: The " patient is awake, alert.  Appropriate mood and affect.  SKIN: Warm, dry, and well perfused. Good turgor.      ED Course      EKG 9/12/2022:    Sinus rhythm, ventricular rate 66 .  Interpreted by myself, patient has a regular rhythm, I do not appreciate ST elevation or depression, or any other arrhythmia present.     Results for orders placed or performed during the hospital encounter of 09/12/22   XR Chest 2 Views     Status: None    Narrative    Exam: XR CHEST 2 VIEWS, 9/12/2022 3:05 PM    Indication: chest pain    Comparison: 11/8/2021    Findings:   The cardiomediastinal silhouette and pulmonary vasculature are within  normal limits. No pleural effusion or pneumothorax. No focal airspace  opacity. Anterior upper abdominal surgical clips.      Impression    Impression: No acute cardiopulmonary abnormality.    MAXIME SALOMON DO         SYSTEM ID:  [id number]   Abdomen US, limited (RUQ only)     Status: None    Narrative    EXAMINATION: Limited Abdominal Ultrasound, 9/12/2022 2:53 PM     COMPARISON: 7/25/2018.    HISTORY: Right upper quadrant pain    FINDINGS:   Fluid: No evidence of ascites or pleural effusions.    Liver: The liver demonstrates coarsened echotexture with diffusely  increased echogenicity, measuring 20.2 cm in craniocaudal dimension.  There is no focal mass.  Patent main portal vein with flow towards  liver.    Gallbladder: There is no wall thickening, pericholecystic fluid,  positive sonographic Kowalski's sign or evidence for cholelithiasis.    Bile Ducts: Both the intra- and extrahepatic biliary system are of  normal caliber.  The common bile duct measures 3 mm in diameter.    Pancreas: Visualized portions of the head and body of the pancreas are  unremarkable.     Kidney: The right kidney measures 9.7 cm long. There is no  hydronephrosis or hydroureter, no shadowing renal calculi, cystic  lesion or mass.       Impression    IMPRESSION:   1.  Sonographic evidence of chronic intrinsic hepatic  parenchymal  disease.  No focal liver lesion.  2.  No acute finding to explain clinical history.    HECTOR VANG MD         SYSTEM ID:  [id number]   CT Abdomen Pelvis w/o Contrast     Status: None (Preliminary result)    Impression    RESIDENT PRELIMINARY INTERPRETATION  IMPRESSION:   1. No evidence for acute pathology in the abdomen/pelvis.  2. Chronic/incidental findings include severe hepatic steatosis,  probable biliary sludge, ovarian cysts, and a small sliding-type  hiatal hernia.    Comprehensive metabolic panel     Status: Abnormal   Result Value Ref Range    Sodium 139 136 - 145 mmol/L    Potassium 4.4 3.4 - 5.3 mmol/L    Creatinine 1.24 (H) 0.51 - 0.95 mg/dL    Urea Nitrogen 11.5 6.0 - 20.0 mg/dL    Chloride 102 98 - 107 mmol/L    Carbon Dioxide (CO2) 29 22 - 29 mmol/L    Anion Gap 8 7 - 15 mmol/L    Glucose 89 70 - 99 mg/dL    Calcium 9.6 8.6 - 10.0 mg/dL    Protein Total 6.9 6.4 - 8.3 g/dL    Albumin 4.3 3.5 - 5.2 g/dL    Bilirubin Total 0.4 <=1.2 mg/dL    Alkaline Phosphatase 79 35 - 104 U/L    AST      ALT 12 10 - 35 U/L    GFR Estimate 51 (L) >60 mL/min/1.73m2   Lipase     Status: Normal   Result Value Ref Range    Lipase 27 13 - 60 U/L   UA with Microscopic reflex to Culture     Status: Abnormal    Specimen: Urine, Clean Catch   Result Value Ref Range    Color Urine Straw Colorless, Straw, Light Yellow, Yellow    Appearance Urine Clear Clear    Glucose Urine Negative Negative mg/dL    Bilirubin Urine Negative Negative    Ketones Urine Negative Negative mg/dL    Specific Gravity Urine 1.007 1.003 - 1.035    Blood Urine Negative Negative    pH Urine 6.0 5.0 - 7.0    Protein Albumin Urine Negative Negative mg/dL    Urobilinogen Urine Normal Normal, 2.0 mg/dL    Nitrite Urine Negative Negative    Leukocyte Esterase Urine Negative Negative    Bacteria Urine Few (A) None Seen /HPF    Mucus Urine Present (A) None Seen /LPF    RBC Urine <1 <=2 /HPF    WBC Urine <1 <=5 /HPF    Squamous Epithelials Urine 2 (H)  <=1 /HPF    Narrative    Urine Culture not indicated   [place] Draw     Status: None    Narrative    The following orders were created for panel order [place] Draw.  Procedure                               Abnormality         Status                     ---------                               -----------         ------                     Extra Blue Top Tube[[id number]]                              Final result               Extra Red Top Tube[[id number]]                               Final result                 Please view results for these tests on the individual orders.   CBC with platelets and differential     Status: None   Result Value Ref Range    WBC Count 8.1 4.0 - 11.0 10e3/uL    RBC Count 4.88 3.80 - 5.20 10e6/uL    Hemoglobin 14.0 11.7 - 15.7 g/dL    Hematocrit 43.4 35.0 - 47.0 %    MCV 89 78 - 100 fL    MCH 28.7 26.5 - 33.0 pg    MCHC 32.3 31.5 - 36.5 g/dL    RDW 14.0 10.0 - 15.0 %    Platelet Count 278 150 - 450 10e3/uL    % Neutrophils 62 %    % Lymphocytes 27 %    % Monocytes 8 %    % Eosinophils 1 %    % Basophils 1 %    % Immature Granulocytes 1 %    NRBCs per 100 WBC 0 <1 /100    Absolute Neutrophils 5.1 1.6 - 8.3 10e3/uL    Absolute Lymphocytes 2.1 0.8 - 5.3 10e3/uL    Absolute Monocytes 0.6 0.0 - 1.3 10e3/uL    Absolute Eosinophils 0.1 0.0 - 0.7 10e3/uL    Absolute Basophils 0.1 0.0 - 0.2 10e3/uL    Absolute Immature Granulocytes 0.0 <=0.4 10e3/uL    Absolute NRBCs 0.0 10e3/uL   Extra Blue Top Tube     Status: None   Result Value Ref Range    Hold Specimen JIC    Extra Red Top Tube     Status: None   Result Value Ref Range    Hold Specimen JIC    Troponin T, High Sensitivity     Status: Normal   Result Value Ref Range    Troponin T, High Sensitivity 9 <=14 ng/L   EKG 12-lead, tracing only     Status: None   Result Value Ref Range    Systolic Blood Pressure  mmHg    Diastolic Blood Pressure  mmHg    Ventricular Rate 66 BPM    Atrial Rate 66 BPM    KY Interval 158 ms    QRS Duration 88 ms      ms    QTc 410 ms    P Axis 55 degrees    R AXIS 39 degrees    T Axis 20 degrees    Interpretation ECG       Sinus rhythm  Possible Inferior infarct , age undetermined  Possible Anterior infarct , age undetermined  Abnormal ECG  Unconfirmed report - interpretation of this ECG is computer generated - see medical record for final interpretation  Confirmed by - EMERGENCY ROOM, PHYSICIAN ([id number]),  SONIDO SEN ([id number]) on 9/12/2022 2:38:06 PM     CBC with platelets differential     Status: None    Narrative    The following orders were created for panel order CBC with platelets differential.  Procedure                               Abnormality         Status                     ---------                               -----------         ------                     CBC with platelets and d...[[id number]]                      Final result                 Please view results for these tests on the individual orders.     Medications   ondansetron (ZOFRAN) injection 4 mg (4 mg Intravenous Given 9/12/22 1957)   acetaminophen (TYLENOL) tablet 1,000 mg (1,000 mg Oral Given 9/12/22 1402)        Assessments & Plan (with Medical Decision Making)   This is a 57-year-old female past medical history significant for gastric sleeve presenting with concerns for ongoing intermittent upper abdominal pain and right upper quadrant pain for the last 2 weeks with associated nausea.  On presentation patient has stable vitals without tachycardia or tachypnea.  She does have some mild right upper quadrant tenderness with a negative Kowalski sign.  Otherwise reassuring abdominal exam.  Patient initially seen in triage for basic labs and right upper quadrant ultrasound were ordered.    CBC without leukocytosis or anemia.  Metabolic function without findings suggest acute kidney injury, electrolyte disturbance, normal LFTs.  Lipase within reference range.  Upper quadrant ultrasound shows findings consistent with chronic intrinsic hepatic  parenchymal disease, without other acute findings.  Chest x-ray also obtained returned showing clear chest.  With patient's history of prior gastric sleeve, I did also order CT noncontrast of the abdomen and pelvis as patient has a documented allergy to contrast.  This returns without acute pathology, there are findings of probable biliary sludge, ovarian cyst, and a small sliding-type hiatal hernia.  EKG in my interpretation shows a regular sinus rhythm, without findings to suggest other arrhythmias or ischemia.  Troponin was also ordered, returns within reference range.    I discussed all these findings with the patient.  On my entering the exam room, patient was eating dinner including a sandwich and some Posta.  She reports improvement of her symptoms with Zofran.  I discussed with the recommendations to follow closely with her bariatric surgery team, with her history of gastric sleeve.  We discussed keeping a close eye out and having a low threshold to return if she develops worsening pain, hematemesis, fevers, any new or worsening symptoms.  I reassured her at this point in time based on potation's clinical appearance and evaluation, symptoms do not appear consistent with an acute cardiac process, pneumonia, bowel obstruction, perforation, UTI, acute GI bleed, cholecystitis, or other more emergent cause.  Patient has no other questions or concerns at this time.  Red flag signs were addressed, and they were in agreement with the patient care plan provided.    I have reviewed the nursing notes. I have reviewed the findings, diagnosis, plan and need for follow up with the patient.    New Prescriptions    ONDANSETRON (ZOFRAN) 4 MG TABLET    Take 1 tablet (4 mg) by mouth every 8 hours as needed for nausea       Final diagnoses:   RUQ abdominal pain       --  Francesca Katherine, PAC  [facility] EMERGENCY DEPARTMENT  9/12/2022

## 2022-09-12 NOTE — ED NOTES
"  ED Triage Provider Note  [facility]  Encounter Date: Sep 12, 2022    History:  Chief Complaint   Patient presents with     Chest Pain     Sunshine Delgado is a 57 year old female with a past medical history of anxiety, MARIA GUADALUPE, obesity, depression, CKD, afib (s/p ablation x 2 in 2021) who presents to the ED with a chief complaint of chest pain, SOB, and abdominal pain. Pain located in the RUQ. Pain worsens with palpation of the affected area. Prior gastric sleeve surgery (2018). She does have associated nausea and decreased appetite without vomiting. She states that she feels full. Last BM was yesterday and was normal. No urinary symptoms. Has also has prior hysterectomy. Still has ovaries. Declines pain medication in thee ER other than Tylenol.    Review of Systems:  General: No fevers or chills  Skin: No rash or diaphoresis  Eyes: No eye redness or discharge  Ears/Nose/Throat: No rhinorrhea or nasal congestion  Respiratory: No cough or SOB  Cardiovascular: positive for chest pain, no palpitations  Gastrointestinal: No vomiting, or diarrhea, positive for RUQ pain and nausea   Genitourinary: No urinary frequency, hematuria, or dysuria  Musculoskeletal: No arthralgias or myalgias  Neurologic: No numbness or weakness  Hematologic/Lymphatic/Immunologic: No leg swelling, no easy bruising/bleeding  Endocrine: No polyuria/polydypsia    Exam:  /83   Pulse 76   Temp 97.9  F (36.6  C) (Oral)   Resp 18   Ht 1.626 m (5' 4\")   Wt 106.6 kg (235 lb)   SpO2 96%   BMI 40.34 kg/m    General: No acute distress. Appears stated age. NAD  HEENT: EOMI, anicteric, NCAT  Neck: Supple. Normal ROM  Cardio: Regular rate, extremities well perfused  Resp: Normal work of breathing, grossly normal respiratory rate  Skin: Normal color, no rash noted  Psych: Normal mood and affect  Neuro: AxOx3, moving all extremities  Abdomen: TTP RUQ w/o rebound or guarding, soft, no masses " palpated    Medical Decision Making:  Patient arriving to the ED with problem as above. A medical screening exam was performed. Labs, UA, meds orders initiated from Triage. The patient is appropriate to wait in triage.         EKG Interpretation:      Interpreted by Yue Stephens MD  Time reviewed:1406   Symptoms at time of EKG: chest pain   Rhythm: normal sinus   Rate: normal, 66 bpm  Axis: NORMAL  Ectopy: none  Conduction: normal  ST Segments/ T Waves: No ST-T wave changes  Q Waves: III, aVF  Comparison to prior: Unchanged from 3/31/22    Clinical Impression: normal EKG    Yue Stephens MD on 9/12/2022 at 1:35 PM      Yue Stephens MD  09/12/22 1341       Yue Stephens MD  09/12/22 1407

## 2022-09-12 NOTE — LETTER
September 12, 2022      To Whom It May Concern:      Sunshine Delgado was seen in our Emergency Department today, 09/12/22.  I expect her condition to improve over the next 1-2 days.  She may return to work/school when improved.    Sincerely,        Shannan Park MD

## 2022-09-12 NOTE — ED NOTES
Pt placed back in ED lobby due to ED capacity. Pt informed to notify writer if any worsening symptoms occur. Pt is aware that if she must leave for any reason the IV would need to be removed.

## 2022-09-12 NOTE — TELEPHONE ENCOUNTER
"Patient calling with concerns of upper right stomach issues. When patient goes to the bathroom and goes to wipe she feels like something in her stomach needs to flip and then she will need to push in on that painful area to make it subside. The pain can be very sharp. The area of the pain is in her right upper abdomin just under her ribs. This started 3 weeks ago.   Patient reports that this happens when she bends over as well, and has also having heart burn and the feeling of fullness \"all the way up her neck\".   Patient states that the pain has been constant and dull with the times of it turning sharp that patient previously mentioned.  Protocol recommends go to ED now.  [facility]. Patient knows how to get there. Patient states that she can not miss any work.   Encouraged patient to notify work that she called doctor's office regarding symptoms and is being directed to ED now. Patient can then get documentation at ED that she was there for employer.   Patient agrees to this and will be heading to the ED now.   Sharon Whyte RN   09/12/22 12:47 PM  [facility] Nurse Advisor    Reason for Disposition    Pain lasting > 10 minutes and over 50 years old    Additional Information    Negative: Passed out (i.e., fainted, collapsed and was not responding)    Negative: Shock suspected (e.g., cold/pale/clammy skin, too weak to stand, low BP, rapid pulse)    Negative: Visible sweat on face or sweat is dripping down    Negative: Chest pain    Negative: SEVERE abdominal pain (e.g., excruciating)    Protocols used: ABDOMINAL PAIN - UPPER-A-OH      "

## 2022-09-12 NOTE — DISCHARGE INSTRUCTIONS
TODAY'S VISIT:  You were seen today for abdominal pain   -   - If you had any labs or imaging/radiology tests performed today, you should also discuss these tests with your usual provider.     FOLLOW-UP:  Please make an appointment to follow up with:  - Your Primary Care Provider. If you do not have a PCP, please call the Primary Care Center (phone: [phone] for an appointment    - Have your provider review the results from today's visit with you again to make sure no further follow-up or additional testing is needed based on those results.     RETURN TO THE EMERGENCY DEPARTMENT  Return to the Emergency Department at any time for any new or worsening symptoms or any concerns.    We discussed calling your bariatric surgery team tomorrow morning, with your history of gastric sleeve, they may want you to follow-up closely.  If you develop worsening symptoms such as worsening abdominal pain, vomiting, fevers, you return right away to the emergency department/urgent care for evaluation and management.  Sent prescription for Zofran to her preferred pharmacy [facility] in [place], uses as needed for nausea.  We discussed bland diet in the meantime, starting omeprazole.    Patient has no other questions or concerns at this time.  Red flag signs were addressed, and they were in agreement with the patient care plan provided.

## 2022-09-15 LAB — RADIOLOGIST FLAGS: NORMAL

## 2022-09-16 NOTE — PROGRESS NOTES
Sunshine Delgado  :  1965  DOS: 2022  MRN: [id number]    Sports Medicine Clinic Visit    PCP: Lesly Arteaga    Sunshine Delgado is a 57 year old female who is seen in consultation at the request of  Hadley Grace PA-C presenting with right knee pain. MRI on file.    HPI  Mechanism of Injury: No acute injury onset.      Duration and progression of pain: Knee pain started in early 2022. Worsening.   Location of pain: Right medial knee.  Radiation: from medial right knee to the right hip    Swelling: yes  Instability: yes  Mechanical symptoms: chronic popping, no locking    Numbness/Tingling: medial right knee numbness occasionally  Radicular pain: no  Weakness: yes, limited by pain    Alleviating factors: hottub helps with muscle tightness.  Aggravating factors: extension of the right leg. Notes constant medial knee pain.     Treatments tried (medications, injections, bracing, therapy, modalities): ice and Tylenol. Heats in the hottub nightly. [place]    Prior medical visits related to complaint:  Followed by SUPRIYA Stubbs.   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    Past Medical History:   Diagnosis Date     Antiplatelet or antithrombotic long-term use      Arrhythmia      Atrial fibrillation with rapid ventricular response (H) 2020     Bee sting allergy      Depressive disorder      Generalized anxiety disorder 10/15/2009    lexapro made things worse.       Hashimoto's thyroiditis 2020     Morbid obesity (H)      Ocular migraine      MARIA GUADALUPE (obstructive sleep apnea)- severe (AHI  84) 09/09/2011    patient not using since 2019     Primary osteoarthritis of right knee 9/23/2022     Renal disease      Voice fatigue 10/22/2009     Past Surgical History:   Procedure Laterality Date     ANESTHESIA CARDIOVERSION N/A 11/22/2021    Procedure: ANESTHESIA, FOR CARDIOVERSION@1515;  Surgeon: GENERIC ANESTHESIA PROVIDER;  Location: [facility] OR     COLONOSCOPY  2000     DAVINCI GASTRIC SLEEVE       EP ABLATION FOCAL AFIB N/A 7/22/2021    Procedure: EP ABLATION FOCAL AFIB;  Surgeon: Vicente Craig MD;  Location:  HEART CARDIAC CATH LAB     EP ABLATION FOCAL AFIB N/A 3/31/2022    Procedure: Ablation Focal Atrial Fibrillation;  Surgeon: Vicente Craig MD;  Location:  HEART CARDIAC CATH LAB     GENITOURINARY SURGERY  2007     HYSTERECTOMY, PAP NO LONGER INDICATED       HYSTERECTOMY, VAGINAL  2007    still has ovaries     LAPAROSCOPIC GASTRIC SLEEVE N/A 3/13/2018    Procedure: LAPAROSCOPIC GASTRIC SLEEVE;  Laparoscopic Sleeve Gastrectomy;  Surgeon: Kameron Joseph MD;  Location:  OR     Family History   Problem Relation Age of Onset     Eye Disorder Mother         lost eyesight; probable macular degeneration     Psychotic Disorder Mother         Dementia /Alzhimers     Neurologic Disorder Mother         seizures     Diabetes Mother      Hypertension Mother      Alzheimer Disease Mother      Arthritis Mother      Osteoporosis Mother      Obesity Mother      Diabetes Father      Depression Father      Hyperlipidemia Father      Obesity Father      Psychotic Disorder Sister         bipolar     Thyroid Disease Sister         h/o thyroid cancer     Depression Sister         Bipolar     Obesity Sister      Cancer Other         Brain cancer     Osteoporosis Maternal Grandmother      Anxiety Disorder Son      Depression Sister      Thyroid Disease Sister        Objective  /83   Pulse 64   Wt 106.6 kg (235 lb)   BMI 40.34 kg/m        General: healthy, alert and in no acute distress      HEENT: no scleral  icterus or conjunctival erythema     Skin: no suspicious lesions or rash. No jaundice.     CV: regular rhythm by palpation, 2+ distal pulses, no pedal edema      Resp: normal respiratory effort without conversational dyspnea     Psych: normal mood and affect      Gait: nonantalgic, appropriate coordination and balance     Neuro:        - Sensation to light touch:  Intact throughout the BLE including all peripheral nerve distributions.        - MSR:  2+ in bilateral patella, achilles.        - Special tests:   - Slump/SLR:  Neg bilaterally    MSK - Knee:       - Inspection:    - mild effusion present. No erythema, warmth, ecchymosis, or lesion.        - ROM:     - Limited in flexion by pain.       - Palpation:    - TTP at the Medial joint line.   - NTTP elsewhere.        - Strength:    - 5/5 in BLE including Hab, Hadd, KF, KE, DF, PF, Inv, Ev.        - Special tests:        - Lachman:  Neg        - A/P drawer:  Neg       - Pivot shift:  Neg   - Garry:  Pos     - Varus stress:  Neg for laxity or pain    - Valgus stress:  Neg for laxity, pos for mild pain   - Patellar grind:  Pos      Radiology  I personally reviewed the available relevant imaging, including MRI R Knee (6/17/22), and agree with the interpretation of mild-moderate tricompartmental cartilage defects, degenerative tear of the medial meniscus, and chronic ACL tear.     MR right knee without contrast 6/17/2022 7:17 PM     Techniques: Multiplanar multisequence imaging of the right knee was  obtained without administration of intra-articular or intravenous  contrast using routing protocol.     History: Acute pain of right knee     Comparison: None available     Findings:     MENISCI:  Medial meniscus:There is a horizontal oblique tear of the body of the  medial meniscus that communicates with the inferior articular surface  (series 7, images 24 through 26), blunting of the posterior horn .  Lateral meniscus: Mucoid degeneration of the lateral meniscus,  which  is mildly extruded.     LIGAMENTS  Cruciate ligaments: The ACL appears attenuated with only a few intact  fibers. No associated bony contusion pattern most consistent with  chronic ACL tear. The posterior cruciate ligament is intact and  unremarkable.  Medial supporting structures:  Lateral supporting structures: Intact.     EXTENSOR MECHANISM  Intact.     FLUID  Small joint effusion. No substantial Baker's cyst.     OSSEOUS and ARTICULAR STRUCTURES  Bones: No fracture, contusion, or osseous lesion is seen.     Patellofemoral compartment: Focal full-thickness cartilage fissuring  centered about the median ridge of the patella is associated mild bone  marrow edema, focal full-thickness cartilage fissure of the medial  trochlea     Medial compartment: Focal moderate grade cartilage fissuring centered  about the weightbearing aspect of the medial femoral condyle.     Lateral compartment: Focal full-thickness cartilage defect of the  lateral femoral condyle (series 6001).     ANCILLARY FINDINGS  None.                                                                      Impression:  1. Medial compartment: Complex degenerative tearing of the medial  meniscus with a predominant horizontal oblique component. Moderate  grade cartilage fissuring.  2. Lateral compartment: Intrasubstance degeneration of the lateral  meniscus, which is mildly extruded. Focal full-thickness articular  cartilage defect within the distal femoral condyle.  3. Patellofemoral joint: Focal full-thickness articular cartilage  fissure centered about the median ridge of the patella.  4. Chronic appearing ACL tear.     JUTTA ELLERMANN, MD     Large Joint Injection/Arthocentesis: R knee joint    Date/Time: 9/23/2022 11:35 AM  Performed by: Roshan Farr DO  Authorized by: Roshan Farr DO     Indications:  Pain  Needle Size:  22 G  Guidance: landmark guided    Approach:  Anterolateral  Location:  Knee      Medications:  40 mg triamcinolone 40  MG/ML; 2 mL lidocaine 1 %; 2 mL bupivacaine (PF) 0.5 %  Outcome:  Tolerated well, no immediate complications  Procedure discussed: discussed risks, benefits, and alternatives    Consent Given by:  Patient  Prep: patient was prepped and draped in usual sterile fashion        Assessment  1. Primary osteoarthritis of right knee    2. Degenerative tear of medial meniscus, right        Plan  Sunshine Delgado is a 57 year old female that presents with right knee pain. History and physical exam appear most consistent with primary osteoarthritis and a degenerative medial meniscus tear. Discussed the nature of the condition and treatment options and mutually agreed upon the following plan:    - Imaging:         - Reviewed relevant imaging in the chart. Results above. Reviewed imaging with patient.   - Medications:         - Discussed pharmacologic options for pain relief. May use Tylenol PRN, avoid NSAIDs due to kidney problems. May also use topical creams such as IcyHot, BioFreeze, or Voltaren gel PRN.   - Injections:         - Performed a corticosteroid injection of the right knee today in clinic. Patient tolerated well without complications. See procedure note above for details.   - Therapy:         - Discussed the benefits of PT vs HEP. Patient prefers PT, referral placed today.  - Modalities:         - Ice, heat, massage PRN.   - Bracing:         - Discussed that she may benefit from a patellar stabilizing brace for exacerbating activities. Options for bracing presented in clinic, she may choose to take a brace home or look for an equivalent brace from an outside source.   - Activity:         - Encouraged to remain active and participate in regular activities as symptoms allow with knee brace in place for stability.   - Follow up:         - In 4-6 weeks for re-evaluation and update to treatment plan. Patient has clinic contact information for questions or concerns.       Roshan Farr DO  [facility] - Sports  Medicine  [facility] - Department of Orthopedic Surgery

## 2022-09-23 ENCOUNTER — OFFICE VISIT (OUTPATIENT)
Dept: ORTHOPEDICS | Facility: CLINIC | Age: 57
End: 2022-09-23
Attending: PHYSICIAN ASSISTANT
Payer: COMMERCIAL

## 2022-09-23 ENCOUNTER — PRE VISIT (OUTPATIENT)
Dept: ORTHOPEDICS | Facility: CLINIC | Age: 57
End: 2022-09-23

## 2022-09-23 VITALS
WEIGHT: 235 LBS | BODY MASS INDEX: 40.34 KG/M2 | SYSTOLIC BLOOD PRESSURE: 137 MMHG | HEART RATE: 64 BPM | DIASTOLIC BLOOD PRESSURE: 83 MMHG

## 2022-09-23 DIAGNOSIS — M23.203 DEGENERATIVE TEAR OF MEDIAL MENISCUS, RIGHT: ICD-10-CM

## 2022-09-23 DIAGNOSIS — M17.11 PRIMARY OSTEOARTHRITIS OF RIGHT KNEE: Primary | ICD-10-CM

## 2022-09-23 PROBLEM — M25.562 LEFT KNEE PAIN, UNSPECIFIED CHRONICITY: Status: ACTIVE | Noted: 2022-09-23

## 2022-09-23 PROCEDURE — 99213 OFFICE O/P EST LOW 20 MIN: CPT | Mod: 25 | Performed by: STUDENT IN AN ORGANIZED HEALTH CARE EDUCATION/TRAINING PROGRAM

## 2022-09-23 PROCEDURE — 20610 DRAIN/INJ JOINT/BURSA W/O US: CPT | Mod: RT | Performed by: STUDENT IN AN ORGANIZED HEALTH CARE EDUCATION/TRAINING PROGRAM

## 2022-09-23 RX ADMIN — TRIAMCINOLONE ACETONIDE 40 MG: 40 INJECTION, SUSPENSION INTRA-ARTICULAR; INTRAMUSCULAR at 11:35

## 2022-09-23 RX ADMIN — BUPIVACAINE HYDROCHLORIDE 2 ML: 5 INJECTION, SOLUTION EPIDURAL; INTRACAUDAL at 11:35

## 2022-09-23 RX ADMIN — LIDOCAINE HYDROCHLORIDE 2 ML: 10 INJECTION, SOLUTION INFILTRATION; PERINEURAL at 11:35

## 2022-09-23 ASSESSMENT — PAIN SCALES - GENERAL: PAINLEVEL: WORST PAIN (10)

## 2022-09-23 NOTE — LETTER
2022         RE: Sunshine Delgado  [address] [place] [address]        Dear Colleague,    Thank you for referring your patient, Sunshine Delgado, to the [facility] [place]. Please see a copy of my visit note below.    Sunshine Delgado  :  1965  DOS: 2022  MRN: [id number]    Sports Medicine Clinic Visit    PCP: Lesly Arteaga    Sunshine Delgado is a 57 year old female who is seen in consultation at the request of  Hadley Grace PA-C presenting with right knee pain. MRI on file.    HPI  Mechanism of Injury: No acute injury onset.      Duration and progression of pain: Knee pain started in early 2022. Worsening.   Location of pain: Right medial knee.  Radiation: from medial right knee to the right hip    Swelling: yes  Instability: yes  Mechanical symptoms: chronic popping, no locking    Numbness/Tingling: medial right knee numbness occasionally  Radicular pain: no  Weakness: yes, limited by pain    Alleviating factors: hottub helps with muscle tightness.  Aggravating factors: extension of the right leg. Notes constant medial knee pain.     Treatments tried (medications, injections, bracing, therapy, modalities): ice and Tylenol. Heats in the hottub nightly. [place] which was helpful    Prior medical visits related to complaint:  Followed by SUPRIYA Stubbs.   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    Past Medical History:   Diagnosis Date     Antiplatelet or antithrombotic long-term use      Arrhythmia      Atrial fibrillation with rapid ventricular response (H)  1/26/2020     Bee sting allergy      Depressive disorder      Generalized anxiety disorder 10/15/2009    lexapro made things worse.       Hashimoto's thyroiditis 5/6/2020     Morbid obesity (H)      Ocular migraine      MARIA GUADALUPE (obstructive sleep apnea)- severe (AHI 84) 09/09/2011    patient not using since 2019     Primary osteoarthritis of right knee 9/23/2022     Renal disease      Voice fatigue 10/22/2009     Past Surgical History:   Procedure Laterality Date     ANESTHESIA CARDIOVERSION N/A 11/22/2021    Procedure: ANESTHESIA, FOR CARDIOVERSION@1515;  Surgeon: GENERIC ANESTHESIA PROVIDER;  Location:  OR     COLONOSCOPY  2000     DAVINCI GASTRIC SLEEVE       EP ABLATION FOCAL AFIB N/A 7/22/2021    Procedure: EP ABLATION FOCAL AFIB;  Surgeon: Vciente Craig MD;  Location:  HEART CARDIAC CATH LAB     EP ABLATION FOCAL AFIB N/A 3/31/2022    Procedure: Ablation Focal Atrial Fibrillation;  Surgeon: Vicente Craig MD;  Location:  HEART CARDIAC CATH LAB     GENITOURINARY SURGERY  2007     HYSTERECTOMY, PAP NO LONGER INDICATED       HYSTERECTOMY, VAGINAL  2007    still has ovaries     LAPAROSCOPIC GASTRIC SLEEVE N/A 3/13/2018    Procedure: LAPAROSCOPIC GASTRIC SLEEVE;  Laparoscopic Sleeve Gastrectomy;  Surgeon: Kameron Joseph MD;  Location:  OR     Family History   Problem Relation Age of Onset     Eye Disorder Mother         lost eyesight; probable macular degeneration     Psychotic Disorder Mother         Dementia /Alzhimers     Neurologic Disorder Mother         seizures     Diabetes Mother      Hypertension Mother      Alzheimer Disease Mother      Arthritis Mother      Osteoporosis Mother      Obesity Mother      Diabetes Father      Depression Father      Hyperlipidemia Father      Obesity Father      Psychotic Disorder Sister         bipolar     Thyroid Disease Sister         h/o thyroid cancer     Depression Sister         Bipolar     Obesity Sister      Cancer Other         Brain cancer      Osteoporosis Maternal Grandmother      Anxiety Disorder Son      Depression Sister      Thyroid Disease Sister        Objective  /83   Pulse 64   Wt 106.6 kg (235 lb)   BMI 40.34 kg/m        General: healthy, alert and in no acute distress      HEENT: no scleral icterus or conjunctival erythema     Skin: no suspicious lesions or rash. No jaundice.     CV: regular rhythm by palpation, 2+ distal pulses, no pedal edema      Resp: normal respiratory effort without conversational dyspnea     Psych: normal mood and affect      Gait: nonantalgic, appropriate coordination and balance     Neuro:        - Sensation to light touch:  Intact throughout the BLE including all peripheral nerve distributions.        - MSR:  2+ in bilateral patella, achilles.        - Special tests:   - Slump/SLR:  Neg bilaterally    MSK - Knee:       - Inspection:    - mild effusion present. No erythema, warmth, ecchymosis, or lesion.        - ROM:     - Limited in flexion by pain.       - Palpation:    - TTP at the Medial joint line.   - NTTP elsewhere.        - Strength:    - 5/5 in BLE including Hab, Hadd, KF, KE, DF, PF, Inv, Ev.        - Special tests:        - Lachman:  Neg        - A/P drawer:  Neg       - Pivot shift:  Neg   - Garry:  Pos     - Varus stress:  Neg for laxity or pain    - Valgus stress:  Neg for laxity, pos for mild pain   - Patellar grind:  Pos      Radiology  I personally reviewed the available relevant imaging, including MRI R Knee (6/17/22), and agree with the interpretation of mild-moderate tricompartmental cartilage defects, degenerative tear of the medial meniscus, and chronic ACL tear.     MR right knee without contrast 6/17/2022 7:17 PM     Techniques: Multiplanar multisequence imaging of the right knee was  obtained without administration of intra-articular or intravenous  contrast using routing protocol.     History: Acute pain of right knee     Comparison: None  available     Findings:     MENISCI:  Medial meniscus:There is a horizontal oblique tear of the body of the  medial meniscus that communicates with the inferior articular surface  (series 7, images 24 through 26), blunting of the posterior horn .  Lateral meniscus: Mucoid degeneration of the lateral meniscus, which  is mildly extruded.     LIGAMENTS  Cruciate ligaments: The ACL appears attenuated with only a few intact  fibers. No associated bony contusion pattern most consistent with  chronic ACL tear. The posterior cruciate ligament is intact and  unremarkable.  Medial supporting structures:  Lateral supporting structures: Intact.     EXTENSOR MECHANISM  Intact.     FLUID  Small joint effusion. No substantial Baker's cyst.     OSSEOUS and ARTICULAR STRUCTURES  Bones: No fracture, contusion, or osseous lesion is seen.     Patellofemoral compartment: Focal full-thickness cartilage fissuring  centered about the median ridge of the patella is associated mild bone  marrow edema, focal full-thickness cartilage fissure of the medial  trochlea     Medial compartment: Focal moderate grade cartilage fissuring centered  about the weightbearing aspect of the medial femoral condyle.     Lateral compartment: Focal full-thickness cartilage defect of the  lateral femoral condyle (series 6001).     ANCILLARY FINDINGS  None.                                                                      Impression:  1. Medial compartment: Complex degenerative tearing of the medial  meniscus with a predominant horizontal oblique component. Moderate  grade cartilage fissuring.  2. Lateral compartment: Intrasubstance degeneration of the lateral  meniscus, which is mildly extruded. Focal full-thickness articular  cartilage defect within the distal femoral condyle.  3. Patellofemoral joint: Focal full-thickness articular cartilage  fissure centered about the median ridge of the patella.  4. Chronic appearing ACL tear.     JUTTA ELLERMANN, MD      Large Joint Injection/Arthocentesis: R knee joint    Date/Time: 9/23/2022 11:35 AM  Performed by: Roshan Farr DO  Authorized by: Roshan Farr DO     Indications:  Pain  Needle Size:  22 G  Guidance: landmark guided    Approach:  Anterolateral  Location:  Knee      Medications:  40 mg triamcinolone 40 MG/ML; 2 mL lidocaine 1 %; 2 mL bupivacaine (PF) 0.5 %  Outcome:  Tolerated well, no immediate complications  Procedure discussed: discussed risks, benefits, and alternatives    Consent Given by:  Patient  Prep: patient was prepped and draped in usual sterile fashion        Assessment  1. Primary osteoarthritis of right knee    2. Degenerative tear of medial meniscus, right        Plan  Sunshine Delgado is a 57 year old female that presents with right knee pain. History and physical exam appear most consistent with primary osteoarthritis and a degenerative medial meniscus tear. Discussed the nature of the condition and treatment options and mutually agreed upon the following plan:    - Imaging:         - Reviewed relevant imaging in the chart. Results above. Reviewed imaging with patient.   - Medications:         - Discussed pharmacologic options for pain relief. May use Tylenol PRN, avoid NSAIDs due to kidney problems. May also use topical creams such as IcyHot, BioFreeze, or Voltaren gel PRN.   - Injections:         - Performed a corticosteroid injection of the right knee today in clinic. Patient tolerated well without complications. See procedure note above for details.   - Therapy:         - Discussed the benefits of PT vs HEP. Patient prefers PT, referral placed today.  - Modalities:         - Ice, heat, massage PRN.   - Bracing:         - Discussed that she may benefit from a patellar stabilizing brace for exacerbating activities. Options for bracing presented in clinic, she may choose to take a brace home or look for an equivalent brace from an outside source.   - Activity:         - Encouraged to remain  active and participate in regular activities as symptoms allow with knee brace in place for stability.   - Follow up:         - In 4-6 weeks for re-evaluation and update to treatment plan. Patient has clinic contact information for questions or concerns.       Roshan Farr DO  [facility] Sports Medicine  [facility] - Department of Orthopedic Surgery         Again, thank you for allowing me to participate in the care of your patient.        Sincerely,        Roshan Farr DO

## 2022-09-23 NOTE — PATIENT INSTRUCTIONS
You were seen today for your right knee pain secondary to osteoarthritis and degenerative medial meniscus tear.   As discussed in clinic, Our treatment plan will focus on pain relief, anti-inflammation, and stability/strengthening of your knee stabilizers.   You may use Tylenol (instead of non-steroidal anti-inflammatory (NSAID) medications for the kidneys) as needed for pain relief You may try topical medications such as IcyHot, BioFreeze, Bengay, or Voltaren gel as well, which may help your symptoms.   Discussed the benefits of Physical Therapy or Home Exercise Program for flexibility, strengthening, and stabilization. Elected PT, referral placed today, please call [phone] to schedule.  We performed a corticosteroid injection of the right knee today in clinic. It may take 2-3 days for the medication to start to provide significant relief. Please keep the injection site clean and dry, do not submerge the site (no baths, pools) for 24 hours. Rest the area for the next 24-48 hours, then you may resume normal activities.   Discussed bracing options and recommend using a patellar stabilizing brace. Options presented in clinic, you may elect to take our brace or purchase the recommended brace through an outside source.   Follow up in  4-6 weeks for re-evaluation and update to treatment plan.   Please call the clinic for any questions or concerns.

## 2022-09-24 RX ORDER — BUPIVACAINE HYDROCHLORIDE 5 MG/ML
2 INJECTION, SOLUTION EPIDURAL; INTRACAUDAL
Status: DISCONTINUED | OUTPATIENT
Start: 2022-09-23 | End: 2023-07-28

## 2022-09-24 RX ORDER — LIDOCAINE HYDROCHLORIDE 10 MG/ML
2 INJECTION, SOLUTION INFILTRATION; PERINEURAL
Status: DISCONTINUED | OUTPATIENT
Start: 2022-09-23 | End: 2023-07-28

## 2022-09-24 RX ORDER — TRIAMCINOLONE ACETONIDE 40 MG/ML
40 INJECTION, SUSPENSION INTRA-ARTICULAR; INTRAMUSCULAR
Status: DISCONTINUED | OUTPATIENT
Start: 2022-09-23 | End: 2022-12-26

## 2022-09-28 ENCOUNTER — MYC MEDICAL ADVICE (OUTPATIENT)
Dept: FAMILY MEDICINE | Facility: CLINIC | Age: 57
End: 2022-09-28

## 2022-09-28 NOTE — LETTER
October 5, 2022      Sunshine Delgado  [address] [place] [address]        To Whom It May Concern:    Sunshine Delgado was seen in our clinic on June 13, 2022. Please excuse her from work on that day for her appointment.       Sincerely,        Linn Montemayor PA-C

## 2022-09-28 NOTE — TELEPHONE ENCOUNTER
This letter was completed on 9/8. If patient can't see on MyChart - may need to  or have it mailed.     SHELLI GuamanC

## 2022-09-30 ENCOUNTER — VIRTUAL VISIT (OUTPATIENT)
Dept: FAMILY MEDICINE | Facility: CLINIC | Age: 57
End: 2022-09-30
Payer: COMMERCIAL

## 2022-09-30 DIAGNOSIS — F90.9 ATTENTION DEFICIT HYPERACTIVITY DISORDER (ADHD), UNSPECIFIED ADHD TYPE: ICD-10-CM

## 2022-09-30 DIAGNOSIS — K44.9 HIATAL HERNIA: ICD-10-CM

## 2022-09-30 DIAGNOSIS — R10.11 RUQ ABDOMINAL PAIN: Primary | ICD-10-CM

## 2022-09-30 DIAGNOSIS — K76.0 FATTY LIVER: ICD-10-CM

## 2022-09-30 DIAGNOSIS — N83.201 RIGHT OVARIAN CYST: ICD-10-CM

## 2022-09-30 DIAGNOSIS — F33.1 MAJOR DEPRESSIVE DISORDER, RECURRENT EPISODE, MODERATE (H): Chronic | ICD-10-CM

## 2022-09-30 DIAGNOSIS — F41.1 GENERALIZED ANXIETY DISORDER: Chronic | ICD-10-CM

## 2022-09-30 PROCEDURE — 99214 OFFICE O/P EST MOD 30 MIN: CPT | Mod: 95 | Performed by: NURSE PRACTITIONER

## 2022-09-30 RX ORDER — BUPROPION HYDROCHLORIDE 300 MG/1
300 TABLET ORAL EVERY MORNING
Qty: 90 TABLET | Refills: 3 | Status: SHIPPED | OUTPATIENT
Start: 2022-09-30 | End: 2023-04-25

## 2022-09-30 RX ORDER — VENLAFAXINE HYDROCHLORIDE 75 MG/1
75 TABLET, EXTENDED RELEASE ORAL DAILY
Qty: 90 TABLET | Refills: 3 | Status: SHIPPED | OUTPATIENT
Start: 2022-09-30 | End: 2023-04-25

## 2022-09-30 NOTE — PROGRESS NOTES
"Sunshine is a 57 year old who is being evaluated via a billable video visit.      How would you like to obtain your AVS? MyChart  If the video visit is dropped, the invitation should be resent by: Text to cell phone: [phone]  Will anyone else be joining your video visit? No          Assessment & Plan     RUQ abdominal pain  Recommend low fat diet, offered Norco for breakthrough pain, follow up with surgeon  - Adult General Surg Referral; Future    Hiatal hernia  Start Omeprazole for reflux symptoms, discussed dietary modifications  - omeprazole (PRILOSEC) 20 MG DR capsule; Take 1 capsule (20 mg) by mouth daily    Generalized anxiety disorder  Well controlled, medications refilled  - buPROPion (WELLBUTRIN XL) 300 MG 24 hr tablet; Take 1 tablet (300 mg) by mouth every morning    Major depressive disorder, recurrent episode, moderate (H)  Well controlled, medications refilled  - buPROPion (WELLBUTRIN XL) 300 MG 24 hr tablet; Take 1 tablet (300 mg) by mouth every morning  - venlafaxine (EFFEXOR-ER) 75 MG 24 hr tablet; Take 1 tablet (75 mg) by mouth daily    Attention deficit hyperactivity disorder (ADHD), unspecified ADHD type  As above  - buPROPion (WELLBUTRIN XL) 300 MG 24 hr tablet; Take 1 tablet (300 mg) by mouth every morning    Fatty liver  LFTs normal, recommend GI consult due to CT findings  Discussed diet and exercise strategies to improve this  - Adult GI  Referral - Consult Only; Future    Right ovarian cyst  US for further evaluation, consider GYN consult pending findings  - US Pelvic Transabdominal and Transvaginal; Future    Review of prior external note(s) from - ER  Review of the result(s) of each unique test - CT abdomen, CMP  Ordering of each unique test  Prescription drug management         BMI:   Estimated body mass index is 40.34 kg/m  as calculated from the following:    Height as of 9/12/22: 1.626 m (5' 4\").    Weight as of 9/23/22: 106.6 kg (235 lb).       See Patient " Instructions    No follow-ups on file.    LEOPOLDO Michelle CNP  [facility] JONATAN Gonsalez is a 57 year old, presenting for the following health issues:    Er F/u    History of Present Illness       Reason for visit:  Hiatal hernia,stomach issues. sludge n my gallbladder. Foundon my er visit two weeks ago.    She eats 2-3 servings of fruits and vegetables daily.She consumes 0 sweetened beverage(s) daily.She exercises with enough effort to increase her heart rate 20 to 29 minutes per day.  She exercises with enough effort to increase her heart rate 3 or less days per week.   She is taking medications regularly.       ED/UC Followup:    Facility:  [facility] ER  Date of visit: 09/12/2022  Reason for visit: Chest Pain  Current Status: doing good    Continues to have right upper quadrant pain constantly.   Gallbladder sludge, hiatal hernia.  Eating bland food, pain and heartburn after eating.  No spicy foods, no meats. Eating rice, mashed potatoes, some veggies, PB sandwich with keto bread. Bread trigger symptoms. Has been having constipation.  Has only taken Zofran a couple times.  Takes Tylenol sometimes for pain.    Ovarian cyst on CT.    Depression, anxiety, ADHD well controlled on current medications.      Review of Systems   Constitutional, HEENT, cardiovascular, pulmonary, gi and gu systems are negative, except as otherwise noted.      Objective           Vitals:  No vitals were obtained today due to virtual visit.    Physical Exam   GENERAL: Healthy, alert and no distress  EYES: Eyes grossly normal to inspection.  No discharge or erythema, or obvious scleral/conjunctival abnormalities.  RESP: No audible wheeze, cough, or visible cyanosis.  No visible retractions or increased work of breathing.    SKIN: Visible skin clear. No significant rash, abnormal pigmentation or lesions.  NEURO: Cranial nerves grossly intact.  Mentation and speech appropriate for age.  PSYCH:  Mentation appears normal, affect normal/bright, judgement and insight intact, normal speech and appearance well-groomed.    No results found for any visits on 09/30/22.            Video-Visit Details    Video Start Time: 5:35 PM    Type of service:  Video Visit    Video End Time:5:51 PM    Originating Location (pt. Location): Other Work office    Distant Location (provider location):  [facility]     Platform used for Video Visit: [employer]

## 2022-10-04 ENCOUNTER — TELEPHONE (OUTPATIENT)
Dept: SURGERY | Facility: CLINIC | Age: 57
End: 2022-10-04

## 2022-10-04 NOTE — TELEPHONE ENCOUNTER
[facility] Call Center    Phone Message    May a detailed message be left on voicemail: yes     Reason for Call: Other: Patient has Abdominal Pain order - per guidelines send message      Action Taken: Message routed to:  [facility] general surgery     Travel Screening: Not Applicable

## 2022-10-04 NOTE — TELEPHONE ENCOUNTER
Attempted to return call to patient with no answer. LM on VM to call office.    Schedule for consult for hiatal hernia and gallbladder sludge.

## 2022-10-06 ENCOUNTER — ANCILLARY PROCEDURE (OUTPATIENT)
Dept: ULTRASOUND IMAGING | Facility: CLINIC | Age: 57
End: 2022-10-06
Attending: NURSE PRACTITIONER
Payer: COMMERCIAL

## 2022-10-06 DIAGNOSIS — N83.201 RIGHT OVARIAN CYST: ICD-10-CM

## 2022-10-06 PROCEDURE — 76830 TRANSVAGINAL US NON-OB: CPT | Mod: TC | Performed by: RADIOLOGY

## 2022-10-06 PROCEDURE — 76856 US EXAM PELVIC COMPLETE: CPT | Mod: TC | Performed by: RADIOLOGY

## 2022-10-06 NOTE — TELEPHONE ENCOUNTER
REFERRAL INFORMATION:    Referring Provider:  LEOPOLDO Lopez CNP     Referring Clinic:   [facility]     Reason for Visit/Diagnosis: Hiatal hernia, Gallbladder sludge, Fatty liver        FUTURE VISIT INFORMATION:    Appointment Date: 10/26/2022    Appointment Time: 7:30 AM      NOTES RECORD STATUS  DETAILS   OFFICE NOTE from Referring Provider Internal 9/30/2022 Office visit with LEOPOLDO Lopez CNP     OFFICE NOTE from Other Specialists N/A    HOSPITAL DISCHARGE SUMMARY/ ED VISITS  Internal 9/12/2022 ([facility])    OPERATIVE REPORT N/A    ENDOSCOPY (EGD)  Received  2/19/15 ([facility])    PERTINENT LABS Internal    PATHOLOGY REPORTS (RELATED) N/A    IMAGING (CT, MRI, US, XR)  Internal US Pelvic: 10/6/2022  CT Abdomen Pelvis: 9/12/2022  US Abdomen: 9/12/2022

## 2022-10-06 NOTE — LETTER
October 11, 2022  Re: Sunshine Delgado  YOB: 1965    Dear Colleague,    Thank you for your referral to the [facility] JONATAN. Your patient cancelled her scheduled appointment.      If you have any questions or concerns, please contact our office at Dept: [phone].        Sincerely,  [facility]

## 2022-10-16 ENCOUNTER — HEALTH MAINTENANCE LETTER (OUTPATIENT)
Age: 57
End: 2022-10-16

## 2022-10-20 NOTE — TELEPHONE ENCOUNTER
DIAGNOSIS:  Fatty liver    Appt Date: 11.15.2022    NOTES STATUS DETAILS   OFFICE NOTE from referring provider Internal 09.30.2022 Lesly Arteaga APRN CNP   OFFICE NOTES from other specialists     DISCHARGE SUMMARY from hospital     MEDICATION LIST Internal    LIVER BIOSPY (IF APPLICABLE)      PATHOLOGY REPORTS      IMAGING     ENDOSCOPY (IF AVAILABLE)     COLONOSCOPY (IF AVAILABLE)     ULTRASOUND LIVER Internal 09.12.2022 US Abd Limited   CT OF ABDOMEN     MRI OF LIVER     FIBROSCAN, US ELASTOGRAPHY, FIBROSIS SCAN, MR ELASTOGRAPHY     LABS     HEPATIC PANEL (LIVER PANEL) Internal 03.17.2021   BASIC METABOLIC PANEL Internal 03.31.2022   COMPLETE METABOLIC PANEL Internal 09.12.2022   COMPLETE BLOOD COUNT (CBC) Internal 09.12.2022   INTERNATIONAL NORMALIZED RATIO (INR) Internal 03.31.2022   HEPATITIS C ANTIBODY     HEPATITIS C VIRAL LOAD/PCR     HEPATITIS C GENOTYPE     HEPATITIS B SURFACE ANTIGEN     HEPATITIS B SURFACE ANTIBODY     HEPATITIS B DNA QUANT LEVEL     HEPATITIS B CORE ANTIBODY

## 2022-10-26 ENCOUNTER — TELEPHONE (OUTPATIENT)
Dept: SURGERY | Facility: CLINIC | Age: 57
End: 2022-10-26

## 2022-10-26 ENCOUNTER — PRE VISIT (OUTPATIENT)
Dept: SURGERY | Facility: CLINIC | Age: 57
End: 2022-10-26

## 2022-10-26 ENCOUNTER — OFFICE VISIT (OUTPATIENT)
Dept: SURGERY | Facility: CLINIC | Age: 57
End: 2022-10-26
Payer: COMMERCIAL

## 2022-10-26 VITALS
SYSTOLIC BLOOD PRESSURE: 130 MMHG | BODY MASS INDEX: 37.73 KG/M2 | HEART RATE: 70 BPM | WEIGHT: 221 LBS | DIASTOLIC BLOOD PRESSURE: 84 MMHG | HEIGHT: 64 IN | OXYGEN SATURATION: 97 %

## 2022-10-26 DIAGNOSIS — R10.11 RUQ ABDOMINAL PAIN: ICD-10-CM

## 2022-10-26 PROCEDURE — 99202 OFFICE O/P NEW SF 15 MIN: CPT | Performed by: SURGERY

## 2022-10-26 ASSESSMENT — PAIN SCALES - GENERAL: PAINLEVEL: MILD PAIN (3)

## 2022-10-26 NOTE — PROGRESS NOTES
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  She has done well.  Her initial weight was 272 pounds with a body mass index of 45.26 her weight loss was a little limited about 58 pounds.  Currently she is 221 pounds with a body mass index of 37.9 kg/m .  She was seen in the emergency department on September 12 with abdominal pain CT scan of the abdomen as well as an ultrasound were both obtained.  CT scan showed fatty liver as well as some sludge in her gallbladder.  By my review perhaps a small hiatal hernia.  Ultrasound did not show any biliary tract disease.  The patient reports a history of intolerance to carbohydrates with reflux and burning.  Since avoiding all sugar she is lost 20 pounds and feels a little bit better.  She still has pain on the right side of her abdomen.  Given the fact that she is actively losing weight and the fact that she only has sludge without any direct food related right-sided abdominal pain I think we continue to have her lose weight and see if her symptoms may be more related to the fatty liver disease.  She is on the whole feeling better.  On examination her abdomen is soft and nontender.

## 2022-10-26 NOTE — TELEPHONE ENCOUNTER
LVM to [facility] a f/u with DORIE pippa Foster or Linda Morris in 3 mon 1/26/23

## 2022-10-26 NOTE — LETTER
10/26/2022       RE: Sunshine Delgado  [address] [place] [address]     Dear Colleague,    Thank you for referring your patient, Sunshine Delgado, to the [facility] [place] [facility]. Please see a copy of my visit note below.    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  She has done well.  Her initial weight was 272 pounds with a body mass index of 45.26 her weight loss was a little limited about 58 pounds.  Currently she is 221 pounds with a body mass index of 37.9 kg/m .  She was seen in the emergency department on September 12 with abdominal pain CT scan of the abdomen as well as an ultrasound were both obtained.  CT scan showed fatty liver as well as some sludge in her gallbladder.  By my review perhaps a small hiatal hernia.  Ultrasound did not show any biliary tract disease.  The patient reports a history of intolerance to carbohydrates with reflux and burning.  Since avoiding all sugar she is lost 20 pounds and feels a little bit better.  She still has pain on the right side of her abdomen.  Given the fact that she is actively losing weight and the fact that she only has sludge without any direct food related right-sided abdominal pain I think we continue to have her lose weight and see if her symptoms may be more related to the fatty liver disease.  She is on the whole feeling better.  On examination her abdomen is soft and nontender.        Sincerely,    Luis Manuel Latham MD

## 2022-10-26 NOTE — NURSING NOTE
"Chief Complaint   Patient presents with     New Patient     Hiatal hernia, gallbladder sludge, fatty liver       Vitals:    10/26/22 0721   BP: 130/84   BP Location: Left arm   Patient Position: Sitting   Cuff Size: Adult Large   Pulse: 70   SpO2: 97%   Weight: 100.2 kg (221 lb)   Height: 1.626 m (5' 4\")       Body mass index is 37.93 kg/m .                          Naresh Burns, EMT    "

## 2022-11-07 ENCOUNTER — E-VISIT (OUTPATIENT)
Dept: URGENT CARE | Facility: CLINIC | Age: 57
End: 2022-11-07
Payer: COMMERCIAL

## 2022-11-07 DIAGNOSIS — N39.0 ACUTE UTI (URINARY TRACT INFECTION): Primary | ICD-10-CM

## 2022-11-07 PROCEDURE — 99421 OL DIG E/M SVC 5-10 MIN: CPT | Performed by: NURSE PRACTITIONER

## 2022-11-07 RX ORDER — NITROFURANTOIN 25; 75 MG/1; MG/1
100 CAPSULE ORAL 2 TIMES DAILY
Qty: 10 CAPSULE | Refills: 0 | Status: SHIPPED | OUTPATIENT
Start: 2022-11-07 | End: 2022-11-12

## 2022-11-08 DIAGNOSIS — Z11.59 ENCOUNTER FOR SCREENING FOR OTHER VIRAL DISEASES: Primary | ICD-10-CM

## 2022-11-08 DIAGNOSIS — K76.0 FATTY LIVER: ICD-10-CM

## 2022-11-08 NOTE — PATIENT INSTRUCTIONS
Dear Sunshine Delgado    After reviewing your responses, I've been able to diagnose you with a urinary tract infection, which is a common infection of the bladder with bacteria.  This is not a sexually transmitted infection, though urinating immediately after intercourse can help prevent infections.  Drinking lots of fluids is also helpful to clear your current infection and prevent the next one.      I have sent a prescription for antibiotics to your pharmacy to treat this infection.    It is important that you take all of your prescribed medication even if your symptoms are improving after a few doses.  Taking all of your medicine helps prevent the symptoms from returning.     If your symptoms worsen, you develop pain in your back or stomach, develop fevers, or are not improving in 5 days, please contact your primary care provider for an appointment or visit any of our convenient Walk-in or Urgent Care Centers to be seen, which can be found on our website here.    Thanks again for choosing us as your health care partner,    LEOPOLDO Perdomo CNP    Urinary Tract Infections in Women  Urinary tract infections (UTIs) are most often caused by bacteria. These bacteria enter the urinary tract. The bacteria may come from inside the body. Or they may travel from the skin outside the rectum or vagina into the urethra. Female anatomy makes it easy for bacteria from the bowel to enter a woman s urinary tract, which is the most common source of UTI. This means women develop UTIs more often than men. Pain in or around the urinary tract is a common UTI symptom. But the only way to know for sure if you have a UTI for the healthcare provider to test your urine. The two tests that may be done are the urinalysis and urine culture.     Types of UTIs    Cystitis. A bladder infection (cystitis) is the most common UTI in women. You may have urgent or frequent need to pee. You may also have pain, burning when you pee, and bloody  urine.    Urethritis. This is an inflamed urethra, which is the tube that carries urine from the bladder to outside the body. You may have lower stomach or back pain. You may also have urgent or frequent need to pee.    Pyelonephritis. This is a kidney infection. If not treated, it can be serious and damage your kidneys. In severe cases, you may need to stay in the hospital. You may have a fever and lower back pain.    Medicines to treat a UTI  Most UTIs are treated with antibiotics. These kill the bacteria. The length of time you need to take them depends on the type of infection. It may be as short as 3 days. If you have repeated UTIs, you may need a low-dose antibiotic for several months. Take antibiotics exactly as directed. Don t stop taking them until all of the medicine is gone. If you stop taking the antibiotic too soon, the infection may not go away. You may also develop a resistance to the antibiotic. This can make it much harder to treat.   Lifestyle changes to treat and prevent UTIs   The lifestyle changes below will help get rid of your UTI. They may also help prevent future UTIs.     Drink plenty of fluids. This includes water, juice, or other caffeine-free drinks. Fluids help flush bacteria out of your body.    Empty your bladder. Always empty your bladder when you feel the urge to pee. And always pee before going to sleep. Urine that stays in your bladder can lead to infection. Try to pee before and after sex as well.    Practice good personal hygiene. Wipe yourself from front to back after using the toilet. This helps keep bacteria from getting into the urethra.    Use condoms during sex. These help prevent UTIs caused by sexually transmitted bacteria. Also don't use spermicides during sex. These can increase the risk for UTIs. Choose other forms of birth control instead. For women who tend to get UTIs after sex, a low-dose of a preventive antibiotic may be used. Be sure to discuss this option with  your healthcare provider.    Follow up with your healthcare provider as directed. He or she may test to make sure the infection has cleared. If needed, more treatment may be started.  [employer] last reviewed this educational content on 7/1/2019 2000-2021 [employer]. All rights reserved. This information is not intended as a substitute for professional medical care. Always follow your healthcare professional's instructions.

## 2022-11-15 ENCOUNTER — PRE VISIT (OUTPATIENT)
Dept: GASTROENTEROLOGY | Facility: CLINIC | Age: 57
End: 2022-11-15

## 2022-12-05 ENCOUNTER — OFFICE VISIT (OUTPATIENT)
Dept: FAMILY MEDICINE | Facility: CLINIC | Age: 57
End: 2022-12-05
Payer: COMMERCIAL

## 2022-12-05 VITALS
OXYGEN SATURATION: 100 % | HEIGHT: 64 IN | SYSTOLIC BLOOD PRESSURE: 94 MMHG | BODY MASS INDEX: 34.76 KG/M2 | TEMPERATURE: 98.2 F | DIASTOLIC BLOOD PRESSURE: 56 MMHG | HEART RATE: 77 BPM | RESPIRATION RATE: 18 BRPM | WEIGHT: 203.6 LBS

## 2022-12-05 DIAGNOSIS — N63.32 MASS OF AXILLARY TAIL OF LEFT BREAST: Primary | ICD-10-CM

## 2022-12-05 PROCEDURE — 99213 OFFICE O/P EST LOW 20 MIN: CPT | Performed by: PHYSICIAN ASSISTANT

## 2022-12-05 ASSESSMENT — PAIN SCALES - GENERAL: PAINLEVEL: MODERATE PAIN (4)

## 2022-12-05 ASSESSMENT — PATIENT HEALTH QUESTIONNAIRE - PHQ9
SUM OF ALL RESPONSES TO PHQ QUESTIONS 1-9: 1
SUM OF ALL RESPONSES TO PHQ QUESTIONS 1-9: 1
10. IF YOU CHECKED OFF ANY PROBLEMS, HOW DIFFICULT HAVE THESE PROBLEMS MADE IT FOR YOU TO DO YOUR WORK, TAKE CARE OF THINGS AT HOME, OR GET ALONG WITH OTHER PEOPLE: SOMEWHAT DIFFICULT

## 2022-12-05 NOTE — PROGRESS NOTES
"  Assessment & Plan     Mass of axillary tail of left breast  New problem  - MA Diagnostic Digital Left; Future         BMI:   Estimated body mass index is 34.95 kg/m  as calculated from the following:    Height as of this encounter: 1.626 m (5' 4\").    Weight as of this encounter: 92.4 kg (203 lb 9.6 oz).           No follow-ups on file.    LINDA BARBA [facility]    Fransisco Gonsalez is a 57 year old, presenting for the following health issues:  Breast Mass (Left breast)    Noticed a breast mass on left breast with intermittent pain a few weeks ago. Has not changed much. Initially there was a rash present. No drainage. No history of cancer or family history    History of Present Illness       Reason for visit:  Lump in breast    She eats 2-3 servings of fruits and vegetables daily.She consumes 0 sweetened beverage(s) daily.She exercises with enough effort to increase her heart rate 30 to 60 minutes per day.  She exercises with enough effort to increase her heart rate 6 days per week.   She is taking medications regularly.    Today's PHQ-9         PHQ-9 Total Score: 1    PHQ-9 Q9 Thoughts of better off dead/self-harm past 2 weeks :   Not at all    How difficult have these problems made it for you to do your work, take care of things at home, or get along with other people: Somewhat difficult       Breast Concern  Onset/Duration: 11/22/22  Description:   Location: Outer left breast, with rash- Itchy  Pain or tenderness: YES-   Redness: YES  Intensity: moderate  Progression of Symptoms: constant  Accompanying Signs & Symptoms:  Any lumps in axillary region: YES  Movable: YES  Nipple discharge: NONE  Changes in the skin or nipple: rash  On Hormone therapy: No  Does it change with menstrual cycle: No  Previous history of similar problem: No  First degree relative with breast cancer: a negative family history for breast cancer.  Precipitating factors:           Worsened by: " "n/a  Alleviating factors:            Improved by: n/a  Therapies tried and outcome: None  No LMP recorded. Patient has had a hysterectomy.        Review of Systems   Constitutional, HEENT, cardiovascular, pulmonary, gi and gu systems are negative, except as otherwise noted.      Objective    BP 94/56   Pulse 77   Temp 98.2  F (36.8  C) (Oral)   Resp 18   Ht 1.626 m (5' 4\")   Wt 92.4 kg (203 lb 9.6 oz)   SpO2 100%   BMI 34.95 kg/m    Body mass index is 34.95 kg/m .  Physical Exam   GENERAL: healthy, alert and no distress  NECK: no adenopathy, no asymmetry, masses, or scars and thyroid normal to palpation  RESP: lungs clear to auscultation - no rales, rhonchi or wheezes  BREAST: Left breast with lump present outer quadrants/axillary tail. No tenderness to palpation. No drainage no rash. Mass feels oblong leading into axillary. No lymph node swelling   CV: regular rate and rhythm, normal S1 S2, no S3 or S4, no murmur, click or rub, no peripheral edema and peripheral pulses strong  ABDOMEN: soft, nontender, no hepatosplenomegaly, no masses and bowel sounds normal                    "

## 2022-12-08 ENCOUNTER — HOSPITAL ENCOUNTER (OUTPATIENT)
Dept: MAMMOGRAPHY | Facility: CLINIC | Age: 57
Discharge: HOME | End: 2022-12-08
Attending: PHYSICIAN ASSISTANT
Payer: COMMERCIAL

## 2022-12-08 DIAGNOSIS — N63.32 MASS OF AXILLARY TAIL OF LEFT BREAST: ICD-10-CM

## 2022-12-08 PROCEDURE — 76642 ULTRASOUND BREAST LIMITED: CPT | Mod: LT

## 2022-12-08 PROCEDURE — 77062 BREAST TOMOSYNTHESIS BI: CPT

## 2022-12-26 ENCOUNTER — VIRTUAL VISIT (OUTPATIENT)
Dept: INTERNAL MEDICINE | Facility: CLINIC | Age: 57
End: 2022-12-26
Payer: COMMERCIAL

## 2022-12-26 DIAGNOSIS — R05.1 ACUTE COUGH: ICD-10-CM

## 2022-12-26 DIAGNOSIS — U07.1 INFECTION DUE TO 2019 NOVEL CORONAVIRUS: Primary | ICD-10-CM

## 2022-12-26 DIAGNOSIS — N18.30 STAGE 3 CHRONIC KIDNEY DISEASE, UNSPECIFIED WHETHER STAGE 3A OR 3B CKD (H): ICD-10-CM

## 2022-12-26 PROCEDURE — 99214 OFFICE O/P EST MOD 30 MIN: CPT | Mod: 95 | Performed by: PHYSICIAN ASSISTANT

## 2022-12-26 RX ORDER — BENZONATATE 200 MG/1
200 CAPSULE ORAL 3 TIMES DAILY PRN
Qty: 30 CAPSULE | Refills: 0 | Status: SHIPPED | OUTPATIENT
Start: 2022-12-26 | End: 2023-04-25

## 2022-12-26 NOTE — LETTER
December 26, 2022      Sunshine Delgado  [address] [address]        To Whom It May Concern:    Sunshine Delgado  was seen on 12/26/2022.  She has covid 19. She needs to be in isolation through 12/29/2022 and then wearing a well fitting good quality mask through  January 2 2022.     Sincerely,        Yue Meng PA-C

## 2022-12-26 NOTE — PROGRESS NOTES
"Sunshine is a 57 year old who is being evaluated via a billable telephone visit.      What phone number would you like to be contacted at? [phone]  How would you like to obtain your AVS? Petert    Distant Location (provider location):  Off-site    Assessment & Plan     Infection due to 2019 novel coronavirus    - nirmatrelvir and ritonavir (PAXLOVID) therapy pack; Take 2 tablets by mouth 2 times daily for 5 days (Take one tablet of Nirmatelvir and 1 tablet of Ritonavir twice daily for 5 days)  - benzonatate (TESSALON) 200 MG capsule; Take 1 capsule (200 mg) by mouth 3 times daily as needed    Acute cough    - benzonatate (TESSALON) 200 MG capsule; Take 1 capsule (200 mg) by mouth 3 times daily as needed    Stage 3 chronic kidney disease, unspecified whether stage 3a or 3b CKD (H)                 BMI:   Estimated body mass index is 34.95 kg/m  as calculated from the following:    Height as of 12/5/22: 1.626 m (5' 4\").    Weight as of 12/5/22: 92.4 kg (203 lb 9.6 oz).   Weight management plan: Patient was referred to their PCP to discuss a diet and exercise plan.    COVID-19 positive patient.  Encounter for consideration of medication intervention. Patient does qualify for a prescription. Full discussion with patient including medication options, risks and benefits. Potential drug interactions reviewed with patient.     Treatment Planned to community pharmacy    Temporary change to home medications: none  Renal dosing     Estimated body mass index is 34.95 kg/m  as calculated from the following:    Height as of 12/5/22: 1.626 m (5' 4\").    Weight as of 12/5/22: 92.4 kg (203 lb 9.6 oz).  GFR Estimate   Date Value Ref Range Status   09/12/2022 51 (L) >60 mL/min/1.73m2 Final     Comment:     Effective December 21, 2021 eGFRcr in adults is calculated using the 2021 CKD-EPI creatinine equation which includes age and gender (Daniel collazo al., NEJM, DOI: 10.1056/RNICda0995705)   06/14/2021 50 (L) >60 mL/min/[1.73_m2] Final     " Comment:     Non  GFR Calc  Starting 12/18/2018, serum creatinine based estimated GFR (eGFR) will be   calculated using the Chronic Kidney Disease Epidemiology Collaboration   (CKD-EPI) equation.       Lab Results   Component Value Date    ARGWW15MEK Negative 04/15/2022       Return in about 2 weeks (around 1/9/2023) for recheck if not improving, regular primary provider.    Yue Meng PA-C  [facility]    Fransisco Gonsalez is a 57 year old, presenting for the following health issues:  Covid Concern (Tested positive)      HPI       COVID-19 Symptom Review  How many days ago did these symptoms start? 3 days ago  12/23    Are any of the following symptoms significant for you?    New or worsening difficulty breathing? No    Worsening cough? Yes, it's a dry cough.     Fever or chills? Yes, I felt feverish or had chills.    Headache: YES    Sore throat: No    Chest pain: YES    Diarrhea: No    Body aches? YES    What treatments has patient tried? Acetaminophen   Does patient live in a nursing home, group home, or shelter? No  Does patient have a way to get food/medications during quarantined? Yes, I have a friend or family member who can help me.                  Review of Systems         Objective           Vitals:  No vitals were obtained today due to virtual visit.    Physical Exam   healthy, alert and no distress  PSYCH: Alert and oriented times 3; coherent speech, normal   rate and volume, able to articulate logical thoughts, able   to abstract reason, no tangential thoughts, no hallucinations   or delusions  Her affect is normal  RESP: dry cough, no audible wheezing, able to talk in full sentences  Remainder of exam unable to be completed due to telephone visits                Phone call duration: 11 minutes

## 2023-01-11 ENCOUNTER — OFFICE VISIT (OUTPATIENT)
Dept: CARDIOLOGY | Facility: CLINIC | Age: 58
End: 2023-01-11
Attending: NURSE PRACTITIONER
Payer: COMMERCIAL

## 2023-01-11 VITALS
SYSTOLIC BLOOD PRESSURE: 126 MMHG | OXYGEN SATURATION: 96 % | HEIGHT: 64 IN | BODY MASS INDEX: 34.95 KG/M2 | HEART RATE: 65 BPM | DIASTOLIC BLOOD PRESSURE: 85 MMHG | WEIGHT: 204.7 LBS

## 2023-01-11 DIAGNOSIS — I48.0 PAF (PAROXYSMAL ATRIAL FIBRILLATION) (H): Primary | ICD-10-CM

## 2023-01-11 DIAGNOSIS — I48.91 ATRIAL FIBRILLATION WITH CONTROLLED VENTRICULAR RATE (H): ICD-10-CM

## 2023-01-11 PROCEDURE — G0463 HOSPITAL OUTPT CLINIC VISIT: HCPCS | Mod: 25 | Performed by: INTERNAL MEDICINE

## 2023-01-11 PROCEDURE — 93005 ELECTROCARDIOGRAM TRACING: CPT

## 2023-01-11 PROCEDURE — 99214 OFFICE O/P EST MOD 30 MIN: CPT | Performed by: INTERNAL MEDICINE

## 2023-01-11 PROCEDURE — G0463 HOSPITAL OUTPT CLINIC VISIT: HCPCS | Mod: 25

## 2023-01-11 ASSESSMENT — PAIN SCALES - GENERAL: PAINLEVEL: NO PAIN (0)

## 2023-01-11 NOTE — PROGRESS NOTES
ELECTROPHYSIOLOGY CLINIC VISIT    Assessment/Recommendations   Assessment/Plan:    Ms. Delgado is a 56 year old female who has a past medical history significant for hashimoto's thyroiditis, MARIA GUADALUPE (uses CPAP), CKD, PAF (CHADSVASC 1 Eliquis) s/p DCCVs s/p PVI/CTI 21 s/p DCCV 21 s/p PVI/CTI 3/31/22, anxiety, and depression. She presents today for follow up.     Paroxsymal Atrial Fibrillation/Flutter:   We discussed in detail with the patient management/treatment options for Skip includin. Stroke Prophylaxis:  CHADSVASC=+gender  1, corresponding to a 1.3% annual stroke / systemic emolism event rate. She does not require chronic anticoagulation at this time.   2. Rate Control: She had been on Metoprolol with fatigue and it was stopped. She had been on Atenolol which was recently stopped. Can consider CCB if needed.   3. Rhythm Control: Cardioversion, Antiarrhythmics and/or ablation are options for rhythm control. She has been having frequent PAF/AFL that is highly symptomatic. She has had a couple DCCVs with continued recurrences. She had been on Flecainide which was not effective. She was recently switched to amiodarone. We discussed that given her younger age, we do not favor keeping her on this long term. We discussed alternative AATs vs. Ablation. She elected to pursue ablation which she had PVI/CTI on 21. She had recurrent persistent AF/AFL with zio from 10/2021 showing AF throughout and had DCCV on 21. She had recurrent AF/AFl and had a repeat PVI/CTI ablation on 3/31/22. Doing well now without recurrences.      4. Risk Factor Management: tight BP control, and MARIA GUADALUPE evaluation as indicated.        Follow up in a year in EP NP       History of Present Illness/Subjective    Ms. Sunshine Delgado is a 56 year old female who comes in today for EP follow-up of AF/AFL.    Ms. Delgado is a 56 year old female who has a past medical history significant for hashimoto's thyroiditis, MARIA GUADALUPE (uses  CPAP), CKD, PAF (CHADSVASC 1 Eliquis) s/p DCCV s/p PVI 7/22/21 s/p PVI/CTI 3/31/22, anxiety, and depression.      She was admitted in 1/2020 to OSH with abdominal pain and was found to have infectious mononucleosis. At that time her ECG showed AF and she spontaneously converted while in the hospital. An echo showed normal LVEF, no significant valvular abnormalities, and small pericardial effusion. She was started on metoprolol and ASA. Her metoprolol was later stopped by PCP due to frequent lightheadedness. She established care with Dr. Fajardo. She followed up in 3/2021 and reported feeling palpitations on and off and one episode lasting up to 3 days. She felt some chest pressure, shortness of breath, and presyncope. ECG showed AF vent rate 89 bpm. She had been started on lisinopril in 2/2021 by PCP for CKD and had felt a little more lightheaded since then, but also notes she had lightheadedness predating the lisinopril. A zio patch monitor from 4/2/21-4/16/21 showed 29% AF/AFL burden up to 10 hours 54 minutes and 39 symptom activations showed mostly AF/AFL or PSVT and sometimes sinus without ectopy. She was placed on atenolol and reported fatigue with this. Flecainide 50 mg BID was added. Subsequent ECG stress testing was negative for ischemia or QRS widening. Her PAF episodes had completely subsided for about 1 month after starting the Flecainide until end of 5/2021. She felt much improved when maintaining sinus. She then started developing increased AF symptoms, occurring daily. She has symptoms of chest tightness, dizziness, and palpitations with her AF.  She then presented to [facility] on 6/10/21 with symptomatic AF and underwent DCCV. Her Flecainide was increased to 100 mg BID. She represented to [facility] on 6/14/21 again with symptomatic AF and underwent DCCV. Her Flecainide and atenolol were stopped and she was changed to amiodarone. She was then referred to EP for consideration for ablation. At EP appointment on 6/16/21,  she was in AFL and feeling fatigue, MCHUGH, lightheadedness/dizziness, and generally unwell. We loaded her with amiodarone and arranged for another DCCV. She presented to [facility] in [facility] and it was canceled. She wanted to get off amiodarone if possible and elected to pursue ablation and had PVI/CTI on 7/22/21.     EP Visit 1/12/22: She presents today for follow up after ablation. Groin sites well healed. A zio patch monitor from 10/2021 showed AF throughout poor HR control with avg  bpm. She underwent DCCV on 11/22/21. She reports feeling OK. She notes feeling less symptomatic now with her AFL then before ablation. She does still have palpitations and decreased stamina. She denies chest discomfort, abdominal fullness/bloating or peripheral edema, shortness of breath, paroxysmal nocturnal dyspnea, orthopnea, lightheadedness, dizziness, pre-syncope, or syncope. Presenting 12 lead ECG shows AFL Vent Rate 78 bpm, QRS 74 ms, QTc 424 ms. Current cardiac medications include: Diltiazem.     EP Visit 7/13/22: She presents today for follow up. She had repeat PVI/CTI ablation on 3/31/22. Groin sites well healed. She reports feeling well. She has some minor occasional palpitations lasting seconds. No known AF/AFL recurrences. She denies chest discomfort, abdominal fullness/bloating or peripheral edema, shortness of breath, paroxysmal nocturnal dyspnea, orthopnea, lightheadedness, dizziness, pre-syncope, or syncope. Presenting 12 lead ECG shows NSR Vent Rate 72 bpm,  ms, QRS 88 ms, QTc 424 ms. Current cardiac medications include: Eliquis. Eliquis stopped at this visit.     She presents today for follow up. She reports feeling well. She has lost over 30 lbs in 9 months. She denies chest discomfort, palpitations, abdominal fullness/bloating or peripheral edema, shortness of breath, paroxysmal nocturnal dyspnea, orthopnea, lightheadedness, dizziness, pre-syncope, or syncope. Presenting 12 lead ECG shows NSR  Vent Rate 60  "bpm,  ms, QRS 88 ms, QTc 400 ms. No current cardiac medications.       I have reviewed and updated the patient's Past Medical History, Social History, Family History and Medication List.     Cardiographics (Personally Reviewed) :   5/11/21 STRESS ECHOCARDIOGRAM:  Interpretation Summary  Submaximal ECG stress test  65% maximal predicted HR achieved. The test was terminated due to fatigue.  Normal blood pressure response to exercise.  The patient did not report any symptoms during exercise.  No ECG evidence of ischemia.  No QRS prolongation at peak exercise compared to rest.  No supraventricular or ventricular arrhythmias.  Good exercise tolerance. The patient achieved 10 METs at peak exercise.     1/27/20 ECHOCARDIOGRAM:   Final Conclusion   Normal left ventricular size.   Normal left ventricular systolic function.   No obvious regional wall motion abnormalities.   The ejection fraction is visually estimated at 55-60%.   The right ventricle is normal in size and function.   The left atrium is normal in size.   There is trace mitral regurgitation.   There is no significant tricuspid regurgitation.   Small pericardial effusion.   No echocardiographic findings to suggest hemodynamic effect of the pericardial fluid.   Estimated EF: 55-60%  7/2021 CTA:  IMPRESSION:  1. Normal pulmonary venous anatomy normal variant right middle  pulmonary vein. All pulmonary veins draining into the left atrium       Physical Examination   /85 (BP Location: Right arm, Patient Position: Chair, Cuff Size: Adult Regular)   Pulse 65   Ht 1.631 m (5' 4.21\")   Wt 92.9 kg (204 lb 11.2 oz)   SpO2 96%   BMI 34.90 kg/m    Wt Readings from Last 3 Encounters:   12/05/22 92.4 kg (203 lb 9.6 oz)   10/26/22 100.2 kg (221 lb)   09/23/22 106.6 kg (235 lb)     General Appearance:   Alert, well-appearing and in no acute distress.   HEENT: Atraumatic, normocephalic. PERRL.  MMM.   Chest/Lungs:   Respirations unlabored.  Lungs are clear to " auscultation.   Cardiovascular:   Regular, S1/S2, no murmur.    Abdomen:  Soft, nontender, nondistended.   Extremities: No cyanosis or clubbing. No edema.    Musculoskeletal: Moves all extremities.  Gait normal.   Skin: Warm, dry, intact.    Neurologic: Mood and affect are appropriate.  Alert and oriented to person, place, time, and situation.          Medications  Allergies   Current Outpatient Medications   Medication Sig Dispense Refill     acetaminophen (TYLENOL) 325 MG tablet Take 325 mg by mouth every 4 hours as needed        benzonatate (TESSALON) 200 MG capsule Take 1 capsule (200 mg) by mouth 3 times daily as needed 30 capsule 0     buPROPion (WELLBUTRIN XL) 300 MG 24 hr tablet Take 1 tablet (300 mg) by mouth every morning 90 tablet 3     EPINEPHrine (AUVI-Q) 0.3 MG/0.3ML injection 2-pack Inject 0.3 mLs (0.3 mg) into the muscle as needed for anaphylaxis 0.6 mL 3     fluticasone (FLONASE) 50 MCG/ACT nasal spray SPRAY 2 SPRAYS INTO BOTH NOSTRILS DAILY 16 g 11     levothyroxine (SYNTHROID/LEVOTHROID) 75 MCG tablet TAKE 1 TABLET (75 MCG) BY MOUTH DAILY 30 tablet 11     omeprazole (PRILOSEC) 20 MG DR capsule Take 1 capsule (20 mg) by mouth daily 90 capsule 3     ondansetron (ZOFRAN) 4 MG tablet Take 1 tablet (4 mg) by mouth every 8 hours as needed for nausea 10 tablet 0     traZODone (DESYREL) 50 MG tablet TAKE TWO TO THREE TABLETS BY MOUTH AT BEDTIME 270 tablet 0     venlafaxine (EFFEXOR-ER) 75 MG 24 hr tablet Take 1 tablet (75 mg) by mouth daily 90 tablet 3    Allergies   Allergen Reactions     Cephalexin Hives     Strawberry Hives     Sulfa Drugs Hives     Cinnamon Hives     Sulfamethoxazole-Trimethoprim Hives     Vicodin [Hydrocodone-Acetaminophen]      Wasp Venom Protein      Other reaction(s): Chest Pain     Wasps [Hornets] Swelling     Now carries Epi Pen     Zoloft [Sertraline]      suicidal ideation     Contrast Dye Other (See Comments) and Rash     Patient had sneezing and an itchy throat following  contrast injection of 100 mL Isovue-370.  From IV contrast dye         Lab Results (Personally Reviewed)    Chemistry/lipid CBC Cardiac Enzymes/BNP/TSH/INR   Lab Results   Component Value Date    BUN 11.5 09/12/2022     09/12/2022    CO2 29 09/12/2022     Creatinine   Date Value Ref Range Status   09/12/2022 1.24 (H) 0.51 - 0.95 mg/dL Final   06/14/2021 1.22 (H) 0.52 - 1.04 mg/dL Final       Lab Results   Component Value Date    CHOL 219 (H) 03/07/2022    HDL 62 03/07/2022     (H) 03/07/2022      Lab Results   Component Value Date    WBC 8.1 09/12/2022    HGB 14.0 09/12/2022    HCT 43.4 09/12/2022    MCV 89 09/12/2022     09/12/2022    Lab Results   Component Value Date    TSH 1.83 01/12/2022    INR 1.27 (H) 03/31/2022        The patient states understanding and is agreeable with the plan.   Vicente Craig MD [facility]  Cardiology - Electrophysiology      Total time spent on patient visit, reviewing notes, imaging, labs, orders, and completing necessary documentation: 30 minutes.  >50% of visit spent on counseling patient and/or coordination of care.

## 2023-01-11 NOTE — NURSING NOTE
Chief Complaint   Patient presents with     Follow Up     6 mo follow-up AF/AFL. PVI/CTI 7/2021, 3/2022. DCCV 11/2021. Tried and failed flec, amio       Vitals were taken, medications reconciled, and EKG performed.    Stas Barker  10:20 AM

## 2023-01-11 NOTE — PATIENT INSTRUCTIONS
Plan:    Follow-up in 1 year with EP DORIE      Your Care Team:  EP Cardiology   Telephone Number     Xiao Andrade RN [phone]    After business hours: [phone], ask for cardiologist on-call   Non-procedure scheduling:    Chaya CHUA   [phone]   Procedure scheduling:    Grisel Smart   [phone]   Device Clinic (Pacemakers, ICDs, Loop Recorders)    During business hours: [phone]  After business hours:   [phone]- select option 4 and ask for job code [id number].       Cardiovascular Clinic:   [address] [place] [address]. [place] [address]      As always, Thank you for trusting us with your health care needs!

## 2023-01-11 NOTE — LETTER
2023      RE: Sunshine Delgado  [address]   [facility] [address]       Dear Colleague,    Thank you for the opportunity to participate in the care of your patient, Sunshine Delgado, at the [facility] [place] [facility]. Please see a copy of my visit note below.        ELECTROPHYSIOLOGY CLINIC VISIT    Assessment/Recommendations   Assessment/Plan:    Ms. Delgado is a 56 year old female who has a past medical history significant for hashimoto's thyroiditis, MARIA GUADALUPE (uses CPAP), CKD, PAF (CHADSVASC 1 Eliquis) s/p DCCVs s/p PVI/CTI 21 s/p DCCV 21 s/p PVI/CTI 3/31/22, anxiety, and depression. She presents today for follow up.     Paroxsymal Atrial Fibrillation/Flutter:   We discussed in detail with the patient management/treatment options for Skip includin. Stroke Prophylaxis:  CHADSVASC=+gender  1, corresponding to a 1.3% annual stroke / systemic emolism event rate. She does not require chronic anticoagulation at this time.   2. Rate Control: She had been on Metoprolol with fatigue and it was stopped. She had been on Atenolol which was recently stopped. Can consider CCB if needed.   3. Rhythm Control: Cardioversion, Antiarrhythmics and/or ablation are options for rhythm control. She has been having frequent PAF/AFL that is highly symptomatic. She has had a couple DCCVs with continued recurrences. She had been on Flecainide which was not effective. She was recently switched to amiodarone. We discussed that given her younger age, we do not favor keeping her on this long term. We discussed alternative AATs vs. Ablation. She elected to pursue ablation which she had PVI/CTI on 21. She had recurrent persistent AF/AFL with zio from 10/2021 showing AF throughout and had DCCV on 21. She had recurrent AF/AFl and had a repeat PVI/CTI ablation on 3/31/22. Doing well now without recurrences.      4. Risk Factor Management: tight BP  control, and MARIA GUADALUPE evaluation as indicated.        Follow up in a year in EP NP       History of Present Illness/Subjective    Ms. Sunshine Delgado is a 56 year old female who comes in today for EP follow-up of AF/AFL.    Ms. Delgado is a 56 year old female who has a past medical history significant for hashimoto's thyroiditis, MARIA GUADALUPE (uses CPAP), CKD, PAF (CHADSVASC 1 Eliquis) s/p DCCV s/p PVI 7/22/21 s/p PVI/CTI 3/31/22, anxiety, and depression.      She was admitted in 1/2020 to OSH with abdominal pain and was found to have infectious mononucleosis. At that time her ECG showed AF and she spontaneously converted while in the hospital. An echo showed normal LVEF, no significant valvular abnormalities, and small pericardial effusion. She was started on metoprolol and ASA. Her metoprolol was later stopped by PCP due to frequent lightheadedness. She established care with Dr. Fajardo. She followed up in 3/2021 and reported feeling palpitations on and off and one episode lasting up to 3 days. She felt some chest pressure, shortness of breath, and presyncope. ECG showed AF vent rate 89 bpm. She had been started on lisinopril in 2/2021 by PCP for CKD and had felt a little more lightheaded since then, but also notes she had lightheadedness predating the lisinopril. A zio patch monitor from 4/2/21-4/16/21 showed 29% AF/AFL burden up to 10 hours 54 minutes and 39 symptom activations showed mostly AF/AFL or PSVT and sometimes sinus without ectopy. She was placed on atenolol and reported fatigue with this. Flecainide 50 mg BID was added. Subsequent ECG stress testing was negative for ischemia or QRS widening. Her PAF episodes had completely subsided for about 1 month after starting the Flecainide until end of 5/2021. She felt much improved when maintaining sinus. She then started developing increased AF symptoms, occurring daily. She has symptoms of chest tightness, dizziness, and palpitations with her AF.  She then presented to [facility] on  6/10/21 with symptomatic AF and underwent DCCV. Her Flecainide was increased to 100 mg BID. She represented to [facility] on 6/14/21 again with symptomatic AF and underwent DCCV. Her Flecainide and atenolol were stopped and she was changed to amiodarone. She was then referred to EP for consideration for ablation. At EP appointment on 6/16/21, she was in AFL and feeling fatigue, MCHUGH, lightheadedness/dizziness, and generally unwell. We loaded her with amiodarone and arranged for another DCCV. She presented to [facility] in sinus and it was canceled. She wanted to get off amiodarone if possible and elected to pursue ablation and had PVI/CTI on 7/22/21.     EP Visit 1/12/22: She presents today for follow up after ablation. Groin sites well healed. A zio patch monitor from 10/2021 showed AF throughout poor HR control with avg  bpm. She underwent DCCV on 11/22/21. She reports feeling OK. She notes feeling less symptomatic now with her AFL then before ablation. She does still have palpitations and decreased stamina. She denies chest discomfort, abdominal fullness/bloating or peripheral edema, shortness of breath, paroxysmal nocturnal dyspnea, orthopnea, lightheadedness, dizziness, pre-syncope, or syncope. Presenting 12 lead ECG shows AFL Vent Rate 78 bpm, QRS 74 ms, QTc 424 ms. Current cardiac medications include: Diltiazem.     EP Visit 7/13/22: She presents today for follow up. She had repeat PVI/CTI ablation on 3/31/22. Groin sites well healed. She reports feeling well. She has some minor occasional palpitations lasting seconds. No known AF/AFL recurrences. She denies chest discomfort, abdominal fullness/bloating or peripheral edema, shortness of breath, paroxysmal nocturnal dyspnea, orthopnea, lightheadedness, dizziness, pre-syncope, or syncope. Presenting 12 lead ECG shows NSR Vent Rate 72 bpm,  ms, QRS 88 ms, QTc 424 ms. Current cardiac medications include: Eliquis. Eliquis stopped at this visit.     She presents  "today for follow up. She reports feeling well. She has lost over 30 lbs in 9 months. She denies chest discomfort, palpitations, abdominal fullness/bloating or peripheral edema, shortness of breath, paroxysmal nocturnal dyspnea, orthopnea, lightheadedness, dizziness, pre-syncope, or syncope. Presenting 12 lead ECG shows NSR  Vent Rate 60 bpm,  ms, QRS 88 ms, QTc 400 ms. No current cardiac medications.       I have reviewed and updated the patient's Past Medical History, Social History, Family History and Medication List.     Cardiographics (Personally Reviewed) :   5/11/21 STRESS ECHOCARDIOGRAM:  Interpretation Summary  Submaximal ECG stress test  65% maximal predicted HR achieved. The test was terminated due to fatigue.  Normal blood pressure response to exercise.  The patient did not report any symptoms during exercise.  No ECG evidence of ischemia.  No QRS prolongation at peak exercise compared to rest.  No supraventricular or ventricular arrhythmias.  Good exercise tolerance. The patient achieved 10 METs at peak exercise.     1/27/20 ECHOCARDIOGRAM:   Final Conclusion   Normal left ventricular size.   Normal left ventricular systolic function.   No obvious regional wall motion abnormalities.   The ejection fraction is visually estimated at 55-60%.   The right ventricle is normal in size and function.   The left atrium is normal in size.   There is trace mitral regurgitation.   There is no significant tricuspid regurgitation.   Small pericardial effusion.   No echocardiographic findings to suggest hemodynamic effect of the pericardial fluid.   Estimated EF: 55-60%  7/2021 CTA:  IMPRESSION:  1. Normal pulmonary venous anatomy normal variant right middle  pulmonary vein. All pulmonary veins draining into the left atrium       Physical Examination   /85 (BP Location: Right arm, Patient Position: Chair, Cuff Size: Adult Regular)   Pulse 65   Ht 1.631 m (5' 4.21\")   Wt 92.9 kg (204 lb 11.2 oz)   SpO2 " 96%   BMI 34.90 kg/m    Wt Readings from Last 3 Encounters:   12/05/22 92.4 kg (203 lb 9.6 oz)   10/26/22 100.2 kg (221 lb)   09/23/22 106.6 kg (235 lb)     General Appearance:   Alert, well-appearing and in no acute distress.   HEENT: Atraumatic, normocephalic. PERRL.  MMM.   Chest/Lungs:   Respirations unlabored.  Lungs are clear to auscultation.   Cardiovascular:   Regular, S1/S2, no murmur.    Abdomen:  Soft, nontender, nondistended.   Extremities: No cyanosis or clubbing. No edema.    Musculoskeletal: Moves all extremities.  Gait normal.   Skin: Warm, dry, intact.    Neurologic: Mood and affect are appropriate.  Alert and oriented to person, place, time, and situation.          Medications  Allergies   Current Outpatient Medications   Medication Sig Dispense Refill     acetaminophen (TYLENOL) 325 MG tablet Take 325 mg by mouth every 4 hours as needed        benzonatate (TESSALON) 200 MG capsule Take 1 capsule (200 mg) by mouth 3 times daily as needed 30 capsule 0     buPROPion (WELLBUTRIN XL) 300 MG 24 hr tablet Take 1 tablet (300 mg) by mouth every morning 90 tablet 3     EPINEPHrine (AUVI-Q) 0.3 MG/0.3ML injection 2-pack Inject 0.3 mLs (0.3 mg) into the muscle as needed for anaphylaxis 0.6 mL 3     fluticasone (FLONASE) 50 MCG/ACT nasal spray SPRAY 2 SPRAYS INTO BOTH NOSTRILS DAILY 16 g 11     levothyroxine (SYNTHROID/LEVOTHROID) 75 MCG tablet TAKE 1 TABLET (75 MCG) BY MOUTH DAILY 30 tablet 11     omeprazole (PRILOSEC) 20 MG DR capsule Take 1 capsule (20 mg) by mouth daily 90 capsule 3     ondansetron (ZOFRAN) 4 MG tablet Take 1 tablet (4 mg) by mouth every 8 hours as needed for nausea 10 tablet 0     traZODone (DESYREL) 50 MG tablet TAKE TWO TO THREE TABLETS BY MOUTH AT BEDTIME 270 tablet 0     venlafaxine (EFFEXOR-ER) 75 MG 24 hr tablet Take 1 tablet (75 mg) by mouth daily 90 tablet 3    Allergies   Allergen Reactions     Cephalexin Hives     Strawberry Hives     Sulfa Drugs Hives     Cinnamon Hives      Sulfamethoxazole-Trimethoprim Hives     Vicodin [Hydrocodone-Acetaminophen]      Wasp Venom Protein      Other reaction(s): Chest Pain     Wasps [Hornets] Swelling     Now carries Epi Pen     Zoloft [Sertraline]      suicidal ideation     Contrast Dye Other (See Comments) and Rash     Patient had sneezing and an itchy throat following contrast injection of 100 mL Isovue-370.  From IV contrast dye         Lab Results (Personally Reviewed)    Chemistry/lipid CBC Cardiac Enzymes/BNP/TSH/INR   Lab Results   Component Value Date    BUN 11.5 09/12/2022     09/12/2022    CO2 29 09/12/2022     Creatinine   Date Value Ref Range Status   09/12/2022 1.24 (H) 0.51 - 0.95 mg/dL Final   06/14/2021 1.22 (H) 0.52 - 1.04 mg/dL Final       Lab Results   Component Value Date    CHOL 219 (H) 03/07/2022    HDL 62 03/07/2022     (H) 03/07/2022      Lab Results   Component Value Date    WBC 8.1 09/12/2022    HGB 14.0 09/12/2022    HCT 43.4 09/12/2022    MCV 89 09/12/2022     09/12/2022    Lab Results   Component Value Date    TSH 1.83 01/12/2022    INR 1.27 (H) 03/31/2022        The patient states understanding and is agreeable with the plan.   Vicente Craig MD [facility]  Cardiology - Electrophysiology      Total time spent on patient visit, reviewing notes, imaging, labs, orders, and completing necessary documentation: 30 minutes.  >50% of visit spent on counseling patient and/or coordination of care.

## 2023-01-12 LAB
ATRIAL RATE - MUSE: 60 BPM
DIASTOLIC BLOOD PRESSURE - MUSE: NORMAL MMHG
INTERPRETATION ECG - MUSE: NORMAL
P AXIS - MUSE: 57 DEGREES
PR INTERVAL - MUSE: 170 MS
QRS DURATION - MUSE: 88 MS
QT - MUSE: 398 MS
QTC - MUSE: 398 MS
R AXIS - MUSE: 62 DEGREES
SYSTOLIC BLOOD PRESSURE - MUSE: NORMAL MMHG
T AXIS - MUSE: 52 DEGREES
VENTRICULAR RATE- MUSE: 60 BPM

## 2023-01-30 ENCOUNTER — TELEPHONE (OUTPATIENT)
Dept: FAMILY MEDICINE | Facility: CLINIC | Age: 58
End: 2023-01-30
Payer: COMMERCIAL

## 2023-01-30 NOTE — TELEPHONE ENCOUNTER
Patient Quality Outreach    Patient is due for the following:   Physical Preventive Adult Physical      Topic Date Due     Hepatitis B Vaccine (1 of 3 - 3-dose series) Never done     Pneumococcal Vaccine (1 - PCV) Never done     Flu Vaccine (1) 09/01/2022     COVID-19 Vaccine (5 - Booster for Moderna series) 09/30/2022       Next Steps:   Schedule a Adult Preventative    Type of outreach:    Sent [employer] message.      Questions for provider review:    None     Fatuma De La Rosa MA

## 2023-03-16 NOTE — PROGRESS NOTES
"Sunshine Delgado  :  1965  DOS: 3/17/2023  MRN: [id number]  PCP: Lesly Aretaga    Sports Medicine Clinic Visit    Interim History - 2023  Since last visit on 2022 patient has noticed a return/increase in medial right knee pain over the past knee.  Right knee corticosteroid injection completed on 2022 provided great relief for 5 months. Has been using ice and heat (hot tub). Feeling of instability, buckling and a need to \"pop\" with sudden movements occasionally when walking quickly. Pain with going down stairs and slippery surfaces.  Did not start physical therapy. Has not gotten a brace.  No new injury in the interim. Also, she has lost over 30 pounds since our last visit, which has helped her overall knee pain.       Initial Visit: 2022  HPI  Sunshine Delgado is a 57 year old female who is seen in consultation at the request of  Hadley Grace PA-C presenting with right knee pain. MRI on file.    Mechanism of Injury: No acute injury onset.      Duration and progression of pain: Knee pain started in early 2022. Worsening.   Location of pain: Right medial knee.  Radiation: from medial right knee to the right hip    Swelling: yes  Instability: yes  Mechanical symptoms: chronic popping, no locking    Numbness/Tingling: medial right knee numbness occasionally  Radicular pain: no  Weakness: yes, limited by pain    Alleviating factors: hottub helps with muscle tightness.  Aggravating factors: extension of the right leg. Notes constant medial knee pain.     Treatments tried (medications, injections, bracing, therapy, modalities): ice and Tylenol. Heats in the hottub nightly. [place]    Prior medical visits related to complaint:  Followed by SUPRIYA Stubbs.   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    Past Medical History:   Diagnosis Date     Antiplatelet or antithrombotic long-term use      Arrhythmia      Atrial fibrillation with rapid ventricular response (H) 1/26/2020     Bee sting allergy      Depressive disorder      Generalized anxiety disorder 10/15/2009    lexapro made things worse.       Hashimoto's thyroiditis 5/6/2020     Morbid obesity (H)      Ocular migraine      MARIA GUADALUPE (obstructive sleep apnea)- severe (AHI 84) 09/09/2011    patient not using since 2019     Primary osteoarthritis of right knee 9/23/2022     Renal disease      Voice fatigue 10/22/2009     Past Surgical History:   Procedure Laterality Date     ANESTHESIA CARDIOVERSION N/A 11/22/2021    Procedure: ANESTHESIA, FOR CARDIOVERSION@1515;  Surgeon: GENERIC ANESTHESIA PROVIDER;  Location:  OR     COLONOSCOPY  2000     DAVINCI GASTRIC SLEEVE       EP ABLATION FOCAL AFIB N/A 7/22/2021    Procedure: EP ABLATION FOCAL AFIB;  Surgeon: Vicente Craig MD;  Location:  [facility]ATH [facility]     EP ABLATION FOCAL AFIB N/A 3/31/2022    Procedure: Ablation Focal Atrial Fibrillation;  Surgeon: Vicente Craig MD;  Location:  HE[facility] CATH LAB     GENITOURINARY SURGERY  2007     HYSTERECTOMY, PAP NO LONGER INDICATED       HYSTERECTOMY, VAGINAL  2007    still has ovaries     LAPAROSCOPIC GASTRIC SLEEVE N/A 3/13/2018    Procedure: LAPAROSCOPIC GASTRIC SLEEVE;  Laparoscopic Sleeve Gastrectomy;  Surgeon: Kameron Joseph MD;  Location:  OR     Family History   Problem Relation Age of Onset     Eye Disorder Mother         lost eyesight; probable macular degeneration     Psychotic Disorder Mother         Dementia /Alzhimers     Neurologic Disorder Mother         seizures     Diabetes Mother       Hypertension Mother      Alzheimer Disease Mother      Arthritis Mother      Osteoporosis Mother      Obesity Mother      Diabetes Father      Depression Father      Hyperlipidemia Father      Obesity Father      Psychotic Disorder Sister         bipolar     Thyroid Disease Sister         h/o thyroid cancer     Depression Sister         Bipolar     Obesity Sister      Cancer Other         Brain cancer     Osteoporosis Maternal Grandmother      Anxiety Disorder Son      Depression Sister      Thyroid Disease Sister        Objective  /85   Wt 90.7 kg (200 lb)   BMI 34.10 kg/m        General: healthy, alert and in no acute distress      HEENT: no scleral icterus or conjunctival erythema     Skin: no suspicious lesions or rash. No jaundice.     CV: regular rhythm by palpation, 2+ distal pulses, no pedal edema      Resp: normal respiratory effort without conversational dyspnea     Psych: normal mood and affect      Gait: nonantalgic, appropriate coordination and balance     Neuro:        - Sensation to light touch:  Intact throughout the BLE including all peripheral nerve distributions.        - MSR:  2+ in bilateral patella, achilles.        - Special tests:   - Slump/SLR:  Neg bilaterally    MSK - Knee:       - Inspection:    - No significant effusion present. No erythema, warmth, ecchymosis, or lesion.        - ROM:     - Limited in flexion by anteromedial knee pain.       - Palpation:    - TTP at the medial joint line, slightly at the lateral joint line.   - NTTP elsewhere.        - Strength:    - 5/5 in BLE including HF, Hab, Hadd, KF, KE, DF, PF, EHL, Inv, Ev.        - Special tests:        - Lachman:  Neg        - A/P drawer:  Neg       - Pivot shift:  Neg   - Garry:  Pos for click and medial compartment pain     - Varus stress:  Neg for laxity or pain    - Valgus stress:  Neg for laxity, pos for mild pain   - Patellar grind:  Pos      Radiology  I previously independently reviewed the available  relevant imaging, including MRI R Knee (6/17/22), and agree with the interpretation of mild-moderate tricompartmental cartilage defects, degenerative tear of the medial meniscus, and chronic ACL tear.     MR right knee without contrast 6/17/2022 7:17 PM     Techniques: Multiplanar multisequence imaging of the right knee was  obtained without administration of intra-articular or intravenous  contrast using routing protocol.     History: Acute pain of right knee     Comparison: None available     Findings:     MENISCI:  Medial meniscus:There is a horizontal oblique tear of the body of the  medial meniscus that communicates with the inferior articular surface  (series 7, images 24 through 26), blunting of the posterior horn .  Lateral meniscus: Mucoid degeneration of the lateral meniscus, which  is mildly extruded.     LIGAMENTS  Cruciate ligaments: The ACL appears attenuated with only a few intact  fibers. No associated bony contusion pattern most consistent with  chronic ACL tear. The posterior cruciate ligament is intact and  unremarkable.  Medial supporting structures:  Lateral supporting structures: Intact.     EXTENSOR MECHANISM  Intact.     FLUID  Small joint effusion. No substantial Baker's cyst.     OSSEOUS and ARTICULAR STRUCTURES  Bones: No fracture, contusion, or osseous lesion is seen.     Patellofemoral compartment: Focal full-thickness cartilage fissuring  centered about the median ridge of the patella is associated mild bone  marrow edema, focal full-thickness cartilage fissure of the medial  trochlea     Medial compartment: Focal moderate grade cartilage fissuring centered  about the weightbearing aspect of the medial femoral condyle.     Lateral compartment: Focal full-thickness cartilage defect of the  lateral femoral condyle (series 6001).     ANCILLARY FINDINGS  None.                                                                      Impression:  1. Medial compartment: Complex degenerative  tearing of the medial  meniscus with a predominant horizontal oblique component. Moderate  grade cartilage fissuring.  2. Lateral compartment: Intrasubstance degeneration of the lateral  meniscus, which is mildly extruded. Focal full-thickness articular  cartilage defect within the distal femoral condyle.  3. Patellofemoral joint: Focal full-thickness articular cartilage  fissure centered about the median ridge of the patella.  4. Chronic appearing ACL tear.     JUTTA ELLERMANN, MD     Large Joint Injection/Arthocentesis: R knee joint    Date/Time: 3/17/2023 12:03 PM  Performed by: Roshan Farr DO  Authorized by: Roshan Farr DO     Indications:  Pain  Needle Size:  22 G  Guidance: landmark guided    Approach:  Anterolateral  Location:  Knee      Medications:  40 mg triamcinolone 40 MG/ML; 2 mL bupivacaine (PF) 0.5 %; 2 mL lidocaine 1 %  Outcome:  Tolerated well, no immediate complications  Procedure discussed: discussed risks, benefits, and alternatives    Consent Given by:  Patient  Prep: patient was prepped and draped in usual sterile fashion           PROCEDURE  Intraarticular Knee Joint Injection - Right  The patient was informed of the risks and benefits of the procedure and alternatives were discussed. A written consent was signed by the patient.   The injection site was prepped with chlorhexidine in sterile fashion.   An injectate solution containing 2 mL of 1% lidocaine, 2 mL of 0.5% bupivacaine, and 1 mL of Kenalog (40 mg/mL) was drawn up into a 5 mL syringe.  Injection was performed using sterile technique.  A 1.5-inch 22-gauge needle was used to enter the knee joint using a lateral infrapatellar approach and injectate was injected successfully. After the injection, the site was cleaned and a bandage applied.     The patient tolerated the procedure well without complications.         Assessment  1. Primary osteoarthritis of right knee    2. Degenerative tear of medial meniscus, right         Plan  Sunshine Delgado is a 57 year old female that presents for follow up of her right knee pain secondary to primary osteoarthritis and a degenerative medial meniscus tear.  Discussed the nature of the condition and treatment options and mutually agreed upon the following plan:    - Medications:         - Discussed pharmacologic options for pain relief. May continue to use Tylenol PRN, avoid NSAIDs due to kidney problems. May also use topical creams such as IcyHot, BioFreeze, or Voltaren gel PRN.   - Injections:         - Great relief from last CSI in September. Performed a corticosteroid injection of the right knee today in clinic. Patient tolerated well without complications. See procedure note above for details.   - Therapy:         - Discussed the benefits of PT vs HEP.  Previously referred to physical therapy, no sessions attended.  Would prefer home exercise program, exercises given today.  - Modalities:         - May use ice, heat, massage PRN.   - Bracing:         - Discussed that she may benefit from a patellar stabilizing brace (lateral J)  for exacerbating activities. Options for bracing presented in clinic, she may choose to take a brace home or look for an equivalent brace from an outside source.   - Activity:         - Encouraged to remain active and participate in regular activities as symptoms allow with knee brace in place for stability.   - Follow up:         - In 3 months as needed for re-evaluation and update to treatment plan. Patient has clinic contact information for questions or concerns.       Roshan Farr DO, GARRICK  [facility] - Sports Medicine  [facility] - Department of Orthopedic Surgery

## 2023-03-17 ENCOUNTER — OFFICE VISIT (OUTPATIENT)
Dept: ORTHOPEDICS | Facility: CLINIC | Age: 58
End: 2023-03-17
Payer: COMMERCIAL

## 2023-03-17 VITALS — SYSTOLIC BLOOD PRESSURE: 128 MMHG | DIASTOLIC BLOOD PRESSURE: 85 MMHG | BODY MASS INDEX: 34.1 KG/M2 | WEIGHT: 200 LBS

## 2023-03-17 DIAGNOSIS — M23.203 DEGENERATIVE TEAR OF MEDIAL MENISCUS, RIGHT: ICD-10-CM

## 2023-03-17 DIAGNOSIS — M17.11 PRIMARY OSTEOARTHRITIS OF RIGHT KNEE: Primary | ICD-10-CM

## 2023-03-17 PROCEDURE — 20610 DRAIN/INJ JOINT/BURSA W/O US: CPT | Mod: RT | Performed by: STUDENT IN AN ORGANIZED HEALTH CARE EDUCATION/TRAINING PROGRAM

## 2023-03-17 PROCEDURE — 99214 OFFICE O/P EST MOD 30 MIN: CPT | Mod: 25 | Performed by: STUDENT IN AN ORGANIZED HEALTH CARE EDUCATION/TRAINING PROGRAM

## 2023-03-17 RX ORDER — TRIAMCINOLONE ACETONIDE 40 MG/ML
40 INJECTION, SUSPENSION INTRA-ARTICULAR; INTRAMUSCULAR
Status: DISCONTINUED | OUTPATIENT
Start: 2023-03-17 | End: 2024-05-30

## 2023-03-17 RX ORDER — BUPIVACAINE HYDROCHLORIDE 5 MG/ML
2 INJECTION, SOLUTION EPIDURAL; INTRACAUDAL
Status: DISCONTINUED | OUTPATIENT
Start: 2023-03-17 | End: 2023-07-28

## 2023-03-17 RX ORDER — LIDOCAINE HYDROCHLORIDE 10 MG/ML
2 INJECTION, SOLUTION INFILTRATION; PERINEURAL
Status: DISCONTINUED | OUTPATIENT
Start: 2023-03-17 | End: 2023-07-28

## 2023-03-17 RX ADMIN — BUPIVACAINE HYDROCHLORIDE 2 ML: 5 INJECTION, SOLUTION EPIDURAL; INTRACAUDAL at 12:03

## 2023-03-17 RX ADMIN — TRIAMCINOLONE ACETONIDE 40 MG: 40 INJECTION, SUSPENSION INTRA-ARTICULAR; INTRAMUSCULAR at 12:03

## 2023-03-17 RX ADMIN — LIDOCAINE HYDROCHLORIDE 2 ML: 10 INJECTION, SOLUTION INFILTRATION; PERINEURAL at 12:03

## 2023-03-17 ASSESSMENT — PAIN SCALES - GENERAL: PAINLEVEL: MODERATE PAIN (5)

## 2023-03-17 NOTE — PATIENT INSTRUCTIONS
Gianni Sunshine eDlgado ,     A copy of your assessment and our treatment plan that we discussed together is included below, as written in your medical chart.   If you have any questions, please feel free to call the clinic.     --------------------------------------------------  Sunshine Delgado is a 57 year old female that presents for follow up of her right knee pain secondary to primary osteoarthritis and a degenerative medial meniscus tear.  Discussed the nature of the condition and treatment options and mutually agreed upon the following plan:    - Medications:         - Discussed pharmacologic options for pain relief. May continue to use Tylenol PRN, avoid NSAIDs due to kidney problems. May also use topical creams such as IcyHot, BioFreeze, or Voltaren gel PRN.   - Injections:         - Great relief from last CSI in September. Performed a corticosteroid injection of the right knee today in clinic. Patient tolerated well without complications. See procedure note above for details.   - Therapy:         - Discussed the benefits of PT vs HEP.  Previously referred to physical therapy, no sessions attended.  Would prefer home exercise program, exercises given today.  - Modalities:         - May use ice, heat, massage PRN.   - Bracing:         - Discussed that she may benefit from a patellar stabilizing brace (lateral J)  for exacerbating activities. Options for bracing presented in clinic, she may choose to take a brace home or look for an equivalent brace from an outside source.   - Activity:         - Encouraged to remain active and participate in regular activities as symptoms allow with knee brace in place for stability.   - Follow up:         - In 3 months as needed for re-evaluation and update to treatment plan. Patient has clinic contact information for questions or concerns.   --------------------------------------------------    It was a pleasure seeing you today. Thank you for choosing [facility] for  your care.       Roshan Farr DO, CAQSM  [facility] - Department of Orthopedic Surgery     Disclaimer:  This note was prepared and written using Dragon Medical dictation software. As a result, there may be errors in the script that have gone undetected. Please consider this when interpreting the information in this note.

## 2023-03-17 NOTE — LETTER
"    3/17/2023         RE: Sunshine Delgado  [address] [place] [address]        Dear Colleague,    Thank you for referring your patient, Sunshine Delgado, to the [facility] [place]. Please see a copy of my visit note below.    Sunshine Delgado  :  1965  DOS: 3/17/2023  MRN: [id number]  PCP: Lesly Arteaga    Sports Medicine Clinic Visit    Interim History - 2023  Since last visit on 2022 patient has noticed a return/increase in medial right knee pain over the past knee.  Right knee corticosteroid injection completed on 2022 provided great relief for 5 months. Has been using ice and heat (hot tub). Feeling of instability, buckling and a need to \"pop\" with sudden movements occasionally when walking quickly. Pain with going down stairs and slippery surfaces.  Did not start physical therapy. Has not gotten a brace.  No new injury in the interim. Also, she has lost over 30 pounds since our last visit, which has helped her overall knee pain.       Initial Visit: 2022  HPI  Sunshine Delgado is a 57 year old female who is seen in consultation at the request of  Hadley Grace PA-C presenting with right knee pain. MRI on file.    Mechanism of Injury: No acute injury onset.      Duration and progression of pain: Knee pain started in early 2022. Worsening.   Location of pain: Right medial knee.  Radiation: from medial right knee to the right hip    Swelling: yes  Instability: yes  Mechanical symptoms: chronic popping, no locking    Numbness/Tingling: medial right knee numbness occasionally  Radicular pain: no  Weakness: yes, limited by pain    Alleviating factors: hottub helps with muscle tightness.  Aggravating factors: extension of the right leg. Notes constant medial knee pain.     Treatments tried (medications, injections, bracing, therapy, modalities): ice and Tylenol. Heats in the hottub nightly. Has K-taped while out of the " country, which was helpful    Prior medical visits related to complaint:  Followed by SUPRIYA Stubbs.   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    Past Medical History:   Diagnosis Date     Antiplatelet or antithrombotic long-term use      Arrhythmia      Atrial fibrillation with rapid ventricular response (H) 1/26/2020     Bee sting allergy      Depressive disorder      Generalized anxiety disorder 10/15/2009    lexapro made things worse.       Hashimoto's thyroiditis 5/6/2020     Morbid obesity (H)      Ocular migraine      MARIA GUADALUPE (obstructive sleep apnea)- severe (AHI 84) 09/09/2011    patient not using since 2019     Primary osteoarthritis of right knee 9/23/2022     Renal disease      Voice fatigue 10/22/2009     Past Surgical History:   Procedure Laterality Date     ANESTHESIA CARDIOVERSION N/A 11/22/2021    Procedure: ANESTHESIA, FOR CARDIOVERSION@1515;  Surgeon: GENERIC ANESTHESIA PROVIDER;  Location:  OR     COLONOSCOPY  2000     [facility] GASTRIC SLEEVE       EP ABLATION FOCAL AFIB N/A 7/22/2021    Procedure: EP ABLATION FOCAL AFIB;  Surgeon: Vicente Craig MD;  Location:  HEART CARDIAC CATH LAB     EP ABLATION FOCAL AFIB N/A 3/31/2022    Procedure: Ablation Focal Atrial Fibrillation;  Surgeon: Vicente Craig MD;  Location:  HEART CARDIAC CATH LAB     GENITOURINARY SURGERY  2007     HYSTERECTOMY, PAP NO LONGER INDICATED       HYSTERECTOMY, VAGINAL  2007    still has ovaries     LAPAROSCOPIC GASTRIC SLEEVE N/A 3/13/2018    Procedure: LAPAROSCOPIC GASTRIC SLEEVE;  Laparoscopic Sleeve Gastrectomy;  Surgeon: Kameron Joseph MD;  Location:  OR      Family History   Problem Relation Age of Onset     Eye Disorder Mother         lost eyesight; probable macular degeneration     Psychotic Disorder Mother         Dementia /Alzhimers     Neurologic Disorder Mother         seizures     Diabetes Mother      Hypertension Mother      Alzheimer Disease Mother      Arthritis Mother      Osteoporosis Mother      Obesity Mother      Diabetes Father      Depression Father      Hyperlipidemia Father      Obesity Father      Psychotic Disorder Sister         bipolar     Thyroid Disease Sister         h/o thyroid cancer     Depression Sister         Bipolar     Obesity Sister      Cancer Other         Brain cancer     Osteoporosis Maternal Grandmother      Anxiety Disorder Son      Depression Sister      Thyroid Disease Sister        Objective  /85   Wt 90.7 kg (200 lb)   BMI 34.10 kg/m        General: healthy, alert and in no acute distress      HEENT: no scleral icterus or conjunctival erythema     Skin: no suspicious lesions or rash. No jaundice.     CV: regular rhythm by palpation, 2+ distal pulses, no pedal edema      Resp: normal respiratory effort without conversational dyspnea     Psych: normal mood and affect      Gait: nonantalgic, appropriate coordination and balance     Neuro:        - Sensation to light touch:  Intact throughout the BLE including all peripheral nerve distributions.        - MSR:  2+ in bilateral patella, achilles.        - Special tests:   - Slump/SLR:  Neg bilaterally    MSK - Knee:       - Inspection:    - No significant effusion present. No erythema, warmth, ecchymosis, or lesion.        - ROM:     - Limited in flexion by anteromedial knee pain.       - Palpation:    - TTP at the medial joint line, slightly at the lateral joint line.   - NTTP elsewhere.        - Strength:    - 5/5 in BLE including HF, Hab, Hadd, KF, KE, DF, PF, EHL, Inv, Ev.        - Special tests:        - Lachman:  Neg        - A/P drawer:  Neg       - Pivot shift:   Neg   - Garry:  Pos for click and medial compartment pain     - Varus stress:  Neg for laxity or pain    - Valgus stress:  Neg for laxity, pos for mild pain   - Patellar grind:  Pos      Radiology  I previously independently reviewed the available relevant imaging, including MRI R Knee (6/17/22), and agree with the interpretation of mild-moderate tricompartmental cartilage defects, degenerative tear of the medial meniscus, and chronic ACL tear.     MR right knee without contrast 6/17/2022 7:17 PM     Techniques: Multiplanar multisequence imaging of the right knee was  obtained without administration of intra-articular or intravenous  contrast using routing protocol.     History: Acute pain of right knee     Comparison: None available     Findings:     MENISCI:  Medial meniscus:There is a horizontal oblique tear of the body of the  medial meniscus that communicates with the inferior articular surface  (series 7, images 24 through 26), blunting of the posterior horn .  Lateral meniscus: Mucoid degeneration of the lateral meniscus, which  is mildly extruded.     LIGAMENTS  Cruciate ligaments: The ACL appears attenuated with only a few intact  fibers. No associated bony contusion pattern most consistent with  chronic ACL tear. The posterior cruciate ligament is intact and  unremarkable.  Medial supporting structures:  Lateral supporting structures: Intact.     EXTENSOR MECHANISM  Intact.     FLUID  Small joint effusion. No substantial Baker's cyst.     OSSEOUS and ARTICULAR STRUCTURES  Bones: No fracture, contusion, or osseous lesion is seen.     Patellofemoral compartment: Focal full-thickness cartilage fissuring  centered about the median ridge of the patella is associated mild bone  marrow edema, focal full-thickness cartilage fissure of the medial  trochlea     Medial compartment: Focal moderate grade cartilage fissuring centered  about the weightbearing aspect of the medial femoral condyle.     Lateral  compartment: Focal full-thickness cartilage defect of the  lateral femoral condyle (series 6001).     ANCILLARY FINDINGS  None.                                                                      Impression:  1. Medial compartment: Complex degenerative tearing of the medial  meniscus with a predominant horizontal oblique component. Moderate  grade cartilage fissuring.  2. Lateral compartment: Intrasubstance degeneration of the lateral  meniscus, which is mildly extruded. Focal full-thickness articular  cartilage defect within the distal femoral condyle.  3. Patellofemoral joint: Focal full-thickness articular cartilage  fissure centered about the median ridge of the patella.  4. Chronic appearing ACL tear.     JUTTA ELLERMANN, MD     Large Joint Injection/Arthocentesis: R knee joint    Date/Time: 3/17/2023 12:03 PM  Performed by: Roshan Farr DO  Authorized by: Roshan Farr DO     Indications:  Pain  Needle Size:  22 G  Guidance: landmark guided    Approach:  Anterolateral  Location:  Knee      Medications:  40 mg triamcinolone 40 MG/ML; 2 mL bupivacaine (PF) 0.5 %; 2 mL lidocaine 1 %  Outcome:  Tolerated well, no immediate complications  Procedure discussed: discussed risks, benefits, and alternatives    Consent Given by:  Patient  Prep: patient was prepped and draped in usual sterile fashion           PROCEDURE  Intraarticular Knee Joint Injection - Right  The patient was informed of the risks and benefits of the procedure and alternatives were discussed. A written consent was signed by the patient.   The injection site was prepped with chlorhexidine in sterile fashion.   An injectate solution containing 2 mL of 1% lidocaine, 2 mL of 0.5% bupivacaine, and 1 mL of Kenalog (40 mg/mL) was drawn up into a 5 mL syringe.  Injection was performed using sterile technique.  A 1.5-inch 22-gauge needle was used to enter the knee joint using a lateral infrapatellar approach and injectate was injected successfully.  After the injection, the site was cleaned and a bandage applied.     The patient tolerated the procedure well without complications.         Assessment  1. Primary osteoarthritis of right knee    2. Degenerative tear of medial meniscus, right        Plan  Sunshine Delgado is a 57 year old female that presents for follow up of her right knee pain secondary to primary osteoarthritis and a degenerative medial meniscus tear.  Discussed the nature of the condition and treatment options and mutually agreed upon the following plan:    - Medications:         - Discussed pharmacologic options for pain relief. May continue to use Tylenol PRN, avoid NSAIDs due to kidney problems. May also use topical creams such as IcyHot, BioFreeze, or Voltaren gel PRN.   - Injections:         - Great relief from last CSI in September. Performed a corticosteroid injection of the right knee today in clinic. Patient tolerated well without complications. See procedure note above for details.   - Therapy:         - Discussed the benefits of PT vs HEP.  Previously referred to physical therapy, no sessions attended.  Would prefer home exercise program, exercises given today.  - Modalities:         - May use ice, heat, massage PRN.   - Bracing:         - Discussed that she may benefit from a patellar stabilizing brace (lateral J)  for exacerbating activities. Options for bracing presented in clinic, she may choose to take a brace home or look for an equivalent brace from an outside source.   - Activity:         - Encouraged to remain active and participate in regular activities as symptoms allow with knee brace in place for stability.   - Follow up:         - In 3 months as needed for re-evaluation and update to treatment plan. Patient has clinic contact information for questions or concerns.       Roshan Farr DO, GARRICK  [facility] - Sports Medicine  [facility] - Department of Orthopedic Surgery         Again, thank  you for allowing me to participate in the care of your patient.        Sincerely,        Roshan Farr, DO

## 2023-03-24 ENCOUNTER — E-VISIT (OUTPATIENT)
Dept: FAMILY MEDICINE | Facility: CLINIC | Age: 58
End: 2023-03-24
Payer: COMMERCIAL

## 2023-03-24 ENCOUNTER — LAB (OUTPATIENT)
Dept: PEDIATRICS | Facility: CLINIC | Age: 58
End: 2023-03-24
Attending: PHYSICIAN ASSISTANT
Payer: COMMERCIAL

## 2023-03-24 DIAGNOSIS — J02.9 SORE THROAT: Primary | ICD-10-CM

## 2023-03-24 LAB
DEPRECATED S PYO AG THROAT QL EIA: NEGATIVE
GROUP A STREP BY PCR: NOT DETECTED

## 2023-03-24 PROCEDURE — 99423 OL DIG E/M SVC 21+ MIN: CPT | Performed by: PHYSICIAN ASSISTANT

## 2023-03-24 PROCEDURE — 87651 STREP A DNA AMP PROBE: CPT | Performed by: PHYSICIAN ASSISTANT

## 2023-03-24 NOTE — TELEPHONE ENCOUNTER
Provider E-Visit time total (minutes): 23    ASSESSMENT / PLAN:  (J02.9) Sore throat  (primary encounter diagnosis)  Comment: New  Plan: Streptococcus A Rapid Screen w/Reflex to PCR -         Clinic Collect        Needs strep testing.  Mucinex and ibuprofen for ear/head pain and be seen 3/4 days if not improving or sooner if worsening

## 2023-03-26 ENCOUNTER — HEALTH MAINTENANCE LETTER (OUTPATIENT)
Age: 58
End: 2023-03-26

## 2023-04-18 ENCOUNTER — OFFICE VISIT (OUTPATIENT)
Dept: URGENT CARE | Facility: URGENT CARE | Age: 58
End: 2023-04-18
Payer: COMMERCIAL

## 2023-04-18 VITALS
TEMPERATURE: 98 F | BODY MASS INDEX: 35.81 KG/M2 | HEART RATE: 71 BPM | RESPIRATION RATE: 14 BRPM | DIASTOLIC BLOOD PRESSURE: 82 MMHG | OXYGEN SATURATION: 99 % | SYSTOLIC BLOOD PRESSURE: 130 MMHG | WEIGHT: 210 LBS

## 2023-04-18 DIAGNOSIS — J02.9 SORE THROAT: Primary | ICD-10-CM

## 2023-04-18 DIAGNOSIS — N18.31 STAGE 3A CHRONIC KIDNEY DISEASE (H): ICD-10-CM

## 2023-04-18 DIAGNOSIS — M54.9 ACUTE BACK PAIN, UNSPECIFIED BACK LOCATION, UNSPECIFIED BACK PAIN LATERALITY: ICD-10-CM

## 2023-04-18 LAB
DEPRECATED S PYO AG THROAT QL EIA: NEGATIVE
GROUP A STREP BY PCR: NOT DETECTED

## 2023-04-18 PROCEDURE — 99214 OFFICE O/P EST MOD 30 MIN: CPT | Performed by: PHYSICIAN ASSISTANT

## 2023-04-18 PROCEDURE — 87651 STREP A DNA AMP PROBE: CPT | Performed by: PHYSICIAN ASSISTANT

## 2023-04-18 RX ORDER — CYCLOBENZAPRINE HCL 10 MG
10 TABLET ORAL
Qty: 20 TABLET | Refills: 0 | Status: SHIPPED | OUTPATIENT
Start: 2023-04-18 | End: 2023-04-23

## 2023-04-18 RX ORDER — METHYLPREDNISOLONE 4 MG
TABLET, DOSE PACK ORAL
Qty: 21 TABLET | Refills: 0 | Status: SHIPPED | OUTPATIENT
Start: 2023-04-18 | End: 2023-04-25

## 2023-04-18 NOTE — LETTER
April 18, 2023      Sunshine Delgado  [address] [address]        To Whom It May Concern:    Sunshine Delgado  was seen on 4/18/2023.  Please excuse her  until 4/20 due to {WORK EXCUSE:329102}.        Sincerely,        Carmen Bennett PA-C

## 2023-04-18 NOTE — PROGRESS NOTES
Chief Complaint   Patient presents with     Back Pain     Sx got up from a chair at work, had not been sitting long, felt like smething was compressing her spine, in her words feels like it goes from the cervical spine to her low lumbar. Not feeling feel sore throat and generalized body aches over the weekend.      Pharyngitis     Sx since Saturday      Generalized Body Aches     Sx over the weekend      Results for orders placed or performed in visit on 04/18/23   Streptococcus A Rapid Screen w/Reflex to PCR - Clinic Collect     Status: Normal    Specimen: Throat; Swab   Result Value Ref Range    Group A Strep antigen Negative Negative           Impression:     ICD-10-CM    1. Sore throat  J02.9 Streptococcus A Rapid Screen w/Reflex to PCR - Clinic Collect     Group A Streptococcus PCR Throat Swab      2. Acute back pain, unspecified back location, unspecified back pain laterality  M54.9       3. Stage 3a chronic kidney disease (H)  N18.31           Plan: Further strep test pending.  Likely viral illness.  No specific injury.  Acute onset of generalized back pain from cervical to lumbar spine after getting out of chair at work.  Medrol Dosepak.  Muscle relaxant.  Instructions for back care and return precautions discussed.      OOW until 4/20    Carmen Bennett PA-C      SUBJECTIVE:  58-year-old female presents with sore throat and body aches over the weekend.  Today at work she stood up from the chair and had back spasm entire back.  She denies any upper or lower extremity numbness, tingling, radicular pain, weakness.  No prior issues with back problems.  History of stage IIIa chronic kidney disease.  No fever.  Occasional cough.        Allergies   Allergen Reactions     Cephalexin Hives     Strawberry Hives     Sulfa Drugs Hives     Cinnamon Hives     Sulfamethoxazole-Trimethoprim Hives     Vicodin [Hydrocodone-Acetaminophen]      Wasp Venom Protein      Other reaction(s): Chest Pain     Wasps [Hornets]  Swelling     Now carries Epi Pen     Zoloft [Sertraline]      suicidal ideation     Contrast Dye Other (See Comments) and Rash     Patient had sneezing and an itchy throat following contrast injection of 100 mL Isovue-370.  From IV contrast dye       Past Medical History:   Diagnosis Date     Antiplatelet or antithrombotic long-term use      Arrhythmia      Atrial fibrillation with rapid ventricular response (H) 1/26/2020     Bee sting allergy      Depressive disorder      Generalized anxiety disorder 10/15/2009    lexapro made things worse.       Hashimoto's thyroiditis 5/6/2020     Morbid obesity (H)      Ocular migraine      MARIA GUADALUPE (obstructive sleep apnea)- severe (AHI 84) 09/09/2011    patient not using since 2019     Primary osteoarthritis of right knee 9/23/2022     Renal disease      Voice fatigue 10/22/2009       buPROPion (WELLBUTRIN XL) 300 MG 24 hr tablet, Take 1 tablet (300 mg) by mouth every morning  levothyroxine (SYNTHROID/LEVOTHROID) 75 MCG tablet, TAKE 1 TABLET (75 MCG) BY MOUTH DAILY  traZODone (DESYREL) 50 MG tablet, TAKE TWO TO THREE TABLETS BY MOUTH AT BEDTIME  venlafaxine (EFFEXOR-ER) 75 MG 24 hr tablet, Take 1 tablet (75 mg) by mouth daily  acetaminophen (TYLENOL) 325 MG tablet, Take 325 mg by mouth every 4 hours as needed  (Patient not taking: Reported on 4/18/2023)  benzonatate (TESSALON) 200 MG capsule, Take 1 capsule (200 mg) by mouth 3 times daily as needed (Patient not taking: Reported on 1/11/2023)  EPINEPHrine (AUVI-Q) 0.3 MG/0.3ML injection 2-pack, Inject 0.3 mLs (0.3 mg) into the muscle as needed for anaphylaxis (Patient not taking: Reported on 4/18/2023)  fluticasone (FLONASE) 50 MCG/ACT nasal spray, SPRAY 2 SPRAYS INTO BOTH NOSTRILS DAILY (Patient not taking: Reported on 4/18/2023)  omeprazole (PRILOSEC) 20 MG DR capsule, Take 1 capsule (20 mg) by mouth daily (Patient not taking: Reported on 1/11/2023)  ondansetron (ZOFRAN) 4 MG tablet, Take 1 tablet (4 mg) by mouth every 8 hours as  needed for nausea (Patient not taking: Reported on 1/11/2023)    2 mL bupivacaine (MARCAINE) preservative free injection 0.5% (20 mL vial)  2 mL bupivacaine (MARCAINE) preservative free injection 0.5% (20 mL vial)  lidocaine 1 % injection 2 mL  lidocaine 1 % injection 2 mL  triamcinolone (KENALOG-40) injection 40 mg        Past Surgical History:   Procedure Laterality Date     ANESTHESIA CARDIOVERSION N/A 11/22/2021    Procedure: ANESTHESIA, FOR CARDIOVERSION@1515;  Surgeon: GENERIC ANESTHESIA PROVIDER;  Location:  OR     COLONOSCOPY  2000     [facility] GASTRIC SLEEVE       EP ABLATION FOCAL AFIB N/A 7/22/2021    Procedure: EP ABLATION FOCAL AFIB;  Surgeon: Vicente Craig MD;  Location:  HEART CARDIAC CATH LAB     EP ABLATION FOCAL AFIB N/A 3/31/2022    Procedure: Ablation Focal Atrial Fibrillation;  Surgeon: Vicente Craig MD;  Location:  HEART CARDIAC CATH LAB     GENITOURINARY SURGERY  2007     HYSTERECTOMY, PAP NO LONGER INDICATED       HYSTERECTOMY, VAGINAL  2007    still has ovaries     LAPAROSCOPIC GASTRIC SLEEVE N/A 3/13/2018    Procedure: LAPAROSCOPIC GASTRIC SLEEVE;  Laparoscopic Sleeve Gastrectomy;  Surgeon: Kameron Joseph MD;  Location:  OR       Social History     Socioeconomic History     Marital status:      Spouse name: Not on file     Number of children: 1     Years of education: Not on file     Highest education level: Not on file   Occupational History     Occupation:      Comment: [employer]     Employer: [employer]   Tobacco Use     Smoking status: Never     Smokeless tobacco: Never   Vaping Use     Vaping status: Never Used   Substance and Sexual Activity     Alcohol use: Yes     Comment: 1-2 per mo     Drug use: Not Currently     Types: Marijuana     Comment: 12/20 last dose     Sexual activity: Yes     Partners: Male     Birth control/protection: Female Surgical   Other Topics Concern     Parent/sibling w/ CABG, MI or angioplasty  "before 65F 55M? No   Social History Narrative    ,  had glioblastoma,      [employer]    Started the \"[employer]\"        Reggie, born , son. Has kim             Social Determinants of Health     Financial Resource Strain: Not on file   Food Insecurity: Not on file   Transportation Needs: Not on file   Physical Activity: Not on file   Stress: Not on file   Social Connections: Not on file   Intimate Partner Violence: Not on file   Housing Stability: Not on file       REVIEW OF SYSTEMS  General: negative for fever  Resp: negative for chest pain   CV: negative for chest pain  : negative for dysuria , frequency  Musculoskeletal: as above  Neurologic: negative for ataxia, saddle anesthesia, fecal incontinence, one sided weakness,  paresthesias    Physical Exam:  /82   Pulse 71   Temp 98  F (36.7  C)   Resp 14   Wt 95.3 kg (210 lb)   SpO2 99%   BMI 35.81 kg/m      Constitutional: slow moving secondary to entire back in spasm   CARDIO: RRR, no MRG  RESP: lungs CTA, NAD  NEURO: Patellar  and ankle reflexes intact and equal bilaterally  BACK:  Straight leg raise intact, all muscles of back feels tense and tender, strength intact and equal bilateral lower extremities  GAIT: intact  Psychiatric: mentation appears normal and affect normal/bright  Lumbar flexion 90 degrees, limited extension with increased back pain throughout.      Carmen Bennett PA-C        "

## 2023-04-18 NOTE — LETTER
April 18, 2023      Sunshine Delgado  [address] [place] [address]        To Whom It May Concern:    Sunshine Delgado  was seen on 4/18/2023.  Please excuse her  until 4/20 due to illness and back pain.        Sincerely,        Carmen Bennett PA-C

## 2023-04-22 ENCOUNTER — APPOINTMENT (OUTPATIENT)
Dept: MRI IMAGING | Facility: CLINIC | Age: 58
End: 2023-04-22
Attending: EMERGENCY MEDICINE
Payer: COMMERCIAL

## 2023-04-22 ENCOUNTER — APPOINTMENT (OUTPATIENT)
Dept: CT IMAGING | Facility: CLINIC | Age: 58
End: 2023-04-22
Attending: EMERGENCY MEDICINE
Payer: COMMERCIAL

## 2023-04-22 ENCOUNTER — HOSPITAL ENCOUNTER (EMERGENCY)
Facility: CLINIC | Age: 58
Discharge: HOME | End: 2023-04-23
Attending: EMERGENCY MEDICINE | Admitting: EMERGENCY MEDICINE
Payer: COMMERCIAL

## 2023-04-22 DIAGNOSIS — R00.1 SEVERE SINUS BRADYCARDIA: ICD-10-CM

## 2023-04-22 DIAGNOSIS — M54.6 PAIN IN THORACIC SPINE: ICD-10-CM

## 2023-04-22 DIAGNOSIS — M54.50 ACUTE BILATERAL LOW BACK PAIN WITHOUT SCIATICA: ICD-10-CM

## 2023-04-22 LAB
ANION GAP SERPL CALCULATED.3IONS-SCNC: 15 MMOL/L (ref 7–15)
BASOPHILS # BLD AUTO: 0.1 10E3/UL (ref 0–0.2)
BASOPHILS NFR BLD AUTO: 1 %
BUN SERPL-MCNC: 18.4 MG/DL (ref 6–20)
CALCIUM SERPL-MCNC: 10.6 MG/DL (ref 8.6–10)
CHLORIDE SERPL-SCNC: 101 MMOL/L (ref 98–107)
CREAT SERPL-MCNC: 1.05 MG/DL (ref 0.51–0.95)
DEPRECATED HCO3 PLAS-SCNC: 27 MMOL/L (ref 22–29)
EOSINOPHIL # BLD AUTO: 0 10E3/UL (ref 0–0.7)
EOSINOPHIL NFR BLD AUTO: 0 %
ERYTHROCYTE [DISTWIDTH] IN BLOOD BY AUTOMATED COUNT: 13.8 % (ref 10–15)
GFR SERPL CREATININE-BSD FRML MDRD: 62 ML/MIN/1.73M2
GLUCOSE SERPL-MCNC: 79 MG/DL (ref 70–99)
HCT VFR BLD AUTO: 49.5 % (ref 35–47)
HGB BLD-MCNC: 15.6 G/DL (ref 11.7–15.7)
HOLD SPECIMEN: NORMAL
IMM GRANULOCYTES # BLD: 0.1 10E3/UL
IMM GRANULOCYTES NFR BLD: 1 %
LYMPHOCYTES # BLD AUTO: 2.4 10E3/UL (ref 0.8–5.3)
LYMPHOCYTES NFR BLD AUTO: 22 %
MCH RBC QN AUTO: 28.9 PG (ref 26.5–33)
MCHC RBC AUTO-ENTMCNC: 31.5 G/DL (ref 31.5–36.5)
MCV RBC AUTO: 92 FL (ref 78–100)
MONOCYTES # BLD AUTO: 1.1 10E3/UL (ref 0–1.3)
MONOCYTES NFR BLD AUTO: 10 %
NEUTROPHILS # BLD AUTO: 7.2 10E3/UL (ref 1.6–8.3)
NEUTROPHILS NFR BLD AUTO: 66 %
NRBC # BLD AUTO: 0 10E3/UL
NRBC BLD AUTO-RTO: 0 /100
PLATELET # BLD AUTO: 362 10E3/UL (ref 150–450)
POTASSIUM SERPL-SCNC: 4.1 MMOL/L (ref 3.4–5.3)
RBC # BLD AUTO: 5.39 10E6/UL (ref 3.8–5.2)
SODIUM SERPL-SCNC: 143 MMOL/L (ref 136–145)
TROPONIN T SERPL HS-MCNC: <6 NG/L
WBC # BLD AUTO: 10.9 10E3/UL (ref 4–11)

## 2023-04-22 PROCEDURE — 72148 MRI LUMBAR SPINE W/O DYE: CPT | Mod: 26 | Performed by: RADIOLOGY

## 2023-04-22 PROCEDURE — 93010 ELECTROCARDIOGRAM REPORT: CPT | Performed by: EMERGENCY MEDICINE

## 2023-04-22 PROCEDURE — 80048 BASIC METABOLIC PNL TOTAL CA: CPT | Performed by: EMERGENCY MEDICINE

## 2023-04-22 PROCEDURE — 99285 EMERGENCY DEPT VISIT HI MDM: CPT | Mod: 25

## 2023-04-22 PROCEDURE — 96374 THER/PROPH/DIAG INJ IV PUSH: CPT

## 2023-04-22 PROCEDURE — 93005 ELECTROCARDIOGRAM TRACING: CPT

## 2023-04-22 PROCEDURE — 72146 MRI CHEST SPINE W/O DYE: CPT | Mod: 26 | Performed by: RADIOLOGY

## 2023-04-22 PROCEDURE — 71250 CT THORAX DX C-: CPT

## 2023-04-22 PROCEDURE — 84484 ASSAY OF TROPONIN QUANT: CPT | Performed by: EMERGENCY MEDICINE

## 2023-04-22 PROCEDURE — 85025 COMPLETE CBC W/AUTO DIFF WBC: CPT | Performed by: EMERGENCY MEDICINE

## 2023-04-22 PROCEDURE — 74176 CT ABD & PELVIS W/O CONTRAST: CPT | Mod: 26 | Performed by: RADIOLOGY

## 2023-04-22 PROCEDURE — 36415 COLL VENOUS BLD VENIPUNCTURE: CPT | Performed by: EMERGENCY MEDICINE

## 2023-04-22 PROCEDURE — 72148 MRI LUMBAR SPINE W/O DYE: CPT

## 2023-04-22 PROCEDURE — 72146 MRI CHEST SPINE W/O DYE: CPT

## 2023-04-22 PROCEDURE — 250N000013 HC RX MED GY IP 250 OP 250 PS 637: Performed by: EMERGENCY MEDICINE

## 2023-04-22 PROCEDURE — 250N000011 HC RX IP 250 OP 636: Performed by: EMERGENCY MEDICINE

## 2023-04-22 PROCEDURE — 96375 TX/PRO/DX INJ NEW DRUG ADDON: CPT

## 2023-04-22 PROCEDURE — 99284 EMERGENCY DEPT VISIT MOD MDM: CPT | Mod: 25 | Performed by: EMERGENCY MEDICINE

## 2023-04-22 PROCEDURE — 71250 CT THORAX DX C-: CPT | Mod: 26 | Performed by: RADIOLOGY

## 2023-04-22 RX ORDER — KETOROLAC TROMETHAMINE 15 MG/ML
15 INJECTION, SOLUTION INTRAMUSCULAR; INTRAVENOUS ONCE
Status: COMPLETED | OUTPATIENT
Start: 2023-04-22 | End: 2023-04-23

## 2023-04-22 RX ORDER — MORPHINE SULFATE 2 MG/ML
2 INJECTION, SOLUTION INTRAMUSCULAR; INTRAVENOUS ONCE
Status: COMPLETED | OUTPATIENT
Start: 2023-04-22 | End: 2023-04-22

## 2023-04-22 RX ORDER — HYDROMORPHONE HYDROCHLORIDE 1 MG/ML
0.5 INJECTION, SOLUTION INTRAMUSCULAR; INTRAVENOUS; SUBCUTANEOUS ONCE
Status: COMPLETED | OUTPATIENT
Start: 2023-04-22 | End: 2023-04-22

## 2023-04-22 RX ORDER — ACETAMINOPHEN 500 MG
1000 TABLET ORAL ONCE
Status: COMPLETED | OUTPATIENT
Start: 2023-04-22 | End: 2023-04-23

## 2023-04-22 RX ADMIN — MORPHINE SULFATE 2 MG: 2 INJECTION, SOLUTION INTRAMUSCULAR; INTRAVENOUS at 18:27

## 2023-04-22 RX ADMIN — MORPHINE SULFATE 2 MG: 2 INJECTION, SOLUTION INTRAMUSCULAR; INTRAVENOUS at 21:01

## 2023-04-22 RX ADMIN — TRAMADOL HYDROCHLORIDE 25 MG: 50 TABLET, COATED ORAL at 16:16

## 2023-04-22 RX ADMIN — HYDROMORPHONE HYDROCHLORIDE 0.5 MG: 1 INJECTION, SOLUTION INTRAMUSCULAR; INTRAVENOUS; SUBCUTANEOUS at 22:41

## 2023-04-22 ASSESSMENT — ACTIVITIES OF DAILY LIVING (ADL)
ADLS_ACUITY_SCORE: 35

## 2023-04-22 NOTE — ED NOTES
Bed: UUEDH-G  Expected date:   Expected time:   Means of arrival:   Comments:  HALLWAY APPROPRIATE

## 2023-04-22 NOTE — ED PROVIDER NOTES
[facility] EMERGENCY DEPARTMENT ([place] [facility])    4/22/23      ED PROVIDER NOTE  History     Chief Complaint   Patient presents with     Back Pain     HPI  Sunshine Delgado is a 58 year old female who presents with back pain.  She states that she has been having pain in her back for about a week. It was nontraumatic in onset.  She was seated at a desk using her computer when she went to go stand up and felt a sensation of pain traveling from her low neck all the way through her thoracic back to her mid back. It is causing her to be unable to fully sit up straight.  She presented to her primary care doctor's office and for evaluation and was started on a Medrol Dosepak as well as Flexeril. She has 1 more day of the taper left tomorrow.  As the medicine has had been tapering down, her pain in her low thoracic upper lumbar back has become more severe. It is worse with hip flexion bilaterally, but generally, her strength in her legs and sensation in her legs is unchanged. No urinary symptoms, no change in bowel function.  She has tried Tylenol. She is unable to take NSAIDs due to history of chronic kidney disease.  No prior history of severe back injuries or back surgeries.    This part of the medical record was transcribed by Louis Carmen Medical Scribe, from a dictation done by Shanann Park MD.       Past Medical History  Past Medical History:   Diagnosis Date     Antiplatelet or antithrombotic long-term use      Arrhythmia      Atrial fibrillation with rapid ventricular response (H) 1/26/2020     Bee sting allergy      Depressive disorder      Generalized anxiety disorder 10/15/2009    lexapro made things worse.       Hashimoto's thyroiditis 5/6/2020     Morbid obesity (H)      Ocular migraine      MARIA GUADALUPE (obstructive sleep apnea)- severe (AHI 84) 09/09/2011    patient not using since 2019     Primary osteoarthritis of right knee 9/23/2022     Renal disease      Voice fatigue 10/22/2009     Past Surgical  History:   Procedure Laterality Date     ANESTHESIA CARDIOVERSION N/A 11/22/2021    Procedure: ANESTHESIA, FOR CARDIOVERSION@1515;  Surgeon: GENERIC ANESTHESIA PROVIDER;  Location: [facility] OR     COLONOSCOPY  2000     DAVINCI GASTRIC SLEEVE       EP ABLATION FOCAL AFIB N/A 7/22/2021    Procedure: EP ABLATION FOCAL AFIB;  Surgeon: Vicente Craig MD;  Location:  HEART CARDIAC CATH LAB     EP ABLATION FOCAL AFIB N/A 3/31/2022    Procedure: Ablation Focal Atrial Fibrillation;  Surgeon: Vicente Craig MD;  Location:  HEART CARDIAC CATH LAB     GENITOURINARY SURGERY  2007     HYSTERECTOMY, PAP NO LONGER INDICATED       HYSTERECTOMY, VAGINAL  2007    still has ovaries     LAPAROSCOPIC GASTRIC SLEEVE N/A 3/13/2018    Procedure: LAPAROSCOPIC GASTRIC SLEEVE;  Laparoscopic Sleeve Gastrectomy;  Surgeon: Kameron Joseph MD;  Location:  OR     cyclobenzaprine (FLEXERIL) 10 MG tablet  acetaminophen (TYLENOL) 325 MG tablet  benzonatate (TESSALON) 200 MG capsule  buPROPion (WELLBUTRIN XL) 300 MG 24 hr tablet  EPINEPHrine (AUVI-Q) 0.3 MG/0.3ML injection 2-pack  fluticasone (FLONASE) 50 MCG/ACT nasal spray  levothyroxine (SYNTHROID/LEVOTHROID) 75 MCG tablet  methylPREDNISolone (MEDROL DOSEPAK) 4 MG tablet therapy pack  omeprazole (PRILOSEC) 20 MG DR capsule  ondansetron (ZOFRAN) 4 MG tablet  traZODone (DESYREL) 50 MG tablet  venlafaxine (EFFEXOR-ER) 75 MG 24 hr tablet      Allergies   Allergen Reactions     Cephalexin Hives     Strawberry Hives     Sulfa Drugs Hives     Cinnamon Hives     Sulfamethoxazole-Trimethoprim Hives     Vicodin [Hydrocodone-Acetaminophen]      Wasp Venom Protein      Other reaction(s): Chest Pain     Wasps [Hornets] Swelling     Now carries Epi Pen     Zoloft [Sertraline]      suicidal ideation     Contrast Dye Other (See Comments) and Rash     Patient had sneezing and an itchy throat following contrast injection of 100 mL Isovue-370.  From IV contrast dye     Family History  Family History   Problem  "Relation Age of Onset     Eye Disorder Mother         lost eyesight; probable macular degeneration     Psychotic Disorder Mother         Dementia /Alzhimers     Neurologic Disorder Mother         seizures     Diabetes Mother      Hypertension Mother      Alzheimer Disease Mother      Arthritis Mother      Osteoporosis Mother      Obesity Mother      Diabetes Father      Depression Father      Hyperlipidemia Father      Obesity Father      Psychotic Disorder Sister         bipolar     Thyroid Disease Sister         h/o thyroid cancer     Depression Sister         Bipolar     Obesity Sister      Cancer Other         Brain cancer     Osteoporosis Maternal Grandmother      Anxiety Disorder Son      Depression Sister      Thyroid Disease Sister      Social History   Social History     Tobacco Use     Smoking status: Never     Smokeless tobacco: Never   Vaping Use     Vaping status: Never Used   Substance Use Topics     Alcohol use: Yes     Comment: 1-2 per mo     Drug use: Not Currently     Types: Marijuana     Comment: 12/20 last dose      Past medical history, past surgical history, medications, allergies, family history, and social history were reviewed with the patient. No additional pertinent items.      A medically appropriate review of systems was performed with pertinent positives and negatives noted in the HPI, and all other systems negative.    Physical Exam   BP: 131/76  Pulse: 75  Temp: 97.8  F (36.6  C)  Resp: 20  Height: 162.6 cm (5' 4\")  Weight: 90.7 kg (200 lb)  SpO2: 99 %     Physical Exam  Gen:A&Ox3, no acute distress  HEENT:PERRL, no facial tenderness or wounds, head atraumatic, oropharynx clear, mucous membranes moist, TMs clear bilaterally  Neck:no bony tenderness or step offs, no JVD, trachea midline  Back: low thoracic and upper lumbar pain midline and in left flank  CV:RRR without murmurs  PULM:Clear to auscultation bilaterally  Abd:soft, nontender, nondistended. Bowel sounds present and " normal  UE:No traumatic injuries, skin normal  LE:no traumatic injuries, skin normal, no LE edema  Neuro:CN II-XII intact, strength 5/5 of flexion and extension of the toes, ankles, knees, hips, hands, wrists, elbows and shoulders. Pain with bilateral straight leg raise. Sensation intact to touch throughout. Coordination normal on finger to nose testing. Reflexes 2/4 and symmetric.  Skin: no rashes or ecchymoses    ED Course, Procedures, & Data      Procedures       ED Course Selections:        EKG Interpretation:      Interpreted by Shannan Park MD  Time reviewed: 10:24pm  Symptoms at time of EKG: thoracic back pain  Rhythm: sinus bradycardia  Rate: 57  Axis: normal  Ectopy: none  Conduction: normal  ST Segments/ T Waves: No ST-T wave changes  Q Waves: none  Comparison to prior: Unchanged from Jan. 11, 2023    Clinical Impression: sinus bradycardia     Results for orders placed or performed during the hospital encounter of 04/22/23   Thoracic spine MRI w/o contrast     Status: None    Narrative    Thoracic and Lumbar spine MRI without contrast    History: back pain, acute onset, unable to fully sit up.  ICD-10:  Comparison: CT 9/12/2022, 7/21/2021.    Technique:  Axial T2-weighted, and sagittal T1, STIR, and T2-weighted  images of the lumbar spine without intravenous contrast. Sagittal T1,  STIR, and T2-weighted images of the thoracic spine without contrast  were obtained, with axial T2-weighted images through levels of  interest in the thoracic spine.     Findings:  Thoracic Spine:     There is no definite abnormal signal in the thoracic spinal cord at  any level. Alignment of the thoracic vertebra appears within normal  limits. Small disc bulges at T6-7 and T7-8 and T8-9. Uncinate  hypertrophy at T10-11 and T11-12. Bone marrow signal intensity on  noncontrast images appears unremarkable. No significant spinal canal  or neural foraminal stenosis.    Lumbar Spine:  There are 5 type lumbar vertebra, used for  the purposes of this  dictation. Straightening of the lumbar vessels. The tip of the conus  is at approximately L1. The alignment of the lumbar spine is  unremarkable.   As far as the bone marrow signal intensity, no  abnormality is noted.  On a level by level basis, the findings are as follows:    L2-3:  No spinal canal or neural foraminal stenosis..    L3-4:  Disc bulge. No spinal canal or neural foraminal stenosis..    L4-5:  No spinal canal or neuroforaminal stenosis..    L5-S1:  No spinal canal or neural foraminal stenosis..    The visualized paraspinous tissues anteriorly are unremarkable.      Impression    Impression:   1. No definite abnormality within the thoracic spinal cord or  vertebra.   2. There is no significant foraminal or spinal canal narrowing at any  level in the lumbar region.     I have personally reviewed the examination and initial interpretation  and I agree with the findings.    SEYMOUR RAMOS MD         SYSTEM ID:  [id number]   MR Lumbar Spine w/o Contrast     Status: None    Narrative    Thoracic and Lumbar spine MRI without contrast    History: back pain, acute onset, unable to fully sit up.  ICD-10:  Comparison: CT 9/12/2022, 7/21/2021.    Technique:  Axial T2-weighted, and sagittal T1, STIR, and T2-weighted  images of the lumbar spine without intravenous contrast. Sagittal T1,  STIR, and T2-weighted images of the thoracic spine without contrast  were obtained, with axial T2-weighted images through levels of  interest in the thoracic spine.     Findings:  Thoracic Spine:     There is no definite abnormal signal in the thoracic spinal cord at  any level. Alignment of the thoracic vertebra appears within normal  limits. Small disc bulges at T6-7 and T7-8 and T8-9. Uncinate  hypertrophy at T10-11 and T11-12. Bone marrow signal intensity on  noncontrast images appears unremarkable. No significant spinal canal  or neural foraminal stenosis.    Lumbar Spine:  There are 5 type lumbar  vertebra, used for the purposes of this  dictation. Straightening of the lumbar vessels. The tip of the conus  is at approximately L1. The alignment of the lumbar spine is  unremarkable.   As far as the bone marrow signal intensity, no  abnormality is noted.  On a level by level basis, the findings are as follows:    L2-3:  No spinal canal or neural foraminal stenosis..    L3-4:  Disc bulge. No spinal canal or neural foraminal stenosis..    L4-5:  No spinal canal or neuroforaminal stenosis..    L5-S1:  No spinal canal or neural foraminal stenosis..    The visualized paraspinous tissues anteriorly are unremarkable.      Impression    Impression:   1. No definite abnormality within the thoracic spinal cord or  vertebra.   2. There is no significant foraminal or spinal canal narrowing at any  level in the lumbar region.     I have personally reviewed the examination and initial interpretation  and I agree with the findings.    SEYMOUR RAMOS MD         SYSTEM ID:  [id number]   CT Chest Abdomen Pelvis w/o Contrast     Status: None    Narrative    EXAM: CT CHEST ABDOMEN PELVIS W/O CONTRAST  LOCATION: [facility]  DATE/TIME: 4/22/2023 11:52 PM CDT    INDICATION: thoracic back and left flank pain  COMPARISON: Prior abdominal CT study 09/12/2022  TECHNIQUE: CT scan of the chest, abdomen, and pelvis was performed without IV contrast. Multiplanar reformats were obtained. Dose reduction techniques were used.   CONTRAST: None.    FINDINGS:   LUNGS AND PLEURA: Lungs are clear. No pleural effusions.    MEDIASTINUM/AXILLAE: No lymphadenopathy. No thoracic aortic aneurysms.    CORONARY ARTERY CALCIFICATION: None.    HEPATOBILIARY: No significant mass or bile duct dilatation. No calcified gallstones.     PANCREAS: No significant mass, duct dilatation, or inflammatory change.    SPLEEN: Normal size.    ADRENAL GLANDS: No significant nodules.    KIDNEYS/BLADDER: No significant mass,  stone, or hydronephrosis.    BOWEL: Postsurgical changes from prior sleeve gastrectomy are noted. The stomach and duodenum are normal in size and caliber of the small bowel is normal in size and caliber. The large bowel is normal in size and caliber. Stool is seen throughout the   colon. The appendix is unremarkable.    LYMPH NODES: No lymphadenopathy.    VASCULATURE: No abdominal aortic aneurysm.    PELVIC ORGANS: No pelvic masses.    MUSCULOSKELETAL: Unremarkable.      Impression    IMPRESSION:  1.  Stool seen throughout the colon, correlate for constipation  2.  normal appendix   [place] Draw     Status: None    Narrative    The following orders were created for panel order [place] Draw.  Procedure                               Abnormality         Status                     ---------                               -----------         ------                     Extra Blue Top Tube[[id number]]                              Final result               Extra Red Top Tube[[id number]]                               Final result               Extra Green Top (Lithium...[[id number]]                      Final result               Extra Purple Top Tube[[id number]]                            Final result                 Please view results for these tests on the individual orders.   Extra Blue Top Tube     Status: None   Result Value Ref Range    Hold Specimen JIC    Extra Red Top Tube     Status: None   Result Value Ref Range    Hold Specimen JIC    Extra Green Top (Lithium Heparin) Tube     Status: None   Result Value Ref Range    Hold Specimen JIC    Extra Purple Top Tube     Status: None   Result Value Ref Range    Hold Specimen JIC    Basic metabolic panel     Status: Abnormal   Result Value Ref Range    Sodium 143 136 - 145 mmol/L    Potassium 4.1 3.4 - 5.3 mmol/L    Chloride 101 98 - 107 mmol/L    Carbon Dioxide (CO2) 27 22 - 29 mmol/L    Anion Gap 15 7 - 15 mmol/L    Urea Nitrogen 18.4 6.0 - 20.0 mg/dL    Creatinine 1.05  (H) 0.51 - 0.95 mg/dL    Calcium 10.6 (H) 8.6 - 10.0 mg/dL    Glucose 79 70 - 99 mg/dL    GFR Estimate 62 >60 mL/min/1.73m2   CBC with platelets and differential     Status: Abnormal   Result Value Ref Range    WBC Count 10.9 4.0 - 11.0 10e3/uL    RBC Count 5.39 (H) 3.80 - 5.20 10e6/uL    Hemoglobin 15.6 11.7 - 15.7 g/dL    Hematocrit 49.5 (H) 35.0 - 47.0 %    MCV 92 78 - 100 fL    MCH 28.9 26.5 - 33.0 pg    MCHC 31.5 31.5 - 36.5 g/dL    RDW 13.8 10.0 - 15.0 %    Platelet Count 362 150 - 450 10e3/uL    % Neutrophils 66 %    % Lymphocytes 22 %    % Monocytes 10 %    % Eosinophils 0 %    % Basophils 1 %    % Immature Granulocytes 1 %    NRBCs per 100 WBC 0 <1 /100    Absolute Neutrophils 7.2 1.6 - 8.3 10e3/uL    Absolute Lymphocytes 2.4 0.8 - 5.3 10e3/uL    Absolute Monocytes 1.1 0.0 - 1.3 10e3/uL    Absolute Eosinophils 0.0 0.0 - 0.7 10e3/uL    Absolute Basophils 0.1 0.0 - 0.2 10e3/uL    Absolute Immature Granulocytes 0.1 <=0.4 10e3/uL    Absolute NRBCs 0.0 10e3/uL   Troponin T, High Sensitivity     Status: Normal   Result Value Ref Range    Troponin T, High Sensitivity <6 <=14 ng/L   UA with Microscopic reflex to Culture     Status: Abnormal    Specimen: Urine, Midstream   Result Value Ref Range    Color Urine Light Yellow Colorless, Straw, Light Yellow, Yellow    Appearance Urine Clear Clear    Glucose Urine Negative Negative mg/dL    Bilirubin Urine Negative Negative    Ketones Urine Negative Negative mg/dL    Specific Gravity Urine 1.013 1.003 - 1.035    Blood Urine Negative Negative    pH Urine 5.5 5.0 - 7.0    Protein Albumin Urine Negative Negative mg/dL    Urobilinogen Urine Normal Normal, 2.0 mg/dL    Nitrite Urine Negative Negative    Leukocyte Esterase Urine Negative Negative    Bacteria Urine Few (A) None Seen /HPF    Mucus Urine Present (A) None Seen /LPF    RBC Urine <1 <=2 /HPF    WBC Urine 1 <=5 /HPF    Squamous Epithelials Urine 1 <=1 /HPF    Transitional Epithelials Urine <1 <=1 /HPF    Narrative     Urine Culture not indicated   EKG 12 lead     Status: None   Result Value Ref Range    Systolic Blood Pressure  mmHg    Diastolic Blood Pressure  mmHg    Ventricular Rate 57 BPM    Atrial Rate 57 BPM    NM Interval 170 ms    QRS Duration 88 ms     ms    QTc 416 ms    P Axis 62 degrees    R AXIS 74 degrees    T Axis 45 degrees    Interpretation ECG       Sinus bradycardia  Otherwise normal ECG  Unconfirmed report - interpretation of this ECG is computer generated - see medical record for final interpretation  Confirmed by - EMERGENCY ROOM, PHYSICIAN ([id number]),  SONIDO SEN ([id number]) on 4/24/2023 9:21:54 AM     CBC with platelets differential     Status: Abnormal    Narrative    The following orders were created for panel order CBC with platelets differential.  Procedure                               Abnormality         Status                     ---------                               -----------         ------                     CBC with platelets and d...[[id number]]  Abnormal            Final result                 Please view results for these tests on the individual orders.     Medications   traMADol (ULTRAM) half-tab 25 mg (25 mg Oral $Given 4/22/23 1616)   morphine (PF) injection 2 mg (2 mg Intravenous $Given 4/22/23 1827)   morphine (PF) injection 2 mg (2 mg Intravenous $Given 4/22/23 2101)   HYDROmorphone (PF) (DILAUDID) injection 0.5 mg (0.5 mg Intravenous $Given 4/22/23 2241)   acetaminophen (TYLENOL) tablet 1,000 mg (1,000 mg Oral $Given 4/23/23 0016)   ketorolac (TORADOL) injection 15 mg (15 mg Intravenous $Given 4/23/23 0016)     Labs Ordered and Resulted from Time of ED Arrival to Time of ED Departure   BASIC METABOLIC PANEL - Abnormal       Result Value    Sodium 143      Potassium 4.1      Chloride 101      Carbon Dioxide (CO2) 27      Anion Gap 15      Urea Nitrogen 18.4      Creatinine 1.05 (*)     Calcium 10.6 (*)     Glucose 79      GFR Estimate 62     CBC WITH PLATELETS AND  DIFFERENTIAL - Abnormal    WBC Count 10.9      RBC Count 5.39 (*)     Hemoglobin 15.6      Hematocrit 49.5 (*)     MCV 92      MCH 28.9      MCHC 31.5      RDW 13.8      Platelet Count 362      % Neutrophils 66      % Lymphocytes 22      % Monocytes 10      % Eosinophils 0      % Basophils 1      % Immature Granulocytes 1      NRBCs per 100 WBC 0      Absolute Neutrophils 7.2      Absolute Lymphocytes 2.4      Absolute Monocytes 1.1      Absolute Eosinophils 0.0      Absolute Basophils 0.1      Absolute Immature Granulocytes 0.1      Absolute NRBCs 0.0     ROUTINE UA WITH MICROSCOPIC REFLEX TO CULTURE - Abnormal    Color Urine Light Yellow      Appearance Urine Clear      Glucose Urine Negative      Bilirubin Urine Negative      Ketones Urine Negative      Specific Gravity Urine 1.013      Blood Urine Negative      pH Urine 5.5      Protein Albumin Urine Negative      Urobilinogen Urine Normal      Nitrite Urine Negative      Leukocyte Esterase Urine Negative      Bacteria Urine Few (*)     Mucus Urine Present (*)     RBC Urine <1      WBC Urine 1      Squamous Epithelials Urine 1      Transitional Epithelials Urine <1     TROPONIN T, HIGH SENSITIVITY - Normal    Troponin T, High Sensitivity <6       CT Chest Abdomen Pelvis w/o Contrast   Final Result   IMPRESSION:   1.  Stool seen throughout the colon, correlate for constipation   2.  normal appendix      Thoracic spine MRI w/o contrast   Final Result   Impression:    1. No definite abnormality within the thoracic spinal cord or   vertebra.    2. There is no significant foraminal or spinal canal narrowing at any   level in the lumbar region.       I have personally reviewed the examination and initial interpretation   and I agree with the findings.      SEYMOUR RAMOS MD            SYSTEM ID:  [id number]      MR Lumbar Spine w/o Contrast   Final Result   Impression:    1. No definite abnormality within the thoracic spinal cord or   vertebra.    2. There is no  significant foraminal or spinal canal narrowing at any   level in the lumbar region.       I have personally reviewed the examination and initial interpretation   and I agree with the findings.      SEYMOUR RAMOS MD            SYSTEM ID:  [id number]             Critical care was not performed.     Medical Decision Making  The patient's presentation was of moderate complexity (an acute illness with systemic symptoms).    The patient's evaluation involved:  ordering and/or review of 3+ test(s) in this encounter (see separate area of note for details)    The patient's management necessitated high risk (a parenteral controlled substance) and high risk (a decision regarding hospitalization).      Assessment & Plan    58-year-old female presenting with 1 week of back pain.   Unable to fully stand due to pain.   Ddx includes musculoskeletal pain, disc disease, ureterolithiais. Lower suspicion for ACS, PE or aortic abnormalities.   IV access obtained and lab testing done, including CBC, BMP, UA.   Labs notable for calcium 10.6, otherwise unremarkable.   EKG with sinus bradycardia  Imaging included MRI of the T and L-spine and showed small disc bulges at T6-7, T7-8, and T8-9 as well as L3-4.   She was treated with a dose of tramadol for pain without improvement, followed by IV morphine and dilaudid.   Signed out to Dr. Zimmerman at 11:25pm pending UA and CT CAP for further evaluation of thoracic and left flank pain, including stone or pyelonephritis. Disposition pending evaluation.     This part of the medical record was transcribed by Louis Carmen, Medical Scribe, from a dictation done by Shannan Park MD.       I have reviewed the nursing notes. I have reviewed the findings, diagnosis, plan and need for follow up with the patient.    Discharge Medication List as of 4/23/2023  1:36 AM          Final diagnoses:   Acute bilateral low back pain without sciatica       Shannan Park MD  [facility]  EMERGENCY DEPARTMENT  4/22/2023     Shannan Park MD  04/24/23 0931

## 2023-04-22 NOTE — ED TRIAGE NOTES
Pt c/o mid thoracic back pain in bilateral side of spine since Tuesday. Pt was seen at urgent care and started on prednisone and flexeril. States pain had improved with higher dose of prednisone but is now tapering and pain is worse. Pt denies known injury but states pain started after working on computer at work. Pt reports pain had initially radiated up entire spine but now just in thoracic area.      Triage Assessment     Row Name 04/22/23 1539       Triage Assessment (Adult)    Airway WDL WDL       Respiratory WDL    Respiratory WDL WDL       Skin Circulation/Temperature WDL    Skin Circulation/Temperature WDL WDL       Cardiac WDL    Cardiac WDL WDL       Peripheral/Neurovascular WDL    Peripheral Neurovascular WDL WDL       Cognitive/Neuro/Behavioral WDL    Cognitive/Neuro/Behavioral WDL WDL       Santiago Coma Scale    Best Eye Response 4-->(E4) spontaneous    Best Motor Response 6-->(M6) obeys commands    Best Verbal Response 5-->(V5) oriented    Santiago Coma Scale Score 15

## 2023-04-23 VITALS
HEART RATE: 54 BPM | HEIGHT: 64 IN | SYSTOLIC BLOOD PRESSURE: 126 MMHG | BODY MASS INDEX: 34.15 KG/M2 | TEMPERATURE: 97.8 F | OXYGEN SATURATION: 95 % | DIASTOLIC BLOOD PRESSURE: 64 MMHG | RESPIRATION RATE: 20 BRPM | WEIGHT: 200 LBS

## 2023-04-23 LAB
ALBUMIN UR-MCNC: NEGATIVE MG/DL
APPEARANCE UR: CLEAR
BACTERIA #/AREA URNS HPF: ABNORMAL /HPF
BILIRUB UR QL STRIP: NEGATIVE
COLOR UR AUTO: ABNORMAL
GLUCOSE UR STRIP-MCNC: NEGATIVE MG/DL
HGB UR QL STRIP: NEGATIVE
KETONES UR STRIP-MCNC: NEGATIVE MG/DL
LEUKOCYTE ESTERASE UR QL STRIP: NEGATIVE
MUCOUS THREADS #/AREA URNS LPF: PRESENT /LPF
NITRATE UR QL: NEGATIVE
PH UR STRIP: 5.5 [PH] (ref 5–7)
RBC URINE: <1 /HPF
SP GR UR STRIP: 1.01 (ref 1–1.03)
SQUAMOUS EPITHELIAL: 1 /HPF
TRANSITIONAL EPI: <1 /HPF
UROBILINOGEN UR STRIP-MCNC: NORMAL MG/DL
WBC URINE: 1 /HPF

## 2023-04-23 PROCEDURE — 250N000011 HC RX IP 250 OP 636: Performed by: EMERGENCY MEDICINE

## 2023-04-23 PROCEDURE — 81001 URINALYSIS AUTO W/SCOPE: CPT | Performed by: EMERGENCY MEDICINE

## 2023-04-23 PROCEDURE — 250N000013 HC RX MED GY IP 250 OP 250 PS 637: Performed by: EMERGENCY MEDICINE

## 2023-04-23 PROCEDURE — 96376 TX/PRO/DX INJ SAME DRUG ADON: CPT

## 2023-04-23 RX ORDER — CYCLOBENZAPRINE HCL 10 MG
10 TABLET ORAL 3 TIMES DAILY PRN
Qty: 20 TABLET | Refills: 0 | Status: SHIPPED | OUTPATIENT
Start: 2023-04-23 | End: 2023-04-25

## 2023-04-23 RX ADMIN — KETOROLAC TROMETHAMINE 15 MG: 15 INJECTION, SOLUTION INTRAMUSCULAR; INTRAVENOUS at 00:16

## 2023-04-23 RX ADMIN — ACETAMINOPHEN 1000 MG: 500 TABLET ORAL at 00:16

## 2023-04-23 ASSESSMENT — ACTIVITIES OF DAILY LIVING (ADL): ADLS_ACUITY_SCORE: 35

## 2023-04-23 NOTE — ED PROVIDER NOTES
"Patient received in signout from prior attending.    Briefly 58-year-old female undergoing evaluation for lumbar back pain.  Patient developed pain after standing up abruptly from chair at work.  Received an MRI of the T and L-spine which were unrevealing.  On reevaluation by prior attending there was concern of possible flank pain so subsequently CT abdomen pelvis and urinalysis were ordered to evaluate for pyelonephritis.    Fortunately CT abdomen pelvis shows some mild constipation perhaps but no other evidence of an acute process including pyelonephritis  Urinalysis reassuring against infection and on repeat history no recent dysuria or GI  changes.    Patient had received IV Dilaudid, Toradol, Tylenol and ultimately had improvement in symptoms.    Discussed a variety of disposition options and patient elects to go home which I think is reasonable, discussed tricked ED return precautions    She will take Tylenol, ibuprofen, and Flexeril for pain  We will refer to PT  She will see her primary doctor on Tuesday which has already been scheduled as part of regular physical.    /58   Pulse 57   Temp 97.8  F (36.6  C) (Oral)   Resp 20   Ht 1.626 m (5' 4\")   Wt 90.7 kg (200 lb)   SpO2 94%   BMI 34.33 kg/m      EHL, FHL, TA 5/5 and symmetric, MARLYST.        Arnav Zimmerman MD  04/23/23 0132    "

## 2023-04-23 NOTE — DISCHARGE INSTRUCTIONS
Please see your primary doctor on Tuesday as you have planned.    Please return to the emergency department if you develops any weakness or numbness in your leg, worsening hip pain, inability to bear weight that has worsened, change in bowel or bladder habits, fever, rash, leg swelling or any other concerns as given or discussed      You can take tylenol as needed for pain. The maximum daily dose is 4000 mg and the maximum single dose at a time is 1000mg. For your condition, your should take 1000mg every 6 hours as needed for pain.    You can take ibuprofen for pain. The maximum daily dose is 2400 mg and the maximum single dose as a time is 800mg. For your condition, your should take 600mg every 4 hours as needed for pain.    Take flexeril as prescribed

## 2023-04-24 LAB
ATRIAL RATE - MUSE: 57 BPM
DIASTOLIC BLOOD PRESSURE - MUSE: NORMAL MMHG
INTERPRETATION ECG - MUSE: NORMAL
P AXIS - MUSE: 62 DEGREES
PR INTERVAL - MUSE: 170 MS
QRS DURATION - MUSE: 88 MS
QT - MUSE: 428 MS
QTC - MUSE: 416 MS
R AXIS - MUSE: 74 DEGREES
SYSTOLIC BLOOD PRESSURE - MUSE: NORMAL MMHG
T AXIS - MUSE: 45 DEGREES
VENTRICULAR RATE- MUSE: 57 BPM

## 2023-04-25 ENCOUNTER — OFFICE VISIT (OUTPATIENT)
Dept: FAMILY MEDICINE | Facility: CLINIC | Age: 58
End: 2023-04-25
Payer: COMMERCIAL

## 2023-04-25 VITALS
OXYGEN SATURATION: 97 % | HEIGHT: 64 IN | WEIGHT: 216.6 LBS | SYSTOLIC BLOOD PRESSURE: 124 MMHG | TEMPERATURE: 98.1 F | HEART RATE: 68 BPM | DIASTOLIC BLOOD PRESSURE: 84 MMHG | BODY MASS INDEX: 36.98 KG/M2

## 2023-04-25 DIAGNOSIS — Z13.220 SCREENING FOR HYPERLIPIDEMIA: ICD-10-CM

## 2023-04-25 DIAGNOSIS — Z91.030 BEE STING ALLERGY: ICD-10-CM

## 2023-04-25 DIAGNOSIS — N95.1 MENOPAUSAL SYNDROME (HOT FLASHES): ICD-10-CM

## 2023-04-25 DIAGNOSIS — F90.9 ATTENTION DEFICIT HYPERACTIVITY DISORDER (ADHD), UNSPECIFIED ADHD TYPE: ICD-10-CM

## 2023-04-25 DIAGNOSIS — E06.3 HASHIMOTO'S THYROIDITIS: ICD-10-CM

## 2023-04-25 DIAGNOSIS — F99 INSOMNIA DUE TO OTHER MENTAL DISORDER: ICD-10-CM

## 2023-04-25 DIAGNOSIS — N18.31 STAGE 3A CHRONIC KIDNEY DISEASE (H): ICD-10-CM

## 2023-04-25 DIAGNOSIS — F33.1 MAJOR DEPRESSIVE DISORDER, RECURRENT EPISODE, MODERATE (H): Chronic | ICD-10-CM

## 2023-04-25 DIAGNOSIS — E66.01 MORBID (SEVERE) OBESITY DUE TO EXCESS CALORIES (H): ICD-10-CM

## 2023-04-25 DIAGNOSIS — F51.05 INSOMNIA DUE TO OTHER MENTAL DISORDER: ICD-10-CM

## 2023-04-25 DIAGNOSIS — Z00.00 ROUTINE GENERAL MEDICAL EXAMINATION AT A HEALTH CARE FACILITY: Primary | ICD-10-CM

## 2023-04-25 DIAGNOSIS — F41.1 GENERALIZED ANXIETY DISORDER: Chronic | ICD-10-CM

## 2023-04-25 LAB
CHOLEST SERPL-MCNC: 207 MG/DL
CREAT UR-MCNC: 122 MG/DL
FASTING STATUS PATIENT QL REPORTED: ABNORMAL
HDLC SERPL-MCNC: 65 MG/DL
LDLC SERPL CALC-MCNC: 114 MG/DL
MICROALBUMIN UR-MCNC: 9 MG/L
MICROALBUMIN/CREAT UR: 7.38 MG/G CR (ref 0–25)
NONHDLC SERPL-MCNC: 142 MG/DL
TRIGL SERPL-MCNC: 139 MG/DL
TSH SERPL DL<=0.005 MIU/L-ACNC: 2.72 MU/L (ref 0.4–4)

## 2023-04-25 PROCEDURE — 99213 OFFICE O/P EST LOW 20 MIN: CPT | Mod: 25 | Performed by: PHYSICIAN ASSISTANT

## 2023-04-25 PROCEDURE — 84443 ASSAY THYROID STIM HORMONE: CPT | Performed by: PHYSICIAN ASSISTANT

## 2023-04-25 PROCEDURE — 36415 COLL VENOUS BLD VENIPUNCTURE: CPT | Performed by: PHYSICIAN ASSISTANT

## 2023-04-25 PROCEDURE — 90677 PCV20 VACCINE IM: CPT | Performed by: PHYSICIAN ASSISTANT

## 2023-04-25 PROCEDURE — 82570 ASSAY OF URINE CREATININE: CPT | Performed by: PHYSICIAN ASSISTANT

## 2023-04-25 PROCEDURE — 90471 IMMUNIZATION ADMIN: CPT | Performed by: PHYSICIAN ASSISTANT

## 2023-04-25 PROCEDURE — 82043 UR ALBUMIN QUANTITATIVE: CPT | Performed by: PHYSICIAN ASSISTANT

## 2023-04-25 PROCEDURE — 99396 PREV VISIT EST AGE 40-64: CPT | Mod: 25 | Performed by: PHYSICIAN ASSISTANT

## 2023-04-25 PROCEDURE — 80061 LIPID PANEL: CPT | Performed by: PHYSICIAN ASSISTANT

## 2023-04-25 RX ORDER — LEVOTHYROXINE SODIUM 75 UG/1
75 TABLET ORAL DAILY
Qty: 90 TABLET | Refills: 3 | Status: SHIPPED | OUTPATIENT
Start: 2023-04-25 | End: 2024-04-09

## 2023-04-25 RX ORDER — ESTRADIOL 0.5 MG/1
0.5 TABLET ORAL DAILY
Qty: 90 TABLET | Refills: 1 | Status: SHIPPED | OUTPATIENT
Start: 2023-04-25 | End: 2023-10-18

## 2023-04-25 RX ORDER — VENLAFAXINE HYDROCHLORIDE 75 MG/1
75 TABLET, EXTENDED RELEASE ORAL DAILY
Qty: 90 TABLET | Refills: 3 | Status: SHIPPED | OUTPATIENT
Start: 2023-04-25 | End: 2024-04-29

## 2023-04-25 RX ORDER — EPINEPHRINE 0.3 MG/.3ML
0.3 INJECTION SUBCUTANEOUS PRN
Qty: 0.6 ML | Refills: 3 | Status: SHIPPED | OUTPATIENT
Start: 2023-04-25 | End: 2024-06-20

## 2023-04-25 RX ORDER — BUPROPION HYDROCHLORIDE 300 MG/1
300 TABLET ORAL EVERY MORNING
Qty: 90 TABLET | Refills: 3 | Status: SHIPPED | OUTPATIENT
Start: 2023-04-25 | End: 2024-04-09

## 2023-04-25 RX ORDER — TRAZODONE HYDROCHLORIDE 50 MG/1
100 TABLET, FILM COATED ORAL AT BEDTIME
Qty: 180 TABLET | Refills: 1 | Status: SHIPPED | OUTPATIENT
Start: 2023-04-25 | End: 2023-10-18

## 2023-04-25 ASSESSMENT — ENCOUNTER SYMPTOMS
HEADACHES: 1
NERVOUS/ANXIOUS: 0
SHORTNESS OF BREATH: 0
HEMATOCHEZIA: 0
JOINT SWELLING: 1
FREQUENCY: 0
NAUSEA: 0
ABDOMINAL PAIN: 1
COUGH: 0
BREAST MASS: 1
SORE THROAT: 0
CONSTIPATION: 1
FEVER: 0
WEAKNESS: 0
PALPITATIONS: 0
DYSURIA: 0
ARTHRALGIAS: 1
EYE PAIN: 0
DIZZINESS: 0
HEARTBURN: 1
MYALGIAS: 1
HEMATURIA: 0
PARESTHESIAS: 0
DIARRHEA: 0
CHILLS: 0

## 2023-04-25 ASSESSMENT — PATIENT HEALTH QUESTIONNAIRE - PHQ9
SUM OF ALL RESPONSES TO PHQ QUESTIONS 1-9: 4
5. POOR APPETITE OR OVEREATING: SEVERAL DAYS

## 2023-04-25 ASSESSMENT — ANXIETY QUESTIONNAIRES
GAD7 TOTAL SCORE: 4
5. BEING SO RESTLESS THAT IT IS HARD TO SIT STILL: NOT AT ALL
6. BECOMING EASILY ANNOYED OR IRRITABLE: NEARLY EVERY DAY
3. WORRYING TOO MUCH ABOUT DIFFERENT THINGS: NOT AT ALL
7. FEELING AFRAID AS IF SOMETHING AWFUL MIGHT HAPPEN: NOT AT ALL
1. FEELING NERVOUS, ANXIOUS, OR ON EDGE: NOT AT ALL
IF YOU CHECKED OFF ANY PROBLEMS ON THIS QUESTIONNAIRE, HOW DIFFICULT HAVE THESE PROBLEMS MADE IT FOR YOU TO DO YOUR WORK, TAKE CARE OF THINGS AT HOME, OR GET ALONG WITH OTHER PEOPLE: NOT DIFFICULT AT ALL
GAD7 TOTAL SCORE: 4
2. NOT BEING ABLE TO STOP OR CONTROL WORRYING: NOT AT ALL

## 2023-04-25 NOTE — PROGRESS NOTES
SUBJECTIVE:   CC: Sunshine is an 58 year old who presents for preventive health visit.       4/25/2023    10:15 AM   Additional Questions   Roomed by eliezer     Patient has been advised of split billing requirements and indicates understanding: Yes  Healthy Habits:     Getting at least 3 servings of Calcium per day:  Yes    Bi-annual eye exam:  NO    Dental care twice a year:  Yes    Sleep apnea or symptoms of sleep apnea:  Sleep apnea    Diet:  Regular (no restrictions)    Frequency of exercise:  4-5 days/week    Duration of exercise:  30-45 minutes    Taking medications regularly:  Yes    PHQ-2 Total Score: 0    Additional concerns today:  Yes      Menopause Symptoms  Hx of partial hysterectomy - has ovaries.   Symptoms of breast tenderness, hot flashes, night sweats, hair loss, mood changes.   Thinks mom was going through menopause for 7+ yrs.       Kidney. History of kidney infections.       Today's PHQ-2 Score:       4/25/2023    10:11 AM   PHQ-2 ( 1999 [employer])   Q1: Little interest or pleasure in doing things 0   Q2: Feeling down, depressed or hopeless 0   PHQ-2 Score 0   Q1: Little interest or pleasure in doing things Not at all   Q2: Feeling down, depressed or hopeless Not at all   PHQ-2 Score 0       Have you ever done Advance Care Planning? (For example, a Health Directive, POLST, or a discussion with a medical provider or your loved ones about your wishes): Yes, patient states has an Advance Care Planning document and will bring a copy to the clinic.    Social History     Tobacco Use     Smoking status: Never     Smokeless tobacco: Never   Vaping Use     Vaping status: Never Used   Substance Use Topics     Alcohol use: Yes     Comment: 1-2 per mo             4/25/2023    10:11 AM   Alcohol Use   Prescreen: >3 drinks/day or >7 drinks/week? Not Applicable     Reviewed orders with patient.  Reviewed health maintenance and updated orders accordingly - Yes  BP Readings from Last 3 Encounters:   04/25/23 124/84    23 126/64   23 130/82    Wt Readings from Last 3 Encounters:   23 98.2 kg (216 lb 9.6 oz)   23 90.7 kg (200 lb)   23 95.3 kg (210 lb)                    Breast Cancer Screenin/25/2023    10:11 AM   Breast CA Risk Assessment (FHS-7)   Do you have a family history of breast, colon, or ovarian cancer? No / Unknown         Mammogram Screening: Recommended mammography every 1-2 years with patient discussion and risk factor consideration  Pertinent mammograms are reviewed under the imaging tab.    History of abnormal Pap smear: Status post benign hysterectomy. Health Maintenance and Surgical History updated.     Reviewed and updated as needed this visit by clinical staff   Tobacco  Allergies  Meds  Problems  Med Hx  Surg Hx  Fam Hx          Reviewed and updated as needed this visit by Provider   Tobacco  Allergies  Meds  Problems  Med Hx  Surg Hx  Fam Hx             Review of Systems   Constitutional: Negative for chills and fever.   HENT: Positive for ear pain and hearing loss. Negative for congestion and sore throat.    Eyes: Negative for pain and visual disturbance.   Respiratory: Negative for cough and shortness of breath.    Cardiovascular: Positive for chest pain. Negative for palpitations.   Gastrointestinal: Positive for abdominal pain, constipation and heartburn. Negative for diarrhea, hematochezia and nausea.   Breasts:  Positive for tenderness and breast mass. Negative for discharge.   Genitourinary: Positive for pelvic pain. Negative for dysuria, frequency, genital sores, hematuria, urgency, vaginal bleeding and vaginal discharge.   Musculoskeletal: Positive for arthralgias, joint swelling and myalgias.   Skin: Negative for rash.   Neurological: Positive for headaches. Negative for dizziness, weakness and paresthesias.   Psychiatric/Behavioral: Negative for mood changes. The patient is not nervous/anxious.           OBJECTIVE:   /84 (BP Location:  "Right arm, Patient Position: Sitting, Cuff Size: Adult Large)   Pulse 68   Temp 98.1  F (36.7  C) (Oral)   Ht 1.626 m (5' 4\")   Wt 98.2 kg (216 lb 9.6 oz)   SpO2 97%   BMI 37.18 kg/m    Physical Exam  GENERAL: healthy, alert and no distress  EYES: Eyes grossly normal to inspection, PERRL and conjunctivae and sclerae normal  HENT: ear canals and TM's normal, nose and mouth without ulcers or lesions  NECK: no adenopathy, no asymmetry, masses, or scars and thyroid normal to palpation  RESP: lungs clear to auscultation - no rales, rhonchi or wheezes  BREAST: normal without masses, tenderness or nipple discharge and no palpable axillary masses or adenopathy  CV: regular rate and rhythm, normal S1 S2, no S3 or S4, no murmur, click or rub, no peripheral edema and peripheral pulses strong  ABDOMEN: soft, nontender, no hepatosplenomegaly, no masses and bowel sounds normal  MS: no gross musculoskeletal defects noted, no edema  SKIN: no suspicious lesions or rashes  NEURO: Normal strength and tone, mentation intact and speech normal  PSYCH: mentation appears normal, affect normal/bright    Diagnostic Test Results:  Labs reviewed in Epic    ASSESSMENT/PLAN:   1. Routine general medical examination at a health care facility  Well adult.     2. Hashimoto's thyroiditis  Will recheck labs. Refilled Synthroid.   - TSH WITH FREE T4 REFLEX; Future  - levothyroxine (SYNTHROID/LEVOTHROID) 75 MCG tablet; Take 1 tablet (75 mcg) by mouth daily  Dispense: 90 tablet; Refill: 3  - TSH WITH FREE T4 REFLEX    3. Stage 3a chronic kidney disease (H)  Patient new to me. GFRs in 50s for a year. H/o kidney infections when she was younger. No family history of issues that she is aware of. Likely should be on ACE/ARB. Patient will consult with nephrology.     - Albumin Random Urine Quantitative with Creat Ratio; Future  - Adult Nephrology  Referral; Future  - Albumin Random Urine Quantitative with Creat Ratio    4. Bee sting " allergy  Refilled.   - EPINEPHrine (AUVI-Q) 0.3 MG/0.3ML injection 2-pack; Inject 0.3 mLs (0.3 mg) into the muscle as needed for anaphylaxis  Dispense: 0.6 mL; Refill: 3    5. Insomnia due to other mental disorder  Refilled.   - traZODone (DESYREL) 50 MG tablet; Take 2 tablets (100 mg) by mouth At Bedtime  Dispense: 180 tablet; Refill: 1    6. Generalized anxiety disorder  Doing well. Refilled.   - buPROPion (WELLBUTRIN XL) 300 MG 24 hr tablet; Take 1 tablet (300 mg) by mouth every morning  Dispense: 90 tablet; Refill: 3    7. Major depressive disorder, recurrent episode, moderate (H)  Doing well. Refilled.   - buPROPion (WELLBUTRIN XL) 300 MG 24 hr tablet; Take 1 tablet (300 mg) by mouth every morning  Dispense: 90 tablet; Refill: 3  - venlafaxine (EFFEXOR-ER) 75 MG 24 hr tablet; Take 1 tablet (75 mg) by mouth daily  Dispense: 90 tablet; Refill: 3    8. Attention deficit hyperactivity disorder (ADHD), unspecified ADHD type  Stable.   - buPROPion (WELLBUTRIN XL) 300 MG 24 hr tablet; Take 1 tablet (300 mg) by mouth every morning  Dispense: 90 tablet; Refill: 3    9. Menopausal syndrome (hot flashes)  Discussed starting estradiol. S/p partial hysterectomy. Interested in low dose. I discussed with the patient risks and benefits of the new medication prescribed including potential side effects.  The patient had opportunity to ask questions and is comfortable with and interested in medications as prescribed.   - estradiol (ESTRACE) 0.5 MG tablet; Take 1 tablet (0.5 mg) by mouth daily  Dispense: 90 tablet; Refill: 1    10. Screening for hyperlipidemia  Screen.  - Lipid panel reflex to direct LDL Non-fasting; Future  - Lipid panel reflex to direct LDL Non-fasting    11. Morbid (severe) obesity due to excess calories (H)  Encouraged continued exercise and healthy diet.      Patient has been advised of split billing requirements and indicates understanding: Yes      COUNSELING:  Special attention given to:        Regular  exercise       Healthy diet/nutrition        She reports that she has never smoked. She has never used smokeless tobacco.          LINDA Guaman [facility]

## 2023-04-25 NOTE — NURSING NOTE
Verified patients Name and .        VIS for PCV20 given on same date of administration.  Staff signature/Title: Iraida EAGLE MA

## 2023-04-26 ENCOUNTER — TELEPHONE (OUTPATIENT)
Dept: NEPHROLOGY | Facility: CLINIC | Age: 58
End: 2023-04-26
Payer: COMMERCIAL

## 2023-04-26 DIAGNOSIS — N18.31 STAGE 3A CHRONIC KIDNEY DISEASE (H): Primary | ICD-10-CM

## 2023-04-26 NOTE — TELEPHONE ENCOUNTER
[facility] Call Center    Phone Message    May a detailed message be left on voicemail: yes     Reason for Call: Order(s): Other:   Date needed: before 7/25/23  Provider name: vivian      Action Taken: Message routed to:  Other: [facility]     Travel Screening: Not Applicable

## 2023-05-17 NOTE — CONFIDENTIAL NOTE
DIAGNOSIS: Stage 3a chronic kidney disease (H)   DATE RECEIVED: 07.31.2023   NOTES STATUS DETAILS   OFFICE NOTE from referring provider Internal 04.25.2023 Linn Montemayor PA-C   OFFICE NOTE from other specialist  Internal 04.18.2023 Carmen Bennett PA-C    12.26.2022 Yue Meng PA-C   *Only VASCULITIS or LUPUS gather office notes for the following     *PULMONARY       *ENT     *DERMATOLOGY     *RHEUMATOLOGY     DISCHARGE SUMMARY from hospital     DISCHARGE REPORT from the ER     MEDICATION LIST Internal    IMAGING  (NEED IMAGES AND REPORTS)     KIDNEY CT SCAN     KIDNEY ULTRASOUND     MR ABDOMEN     NUCLEAR MEDICINE RENAL     LABS     CBC Internal 09.12.2022   CMP Internal 09.12.2022   BMP Internal 04.22.2023   UA Internal 04.23.2023   URINE PROTEIN Internal 04.23.2023   RENAL PANEL     BIOPSY     KIDNEY BIOPSY

## 2023-07-10 ENCOUNTER — LAB (OUTPATIENT)
Dept: LAB | Facility: CLINIC | Age: 58
End: 2023-07-10
Payer: COMMERCIAL

## 2023-07-10 DIAGNOSIS — N18.31 STAGE 3A CHRONIC KIDNEY DISEASE (H): ICD-10-CM

## 2023-07-10 LAB
ALBUMIN MFR UR ELPH: <6 MG/DL
ALBUMIN SERPL BCG-MCNC: 4.4 G/DL (ref 3.5–5.2)
ALBUMIN UR-MCNC: NEGATIVE MG/DL
ANION GAP SERPL CALCULATED.3IONS-SCNC: 8 MMOL/L (ref 7–15)
APPEARANCE UR: CLEAR
BACTERIA #/AREA URNS HPF: ABNORMAL /HPF
BILIRUB UR QL STRIP: NEGATIVE
BUN SERPL-MCNC: 16.3 MG/DL (ref 6–20)
CALCIUM SERPL-MCNC: 9.6 MG/DL (ref 8.6–10)
CHLORIDE SERPL-SCNC: 100 MMOL/L (ref 98–107)
COLOR UR AUTO: COLORLESS
CREAT SERPL-MCNC: 1.21 MG/DL (ref 0.51–0.95)
CREAT UR-MCNC: 50.9 MG/DL
DEPRECATED CALCIDIOL+CALCIFEROL SERPL-MC: 32 UG/L (ref 20–75)
DEPRECATED HCO3 PLAS-SCNC: 29 MMOL/L (ref 22–29)
ERYTHROCYTE [DISTWIDTH] IN BLOOD BY AUTOMATED COUNT: 13.6 % (ref 10–15)
GFR SERPL CREATININE-BSD FRML MDRD: 52 ML/MIN/1.73M2
GLUCOSE SERPL-MCNC: 86 MG/DL (ref 70–99)
GLUCOSE UR STRIP-MCNC: NEGATIVE MG/DL
HCT VFR BLD AUTO: 42.2 % (ref 35–47)
HGB BLD-MCNC: 13.8 G/DL (ref 11.7–15.7)
HGB UR QL STRIP: NEGATIVE
KETONES UR STRIP-MCNC: NEGATIVE MG/DL
LEUKOCYTE ESTERASE UR QL STRIP: NEGATIVE
MCH RBC QN AUTO: 29.1 PG (ref 26.5–33)
MCHC RBC AUTO-ENTMCNC: 32.7 G/DL (ref 31.5–36.5)
MCV RBC AUTO: 89 FL (ref 78–100)
NITRATE UR QL: NEGATIVE
PH UR STRIP: 5 [PH] (ref 5–7)
PHOSPHATE SERPL-MCNC: 3.4 MG/DL (ref 2.5–4.5)
PLATELET # BLD AUTO: 277 10E3/UL (ref 150–450)
POTASSIUM SERPL-SCNC: 4.3 MMOL/L (ref 3.4–5.3)
PROT/CREAT 24H UR: NORMAL MG/G{CREAT}
PTH-INTACT SERPL-MCNC: 44 PG/ML (ref 15–65)
RBC # BLD AUTO: 4.75 10E6/UL (ref 3.8–5.2)
RBC #/AREA URNS AUTO: ABNORMAL /HPF
SKIP: NORMAL
SODIUM SERPL-SCNC: 137 MMOL/L (ref 136–145)
SP GR UR STRIP: 1.01 (ref 1–1.03)
SQUAMOUS #/AREA URNS AUTO: ABNORMAL /LPF
UROBILINOGEN UR STRIP-MCNC: NORMAL MG/DL
WBC # BLD AUTO: 6.6 10E3/UL (ref 4–11)
WBC #/AREA URNS AUTO: ABNORMAL /HPF

## 2023-07-10 PROCEDURE — 80069 RENAL FUNCTION PANEL: CPT

## 2023-07-10 PROCEDURE — 82306 VITAMIN D 25 HYDROXY: CPT

## 2023-07-10 PROCEDURE — 85027 COMPLETE CBC AUTOMATED: CPT

## 2023-07-10 PROCEDURE — 36415 COLL VENOUS BLD VENIPUNCTURE: CPT

## 2023-07-10 PROCEDURE — 82610 CYSTATIN C: CPT

## 2023-07-10 PROCEDURE — 83970 ASSAY OF PARATHORMONE: CPT

## 2023-07-10 PROCEDURE — 81001 URINALYSIS AUTO W/SCOPE: CPT

## 2023-07-10 PROCEDURE — 84156 ASSAY OF PROTEIN URINE: CPT

## 2023-07-14 ENCOUNTER — VIRTUAL VISIT (OUTPATIENT)
Dept: NEPHROLOGY | Facility: CLINIC | Age: 58
End: 2023-07-14
Attending: PHYSICIAN ASSISTANT
Payer: COMMERCIAL

## 2023-07-14 VITALS — WEIGHT: 190 LBS | BODY MASS INDEX: 32.44 KG/M2 | HEIGHT: 64 IN

## 2023-07-14 DIAGNOSIS — N18.31 STAGE 3A CHRONIC KIDNEY DISEASE (H): ICD-10-CM

## 2023-07-14 DIAGNOSIS — E66.01 MORBID OBESITY (H): Primary | Chronic | ICD-10-CM

## 2023-07-14 DIAGNOSIS — I48.91 ATRIAL FIBRILLATION WITH CONTROLLED VENTRICULAR RATE (H): ICD-10-CM

## 2023-07-14 DIAGNOSIS — E06.3 HASHIMOTO'S THYROIDITIS: ICD-10-CM

## 2023-07-14 PROCEDURE — 99204 OFFICE O/P NEW MOD 45 MIN: CPT | Mod: VID | Performed by: INTERNAL MEDICINE

## 2023-07-14 ASSESSMENT — PAIN SCALES - GENERAL: PAINLEVEL: NO PAIN (0)

## 2023-07-14 NOTE — PROGRESS NOTES
Virtual Visit Details    Type of service:  Video Visit     Originating Location (pt. Location): Home    Distant Location (provider location):  On-site  Platform used for Video Visit: Juli

## 2023-07-14 NOTE — PROGRESS NOTES
"  Nephrology Clinic Note  July 14, 2023    I was asked to see this patient by Linn Montemayor PA-C.    CC: elevated creatinine    HPI: Sunshine Delgado is a 58 year old female with a PMH notable for Hashimoto's thyroiditis, MARIA GUADALUPE with CPAP use, PAF S/P ablations, so not on on Eliquis and not currently needing rate control, anxiety, and depression who presents for evaluation of elevated creatinine.     Prior to the beginning of 2018, patient's creatinine was 0.9-1.0, however it then began to climb to 1.1-1.2, where it has stayed, relatively, since then. The highest her creatinine has been is 1.24. Today, it is 1.21 with a GFR of 52. There is no proteinuria.    The patient has a history of obesity, but has lost about 40 lbs this year via exercise and dietary changes. Unfortunately with this increased exercise, she has a partially torn meniscus and ACL, and the plan is for surgery eventually. She has no history of hypertension or diabetes. She has no history of tobacco use, alcohol use, or IV drug use. She is , with a healthy 24 year old son.     - History of urinary symptoms: no  - History of Hematuria: with UTI or kidney stone episodes in the remote past, but not outside of that.  - Swelling: none  - Hx of UTIs: 3 remote episodes of UTI, \"kidney infections\" as a small child, per her parents  - Hx of stones: 2 episodes, most recent was 2015.   - Rashes/Joint pain: none  - Family hx of kidney disease: none  - NSAID use: the patient had adenomyosis s/p hysterectomy 2007, used ibuprofen daily then for approximately one year, but none since.  - Fluid intake: 120 oz  - Diet: changed to a healthier diet in the last year, no added salt to food        Allergies   Allergen Reactions     Cephalexin Hives     Strawberry Extract Hives     Sulfa Antibiotics Hives     Cinnamon Hives     Sulfamethoxazole-Trimethoprim Hives     Vicodin [Hydrocodone-Acetaminophen]      Wasp Venom Protein      Other reaction(s): Chest Pain     Wasps " [Hornets] Swelling     Now carries Epi Pen     Zoloft [Sertraline]      suicidal ideation     Contrast Dye Other (See Comments) and Rash     Patient had sneezing and an itchy throat following contrast injection of 100 mL Isovue-370.  From IV contrast dye       EPINEPHrine (AUVI-Q) 0.3 MG/0.3ML injection 2-pack, Inject 0.3 mLs (0.3 mg) into the muscle as needed for anaphylaxis  estradiol (ESTRACE) 0.5 MG tablet, Take 1 tablet (0.5 mg) by mouth daily  levothyroxine (SYNTHROID/LEVOTHROID) 75 MCG tablet, Take 1 tablet (75 mcg) by mouth daily  traZODone (DESYREL) 50 MG tablet, Take 2 tablets (100 mg) by mouth At Bedtime  venlafaxine (EFFEXOR-ER) 75 MG 24 hr tablet, Take 1 tablet (75 mg) by mouth daily  acetaminophen (TYLENOL) 325 MG tablet, Take 325 mg by mouth every 4 hours as needed  (Patient not taking: Reported on 4/18/2023)  buPROPion (WELLBUTRIN XL) 300 MG 24 hr tablet, Take 1 tablet (300 mg) by mouth every morning (Patient not taking: Reported on 7/14/2023)    2 mL bupivacaine (MARCAINE) preservative free injection 0.5% (20 mL vial)  2 mL bupivacaine (MARCAINE) preservative free injection 0.5% (20 mL vial)  lidocaine 1 % injection 2 mL  lidocaine 1 % injection 2 mL  triamcinolone (KENALOG-40) injection 40 mg        Past Medical History:   Diagnosis Date     Antiplatelet or antithrombotic long-term use      Arrhythmia      Atrial fibrillation with rapid ventricular response (H) 1/26/2020     Bee sting allergy      Depressive disorder      Generalized anxiety disorder 10/15/2009    lexapro made things worse.       Hashimoto's thyroiditis 5/6/2020     Morbid obesity (H)      Ocular migraine      MARIA GUADALUPE (obstructive sleep apnea)- severe (AHI 84) 09/09/2011    patient not using since 2019     Primary osteoarthritis of right knee 9/23/2022     Renal disease      Voice fatigue 10/22/2009       Past Surgical History:   Procedure Laterality Date     ANESTHESIA CARDIOVERSION N/A 11/22/2021    Procedure: ANESTHESIA, FOR  "CARDIOVERSION@1515;  Surgeon: GENERIC ANESTHESIA PROVIDER;  Location:  OR     COLONOSCOPY  2000     DAVINCI GASTRIC SLEEVE       EP ABLATION FOCAL AFIB N/A 7/22/2021    Procedure: EP ABLATION FOCAL AFIB;  Surgeon: Vicente Craig MD;  Location:  [facility]     EP ABLATION FOCAL AFIB N/A 3/31/2022    Procedure: Ablation Focal Atrial Fibrillation;  Surgeon: Vicente Craig MD;  Location:  [facility]     GENITOURINARY SURGERY  2007     HYSTERECTOMY, PAP NO LONGER INDICATED       HYSTERECTOMY, VAGINAL  2007    still has ovaries     LAPAROSCOPIC GASTRIC SLEEVE N/A 3/13/2018    Procedure: LAPAROSCOPIC GASTRIC SLEEVE;  Laparoscopic Sleeve Gastrectomy;  Surgeon: Kameron Joseph MD;  Location:  OR       Social History     Tobacco Use     Smoking status: Never     Smokeless tobacco: Never   Vaping Use     Vaping Use: Never used   Substance Use Topics     Alcohol use: Yes     Comment: 1-2 per mo     Drug use: Not Currently     Types: Marijuana     Comment: 12/20 last dose       Family History   Problem Relation Age of Onset     Eye Disorder Mother         lost eyesight; probable macular degeneration     Psychotic Disorder Mother         Dementia /Alzhimers     Neurologic Disorder Mother         seizures     Diabetes Mother      Hypertension Mother      Alzheimer Disease Mother      Arthritis Mother      Osteoporosis Mother      Obesity Mother      Diabetes Father      Depression Father      Hyperlipidemia Father      Obesity Father      Psychotic Disorder Sister         bipolar     Thyroid Disease Sister         h/o thyroid cancer     Depression Sister         Bipolar     Obesity Sister      Cancer Other         Brain cancer     Osteoporosis Maternal Grandmother      Anxiety Disorder Son      Depression Sister      Thyroid Disease Sister        ROS: A 12 system review of systems was negative other than noted here or above.     Exam:  Ht 1.626 m (5' 4\")   Wt 86.2 kg (190 lb)   BMI 32.61 " kg/m      GENERAL APPEARANCE: alert and no distress  EYES: no scleral icterus  NECK: supple  SKIN: no visible rash  NEURO: mentation intact and speech normal  PSYCH: affect normal/bright    Results:    No visits with results within 1 Day(s) from this visit.   Latest known visit with results is:   Lab on 07/10/2023   Component Date Value Ref Range Status     Sodium 07/10/2023 137  136 - 145 mmol/L Final     Potassium 07/10/2023 4.3  3.4 - 5.3 mmol/L Final     Chloride 07/10/2023 100  98 - 107 mmol/L Final     Carbon Dioxide (CO2) 07/10/2023 29  22 - 29 mmol/L Final     Anion Gap 07/10/2023 8  7 - 15 mmol/L Final     Glucose 07/10/2023 86  70 - 99 mg/dL Final     Urea Nitrogen 07/10/2023 16.3  6.0 - 20.0 mg/dL Final     Creatinine 07/10/2023 1.21 (H)  0.51 - 0.95 mg/dL Final     GFR Estimate 07/10/2023 52 (L)  >60 mL/min/1.73m2 Final     Calcium 07/10/2023 9.6  8.6 - 10.0 mg/dL Final     Albumin 07/10/2023 4.4  3.5 - 5.2 g/dL Final     Phosphorus 07/10/2023 3.4  2.5 - 4.5 mg/dL Final     WBC Count 07/10/2023 6.6  4.0 - 11.0 10e3/uL Final     RBC Count 07/10/2023 4.75  3.80 - 5.20 10e6/uL Final     Hemoglobin 07/10/2023 13.8  11.7 - 15.7 g/dL Final     Hematocrit 07/10/2023 42.2  35.0 - 47.0 % Final     MCV 07/10/2023 89  78 - 100 fL Final     MCH 07/10/2023 29.1  26.5 - 33.0 pg Final     MCHC 07/10/2023 32.7  31.5 - 36.5 g/dL Final     RDW 07/10/2023 13.6  10.0 - 15.0 % Final     Platelet Count 07/10/2023 277  150 - 450 10e3/uL Final     Total Protein Urine mg/dL 07/10/2023 <6.0    mg/dL Final    The reference ranges have not been established in urine protein. The results should be integrated into the clinical context for interpretation.     Total Protein UR MG/MG CR 07/10/2023    Final    Unable to calculate, urine creatinine or protein is outside the detectable limits.     Creatinine Urine mg/dL 07/10/2023 50.9  mg/dL Final    The reference ranges have not been established in urine creatinine. The results should be  integrated into the clinical context for interpretation.     Color Urine 07/10/2023 Colorless  Colorless, Straw, Light Yellow, Yellow Final     Appearance Urine 07/10/2023 Clear  Clear Final     Glucose Urine 07/10/2023 Negative  Negative mg/dL Final     Bilirubin Urine 07/10/2023 Negative  Negative Final     Ketones Urine 07/10/2023 Negative  Negative mg/dL Final     Specific Gravity Urine 07/10/2023 1.007  0.999 - 1.035 Final     Blood Urine 07/10/2023 Negative  Negative Final     pH Urine 07/10/2023 5.0  5.0 - 7.0 Final     Protein Albumin Urine 07/10/2023 Negative  Negative mg/dL Final     Urobilinogen Urine 07/10/2023 Normal  Normal, 2.0 mg/dL Final     Nitrite Urine 07/10/2023 Negative  Negative Final     Leukocyte Esterase Urine 07/10/2023 Negative  Negative Final     Parathyroid Hormone Intact 07/10/2023 44  15 - 65 pg/mL Final     Vitamin D, Total (25-Hydroxy) 07/10/2023 32  20 - 75 ug/L Final     Bacteria Urine 07/10/2023 Few (A)  None Seen /HPF Final     RBC Urine 07/10/2023 0-2  0-2 /HPF /HPF Final     WBC Urine 07/10/2023 0-5  0-5 /HPF /HPF Final     Squamous Epithelials Urine 07/10/2023 Many (A)  None Seen /LPF Final       Assessment/Plan:   CKD stage 3A: the cause of patient's CKD isn't clear without history of HTN, DM, obstruction, and without hematuria or proteinuria there is little suspicion for GN. The patient does have a history of obesity, which may be contributing here.  - Will obtain cystatin-C to assess true GFR, if this matches GFR measured from creatinine, will plan to see her in 6 months to follow trend and monitor for proteinuria  - Recommend ongoing weight loss via exercise and dietary changes.  - Recommend increasing fluid intake  - Restrict salt intake  - Continue to avoid all NSAIDs    Electrolytes: normal  - Na 137, K 4.3.    BMD: no evidence of this today  - Ca 9.6, Phos 3.4, vit D 32, PTH 44    Metabolic acidosis : bicarb is 29 today, has been 27-29 for the past year.  - No need  for sodium bicarb supplementation    Anemia : hgb 12.8 today.    Pat Cunningham DO  PGY-1 Internal Medicine Resident

## 2023-07-14 NOTE — LETTER
"7/14/2023       RE: Sunshine Delgado  [address]   [facility] [address]     Dear Colleague,    Thank you for referring your patient, Sunshine Delgado, to the [facility] [place] at [facility]. Please see a copy of my visit note below.      Nephrology Clinic Note  July 14, 2023    I was asked to see this patient by Linn Montemayor PA-C.    CC: elevated creatinine    HPI: Sunshine Delgado is a 58 year old female with a PMH notable for Hashimoto's thyroiditis, MARIA GUADALUPE with CPAP use, PAF S/P ablations, so not on on Eliquis and not currently needing rate control, anxiety, and depression who presents for evaluation of elevated creatinine.     Prior to the beginning of 2018, patient's creatinine was 0.9-1.0, however it then began to climb to 1.1-1.2, where it has stayed, relatively, since then. The highest her creatinine has been is 1.24. Today, it is 1.21 with a GFR of 52. There is no proteinuria.    The patient has a history of obesity, but has lost about 40 lbs this year via exercise and dietary changes. Unfortunately with this increased exercise, she has a partially torn meniscus and ACL, and the plan is for surgery eventually. She has no history of hypertension or diabetes. She has no history of tobacco use, alcohol use, or IV drug use. She is , with a healthy 24 year old son.     - History of urinary symptoms: no  - History of Hematuria: with UTI or kidney stone episodes in the remote past, but not outside of that.  - Swelling: none  - Hx of UTIs: 3 remote episodes of UTI, \"kidney infections\" as a small child, per her parents  - Hx of stones: 2 episodes, most recent was 2015.   - Rashes/Joint pain: none  - Family hx of kidney disease: none  - NSAID use: the patient had adenomyosis s/p hysterectomy 2007, used ibuprofen daily then for approximately one year, but none since.  - Fluid intake: 120 oz  - Diet: changed to a healthier diet in the last " year, no added salt to food        Allergies   Allergen Reactions    Cephalexin Hives    Strawberry Extract Hives    Sulfa Antibiotics Hives    Cinnamon Hives    Sulfamethoxazole-Trimethoprim Hives    Vicodin [Hydrocodone-Acetaminophen]     Wasp Venom Protein      Other reaction(s): Chest Pain    Wasps [Hornets] Swelling     Now carries Epi Pen    Zoloft [Sertraline]      suicidal ideation    Contrast Dye Other (See Comments) and Rash     Patient had sneezing and an itchy throat following contrast injection of 100 mL Isovue-370.  From IV contrast dye       EPINEPHrine (AUVI-Q) 0.3 MG/0.3ML injection 2-pack, Inject 0.3 mLs (0.3 mg) into the muscle as needed for anaphylaxis  estradiol (ESTRACE) 0.5 MG tablet, Take 1 tablet (0.5 mg) by mouth daily  levothyroxine (SYNTHROID/LEVOTHROID) 75 MCG tablet, Take 1 tablet (75 mcg) by mouth daily  traZODone (DESYREL) 50 MG tablet, Take 2 tablets (100 mg) by mouth At Bedtime  venlafaxine (EFFEXOR-ER) 75 MG 24 hr tablet, Take 1 tablet (75 mg) by mouth daily  acetaminophen (TYLENOL) 325 MG tablet, Take 325 mg by mouth every 4 hours as needed  (Patient not taking: Reported on 4/18/2023)  buPROPion (WELLBUTRIN XL) 300 MG 24 hr tablet, Take 1 tablet (300 mg) by mouth every morning (Patient not taking: Reported on 7/14/2023)    2 mL bupivacaine (MARCAINE) preservative free injection 0.5% (20 mL vial)  2 mL bupivacaine (MARCAINE) preservative free injection 0.5% (20 mL vial)  lidocaine 1 % injection 2 mL  lidocaine 1 % injection 2 mL  triamcinolone (KENALOG-40) injection 40 mg        Past Medical History:   Diagnosis Date    Antiplatelet or antithrombotic long-term use     Arrhythmia     Atrial fibrillation with rapid ventricular response (H) 1/26/2020    Bee sting allergy     Depressive disorder     Generalized anxiety disorder 10/15/2009    lexapro made things worse.      Hashimoto's thyroiditis 5/6/2020    Morbid obesity (H)     Ocular migraine     MARIA GUADALUPE (obstructive sleep apnea)-  severe (AHI 84) 09/09/2011    patient not using since 2019    Primary osteoarthritis of right knee 9/23/2022    Renal disease     Voice fatigue 10/22/2009       Past Surgical History:   Procedure Laterality Date    ANESTHESIA CARDIOVERSION N/A 11/22/2021    Procedure: ANESTHESIA, FOR CARDIOVERSION@1515;  Surgeon: GENERIC ANESTHESIA PROVIDER;  Location:  OR    COLONOSCOPY  2000    [facility] GASTRIC SLEEVE      EP ABLATION FOCAL AFIB N/A 7/22/2021    Procedure: EP ABLATION FOCAL AFIB;  Surgeon: Vicente Craig MD;  Location:  HEART CARDIAC CATH LAB    EP ABLATION FOCAL AFIB N/A 3/31/2022    Procedure: Ablation Focal Atrial Fibrillation;  Surgeon: Vicente Craig MD;  Location:  HEART CARDIAC CATH LAB    GENITOURINARY SURGERY  2007    HYSTERECTOMY, PAP NO LONGER INDICATED      HYSTERECTOMY, VAGINAL  2007    still has ovaries    LAPAROSCOPIC GASTRIC SLEEVE N/A 3/13/2018    Procedure: LAPAROSCOPIC GASTRIC SLEEVE;  Laparoscopic Sleeve Gastrectomy;  Surgeon: Kameron Joseph MD;  Location:  OR       Social History     Tobacco Use    Smoking status: Never    Smokeless tobacco: Never   Vaping Use    Vaping Use: Never used   Substance Use Topics    Alcohol use: Yes     Comment: 1-2 per mo    Drug use: Not Currently     Types: Marijuana     Comment: 12/20 last dose       Family History   Problem Relation Age of Onset    Eye Disorder Mother         lost eyesight; probable macular degeneration    Psychotic Disorder Mother         Dementia /Alzhimers    Neurologic Disorder Mother         seizures    Diabetes Mother     Hypertension Mother     Alzheimer Disease Mother     Arthritis Mother     Osteoporosis Mother     Obesity Mother     Diabetes Father     Depression Father     Hyperlipidemia Father     Obesity Father     Psychotic Disorder Sister         bipolar    Thyroid Disease Sister         h/o thyroid cancer    Depression Sister         Bipolar    Obesity Sister     Cancer Other         Brain cancer    Osteoporosis  "Maternal Grandmother     Anxiety Disorder Son     Depression Sister     Thyroid Disease Sister        ROS: A 12 system review of systems was negative other than noted here or above.     Exam:  Ht 1.626 m (5' 4\")   Wt 86.2 kg (190 lb)   BMI 32.61 kg/m      GENERAL APPEARANCE: alert and no distress  EYES: no scleral icterus  NECK: supple  SKIN: no visible rash  NEURO: mentation intact and speech normal  PSYCH: affect normal/bright    Results:    No visits with results within 1 Day(s) from this visit.   Latest known visit with results is:   Lab on 07/10/2023   Component Date Value Ref Range Status    Sodium 07/10/2023 137  136 - 145 mmol/L Final    Potassium 07/10/2023 4.3  3.4 - 5.3 mmol/L Final    Chloride 07/10/2023 100  98 - 107 mmol/L Final    Carbon Dioxide (CO2) 07/10/2023 29  22 - 29 mmol/L Final    Anion Gap 07/10/2023 8  7 - 15 mmol/L Final    Glucose 07/10/2023 86  70 - 99 mg/dL Final    Urea Nitrogen 07/10/2023 16.3  6.0 - 20.0 mg/dL Final    Creatinine 07/10/2023 1.21 (H)  0.51 - 0.95 mg/dL Final    GFR Estimate 07/10/2023 52 (L)  >60 mL/min/1.73m2 Final    Calcium 07/10/2023 9.6  8.6 - 10.0 mg/dL Final    Albumin 07/10/2023 4.4  3.5 - 5.2 g/dL Final    Phosphorus 07/10/2023 3.4  2.5 - 4.5 mg/dL Final    WBC Count 07/10/2023 6.6  4.0 - 11.0 10e3/uL Final    RBC Count 07/10/2023 4.75  3.80 - 5.20 10e6/uL Final    Hemoglobin 07/10/2023 13.8  11.7 - 15.7 g/dL Final    Hematocrit 07/10/2023 42.2  35.0 - 47.0 % Final    MCV 07/10/2023 89  78 - 100 fL Final    MCH 07/10/2023 29.1  26.5 - 33.0 pg Final    MCHC 07/10/2023 32.7  31.5 - 36.5 g/dL Final    RDW 07/10/2023 13.6  10.0 - 15.0 % Final    Platelet Count 07/10/2023 277  150 - 450 10e3/uL Final    Total Protein Urine mg/dL 07/10/2023 <6.0    mg/dL Final    The reference ranges have not been established in urine protein. The results should be integrated into the clinical context for interpretation.    Total Protein UR MG/MG CR 07/10/2023    Final    Unable " to calculate, urine creatinine or protein is outside the detectable limits.    Creatinine Urine mg/dL 07/10/2023 50.9  mg/dL Final    The reference ranges have not been established in urine creatinine. The results should be integrated into the clinical context for interpretation.    Color Urine 07/10/2023 Colorless  Colorless, Straw, Light Yellow, Yellow Final    Appearance Urine 07/10/2023 Clear  Clear Final    Glucose Urine 07/10/2023 Negative  Negative mg/dL Final    Bilirubin Urine 07/10/2023 Negative  Negative Final    Ketones Urine 07/10/2023 Negative  Negative mg/dL Final    Specific Gravity Urine 07/10/2023 1.007  0.999 - 1.035 Final    Blood Urine 07/10/2023 Negative  Negative Final    pH Urine 07/10/2023 5.0  5.0 - 7.0 Final    Protein Albumin Urine 07/10/2023 Negative  Negative mg/dL Final    Urobilinogen Urine 07/10/2023 Normal  Normal, 2.0 mg/dL Final    Nitrite Urine 07/10/2023 Negative  Negative Final    Leukocyte Esterase Urine 07/10/2023 Negative  Negative Final    Parathyroid Hormone Intact 07/10/2023 44  15 - 65 pg/mL Final    Vitamin D, Total (25-Hydroxy) 07/10/2023 32  20 - 75 ug/L Final    Bacteria Urine 07/10/2023 Few (A)  None Seen /HPF Final    RBC Urine 07/10/2023 0-2  0-2 /HPF /HPF Final    WBC Urine 07/10/2023 0-5  0-5 /HPF /HPF Final    Squamous Epithelials Urine 07/10/2023 Many (A)  None Seen /LPF Final       Assessment/Plan:   CKD stage 3A: the cause of patient's CKD isn't clear without history of HTN, DM, obstruction, and without hematuria or proteinuria there is little suspicion for GN. The patient does have a history of obesity, which may be contributing here.  - Will obtain cystatin-C to assess true GFR, if this matches GFR measured from creatinine, will plan to see her in 6 months to follow trend and monitor for proteinuria  - Recommend ongoing weight loss via exercise and dietary changes.  - Recommend increasing fluid intake  - Restrict salt intake  - Continue to avoid all  NSAIDs    Electrolytes: normal  - Na 137, K 4.3.    BMD: no evidence of this today  - Ca 9.6, Phos 3.4, vit D 32, PTH 44    Metabolic acidosis : bicarb is 29 today, has been 27-29 for the past year.  - No need for sodium bicarb supplementation    Anemia : hgb 12.8 today.    Pat Cunningham DO  PGY-1 Internal Medicine Resident      Attestation signed by Echo Henderson MD at 8/1/2023 12:20 AM:      Physician Attestation  I saw this patient with the resident and agree with the resident/fellow's findings and plan of care as documented in the note.      Echo Henderson MD  Date of Service (when I saw the patient): 7/14/2023

## 2023-07-14 NOTE — NURSING NOTE
Is the patient currently in the state of [place]? YES    Visit mode:VIDEO    If the visit is dropped, the patient can be reconnected by: VIDEO VISIT: Text to cell phone: [phone]    Will anyone else be joining the visit? NO      How would you like to obtain your AVS? MyChart    Are changes needed to the allergy or medication list? NO    Reason for visit: Consult

## 2023-07-16 LAB
CYSTATIN C (ROCHE): 1.3 MG/L (ref 0.6–1)
GFR SERPL CREATININE-BSD FRML MDRD: 52 ML/MIN/1.73M2

## 2023-07-27 NOTE — PROGRESS NOTES
"Sunshine Delgado  :  1965  DOS: 2023  MRN: [id number]  PCP: Lesly Arteaga    Sports Medicine Clinic Visit      Interim History - 2023  - Last seen in clinic on 3/17/2023 for right knee primary osteoarthritis and a degenerative medial meniscus tear. Performed CSI (5 months of relief from CSI in 2022), given lateral J brace, did not attend PT so given HEP.   - Right knee injection completed on 3/17/23 provided 2.5 months of great relief.   - Since the last visit, patient notes a return in right anteromedial knee pain over the last 2 months. Pain with descending stairs. Also, she notes that her knee feels like it is buckling while walking, on occasion. Has been using the hot tub and icing lately. Is hopeful for a repeat corticosteroid injection today.   - No interim injury.       Interim History - 2023  Since last visit on 2022 patient has noticed a return/increase in medial right knee pain.  Right knee corticosteroid injection completed on 2022 provided great relief for 5 months. Has been using ice and heat (hot tub). Feeling of instability, buckling and a need to \"pop\" with sudden movements occasionally when walking quickly. Pain with going down stairs and slippery surfaces.  Did not start physical therapy. Has not gotten a brace.  No new injury in the interim. Also, she has lost over 30 pounds since our last visit, which has helped her overall knee pain.     Initial Visit: 2022  HPI  Sunshine Delgado is a 57 year old female who is seen in consultation at the request of  Hadley Grace PA-C presenting with right knee pain. MRI on file.    Mechanism of Injury: No acute injury onset.      Duration and progression of pain: Knee pain started in early 2022. Worsening.   Location of pain: Right medial knee.  Radiation: from medial right knee to the right hip    Swelling: yes  Instability: yes  Mechanical symptoms: chronic popping, no " locking    Numbness/Tingling: medial right knee numbness occasionally  Radicular pain: no  Weakness: yes, limited by pain    Alleviating factors: hottub helps with muscle tightness.  Aggravating factors: extension of the right leg. Notes constant medial knee pain.     Treatments tried (medications, injections, bracing, therapy, modalities): ice and Tylenol. Heats in the hottub nightly. [place]    Prior medical visits related to complaint:  Followed by SUPRIYA Stubbs.   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    Past Medical History:   Diagnosis Date    Antiplatelet or antithrombotic long-term use     Arrhythmia     Atrial fibrillation with rapid ventricular response (H) 1/26/2020    Bee sting allergy     Depressive disorder     Generalized anxiety disorder 10/15/2009    lexapro made things worse.      Hashimoto's thyroiditis 5/6/2020    Morbid obesity (H)     Ocular migraine     MARIA GUADALUPE (obstructive sleep apnea)- severe (AHI 84) 09/09/2011    patient not using since 2019    Primary osteoarthritis of right knee 9/23/2022    Renal disease     Voice fatigue 10/22/2009     Past Surgical History:   Procedure Laterality Date    ANESTHESIA CARDIOVERSION N/A 11/22/2021    Procedure: ANESTHESIA, FOR CARDIOVERSION@1515;  Surgeon: GENERIC ANESTHESIA PROVIDER;  Location:  OR    [facility]  2000    [facility]      EP ABLATION FOCAL AFIB N/A 7/22/2021    Procedure: EP ABLATION FOCAL AFIB;  Surgeon: Vicente Craig MD;  Location:  HEART CARDIAC CATH LAB    EP ABLATION FOCAL AFIB N/A 3/31/2022    Procedure: Ablation  Focal Atrial Fibrillation;  Surgeon: Vicente Craig MD;  Location:  HEART CARDIAC CATH LAB    GENITOURINARY SURGERY  2007    HYSTERECTOMY, PAP NO LONGER INDICATED      HYSTERECTOMY, VAGINAL  2007    still has ovaries    LAPAROSCOPIC GASTRIC SLEEVE N/A 3/13/2018    Procedure: LAPAROSCOPIC GASTRIC SLEEVE;  Laparoscopic Sleeve Gastrectomy;  Surgeon: Kameron Joseph MD;  Location:  OR     Family History   Problem Relation Age of Onset    Eye Disorder Mother         lost eyesight; probable macular degeneration    Psychotic Disorder Mother         Dementia /Alzhimers    Neurologic Disorder Mother         seizures    Diabetes Mother     Hypertension Mother     Alzheimer Disease Mother     Arthritis Mother     Osteoporosis Mother     Obesity Mother     Diabetes Father     Depression Father     Hyperlipidemia Father     Obesity Father     Psychotic Disorder Sister         bipolar    Thyroid Disease Sister         h/o thyroid cancer    Depression Sister         Bipolar    Obesity Sister     Cancer Other         Brain cancer    Osteoporosis Maternal Grandmother     Anxiety Disorder Son     Depression Sister     Thyroid Disease Sister        Objective  /88   Wt 86.2 kg (190 lb)   BMI 32.61 kg/m      General: healthy, alert and in no acute distress    HEENT: no scleral icterus or conjunctival erythema   Skin: no suspicious lesions or rash. No jaundice.   CV: regular rhythm by palpation, 2+ distal pulses, no pedal edema    Resp: normal respiratory effort without conversational dyspnea   Psych: normal mood and affect    Gait: nonantalgic, appropriate coordination and balance     Neuro:        - Sensation to light touch:  Intact throughout the BLE including all peripheral nerve distributions.        - MSR:  2+ in bilateral patella, achilles.        - Special tests:   - Slump/SLR:  Neg bilaterally    MSK - Knee:       - Inspection:    - No significant effusion present. No erythema, warmth, ecchymosis, or lesion.         - ROM:     - Limited slightly in flexion by anteromedial knee pain.       - Palpation:    - TTP at the medial joint line, slightly at the lateral joint line.   - NTTP elsewhere.        - Strength:    - 5/5 in BLE including HF, Hab, Hadd, KF, KE, DF, PF, EHL, Inv, Ev.        - Special tests:        - Lachman:  Neg        - A/P drawer:  Neg       - Pivot shift:  Neg   - Garry:  Pos for click and medial compartment pain     - Varus stress:  Neg for laxity or pain    - Valgus stress:  Neg for laxity, pos for mild pain   - Patellar grind:  Pos      Radiology  I previously independently reviewed the available relevant imaging, including MRI R Knee (6/17/22), and agree with the interpretation of mild-moderate tricompartmental cartilage defects, degenerative tear of the medial meniscus, and chronic ACL tear.     MR right knee without contrast 6/17/2022 7:17 PM     Techniques: Multiplanar multisequence imaging of the right knee was  obtained without administration of intra-articular or intravenous  contrast using routing protocol.     History: Acute pain of right knee     Comparison: None available     Findings:     MENISCI:  Medial meniscus:There is a horizontal oblique tear of the body of the  medial meniscus that communicates with the inferior articular surface  (series 7, images 24 through 26), blunting of the posterior horn .  Lateral meniscus: Mucoid degeneration of the lateral meniscus, which  is mildly extruded.     LIGAMENTS  Cruciate ligaments: The ACL appears attenuated with only a few intact  fibers. No associated bony contusion pattern most consistent with  chronic ACL tear. The posterior cruciate ligament is intact and  unremarkable.  Medial supporting structures:  Lateral supporting structures: Intact.     EXTENSOR MECHANISM  Intact.     FLUID  Small joint effusion. No substantial Baker's cyst.     OSSEOUS and ARTICULAR STRUCTURES  Bones: No fracture, contusion, or osseous lesion is seen.      Patellofemoral compartment: Focal full-thickness cartilage fissuring  centered about the median ridge of the patella is associated mild bone  marrow edema, focal full-thickness cartilage fissure of the medial  trochlea     Medial compartment: Focal moderate grade cartilage fissuring centered  about the weightbearing aspect of the medial femoral condyle.     Lateral compartment: Focal full-thickness cartilage defect of the  lateral femoral condyle (series 6001).     ANCILLARY FINDINGS  None.                                                                      Impression:  1. Medial compartment: Complex degenerative tearing of the medial  meniscus with a predominant horizontal oblique component. Moderate  grade cartilage fissuring.  2. Lateral compartment: Intrasubstance degeneration of the lateral  meniscus, which is mildly extruded. Focal full-thickness articular  cartilage defect within the distal femoral condyle.  3. Patellofemoral joint: Focal full-thickness articular cartilage  fissure centered about the median ridge of the patella.  4. Chronic appearing ACL tear.     JUTTA ELLERMANN, MD     PROCEDURE  Large Joint Injection/Arthocentesis: R knee joint    Date/Time: 7/28/2023 4:36 PM    Performed by: Roshan Farr DO  Authorized by: Roshan Farr DO    Indications:  Pain  Needle Size:  22 G  Guidance: landmark guided    Approach:  Anterolateral  Location:  Knee      Medications:  40 mg triamcinolone 40 MG/ML; 2 mL bupivacaine (PF) 0.5 %  Outcome:  Tolerated well, no immediate complications  Procedure discussed: discussed risks, benefits, and alternatives    Consent Given by:  Patient  Prep: patient was prepped and draped in usual sterile fashion      PROCEDURE  Intraarticular Knee Joint Injection - Right  The patient was informed of the risks and benefits of the procedure and alternatives were discussed. A written consent was signed by the patient.   The injection site was prepped with chlorhexidine in  sterile fashion.   An injectate solution containing 2 mL of 1% lidocaine, 2 mL of 0.5% bupivacaine, and 1 mL of Kenalog (40 mg/mL) was drawn up into a 5 mL syringe.  Injection was performed using sterile technique.  A 1.5-inch 22-gauge needle was used to enter the knee joint using a lateral infrapatellar approach and injectate was injected successfully. After the injection, the site was cleaned and a bandage applied. The patient tolerated the procedure well without complications.         Assessment  1. Primary osteoarthritis of right knee    2. Degenerative tear of medial meniscus, right        Plan  Sunshine Delgado is a 57 year old female that presents for follow up of her right knee pain secondary to primary osteoarthritis and a degenerative medial meniscus tear.  Discussed the nature of the condition and treatment options and mutually agreed upon the following plan:    - Medications:         - Discussed pharmacologic options for pain relief. May continue to use Tylenol PRN, avoid NSAIDs due to kidney problems. May also use topical creams such as IcyHot, BioFreeze, or Voltaren gel PRN.   - Injections:         - Great relief from CSI last September, less relief at this most recent time. Interested in repeat injection today in hopes of similar relief, but also interested in additional injection options. Performed a corticosteroid injection of the right knee today in clinic. Patient tolerated well without complications. See procedure note above for details.    - Prior authorization submitted for viscosupplementation injection.  We will perform injection when approved by her insurance.  - Therapy:         - Discussed the benefits of PT vs HEP.  Previously referred to physical therapy, no sessions attended.  Did not consistently perform HEP and now would like to participate in physical therapy.  Physical therapy referral placed today and instructions given for scheduling therapy sessions.   - Please call [phone] to  schedule therapy appointments.  - Modalities:         - May use ice, heat, massage PRN.   - Bracing:         - Continue using the lateral J brace for walking and exacerbating activities as needed.  - Activity:         - Encouraged to remain active and participate in regular activities as symptoms allow with knee brace in place for stability.   - Follow up:         - In 3 months as needed for re-evaluation and update to treatment plan. Patient has clinic contact information for questions or concerns.       Roshan Farr DO, GARRICK  [facility] - Sports Medicine  [facility] - Department of Orthopedic Surgery

## 2023-07-28 ENCOUNTER — OFFICE VISIT (OUTPATIENT)
Dept: ORTHOPEDICS | Facility: CLINIC | Age: 58
End: 2023-07-28
Payer: COMMERCIAL

## 2023-07-28 VITALS — BODY MASS INDEX: 32.61 KG/M2 | WEIGHT: 190 LBS | SYSTOLIC BLOOD PRESSURE: 137 MMHG | DIASTOLIC BLOOD PRESSURE: 88 MMHG

## 2023-07-28 DIAGNOSIS — M23.203 DEGENERATIVE TEAR OF MEDIAL MENISCUS, RIGHT: ICD-10-CM

## 2023-07-28 DIAGNOSIS — M17.11 PRIMARY OSTEOARTHRITIS OF RIGHT KNEE: Primary | ICD-10-CM

## 2023-07-28 PROCEDURE — 99213 OFFICE O/P EST LOW 20 MIN: CPT | Mod: 25 | Performed by: STUDENT IN AN ORGANIZED HEALTH CARE EDUCATION/TRAINING PROGRAM

## 2023-07-28 PROCEDURE — 20610 DRAIN/INJ JOINT/BURSA W/O US: CPT | Mod: RT | Performed by: STUDENT IN AN ORGANIZED HEALTH CARE EDUCATION/TRAINING PROGRAM

## 2023-07-28 RX ADMIN — BUPIVACAINE HYDROCHLORIDE 2 ML: 5 INJECTION, SOLUTION EPIDURAL; INTRACAUDAL at 16:36

## 2023-07-28 RX ADMIN — TRIAMCINOLONE ACETONIDE 40 MG: 40 INJECTION, SUSPENSION INTRA-ARTICULAR; INTRAMUSCULAR at 16:36

## 2023-07-28 ASSESSMENT — PAIN SCALES - GENERAL: PAINLEVEL: EXTREME PAIN (8)

## 2023-07-28 NOTE — LETTER
"    2023         RE: Sunshine Delgado  [address] [place] [address]        Dear Colleague,    Thank you for referring your patient, Sunshine Delgado, to the [facility] [place]. Please see a copy of my visit note below.    Sunshine Delgado  :  1965  DOS: 2023  MRN: [id number]  PCP: Lesly Arteaga    Sports Medicine Clinic Visit      Interim History - 2023  - Last seen in clinic on 3/17/2023 for right knee primary osteoarthritis and a degenerative medial meniscus tear. Performed CSI (5 months of relief from CSI in 2022), given lateral J brace, did not attend PT so given HEP.   - Right knee injection completed on 3/17/23 provided 2.5 months of great relief.   - Since the last visit, patient notes a return in right anteromedial knee pain over the last 2 months. Pain with descending stairs. Also, she notes that her knee feels like it is buckling while walking, on occasion. Has been using the hot tub and icing lately. Is hopeful for a repeat corticosteroid injection today.   - No interim injury.       Interim History - 2023  Since last visit on 2022 patient has noticed a return/increase in medial right knee pain.  Right knee corticosteroid injection completed on 2022 provided great relief for 5 months. Has been using ice and heat (hot tub). Feeling of instability, buckling and a need to \"pop\" with sudden movements occasionally when walking quickly. Pain with going down stairs and slippery surfaces.  Did not start physical therapy. Has not gotten a brace.  No new injury in the interim. Also, she has lost over 30 pounds since our last visit, which has helped her overall knee pain.     Initial Visit: 2022  HPI  Sunshine Delgado is a 57 year old female who is seen in consultation at the request of  Hadley Grace PA-C presenting with right knee pain. MRI on file.    Mechanism of Injury: No acute injury onset.  "     Duration and progression of pain: Knee pain started in early June 2022. Worsening.   Location of pain: Right medial knee.  Radiation: from medial right knee to the right hip    Swelling: yes  Instability: yes  Mechanical symptoms: chronic popping, no locking    Numbness/Tingling: medial right knee numbness occasionally  Radicular pain: no  Weakness: yes, limited by pain    Alleviating factors: hottub helps with muscle tightness.  Aggravating factors: extension of the right leg. Notes constant medial knee pain.     Treatments tried (medications, injections, bracing, therapy, modalities): ice and Tylenol. Heats in the hottub nightly. [place]    Prior medical visits related to complaint:  Followed by SUPRIYA Stubbs.   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    Past Medical History:   Diagnosis Date     Antiplatelet or antithrombotic long-term use      Arrhythmia      Atrial fibrillation with rapid ventricular response (H) 1/26/2020     Bee sting allergy      Depressive disorder      Generalized anxiety disorder 10/15/2009    lexapro made things worse.       Hashimoto's thyroiditis 5/6/2020     Morbid obesity (H)      Ocular migraine      MARIA GUADALUPE (obstructive sleep apnea)- severe (AHI 84) 09/09/2011    patient not using since 2019     Primary osteoarthritis of right knee 9/23/2022     Renal disease      Voice fatigue 10/22/2009     Past Surgical History:   Procedure Laterality Date     ANESTHESIA CARDIOVERSION N/A 11/22/2021    Procedure: ANESTHESIA, FOR CARDIOVERSION@1515;  Surgeon: GENERIC ANESTHESIA PROVIDER;   Location: [facility] OR     COLONOSCOPY  2000     DAVINCI GASTRIC SLEEVE       EP ABLATION FOCAL AFIB N/A 7/22/2021    Procedure: EP ABLATION FOCAL AFIB;  Surgeon: Vicente Craig MD;  Location:  HEART CARDIAC CATH LAB     EP ABLATION FOCAL AFIB N/A 3/31/2022    Procedure: Ablation Focal Atrial Fibrillation;  Surgeon: Vicente Craig MD;  Location:  HEART CARDIAC CATH LAB     GENITOURINARY SURGERY  2007     HYSTERECTOMY, PAP NO LONGER INDICATED       HYSTERECTOMY, VAGINAL  2007    still has ovaries     LAPAROSCOPIC GASTRIC SLEEVE N/A 3/13/2018    Procedure: LAPAROSCOPIC GASTRIC SLEEVE;  Laparoscopic Sleeve Gastrectomy;  Surgeon: Kameron Joseph MD;  Location: [facility] OR     Family History   Problem Relation Age of Onset     Eye Disorder Mother         lost eyesight; probable macular degeneration     Psychotic Disorder Mother         Dementia /Alzhimers     Neurologic Disorder Mother         seizures     Diabetes Mother      Hypertension Mother      Alzheimer Disease Mother      Arthritis Mother      Osteoporosis Mother      Obesity Mother      Diabetes Father      Depression Father      Hyperlipidemia Father      Obesity Father      Psychotic Disorder Sister         bipolar     Thyroid Disease Sister         h/o thyroid cancer     Depression Sister         Bipolar     Obesity Sister      Cancer Other         Brain cancer     Osteoporosis Maternal Grandmother      Anxiety Disorder Son      Depression Sister      Thyroid Disease Sister        Objective  /88   Wt 86.2 kg (190 lb)   BMI 32.61 kg/m      General: healthy, alert and in no acute distress    HEENT: no scleral icterus or conjunctival erythema   Skin: no suspicious lesions or rash. No jaundice.   CV: regular rhythm by palpation, 2+ distal pulses, no pedal edema    Resp: normal respiratory effort without conversational dyspnea   Psych: normal mood and affect    Gait: nonantalgic, appropriate coordination and balance     Neuro:        - Sensation to light  touch:  Intact throughout the BLE including all peripheral nerve distributions.        - MSR:  2+ in bilateral patella, achilles.        - Special tests:   - Slump/SLR:  Neg bilaterally    MSK - Knee:       - Inspection:    - No significant effusion present. No erythema, warmth, ecchymosis, or lesion.        - ROM:     - Limited slightly in flexion by anteromedial knee pain.       - Palpation:    - TTP at the medial joint line, slightly at the lateral joint line.   - NTTP elsewhere.        - Strength:    - 5/5 in BLE including HF, Hab, Hadd, KF, KE, DF, PF, EHL, Inv, Ev.        - Special tests:        - Lachman:  Neg        - A/P drawer:  Neg       - Pivot shift:  Neg   - Garry:  Pos for click and medial compartment pain     - Varus stress:  Neg for laxity or pain    - Valgus stress:  Neg for laxity, pos for mild pain   - Patellar grind:  Pos      Radiology  I previously independently reviewed the available relevant imaging, including MRI R Knee (6/17/22), and agree with the interpretation of mild-moderate tricompartmental cartilage defects, degenerative tear of the medial meniscus, and chronic ACL tear.     MR right knee without contrast 6/17/2022 7:17 PM     Techniques: Multiplanar multisequence imaging of the right knee was  obtained without administration of intra-articular or intravenous  contrast using routing protocol.     History: Acute pain of right knee     Comparison: None available     Findings:     MENISCI:  Medial meniscus:There is a horizontal oblique tear of the body of the  medial meniscus that communicates with the inferior articular surface  (series 7, images 24 through 26), blunting of the posterior horn .  Lateral meniscus: Mucoid degeneration of the lateral meniscus, which  is mildly extruded.     LIGAMENTS  Cruciate ligaments: The ACL appears attenuated with only a few intact  fibers. No associated bony contusion pattern most consistent with  chronic ACL tear. The posterior cruciate  ligament is intact and  unremarkable.  Medial supporting structures:  Lateral supporting structures: Intact.     EXTENSOR MECHANISM  Intact.     FLUID  Small joint effusion. No substantial Baker's cyst.     OSSEOUS and ARTICULAR STRUCTURES  Bones: No fracture, contusion, or osseous lesion is seen.     Patellofemoral compartment: Focal full-thickness cartilage fissuring  centered about the median ridge of the patella is associated mild bone  marrow edema, focal full-thickness cartilage fissure of the medial  trochlea     Medial compartment: Focal moderate grade cartilage fissuring centered  about the weightbearing aspect of the medial femoral condyle.     Lateral compartment: Focal full-thickness cartilage defect of the  lateral femoral condyle (series 6001).     ANCILLARY FINDINGS  None.                                                                      Impression:  1. Medial compartment: Complex degenerative tearing of the medial  meniscus with a predominant horizontal oblique component. Moderate  grade cartilage fissuring.  2. Lateral compartment: Intrasubstance degeneration of the lateral  meniscus, which is mildly extruded. Focal full-thickness articular  cartilage defect within the distal femoral condyle.  3. Patellofemoral joint: Focal full-thickness articular cartilage  fissure centered about the median ridge of the patella.  4. Chronic appearing ACL tear.     JUTTA ELLERMANN, MD     PROCEDURE  Large Joint Injection/Arthocentesis: R knee joint    Date/Time: 7/28/2023 4:36 PM    Performed by: Roshan Farr DO  Authorized by: Roshan Farr DO    Indications:  Pain  Needle Size:  22 G  Guidance: landmark guided    Approach:  Anterolateral  Location:  Knee      Medications:  40 mg triamcinolone 40 MG/ML; 2 mL bupivacaine (PF) 0.5 %  Outcome:  Tolerated well, no immediate complications  Procedure discussed: discussed risks, benefits, and alternatives    Consent Given by:  Patient  Prep: patient was prepped  and draped in usual sterile fashion      PROCEDURE  Intraarticular Knee Joint Injection - Right  The patient was informed of the risks and benefits of the procedure and alternatives were discussed. A written consent was signed by the patient.   The injection site was prepped with chlorhexidine in sterile fashion.   An injectate solution containing 2 mL of 1% lidocaine, 2 mL of 0.5% bupivacaine, and 1 mL of Kenalog (40 mg/mL) was drawn up into a 5 mL syringe.  Injection was performed using sterile technique.  A 1.5-inch 22-gauge needle was used to enter the knee joint using a lateral infrapatellar approach and injectate was injected successfully. After the injection, the site was cleaned and a bandage applied. The patient tolerated the procedure well without complications.         Assessment  1. Primary osteoarthritis of right knee    2. Degenerative tear of medial meniscus, right        Plan  Sunshine Delgado is a 57 year old female that presents for follow up of her right knee pain secondary to primary osteoarthritis and a degenerative medial meniscus tear.  Discussed the nature of the condition and treatment options and mutually agreed upon the following plan:    - Medications:         - Discussed pharmacologic options for pain relief. May continue to use Tylenol PRN, avoid NSAIDs due to kidney problems. May also use topical creams such as IcyHot, BioFreeze, or Voltaren gel PRN.   - Injections:         - Great relief from CSI last September, less relief at this most recent time. Interested in repeat injection today in hopes of similar relief, but also interested in additional injection options. Performed a corticosteroid injection of the right knee today in clinic. Patient tolerated well without complications. See procedure note above for details.    - Prior authorization submitted for viscosupplementation injection.  We will perform injection when approved by her insurance.  - Therapy:         - Discussed the  benefits of PT vs HEP.  Previously referred to physical therapy, no sessions attended.  Did not consistently perform HEP and now would like to participate in physical therapy.  Physical therapy referral placed today and instructions given for scheduling therapy sessions.   - Please call [phone] to schedule therapy appointments.  - Modalities:         - May use ice, heat, massage PRN.   - Bracing:         - Continue using the lateral J brace for walking and exacerbating activities as needed.  - Activity:         - Encouraged to remain active and participate in regular activities as symptoms allow with knee brace in place for stability.   - Follow up:         - In 3 months as needed for re-evaluation and update to treatment plan. Patient has clinic contact information for questions or concerns.       Roshan Farr DO, CAQSM  [facility] - Sports Medicine  [facility] - Department of Orthopedic Surgery       Again, thank you for allowing me to participate in the care of your patient.        Sincerely,        Roshan Farr DO

## 2023-07-31 ENCOUNTER — PRE VISIT (OUTPATIENT)
Dept: NEPHROLOGY | Facility: CLINIC | Age: 58
End: 2023-07-31
Payer: COMMERCIAL

## 2023-08-05 RX ORDER — BUPIVACAINE HYDROCHLORIDE 5 MG/ML
2 INJECTION, SOLUTION EPIDURAL; INTRACAUDAL
Status: DISCONTINUED | OUTPATIENT
Start: 2023-07-28 | End: 2024-05-30

## 2023-08-05 RX ORDER — TRIAMCINOLONE ACETONIDE 40 MG/ML
40 INJECTION, SUSPENSION INTRA-ARTICULAR; INTRAMUSCULAR
Status: DISCONTINUED | OUTPATIENT
Start: 2023-07-28 | End: 2024-05-30

## 2023-08-05 NOTE — PATIENT INSTRUCTIONS
Gianni Sunshine Delgado ,     A copy of your assessment and our treatment plan that we discussed together is included below, as written in your medical chart.   If you have any questions, please feel free to call the clinic.     --------------------------------------------------  Sunshine Delgado is a 57 year old female that presents for follow up of her right knee pain secondary to primary osteoarthritis and a degenerative medial meniscus tear.  Discussed the nature of the condition and treatment options and mutually agreed upon the following plan:    - Medications:         - Discussed pharmacologic options for pain relief. May continue to use Tylenol PRN, avoid NSAIDs due to kidney problems. May also use topical creams such as IcyHot, BioFreeze, or Voltaren gel PRN.   - Injections:         - Great relief from CSI last September, less relief at this most recent time. Interested in repeat injection today in hopes of similar relief, but also interested in additional injection options. Performed a corticosteroid injection of the right knee today in clinic. Patient tolerated well without complications. See procedure note above for details.    - Prior authorization submitted for viscosupplementation injection.  We will perform injection when approved by her insurance.  - Therapy:         - Discussed the benefits of PT vs HEP.  Previously referred to physical therapy, no sessions attended.  Did not consistently perform HEP and now would like to participate in physical therapy.  Physical therapy referral placed today and instructions given for scheduling therapy sessions.   - Please call [phone] to schedule therapy appointments.  - Modalities:         - May use ice, heat, massage PRN.   - Bracing:         - Continue using the lateral J brace for walking and exacerbating activities as needed.  - Activity:         - Encouraged to remain active and participate in regular activities as symptoms allow with knee brace in place  for stability.   - Follow up:         - In 3 months as needed for re-evaluation and update to treatment plan. Patient has clinic contact information for questions or concerns.   --------------------------------------------------    It was a pleasure seeing you today. Thank you for choosing [facility] for your care.       Roshan Farr DO, CAQSM  [facility] - Sports Medicine  [facility] - Department of Orthopedic Surgery     Disclaimer:  This note was prepared and written using Dragon Medical dictation software. As a result, there may be errors in the script that have gone undetected. Please consider this when interpreting the information in this note.

## 2023-08-07 ENCOUNTER — MYC MEDICAL ADVICE (OUTPATIENT)
Dept: FAMILY MEDICINE | Facility: CLINIC | Age: 58
End: 2023-08-07
Payer: COMMERCIAL

## 2023-08-08 ENCOUNTER — VIRTUAL VISIT (OUTPATIENT)
Dept: FAMILY MEDICINE | Facility: CLINIC | Age: 58
End: 2023-08-08
Payer: COMMERCIAL

## 2023-08-08 DIAGNOSIS — J18.9 PNEUMONIA OF RIGHT MIDDLE LOBE DUE TO INFECTIOUS ORGANISM: ICD-10-CM

## 2023-08-08 DIAGNOSIS — R53.83 FATIGUE, UNSPECIFIED TYPE: ICD-10-CM

## 2023-08-08 DIAGNOSIS — Z09 FOLLOW-UP EXAM AFTER TREATMENT: Primary | ICD-10-CM

## 2023-08-08 DIAGNOSIS — E66.01 MORBID OBESITY (H): ICD-10-CM

## 2023-08-08 PROCEDURE — 99212 OFFICE O/P EST SF 10 MIN: CPT | Mod: VID | Performed by: FAMILY MEDICINE

## 2023-08-08 NOTE — LETTER
August 8, 2023      Sunshine Delgado  [address] [place] [address]        To Whom It May Concern:    Sunshine Delgado started feeling unwell on 8/2/2023, she was treated in ER and is seen today for follow up. She is now feeling better enough to return to work.  She may return to work on 8/9/2023 without restrictions.      Sincerely,        Camden Conklin MD

## 2023-08-08 NOTE — PROGRESS NOTES
"Sunshine is a 58 year old who is being evaluated via a billable video visit.      How would you like to obtain your AVS? MyChart  If the video visit is dropped, the invitation should be resent by: Text to cell phone: [phone]  Will anyone else be joining your video visit? No      Assessment & Plan     Follow-up exam after treatment   Treated for pneumonia, doing well at this time    Pneumonia of right middle lobe due to infectious organism    Treated with doxycycline and improved    Fatigue, unspecified type   -  after illness anticipated to improve    Morbid obesity (H)  Estimated body mass index is 32.61 kg/m  as calculated from the following:    Height as of 7/14/23: 1.626 m (5' 4\").    Weight as of 7/28/23: 86.2 kg (190 lb).   Weight management plan: Discussed healthy diet and exercise guidelines    See Patient Instructions    Camden Conklin MD  [facility] JONATAN Gonsalez is a 58 year old, presenting for the following health issues:  ER F/U (Was seen at [facility] on 08/03/2023)      8/8/2023     5:04 PM   Additional Questions   Roomed by An V.         8/8/2023     5:04 PM   Patient Reported Additional Medications   Patient reports taking the following new medications taking: Doxycycline 100mg and Ondansetron 4mg       HPI     ED/UC Followup:    Facility:  [facility]  Date of visit: 08/03/2023  Reason for visit: Pneumonia   Current Status: N/A  8/3/2023:  Sunshine Delgado is a 58 Y female with a significant PMH of CKD, A-fib s/p ablation, s/p gastric sleeve who presents to the Emergency Department for evaluation of chest pain and abdominal pain. She has a low-grade fever but is otherwise hemodynamically stable. My initial concern was for appendicitis or another acute abdominal process. CT abdomen was obtained and negative for appendicitis, renal calculi, or bowel obstruction. It did show opacities in the " right middle lobe concerning for infiltrates. Labs are notable for leukocytosis with left shift. Normal urine and chemistry. Nonischemic ECG and negative troponin. Chest x-ray showed pneumonia of the right middle lobe. Pro-Noah was negative. She was given fluids and antinausea medication as above. She was p.o. challenged and was able to tolerate food and drink. Patient was reevaluated and notes she is feeling much better with the above measures. She is comfortable discharging home at this time with outpatient antibiotics for pneumonia. We will start her on doxycycline. We also sent her home with Zofran for nausea. We like her to follow-up with her primary care provider in 1 week to be rechecked. Return precautions were discussed and she demonstrated good understanding. All questions were answered to her satisfaction. She is discharged home in stable condition     doxycycline hyclate (VIBRAMYCIN) 100 mg oral Capsule   Indications: Pneumonia of right middle lobe due to infectious organism Take 1 Capsule (100 mg) by mouth every 12 hours for 5 days. 10 Capsule  0      Feeling better still feeling headache and fatigue   Onset with stomach pain, and fever   Been out since 8/2/23; ER told her could return on Monady  Been fever free for 24 hr  Can return tomorrow.  Works 8 hours a day    Objective           Vitals:  No vitals were obtained today due to virtual visit.    Physical Exam       Video-Visit Details    Type of service:  Video Visit     Originating Location (pt. Location): Home    Distant Location (provider location):  On-site  Platform used for Video Visit: [employer]

## 2023-08-17 ENCOUNTER — TELEPHONE (OUTPATIENT)
Dept: FAMILY MEDICINE | Facility: CLINIC | Age: 58
End: 2023-08-17
Payer: COMMERCIAL

## 2023-08-17 NOTE — TELEPHONE ENCOUNTER
Reason for Call:  Appointment Request    Patient requesting this type of appt:  Hospital/ED Follow-Up     Requested provider: Linn Montemayor    Reason patient unable to be scheduled: Not within requested timeframe    When does patient want to be seen/preferred time: 3-7 days    Comments: Patient would like to be seen asap due to still having issues with pneumonia.    Could we send this information to you in [facility] or would you prefer to receive a phone call?:   Patient would prefer a phone call   Okay to leave a detailed message?: Yes at Cell number on file:    Telephone Information:   Mobile [phone]       Call taken on 8/17/2023 at 4:08 PM by Reynold Costello

## 2023-08-18 ENCOUNTER — ANCILLARY PROCEDURE (OUTPATIENT)
Dept: GENERAL RADIOLOGY | Facility: CLINIC | Age: 58
End: 2023-08-18
Attending: PHYSICIAN ASSISTANT
Payer: COMMERCIAL

## 2023-08-18 ENCOUNTER — OFFICE VISIT (OUTPATIENT)
Dept: FAMILY MEDICINE | Facility: CLINIC | Age: 58
End: 2023-08-18
Payer: COMMERCIAL

## 2023-08-18 VITALS
HEIGHT: 64 IN | RESPIRATION RATE: 22 BRPM | TEMPERATURE: 98.2 F | BODY MASS INDEX: 39.2 KG/M2 | SYSTOLIC BLOOD PRESSURE: 116 MMHG | WEIGHT: 229.6 LBS | DIASTOLIC BLOOD PRESSURE: 82 MMHG | OXYGEN SATURATION: 97 % | HEART RATE: 89 BPM

## 2023-08-18 DIAGNOSIS — R07.9 CHEST PAIN, UNSPECIFIED TYPE: ICD-10-CM

## 2023-08-18 DIAGNOSIS — J18.9 PNEUMONIA OF RIGHT MIDDLE LOBE DUE TO INFECTIOUS ORGANISM: Primary | ICD-10-CM

## 2023-08-18 DIAGNOSIS — M94.0 COSTOCHONDRITIS: ICD-10-CM

## 2023-08-18 DIAGNOSIS — J18.9 PNEUMONIA OF RIGHT MIDDLE LOBE DUE TO INFECTIOUS ORGANISM: ICD-10-CM

## 2023-08-18 DIAGNOSIS — K76.0 HEPATIC STEATOSIS: ICD-10-CM

## 2023-08-18 LAB — D DIMER PPP FEU-MCNC: 0.3 UG/ML FEU (ref 0–0.5)

## 2023-08-18 PROCEDURE — 71046 X-RAY EXAM CHEST 2 VIEWS: CPT | Mod: TC | Performed by: RADIOLOGY

## 2023-08-18 PROCEDURE — 36415 COLL VENOUS BLD VENIPUNCTURE: CPT | Performed by: PHYSICIAN ASSISTANT

## 2023-08-18 PROCEDURE — 99214 OFFICE O/P EST MOD 30 MIN: CPT | Performed by: PHYSICIAN ASSISTANT

## 2023-08-18 PROCEDURE — 85379 FIBRIN DEGRADATION QUANT: CPT | Performed by: PHYSICIAN ASSISTANT

## 2023-08-18 ASSESSMENT — ENCOUNTER SYMPTOMS
NAUSEA: 1
DIAPHORESIS: 1
SINUS PRESSURE: 1
APPETITE CHANGE: 1
SINUS PAIN: 0
COUGH: 1
VOMITING: 0
FEVER: 1
HEADACHES: 1
ABDOMINAL PAIN: 0
FATIGUE: 1

## 2023-08-18 ASSESSMENT — PAIN SCALES - GENERAL: PAINLEVEL: NO PAIN (0)

## 2023-08-18 NOTE — PROGRESS NOTES
Assessment & Plan       Pneumonia of right middle lobe due to infectious organism  Chest pain, unspecified type  Costochondritis  Patient is a 58-year-old female who presents to clinic for follow-up regarding diagnosis of pneumonia and ongoing chest pain, fatigue, cough, fever, congestion, headache.  Patient initially started treatment 8/3/2023 with doxycycline.  Symptoms have not improved significantly despite treatment.  Vital signs are normal.  Physical exam significant for chest pain reproduction with AP compression of chest wall.    Reproduction of chest pain with AP compression of chest wall suggest costochondritis.  Recommended Tylenol/ibuprofen and ice for management.    Chest x-ray completed and does not show consolidation, but as patient has ongoing pneumonia symptoms that have not improved, will prescribe second antibiotic,  Augmentin.  Additionally, cannot rule out PE and will complete Ddimer.  Patient is aware that completing this in clinic will delay care if she does have PE.  Patient would like to proceed and knows to go directly to emergency department for further evaluation if D-dimer is positive.    Return and urgent follow-up precautions provided.  - XR Chest 2 Views; Future  - D dimer, quantitative; Future  - amoxicillin-clavulanate (AUGMENTIN) 875-125 MG tablet; Take 1 tablet by mouth 2 times daily for 7 days      Hepatic steatosis  Patient notes that she was mailed a copy of her CT results and she is unsure of the findings.  CT reviewed and hepatic steatosis was noted.  Patient was unaware of fatty liver diagnosis.  Discussed that this often has to do with lifestyle measures and recommended weight loss, healthy diet changes, and regular exercise.  Additionally, recommended scheduling follow-up visit with primary care provider for ongoing management.       See Patient Instructions    Teresa Wilkinson PA-C  [facility] LAURA Gonsalez is a 58 year old, presenting for  the following health issues:  Cough (Patient has pneumonia concerns. Symptoms improved greatly with the antibiotics. Patient is having chest pain. It also hurts to breathe she is coughing. And when patient is laying down symptoms are much worse.)      8/18/2023     3:31 PM   Additional Questions   Roomed by Linn OROZCO MA   Accompanied by No one         8/18/2023     3:31 PM   Patient Reported Additional Medications   Patient reports taking the following new medications omeprazole.       History of Present Illness       Reason for visit:  Pneumonia in early August feel like it hasn t gotten better    She eats 2-3 servings of fruits and vegetables daily.She consumes 0 sweetened beverage(s) daily.She exercises with enough effort to increase her heart rate 30 to 60 minutes per day.  She exercises with enough effort to increase her heart rate 5 days per week.   She is taking medications regularly.       Patient notes significant chest pain with lying down as well as back pain which improves when she sits. Patient notes intermittent low grade fever around 100.  No cough, but patient notes persistent headache and sore throat. She tested multiple times at home for COVID19 and tests were negative. No history of PE/DVT. No calf pain. Patient states she feels around 10% better since diagnosis and treatment with antibiotics.      Patient notes she has had COVID19 3 times. She did have Delta and was hospitalized. She had new diagnosis of A. Fib and hashimoto's since. She notes her health had declined since COVID19.     Per chart review, patient evaluated in emergency department 8/3/2023 and diagnosed with pneumonia of right middle lobe. EKG and troponin completed and with sign of MI or arrhythmia. WBC 13. CT ABD/Pelvis was completed:    IMPRESSION: 1. No definite evidence of acute inflammatory process in the abdomen or pelvis. No obstructive renal calculi, bowel obstruction, or appendicitis.. 2. Hepatic steatosis. 3. Gastric  "postoperative changes. 4. Additional nonemergent findings as detailed.      She was prescribed doxycycline and Zofran.  At the time she had 3 days of nausea, vomiting, diarrhea, body aches, and fever ranging from 101-103.  She had abdominal pain in the right lower quadrant.  She then completed virtual visit 8/8/2023 and noted she was feeling better but still had headaches and fatigue.  Yesterday, patient sent a CapsoVisionhart message noting chest pain and back in front with breathing as well as cough.      Review of Systems   Constitutional:  Positive for appetite change, diaphoresis, fatigue and fever.   HENT:  Positive for congestion and sinus pressure. Negative for sinus pain.    Respiratory:  Positive for cough.    Cardiovascular:  Positive for chest pain.   Gastrointestinal:  Positive for nausea (intermittent). Negative for abdominal pain and vomiting.   Neurological:  Positive for headaches.            Objective    /82   Pulse 89   Temp 98.2  F (36.8  C) (Temporal)   Resp 22   Ht 1.624 m (5' 3.94\")   Wt 104.1 kg (229 lb 9.6 oz)   LMP  (LMP Unknown)   SpO2 97%   Breastfeeding No   BMI 39.49 kg/m    Body mass index is 39.49 kg/m .  Physical Exam  Vitals and nursing note reviewed.   Constitutional:       General: She is not in acute distress.     Appearance: Normal appearance.   HENT:      Head: Normocephalic and atraumatic.      Mouth/Throat:      Mouth: Mucous membranes are moist.      Pharynx: Oropharynx is clear.   Eyes:      Extraocular Movements: Extraocular movements intact.      Pupils: Pupils are equal, round, and reactive to light.   Cardiovascular:      Rate and Rhythm: Normal rate and regular rhythm.      Heart sounds: Normal heart sounds. No murmur heard.  Pulmonary:      Effort: Pulmonary effort is normal. No respiratory distress.      Breath sounds: Normal breath sounds. No wheezing, rhonchi or rales.   Musculoskeletal:         General: Normal range of motion.      Cervical back: Normal " range of motion.      Comments: Chest pain reproduced with AP compression of chest wall   Skin:     General: Skin is warm and dry.   Neurological:      General: No focal deficit present.      Mental Status: She is alert.   Psychiatric:         Mood and Affect: Mood normal.         Behavior: Behavior normal.            CXR -   IMPRESSION: There are no acute infiltrates. The cardiac silhouette is  not enlarged. Pulmonary vasculature is unremarkable.

## 2023-08-18 NOTE — PATIENT INSTRUCTIONS
Your x-ray does not show any worsening pneumonia.  Given your symptoms, I think we should start a second antibiotic, Augmentin.  I sent this to the [facility] pharmacy.  Your chest pain could be due to inflammation of the cartilage called costochondritis.  To treat this you can use Tylenol and ice.  With your chest pain and your intermittent fever, I also think we should check for a blood clot.      I am completing lab work today.  The results will be sent to your MyChart.  If this lab work, the D-dimer, returns abnormal/elevated, you could have a blood clot, and you must go directly to the emergency department for further evaluation.    As we discussed, and the CT scan completed in the emergency department does show hepatic steatosis which is fatty liver.  I did add a handout about this at the back of your back it.  I would like you to schedule a follow-up visit with your primary care provider for further discussion and treatment.  Often the treatment plan for hepatic steatosis includes weight loss and dietary changes.    Please reach out with questions or concerns.  Follow-up in clinic for new or worsening symptoms.

## 2023-08-18 NOTE — TELEPHONE ENCOUNTER
Pt returned call. Note that pt had a hospital follow up appt on 8/8/23 with Dr. Conklin. No openings at  today. Assisted with scheduling appt at .    Patsy Raya RN  [facility]

## 2023-08-18 NOTE — TELEPHONE ENCOUNTER
Lvm requesting patient calls back to schedule. Did inform patient that if PCP is not available within requested time frame we could look at other providers schedules as well. Please assist patient with scheduling.    Paz Wilson -    [facility]

## 2023-10-12 ENCOUNTER — TELEPHONE (OUTPATIENT)
Dept: FAMILY MEDICINE | Facility: CLINIC | Age: 58
End: 2023-10-12

## 2023-10-12 NOTE — TELEPHONE ENCOUNTER
Patient Quality Outreach    Patient is due for the following:   Depression  -  PHQ-9 needed and Depression follow-up visit      Topic Date Due    Hepatitis B Vaccine (1 of 3 - 3-dose series) Never done    Flu Vaccine (1) 09/01/2023    COVID-19 Vaccine (5 - 2023-24 season) 09/01/2023       Next Steps:   Schedule a office visit for Depression Check    Type of outreach:    Sent Nymirum message.      Questions for provider review:    None           An Ocasio, [facility]  Chart routed to none.

## 2023-10-12 NOTE — LETTER
November 21, 2023    To  Sunshine Delgado  [address]  [place] [address]    Your team at [facility] cares about your health. We have reviewed your chart and based on our findings; we are making the following recommendations to better manage your health.     You are in particular need of attention regarding the following:     Schedule an office visit with your PRIMARY CARE PHYSICIAN for mental health follow-up.  Complete the attached questionnaires to ensure your mental health needs are being properly met.  Please fill them out and send back to us via Vputi, and feel free to call us with any questions or concerns at [phone].      If you have already completed these items, please contact the clinic via phone or   Vputi so your care team can review and update your records. Thank you for   choosing [facility] for your healthcare needs. For any questions,   concerns, or to schedule an appointment please contact our clinic.    Healthy Regards,      Your [facility] Care Team/AV

## 2023-10-18 DIAGNOSIS — N95.1 MENOPAUSAL SYNDROME (HOT FLASHES): ICD-10-CM

## 2023-10-18 DIAGNOSIS — F99 INSOMNIA DUE TO OTHER MENTAL DISORDER: ICD-10-CM

## 2023-10-18 DIAGNOSIS — F51.05 INSOMNIA DUE TO OTHER MENTAL DISORDER: ICD-10-CM

## 2023-10-18 RX ORDER — TRAZODONE HYDROCHLORIDE 50 MG/1
100 TABLET, FILM COATED ORAL AT BEDTIME
Qty: 180 TABLET | Refills: 1 | Status: SHIPPED | OUTPATIENT
Start: 2023-10-18 | End: 2024-04-10

## 2023-10-18 RX ORDER — ESTRADIOL 0.5 MG/1
0.5 TABLET ORAL DAILY
Qty: 90 TABLET | Refills: 1 | Status: SHIPPED | OUTPATIENT
Start: 2023-10-18 | End: 2024-04-29

## 2023-11-08 ENCOUNTER — PATIENT OUTREACH (OUTPATIENT)
Dept: CARE COORDINATION | Facility: CLINIC | Age: 58
End: 2023-11-08
Payer: COMMERCIAL

## 2023-11-09 ENCOUNTER — TELEPHONE (OUTPATIENT)
Dept: CARDIOLOGY | Facility: CLINIC | Age: 58
End: 2023-11-09
Payer: COMMERCIAL

## 2023-11-09 NOTE — TELEPHONE ENCOUNTER
11/9 [facility] to schedule return EP DORIE visit with Gilberto or Nick Roland - pm

## 2023-11-20 ENCOUNTER — TELEPHONE (OUTPATIENT)
Dept: CARDIOLOGY | Facility: CLINIC | Age: 58
End: 2023-11-20
Payer: COMMERCIAL

## 2023-11-20 NOTE — TELEPHONE ENCOUNTER
11/9 [facility] to schedule return EP DORIE visit with Gilberto or Nick Roland - pm   X2 (final attempt) referral cancelled because of max amount of attempts reached to contact pt Maryjane Peña on 11/20/2023 at 2:01 PM

## 2023-11-21 ENCOUNTER — HOSPITAL ENCOUNTER (OUTPATIENT)
Dept: MAMMOGRAPHY | Facility: CLINIC | Age: 58
Discharge: HOME | End: 2023-11-21
Attending: PHYSICIAN ASSISTANT
Payer: COMMERCIAL

## 2023-11-21 DIAGNOSIS — R92.8 BI-RADS CATEGORY 3 MAMMOGRAM RESULT: ICD-10-CM

## 2023-11-21 PROCEDURE — 77062 BREAST TOMOSYNTHESIS BI: CPT

## 2023-11-21 PROCEDURE — 76642 ULTRASOUND BREAST LIMITED: CPT | Mod: LT

## 2023-11-21 NOTE — TELEPHONE ENCOUNTER
Patient Quality Outreach    Patient is due for the following:   Depression  -  PHQ-9 needed and Depression follow-up visit      Topic Date Due    Hepatitis B Vaccine (1 of 3 - 3-dose series) Never done    Flu Vaccine (1) 09/01/2023    COVID-19 Vaccine (5 - 2023-24 season) 09/01/2023       Next Steps:   Schedule a office visit for Depression check with PHQ-9    Type of outreach:    Sent letter.    Next Steps:  Reach out within 90 days via MyChart and Letter.    Max number of attempts reached: Yes. Will try again in 90 days if patient still on fail list.    Questions for provider review:    None           An Amarjit, [facility]  Chart routed to none.

## 2023-12-05 NOTE — TELEPHONE ENCOUNTER
REFERRAL INFORMATION:  Referring Provider:  Referring Clinic:  Reason for Visit/Diagnosis: Recent BMI 39.5; gastric sleeve in 2018 with Dr. Joseph       FUTURE VISIT INFORMATION:  Appointment Date: 1/23/2024  Appointment Time: 2 PM     NOTES RECORD STATUS  DETAILS   OFFICE NOTE from Referring Provider N/A    OFFICE NOTE from Other Specialists Internal [facility]:  8/18/23 - PCC OV with SUPRIYA Boland    [facility]:  10/26/22 - GEN SURG OV with Noa Correa   [facility] DISCHARGE SUMMARY/ ED VISITS  N/A    OPERATIVE REPORT Internal [facility]:  3/13/18 - OP Note for LAPAROSCOPIC SLEEVE GASTRECTOMY with Dr. Joseph   ENDOSCOPY (EGD)  Received [facility]:  2/19/15 - EGD   PERTINENT LABS Care Everywhere / Internal    PATHOLOGY REPORTS (RELATED) Internal [facility]:  3/13/18  - Gastrectomy (Case: [id number])   IMAGING (CT, MRI, US, XR)  Received / Internal [facility]:  8/18/23 - XR Chest  4/22/23 - CT Cchest/Abd/Pelvis    [facility]:  8/3/23 - XR Chest  8/3/23 - CT Abd/Pelvis     Records Requested    Facility  [facility]  Fax: [phone]   Outcome * 12/5/23 10:53 AM Faxed request to  for images to be pushed to [facility] PACs. - [place]    * 12/27/23 7:45 AM Images received from [facility] and attached to the patient in PACs. Lanette Goel

## 2023-12-28 ENCOUNTER — APPOINTMENT (OUTPATIENT)
Dept: LAB | Facility: OTHER | Age: 58
End: 2023-12-28
Payer: COMMERCIAL

## 2023-12-28 ENCOUNTER — VIRTUAL VISIT (OUTPATIENT)
Dept: FAMILY MEDICINE | Facility: CLINIC | Age: 58
End: 2023-12-28
Payer: COMMERCIAL

## 2023-12-28 DIAGNOSIS — J11.1 INFLUENZA-LIKE ILLNESS: ICD-10-CM

## 2023-12-28 DIAGNOSIS — R05.9 COUGH, UNSPECIFIED TYPE: Primary | ICD-10-CM

## 2023-12-28 DIAGNOSIS — R50.9 FEVER, UNSPECIFIED FEVER CAUSE: ICD-10-CM

## 2023-12-28 LAB
DEPRECATED S PYO AG THROAT QL EIA: NEGATIVE
FLUAV AG SPEC QL IA: NEGATIVE
FLUBV AG SPEC QL IA: NEGATIVE
GROUP A STREP BY PCR: NOT DETECTED

## 2023-12-28 PROCEDURE — 99213 OFFICE O/P EST LOW 20 MIN: CPT | Mod: VID | Performed by: NURSE PRACTITIONER

## 2023-12-28 PROCEDURE — 87635 SARS-COV-2 COVID-19 AMP PRB: CPT | Mod: VID | Performed by: NURSE PRACTITIONER

## 2023-12-28 PROCEDURE — 87651 STREP A DNA AMP PROBE: CPT | Mod: VID | Performed by: NURSE PRACTITIONER

## 2023-12-28 PROCEDURE — 87804 INFLUENZA ASSAY W/OPTIC: CPT | Mod: VID | Performed by: NURSE PRACTITIONER

## 2023-12-28 RX ORDER — BENZONATATE 200 MG/1
200 CAPSULE ORAL 3 TIMES DAILY PRN
Qty: 30 CAPSULE | Refills: 0 | Status: SHIPPED | OUTPATIENT
Start: 2023-12-28 | End: 2024-01-23

## 2023-12-28 RX ORDER — CODEINE PHOSPHATE AND GUAIFENESIN 10; 100 MG/5ML; MG/5ML
1-2 SOLUTION ORAL
Qty: 70 ML | Refills: 0 | Status: SHIPPED | OUTPATIENT
Start: 2023-12-28 | End: 2024-01-23

## 2023-12-28 ASSESSMENT — PATIENT HEALTH QUESTIONNAIRE - PHQ9
SUM OF ALL RESPONSES TO PHQ QUESTIONS 1-9: 3
10. IF YOU CHECKED OFF ANY PROBLEMS, HOW DIFFICULT HAVE THESE PROBLEMS MADE IT FOR YOU TO DO YOUR WORK, TAKE CARE OF THINGS AT HOME, OR GET ALONG WITH OTHER PEOPLE: NOT DIFFICULT AT ALL
SUM OF ALL RESPONSES TO PHQ QUESTIONS 1-9: 3

## 2023-12-28 NOTE — LETTER
December 28, 2023      Sunshine Delgado  [address]   [place] [address]        To Whom It May Concern:      Sunshine Delgado  was seen on 12/28/23.  Please excuse her  until 1//02/24 due to illness.        Sincerely,      Merlene Aleman, APRN, CNP

## 2023-12-28 NOTE — PROGRESS NOTES
Sunshine is a 58 year old who is being evaluated via a billable video visit.      How would you like to obtain your AVS? MyChart  If the video visit is dropped, the invitation should be resent by: Text to cell phone: [phone]  Will anyone else be joining your video visit? No        Assessment & Plan     Sunshine was seen today for cough and pharyngitis.    Diagnoses and all orders for this visit:    Cough, unspecified type  Influenza-like illness  Fever, unspecified fever cause  Patient education completed regarding viral cause vs. Strep infection - will plan further testing for strep, influenza and covid.    Recommend cough suppressants and continued OTC treatment with Tylenol/Ibuprofen as needed for fever.    -     benzonatate (TESSALON) 200 MG capsule; Take 1 capsule (200 mg) by mouth 3 times daily as needed for cough  -     guaiFENesin-codeine (ROBITUSSIN AC) 100-10 MG/5ML solution; Take 5-10 mLs by mouth nightly as needed for cough  -     Influenza A & B Antigen - Clinic Collect  -     Streptococcus A Rapid Screen w/Reflex to PCR - Clinic Collect  -     Symptomatic COVID-19 Virus (Coronavirus) by PCR Nose  -     Group A Streptococcus PCR Throat Swab    Return in about 1 week (around 1/4/2024) for No improvement or sooner with worsening symptoms.      Merlene Aleman, LEOPOLDO [facility]   Sunshine is a 58 year old, presenting for the following health issues:  Cough and Pharyngitis        12/28/2023     2:42 PM   Additional Questions   Roomed by Cheryl HOPE       HPI     Acute Illness    Patient reports onset of symptoms on Monday evening - 12/25/23 - with onset of cough then progressed to body aches, feverish, sick to stomach/vomiting, body aches and cluster headaches.    Cough is deep into throat and chest, non-productive.  No shortness of breath or wheezing.   Fever present still today.    History of pneumonia in August.     Acute illness concerns: cough, fever, sore  throat .   Onset/Duration: x 4 days . 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      Objective           Vitals:  No vitals were obtained today due to virtual visit.    Physical Exam     GENERAL: Healthy, alert and no acute distress, appears ill  EYES: Eyes grossly normal to inspection.  No discharge or erythema, or obvious scleral/conjunctival abnormalities.  RESP: No audible wheeze or visible cyanosis.  +Harsh cough.  No visible retractions or increased work of breathing.    SKIN: Visible skin clear. No significant rash, abnormal pigmentation or lesions.  NEURO: Cranial nerves grossly intact.  Mentation and speech appropriate for age.  PSYCH: Mentation appears normal, affect normal/bright, judgement and insight intact, normal speech and appearance well-groomed.            Video-Visit Details    Type of service:  Video Visit     Originating Location (pt. Location): Home  Distant Location (provider location):  Off-site  Platform used for Video Visit: [employer]

## 2023-12-29 LAB — SARS-COV-2 RNA RESP QL NAA+PROBE: POSITIVE

## 2023-12-29 NOTE — RESULT ENCOUNTER NOTE
Dear Sunshine,     Reassuring negative strep and Influenza testing.  We are waiting on results of COVID-19.      Thank you,     Merlene Aleman, LEOPOLDO, CNP  [facility]

## 2023-12-30 ENCOUNTER — TELEPHONE (OUTPATIENT)
Dept: NURSING | Facility: CLINIC | Age: 58
End: 2023-12-30
Payer: COMMERCIAL

## 2023-12-30 NOTE — TELEPHONE ENCOUNTER
Patient calling for her covid test results. Advised it was positive. Patient declining triage as she is feeling better.     IVA FELIPE RN

## 2023-12-30 NOTE — TELEPHONE ENCOUNTER
Patient classified as COVID treatment eligible by Epic high risk algorithm:  Yes    Coronavirus (COVID-19) Notification    Reason for call  Notify of POSITIVE COVID-19 lab result, assess symptoms,  review [facility] recommendations    Lab Result   Lab test for 2019-nCoV rRt-PCR or SARS-COV-2 PCR  Oropharyngeal AND/OR nasopharyngeal swabs were POSITIVE for 2019-nCoV RNA [OR] SARS-COV-2 RNA (COVID-19) RNA     We have been unable to reach patient by phone at this time to notify of their Positive COVID-19 result.    Left voicemail message requesting a call back to [phone] [facility] for results.        A Positive COVID-19 letter will be sent via [employer] or the mail.    Emerald Rosario

## 2024-01-08 ENCOUNTER — OFFICE VISIT (OUTPATIENT)
Dept: FAMILY MEDICINE | Facility: CLINIC | Age: 59
End: 2024-01-08
Payer: COMMERCIAL

## 2024-01-08 VITALS
WEIGHT: 224.8 LBS | SYSTOLIC BLOOD PRESSURE: 114 MMHG | RESPIRATION RATE: 20 BRPM | DIASTOLIC BLOOD PRESSURE: 80 MMHG | HEART RATE: 94 BPM | OXYGEN SATURATION: 98 % | BODY MASS INDEX: 38.38 KG/M2 | HEIGHT: 64 IN | TEMPERATURE: 97.2 F

## 2024-01-08 DIAGNOSIS — L02.415 ABSCESS OF RIGHT THIGH: Primary | ICD-10-CM

## 2024-01-08 PROCEDURE — 10061 I&D ABSCESS COMP/MULTIPLE: CPT | Performed by: PHYSICIAN ASSISTANT

## 2024-01-08 PROCEDURE — 99213 OFFICE O/P EST LOW 20 MIN: CPT | Mod: 25 | Performed by: PHYSICIAN ASSISTANT

## 2024-01-08 RX ORDER — LIDOCAINE HYDROCHLORIDE AND EPINEPHRINE 10; 10 MG/ML; UG/ML
4 INJECTION, SOLUTION INFILTRATION; PERINEURAL ONCE
Status: COMPLETED | OUTPATIENT
Start: 2024-01-08 | End: 2024-01-08

## 2024-01-08 RX ORDER — DOXYCYCLINE 100 MG/1
100 CAPSULE ORAL 2 TIMES DAILY
Qty: 20 CAPSULE | Refills: 0 | Status: SHIPPED | OUTPATIENT
Start: 2024-01-08 | End: 2024-01-18

## 2024-01-08 RX ADMIN — LIDOCAINE HYDROCHLORIDE AND EPINEPHRINE 4 ML: 10; 10 INJECTION, SOLUTION INFILTRATION; PERINEURAL at 17:28

## 2024-01-08 ASSESSMENT — PAIN SCALES - GENERAL: PAINLEVEL: WORST PAIN (10)

## 2024-01-08 NOTE — LETTER
January 8, 2024      Sunshine Delgado  [address]   [place] [address]        To Whom It May Concern:    Sunshine Delgado  was seen on 1/8/24.  Please excuse her absence today due to illness.        Sincerely,        SUPRIYA Gross

## 2024-01-08 NOTE — PATIENT INSTRUCTIONS
Berenice Gonsalez,    Thank you for allowing [facility] to manage your care.    Do soapy water soaks or a washcloth to the area at least twice daily for 5-10 min.    For your pain, please use Tylenol 650mg every 6 hours as needed. Max acetaminophen (Tylenol) 4,000mg/24 hours    If you develop worsening/changing symptoms at any time, please be seen in clinic/urgent care or call 911/go to the emergency department for evaluation as we discussed.    I sent your prescriptions to your pharmacy. Take a probiotic pill, eat live culture yogurt (Greek yogurt or Activia), drink kefir daily for one week after finishing the antibiotics to encourage growth of good bacteria in your system.    I made a referral to general surgery for as needed cyst removal once this is healed. Please call the specialty number on your after visit summary.     If you have any questions or concerns, please feel free to call us at [phone]    Sincerely,    Nelson Sam PA-C    Did you know?      You can schedule a video visit for follow-up appointments as well as future appointments for certain conditions.  Please see the below link.     [email or web address]    If you have not already done so,  I encourage you to sign up for Private Practicehart ([email or web address]).  This will allow you to review your results, securely communicate with a provider, and schedule virtual visits as well.

## 2024-01-08 NOTE — PROGRESS NOTES
Assessment & Plan   Problem List Items Addressed This Visit    None  Visit Diagnoses       Abscess of right thigh    -  Primary    Relevant Medications    doxycycline hyclate (VIBRAMYCIN) 100 MG capsule    Other Relevant Orders    Adult General Surg Referral           It is my impression based on the historical events and the physical exam that this is an abscess.  It was drained as below. There is surrounding cellulitis.  I do not believe the patient has underlying necrotizing fasciitis. Appears well and non-toxic and I have low suspicion for systemic illness. Will treat with wound care, soapy water soaks, otc analgesics, course of doxycycline and surgery follow up once healed for possible sebaceous cyst excision as patient feels there has been a cyst present at this site in the past.  Referral placed. Follow up with us in 2-3 days if not improving.    Complete history and physical exam as below. Afebrile with normal vital signs.    DDx and Dx discussed with and explained to the pt to their satisfaction.  All questions were answered at this time. Pt expressed understanding of and agreement with this dx, tx, and plan. No further workup warranted and standard medication warnings given. I have given the patient a list of pertinent indications for re-evaluation. Will go to the Emergency Department if symptoms worsen or new concerning symptoms arise. Patient left in no apparent distress.     Prescription drug management  See Patient Instructions    SUPRIYA Gross  [facility] LAURA Gonsalez is a 58 year old, presenting for the following health issues:  Cyst        1/8/2024    10:09 AM   Additional Questions   Roomed by Johnathan Estevez CMA   Accompanied by N/A         1/8/2024    10:09 AM   Patient Reported Additional Medications   Patient reports taking the following new medications No new medications       History of Present Illness       Reason for visit:  Cyst  Symptom onset:   "More than a month    She eats 2-3 servings of fruits and vegetables daily.She consumes 0 sweetened beverage(s) daily.She exercises with enough effort to increase her heart rate 30 to 60 minutes per day.  She exercises with enough effort to increase her heart rate 4 days per week.   She is taking medications regularly.     Patient states that cyst appeared around October but got bumped and drained. Since then it has gotten bigger and thicker but with no drainage.  Cyst is also causing pain.  Cyst is located on the back of the right thigh. 10/10 for pain. No fevers,chills. Associated nausea.    Review of Systems   Constitutional, HEENT, cardiovascular, pulmonary, gi and gu systems are negative, except as otherwise noted.      Objective    /80   Pulse 94   Temp 97.2  F (36.2  C) (Temporal)   Resp 20   Ht 1.626 m (5' 4.02\")   Wt 102 kg (224 lb 12.8 oz)   LMP  (LMP Unknown)   SpO2 98%   BMI 38.57 kg/m    Body mass index is 38.57 kg/m .  Physical Exam  Vitals and nursing note reviewed.   Constitutional:       General: She is not in acute distress.     Appearance: Normal appearance. She is not diaphoretic.   HENT:      Head: Normocephalic and atraumatic.      Nose: Nose normal.   Eyes:      Conjunctiva/sclera: Conjunctivae normal.   Pulmonary:      Effort: Pulmonary effort is normal. No respiratory distress.   Skin:     General: Skin is dry.      Coloration: Skin is not jaundiced or pale.      Comments: RLE: ~10cm area of erythema, warmth and fluctuance present to the posterior mid thigh. Distal CMS intact. Remainder of limb non-tender.    Neurological:      General: No focal deficit present.      Mental Status: She is alert. Mental status is at baseline.   Psychiatric:         Mood and Affect: Mood normal.         Behavior: Behavior normal.          PROCEDURE:    Incision and Drainage  INDICATIONS:    Localized abscess  PROCEDURE PROVIDER: Flaco  CONSENT:   Risks, benefits and alternatives were discussed " with patient and consent for procedure was obtained verbally.    SITE:    right posterior thigh  MEDICATION:    1% lidocaine with epinephrine.  Injecting 4 mls.  NOTE:    The area was prepped with chlorhexidine and draped off in the usual sterile fashion.  Local anesthetic was injected subcutaneously with anesthesia effects demonstrated prior to proceeding.  The area of maximal fluctuance was opened with a #11 blade using a single straight incision to allow for drainage of copious blood tinged yellow pus.  The abscess was drained.  The abscess cavity was bluntly explored to separate any loculations. A sterile dressing was placed over the area with bacitracin ointment and there was no further bleeding.  COMPLEXITY:    complex  COMPLICATIONS:     None

## 2024-01-09 ENCOUNTER — TELEPHONE (OUTPATIENT)
Dept: SURGERY | Facility: CLINIC | Age: 59
End: 2024-01-09
Payer: COMMERCIAL

## 2024-01-09 NOTE — TELEPHONE ENCOUNTER
[facility] Call Center    Phone Message    May a detailed message be left on voicemail: yes     Reason for Call: Appointment Intake    Referring Provider Name:     Arnav Sam PA     Diagnosis and/or Symptoms: Abscess of right thigh - Dx not listed on guidelines - sending for review - thank you!     Action Taken: Message routed to:  Other: [facility] Surg    Travel Screening: Not Applicable

## 2024-01-23 ENCOUNTER — PRE VISIT (OUTPATIENT)
Dept: ENDOCRINOLOGY | Facility: CLINIC | Age: 59
End: 2024-01-23

## 2024-01-23 ENCOUNTER — VIRTUAL VISIT (OUTPATIENT)
Dept: ENDOCRINOLOGY | Facility: CLINIC | Age: 59
End: 2024-01-23
Payer: COMMERCIAL

## 2024-01-23 VITALS — WEIGHT: 223 LBS | HEIGHT: 64 IN | BODY MASS INDEX: 38.07 KG/M2

## 2024-01-23 DIAGNOSIS — Z98.84 S/P LAPAROSCOPIC SLEEVE GASTRECTOMY: Primary | ICD-10-CM

## 2024-01-23 DIAGNOSIS — G47.33 OSA (OBSTRUCTIVE SLEEP APNEA): Chronic | ICD-10-CM

## 2024-01-23 DIAGNOSIS — E66.812 CLASS 2 SEVERE OBESITY WITH SERIOUS COMORBIDITY AND BODY MASS INDEX (BMI) OF 38.0 TO 38.9 IN ADULT, UNSPECIFIED OBESITY TYPE (H): ICD-10-CM

## 2024-01-23 DIAGNOSIS — E06.3 HASHIMOTO'S THYROIDITIS: ICD-10-CM

## 2024-01-23 DIAGNOSIS — K21.9 GASTROESOPHAGEAL REFLUX DISEASE, UNSPECIFIED WHETHER ESOPHAGITIS PRESENT: ICD-10-CM

## 2024-01-23 DIAGNOSIS — E66.01 CLASS 2 SEVERE OBESITY WITH SERIOUS COMORBIDITY AND BODY MASS INDEX (BMI) OF 38.0 TO 38.9 IN ADULT, UNSPECIFIED OBESITY TYPE (H): ICD-10-CM

## 2024-01-23 PROCEDURE — 99417 PROLNG OP E/M EACH 15 MIN: CPT | Mod: 95

## 2024-01-23 PROCEDURE — 99215 OFFICE O/P EST HI 40 MIN: CPT | Mod: 95

## 2024-01-23 RX ORDER — PANTOPRAZOLE SODIUM 40 MG/1
40 TABLET, DELAYED RELEASE ORAL DAILY
Qty: 30 TABLET | Refills: 3 | Status: SHIPPED | OUTPATIENT
Start: 2024-01-23 | End: 2024-04-09

## 2024-01-23 RX ORDER — NALTREXONE HYDROCHLORIDE 50 MG/1
TABLET, FILM COATED ORAL
Qty: 30 TABLET | Refills: 3 | Status: SHIPPED | OUTPATIENT
Start: 2024-01-23 | End: 2024-04-09

## 2024-01-23 ASSESSMENT — PAIN SCALES - GENERAL: PAINLEVEL: NO PAIN (0)

## 2024-01-23 NOTE — LETTER
2024       RE: Sunshine Delgado  [address] [place] [address]     Dear Colleague,    Thank you for referring your patient, Sunshine Delgado, to the [facility]NAG[facility]INIC [facility]. Please see a copy of my visit note below.    Virtual Visit Details    Type of service:  Video Visit     Originating Location (pt. Location): Home    Distant Location (provider location):  Off-site  Platform used for Video Visit: [facility] Bariatric Surgery Note    RE: Sunshine Delgado  MR#: [id number]  : 1965  VISIT DATE: 2024      Dear Linn Montemayor,    I had the pleasure of seeing your patient, Sunshine Delgado, in my post-bariatric surgery assessment clinic.    Assessment & Plan  Problem List Items Addressed This Visit       MARIA GUADALUPE (obstructive sleep apnea)- severe (AHI 84) (Chronic)    Relevant Orders    Adult Sleep Eval & Management  Referral    Class 2 severe obesity with serious comorbidity and body mass index (BMI) of 38.0 to 38.9 in adult, unspecified obesity type (H)    Relevant Medications    naltrexone (DEPADE/REVIA) 50 MG tablet    Hashimoto's thyroiditis    Relevant Orders    Adult Endocrinology  Referral    Gastroesophageal reflux disease, unspecified whether esophagitis present     Symptoms of abdominal pain, burning in chest, acid in throat, and nausea. Nausea worse after eating. Symptoms are all day, and then worse weigh eating. Symptoms have gotten worse over the past year, especially with weight gain. Vomited in November, but changed diet to more bland foods and no current symptoms. Has tried TUMS with minimal relief. Briefly tried omeprazole with no relief. Denied hematemesis, melena or blood in stool. Will start Protonix today. If no improvement will increase dose and order EGD.          Relevant Medications    pantoprazole (PROTONIX) 40 MG EC tablet    S/P laparoscopic sleeve gastrectomy -  Primary     S/p gastric sleeve with Dr. Joseph on 3/2018  Last seen by Any Morris 1 month post op 4/2018  Weight prior to surgery - 289lb   Erika weight - 192lb     Able to maintain erika weight. Weight increase after being diagnosed with Covid in 2020. Since then has had long lasting covid symptoms that have influenced activity. Currently has uncontrolled GERD symptoms leading to maladaptive eating habbits that have also influenced weight.     Discussed AOMs to help with weight loss:   - Phentermine contraindicated due to hx of afib   - Topiramate concern for increase brain fog from Covid   - GLP-1 can be considered in the future. Concern for start today with uncontrolled GERD symptoms. Sister had thyroid cancer, would need to confirm type prior to starting   - Naltrexone to be started today. No contraindications. Discussed side effects, risks, and benefits.No hx of liver disease. Not taking opioids.                   Start Naltrexone 50mg - take 1/2 tablet daily for 7 days, then increase to 1 tablet daily.   Start Protonix 40mg in the morning. If no improvement can will dose increase protonix and order EGD with Dr. Joseph.   Sleep referral placed to reestablish care   Endocrine referral placed for increase fatigue symptoms.   Follow up with Jessica Voss in 3 months. Will reach out via [employer] in 1 month       60 minutes spent by me on the date of the encounter doing chart review, history and exam, documentation and further activities per the note    CHIEF COMPLAINT: Post-bariatric surgery follow-up.     HISTORY OF PRESENT ILLNESS:      1/23/2024     1:55 PM   Questions Regarding Prior Weight Loss Surgery Reviewed With Patient   I had the following weight loss procedure Sleeve Gastrectomy   What year was your surgery? 2018   How has your weight changed since your last visit? I have gained weight   Do you currently have any of the following Sleep Apnea   Do you have any concerns today? Weight gain , pain less active  because of a bad knee     S/p gastric sleeve with Dr. Joseph on 3/2018  Last seen by Any Morris 1 month post op 4/2018  Weight prior to surgery - 289lb   Erika weight - 192lb     Was able to maintain erika weight until 2020 when got Covid. Has had covid 4x in the past 4 years. Each time is hard to bounce back, will have a lot of fatigue. Her most recent covid was in December, and does feel like she recovered quickly. Has tried to lose weight through keto diet, but was not sustainable long term.  Is currently stable at around 220lbs.     Anti-obesity medications:   Failed/contraindicated:   Topiramate - does not remember any side effects. Was not helpful with weight to her remembrance.     Recent diet changes: Eating 3 meals a day. Is gluten free. Has been harder with food choices due to acid reflux symptoms. Portion sizes around 1-2 cups. No increase hunger. Feels full all the time. No thoughts of food, espeically with reflux. Can get full and stay full.     -Protein? Has not been counting proteins. Does have a protein shake daily. Tries to stay protein focused   -Water? 90oz daily. Also drinks coffee + oatmilk     Recent exercise/activity changes: [place] during lunch break - 4.5miles. Limited by chronic knee pain - gets injections. Will need a total knee replacement at some point.     Vitamins/Labs: Vitamin D, MV.     Denies dizziness or dysphagia.   No hypoglycemia or dumping syndrome     GERD - symptoms abdominal pain, burning in chest, acid in throat, and nausea. Nausea is worse after eating. No current vomiting. Was vomiting in November, but has adjusted to blander foods with improvement. Has symptoms all day and worse with food and coffee. Some improvement with bland and soft food. Has tried TUMs with no relief. Tried Omeprazole, but felt like it got worse. Has gotten worse with weight regain.     Nausea - after eating.     Constipation - very rarely. Takes stool softener with relief.      Caffiene - 1/2 cup of coffee daily   No ETOH, nicotine, NSAIDs    Weight History:      1/23/2024     1:55 PM   --   What is your highest lifetime weight? 289   What is your lowest weight since surgery? (In pounds) 198     Initial Weight (lbs): 273 lbs  Weight: 101.2 kg (223 lb)  Last Visits Weight: 102 kg (224 lb 12.8 oz)  Cumulative weight loss (lbs): 50  Weight Loss Percentage: 18.32%    Wt Readings from Last 5 Encounters:   01/25/24 101.2 kg (223 lb)   01/23/24 101.2 kg (223 lb)   01/08/24 102 kg (224 lb 12.8 oz)   08/18/23 104.1 kg (229 lb 9.6 oz)   07/28/23 86.2 kg (190 lb)             1/23/2024     1:55 PM   Questions Regarding Co-Morbidities and Health Concerns Reviewed With Patient   Pre-diabetes Never   Diabetes II Never   High Blood Pressure Never   High cholesterol Never   Heartburn/Reflux Worsened   Sleep apnea Improved   PCOS Never   Back pain Improved   Joint pain Improved   Lower leg swelling Improved     Afib - after first diagnosis of Covid. Had 2 abalations. Followed by cardiology.     MARIA GUADALUPE - no longer using CPAP. Needs to follow up with sleep     Fatty liver - followed by PCP     Thyroiditis - well controlled on medications. However, feels like she is having increase syptoms and would like a referral to endocrine. Followed by PCP     CKD stage 3 - followed by PCP     Anxiety, depression, ADHD - mood is stable on medications. Followed by PCP         1/23/2024     1:55 PM   Eating Habits   How many meals do you eat per day? 2   Do you snack between meals? Sometimes   How much food are you eating at each meal? 1/2 cup to 1 cup   Are you able to separate your meals and liquids by at least 30 minutes? Yes   Are you able to avoid liquid calories? Yes           1/23/2024     1:55 PM   Exercise Questions Reviewed With Patient   How often do you exercise? 3 to 4 times per week   What is the duration of your exercise (in minutes)? 60+ Minutes   What types of exercise do you do? walking   What keeps you  from being more active? Pain    My ability to walk or move around is limited       Social History:      1/23/2024     1:55 PM   --   Are you smoking? No   Are you drinking alcohol? 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 Fiance. Supportive family.     No drugs. THC gummy for anxiety, rarely.     Medications:  Current Outpatient Medications   Medication    acetaminophen (TYLENOL) 325 MG tablet    buPROPion (WELLBUTRIN XL) 300 MG 24 hr tablet    EPINEPHrine (AUVI-Q) 0.3 MG/0.3ML injection 2-pack    estradiol (ESTRACE) 0.5 MG tablet    levothyroxine (SYNTHROID/LEVOTHROID) 75 MCG tablet    naltrexone (DEPADE/REVIA) 50 MG tablet    pantoprazole (PROTONIX) 40 MG EC tablet    traZODone (DESYREL) 50 MG tablet    venlafaxine (EFFEXOR-ER) 75 MG 24 hr tablet    doxycycline hyclate (VIBRAMYCIN) 100 MG capsule     Current Facility-Administered Medications   Medication    2 mL bupivacaine (MARCAINE) preservative free injection 0.5% (20 mL vial)    triamcinolone (KENALOG-40) injection 40 mg    triamcinolone (KENALOG-40) injection 40 mg         1/23/2024     1:55 PM   --   Do you avoid NSAIDs such as (Ibuprofen, Aleve, Naproxen, Advil)? Yes       ROS:  GI:       1/23/2024     1:55 PM   --   Vomiting No   Diarrhea No   Constipation No   Swallowing trouble No   Abdominal pain Yes   Heartburn Yes     Skin:       1/23/2024     1:55 PM   BAR RBS ROS - SKIN   Rash in skin folds No     Psych:       1/23/2024     1:55 PM   --   Depression No   Anxiety No     Female Only:       1/23/2024     1:55 PM   BAR RBS ROS -    Female only None of the above   Stress urinary incontinence No     Anti-obesity medication ROS:    HEENT  Hx of glaucoma: No    Cardiovascular  CAD:Yes  HTN:No    Gastrointestinal  GERD:Yes  Constipation:Yes  Liver Dz:Yes  H/O Pancreatitis:No    Psychiatric  Bipolar: No  Anxiety:Yes  Depression:Yes  History of alcohol/drug abuse: No  Hx of eating  "disorder:No    Endocrine  Personal or family hx of MTC or MEN2: Sister had thyroid cancer  Diabetes/prediabetes: No    Neurologic:  Hx of seizures: No  Hx of migraines: No  Memory Impairment: No      History of kidney stones: Yes - 2015  Kidney disease: Yes  Current birth control:  hysterectomy    Taking Opioid/Narcotic: No      Objective   Ht 1.626 m (5' 4\")   Wt 101.2 kg (223 lb)   LMP  (LMP Unknown)   BMI 38.28 kg/m           Vitals:  No vitals were obtained today due to virtual visit.    Physical Exam   GENERAL: alert and no distress  EYES: Eyes grossly normal to inspection.  No discharge or erythema, or obvious scleral/conjunctival abnormalities.  RESP: No audible wheeze, cough, or visible cyanosis.    SKIN: Visible skin clear. No significant rash, abnormal pigmentation or lesions.  NEURO: Cranial nerves grossly intact.  Mentation and speech appropriate for age.  PSYCH: Appropriate affect, tone, and pace of words        Sincerely,    Jessica Negro PA-C  "

## 2024-01-23 NOTE — PROGRESS NOTES
Virtual Visit Details    Type of service:  Video Visit     Originating Location (pt. Location): Home    Distant Location (provider location):  Off-site  Platform used for Video Visit: [facility] Bariatric Surgery Note    RE: Sunshine Delgado  MR#: [id number]  : 1965  VISIT DATE: 2024      Dear Linn Montemayor,    I had the pleasure of seeing your patient, Sunshine Delgado, in my post-bariatric surgery assessment clinic.    Assessment & Plan   Problem List Items Addressed This Visit       MARIA GUADALUPE (obstructive sleep apnea)- severe (AHI 84) (Chronic)    Relevant Orders    Adult Sleep Eval & Management  Referral    Class 2 severe obesity with serious comorbidity and body mass index (BMI) of 38.0 to 38.9 in adult, unspecified obesity type (H)    Relevant Medications    naltrexone (DEPADE/REVIA) 50 MG tablet    Hashimoto's thyroiditis    Relevant Orders    Adult Endocrinology  Referral    Gastroesophageal reflux disease, unspecified whether esophagitis present     Symptoms of abdominal pain, burning in chest, acid in throat, and nausea. Nausea worse after eating. Symptoms are all day, and then worse weigh eating. Symptoms have gotten worse over the past year, especially with weight gain. Vomited in November, but changed diet to more bland foods and no current symptoms. Has tried TUMS with minimal relief. Briefly tried omeprazole with no relief. Denied hematemesis, melena or blood in stool. Will start Protonix today. If no improvement will increase dose and order EGD.          Relevant Medications    pantoprazole (PROTONIX) 40 MG EC tablet    S/P laparoscopic sleeve gastrectomy - Primary     S/p gastric sleeve with Dr. Joseph on 3/2018  Last seen by Any Morris 1 month post op 2018  Weight prior to surgery - 289lb   Erika weight - 192lb     Able to maintain erika weight. Weight increase after being diagnosed with Covid in 2020. Since then has had long lasting covid symptoms that have  influenced activity. Currently has uncontrolled GERD symptoms leading to maladaptive eating habbits that have also influenced weight.     Discussed AOMs to help with weight loss:   - Phentermine contraindicated due to hx of afib   - Topiramate concern for increase brain fog from Covid   - GLP-1 can be considered in the future. Concern for start today with uncontrolled GERD symptoms. Sister had thyroid cancer, would need to confirm type prior to starting   - Naltrexone to be started today. No contraindications. Discussed side effects, risks, and benefits.No hx of liver disease. Not taking opioids.                   Start Naltrexone 50mg - take 1/2 tablet daily for 7 days, then increase to 1 tablet daily.   Start Protonix 40mg in the morning. If no improvement can will dose increase protonix and order EGD with Dr. Joseph.   Sleep referral placed to reestablish care   Endocrine referral placed for increase fatigue symptoms.   Follow up with Jessica Voss in 3 months. Will reach out via [employer] in 1 month       60 minutes spent by me on the date of the encounter doing chart review, history and exam, documentation and further activities per the note    CHIEF COMPLAINT: Post-bariatric surgery follow-up.     HISTORY OF PRESENT ILLNESS:      1/23/2024     1:55 PM   Questions Regarding Prior Weight Loss Surgery Reviewed With Patient   I had the following weight loss procedure Sleeve Gastrectomy   What year was your surgery? 2018   How has your weight changed since your last visit? I have gained weight   Do you currently have any of the following Sleep Apnea   Do you have any concerns today? Weight gain , pain less active because of a bad knee     S/p gastric sleeve with Dr. Joseph on 3/2018  Last seen by Any Morris 1 month post op 4/2018  Weight prior to surgery - 289lb   Erika weight - 192lb     Was able to maintain erika weight until 2020 when got Covid. Has had covid 4x in the past 4 years. Each time is hard to bounce  back, will have a lot of fatigue. Her most recent covid was in December, and does feel like she recovered quickly. Has tried to lose weight through keto diet, but was not sustainable long term.  Is currently stable at around 220lbs.     Anti-obesity medications:   Failed/contraindicated:   Topiramate - does not remember any side effects. Was not helpful with weight to her remembrance.     Recent diet changes: Eating 3 meals a day. Is gluten free. Has been harder with food choices due to acid reflux symptoms. Portion sizes around 1-2 cups. No increase hunger. Feels full all the time. No thoughts of food, espeically with reflux. Can get full and stay full.     -Protein? Has not been counting proteins. Does have a protein shake daily. Tries to stay protein focused   -Water? 90oz daily. Also drinks coffee + oatmilk     Recent exercise/activity changes: [place] during [place] break - 4.5miles. Limited by chronic knee pain - gets injections. Will need a total knee replacement at some point.     Vitamins/Labs: Vitamin D, MV.     Denies dizziness or dysphagia.   No hypoglycemia or dumping syndrome     GERD - symptoms abdominal pain, burning in chest, acid in throat, and nausea. Nausea is worse after eating. No current vomiting. Was vomiting in November, but has adjusted to blander foods with improvement. Has symptoms all day and worse with food and coffee. Some improvement with bland and soft food. Has tried TUMs with no relief. Tried Omeprazole, but felt like it got worse. Has gotten worse with weight regain.     Nausea - after eating.     Constipation - very rarely. Takes stool softener with relief.     Caffiene - 1/2 cup of coffee daily   No ETOH, nicotine, NSAIDs    Weight History:      1/23/2024     1:55 PM   --   What is your highest lifetime weight? 289   What is your lowest weight since surgery? (In pounds) 198     Initial Weight (lbs): 273 lbs  Weight: 101.2 kg (223 lb)  Last Visits Weight: 102 kg (224  lb 12.8 oz)  Cumulative weight loss (lbs): 50  Weight Loss Percentage: 18.32%    Wt Readings from Last 5 Encounters:   01/25/24 101.2 kg (223 lb)   01/23/24 101.2 kg (223 lb)   01/08/24 102 kg (224 lb 12.8 oz)   08/18/23 104.1 kg (229 lb 9.6 oz)   07/28/23 86.2 kg (190 lb)             1/23/2024     1:55 PM   Questions Regarding Co-Morbidities and Health Concerns Reviewed With Patient   Pre-diabetes Never   Diabetes II Never   High Blood Pressure Never   High cholesterol Never   Heartburn/Reflux Worsened   Sleep apnea Improved   PCOS Never   Back pain Improved   Joint pain Improved   Lower leg swelling Improved     Afib - after first diagnosis of Covid. Had 2 abalations. Followed by cardiology.     MARIA GUADALUPE - no longer using CPAP. Needs to follow up with sleep     Fatty liver - followed by PCP     Thyroiditis - well controlled on medications. However, feels like she is having increase syptoms and would like a referral to endocrine. Followed by PCP     CKD stage 3 - followed by PCP     Anxiety, depression, ADHD - mood is stable on medications. Followed by PCP         1/23/2024     1:55 PM   Eating Habits   How many meals do you eat per day? 2   Do you snack between meals? Sometimes   How much food are you eating at each meal? 1/2 cup to 1 cup   Are you able to separate your meals and liquids by at least 30 minutes? Yes   Are you able to avoid liquid calories? Yes           1/23/2024     1:55 PM   Exercise Questions Reviewed With Patient   How often do you exercise? 3 to 4 times per week   What is the duration of your exercise (in minutes)? 60+ Minutes   What types of exercise do you do? walking   What keeps you from being more active? Pain    My ability to walk or move around is limited       Social History:      1/23/2024     1:55 PM   --   Are you smoking? No   Are you drinking alcohol? 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 Supportive family.      No drugs. THC gummy for anxiety, rarely.     Medications:  Current Outpatient Medications   Medication    acetaminophen (TYLENOL) 325 MG tablet    buPROPion (WELLBUTRIN XL) 300 MG 24 hr tablet    EPINEPHrine (AUVI-Q) 0.3 MG/0.3ML injection 2-pack    estradiol (ESTRACE) 0.5 MG tablet    levothyroxine (SYNTHROID/LEVOTHROID) 75 MCG tablet    naltrexone (DEPADE/REVIA) 50 MG tablet    pantoprazole (PROTONIX) 40 MG EC tablet    traZODone (DESYREL) 50 MG tablet    venlafaxine (EFFEXOR-ER) 75 MG 24 hr tablet    doxycycline hyclate (VIBRAMYCIN) 100 MG capsule     Current Facility-Administered Medications   Medication    2 mL bupivacaine (MARCAINE) preservative free injection 0.5% (20 mL vial)    triamcinolone (KENALOG-40) injection 40 mg    triamcinolone (KENALOG-40) injection 40 mg         1/23/2024     1:55 PM   --   Do you avoid NSAIDs such as (Ibuprofen, Aleve, Naproxen, Advil)? Yes       ROS:  GI:       1/23/2024     1:55 PM   --   Vomiting No   Diarrhea No   Constipation No   Swallowing trouble No   Abdominal pain Yes   Heartburn Yes     Skin:       1/23/2024     1:55 PM   BAR RBS ROS - SKIN   Rash in skin folds No     Psych:       1/23/2024     1:55 PM   --   Depression No   Anxiety No     Female Only:       1/23/2024     1:55 PM   BAR RBS ROS -    Female only None of the above   Stress urinary incontinence No     Anti-obesity medication ROS:    HEENT  Hx of glaucoma: No    Cardiovascular  CAD:Yes  HTN:No    Gastrointestinal  GERD:Yes  Constipation:Yes  Liver Dz:Yes  H/O Pancreatitis:No    Psychiatric  Bipolar: No  Anxiety:Yes  Depression:Yes  History of alcohol/drug abuse: No  Hx of eating disorder:No    Endocrine  Personal or family hx of MTC or MEN2: Sister had thyroid cancer  Diabetes/prediabetes: No    Neurologic:  Hx of seizures: No  Hx of migraines: No  Memory Impairment: No      History of kidney stones: Yes - 2015  Kidney disease: Yes  Current birth control:  hysterectomy    Taking Opioid/Narcotic:  "No      Objective    Ht 1.626 m (5' 4\")   Wt 101.2 kg (223 lb)   LMP  (LMP Unknown)   BMI 38.28 kg/m           Vitals:  No vitals were obtained today due to virtual visit.    Physical Exam   GENERAL: alert and no distress  EYES: Eyes grossly normal to inspection.  No discharge or erythema, or obvious scleral/conjunctival abnormalities.  RESP: No audible wheeze, cough, or visible cyanosis.    SKIN: Visible skin clear. No significant rash, abnormal pigmentation or lesions.  NEURO: Cranial nerves grossly intact.  Mentation and speech appropriate for age.  PSYCH: Appropriate affect, tone, and pace of words        Sincerely,    Jessica Negro PA-C    "

## 2024-01-23 NOTE — NURSING NOTE
Is the patient currently in the state of [place]? YES    Visit mode:VIDEO    If the visit is dropped, the patient can be reconnected by: VIDEO VISIT: Text to cell phone:   Telephone Information:   Mobile [phone]       Will anyone else be joining the visit? NO  (If patient encounters technical issues they should call [phone]    How would you like to obtain your AVS? MyChart    Are changes needed to the allergy or medication list? No    Reason for visit: Consult    Mony WELSH

## 2024-01-25 ENCOUNTER — OFFICE VISIT (OUTPATIENT)
Dept: SURGERY | Facility: CLINIC | Age: 59
End: 2024-01-25
Payer: COMMERCIAL

## 2024-01-25 ENCOUNTER — TELEPHONE (OUTPATIENT)
Dept: ENDOCRINOLOGY | Facility: CLINIC | Age: 59
End: 2024-01-25

## 2024-01-25 VITALS
BODY MASS INDEX: 38.28 KG/M2 | DIASTOLIC BLOOD PRESSURE: 62 MMHG | WEIGHT: 223 LBS | SYSTOLIC BLOOD PRESSURE: 104 MMHG | HEART RATE: 77 BPM

## 2024-01-25 DIAGNOSIS — L72.9 INFECTED CYST OF SKIN: Primary | ICD-10-CM

## 2024-01-25 DIAGNOSIS — L08.9 INFECTED CYST OF SKIN: Primary | ICD-10-CM

## 2024-01-25 PROCEDURE — 99243 OFF/OP CNSLTJ NEW/EST LOW 30: CPT | Performed by: SURGERY

## 2024-01-25 RX ORDER — DOXYCYCLINE 100 MG/1
100 CAPSULE ORAL 2 TIMES DAILY
Qty: 28 CAPSULE | Refills: 1 | Status: SHIPPED | OUTPATIENT
Start: 2024-01-25 | End: 2024-06-20

## 2024-01-25 NOTE — PROGRESS NOTES
It is my impression based on the historical events and the physical exam that this is an abscess.  It was drained as below. There is surrounding cellulitis.  I do not believe the patient has underlying necrotizing fasciitis. Appears well and non-toxic and I have low suspicion for systemic illness. Will treat with wound care, soapy water soaks, otc analgesics, course of doxycycline and surgery follow up once healed for possible sebaceous cyst excision as patient feels there has been a cyst present at this site in the past.  Referral placed. Follow up with us in 2-3 days if not improving.       Dear Linn Doss  And Arnav Sam  I was asked to see this patient by Linn Montemayor for please see below.  I have seen Sunshine Delgado and as you know his chief complaint is a cyst on her right back leg was infected and drained and started on antibiotics her pain got better and the redness looks like it is revolving. But has been off the antibioitcs for about 10 days ago and does feel that the are has become more tender in the last couple days .  It does look mildly red and is mildly tender to the touch.     HPI:  Patient is a 58 year old female  with complaints  see above      Review Of Systems  Respiratory: No shortness of breath, dyspnea on exertion, cough, or hemoptysis  Cardiovascular: negative  Gastrointestinal: negative and had a fib fixed with ablation  Endocrine: negative      10 Point review of systems all others are negative.   LMP  (LMP Unknown)     Past Medical History:   Diagnosis Date    Antiplatelet or antithrombotic long-term use     Arrhythmia     Atrial fibrillation with rapid ventricular response (H) 1/26/2020    Bee sting allergy     Depressive disorder     Generalized anxiety disorder 10/15/2009    lexapro made things worse.      Hashimoto's thyroiditis 5/6/2020    Morbid obesity (H)     Ocular migraine     MARIA GUADALUPE (obstructive sleep apnea)- severe (AHI 84) 09/09/2011    patient not using since  "2019    Primary osteoarthritis of right knee 2022    Renal disease     Voice fatigue 10/22/2009       Past Surgical History:   Procedure Laterality Date    ANESTHESIA CARDIOVERSION N/A 2021    Procedure: ANESTHESIA, FOR CARDIOVERSION@1515;  Surgeon: GENERIC ANESTHESIA PROVIDER;  Location:  OR    COLONOSCOPY      DAVINCI GASTRIC SLEEVE      EP ABLATION FOCAL AFIB N/A 2021    Procedure: EP ABLATION FOCAL AFIB;  Surgeon: Vicente Craig MD;  Location:  [facility]ATH [facility]    EP ABLATION FOCAL AFIB N/A 3/31/2022    Procedure: Ablation Focal Atrial Fibrillation;  Surgeon: Vicente Craig MD;  Location:  HEART [facility]ARD[facility] CATH LAB    GENITOURINARY SURGERY      HYSTERECTOMY, PAP NO LONGER INDICATED      HYSTERECTOMY, VAGINAL      still has ovaries    LAPAROSCOPIC GASTRIC SLEEVE N/A 3/13/2018    Procedure: LAPAROSCOPIC GASTRIC SLEEVE;  Laparoscopic Sleeve Gastrectomy;  Surgeon: Kameron Joseph MD;  Location:  OR       Social History     Socioeconomic History    Marital status:      Spouse name: Not on file    Number of children: 1    Years of education: Not on file    Highest education level: Not on file   Occupational History    Occupation:      Comment: [employer]     Employer: [employer]   Tobacco Use    Smoking status: Never     Passive exposure: Never    Smokeless tobacco: Never   Vaping Use    Vaping Use: Never used   Substance and Sexual Activity    Alcohol use: Yes     Comment: 1-2 per mo    Drug use: Not Currently     Types: Marijuana     Comment:  last dose    Sexual activity: Yes     Partners: Male     Birth control/protection: Female Surgical   Other Topics Concern    Parent/sibling w/ CABG, MI or angioplasty before 65F 55M? No   Social History Narrative    ,  had glioblastoma,      [employer], [employer]    Started the \"[employer] project\"        Reggie, born , son. Has " kim             Social Determinants of Health     Financial Resource Strain: Low Risk  (1/8/2024)    Financial Resource Strain     Within the past 12 months, have you or your family members you live with been unable to get utilities (heat, electricity) when it was really needed?: No   Food Insecurity: Low Risk  (1/8/2024)    Food Insecurity     Within the past 12 months, did you worry that your food would run out before you got money to buy more?: No     Within the past 12 months, did the food you bought just not last and you didn t have money to get more?: No   Transportation Needs: Low Risk  (1/8/2024)    Transportation Needs     Within the past 12 months, has lack of transportation kept you from medical appointments, getting your medicines, non-medical meetings or appointments, work, or from getting things that you need?: No   Physical Activity: Not on file   Stress: Not on file   Social Connections: Not on file   Interpersonal Safety: Low Risk  (1/8/2024)    Interpersonal Safety     Do you feel physically and emotionally safe where you currently live?: Yes     Within the past 12 months, have you been hit, slapped, kicked or otherwise physically hurt by someone?: No     Within the past 12 months, have you been humiliated or emotionally abused in other ways by your partner or ex-partner?: No   Housing Stability: Low Risk  (1/8/2024)    Housing Stability     Do you have housing? : Yes     Are you worried about losing your housing?: No       Current Outpatient Medications   Medication Sig Dispense Refill    acetaminophen (TYLENOL) 325 MG tablet Take 325 mg by mouth every 4 hours as needed      buPROPion (WELLBUTRIN XL) 300 MG 24 hr tablet Take 1 tablet (300 mg) by mouth every morning 90 tablet 3    EPINEPHrine (AUVI-Q) 0.3 MG/0.3ML injection 2-pack Inject 0.3 mLs (0.3 mg) into the muscle as needed for anaphylaxis 0.6 mL 3    estradiol (ESTRACE) 0.5 MG tablet TAKE 1 TABLET BY MOUTH EVERY DAY 90 tablet 1     levothyroxine (SYNTHROID/LEVOTHROID) 75 MCG tablet Take 1 tablet (75 mcg) by mouth daily 90 tablet 3    naltrexone (DEPADE/REVIA) 50 MG tablet Take 1/2 tablet once daily 1-2 hours prior to worst cravings for 1 week, then increase to 1 tablet daily as directed if tolerating 30 tablet 3    pantoprazole (PROTONIX) 40 MG EC tablet Take 1 tablet (40 mg) by mouth daily 30 tablet 3    traZODone (DESYREL) 50 MG tablet TAKE 2 TABLETS (100 MG) BY MOUTH AT BEDTIME 180 tablet 1    venlafaxine (EFFEXOR-ER) 75 MG 24 hr tablet Take 1 tablet (75 mg) by mouth daily 90 tablet 3       Above was reviewed  Physical exam: LMP  (LMP Unknown)    Patient able to get up on table without difficulty.   Patient is alert and orientated.   Head eyes, nose and mouth within normal limits.            Skin:  on the back of her right leg is an are of resolving redness out to about 8 cm.  There is some tenderness of the cyst now.   Skin was warm and pink  Normal Affect    Lower extremity edema is not present.    Assessment:   Skin:  on the back of her right leg is an are of resolving redness out to about 8 cm.  There is some tenderness of the cyst now.    Plan to do will restart antibiotics.  Was on doxycyline.    patient ok with doing this in the office       patient HAS Been improving on antibiotics and area is draining.  Discussed that  We can remove the cyst now but will have to leave it open. And may have to pack it.  Since antibiotics were working will have her take that again and then give another 14 days worth to take 7 days BEFORE SURGERY AND THEN 7 DAYS AFTER SURGERY TO REMOVE IT.   If not getting better may have to just remove and leave open.  If comes back before than will have patient restart the antibiotics.   Plan to do set up a time in 3-4 weeks to excise the cyst.  .     Risks of surgery include damage to nerves, bleeding, infection, damage to  Vessels, recurrence.           Time spent with the patient with greater that 50% of the time  in discussion was 30 minutes.  In discussing the plan .      Camden Calvillo MD

## 2024-01-25 NOTE — PATIENT INSTRUCTIONS
Skin:  on the back of her right leg is an are of resolving redness out to about 8 cm.  There is some tenderness of the cyst now.    Plan to do will restart antibiotics.  Was on doxycyline.    patient ok with doing this in the office       patient HAS Been improving on antibiotics and area is draining.  Discussed that  We can remove the cyst now but will have to leave it open. And may have to pack it.  Since antibiotics were working will have her take that again and then give another 14 days worth to take 7 days BEFORE SURGERY AND THEN 7 DAYS AFTER SURGERY TO REMOVE IT.   If not getting better may have to just remove and leave open.  If comes back before than will have patient restart the antibiotics.   Plan to do set up a time in 3-4 weeks to excise the cyst.  .     Risks of surgery include damage to nerves, bleeding, infection, damage to  Vessels, recurrence.        SKIN AND SUBCUTANEOUS SURGERY DISCHARGE INSTRUCTIONS  DR. GISELL VAZQUEZ    1. You may resume your regular diet when you feel you are ready to. DO NOT drink alcoholic beverages for 24 hours or while you are taking prescription medication.    2. Limit your activities for the first 48 hours. Gradually, increase them as tolerated. You may use stairs. I encourage you to walk as tolerated.  Please do not do things that will pull on your incision as this may cause it to open up.     3. You will have some discomfort at the incision sites. This is expected. This should improve over the next 2-3 days. Ice and pain medication will help with this pain. Use prescribed pain medication as instructed.    4. Bruising and mild swelling is normal after surgery. The area below and around the incision(s) will be hard and elevated. This is normal. I call it the healing ridge. This will resolve slowly over the next several months. If you feel the pain is increasing and cannot explain it by increasing activity please call us at [phone].    5. The dressing will often  have some blood on it. You may shower 24 hours after surgery. No baths for 2 weeks after surgery.  Clean gently over incision site. If clear plastic covering or steri-strip comes off and there is still some bleeding or drainage then cover with gauze or band-aid. If no bleeding, there is no reason to cover site. If you were given an abdominal binder at time of surgery it may be removed after 24 hours after surgery. You may continue to wear it however for comfort. I suggest  you wear an old t-shirt under the abdominal binder for a more comfortable wear.    6. Avoid Aspirin for the first 72 hours after the procedure. This medication may increase the tendency to bleed.    7. Use the following medications (in addition to your normal meds) as shown:  a. Percocet 5 mg 1-2 every 6 hours as needed for severe pain. This contains 325 mg of Tylenol (acetaminophen) per tablet.  Please do not take more than 4 grams of Tylenol (acetaminophen) per day. For example, you may take 1 Percocet and 1 Tylenol, or 2 Percocet and no Tylenol, or 2 Tylenol and no Percocet every 6 hours.  b. Tylenol (acetaminophen) 500 mg every 6 hours as needed for mild pain. Do not take more than 1000 mg every 6 hours. (see above).  c. Motrin (ibuprofen) 200-800 mg every 6 hours as needed for mild to moderate pain. Take with food.     8. Notify Dr. Calvillo's clinic at [phone] if:  Your discomfort is not relieved by your pain medication.  You have signs of infection such as temperature above 100.5 degrees orally, chills, or increasing daily discomfort.  Incision site is becoming more red and/or there is purulent drainage.  You have questions or concerns.    9. Please call [phone], for  [place] clinic or  for [facility], to schedule a follow up appointment in 2 weeks.       10. When taking narcotics (pain medication more than Tylenol [acetaminophen] and Motrin [ibuprofen]) it is important to keep your stools  soft to avoid constipation and pain with straining. This is best done by drinking fluids (non-alcoholic and non-caffeinated) and taking a stool softener (i.e. Metamucil or milk of magnesia). You may be able to use non-narcotics for pain relief especially by the 3rd post- operative day. Tylenol (acetaminophen) 500 mg every 6 hours and/or Motrin (ibuprofen) 200-800 mg every 6 hours. Please do not take more than 4 grams of Tylenol (acetaminophen) per day. Remember your Percocet does have Tylenol (acetaminophen) already in it. Please take Motrin (ibuprofen) with food to help protect the stomach. If you have a history of stomach ulcers or stomach problems, do not take Motrin (ibuprofen).     11. Do not drive or operate heavy machinery for 24 hours after surgery or when taking narcotics. You may resume driving when feel that you can safely avoid an accident and are not taking narcotics. This is usually 5 to 7 days after surgery. You should not be alone for 24 hours after surgery.    12. Have milk of magnesia available at home so that when you take the pain medications you take 1-2 teaspoons a day,  to help reduce problems with constipation.      13. If you have questions after your procedure/surgery please contact Dr Calvillo's primary clinic in [place]. You can call [phone], your other option is to send us a [employer] message through your [facility] portal to Dr Calvillo's team. For urgent and non urgent matters these options are best. If your symptoms are emergent or cannot wait, please proceed to [facility] and let them know who you had surgery with.  For [facility]:  Please call  for [facility], to schedule a follow up appointment in 2 weeks.  Or for questions or problems.         Patient can receive pain medications that I have ordered and give the first dose in the recovery room as needed.

## 2024-01-25 NOTE — TELEPHONE ENCOUNTER
Left Voicemail (1st Attempt) for the patient to call back and schedule the following:    Appointment type: KALYN EDOUARD  Provider: Jessica Negro PA-C  Return date: 3 mos ( 04/23/24 )   Specialty phone number: [phone]  Additional appointment(s) needed: no  Additonal Notes: no

## 2024-01-25 NOTE — LETTER
1/25/2024         RE: Sunshine Delgado  [address]   [place] [address]        Dear Colleague,    Thank you for referring your patient, Sunshine Delgado, to the [facility]. Please see a copy of my visit note below.    It is my impression based on the historical events and the physical exam that this is an abscess.  It was drained as below. There is surrounding cellulitis.  I do not believe the patient has underlying necrotizing fasciitis. Appears well and non-toxic and I have low suspicion for systemic illness. Will treat with wound care, soapy water soaks, otc analgesics, course of doxycycline and surgery follow up once healed for possible sebaceous cyst excision as patient feels there has been a cyst present at this site in the past.  Referral placed. Follow up with us in 2-3 days if not improving.       Dear Linn Doss  And Arnav Sam  I was asked to see this patient by Linn Montemayor for please see below.  I have seen Sunshine Delgado and as you know his chief complaint is a cyst on her right back leg was infected and drained and started on antibiotics her pain got better and the redness looks like it is revolving. But has been off the antibioitcs for about 10 days ago and does feel that the are has become more tender in the last couple days .  It does look mildly red and is mildly tender to the touch.     HPI:  Patient is a 58 year old female  with complaints  see above      Review Of Systems  Respiratory: No shortness of breath, dyspnea on exertion, cough, or hemoptysis  Cardiovascular: negative  Gastrointestinal: negative and had a fib fixed with ablation  Endocrine: negative      10 Point review of systems all others are negative.   LMP  (LMP Unknown)     Past Medical History:   Diagnosis Date     Antiplatelet or antithrombotic long-term use      Arrhythmia      Atrial fibrillation with rapid ventricular response (H) 1/26/2020     Bee sting allergy      Depressive  disorder      Generalized anxiety disorder 10/15/2009    lexapro made things worse.       Hashimoto's thyroiditis 5/6/2020     Morbid obesity (H)      Ocular migraine      MARIA GUADALUPE (obstructive sleep apnea)- severe (AHI 84) 09/09/2011    patient not using since 2019     Primary osteoarthritis of right knee 9/23/2022     Renal disease      Voice fatigue 10/22/2009       Past Surgical History:   Procedure Laterality Date     ANESTHESIA CARDIOVERSION N/A 11/22/2021    Procedure: ANESTHESIA, FOR CARDIOVERSION@1515;  Surgeon: GENERIC ANESTHESIA PROVIDER;  Location:  OR     COLONOSCOPY  2000     DAVINCI GASTRIC SLEEVE       EP ABLATION FOCAL AFIB N/A 7/22/2021    Procedure: EP ABLATION FOCAL AFIB;  Surgeon: Vicente Craig MD;  Location:  HEART CARDI[facility] CATH LAB     EP ABLATION FOCAL AFIB N/A 3/31/2022    Procedure: Ablation Focal Atrial Fibrillation;  Surgeon: Vicente Craig MD;  Location:  HEART CARDIAC CATH LAB     GENITOURINARY SURGERY  2007     HYSTERECTOMY, PAP NO LONGER INDICATED       HYSTERECTOMY, VAGINAL  2007    still has ovaries     LAPAROSCOPIC GASTRIC SLEEVE N/A 3/13/2018    Procedure: LAPAROSCOPIC GASTRIC SLEEVE;  Laparoscopic Sleeve Gastrectomy;  Surgeon: Kameron Joseph MD;  Location:  OR       Social History     Socioeconomic History     Marital status:      Spouse name: Not on file     Number of children: 1     Years of education: Not on file     Highest education level: Not on file   Occupational History     Occupation:      Comment: \[employer]     Employer: [email or web address]   Tobacco Use     Smoking status: Never     Passive exposure: Never     Smokeless tobacco: Never   Vaping Use     Vaping Use: Never used   Substance and Sexual Activity     Alcohol use: Yes     Comment: 1-2 per mo     Drug use: Not Currently     Types: Marijuana     Comment: 12/20 last dose     Sexual activity: Yes     Partners: Male     Birth control/protection: Female Surgical  "  Other Topics Concern     Parent/sibling w/ CABG, MI or angioplasty before 65F 55M? No   Social History Narrative    ,  had glioblastoma,      [employer]    Started the \"[employer]        Reggie, born , son. Has kim             Social Determinants of Health     Financial Resource Strain: Low Risk  (2024)    Financial Resource Strain      Within the past 12 months, have you or your family members you live with been unable to get utilities (heat, electricity) when it was really needed?: No   Food Insecurity: Low Risk  (2024)    Food Insecurity      Within the past 12 months, did you worry that your food would run out before you got money to buy more?: No      Within the past 12 months, did the food you bought just not last and you didn t have money to get more?: No   Transportation Needs: Low Risk  (2024)    Transportation Needs      Within the past 12 months, has lack of transportation kept you from medical appointments, getting your medicines, non-medical meetings or appointments, work, or from getting things that you need?: No   Physical Activity: Not on file   Stress: Not on file   Social Connections: Not on file   Interpersonal Safety: Low Risk  (2024)    Interpersonal Safety      Do you feel physically and emotionally safe where you currently live?: Yes      Within the past 12 months, have you been hit, slapped, kicked or otherwise physically hurt by someone?: No      Within the past 12 months, have you been humiliated or emotionally abused in other ways by your partner or ex-partner?: No   Housing Stability: Low Risk  (2024)    Housing Stability      Do you have housing? : Yes      Are you worried about losing your housing?: No       Current Outpatient Medications   Medication Sig Dispense Refill     acetaminophen (TYLENOL) 325 MG tablet Take 325 mg by mouth every 4 hours as needed       buPROPion (WELLBUTRIN XL) 300 MG 24 " hr tablet Take 1 tablet (300 mg) by mouth every morning 90 tablet 3     EPINEPHrine (AUVI-Q) 0.3 MG/0.3ML injection 2-pack Inject 0.3 mLs (0.3 mg) into the muscle as needed for anaphylaxis 0.6 mL 3     estradiol (ESTRACE) 0.5 MG tablet TAKE 1 TABLET BY MOUTH EVERY DAY 90 tablet 1     levothyroxine (SYNTHROID/LEVOTHROID) 75 MCG tablet Take 1 tablet (75 mcg) by mouth daily 90 tablet 3     naltrexone (DEPADE/REVIA) 50 MG tablet Take 1/2 tablet once daily 1-2 hours prior to worst cravings for 1 week, then increase to 1 tablet daily as directed if tolerating 30 tablet 3     pantoprazole (PROTONIX) 40 MG EC tablet Take 1 tablet (40 mg) by mouth daily 30 tablet 3     traZODone (DESYREL) 50 MG tablet TAKE 2 TABLETS (100 MG) BY MOUTH AT BEDTIME 180 tablet 1     venlafaxine (EFFEXOR-ER) 75 MG 24 hr tablet Take 1 tablet (75 mg) by mouth daily 90 tablet 3       Above was reviewed  Physical exam: LMP  (LMP Unknown)    Patient able to get up on table without difficulty.   Patient is alert and orientated.   Head eyes, nose and mouth within normal limits.            Skin:  on the back of her right leg is an are of resolving redness out to about 8 cm.  There is some tenderness of the cyst now.   Skin was warm and pink  Normal Affect    Lower extremity edema is not present.    Assessment:   Skin:  on the back of her right leg is an are of resolving redness out to about 8 cm.  There is some tenderness of the cyst now.    Plan to do will restart antibiotics.  Was on doxycyline.    patient ok with doing this in the office       patient HAS Been improving on antibiotics and area is draining.  Discussed that  We can remove the cyst now but will have to leave it open. And may have to pack it.  Since antibiotics were working will have her take that again and then give another 14 days worth to take 7 days BEFORE SURGERY AND THEN 7 DAYS AFTER SURGERY TO REMOVE IT.   If not getting better may have to just remove and leave open.  If comes back  before than will have patient restart the antibiotics.   Plan to do set up a time in 3-4 weeks to excise the cyst.  .     Risks of surgery include damage to nerves, bleeding, infection, damage to  Vessels, recurrence.           Time spent with the patient with greater that 50% of the time in discussion was 30 minutes.  In discussing the plan .      Camden Calvillo MD       Again, thank you for allowing me to participate in the care of your patient.        Sincerely,        Camden Calvillo MD

## 2024-01-26 PROBLEM — Z98.84 S/P LAPAROSCOPIC SLEEVE GASTRECTOMY: Status: ACTIVE | Noted: 2024-01-26

## 2024-01-26 NOTE — PATIENT INSTRUCTIONS
"Terrell Gonsalez, it was nice to meet you today!  Thank you for allowing us the privilege of caring for you. We hope we provided you with the excellent service you deserve.   Please let us know if there is anything else we can do for you so that we can be sure you are completely satisfied with your care experience.    To ensure the quality of our services you may be receiving a patient satisfaction survey from an independent patient satisfaction monitoring company.    The greatest compliment you can give is a \"Likely to Recommend\"    Your visit was with Jessica Negro PA-C today.    Instructions per today's visit:     Start Naltrexone 50mg - take 1/2 tablet daily for 7 days, then increase to 1 tablet daily.   Start Protonix 40mg in the morning. If no improvement can will dose increase protonix and order EGD with Dr. Joseph.   Sleep referral placed to reestablish care   Endocrine referral placed for increase fatigue symptoms.   Follow up with Jessica Voss in 3 months. Will reach out via [employer] in 1 month     ___________________________________________________________________________  Important contact and scheduling information:  Please call our contact center at [phone] to schedule your next appointments.  For any nursing questions or concerns call Nohelia Rene LPN at [phone] or Manasa Cline RN at [phone]  Please call during clinic hours Monday through Friday 8:00a - 4:00p if you have questions or you can contact us via [employer] at anytime and we will reply during clinic hours.    Lab results will be communicated through My Chart or letter (if My Chart not used). Please call the clinic if you have not received communication after 1 week or if you have any questions.?  Clinic Fax: [phone]  __________________________________________________________________________    If labs were ordered today:    Please make an appointment to have them drawn at your convenience.     To schedule the Lab Appointment using " "MyChart:  Select \"Schedule an Appointment\"  Select \"Lab Only\"  For \"A couple of questions\", select \"Other\"  For \"Which locations work for you?, select the location and set up the appointment    To schedule by phone call [phone] to schedule a lab only appointment at any [facility] lab.  ___________________________________________________________________________  Work with [facility] !  Virtual Sessions are Available through [facility] Clin[facility]    To learn more, call to schedule a free, Health  Q&A appointment: [phone]     What is Health Coaching?  Do you know what you are supposed to do, but you just aren't doing it?  Then, HEALTH COACHING may help you!   Get unstuck and move forward with the support of a professionally trained NBC-HWC (National Board-Certified Health and ) who uses evidence-based approaches to help you move forward with healthy lifestyle changes in the areas of weight loss, stress management and overall well-being.    Health Coaches help you identify goals that will work best for you. Health Coaches provide support and encouragement with overcoming barriers and help you to find inspiration and motivation to lead a healthy lifestyle.    Option one:  Health Coaching 3-Pack; Three, 30-minute Health Coaching Visits, for $99  Visits are done virtually (phone or video)  This is a self pay service; we do not accept insurance for marisol coaching.    Option two:   The 24 week Plan; 11 Health Coaching Visits, and a 7 months subscription to [employer]-- on-demand fitness, nutrition and mindfulness classes, for $499 (employee discounts may be available). Participants will also meet regularly with a weight management Medical Provider and a Registered/Licensed Dietician.  This is a self-pay service; we do not accept insurance for health coaching.    To Schedule a free Health  Q&A appointment to learn more,  call " "[phone].  ____________________________________________________________________  [facility]  Healthy Lifestyle Group    Healthy Lifestyle Group  This is a 60 minute virtual coaching group for those who want to lead a healthier lifestyle. Come together to set goals and overcome barriers in a supportive group environment. We will address the four pillars of health--nutrition, exercise, sleep and emotional well-being.  This group is highly recommended for those who are participating in the 24 week Healthy Lifestyle Plan and our Health Coaching sessions.    WHEN: This group meets the first Friday of the month, 12:30 PM - 1:30 PM online, via a zoom meeting.      FACILITATOR: Led by National Board Certified Health and , Claudia Wright [facility].    TO REGISTER: Please call the Call Center at [phone] to register. You will get an appointment to attend [facility]. Fifteen minutes prior to the meeting, complete the e-check in and you will get the link to join the meeting.  There is no charge to attend this group and space is limited.      2023 and 2024 Meeting Topics and Dates:    November 3: Introduction to Mindfulness (Learn simple and effective mindfulness practices and how it can benefit you)    December 8: Let's Talk (guided discussion on our wins and challenges)    January 5: New Years Vision: Manifest your Best 2024! (Guided imagery,  journaling and discussion)    February 2: Let's Talk    March 1: 10 Percent Happier by Stevie Stewart (Book Bites; a guided discussion on the nuggets of wisdom from favorite wellness books; no need to read the book but highly encouraged)    April 5: Let's Talk    May 3: \"Essentialism; The Disciplined Pursuit of Less by Alok Cordova (book bites discussion)    June 7: Let's Talk    July 5: NO MEETING, off for the 4th of July Holiday    August 2: The Blue Zones, Secrets for Living a Longer Life by Stevie Moura (book bites " "discussion)      If you would like bariatric surgery specific support group info please let your care team know.         Thank you,   [facility] Comprehensive Weight Management Team          NALTREXONE    We are considering starting Naltrexone. Start with 1/2 tab 1-2 hours prior to the time you have the most trouble with cravings or extra hunger. If you are doing well you may switch to a whole tablet taken at the same time period.     WARNING: This medication blocks the action of opioid type pain medications. If you routinely take any medication like Codeine, Oxycontin,Percocet,Morphine,Dilaudid or Methodone, do not take this until you have talked with weight management staff. If you are planning surgery you should stop Naltrexone 4 days prior to the surgery. If you have an injury that requires pain medication, make sure the health care staff knows you take Naltrexone.     Naltrexone is a medication that is used most often to help people who are troubled by dependence on prescription pain killers or alcohol. It has also been found to help with weight loss. Although it's not currently FDA approved for weight loss, it has been used safely for a number of years to help people who are carrying extra weight.     Just how Naltrexone helps with weight loss has not been exactly determined.  It seems to work by quieting down brain signals related to strong food cravings. Many of our patients use the word \"addiction\" to describe their feelings and constant thoughts about food. It makes sense then to treat the feeling of dependence on food, outside of real hunger, with a medication designed to help with other sorts of dependence.     Our patients on Naltrexone find that they:    >feel less interest in food   >think less about food and eating and have more time to think of other things   >find it easier to push the plate away   >have an easier time eating less    For some of our patients, these feelings are very " immediate. Other patients, don't feel much of a change but find they've lost weight. Like all weight loss medications, Naltrexone works best when you help it work. This means:  1. Having less tempting high calorie (fattening) food around the house or office. (For people with strong cravings this is very important.)   2. Staying away from situations or people that may trigger your cravings .   3. Eating out only one time or less each week.  4. Eating your meals at a table with the TV or computer off.    Side-effects. Naltrexone is generally well tolerated. The main side-effect we see is  nausea or a woozy feeling. A small number of people feel quite ill. Most people have a mild reaction and some people have no reaction at all.  The good news is that this feeling does go away.     In order to avoid nausea, please start the medication with half a pill for the first few days. Go on to a full pill if you are feeling well.      If you  are nauseated on 1/2 a pill it is okay to cut back to 1/4 pill ( a very small amount). Take this for a couple of days and work your way back up to a 1/2 pill and then a whole pill. Taking the medication at night or with food  to start also may help prevent the feeling of nausea.       For any questions or concerns please send a [employer] message to our team or call our weight management call center at [phone] during regular business hours. For questions during evenings or weekends your messages will be addressed during the next business day.  For emergencies please call 911 or seek immediate medical care.     (Do not stop taking it if you don't think it's working. For some people it works without them knowing it.)      Please refer to the pharmacy insert for more information on side-effects. Since many pharmacists are not familiar with the use of naltrexone in weight loss, calling the nurse at [phone] will get you the most accurate information.  In order to get refills of this or  any medication we prescribe you must be seen in the medical weight mgmt clinic every 2-3 months. Please have your pharmacy fax a refill request to [phone].

## 2024-01-26 NOTE — ASSESSMENT & PLAN NOTE
Symptoms of abdominal pain, burning in chest, acid in throat, and nausea. Nausea worse after eating. Symptoms are all day, and then worse weigh eating. Symptoms have gotten worse over the past year, especially with weight gain. Vomited in November, but changed diet to more bland foods and no current symptoms. Has tried TUMS with minimal relief. Briefly tried omeprazole with no relief. Denied hematemesis, melena or blood in stool. No dysphagia. Denies nicotine, ETOH, or NSAID use. Minimal caffeine daily. Will start Protonix today. If no improvement will increase dose and order EGD.

## 2024-01-26 NOTE — ASSESSMENT & PLAN NOTE
S/p gastric sleeve with Dr. Joseph on 3/2018  Last seen by Any Morris 1 month post op 4/2018  Weight prior to surgery - 289lb   Erika weight - 192lb     Able to maintain erika weight. Weight increase after being diagnosed with Covid in 2020. Since then has had long lasting covid symptoms that have influenced activity. Currently has uncontrolled GERD symptoms leading to maladaptive eating habbits that have also influenced weight.     Discussed AOMs to help with weight loss:   - Phentermine contraindicated due to hx of afib   - Topiramate concern for increase brain fog from Covid   - GLP-1 can be considered in the future. Concern for start today with uncontrolled GERD symptoms. Sister had thyroid cancer, would need to confirm type prior to starting   - Naltrexone to be started today. No contraindications. Discussed side effects, risks, and benefits.No hx of liver disease. Not taking opioids.

## 2024-02-06 ENCOUNTER — APPOINTMENT (OUTPATIENT)
Dept: GENERAL RADIOLOGY | Facility: CLINIC | Age: 59
End: 2024-02-06
Attending: EMERGENCY MEDICINE
Payer: COMMERCIAL

## 2024-02-06 ENCOUNTER — HOSPITAL ENCOUNTER (EMERGENCY)
Facility: CLINIC | Age: 59
Discharge: HOME | End: 2024-02-06
Attending: EMERGENCY MEDICINE | Admitting: EMERGENCY MEDICINE
Payer: COMMERCIAL

## 2024-02-06 VITALS
OXYGEN SATURATION: 99 % | RESPIRATION RATE: 16 BRPM | TEMPERATURE: 97.8 F | SYSTOLIC BLOOD PRESSURE: 127 MMHG | DIASTOLIC BLOOD PRESSURE: 84 MMHG | HEART RATE: 78 BPM

## 2024-02-06 DIAGNOSIS — M25.561 ACUTE PAIN OF RIGHT KNEE: ICD-10-CM

## 2024-02-06 PROCEDURE — 73560 X-RAY EXAM OF KNEE 1 OR 2: CPT | Mod: 26 | Performed by: RADIOLOGY

## 2024-02-06 PROCEDURE — 99283 EMERGENCY DEPT VISIT LOW MDM: CPT | Performed by: EMERGENCY MEDICINE

## 2024-02-06 PROCEDURE — 250N000013 HC RX MED GY IP 250 OP 250 PS 637: Performed by: EMERGENCY MEDICINE

## 2024-02-06 PROCEDURE — 73560 X-RAY EXAM OF KNEE 1 OR 2: CPT | Mod: RT

## 2024-02-06 RX ORDER — IBUPROFEN 600 MG/1
600 TABLET, FILM COATED ORAL ONCE
Status: COMPLETED | OUTPATIENT
Start: 2024-02-06 | End: 2024-02-06

## 2024-02-06 RX ORDER — ACETAMINOPHEN 500 MG
1000 TABLET ORAL ONCE
Status: COMPLETED | OUTPATIENT
Start: 2024-02-06 | End: 2024-02-06

## 2024-02-06 RX ADMIN — IBUPROFEN 600 MG: 600 TABLET, FILM COATED ORAL at 10:51

## 2024-02-06 RX ADMIN — ACETAMINOPHEN 1000 MG: 500 TABLET ORAL at 10:51

## 2024-02-06 ASSESSMENT — ACTIVITIES OF DAILY LIVING (ADL)
ADLS_ACUITY_SCORE: 35
ADLS_ACUITY_SCORE: 35

## 2024-02-06 NOTE — ED PROVIDER NOTES
"    [place] EMERGENCY DEPARTMENT ([facility])    2/06/24       ED PROVIDER NOTE  VTA      History     Chief Complaint   Patient presents with    Knee Pain     HPI  Sunshine Delgado is a 58 year old female with a past medical history significant for CKD III, A-fib, osteoarthritis of right knee, anxiety and depression who presents to the Emergency Department for evaluation of knee injury. Patient states this morning at 6 am she fell on the stairs, grabbed the railing and felt her right knee \"pop.\" Since then she has had right knee pain and it has been more difficult for her to walk. She has been able to put some weight on it but endorses pain with this. She states this was a mechanical fall and did not have abnormal symptoms prior to this fall. She denies hitting her head or LOC. She has not taken anything for pain control. She is not on blood thinners. She states she has injured her knee before while playing softball and sees an orthopedist for this.   No ankle pain.  No numbness or tingling in the extremity.    Past Medical History  Past Medical History:   Diagnosis Date    Antiplatelet or antithrombotic long-term use     Arrhythmia     Atrial fibrillation with rapid ventricular response (H) 1/26/2020    Bee sting allergy     Depressive disorder     Generalized anxiety disorder 10/15/2009    lexapro made things worse.      Hashimoto's thyroiditis 5/6/2020    Morbid obesity (H)     Ocular migraine     MARIA GUADALUPE (obstructive sleep apnea)- severe (AHI 84) 09/09/2011    patient not using since 2019    Primary osteoarthritis of right knee 9/23/2022    Renal disease     Voice fatigue 10/22/2009     Past Surgical History:   Procedure Laterality Date    ANESTHESIA CARDIOVERSION N/A 11/22/2021    Procedure: ANESTHESIA, FOR CARDIOVERSION@1515;  Surgeon: GENERIC ANESTHESIA PROVIDER;  Location: [facility] OR    COLONOSCOPY  2000    [facility] GASTRIC SLEEVE      EP ABLATION FOCAL AFIB N/A 7/22/2021    Procedure: EP ABLATION FOCAL AFIB;  " Surgeon: Vicente Craig MD;  Location:  HEART CARDIAC CATH LAB    EP ABLATION FOCAL AFIB N/A 3/31/2022    Procedure: Ablation Focal Atrial Fibrillation;  Surgeon: Vicente Craig MD;  Location:  HEART CARDIAC CATH LAB    GENITOURINARY SURGERY  2007    HYSTERECTOMY, PAP NO LONGER INDICATED      HYSTERECTOMY, VAGINAL  2007    still has ovaries    LAPAROSCOPIC GASTRIC SLEEVE N/A 3/13/2018    Procedure: LAPAROSCOPIC GASTRIC SLEEVE;  Laparoscopic Sleeve Gastrectomy;  Surgeon: Kameron Joseph MD;  Location:  OR     acetaminophen (TYLENOL) 325 MG tablet  buPROPion (WELLBUTRIN XL) 300 MG 24 hr tablet  doxycycline hyclate (VIBRAMYCIN) 100 MG capsule  EPINEPHrine (AUVI-Q) 0.3 MG/0.3ML injection 2-pack  estradiol (ESTRACE) 0.5 MG tablet  levothyroxine (SYNTHROID/LEVOTHROID) 75 MCG tablet  naltrexone (DEPADE/REVIA) 50 MG tablet  pantoprazole (PROTONIX) 40 MG EC tablet  traZODone (DESYREL) 50 MG tablet  venlafaxine (EFFEXOR-ER) 75 MG 24 hr tablet      Allergies   Allergen Reactions    Cephalexin Hives    Strawberry Extract Hives    Sulfa Antibiotics Hives    Cinnamon Hives    Sulfamethoxazole-Trimethoprim Hives    Vicodin [Hydrocodone-Acetaminophen]     Wasp Venom Protein      Other reaction(s): Chest Pain    Wasps [Hornets] Swelling     Now carries Epi Pen    Zoloft [Sertraline]      suicidal ideation    Contrast Dye Other (See Comments) and Rash     Patient had sneezing and an itchy throat following contrast injection of 100 mL Isovue-370.  From IV contrast dye     Family History  Family History   Problem Relation Age of Onset    Eye Disorder Mother         lost eyesight; probable macular degeneration    Psychotic Disorder Mother         Dementia /Alzhimers    Neurologic Disorder Mother         seizures    Diabetes Mother     Hypertension Mother     Alzheimer Disease Mother     Arthritis Mother     Osteoporosis Mother     Obesity Mother     Diabetes Father     Depression Father     Hyperlipidemia Father     Obesity  "Father     Psychotic Disorder Sister         bipolar    Thyroid Disease Sister         h/o thyroid cancer    Depression Sister         Bipolar    Obesity Sister     Cancer Other         Brain cancer    Osteoporosis Maternal Grandmother     Anxiety Disorder Son     Depression Sister     Thyroid Disease Sister      Social History   Social History     Tobacco Use    Smoking status: Never     Passive exposure: Never    Smokeless tobacco: Never   Vaping Use    Vaping Use: Never used   Substance Use Topics    Alcohol use: Yes     Comment: 1-2 per mo    Drug use: Not Currently     Types: Marijuana     Comment: 12/20 last dose      Past medical history, past surgical history, medications, allergies, family history, and social history were reviewed with the patient. No additional pertinent items.      A complete review of systems was performed with pertinent positives and negatives noted in the HPI, and all other systems negative.    Physical Exam   BP: 127/84  Pulse: 78  Temp: 97.8  F (36.6  C)  Resp: 16  SpO2: 99 %  Physical Exam  General: Awake alert no acute distress  Extremity: No posterior knee pain, some swelling noted around the patella but no tenderness over the patella.  Patient has active flexion and extension of the knee.  No tenderness to palpation around the ankle.  Extremities warm, well-perfused.      ED Course, Procedures, & Data      Procedures       Results for orders placed or performed during the hospital encounter of 02/06/24   XR Knee Right 1/2 Views     Status: None    Narrative    2 views right knee radiographs 2/6/2024 11:14 AM    History: pain after fall; felt a \"pop\"    Comparison: Right knee X-rays 2/25/2022    Findings:    AP and lateral views of the right knee were obtained.     No acute osseous abnormality. Small joint effusion.    Mild joint space loss of the medial compartment. No substantial joint  space loss of the lateral compartment. Superior patellar enthesophyte.    Soft tissue is " unremarkable.      Impression    Impression:  1. No acute osseous abnormality.  2. Mild degenerative change of the medial joint compartment, not  substantially progressed from 2022.    I have personally reviewed the examination and initial interpretation  and I agree with the findings.    JUTTA ELLERMANN, MD         SYSTEM ID:  [id number]     Medications   ibuprofen (ADVIL/MOTRIN) tablet 600 mg (600 mg Oral $Given 2/6/24 1051)   acetaminophen (TYLENOL) tablet 1,000 mg (1,000 mg Oral $Given 2/6/24 1051)     Labs Ordered and Resulted from Time of ED Arrival to Time of ED Departure - No data to display  XR Knee Right 1/2 Views   Final Result   Impression:   1. No acute osseous abnormality.   2. Mild degenerative change of the medial joint compartment, not   substantially progressed from 2022.      I have personally reviewed the examination and initial interpretation   and I agree with the findings.      JUTTA ELLERMANN, MD            SYSTEM ID:  [id number]             Critical care was not performed.     Medical Decision Making  The patient's presentation was of low complexity (an acute and uncomplicated illness or injury).    The patient's evaluation involved:  ordering and/or review of 1 test(s) in this encounter (see separate area of note for details)  independent interpretation of testing performed by another health professional (x-ray reviewed by me)    The patient's management necessitated only low risk treatment.    Assessment & Plan    Sunshine is a 58-year-old female who presents with knee pain.  This was an acute injury, no need to workup syncope.  Physical exam is not consistent with quadriceps tendon rupture as she has full active extension.  X-ray negative for acute fracture.  Discussed ice, ibuprofen and Tylenol.  Will weight-bear with crutches until she is able to follow-up in 1 to 2 weeks with sports medicine/Ortho if symptoms do not improve.    Patient reports knee pain occurred after the fall, no preceding  leg pain, knee pain or calf tenderness so do not suspect DVT or other alternative source.  After Tylenol and ibuprofen she was able to ambulate more comfortably and reports that her pain was well-controlled.    She understands discharge and follow-up plans.    I have reviewed the nursing notes. I have reviewed the findings, diagnosis, plan and need for follow up with the patient.    Discharge Medication List as of 2/6/2024  1:10 PM          Final diagnoses:   Acute pain of right knee   I, RAFAEL HARRIS, am serving as a trained medical scribe to document services personally performed by Eusebio Ozuna MD, based on the provider's statements to me.      I, Eusebio Ozuna MD, was physically present and have reviewed and verified the accuracy of this note documented by RAFAEL HARRIS.      Eusebio Ozuna MD  [facility] EMERGENCY DEPARTMENT  2/6/2024     Eusebio Ozuna MD  02/06/24 1861

## 2024-02-06 NOTE — ED TRIAGE NOTES
"Pt arrives ambulatory to triage c/o R knee pain after  a fall at home on the stairs. Fell backwards and heard her knee \"pop\" Denies hitting head and LOC. Not on thinners     Triage Assessment (Adult)       Row Name 02/06/24 1024          Triage Assessment    Airway WDL WDL        Respiratory WDL    Respiratory WDL WDL        Cardiac WDL    Cardiac WDL WDL        Peripheral/Neurovascular WDL    Peripheral Neurovascular WDL WDL        Cognitive/Neuro/Behavioral WDL    Cognitive/Neuro/Behavioral WDL WDL                     "

## 2024-02-08 NOTE — PROGRESS NOTES
Sunshine Delgado  :  1965  DOS: 2024  MRN: [id number]  PCP: Lesly Arteaga    Sports Medicine Clinic Visit      Interim History - 2024  - Last seen in clinic on 2023 for right knee primary tricompartmental osteoarthritis and a degenerative medial meniscus tear, chronic ACL tear.  - Right knee corticosteroid injection completed on 2023 provided moderate to great relief for 5+ months.      - Since the last visit, she notes a return in right knee pain and a new injury .   - Injury on 2024 where she reports that she fell down the stairs while trying to help her dog.  She held onto the railing and her right leg twisted underneath her and what sounds like a varus to valgus force.  She felt a significant pop during the injury and was unable to bear weight afterward.  She noticed significant swelling of the knee afterward as well.  She has had a difficult time weightbearing since the injury and has had continued pain throughout the knee.  Also with significant feelings of instability, even while in her lateral J brace.  She was using crutches initially after her ED visit on 2024, but discontinued due to discomfort.  Also using Tylenol as needed for additional pain control.  Does not take medications often.  She is concerned about her knee after this recent injury, as she has had more pain than usual and more instability.    - XR R knee 2024 shows normal anatomic alignment with mild to moderate degenerative changes of the medial compartment, no fractures or dislocations.  There is a small knee joint effusion and distal quadriceps tendon enthesopathy.      Interim History - 2023  - Last seen in clinic on 3/17/2023 for right knee primary osteoarthritis and a degenerative medial meniscus tear. Performed CSI (5 months of relief from CSI in 2022), given lateral J brace, did not attend PT so given HEP.   - Right knee injection completed on 3/17/23 provided 2.5 months  "of great relief.   - Since the last visit, patient notes a return in right anteromedial knee pain over the last 2 months. Pain with descending stairs. Also, she notes that her knee feels like it is buckling while walking, on occasion. Has been using the hot tub and icing lately. Is hopeful for a repeat corticosteroid injection today.   - No interim injury.       Interim History - March 17, 2023  Since last visit on 9/23/2022 patient has noticed a return/increase in medial right knee pain.  Right knee corticosteroid injection completed on 9/23/2022 provided great relief for 5 months. Has been using ice and heat (hot tub). Feeling of instability, buckling and a need to \"pop\" with sudden movements occasionally when walking quickly. Pain with going down stairs and slippery surfaces.  Did not start physical therapy. Has not gotten a brace.  No new injury in the interim. Also, she has lost over 30 pounds since our last visit, which has helped her overall knee pain.     Initial Visit: September 23, 2022  HPI  Sunshine Delgado is a 57 year old female who is seen in consultation at the request of  Hadley Grace PA-C presenting with right knee pain. MRI on file.    Mechanism of Injury: No acute injury onset.      Duration and progression of pain: Knee pain started in early June 2022. Worsening.   Location of pain: Right medial knee.  Radiation: from medial right knee to the right hip    Swelling: yes  Instability: yes  Mechanical symptoms: chronic popping, no locking    Numbness/Tingling: medial right knee numbness occasionally  Radicular pain: no  Weakness: yes, limited by pain    Alleviating factors: hottub helps with muscle tightness.  Aggravating factors: extension of the right leg. Notes constant medial knee pain.     Treatments tried (medications, injections, bracing, therapy, modalities): ice and Tylenol. Heats in the hottub nightly. [place]    Prior medical visits " related to complaint:  Followed by SUPRIYA Stubbs.   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    Past Medical History:   Diagnosis Date    Antiplatelet or antithrombotic long-term use     Arrhythmia     Atrial fibrillation with rapid ventricular response (H) 1/26/2020    Bee sting allergy     Depressive disorder     Generalized anxiety disorder 10/15/2009    lexapro made things worse.      Hashimoto's thyroiditis 5/6/2020    Morbid obesity (H)     Ocular migraine     MARIA GUADALUPE (obstructive sleep apnea)- severe (AHI 84) 09/09/2011    patient not using since 2019    Primary osteoarthritis of right knee 9/23/2022    Renal disease     Voice fatigue 10/22/2009     Past Surgical History:   Procedure Laterality Date    ANESTHESIA CARDIOVERSION N/A 11/22/2021    Procedure: ANESTHESIA, FOR CARDIOVERSION@1515;  Surgeon: GENERIC ANESTHESIA PROVIDER;  Location:  OR    COLONOSCOPY  2000    DAVINCI GASTRIC SLEEVE      EP ABLATION FOCAL AFIB N/A 7/22/2021    Procedure: EP ABLATION FOCAL AFIB;  Surgeon: Vicente Craig MD;  Location:  [facility]    EP ABLATION FOCAL AFIB N/A 3/31/2022    Procedure: Ablation Focal Atrial Fibrillation;  Surgeon: Vicente Craig MD;  Location:  [facility]ATH [facility]    GENITOURINARY SURGERY  2007    HYSTERECTOMY, PAP NO LONGER INDICATED      HYSTERECTOMY, VAGINAL  2007    still has ovaries    LAPAROSCOPIC GASTRIC SLEEVE N/A 3/13/2018    Procedure: LAPAROSCOPIC GASTRIC SLEEVE;  Laparoscopic Sleeve Gastrectomy;  Surgeon: Kameron Joseph MD;  Location:  OR     Family History   Problem Relation Age of Onset    Eye Disorder Mother   "       lost eyesight; probable macular degeneration    Psychotic Disorder Mother         Dementia /Alzhimers    Neurologic Disorder Mother         seizures    Diabetes Mother     Hypertension Mother     Alzheimer Disease Mother     Arthritis Mother     Osteoporosis Mother     Obesity Mother     Diabetes Father     Depression Father     Hyperlipidemia Father     Obesity Father     Psychotic Disorder Sister         bipolar    Thyroid Disease Sister         h/o thyroid cancer    Depression Sister         Bipolar    Obesity Sister     Cancer Other         Brain cancer    Osteoporosis Maternal Grandmother     Anxiety Disorder Son     Depression Sister     Thyroid Disease Sister        Objective  Ht 1.626 m (5' 4\")   Wt 101.2 kg (223 lb)   LMP  (LMP Unknown)   BMI 38.28 kg/m      General: healthy, alert and in no acute distress    HEENT: no scleral icterus or conjunctival erythema   Skin: no suspicious lesions or rash. No jaundice.   CV: regular rhythm by palpation, 2+ distal pulses, no pedal edema    Resp: normal respiratory effort without conversational dyspnea   Psych: normal mood and affect    Gait: antalgic favoring the right leg, appropriate coordination and balance     Neuro:        - Sensation to light touch:  Intact throughout the BLE including all peripheral nerve distributions.        - MSR:  2+ in bilateral patella, achilles.        - Special tests:   - Slump/SLR:  Neg bilaterally    MSK - Knee:       - Inspection:    -Mild to moderate effusion present without surrounding erythema, warmth, ecchymosis, lesion.        - ROM:     - Limited slightly in flexion by anteromedial knee pain.       - Palpation:    - TTP at the medial joint line, MCL, lateral joint line, LCL, fibular head, distal medial hamstring tendons  - NTTP elsewhere.        - Strength:    - 5/5 in BLE including HF, Hab, Hadd, KF*, KE*, DF, PF, EHL, Inv, Ev.        - Special tests:        - Lachman: Positive 2B with significant pain        - A/P " "drawer: Positive       - Pivot shift: Positive   - Garry:  Pos for click and medial compartment pain     - Varus stress:  Neg for laxity, positive for mild pain    - Valgus stress:  Neg for laxity, pos for mild pain   - Patellar grind:  Pos      Radiology  I previously independently reviewed the available relevant imaging, including MRI R Knee (6/17/22), and agree with the interpretation of mild-moderate tricompartmental cartilage defects, degenerative tear of the medial meniscus, and chronic ACL tear.     Recent Results (from the past 744 hour(s))   XR Knee Right 1/2 Views    Narrative    2 views right knee radiographs 2/6/2024 11:14 AM    History: pain after fall; felt a \"pop\"    Comparison: Right knee X-rays 2/25/2022    Findings:    AP and lateral views of the right knee were obtained.     No acute osseous abnormality. Small joint effusion.    Mild joint space loss of the medial compartment. No substantial joint  space loss of the lateral compartment. Superior patellar enthesophyte.    Soft tissue is unremarkable.      Impression    Impression:  1. No acute osseous abnormality.  2. Mild degenerative change of the medial joint compartment, not  substantially progressed from 2022.    I have personally reviewed the examination and initial interpretation  and I agree with the findings.    JUTTA ELLERMANN, MD         SYSTEM ID:  [id number]         MR right knee without contrast 6/17/2022 7:17 PM     Techniques: Multiplanar multisequence imaging of the right knee was  obtained without administration of intra-articular or intravenous  contrast using routing protocol.     History: Acute pain of right knee     Comparison: None available     Findings:     MENISCI:  Medial meniscus:There is a horizontal oblique tear of the body of the  medial meniscus that communicates with the inferior articular surface  (series 7, images 24 through 26), blunting of the posterior horn .  Lateral meniscus: Mucoid degeneration of the " lateral meniscus, which  is mildly extruded.     LIGAMENTS  Cruciate ligaments: The ACL appears attenuated with only a few intact  fibers. No associated bony contusion pattern most consistent with  chronic ACL tear. The posterior cruciate ligament is intact and  unremarkable.  Medial supporting structures:  Lateral supporting structures: Intact.     EXTENSOR MECHANISM  Intact.     FLUID  Small joint effusion. No substantial Baker's cyst.     OSSEOUS and ARTICULAR STRUCTURES  Bones: No fracture, contusion, or osseous lesion is seen.     Patellofemoral compartment: Focal full-thickness cartilage fissuring  centered about the median ridge of the patella is associated mild bone  marrow edema, focal full-thickness cartilage fissure of the medial  trochlea     Medial compartment: Focal moderate grade cartilage fissuring centered  about the weightbearing aspect of the medial femoral condyle.     Lateral compartment: Focal full-thickness cartilage defect of the  lateral femoral condyle (series 6001).     ANCILLARY FINDINGS  None.                                                                      Impression:  1. Medial compartment: Complex degenerative tearing of the medial  meniscus with a predominant horizontal oblique component. Moderate  grade cartilage fissuring.  2. Lateral compartment: Intrasubstance degeneration of the lateral  meniscus, which is mildly extruded. Focal full-thickness articular  cartilage defect within the distal femoral condyle.  3. Patellofemoral joint: Focal full-thickness articular cartilage  fissure centered about the median ridge of the patella.  4. Chronic appearing ACL tear.     JUTTA ELLERMANN, MD       Assessment  1. Rupture of anterior cruciate ligament of right knee, initial encounter    2. Primary osteoarthritis of right knee    3. Degenerative tear of medial meniscus, right          Plan  Sunshine Delgado is a 57 year old female that presents for follow up of her right knee pain  secondary to primary osteoarthritis and a degenerative medial meniscus tear and a chronic ACL tear.      She is following up specifically today because she had a significant recent injury to the right knee.  She had a fall while on the stairs that resulted in a varus to valgus twisting mechanism injury to the knee where she fell a loud pop and had severe pain, instability, and inability to bear weight on the knee after the injury.  Significant effusion, stiffness afterward as well.  On exam, she had a positive Lachman and pivot shift, positive Garry medial and lateral.  Based on her known injuries in the knee as above, concern that she has progressed to a complete ACL tear or possible other internal derangement to the knee that would explain the effusion and inability to bear weight.  It will be important to evaluate the integrity of the soft tissues of the knee with advanced imaging (MRI).  MRI order has been placed today and instructions given for scheduling MRI and follow-up with me afterward.      We discussed the nature of the condition and additional treatment options and mutually agreed upon the following plan:    - Imaging:       - Reviewed results and images of her recent relevant imaging including XR R knee 2/6/2024 with patient and questions were answered.  - Medications:         - Discussed pharmacologic options for pain relief. May continue to use Tylenol PRN, avoid NSAIDs due to kidney problems. May also use topical creams such as IcyHot, BioFreeze, or Voltaren gel PRN.   - Injections:         - Great relief from CSI injections, most recent injection in July was providing significant relief for many months until this new recent injury.   - Prior authorization previously submitted for viscosupplementation injection.  Determined that no prior authorization is necessary.  We may consider viscosupplementation injections in the future as indicated.   -Will defer injections at this time in favor of the  MRI, and consider in the future as needed.  - Therapy:         - Discussed the benefits of PT vs HEP.  Previously referred to physical therapy, no sessions attended.  Did not consistently perform HEP.   - Continue home exercise program as instructed.  - Modalities:         - May use ice, heat, massage PRN.   - Bracing:         - Has been using a lateral J brace for walking and exacerbating activities as needed.  Based on her new acute injury and continued instability despite the brace, recommended a more stable hinged brace until we are able to get MRI results back and to determine her next steps.  Brace given today in clinic.  - Activity:         - Encouraged to rest and protect the knee from new/worsening injury until MRI results are obtained.  Avoid high impact exercise, and activities that are high risk for falls.  - Follow up:         - When results of the advanced imaging are available to discuss the results and next steps in treatment.        - Patient has clinic contact information for questions or concerns.       Roshan Farr DO, CAQSM  [facility] - Sports Medicine  [facility] - Department of Orthopedic Surgery

## 2024-02-09 ENCOUNTER — OFFICE VISIT (OUTPATIENT)
Dept: ORTHOPEDICS | Facility: CLINIC | Age: 59
End: 2024-02-09
Payer: COMMERCIAL

## 2024-02-09 VITALS — WEIGHT: 223 LBS | BODY MASS INDEX: 38.07 KG/M2 | HEIGHT: 64 IN

## 2024-02-09 DIAGNOSIS — S83.511A RUPTURE OF ANTERIOR CRUCIATE LIGAMENT OF RIGHT KNEE, INITIAL ENCOUNTER: Primary | ICD-10-CM

## 2024-02-09 DIAGNOSIS — M17.11 PRIMARY OSTEOARTHRITIS OF RIGHT KNEE: ICD-10-CM

## 2024-02-09 DIAGNOSIS — M23.203 DEGENERATIVE TEAR OF MEDIAL MENISCUS, RIGHT: ICD-10-CM

## 2024-02-09 PROCEDURE — 99214 OFFICE O/P EST MOD 30 MIN: CPT | Performed by: STUDENT IN AN ORGANIZED HEALTH CARE EDUCATION/TRAINING PROGRAM

## 2024-02-09 ASSESSMENT — PAIN SCALES - GENERAL: PAINLEVEL: MODERATE PAIN (4)

## 2024-02-09 NOTE — LETTER
2024         RE: Sunshine Delgado  [address] [place] [address]        Dear Colleague,    Thank you for referring your patient, Sunshine Delgado, to the [facility] [place]. Please see a copy of my visit note below.    Sunshine Delgado  :  1965  DOS: 2024  MRN: [id number]  PCP: Lesly Arteaga    Sports Medicine Clinic Visit      Interim History - 2024  - Last seen in clinic on 2023 for right knee primary tricompartmental osteoarthritis and a degenerative medial meniscus tear, chronic ACL tear.  - Right knee corticosteroid injection completed on 2023 provided moderate to great relief for 5+ months.      - Since the last visit, she notes a return in right knee pain and a new injury .   - Injury on 2024 where she reports that she fell down the stairs while trying to help her dog.  She held onto the railing and her right leg twisted underneath her and what sounds like a varus to valgus force.  She felt a significant pop during the injury and was unable to bear weight afterward.  She noticed significant swelling of the knee afterward as well.  She has had a difficult time weightbearing since the injury and has had continued pain throughout the knee.  Also with significant feelings of instability, even while in her lateral J brace.  She was using crutches initially after her ED visit on 2024, but discontinued due to discomfort.  Also using Tylenol as needed for additional pain control.  Does not take medications often.  She is concerned about her knee after this recent injury, as she has had more pain than usual and more instability.    - XR R knee 2024 shows normal anatomic alignment with mild to moderate degenerative changes of the medial compartment, no fractures or dislocations.  There is a small knee joint effusion and distal quadriceps tendon enthesopathy.      Interim History - 2023  - Last seen in clinic on  "3/17/2023 for right knee primary osteoarthritis and a degenerative medial meniscus tear. Performed CSI (5 months of relief from CSI in 09/2022), given lateral J brace, did not attend PT so given HEP.   - Right knee injection completed on 3/17/23 provided 2.5 months of great relief.   - Since the last visit, patient notes a return in right anteromedial knee pain over the last 2 months. Pain with descending stairs. Also, she notes that her knee feels like it is buckling while walking, on occasion. Has been using the hot tub and icing lately. Is hopeful for a repeat corticosteroid injection today.   - No interim injury.       Interim History - March 17, 2023  Since last visit on 9/23/2022 patient has noticed a return/increase in medial right knee pain.  Right knee corticosteroid injection completed on 9/23/2022 provided great relief for 5 months. Has been using ice and heat (hot tub). Feeling of instability, buckling and a need to \"pop\" with sudden movements occasionally when walking quickly. Pain with going down stairs and slippery surfaces.  Did not start physical therapy. Has not gotten a brace.  No new injury in the interim. Also, she has lost over 30 pounds since our last visit, which has helped her overall knee pain.     Initial Visit: September 23, 2022  HPI  Sunshine Delgado is a 57 year old female who is seen in consultation at the request of  Hadley Grace PA-C presenting with right knee pain. MRI on file.    Mechanism of Injury: No acute injury onset.      Duration and progression of pain: Knee pain started in early June 2022. Worsening.   Location of pain: Right medial knee.  Radiation: from medial right knee to the right hip    Swelling: yes  Instability: yes  Mechanical symptoms: chronic popping, no locking    Numbness/Tingling: medial right knee numbness occasionally  Radicular pain: no  Weakness: yes, limited by pain    Alleviating factors: hottub helps with muscle tightness.  Aggravating factors: " extension of the right leg. Notes constant medial knee pain.     Treatments tried (medications, injections, bracing, therapy, modalities): ice and Tylenol. Heats in the hottub nightly. [place]    Prior medical visits related to complaint:  Followed by SUPRIYA Stubbs.   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    Past Medical History:   Diagnosis Date     Antiplatelet or antithrombotic long-term use      Arrhythmia      Atrial fibrillation with rapid ventricular response (H) 1/26/2020     Bee sting allergy      Depressive disorder      Generalized anxiety disorder 10/15/2009    lexapro made things worse.       Hashimoto's thyroiditis 5/6/2020     Morbid obesity (H)      Ocular migraine      MARIA GUADALUPE (obstructive sleep apnea)- severe (AHI 84) 09/09/2011    patient not using since 2019     Primary osteoarthritis of right knee 9/23/2022     Renal disease      Voice fatigue 10/22/2009     Past Surgical History:   Procedure Laterality Date     ANESTHESIA CARDIOVERSION N/A 11/22/2021    Procedure: ANESTHESIA, FOR CARDIOVERSION@1515;  Surgeon: GENERIC ANESTHESIA PROVIDER;  Location:  OR     [facility]  2000     [facility]       EP ABLATION FOCAL AFIB N/A 7/22/2021    Procedure: EP ABLATION FOCAL AFIB;  Surgeon: Vicente Craig MD;  Location:  HEART CARDIAC CATH LAB     EP ABLATION FOCAL AFIB N/A 3/31/2022    Procedure: Ablation Focal Atrial Fibrillation;  Surgeon: Vicente Craig MD;  Location:  HEART CARDIAC CATH LAB     GENITOURINARY SURGERY  2007     HYSTERECTOMY, PAP NO LONGER INDICATED       HYSTERECTOMY,  "VAGINAL  2007    still has ovaries     LAPAROSCOPIC GASTRIC SLEEVE N/A 3/13/2018    Procedure: LAPAROSCOPIC GASTRIC SLEEVE;  Laparoscopic Sleeve Gastrectomy;  Surgeon: Kameron Joseph MD;  Location:  OR     Family History   Problem Relation Age of Onset     Eye Disorder Mother         lost eyesight; probable macular degeneration     Psychotic Disorder Mother         Dementia /Alzhimers     Neurologic Disorder Mother         seizures     Diabetes Mother      Hypertension Mother      Alzheimer Disease Mother      Arthritis Mother      Osteoporosis Mother      Obesity Mother      Diabetes Father      Depression Father      Hyperlipidemia Father      Obesity Father      Psychotic Disorder Sister         bipolar     Thyroid Disease Sister         h/o thyroid cancer     Depression Sister         Bipolar     Obesity Sister      Cancer Other         Brain cancer     Osteoporosis Maternal Grandmother      Anxiety Disorder Son      Depression Sister      Thyroid Disease Sister        Objective  Ht 1.626 m (5' 4\")   Wt 101.2 kg (223 lb)   LMP  (LMP Unknown)   BMI 38.28 kg/m      General: healthy, alert and in no acute distress    HEENT: no scleral icterus or conjunctival erythema   Skin: no suspicious lesions or rash. No jaundice.   CV: regular rhythm by palpation, 2+ distal pulses, no pedal edema    Resp: normal respiratory effort without conversational dyspnea   Psych: normal mood and affect    Gait: antalgic favoring the right leg, appropriate coordination and balance     Neuro:        - Sensation to light touch:  Intact throughout the BLE including all peripheral nerve distributions.        - MSR:  2+ in bilateral patella, achilles.        - Special tests:   - Slump/SLR:  Neg bilaterally    MSK - Knee:       - Inspection:    -Mild to moderate effusion present without surrounding erythema, warmth, ecchymosis, lesion.        - ROM:     - Limited slightly in flexion by anteromedial knee pain.       - Palpation:  " "  - TTP at the medial joint line, MCL, lateral joint line, LCL, fibular head, distal medial hamstring tendons  - NTTP elsewhere.        - Strength:    - 5/5 in BLE including HF, Hab, Hadd, KF*, KE*, DF, PF, EHL, Inv, Ev.        - Special tests:        - Lachman: Positive 2B with significant pain        - A/P drawer: Positive       - Pivot shift: Positive   - Garry:  Pos for click and medial compartment pain     - Varus stress:  Neg for laxity, positive for mild pain    - Valgus stress:  Neg for laxity, pos for mild pain   - Patellar grind:  Pos      Radiology  I previously independently reviewed the available relevant imaging, including MRI R Knee (6/17/22), and agree with the interpretation of mild-moderate tricompartmental cartilage defects, degenerative tear of the medial meniscus, and chronic ACL tear.     Recent Results (from the past 744 hour(s))   XR Knee Right 1/2 Views    Narrative    2 views right knee radiographs 2/6/2024 11:14 AM    History: pain after fall; felt a \"pop\"    Comparison: Right knee X-rays 2/25/2022    Findings:    AP and lateral views of the right knee were obtained.     No acute osseous abnormality. Small joint effusion.    Mild joint space loss of the medial compartment. No substantial joint  space loss of the lateral compartment. Superior patellar enthesophyte.    Soft tissue is unremarkable.      Impression    Impression:  1. No acute osseous abnormality.  2. Mild degenerative change of the medial joint compartment, not  substantially progressed from 2022.    I have personally reviewed the examination and initial interpretation  and I agree with the findings.    JUTTA ELLERMANN, MD         SYSTEM ID:  [id number]         MR right knee without contrast 6/17/2022 7:17 PM     Techniques: Multiplanar multisequence imaging of the right knee was  obtained without administration of intra-articular or intravenous  contrast using routing protocol.     History: Acute pain of right knee   "   Comparison: None available     Findings:     MENISCI:  Medial meniscus:There is a horizontal oblique tear of the body of the  medial meniscus that communicates with the inferior articular surface  (series 7, images 24 through 26), blunting of the posterior horn .  Lateral meniscus: Mucoid degeneration of the lateral meniscus, which  is mildly extruded.     LIGAMENTS  Cruciate ligaments: The ACL appears attenuated with only a few intact  fibers. No associated bony contusion pattern most consistent with  chronic ACL tear. The posterior cruciate ligament is intact and  unremarkable.  Medial supporting structures:  Lateral supporting structures: Intact.     EXTENSOR MECHANISM  Intact.     FLUID  Small joint effusion. No substantial Baker's cyst.     OSSEOUS and ARTICULAR STRUCTURES  Bones: No fracture, contusion, or osseous lesion is seen.     Patellofemoral compartment: Focal full-thickness cartilage fissuring  centered about the median ridge of the patella is associated mild bone  marrow edema, focal full-thickness cartilage fissure of the medial  trochlea     Medial compartment: Focal moderate grade cartilage fissuring centered  about the weightbearing aspect of the medial femoral condyle.     Lateral compartment: Focal full-thickness cartilage defect of the  lateral femoral condyle (series 6001).     ANCILLARY FINDINGS  None.                                                                      Impression:  1. Medial compartment: Complex degenerative tearing of the medial  meniscus with a predominant horizontal oblique component. Moderate  grade cartilage fissuring.  2. Lateral compartment: Intrasubstance degeneration of the lateral  meniscus, which is mildly extruded. Focal full-thickness articular  cartilage defect within the distal femoral condyle.  3. Patellofemoral joint: Focal full-thickness articular cartilage  fissure centered about the median ridge of the patella.  4. Chronic appearing ACL tear.     ANNY  ELLERMANN, MD       Assessment  1. Rupture of anterior cruciate ligament of right knee, initial encounter    2. Primary osteoarthritis of right knee    3. Degenerative tear of medial meniscus, right          Plan  Sunshine Delgdao is a 57 year old female that presents for follow up of her right knee pain secondary to primary osteoarthritis and a degenerative medial meniscus tear and a chronic ACL tear.      She is following up specifically today because she had a significant recent injury to the right knee.  She had a fall while on the stairs that resulted in a varus to valgus twisting mechanism injury to the knee where she fell a loud pop and had severe pain, instability, and inability to bear weight on the knee after the injury.  Significant effusion, stiffness afterward as well.  On exam, she had a positive Lachman and pivot shift, positive Garry medial and lateral.  Based on her known injuries in the knee as above, concern that she has progressed to a complete ACL tear or possible other internal derangement to the knee that would explain the effusion and inability to bear weight.  It will be important to evaluate the integrity of the soft tissues of the knee with advanced imaging (MRI).  MRI order has been placed today and instructions given for scheduling MRI and follow-up with me afterward.      We discussed the nature of the condition and additional treatment options and mutually agreed upon the following plan:    - Imaging:       - Reviewed results and images of her recent relevant imaging including XR R knee 2/6/2024 with patient and questions were answered.  - Medications:         - Discussed pharmacologic options for pain relief. May continue to use Tylenol PRN, avoid NSAIDs due to kidney problems. May also use topical creams such as IcyHot, BioFreeze, or Voltaren gel PRN.   - Injections:         - Great relief from CSI injections, most recent injection in July was providing significant relief for many  months until this new recent injury.   - Prior authorization previously submitted for viscosupplementation injection.  Determined that no prior authorization is necessary.  We may consider viscosupplementation injections in the future as indicated.   -Will defer injections at this time in favor of the MRI, and consider in the future as needed.  - Therapy:         - Discussed the benefits of PT vs HEP.  Previously referred to physical therapy, no sessions attended.  Did not consistently perform HEP.   - Continue home exercise program as instructed.  - Modalities:         - May use ice, heat, massage PRN.   - Bracing:         - Has been using a lateral J brace for walking and exacerbating activities as needed.  Based on her new acute injury and continued instability despite the brace, recommended a more stable hinged brace until we are able to get MRI results back and to determine her next steps.  Brace given today in clinic.  - Activity:         - Encouraged to rest and protect the knee from new/worsening injury until MRI results are obtained.  Avoid high impact exercise, and activities that are high risk for falls.  - Follow up:         - When results of the advanced imaging are available to discuss the results and next steps in treatment.        - Patient has clinic contact information for questions or concerns.       Roshan Farr DO, CAQSM  [facility] - Sports Medicine  [facility] - Department of Orthopedic Surgery       Again, thank you for allowing me to participate in the care of your patient.        Sincerely,        Roshan Farr DO

## 2024-02-09 NOTE — PATIENT INSTRUCTIONS
MRI Scheduling Instructions  1.  Advanced imaging is done by appointment. Please call [facility] ([facility] [place]) [phone] to schedule your MRI at your earliest convenience.     - Some insurance companies may require a prior authorization to be completed which can delay the time until you are able to schedule your appointment.       - If you are active on [employer], you may have access to your test results before your provider is able to review the study and advise on next steps.      2. After your MRI is scheduled, please call [phone] to schedule an in-person or telephone follow up appointment with your provider to discuss the results and updated treatment recommendations.

## 2024-02-12 NOTE — TELEPHONE ENCOUNTER
"Routing refill request to provider for review/approval because:  Labs out of range:  PHQ-9    Requested Prescriptions   Pending Prescriptions Disp Refills     buPROPion (WELLBUTRIN XL) 150 MG 24 hr tablet [Pharmacy Med Name: BUPROPION HCL ER (XL) 150MG TB24] 90 tablet 0     Sig: TAKE ONE TABLET BY MOUTH EVERY MORNING       SSRIs Protocol Failed - 9/2/2020  9:16 AM        Failed - PHQ-9 score less than 5 in past 6 months     Please review last PHQ-9 score.           Passed - Medication is Bupropion     If the medication is Bupropion (Wellbutrin), and the patient is taking for smoking cessation; OK to refill.          Passed - Medication is active on med list        Passed - Patient is age 18 or older        Passed - No active pregnancy on record        Passed - No positive pregnancy test in last 12 months        Passed - Recent (6 mo) or future (30 days) visit within the authorizing provider's specialty     Patient had office visit in the last 6 months or has a visit in the next 30 days with authorizing provider or within the authorizing provider's specialty.  See \"Patient Info\" tab in inbasket, or \"Choose Columns\" in Meds & Orders section of the refill encounter.               Anne Marie Faustin RN  "
Need updated PHQ-9  
PHQ9 completed on [employer].    PHQ 9/2/2020   PHQ-9 Total Score 6   Q9: Thoughts of better off dead/self-harm past 2 weeks Not at all     An IFEANYI Ocasio    
Sent PHQ9 through [employer].  Sujata GRIFFIN CMA    
no

## 2024-02-20 ENCOUNTER — TELEPHONE (OUTPATIENT)
Dept: CARDIOLOGY | Facility: CLINIC | Age: 59
End: 2024-02-20
Payer: COMMERCIAL

## 2024-02-20 NOTE — PROGRESS NOTES
ELECTROPHYSIOLOGY CLINIC VISIT    Assessment/Recommendations   Assessment/Plan:    Ms. Delgado is a 58 year old female who has a past medical history significant for hashimoto's thyroiditis, MARIA GUADALUPE (uses CPAP), CKD, PAF (CHADSVASC 1 Eliquis) s/p DCCVs s/p PVI/CTI 21 s/p DCCV 21 s/p PVI/CTI 3/31/22, anxiety, and depression. She presents today for follow up.     Paroxsymal Atrial Fibrillation/Flutter:   We discussed in detail with the patient management/treatment options for Skip includin. Stroke Prophylaxis:  CHADSVASC=+gender  1, corresponding to a 1.3% annual stroke / systemic emolism event rate. She does not require chronic anticoagulation at this time.   2. Rate Control: She had been on Metoprolol with fatigue and it was stopped. She had been on Atenolol which was recently stopped. Can consider CCB if needed.   3. Rhythm Control: Cardioversion, Antiarrhythmics and/or ablation are options for rhythm control. She has been having frequent PAF/AFL that is highly symptomatic. She has had a couple DCCVs with continued recurrences. She had been on Flecainide which was not effective. She was recently switched to amiodarone. We discussed that given her younger age, we do not favor keeping her on this long term. We discussed alternative AATs vs. Ablation. She elected to pursue ablation which she had PVI/CTI on 21. She had recurrent persistent AF/AFL with zio from 10/2021 showing AF throughout and had DCCV on 21. She had recurrent AF/AFl and had a repeat PVI/CTI ablation on 3/31/22. Doing well now without recurrences.      4. Risk Factor Management: tight BP control, and MARIA GUADALUPE evaluation as indicated.      Given generalized fatigue and dizziness will get updated labs for thyroid function, Hgb, and renal function with electrolytes.    Follow up in a year in EP NP       History of Present Illness/Subjective    Ms. Sunshine Delgado is a 58 year old female who comes in today for EP follow-up of  AF/AFL.    Ms. Delgado is a 58 year old female who has a past medical history significant for hashimoto's thyroiditis, MARIA GUADALUPE (uses CPAP), CKD, PAF (CHADSVASC 1 Eliquis) s/p DCCV s/p PVI 7/22/21 s/p PVI/CTI 3/31/22, anxiety, and depression.      She was admitted in 1/2020 to OSH with abdominal pain and was found to have infectious mononucleosis. At that time her ECG showed AF and she spontaneously converted while in the hospital. An echo showed normal LVEF, no significant valvular abnormalities, and small pericardial effusion. She was started on metoprolol and ASA. Her metoprolol was later stopped by PCP due to frequent lightheadedness. She established care with Dr. Fajardo. She followed up in 3/2021 and reported feeling palpitations on and off and one episode lasting up to 3 days. She felt some chest pressure, shortness of breath, and presyncope. ECG showed AF vent rate 89 bpm. She had been started on lisinopril in 2/2021 by PCP for CKD and had felt a little more lightheaded since then, but also notes she had lightheadedness predating the lisinopril. A zio patch monitor from 4/2/21-4/16/21 showed 29% AF/AFL burden up to 10 hours 54 minutes and 39 symptom activations showed mostly AF/AFL or PSVT and sometimes sinus without ectopy. She was placed on atenolol and reported fatigue with this. Flecainide 50 mg BID was added. Subsequent ECG stress testing was negative for ischemia or QRS widening. Her PAF episodes had completely subsided for about 1 month after starting the Flecainide until end of 5/2021. She felt much improved when maintaining sinus. She then started developing increased AF symptoms, occurring daily. She has symptoms of chest tightness, dizziness, and palpitations with her AF.  She then presented to [facility] on 6/10/21 with symptomatic AF and underwent DCCV. Her Flecainide was increased to 100 mg BID. She represented to [facility] on 6/14/21 again with symptomatic AF and underwent DCCV. Her Flecainide and atenolol were  stopped and she was changed to amiodarone. She was then referred to EP for consideration for ablation. At EP appointment on 6/16/21, she was in AFL and feeling fatigue, MCHUGH, lightheadedness/dizziness, and generally unwell. We loaded her with amiodarone and arranged for another DCCV. She presented to [facility] [place] and it was canceled. She wanted to get off amiodarone if possible and elected to pursue ablation and had PVI/CTI on 7/22/21.     EP Visit 1/12/22: She presents today for follow up after ablation. Groin sites well healed. A zio patch monitor from 10/2021 showed AF throughout poor HR control with avg  bpm. She underwent DCCV on 11/22/21. She reports feeling OK. She notes feeling less symptomatic now with her AFL then before ablation. She does still have palpitations and decreased stamina. She denies chest discomfort, abdominal fullness/bloating or peripheral edema, shortness of breath, paroxysmal nocturnal dyspnea, orthopnea, lightheadedness, dizziness, pre-syncope, or syncope. Presenting 12 lead ECG shows AFL Vent Rate 78 bpm, QRS 74 ms, QTc 424 ms. Current cardiac medications include: Diltiazem.     EP Visit 7/13/22: She presents today for follow up. She had repeat PVI/CTI ablation on 3/31/22. Groin sites well healed. She reports feeling well. She has some minor occasional palpitations lasting seconds. No known AF/AFL recurrences. She denies chest discomfort, abdominal fullness/bloating or peripheral edema, shortness of breath, paroxysmal nocturnal dyspnea, orthopnea, lightheadedness, dizziness, pre-syncope, or syncope. Presenting 12 lead ECG shows NSR Vent Rate 72 bpm,  ms, QRS 88 ms, QTc 424 ms. Current cardiac medications include: Eliquis. Eliquis stopped at this visit.     EP Visit 1/11/23: She presents today for follow up. She reports feeling well. She has lost over 30 lbs in 9 months. She denies chest discomfort, palpitations, abdominal fullness/bloating or peripheral edema, shortness of  breath, paroxysmal nocturnal dyspnea, orthopnea, lightheadedness, dizziness, pre-syncope, or syncope. Presenting 12 lead ECG shows NSR  Vent Rate 60 bpm,  ms, QRS 88 ms, QTc 400 ms. No current cardiac medications.     She presents today for follow up. She reports feeling some generalized fatigue and some lightheadedness. She has some rare palpitations, short lived. She has been having pleurisy pain. She also has a knee injury causing her pain and has a MRI upcoming. She denies chest discomfort,abdominal fullness/bloating or peripheral edema, shortness of breath, paroxysmal nocturnal dyspnea, orthopnea, pre-syncope, or syncope. No current cardiac medications.     I have reviewed and updated the patient's Past Medical History, Social History, Family History and Medication List.     Cardiographics (Personally Reviewed) :   5/11/21 STRESS ECHOCARDIOGRAM:  Interpretation Summary  Submaximal ECG stress test  65% maximal predicted HR achieved. The test was terminated due to fatigue.  Normal blood pressure response to exercise.  The patient did not report any symptoms during exercise.  No ECG evidence of ischemia.  No QRS prolongation at peak exercise compared to rest.  No supraventricular or ventricular arrhythmias.  Good exercise tolerance. The patient achieved 10 METs at peak exercise.     1/27/20 ECHOCARDIOGRAM:   Final Conclusion   Normal left ventricular size.   Normal left ventricular systolic function.   No obvious regional wall motion abnormalities.   The ejection fraction is visually estimated at 55-60%.   The right ventricle is normal in size and function.   The left atrium is normal in size.   There is trace mitral regurgitation.   There is no significant tricuspid regurgitation.   Small pericardial effusion.   No echocardiographic findings to suggest hemodynamic effect of the pericardial fluid.   Estimated EF: 55-60%  7/2021 CTA:  IMPRESSION:  1. Normal pulmonary venous anatomy normal variant right  middle  pulmonary vein. All pulmonary veins draining into the left atrium       Physical Examination   /70 (BP Location: Right arm, Patient Position: Chair, Cuff Size: Adult Large)   Pulse 67   Wt 106.6 kg (235 lb 1.6 oz)   LMP  (LMP Unknown)   SpO2 97%   BMI 40.35 kg/m    Wt Readings from Last 3 Encounters:   02/09/24 101.2 kg (223 lb)   01/25/24 101.2 kg (223 lb)   01/23/24 101.2 kg (223 lb)     General Appearance:   Alert, well-appearing and in no acute distress.   HEENT: Atraumatic, normocephalic. PERRL.  MMM.   Chest/Lungs:   Respirations unlabored.  Lungs are clear to auscultation.   Cardiovascular:   Regular, S1/S2, no murmur.    Abdomen:  Soft, nontender, nondistended.   Extremities: No cyanosis or clubbing. No edema.    Musculoskeletal: Moves all extremities.  Gait normal.   Skin: Warm, dry, intact.    Neurologic: Mood and affect are appropriate.  Alert and oriented to person, place, time, and situation.          Medications  Allergies   Current Outpatient Medications   Medication Sig Dispense Refill    acetaminophen (TYLENOL) 325 MG tablet Take 325 mg by mouth every 4 hours as needed      buPROPion (WELLBUTRIN XL) 300 MG 24 hr tablet Take 1 tablet (300 mg) by mouth every morning 90 tablet 3    doxycycline hyclate (VIBRAMYCIN) 100 MG capsule Take 1 capsule (100 mg) by mouth 2 times daily 28 capsule 1    EPINEPHrine (AUVI-Q) 0.3 MG/0.3ML injection 2-pack Inject 0.3 mLs (0.3 mg) into the muscle as needed for anaphylaxis 0.6 mL 3    estradiol (ESTRACE) 0.5 MG tablet TAKE 1 TABLET BY MOUTH EVERY DAY 90 tablet 1    levothyroxine (SYNTHROID/LEVOTHROID) 75 MCG tablet Take 1 tablet (75 mcg) by mouth daily 90 tablet 3    naltrexone (DEPADE/REVIA) 50 MG tablet Take 1/2 tablet once daily 1-2 hours prior to worst cravings for 1 week, then increase to 1 tablet daily as directed if tolerating 30 tablet 3    pantoprazole (PROTONIX) 40 MG EC tablet Take 1 tablet (40 mg) by mouth daily 30 tablet 3    traZODone  (DESYREL) 50 MG tablet TAKE 2 TABLETS (100 MG) BY MOUTH AT BEDTIME 180 tablet 1    venlafaxine (EFFEXOR-ER) 75 MG 24 hr tablet Take 1 tablet (75 mg) by mouth daily 90 tablet 3    Allergies   Allergen Reactions    Cephalexin Hives    Strawberry Extract Hives    Sulfa Antibiotics Hives    Cinnamon Hives    Sulfamethoxazole-Trimethoprim Hives    Vicodin [Hydrocodone-Acetaminophen]     Wasp Venom Protein      Other reaction(s): Chest Pain    Wasps [Hornets] Swelling     Now carries Epi Pen    Zoloft [Sertraline]      suicidal ideation    Contrast Dye Other (See Comments) and Rash     Patient had sneezing and an itchy throat following contrast injection of 100 mL Isovue-370.  From IV contrast dye         Lab Results (Personally Reviewed)    Chemistry/lipid CBC Cardiac Enzymes/BNP/TSH/INR   Lab Results   Component Value Date    BUN 16.3 07/10/2023     07/10/2023    CO2 29 07/10/2023     Creatinine   Date Value Ref Range Status   07/10/2023 1.21 (H) 0.51 - 0.95 mg/dL Final   06/14/2021 1.22 (H) 0.52 - 1.04 mg/dL Final       Lab Results   Component Value Date    CHOL 207 (H) 04/25/2023    HDL 65 04/25/2023     (H) 04/25/2023      Lab Results   Component Value Date    WBC 6.6 07/10/2023    HGB 13.8 07/10/2023    HCT 42.2 07/10/2023    MCV 89 07/10/2023     07/10/2023    Lab Results   Component Value Date    TSH 2.72 04/25/2023    INR 1.27 (H) 03/31/2022        The patient states understanding and is agreeable with the plan.   LEOPOLDO Colin CNP  Electrophysiology Consult Service  Securely message with [employer]   Text page via [employer] Paging/Directory

## 2024-02-20 NOTE — TELEPHONE ENCOUNTER
[facility] Call Center    Phone Message    May a detailed message be left on voicemail: yes     Reason for Call: Other: Pt is returning call to reschedule with NP for tomorrow. [facility] has no openings tomorrow.     Action Taken: Other: cardio    Travel Screening: Not Applicable    Thank you!  [facility]

## 2024-02-21 ENCOUNTER — OFFICE VISIT (OUTPATIENT)
Dept: CARDIOLOGY | Facility: CLINIC | Age: 59
End: 2024-02-21
Attending: NURSE PRACTITIONER
Payer: COMMERCIAL

## 2024-02-21 ENCOUNTER — LAB (OUTPATIENT)
Dept: LAB | Facility: CLINIC | Age: 59
End: 2024-02-21
Payer: COMMERCIAL

## 2024-02-21 VITALS
BODY MASS INDEX: 40.35 KG/M2 | DIASTOLIC BLOOD PRESSURE: 70 MMHG | OXYGEN SATURATION: 97 % | HEART RATE: 67 BPM | WEIGHT: 235.1 LBS | SYSTOLIC BLOOD PRESSURE: 123 MMHG

## 2024-02-21 DIAGNOSIS — I48.91 ATRIAL FIBRILLATION WITH CONTROLLED VENTRICULAR RATE (H): ICD-10-CM

## 2024-02-21 DIAGNOSIS — I48.91 ATRIAL FIBRILLATION WITH CONTROLLED VENTRICULAR RATE (H): Primary | ICD-10-CM

## 2024-02-21 LAB
ALBUMIN SERPL BCG-MCNC: 4.3 G/DL (ref 3.5–5.2)
ALP SERPL-CCNC: 60 U/L (ref 40–150)
ALT SERPL W P-5'-P-CCNC: 17 U/L (ref 0–50)
ANION GAP SERPL CALCULATED.3IONS-SCNC: 8 MMOL/L (ref 7–15)
AST SERPL W P-5'-P-CCNC: 21 U/L (ref 0–45)
BILIRUB SERPL-MCNC: 0.5 MG/DL
BUN SERPL-MCNC: 13.9 MG/DL (ref 6–20)
CALCIUM SERPL-MCNC: 9.4 MG/DL (ref 8.6–10)
CHLORIDE SERPL-SCNC: 102 MMOL/L (ref 98–107)
CREAT SERPL-MCNC: 1.12 MG/DL (ref 0.51–0.95)
DEPRECATED HCO3 PLAS-SCNC: 29 MMOL/L (ref 22–29)
EGFRCR SERPLBLD CKD-EPI 2021: 57 ML/MIN/1.73M2
ERYTHROCYTE [DISTWIDTH] IN BLOOD BY AUTOMATED COUNT: 14.4 % (ref 10–15)
GLUCOSE SERPL-MCNC: 104 MG/DL (ref 70–99)
HCT VFR BLD AUTO: 41.6 % (ref 35–47)
HGB BLD-MCNC: 13.6 G/DL (ref 11.7–15.7)
MCH RBC QN AUTO: 28 PG (ref 26.5–33)
MCHC RBC AUTO-ENTMCNC: 32.7 G/DL (ref 31.5–36.5)
MCV RBC AUTO: 86 FL (ref 78–100)
PLATELET # BLD AUTO: 289 10E3/UL (ref 150–450)
POTASSIUM SERPL-SCNC: 4.5 MMOL/L (ref 3.4–5.3)
PROT SERPL-MCNC: 6.7 G/DL (ref 6.4–8.3)
RBC # BLD AUTO: 4.85 10E6/UL (ref 3.8–5.2)
SODIUM SERPL-SCNC: 139 MMOL/L (ref 135–145)
TSH SERPL DL<=0.005 MIU/L-ACNC: 2.92 UIU/ML (ref 0.3–4.2)
WBC # BLD AUTO: 5.3 10E3/UL (ref 4–11)

## 2024-02-21 PROCEDURE — 99213 OFFICE O/P EST LOW 20 MIN: CPT | Performed by: NURSE PRACTITIONER

## 2024-02-21 PROCEDURE — 36415 COLL VENOUS BLD VENIPUNCTURE: CPT | Performed by: PATHOLOGY

## 2024-02-21 PROCEDURE — 80053 COMPREHEN METABOLIC PANEL: CPT | Performed by: PATHOLOGY

## 2024-02-21 PROCEDURE — 85027 COMPLETE CBC AUTOMATED: CPT | Performed by: PATHOLOGY

## 2024-02-21 PROCEDURE — 84443 ASSAY THYROID STIM HORMONE: CPT | Performed by: PATHOLOGY

## 2024-02-21 ASSESSMENT — PAIN SCALES - GENERAL: PAINLEVEL: NO PAIN (0)

## 2024-02-21 NOTE — NURSING NOTE
Chief Complaint   Patient presents with    Follow Up     Return EP     Vitals were taken and medications reconciled.    Aleksandar Le, EMT  9:02 AM

## 2024-02-21 NOTE — LETTER
2024      RE: Sunshine Delgado  [id number]   [place] [address]       Dear Colleague,    Thank you for the opportunity to participate in the care of your patient, Sunshine Delgado, at the [facility] [place] [facility]. Please see a copy of my visit note below.        ELECTROPHYSIOLOGY CLINIC VISIT    Assessment/Recommendations   Assessment/Plan:    Ms. Delgado is a 58 year old female who has a past medical history significant for hashimoto's thyroiditis, MARIA GUADALUPE (uses CPAP), CKD, PAF (CHADSVASC 1 Eliquis) s/p DCCVs s/p PVI/CTI 21 s/p DCCV 21 s/p PVI/CTI 3/31/22, anxiety, and depression. She presents today for follow up.     Paroxsymal Atrial Fibrillation/Flutter:   We discussed in detail with the patient management/treatment options for Skip includin. Stroke Prophylaxis:  CHADSVASC=+gender  1, corresponding to a 1.3% annual stroke / systemic emolism event rate. She does not require chronic anticoagulation at this time.   2. Rate Control: She had been on Metoprolol with fatigue and it was stopped. She had been on Atenolol which was recently stopped. Can consider CCB if needed.   3. Rhythm Control: Cardioversion, Antiarrhythmics and/or ablation are options for rhythm control. She has been having frequent PAF/AFL that is highly symptomatic. She has had a couple DCCVs with continued recurrences. She had been on Flecainide which was not effective. She was recently switched to amiodarone. We discussed that given her younger age, we do not favor keeping her on this long term. We discussed alternative AATs vs. Ablation. She elected to pursue ablation which she had PVI/CTI on 21. She had recurrent persistent AF/AFL with zio from 10/2021 showing AF throughout and had DCCV on 21. She had recurrent AF/AFl and had a repeat PVI/CTI ablation on 3/31/22. Doing well now without recurrences.      4. Risk Factor Management: tight BP  control, and MARIA GUADALUPE evaluation as indicated.      Given generalized fatigue and dizziness will get updated labs for thyroid function, Hgb, and renal function with electrolytes.    Follow up in a year in EP NP       History of Present Illness/Subjective    Ms. Sunshine Delgado is a 58 year old female who comes in today for EP follow-up of AF/AFL.    Ms. Delgado is a 58 year old female who has a past medical history significant for hashimoto's thyroiditis, MARIA GUADALUPE (uses CPAP), CKD, PAF (CHADSVASC 1 Eliquis) s/p DCCV s/p PVI 7/22/21 s/p PVI/CTI 3/31/22, anxiety, and depression.      She was admitted in 1/2020 to OSH with abdominal pain and was found to have infectious mononucleosis. At that time her ECG showed AF and she spontaneously converted while in the hospital. An echo showed normal LVEF, no significant valvular abnormalities, and small pericardial effusion. She was started on metoprolol and ASA. Her metoprolol was later stopped by PCP due to frequent lightheadedness. She established care with Dr. Fajardo. She followed up in 3/2021 and reported feeling palpitations on and off and one episode lasting up to 3 days. She felt some chest pressure, shortness of breath, and presyncope. ECG showed AF vent rate 89 bpm. She had been started on lisinopril in 2/2021 by PCP for CKD and had felt a little more lightheaded since then, but also notes she had lightheadedness predating the lisinopril. A zio patch monitor from 4/2/21-4/16/21 showed 29% AF/AFL burden up to 10 hours 54 minutes and 39 symptom activations showed mostly AF/AFL or PSVT and sometimes sinus without ectopy. She was placed on atenolol and reported fatigue with this. Flecainide 50 mg BID was added. Subsequent ECG stress testing was negative for ischemia or QRS widening. Her PAF episodes had completely subsided for about 1 month after starting the Flecainide until end of 5/2021. She felt much improved when maintaining sinus. She then started developing increased AF symptoms,  occurring daily. She has symptoms of chest tightness, dizziness, and palpitations with her AF.  She then presented to [facility] on 6/10/21 with symptomatic AF and underwent DCCV. Her Flecainide was increased to 100 mg BID. She represented to [facility] on 6/14/21 again with symptomatic AF and underwent DCCV. Her Flecainide and atenolol were stopped and she was changed to amiodarone. She was then referred to EP for consideration for ablation. At EP appointment on 6/16/21, she was in AFL and feeling fatigue, MCHUGH, lightheadedness/dizziness, and generally unwell. We loaded her with amiodarone and arranged for another DCCV. She presented to [facility] in [place] and it was canceled. She wanted to get off amiodarone if possible and elected to pursue ablation and had PVI/CTI on 7/22/21.     EP Visit 1/12/22: She presents today for follow up after ablation. Groin sites well healed. A zio patch monitor from 10/2021 showed AF throughout poor HR control with avg  bpm. She underwent DCCV on 11/22/21. She reports feeling OK. She notes feeling less symptomatic now with her AFL then before ablation. She does still have palpitations and decreased stamina. She denies chest discomfort, abdominal fullness/bloating or peripheral edema, shortness of breath, paroxysmal nocturnal dyspnea, orthopnea, lightheadedness, dizziness, pre-syncope, or syncope. Presenting 12 lead ECG shows AFL Vent Rate 78 bpm, QRS 74 ms, QTc 424 ms. Current cardiac medications include: Diltiazem.     EP Visit 7/13/22: She presents today for follow up. She had repeat PVI/CTI ablation on 3/31/22. Groin sites well healed. She reports feeling well. She has some minor occasional palpitations lasting seconds. No known AF/AFL recurrences. She denies chest discomfort, abdominal fullness/bloating or peripheral edema, shortness of breath, paroxysmal nocturnal dyspnea, orthopnea, lightheadedness, dizziness, pre-syncope, or syncope. Presenting 12 lead ECG shows NSR Vent Rate 72 bpm,   ms, QRS 88 ms, QTc 424 ms. Current cardiac medications include: Eliquis. Eliquis stopped at this visit.     EP Visit 1/11/23: She presents today for follow up. She reports feeling well. She has lost over 30 lbs in 9 months. She denies chest discomfort, palpitations, abdominal fullness/bloating or peripheral edema, shortness of breath, paroxysmal nocturnal dyspnea, orthopnea, lightheadedness, dizziness, pre-syncope, or syncope. Presenting 12 lead ECG shows NSR  Vent Rate 60 bpm,  ms, QRS 88 ms, QTc 400 ms. No current cardiac medications.     She presents today for follow up. She reports feeling some generalized fatigue and some lightheadedness. She has some rare palpitations, short lived. She has been having pleurisy pain. She also has a knee injury causing her pain and has a MRI upcoming. She denies chest discomfort,abdominal fullness/bloating or peripheral edema, shortness of breath, paroxysmal nocturnal dyspnea, orthopnea, pre-syncope, or syncope. No current cardiac medications.     I have reviewed and updated the patient's Past Medical History, Social History, Family History and Medication List.     Cardiographics (Personally Reviewed) :   5/11/21 STRESS ECHOCARDIOGRAM:  Interpretation Summary  Submaximal ECG stress test  65% maximal predicted HR achieved. The test was terminated due to fatigue.  Normal blood pressure response to exercise.  The patient did not report any symptoms during exercise.  No ECG evidence of ischemia.  No QRS prolongation at peak exercise compared to rest.  No supraventricular or ventricular arrhythmias.  Good exercise tolerance. The patient achieved 10 METs at peak exercise.     1/27/20 ECHOCARDIOGRAM:   Final Conclusion   Normal left ventricular size.   Normal left ventricular systolic function.   No obvious regional wall motion abnormalities.   The ejection fraction is visually estimated at 55-60%.   The right ventricle is normal in size and function.   The left atrium is normal  in size.   There is trace mitral regurgitation.   There is no significant tricuspid regurgitation.   Small pericardial effusion.   No echocardiographic findings to suggest hemodynamic effect of the pericardial fluid.   Estimated EF: 55-60%  7/2021 CTA:  IMPRESSION:  1. Normal pulmonary venous anatomy normal variant right middle  pulmonary vein. All pulmonary veins draining into the left atrium       Physical Examination   /70 (BP Location: Right arm, Patient Position: Chair, Cuff Size: Adult Large)   Pulse 67   Wt 106.6 kg (235 lb 1.6 oz)   LMP  (LMP Unknown)   SpO2 97%   BMI 40.35 kg/m    Wt Readings from Last 3 Encounters:   02/09/24 101.2 kg (223 lb)   01/25/24 101.2 kg (223 lb)   01/23/24 101.2 kg (223 lb)     General Appearance:   Alert, well-appearing and in no acute distress.   HEENT: Atraumatic, normocephalic. PERRL.  MMM.   Chest/Lungs:   Respirations unlabored.  Lungs are clear to auscultation.   Cardiovascular:   Regular, S1/S2, no murmur.    Abdomen:  Soft, nontender, nondistended.   Extremities: No cyanosis or clubbing. No edema.    Musculoskeletal: Moves all extremities.  Gait normal.   Skin: Warm, dry, intact.    Neurologic: Mood and affect are appropriate.  Alert and oriented to person, place, time, and situation.          Medications  Allergies   Current Outpatient Medications   Medication Sig Dispense Refill     acetaminophen (TYLENOL) 325 MG tablet Take 325 mg by mouth every 4 hours as needed       buPROPion (WELLBUTRIN XL) 300 MG 24 hr tablet Take 1 tablet (300 mg) by mouth every morning 90 tablet 3     doxycycline hyclate (VIBRAMYCIN) 100 MG capsule Take 1 capsule (100 mg) by mouth 2 times daily 28 capsule 1     EPINEPHrine (AUVI-Q) 0.3 MG/0.3ML injection 2-pack Inject 0.3 mLs (0.3 mg) into the muscle as needed for anaphylaxis 0.6 mL 3     estradiol (ESTRACE) 0.5 MG tablet TAKE 1 TABLET BY MOUTH EVERY DAY 90 tablet 1     levothyroxine (SYNTHROID/LEVOTHROID) 75 MCG tablet Take 1 tablet  (75 mcg) by mouth daily 90 tablet 3     naltrexone (DEPADE/REVIA) 50 MG tablet Take 1/2 tablet once daily 1-2 hours prior to worst cravings for 1 week, then increase to 1 tablet daily as directed if tolerating 30 tablet 3     pantoprazole (PROTONIX) 40 MG EC tablet Take 1 tablet (40 mg) by mouth daily 30 tablet 3     traZODone (DESYREL) 50 MG tablet TAKE 2 TABLETS (100 MG) BY MOUTH AT BEDTIME 180 tablet 1     venlafaxine (EFFEXOR-ER) 75 MG 24 hr tablet Take 1 tablet (75 mg) by mouth daily 90 tablet 3    Allergies   Allergen Reactions     Cephalexin Hives     Strawberry Extract Hives     Sulfa Antibiotics Hives     Cinnamon Hives     Sulfamethoxazole-Trimethoprim Hives     Vicodin [Hydrocodone-Acetaminophen]      Wasp Venom Protein      Other reaction(s): Chest Pain     Wasps [Hornets] Swelling     Now carries Epi Pen     Zoloft [Sertraline]      suicidal ideation     Contrast Dye Other (See Comments) and Rash     Patient had sneezing and an itchy throat following contrast injection of 100 mL Isovue-370.  From IV contrast dye         Lab Results (Personally Reviewed)    Chemistry/lipid CBC Cardiac Enzymes/BNP/TSH/INR   Lab Results   Component Value Date    BUN 16.3 07/10/2023     07/10/2023    CO2 29 07/10/2023     Creatinine   Date Value Ref Range Status   07/10/2023 1.21 (H) 0.51 - 0.95 mg/dL Final   06/14/2021 1.22 (H) 0.52 - 1.04 mg/dL Final       Lab Results   Component Value Date    CHOL 207 (H) 04/25/2023    HDL 65 04/25/2023     (H) 04/25/2023      Lab Results   Component Value Date    WBC 6.6 07/10/2023    HGB 13.8 07/10/2023    HCT 42.2 07/10/2023    MCV 89 07/10/2023     07/10/2023    Lab Results   Component Value Date    TSH 2.72 04/25/2023    INR 1.27 (H) 03/31/2022        The patient states understanding and is agreeable with the plan.   Arleth Nolasco, APRN CNP  Electrophysiology Consult Service  Securely message with [employer]   Text page via [employer] Paging/Directory

## 2024-02-29 ENCOUNTER — TELEPHONE (OUTPATIENT)
Dept: ORTHOPEDICS | Facility: CLINIC | Age: 59
End: 2024-02-29

## 2024-02-29 ENCOUNTER — ANCILLARY PROCEDURE (OUTPATIENT)
Dept: MRI IMAGING | Facility: CLINIC | Age: 59
End: 2024-02-29
Attending: STUDENT IN AN ORGANIZED HEALTH CARE EDUCATION/TRAINING PROGRAM
Payer: COMMERCIAL

## 2024-02-29 DIAGNOSIS — S83.511A RUPTURE OF ANTERIOR CRUCIATE LIGAMENT OF RIGHT KNEE, INITIAL ENCOUNTER: ICD-10-CM

## 2024-02-29 DIAGNOSIS — M17.11 PRIMARY OSTEOARTHRITIS OF RIGHT KNEE: ICD-10-CM

## 2024-02-29 DIAGNOSIS — M23.203 DEGENERATIVE TEAR OF MEDIAL MENISCUS, RIGHT: ICD-10-CM

## 2024-02-29 PROCEDURE — 73721 MRI JNT OF LWR EXTRE W/O DYE: CPT | Mod: RT | Performed by: RADIOLOGY

## 2024-02-29 NOTE — TELEPHONE ENCOUNTER
Attempted to call but straight to voicemail. Sent [employer] message with a few options of earlier appointments in [place] next week. Linn Oh ATC

## 2024-02-29 NOTE — TELEPHONE ENCOUNTER
[facility] Call Center    Phone Message    May a detailed message be left on voicemail: yes     Reason for Call: Pt received a message this afternoon that stated the MRI results are in.  And to call and schedule.  Patient had already scheduled w/Dr. Farr on 3/8 which was the 1st available appt.  She is requesting a call back to clarify whether she needs to come in sooner or if the 8th is ok. Thanks!     Action Taken: Message routed to:  Adult Clinics: Sports Medicine p [id number]    Travel Screening: Not Applicable

## 2024-03-26 ENCOUNTER — PATIENT OUTREACH (OUTPATIENT)
Dept: CARE COORDINATION | Facility: CLINIC | Age: 59
End: 2024-03-26
Payer: COMMERCIAL

## 2024-04-01 ENCOUNTER — TELEPHONE (OUTPATIENT)
Dept: ENDOCRINOLOGY | Facility: CLINIC | Age: 59
End: 2024-04-01
Payer: COMMERCIAL

## 2024-04-01 DIAGNOSIS — E66.812 CLASS 2 SEVERE OBESITY WITH SERIOUS COMORBIDITY AND BODY MASS INDEX (BMI) OF 38.0 TO 38.9 IN ADULT, UNSPECIFIED OBESITY TYPE (H): Primary | ICD-10-CM

## 2024-04-01 DIAGNOSIS — E66.01 CLASS 2 SEVERE OBESITY WITH SERIOUS COMORBIDITY AND BODY MASS INDEX (BMI) OF 38.0 TO 38.9 IN ADULT, UNSPECIFIED OBESITY TYPE (H): Primary | ICD-10-CM

## 2024-04-01 NOTE — TELEPHONE ENCOUNTER
PA Initiation    Medication: ZEPBOUND 2.5 MG/0.5ML SC SOAJ  Insurance Company: [employer] - Phone [phone] Fax [phone]  Pharmacy Filling the Rx:    Filling Pharmacy Phone:    Filling Pharmacy Fax:    Start Date: 4/1/2024

## 2024-04-01 NOTE — TELEPHONE ENCOUNTER
Prior Authorization Approval    Medication: ZEPBOUND 2.5 MG/0.5ML SC SOAJ  Authorization Effective Date: 3/2/2024  Authorization Expiration Date: 11/26/2024  Approved Dose/Quantity: 2  Reference #: [id number]   Insurance Company: [employer] - Phone [phone] Fax [phone]  Expected CoPay: $    CoPay Card Available:      Financial Assistance Needed:    Which Pharmacy is filling the prescription:    Pharmacy Notified: yes  Patient Notified: yes

## 2024-04-04 ENCOUNTER — TELEPHONE (OUTPATIENT)
Dept: ENDOCRINOLOGY | Facility: CLINIC | Age: 59
End: 2024-04-04
Payer: COMMERCIAL

## 2024-04-04 NOTE — TELEPHONE ENCOUNTER
[facility] referral from: [facility] visit (referral by provider)    [facility] referral outreach attempt #2 on April 4, 2024 at 10:43 AM      Outcome: Patient not reachable after several attempts, will route to [facility] Pharmacist/Provider as an FYI.  MT scheduling number is .  Thank you for the referral.    Use hbc for the carrier/Plan on the flowsheet      [employer] Message Sent    Alena Azar CPhT  [facility]      Patient called back and scheduled visit.

## 2024-04-09 ENCOUNTER — PATIENT OUTREACH (OUTPATIENT)
Dept: CARE COORDINATION | Facility: CLINIC | Age: 59
End: 2024-04-09

## 2024-04-09 ENCOUNTER — OFFICE VISIT (OUTPATIENT)
Dept: SLEEP MEDICINE | Facility: CLINIC | Age: 59
End: 2024-04-09
Payer: COMMERCIAL

## 2024-04-09 VITALS
OXYGEN SATURATION: 98 % | HEIGHT: 65 IN | SYSTOLIC BLOOD PRESSURE: 136 MMHG | WEIGHT: 237 LBS | DIASTOLIC BLOOD PRESSURE: 89 MMHG | BODY MASS INDEX: 39.49 KG/M2 | HEART RATE: 64 BPM

## 2024-04-09 DIAGNOSIS — F33.1 MAJOR DEPRESSIVE DISORDER, RECURRENT EPISODE, MODERATE (H): Chronic | ICD-10-CM

## 2024-04-09 DIAGNOSIS — E66.9 OBESITY (BMI 30-39.9): ICD-10-CM

## 2024-04-09 DIAGNOSIS — F51.04 CHRONIC INSOMNIA: ICD-10-CM

## 2024-04-09 DIAGNOSIS — I48.0 PAROXYSMAL ATRIAL FIBRILLATION (H): ICD-10-CM

## 2024-04-09 DIAGNOSIS — F32.A ANXIETY AND DEPRESSION: ICD-10-CM

## 2024-04-09 DIAGNOSIS — F41.9 ANXIETY AND DEPRESSION: ICD-10-CM

## 2024-04-09 DIAGNOSIS — F90.9 ATTENTION DEFICIT HYPERACTIVITY DISORDER (ADHD), UNSPECIFIED ADHD TYPE: ICD-10-CM

## 2024-04-09 DIAGNOSIS — F41.1 GENERALIZED ANXIETY DISORDER: Chronic | ICD-10-CM

## 2024-04-09 DIAGNOSIS — R53.82 CHRONIC FATIGUE: ICD-10-CM

## 2024-04-09 DIAGNOSIS — G47.33 OSA (OBSTRUCTIVE SLEEP APNEA): Chronic | ICD-10-CM

## 2024-04-09 DIAGNOSIS — E06.3 HASHIMOTO'S THYROIDITIS: ICD-10-CM

## 2024-04-09 DIAGNOSIS — F51.04 PSYCHOPHYSIOLOGICAL INSOMNIA: Primary | ICD-10-CM

## 2024-04-09 PROCEDURE — 99213 OFFICE O/P EST LOW 20 MIN: CPT | Mod: GC | Performed by: INTERNAL MEDICINE

## 2024-04-09 RX ORDER — ONDANSETRON 4 MG/1
TABLET, ORALLY DISINTEGRATING ORAL
COMMUNITY
Start: 2023-08-04 | End: 2024-06-20

## 2024-04-09 RX ORDER — BUPROPION HYDROCHLORIDE 300 MG/1
300 TABLET ORAL EVERY MORNING
Qty: 90 TABLET | Refills: 0 | Status: SHIPPED | OUTPATIENT
Start: 2024-04-09 | End: 2024-06-20

## 2024-04-09 RX ORDER — LEVOTHYROXINE SODIUM 75 UG/1
75 TABLET ORAL DAILY
Qty: 90 TABLET | Refills: 0 | Status: SHIPPED | OUTPATIENT
Start: 2024-04-09 | End: 2024-06-20

## 2024-04-09 ASSESSMENT — SLEEP AND FATIGUE QUESTIONNAIRES
HOW LIKELY ARE YOU TO NOD OFF OR FALL ASLEEP WHEN YOU ARE A PASSENGER IN A CAR FOR AN HOUR WITHOUT A BREAK: SLIGHT CHANCE OF DOZING
HOW LIKELY ARE YOU TO NOD OFF OR FALL ASLEEP WHILE SITTING AND TALKING TO SOMEONE: WOULD NEVER DOZE
HOW LIKELY ARE YOU TO NOD OFF OR FALL ASLEEP WHILE SITTING AND READING: SLIGHT CHANCE OF DOZING
HOW LIKELY ARE YOU TO NOD OFF OR FALL ASLEEP WHILE SITTING INACTIVE IN A PUBLIC PLACE: WOULD NEVER DOZE
HOW LIKELY ARE YOU TO NOD OFF OR FALL ASLEEP IN A CAR, WHILE STOPPED FOR A FEW MINUTES IN TRAFFIC: WOULD NEVER DOZE
HOW LIKELY ARE YOU TO NOD OFF OR FALL ASLEEP WHILE LYING DOWN TO REST IN THE AFTERNOON WHEN CIRCUMSTANCES PERMIT: SLIGHT CHANCE OF DOZING
HOW LIKELY ARE YOU TO NOD OFF OR FALL ASLEEP WHILE WATCHING TV: SLIGHT CHANCE OF DOZING
HOW LIKELY ARE YOU TO NOD OFF OR FALL ASLEEP WHILE SITTING QUIETLY AFTER LUNCH WITHOUT ALCOHOL: WOULD NEVER DOZE

## 2024-04-09 NOTE — PATIENT INSTRUCTIONS
"          MY TREATMENT INFORMATION FOR SLEEP APNEA-  Sunshine Delgado    DOCTOR : Nanci Yang MD    Am I having a sleep study at a sleep center?  --->Due to normal delays, you will be contacted within 2-4 weeks to schedule    Frequently asked questions:  1. What is Obstructive Sleep Apnea (MARIA GUADALUPE)? MARIA GUADALUPE is the most common type of sleep apnea. Apnea means, \"without breath.\"  Apnea is most often caused by narrowing or collapse of the upper airway as muscles relax during sleep.   Almost everyone has occasional apneas. Most people with sleep apnea have had brief interruptions at night frequently for many years.  The severity of sleep apnea is related to how frequent and severe the events are.   2. What are the consequences of MARIA GUADALUPE? Symptoms include: feeling sleepy during the day, snoring loudly, gasping or stopping of breathing, trouble sleeping, and occasionally morning headaches or heartburn at night.  Sleepiness can be serious and even increase the risk of falling asleep while driving. Other health consequences may include development of high blood pressure and other cardiovascular disease in persons who are susceptible. Untreated MARIA GUADALUPE  can contribute to heart disease, stroke and diabetes.   3. What are the treatment options? In most situations, sleep apnea is a lifelong disease that must be managed with daily therapy. Medications are not effective for sleep apnea and surgery is generally not considered until other therapies have been tried. Your treatment is your choice . Continuous Positive Airway (CPAP) works right away and is the therapy that is effective in nearly everyone. An oral device to hold your jaw forward is usually the next most reliable option. Other options include postioning devices (to keep you off your back), weight loss, and surgery including a tongue pacing device. There is more detail about some of these options below.  4. Are my sleep studies covered by insurance? Although we will request verification " of coverage, we advise you also check in advance of the study to ensure there is coverage.    Important tips for those choosing CPAP and similar devices  REMEMBER-IF YOU RECEIVE A CALL FROM  [phone]-->IT IS TO SETUP A DEVICE  For new devices, sign up for device CORWIN to monitor your device for your followup visits  We encourage you to utilize the [employer] corwin or website ( https://Collplant.[employer]/ ) to monitor your therapy progress and share the data with your healthcare team when you discuss your sleep apnea.                                                    Know your equipment:  CPAP is continuous positive airway pressure that prevents obstructive sleep apnea by keeping the throat from collapsing while you are sleeping. In most cases, the device is  smart  and can slowly self-adjusts if your throat collapses and keeps a record every day of how well you are treated-this information is available to you and your care team.  BPAP is bilevel positive airway pressure that keeps your throat open and also assists each breath with a pressure boost to maintain adequate breathing.  Special kinds of BPAP are used in patients who have inadequate breathing from lung or heart disease. In most cases, the device is  smart  and can slowly self-adjusts to assist breathing. Like CPAP, the device keeps a record of how well you are treated.  Your mask is your connection to the device. You get to choose what feels most comfortable and the staff will help to make sure if fits. Here: are some examples of the different masks that are available: Magnetic mask aids may assist with use but there are safety issues that should be addressed when considering with magnets* ( see end of discussion).       Key points to remember on your journey with sleep apnea:  Sleep study.  PAP devices often need to be adjusted during a sleep study to show that they are effective and adjusted right.  Good tips to remember: Try wearing just the mask  during a quiet time during the day so your body adapts to wearing it. A humidifier is recommended for comfort in most cases to prevent drying of your nose and throat. Allergy medication from your provider may help you if you are having nasal congestion.  Getting settled-in. It takes more than one night for most of us to get used to wearing a mask. Try wearing just the mask during a quiet time during the day so your body adapts to wearing it. A humidifier is recommended for comfort in most cases. Our team will work with you carefully on the first day and will be in contact within 4 days and again at 2 and 4 weeks for advice and remote device adjustments. Your therapy is evaluated by the device each day.   Use it every night. The more you are able to sleep naturally for 7-8 hours, the more likely you will have good sleep and to prevent health risks or symptoms from sleep apnea. Even if you use it 4 hours it helps. Occasionally all of us are unable to use a medical therapy, in sleep apnea, it is not dangerous to miss one night.   Communicate. Call our skilled team on the number provided on the first day if your visit for problems that make it difficult to wear the device. Over 2 out of 3 patients can learn to wear the device long-term with help from our team. Remember to call our team or your sleep providers if you are unable to wear the device as we may have other solutions for those who cannot adapt to mask CPAP therapy. It is recommended that you sleep your sleep provider within the first 3 months and yearly after that if you are not having problems.   Use it for your health. We encourage use of CPAP masks during daytime quiet periods to allow your face and brain to adapt to the sensation of CPAP so that it will be a more natural sensation to awaken to at night or during naps. This can be very useful during the first few weeks or months of adapting to CPAP though it does not help medically to wear CPAP during  wakefulness and  should not be used as a strategy just to meet guidelines.  Take care of your equipment. Make sure you clean your mask and tubing using directions every day and that your filter and mask are replaced as recommended or if they are not working.     *Masks with magnets:  Updated Contraindications  Masks with magnetic components are contraindicated for use by patients where they, or anyone in close physical contact while using the mask, have the following:   Active medical implants that interact with magnets (i.e., pacemakers, implantable cardioverter defibrillators (ICD), neurostimulators, cerebrospinal fluid (CSF) shunts, insulin/infusion pumps)   Metallic implants/objects containing ferromagnetic material (i.e., aneurysm clips/flow disruption devices, embolic coils, stents, valves, electrodes, implants to restore hearing or balance with implanted magnets, ocular implants, metallic splinters in the eye)  Updated Warning  Keep the mask magnets at a safe distance of at least 6 inches (150 mm) away from implants or medical devices that may be adversely affected by magnetic interference. This warning applies to you or anyone in close physical contact with your mask. The magnets are in the frame and lower headgear clips, with a magnetic field strength of up to 400mT. When worn, they connect to secure the mask but may inadvertently detach while asleep.  Implants/medical devices, including those listed within contraindications, may be adversely affected if they change function under external magnetic fields or contain ferromagnetic materials that attract/repel to magnetic fields (some metallic implants, e.g., contact lenses with metal, dental implants, metallic cranial plates, screws, kelley hole covers, and bone substitute devices). Consult your physician and  of your implant / other medical device for information on the potential adverse effects of magnetic fields.    BESIDES CPAP, WHAT OTHER  THERAPIES ARE THERE?    Positioning Device  Positioning devices are generally used when sleep apnea is mild and only occurs on your back.This example shows a pillow that straps around the waist. It may be appropriate for those whose sleep study shows milder sleep apnea that occurs primarily when lying flat on one's back. Preliminary studies have shown benefit but effectiveness at home may need to be verified by a home sleep test. These devices are generally not covered by medical insurance.  Examples of devices that maintain sleeping on the back to prevent snoring and mild sleep apnea.    Belt type body positioner  http://WangYou/    Electronic reminder  http://nightshifttherapy.com/            Oral Appliance  What is oral appliance therapy?  An oral appliance device fits on your teeth at night like a retainer used after having braces. The device is made by a specialized dentist and requires several visits over 1-2 months before a manufactured device is made to fit your teeth and is adjusted to prevent your sleep apnea. Once an oral device is working properly, snoring should be improved. A home sleep test may be recommended at that time if to determine whether the sleep apnea is adequately treated.       Some things to remember:  -Oral devices are often, but not always, covered by your medical insurance. Be sure to check with your insurance provider.   -If you are referred for oral therapy, you will be given a list of specialized dentists to consider or you may choose to visit the Web site of the American Academy of Dental Sleep Medicine  -Oral devices are less likely to work if you have severe sleep apnea or are extremely overweight.     More detailed information  An oral appliance is a small acrylic device that fits over the upper and lower teeth  (similar to a retainer or a mouth guard). This device slightly moves jaw forward, which moves the base of the tongue forward, opens the airway, improves breathing  for effective treat snoring and obstructive sleep apnea in perhaps 7 out of 10 people .  The best working devices are custom-made by a dental device  after a mold is made of the teeth 1, 2, 3.  When is an oral appliance indicated?  Oral appliance therapy is recommended as a first-line treatment for patients with primary snoring, mild sleep apnea, and for patients with moderate sleep apnea who prefer appliance therapy to use of CPAP4, 5. Severity of sleep apnea is determined by sleep testing and is based on the number of respiratory events per hour of sleep.   How successful is oral appliance therapy?  The success rate of oral appliance therapy in patients with mild sleep apnea is 75-80% while in patients with moderate sleep apnea it is 50-70%. The chance of success in patients with severe sleep apnea is 40-50%. The research also shows that oral appliances have a beneficial effect on the cardiovascular health of MARIA GUADALUPE patients at the same magnitude as CPAP therapy7.  Oral appliances should be a second-line treatment in cases of severe sleep apnea, but if not completely successful then a combination therapy utilizing CPAP plus oral appliance therapy may be effective. Oral appliances tend to be effective in a broad range of patients although studies show that the patients who have the highest success are females, younger patients, those with milder disease, and less severe obesity. 3, 6.   Finding a dentist that practices dental sleep medicine  Specific training is available through the American Academy of Dental Sleep Medicine for dentists interested in working in the field of sleep. To find a dentist who is educated in the field of sleep and the use of oral appliances, near you, visit the Web site of the American Academy of Dental Sleep Medicine.    References  1. Washington et al. Objectively measured vs self-reported compliance during oral appliance therapy for sleep-disordered breathing. Chest 2013;  144(5): 4440-0345.  2. Nasrin, et al. Objective measurement of compliance during oral appliance therapy for sleep-disordered breathing. Thorax 2013; 68(1): 91-96.  3. Amberly et al. Mandibular advancement devices in 620 men and women with MARIA GUADALUPE and snoring: tolerability and predictors of treatment success. Chest 2004; 125: 8681-8368.  4. Edie, et al. Oral appliances for snoring and MARIA GUADALUPE: a review. Sleep 2006; 29: 244-262.  5. Bernardino et al. Oral appliance treatment for MARIA GUADALUPE: an update. J Clin Sleep Med 2014; 10(2): 215-227.  6. Zelalem et al. Predictors of OSAH treatment outcome. J Dent Res 2007; 86: 5066-3031.      Weight Loss:   Your Body mass index is 39.44 kg/m .    Being overweight does not necessarily mean you will have health consequences.  Those who have BMI over 35 or over 27 with existing medical conditions carries greater risk.   Weight loss decreases severity of sleep apnea in most people with obesity. For those with mild obesity who have developed snoring with weight gain, even 15-30 pound weight loss can improve and occasionally milder eliminate sleep apnea.  Structured and life-long dietary and health habits are necessary to lose weight and keep healthier weight levels.     The Comprehensive Weight loss program offers all aspects of weight loss strategies including two Non-Surgical Weight Loss Programs: Medical Weight Management and our 24 Week Healthy Lifestyle Program:    Medical Weight Management: You will meet with a Medical Weight Management Provider, as well as a Registered Dietician. The program may include medication therapy, dietary education, recommended exercise and physical therapy programs, monthly support group meetings, and possible psychological counseling. Follow up visits with the provider or dietician are scheduled based on your progress and needs.    24 Week Healthy Lifestyle Program: This unique program is designed to give you the support of weekly appointments and  activities thru a 24-week period. It may include all of the components of the basic program (above), with the addition of 11 individual Health  Visits, 24-week access to the [employer] website for over 700 online classes, and monthly support group meetings. This program has an out-of-pocket expense of $499 to cover the items that can not be billed to insurance (health coaches and [employer] access), and is non-refundable/non-transferable (you may be able to use a Health Savings Account; ask your HSA provider). There may be an optional meal replacement plan prescribed as well.   Surgical management achieves meaningful long-term weight loss and improvement in health risks in most patients with more severe obesity.      Sleep Apnea Surgery:    Surgery for obstructive sleep apnea is considered generally only when other therapies fail to work. Surgery may be discussed with you if you are having a difficult time tolerating CPAP and or when there is an abnormal structure that requires surgical correction.  Nose and throat surgeries often enlarge the airway to prevent collapse.  Most of these surgeries create pain for 1-2 weeks and up to half of the most common surgeries are not effective throughout life.  You should carefully discuss the benefits and drawbacks to surgery with your sleep provider and surgeon to determine if it is the best solution for you.   More information  Surgery for MARIA GUADALUPE is directed at areas that are responsible for narrowing or complete obstruction of the airway during sleep.  There are a wide range of procedures available to enlarge and/or stabilize the airway to prevent blockage of breathing in the three major areas where it can occur: the palate, tongue, and nasal regions.  Successful surgical treatment depends on the accurate identification of the factors responsible for obstructive sleep apnea in each person.  A personalized approach is required because there is no single treatment that works  well for everyone.  Because of anatomic variation, consultation with an examination by a sleep surgeon is a critical first step in determining what surgical options are best for each patient.  In some cases, examination during sedation may be recommended in order to guide the selection of procedures.  Patients will be counseled about risks and benefits as well as the typical recovery course after surgery. Surgery is typically not a cure for a person s MARIA GUADALUPE.  However, surgery will often significantly improve one s MARIA GUADALUPE severity (termed  success rate ).  Even in the absence of a cure, surgery will decrease the cardiovascular risk associated with OSA7; improve overall quality of life8 (sleepiness, functionality, sleep quality, etc).      Palate Procedures:  Patients with MARIA GUADALUPE often have narrowing of their airway in the region of their tonsils and uvula.  The goals of palate procedures are to widen the airway in this region as well as to help the tissues resist collapse.  Modern palate procedure techniques focus on tissue conservation and soft tissue rearrangement, rather than tissue removal.  Often the uvula is preserved in this procedure. Residual sleep apnea is common in patient after pharyngoplasty with an average reduction in sleep apnea events of 33%2.      Tongue Procedures:  ExamWhile patients are awake, the muscles that surround the throat are active and keep this region open for breathing. These muscles relax during sleep, allowing the tongue and other structures to collapse and block breathing.  There are several different tongue procedures available.  Selection of a tongue base procedure depends on characteristics seen on physical exam.  Generally, procedures are aimed at removing bulky tissues in this area or preventing the back of the tongue from falling back during sleep.  Success rates for tongue surgery range from 50-62%3.    Hypoglossal Nerve Stimulation:  Hypoglossal nerve stimulation has recently  received approval from the [place] Food and Drug Administration for the treatment of obstructive sleep apnea.  This is based on research showing that the system was safe and effective in treating sleep apnea6.  Results showed that the median AHI score decreased 68%, from 29.3 to 9.0. This therapy uses an implant system that senses breathing patterns and delivers mild stimulation to airway muscles, which keeps the airway open during sleep.  The system consists of three fully implanted components: a small generator (similar in size to a pacemaker), a breathing sensor, and a stimulation lead.  Using a small handheld remote, a patient turns the therapy on before bed and off upon awakening.    Candidates for this device must be greater than 18 years of age, have moderate to severe obstructive sleep apnea with less than 25% central events  (AHI between 15-65), BMI less than 35, have tried CPAP/oral appliance for at least 8 weeks without success, and have appropriate upper airway anatomy (determined by a sleep endoscopy performed by Dr. Eduardo Chamebrs or Dr. Sky Benitez).    Nasal Procedures:  Nasal obstruction can interfere with nasal breathing during the day and night.  Studies have shown that relief of nasal obstruction can improve the ability of some patients to tolerate positive airway pressure therapy for obstructive sleep apnea1.  Treatment options include medications such as nasal saline, topical corticosteroid and antihistamine sprays, and oral medications such as antihistamines or decongestants. Non-surgical treatments can include external nasal dilators for selected patients. If these are not successful by themselves, surgery can improve the nasal airway either alone or in combination with these other options.        Combination Procedures:  Combination of surgical procedures and other treatments may be recommended, particularly if patients have more than one area of narrowing or persistent positional  disease.  The success rate of combination surgery ranges from 66-80%2,3.    References  Tamar VILLASENOR. The Role of the Nose in Snoring and Obstructive Sleep Apnoea: An Update.  Eur Arch Otorhinolaryngol. 2011; 268: 1365-73.   Jun SM; Cheryl JA; Luis JR; Pallanch JF; Juanis MB; Tabitha SG; Shanique ALICEA. Surgical modifications of the upper airway for obstructive sleep apnea in adults: a systematic review and meta-analysis. SLEEP 2010;33(10):9692-9656. Garett BURGESS. Hypopharyngeal surgery in obstructive sleep apnea: an evidence-based medicine review.  Arch Otolaryngol Head Neck Surg. 2006 Feb;132(2):206-13.  Tyron YH1, Uriel Y, Damian BETZY. The efficacy of anatomically based multilevel surgery for obstructive sleep apnea. Otolaryngol Head Neck Surg. 2003 Oct;129(4):327-35.  Garett BURGESS, Goldberg A. Hypopharyngeal Surgery in Obstructive Sleep Apnea: An Evidence-Based Medicine Review. Arch Otolaryngol Head Neck Surg. 2006 Feb;132(2):206-13.  Monika PJ et al. Upper-Airway Stimulation for Obstructive Sleep Apnea.  N Engl J Med. 2014 Jan 9;370(2):139-49.  Anirudh Y et al. Increased Incidence of Cardiovascular Disease in Middle-aged Men with Obstructive Sleep Apnea. Am J Respir Crit Care Med; 2002 166: 159-165  Garcia EM et al. Studying Life Effects and Effectiveness of Palatopharyngoplasty (SLEEP) study: Subjective Outcomes of Isolated Uvulopalatopharyngoplasty. Otolaryngol Head Neck Surg. 2011; 144: 623-631.        WHAT IF I ONLY HAVE SNORING?    Mandibular advancement devices, lateral sleep positioning, long-term weight loss and treatment of nasal allergies have been shown to improve snoring.  Exercising tongue muscles with a game (https://Celtro.light/us/corwin/soundly-reduce-snoring/dt4059193983) or stimulating the tongue during the day with a device (https://doi.org/10.3390/rve72154478) have improved snoring in some  individuals.  https://www.KIXEYE.[employer]ojoki/  https://www.sleepfoundation.org/best-anti-snoring-mouthpieces-and-mouthguards    Remember to Drive Safe... Drive Alive     Sleep health profoundly affects your health, mood, and your safety.  Thirty three percent of the population (one in three of us) is not getting enough sleep and many have a sleep disorder. Not getting enough sleep or having an untreated / undertreated sleep condition may make us sleepy without even knowing it. In fact, our driving could be dramatically impaired due to our sleep health. As your provider, here are some things I would like you to know about driving:     Here are some warning signs for impairment and dangerous drowsy driving:              -Having been awake more than 16 hours               -Looking tired               -Eyelid drooping              -Head nodding (it could be too late at this point)              -Driving for more than 30 minutes     Some things you could do to make the driving safer if you are experiencing some drowsiness:              -Stop driving and rest              -Call for transportation              -Make sure your sleep disorder is adequately treated     Some things that have been shown NOT to work when experiencing drowsiness while driving:              -Turning on the radio              -Opening windows              -Eating any  distracting  /  entertaining  foods (e.g., sunflower seeds, candy, or any other)              -Talking on the phone      Your decision may not only impact your life, but also the life of others. Please, remember to drive safe for yourself and all of us.              Your BMI is Body mass index is 39.44 kg/m .    What is BMI?  Body mass index (BMI) is one way to tell whether you are at a healthy weight, overweight, or obese. It measures your weight in relation to your height.  A BMI of 18.5 to 24.9 is in the healthy range. A person with a BMI of 25 to 29.9 is considered overweight, and  someone with a BMI of 30 or greater is considered obese.  Another way to find out if you are at risk for health problems caused by overweight and obesity is to measure your waist. If you are a woman and your waist is more than 35 inches, or if you are a man and your waist is more than 40 inches, your risk of disease may be higher.  More than two-thirds of American adults are considered overweight or obese. Being overweight or obese increases the risk for further weight gain.  Excess weight may lead to heart disease and diabetes. Creating and following plans for healthy eating and physical activity may help you improve your health.    Methods for maintaining or losing weight.  Weight control is part of healthy lifestyle and includes exercise, emotional health, and healthy eating habits.  Careful eating habits lifelong is the mainstay of weight control.  Though there are significant health benefits from weight loss, long-term weight loss with diet alone may be very difficult to achieve- studies show long-term success with dietary management in less than 10% of people. Attaining a healthy weight may be especially difficult to achieve in those with severe obesity. In some cases, medications, devices and surgical management might be considered.    What can you do?  If you are overweight or obese and are interested in methods for weight loss, you should discuss this with your provider. In addition, we recommend that you review healthy life styles and methods for weight loss available through the [employer] patient information sites:   http://win.niddk.nih.gov/publications/index.htm      Your blood pressure was checked while you were in clinic today.  Please read the guidelines below about what these numbers mean and what you should do about them.  Your systolic blood pressure is the top number.  This is the pressure when the heart is pumping.  Your diastolic blood pressure is the bottom number.  This is  the pressure in between beats.  If your systolic blood pressure is less than 120 and your diastolic blood pressure is less than 80, then your blood pressure is normal. There is nothing more that you need to do about it  If your systolic blood pressure is 120-139 or your diastolic blood pressure is 80-89, your blood pressure may be higher than it should be.  You should have your blood pressure re-checked within a year by a primary care provider.  If your systolic blood pressure is 140 or greater or your diastolic blood pressure is 90 or greater, you may have high blood pressure.  High blood pressure is treatable, but if left untreated over time it can put you at risk for heart attack, stroke, or kidney failure.  You should have your blood pressure re-checked by a primary care provider within the next four weeks.

## 2024-04-09 NOTE — PROGRESS NOTES
Outpatient Sleep Medicine Consultation:      Name: Sunshine Delgado MRN# [id number]   Age: 59 year old YOB: 1965     Date of Consultation: April 9, 2024  Consultation is requested by: Jessica Negro PA-C  [address] [place] [id number] Jessica Negro  Primary care provider: Linn Montemayor       Reason for Sleep Consult:     Sunshine Delgado is sent by Jessica Negro for a sleep consultation regarding MARIA GUADALUPE management.    Patient s Reason for visit  Sunshine Delgado main reason for visit: Snoring  Patient states problem(s) started: 10 years ago  Sunshine Delgado's goals for this visit: To get better quality sleep!           Assessment and Plan:     Summary Sleep Diagnoses:  Severe obstructive sleep apnea: PSG in 2016 showed an AHI of 84.4/h, discontinued PAP use 7 years ago, with reports of Snoring, observed apneas, returning to re-establish care for management of MARIA GUADALUPE  BMI 39.44 kg/m2, serum bicarb 29 (2/21/24) with morning cephalgia- Suspect possible coexistent obesity hypoventilation syndrome  Chronic Insomnia (Psychophysiological ,depression, anxiety) On stable dose of trazodone with good response.     Comorbid Diagnoses:  Elevated BMI s/p gastric sleeve in 2018.  Anxiety  Depression  Chronic fatigue  Hashimoto's thyroiditis   Paroxysmal atrial fibrillation s/p ablation    Summary Recommendations:  -  Recommend split night study with TCM to re-evaluate for sleep related breathing disorder. Split if AHI > 15.  Recommend obtaining ABG on RA within 48 hrs prior to study.   -Discussed risks of untreated MARIA GUADALUPE including the association of MARIA GUADALUPE with atrial fibrillation and other cardiovascular comorbidities including stroke.  -Discussed pathophysiology and treatment options for MARIA GUADALUPE   - If sleep study shows MARIA GUADALUPE, patient is amenable to trying CPAP. Patient will follow up 2 months after the sleep study. We will review results and [facility]  [facility] to the follow up.   -We discussed  "weight management with healthy diet, and exercise.  - Recommend to avoid mind stimulating activities prior to bed, avoiding screens 1-2 hours prior to bedtime  - Okay to continue trazodone 50 mg at bedtime for insomnia with close monitoring of her PCP  -She will continue follow-up with her primary care provider for optimizing the management of anxiety and depression.  -Continue follow-up with cardiology  -We discussed following a regular sleep schedule, aiming at obtaining 7.5 to 8 hours of sleep/night avoiding sleep deprivation.    Patient was strongly advised to avoid driving, operating any heavy machinery or other hazardous situations while drowsy or sleepy.  Patient was counseled on the importance of driving while alert, to pull over if drowsy, or nap before getting into the vehicle if sleepy.       Orders Placed This Encounter   Procedures    Comprehensive Sleep Study    ABG-Blood Gas Arterial ([facility])     Summary Counseling:    Sleep Testing Reviewed  Obstructive Sleep Apnea Reviewed  Complications of Untreated Sleep Apnea Reviewed    Medical Decision-making:   Educational materials provided in instructions    The above note was dictated using voice recognition software. Although reviewed after completion, some word and grammatical error may remain . Please contact the author for any clarifications.    Patient was seen and discussed with Dr. De La Paz.     Nanci Yang MD  Sleep Medicine Fellow    CC: Jessica Negro PA-C    Attending: As the attending physician I was present with  Dr. Nanci Yang MD  during this clinic visit. I personally reviewed the coleman aspects of the history, chart review and discussed the plan with the patient and I agree with the above documentation.     \" Total time spent was 45 minutes for this appointment on this date of service which include time spent before, during and after the visit for chart review, patient care, counseling and coordination " "of care including documentation.\"      Justin De La Paz MD  [facility]  [address], [address], [place] [address]         History of Present Illness:     Sunshine Delgado is a 59-year-old woman with history of class II obesity, GERD, Hashimoto's thyroiditis, atrial fibrillation, anxiety, depression, ADHD, insomnia and severe obstructive sleep apnea who is seen today to reestablish care for obstructive sleep apnea.    Patient was initially diagnosed with obstructive sleep apnea in 2011, and underwent a repeat sleep study in 2016 with an AHI of 84.4/h. She was placed on APAP 8-12 cmH2O, but discontinued use  1 year of use after,~ 7 years ago as she found PAP therapy to be very cumbersome. During her use of PAP use, she did note more restorative sleep.    She was also diagnosed with insomnia and started taking trazodone 9 years ago when her  passed away. She started with trazodone 150 mg at bedtime, and has been titrated down to 50mg in the past year with no continued benefit from use.     Given her history of atrial fibrillation, along with elevated BMI and daytime fatigue, patient would like  to re-establishing care for  treatment of MARIA GUADALUPE.     Past Sleep Evaluations:  Sleep study 5/10/2011 (226lbs)- AHI 24, RDI 30, lowest oxygen saturation was 88%, CPAP 8 cm was curative.      Date: 9/30/2016  Weight: 248 pounds, BMI 41.3  Sleep architecture: Decreased sleep latency of 4 minutes with use of sleep aid (Benadryl), with normal sleep efficiency.  Stage N1 21%, stage N2 79%, stage N3 and REM 0%.  Respirations:, AHI 84.4/h, supine AHI 88.8/h.  Baseline oxygen saturation 92.8%, oxygen erika 80.2% with time spent 88% <was 26.1 minutes  Titration: CPAP started at 5 cmH2O with residual AHI of 5.2/h    SLEEP-WAKE SCHEDULE:     Work/School Days: Patient goes to school/work: Yes   Usually gets into bed at 10 pm  Takes patient about 5 minutes to fall asleep with " trazodone  Has trouble falling asleep only when I don t take my trazadone nights per week  Wakes up in the middle of the night 1 time times.  Wakes up due to Use the bathroom  She has trouble falling back asleep never expect if i haven t taken my trazadone times a week.   It usually takes a few minutes to get back to sleep with trazodone, but without it would take ~ hours or would not be able to fall back to sleep at all  Patient is usually up at 4:45 am  Uses alarm: Yes    Weekends/Non-work Days/All Other Days:  Usually gets into bed at 11 pm   Takes patient about minutes to fall asleep even without trazodone  Patient is usually up at 6 am  Uses alarm: No    Sleep Need  Patient gets  7hours sleep on average   Patient thinks she needs about 8 hour sleep    Sunshine Delgado prefers to sleep in this position(s): Back;Side   Patient states they do the following activities in bed: Read;Use phone, computer, or tablet    Naps  Patient takes a purposeful nap   times a week and naps are usually no naps in duration  She feels better after a nap: No  She dozes off unintentionally   days per week  Patient has had a driving accident or near-miss due to sleepiness/drowsiness: No      SLEEP DISRUPTIONS:    Breathing/Snoring  Patient snores:Yes  Other people complain about her snoring: Yes  Patient has been told she stops breathing in her sleep:Yes  She has issues with the following: Morning headaches;Morning mouth dryness;Heartburn or reflux at night    Movement:  Patient gets pain, discomfort, with an urge to move:  Yes, usually due to pain in hips  It happens when she is resting:  Yes  It happens more at night:  Yes  Patient has been told she kicks her legs at night:  No     Behaviors in Sleep:  Sunshine Delgado has experienced the following behaviors while sleeping:    She has experienced sudden muscle weakness during the day: No      Is there anything else you would like your sleep provider to know:      CAFFEINE AND OTHER  SUBSTANCES:    Patient consumes caffeinated beverages per day:  2 cups of coffee  Last caffeine use is usually: 5 am and 3 pm  List of any prescribed or over the counter stimulants that patient takes: none  List of any prescribed or over the counter sleep medication patient takes: trazadone  List of previous sleep medications that patient has tried:    Patient drinks alcohol to help them sleep: No  Patient drinks alcohol near bedtime: No    Family History:  Patient has a family member been diagnosed with a sleep disorder: Yes  my sister and my son         SCALES:    EPWORTH SLEEPINESS SCALE         4/9/2024     9:50 AM    [place] Sleepiness Scale ( LOU Rush  [id number]<br>ESS - [place]/English - Final version - 21 Nov 07 - [facility] Research [place].)   Sitting and reading Slight chance of dozing   Watching TV Slight chance of dozing   Sitting, inactive in a public place (e.g. a theatre or a meeting) Would never doze   As a passenger in a car for an hour without a break Slight chance of dozing   Lying down to rest in the afternoon when circumstances permit Slight chance of dozing   Sitting and talking to someone Would never doze   Sitting quietly after a lunch without alcohol Would never doze   In a car, while stopped for a few minutes in traffic Would never doze   [place] Score (MC) 4   [place] Score (Sleep) 4         INSOMNIA SEVERITY INDEX (JOHANN)          4/9/2024     9:37 AM   Insomnia Severity Index (JOHANN)   Difficulty falling asleep 2   Difficulty staying asleep 2   Problems waking up too early 2   How SATISFIED/DISSATISFIED are you with your CURRENT sleep pattern? 1   How NOTICEABLE to others do you think your sleep problem is in terms of impairing the quality of your life? 2   How WORRIED/DISTRESSED are you about your current sleep problem? 3   To what extent do you consider your sleep problem to INTERFERE with your daily functioning (e.g. daytime fatigue, mood, ability to function at work/daily chores,  "concentration, memory, mood, etc.) CURRENTLY? 3   JOHANN Total Score 15       Guidelines for Scoring/Interpretation:  Total score categories:  0-7 = No clinically significant insomnia   8-14 = Subthreshold insomnia   15-21 = Clinical insomnia (moderate severity)  22-28 = Clinical insomnia (severe)  Used via courtesy of www.aXess americaealth.va.gov with permission from Sal Jefferson PhD., [facility]      STOP BANG 5/8 4/9/2024    10:00 AM   STOP BANG Questionnaire (  2008, the American Society of Anesthesiologists, Inc. [employer].)   Neck Cir (cm) Clinic: 41 cm   B/P Clinic: 143/86   BMI Clinic: 39.44         GAD7        4/25/2023    11:08 AM   ADRIANNE-7    1. Feeling nervous, anxious, or on edge 0   2. Not being able to stop or control worrying 0   3. Worrying too much about different things 0   4. Trouble relaxing 1   5. Being so restless that it is hard to sit still 0   6. Becoming easily annoyed or irritable 3   7. Feeling afraid, as if something awful might happen 0   ADRIANNE-7 Total Score 4   If you checked any problems, how difficult have they made it for you to do your work, take care of things at home, or get along with other people? Not difficult at all         CAGE-AID         No data to display                CAGE-AID reprinted with permission from the [place] Medical Journal, DONATO Dillard. and MARY LOU Mandujano, \"Conjoint screening questionnaires for alcohol and drug abuse\" [place] Medical Journal 94: 135-140, 1995.      PATIENT HEALTH QUESTIONNAIRE-9 (PHQ - 9)        12/30/2023    10:49 AM   PHQ-9 ([employer])   1.  Little interest or pleasure in doing things 0   2.  Feeling down, depressed, or hopeless 0   3.  Trouble falling or staying asleep, or sleeping too much 0   4.  Feeling tired or having little energy 1   5.  Poor appetite or overeating 0   6.  Feeling bad about yourself - or that you are a failure or have let yourself or your family down 0   7.  Trouble concentrating on things, " such as reading the newspaper or watching television 1   8.  Moving or speaking so slowly that other people could have noticed. Or the opposite - being so fidgety or restless that you have been moving around a lot more than usual 0   9.  Thoughts that you would be better off dead, or of hurting yourself in some way 0   PHQ-9 Total Score 2   6.  Feeling bad about yourself 0   7.  Trouble concentrating 1   8.  Moving slowly or restless 0   9.  Suicidal or self-harm thoughts 0   1.  Little interest or pleasure in doing things Not at all   2.  Feeling down, depressed, or hopeless Not at all   3.  Trouble falling or staying asleep, or sleeping too much Not at all   4.  Feeling tired or having little energy Several days   5.  Poor appetite or overeating Not at all   6.  Feeling bad about yourself Not at all   7.  Trouble concentrating Several days   8.  Moving slowly or restless Not at all   9.  Suicidal or self-harm thoughts Not at all   PHQ-9 via [facility] TOTAL SCORE-----> 2 (Minimal depression)   Difficulty at work, home, or with people Not difficult at all       Developed by Juliana Hicks, Jane Spencer, Martin Romero and colleagues, with an educational francheska from [employer]. No permission required to reproduce, translate, display or distribute.        Allergies:    Allergies   Allergen Reactions    Cephalexin Hives    Strawberry Extract Hives    Sulfa Antibiotics Hives    Cinnamon Hives    Sulfamethoxazole-Trimethoprim Hives    Vicodin [Hydrocodone-Acetaminophen]     Wasp Venom Protein      Other reaction(s): Chest Pain    Wasps [Hornets] Swelling     Now carries Epi Pen    Zoloft [Sertraline]      suicidal ideation    Contrast Dye Other (See Comments) and Rash     Patient had sneezing and an itchy throat following contrast injection of 100 mL Isovue-370.  From IV contrast dye       Medications:    Current Outpatient Medications   Medication Sig Dispense Refill    acetaminophen (TYLENOL) 325 MG tablet  Take 325 mg by mouth every 4 hours as needed      buPROPion (WELLBUTRIN XL) 300 MG 24 hr tablet Take 1 tablet (300 mg) by mouth every morning 90 tablet 3    EPINEPHrine (AUVI-Q) 0.3 MG/0.3ML injection 2-pack Inject 0.3 mLs (0.3 mg) into the muscle as needed for anaphylaxis 0.6 mL 3    estradiol (ESTRACE) 0.5 MG tablet TAKE 1 TABLET BY MOUTH EVERY DAY 90 tablet 1    levothyroxine (SYNTHROID/LEVOTHROID) 75 MCG tablet Take 1 tablet (75 mcg) by mouth daily +Appointment is required for further refills+ 90 tablet 0    ondansetron (ZOFRAN ODT) 4 MG ODT tab TAKE 1 TABLET (4 MG) BY MOUTH EVERY 8 HOURS IF NEEDED FOR NAUSEA.      tirzepatide-Weight Management (ZEPBOUND) 2.5 MG/0.5ML prefilled pen Inject 0.5 mLs (2.5 mg) Subcutaneous every 7 days For 4 weeks 2 mL 0    [START ON 5/1/2024] tirzepatide-Weight Management (ZEPBOUND) 5 MG/0.5ML prefilled pen Inject 0.5 mLs (5 mg) Subcutaneous every 7 days After completing 4 weeks of 2.5mg dose 2 mL 2    traZODone (DESYREL) 50 MG tablet TAKE 2 TABLETS (100 MG) BY MOUTH AT BEDTIME 180 tablet 1    venlafaxine (EFFEXOR-ER) 75 MG 24 hr tablet Take 1 tablet (75 mg) by mouth daily 90 tablet 3    doxycycline hyclate (VIBRAMYCIN) 100 MG capsule Take 1 capsule (100 mg) by mouth 2 times daily 28 capsule 1    naltrexone (DEPADE/REVIA) 50 MG tablet Take 1/2 tablet once daily 1-2 hours prior to worst cravings for 1 week, then increase to 1 tablet daily as directed if tolerating 30 tablet 3    pantoprazole (PROTONIX) 40 MG EC tablet Take 1 tablet (40 mg) by mouth daily 30 tablet 3       Problem List:  Patient Active Problem List    Diagnosis Date Noted    S/P laparoscopic sleeve gastrectomy 01/26/2024     Priority: Medium    Gastroesophageal reflux disease, unspecified whether esophagitis present 01/23/2024     Priority: Medium    Hepatic steatosis 08/18/2023     Priority: Medium    Left knee pain, unspecified chronicity 09/23/2022     Priority: Medium    Primary osteoarthritis of right knee  09/23/2022     Priority: Medium    Atrial fibrillation with controlled ventricular rate (H) 06/24/2021     Priority: Medium     Added automatically from request for surgery [id number]      Attention deficit hyperactivity disorder (ADHD), unspecified ADHD type 01/08/2021     Priority: Medium    Acute pain of right shoulder 05/28/2020     Priority: Medium    Impingement syndrome of shoulder region, right 05/28/2020     Priority: Medium    Hashimoto's thyroiditis 05/06/2020     Priority: Medium    Infectious mononucleosis 01/27/2020     Priority: Medium    Elevated liver enzymes 01/26/2020     Priority: Medium    CKD (chronic kidney disease) stage 3, GFR 30-59 ml/min (H) 10/02/2018     Priority: Medium    Bee sting allergy      Priority: Medium    Insomnia 12/09/2015     Priority: Medium    Mild major depression (H) 03/27/2015     Priority: Medium      is terminally ill. Going through a lot.      Health Care Home 01/26/2015     Priority: Medium     No Active Care Coordination 3/16/2015  Care Coordinator: Isamar SANDERS, MSW  See letters for Health Care Home care plan       Class 2 severe obesity with serious comorbidity and body mass index (BMI) of 38.0 to 38.9 in adult, unspecified obesity type (H) 03/21/2013     Priority: Medium    MARIA GUADALUPE (obstructive sleep apnea)- severe (AHI 84) 09/09/2011     Priority: Medium     Sleep study 5/10/2011 (226#)- AHI 24, RDI 30, lowest oxygen saturation was 88%, CPAP 8 cm was curative.   Study Date: 9/30/2016- (248.0 lbs) apnea/hypopnea index 84.4.  REM AHI n/a, supine AHI 88.8 events per hour. Lowest oxygen saturation 80.2%.  Time spent less than or equal to 88% 26.1 minutes. CPAP optimal pressure 5 cmH2O with AHI of 5.2 events per hour.  There were residual non-specific arousals. Time in REM supine on final pressure 72.5 minutes.           CARDIOVASCULAR SCREENING; LDL GOAL LESS THAN 160 05/09/2010     Priority: Medium    Generalized anxiety disorder 10/15/2009     Priority:  Medium     lexapro made things worse.           Past Medical/Surgical History:  Past Medical History:   Diagnosis Date    Antiplatelet or antithrombotic long-term use     Arrhythmia     Atrial fibrillation with rapid ventricular response (H) 1/26/2020    Bee sting allergy     Depressive disorder     Generalized anxiety disorder 10/15/2009    lexapro made things worse.      Hashimoto's thyroiditis 5/6/2020    Morbid obesity (H)     Ocular migraine     MARIA GUADALUPE (obstructive sleep apnea)- severe (AHI 84) 09/09/2011    patient not using since 2019    Primary osteoarthritis of right knee 9/23/2022    Renal disease     Voice fatigue 10/22/2009     Past Surgical History:   Procedure Laterality Date    ANESTHESIA CARDIOVERSION N/A 11/22/2021    Procedure: ANESTHESIA, FOR CARDIOVERSION@1515;  Surgeon: GENERIC ANESTHESIA PROVIDER;  Location:  OR    COLONOSCOPY  2000    DAVINCI GASTRIC SLEEVE      EP ABLATION FOCAL AFIB N/A 7/22/2021    Procedure: EP ABLATION FOCAL AFIB;  Surgeon: Vicente Craig MD;  Location:  HEART CARDIAC CATH LAB    EP ABLATION FOCAL AFIB N/A 3/31/2022    Procedure: Ablation Focal Atrial Fibrillation;  Surgeon: Vicente Craig MD;  Location:  HEART CARDIAC CATH LAB    GENITOURINARY SURGERY  2007    HYSTERECTOMY, PAP NO LONGER INDICATED      HYSTERECTOMY, VAGINAL  2007    still has ovaries    LAPAROSCOPIC GASTRIC SLEEVE N/A 3/13/2018    Procedure: LAPAROSCOPIC GASTRIC SLEEVE;  Laparoscopic Sleeve Gastrectomy;  Surgeon: Kameron Joseph MD;  Location:  OR       Social History:  Social History     Socioeconomic History    Marital status:      Spouse name: Not on file    Number of children: 1    Years of education: Not on file    Highest education level: Not on file   Occupational History    Occupation:      Comment: [employer]     Employer: [employer]   Tobacco Use    Smoking status: Never     Passive exposure: Never    Smokeless tobacco: Never   Vaping  "Use    Vaping Use: Never used   Substance and Sexual Activity    Alcohol use: Yes     Comment: 1-2 per mo    Drug use: Not Currently     Types: Marijuana     Comment:  last dose    Sexual activity: Yes     Partners: Male     Birth control/protection: Female Surgical   Other Topics Concern    Parent/sibling w/ CABG, MI or angioplasty before 65F 55M? No   Social History Narrative    ,  had glioblastoma,      [place] [employer] jewelry    Started the \"[employer] project\"        Reggie, born , son. Has kim             Social Determinants of Health     Financial Resource Strain: Low Risk  (2024)    Financial Resource Strain     Within the past 12 months, have you or your family members you live with been unable to get utilities (heat, electricity) when it was really needed?: No   Food Insecurity: Low Risk  (2024)    Food Insecurity     Within the past 12 months, did you worry that your food would run out before you got money to buy more?: No     Within the past 12 months, did the food you bought just not last and you didn t have money to get more?: No   Transportation Needs: Low Risk  (2024)    Transportation Needs     Within the past 12 months, has lack of transportation kept you from medical appointments, getting your medicines, non-medical meetings or appointments, work, or from getting things that you need?: No   Physical Activity: Not on file   Stress: Not on file   Social Connections: Not on file   Interpersonal Safety: Low Risk  (2024)    Interpersonal Safety     Do you feel physically and emotionally safe where you currently live?: Yes     Within the past 12 months, have you been hit, slapped, kicked or otherwise physically hurt by someone?: No     Within the past 12 months, have you been humiliated or emotionally abused in other ways by your partner or ex-partner?: No   Housing Stability: Low Risk  (2024)    Housing Stability     Do you have " "housing? : Yes     Are you worried about losing your housing?: No       Family History:  Family History   Problem Relation Age of Onset    Eye Disorder Mother         lost eyesight; probable macular degeneration    Psychotic Disorder Mother         Dementia /Alzhimers    Neurologic Disorder Mother         seizures    Diabetes Mother     Hypertension Mother     Alzheimer Disease Mother     Arthritis Mother     Osteoporosis Mother     Obesity Mother     Diabetes Father     Depression Father     Hyperlipidemia Father     Obesity Father     Psychotic Disorder Sister         bipolar    Thyroid Disease Sister         h/o thyroid cancer    Depression Sister         Bipolar    Obesity Sister     Cancer Other         Brain cancer    Osteoporosis Maternal Grandmother     Anxiety Disorder Son     Depression Sister     Thyroid Disease Sister        Review of Systems:  A complete review of systems reviewed by me is negative with the exeption of what has been mentioned in the history of present illness.  In the last TWO WEEKS have you experienced any of the following symptoms?  Fevers: No  Night Sweats: No  Weight Gain: No  Pain at Night: Yes  Double Vision: No  Changes in Vision: No  Difficulty Breathing through Nose: No  Sore Throat in Morning: No  Dry Mouth in the Morning: Yes  Shortness of Breath Lying Flat: No  Shortness of Breath With Activity: No  Awakening with Shortness of Breath: No  Increased Cough: No  Heart Racing at Night: No  Swelling in Feet or Legs: No  Diarrhea at Night: No  Heartburn at Night: Yes  Urinating More than Once at Night: No  Losing Control of Urine at Night: No  Joint Pains at Night: Yes  Headaches in Morning: Yes  Weakness in Arms or Legs: No  Depressed Mood: Yes  Anxiety: Yes     Physical Examination:  Vitals: BP (!) 143/86   Pulse 64   Ht 1.651 m (5' 5\")   Wt 107.5 kg (237 lb)   LMP  (LMP Unknown)   SpO2 98%   BMI 39.44 kg/m    BMI= Body mass index is 39.44 kg/m .  Neck Cir (cm): 41 " "cm    General: Pleasant. Cooperative. In no apparent distress.  Oropharynx: Mallampati Class: IV.Tonsillar Stage: 1  hidden by pillars.  Cardiovascular: regular rate and rhythm. No significant peripheral edema to suggest circulatory volume overload  Pulmonary: Able to speak in full sentences easily with normal respiratory effort. Clear to auscultation bilaterally, no rhonchi or wheeze.   Neurologic: Alert, oriented x3.  Psychiatric: Mood euthymic. Affect congruent with full range and intensity.         Data: All pertinent previous laboratory data reviewed     Recent Labs   Lab Test 02/21/24  0939 07/10/23  1541    137   POTASSIUM 4.5 4.3   CHLORIDE 102 100   CO2 29 29   ANIONGAP 8 8   * 86   BUN 13.9 16.3   CR 1.12* 1.21*   CONCHA 9.4 9.6       Recent Labs   Lab Test 02/21/24  0939   WBC 5.3   RBC 4.85   HGB 13.6   HCT 41.6   MCV 86   MCH 28.0   MCHC 32.7   RDW 14.4          Recent Labs   Lab Test 02/21/24  0939   PROTTOTAL 6.7   ALBUMIN 4.3   BILITOTAL 0.5   ALKPHOS 60   AST 21   ALT 17       TSH   Date Value   02/21/2024 2.92 uIU/mL   04/25/2023 2.72 mU/L   01/12/2022 1.83 mU/L   06/14/2021 2.86 mU/L   03/17/2021 5.00 mU/L (H)       Cannabinoids Qual Urine (no units)   Date Value   02/20/2018 Negative       Iron Saturation Index   Date/Time Value Ref Range Status   03/04/2013 10:36 AM 21 15 - 46 % Final     Ferritin   Date/Time Value Ref Range Status   11/07/2019 11:31 AM 87 8 - 252 ng/mL Final       No results found for: \"PH\", \"PHARTERIAL\", \"PO2\", \"TP2UVPMPCVF\", \"SAT\", \"PCO2\", \"HCO3\", \"BASEEXCESS\", \"MARGAUX\", \"BEB\"      Echocardiology: 5/11/21 STRESS ECHOCARDIOGRAM  Interpretation Summary  Submaximal ECG stress test  65% maximal predicted HR achieved. The test was terminated due to fatigue.  Normal blood pressure response to exercise.  The patient did not report any symptoms during exercise.  No ECG evidence of ischemia.  No QRS prolongation at peak exercise compared to rest.  No supraventricular or " ventricular arrhythmias.  Good exercise tolerance. The patient achieved 10 METs at peak exercise.     Transesophageal echocardiography  Study date: 3/31/202  Echocardiogram comments: Focussed YIFAN to rule out LA Appendage clot- Normal LA/LV Size, Normal Biventricular function, Mild MR, LA Appendage is open and no clot seen, no spontaneous echo contrast seen . LA Velocity- 52 cm/s, Mild TR . Small pericardial effusion seen..      Chest x-ray:   XR Chest 2 Views 08/18/2023    Narrative  CHEST TWO VIEWS 8/18/2023 4:02 PM    HISTORY: 2 weeks of PNA. Ongoing chest pain; Chest pain, unspecified  type; Pneumonia of right middle lobe due to infectious organism    COMPARISON: September 12, 2022    Impression  IMPRESSION: There are no acute infiltrates. The cardiac silhouette is  not enlarged. Pulmonary vasculature is unremarkable.    HUBERT DAVILA MD      SYSTEM ID:  [id number]      Chest CT:   CT Chest Abdomen Pelvis w/o Contrast 04/22/2023    Narrative  EXAM: CT CHEST ABDOMEN PELVIS W/O CONTRAST  LOCATION: [facility]  DATE/TIME: 4/22/2023 11:52 PM CDT    INDICATION: thoracic back and left flank pain  COMPARISON: Prior abdominal CT study 09/12/2022  TECHNIQUE: CT scan of the chest, abdomen, and pelvis was performed without IV contrast. Multiplanar reformats were obtained. Dose reduction techniques were used.  CONTRAST: None.    FINDINGS:  LUNGS AND PLEURA: Lungs are clear. No pleural effusions.    MEDIASTINUM/AXILLAE: No lymphadenopathy. No thoracic aortic aneurysms.    CORONARY ARTERY CALCIFICATION: None.    HEPATOBILIARY: No significant mass or bile duct dilatation. No calcified gallstones.    PANCREAS: No significant mass, duct dilatation, or inflammatory change.    SPLEEN: Normal size.    ADRENAL GLANDS: No significant nodules.    KIDNEYS/BLADDER: No significant mass, stone, or hydronephrosis.    BOWEL: Postsurgical changes from prior sleeve gastrectomy are noted. The stomach  and duodenum are normal in size and caliber of the small bowel is normal in size and caliber. The large bowel is normal in size and caliber. Stool is seen throughout the  colon. The appendix is unremarkable.    LYMPH NODES: No lymphadenopathy.    VASCULATURE: No abdominal aortic aneurysm.    PELVIC ORGANS: No pelvic masses.    MUSCULOSKELETAL: Unremarkable.    Impression  IMPRESSION:  1.  Stool seen throughout the colon, correlate for constipation  2.  normal appendix      PFT: Most Recent Breeze Pulmonary Function Testing: No results found    Nanci Yang MD 4/9/2024

## 2024-04-28 DIAGNOSIS — F33.1 MAJOR DEPRESSIVE DISORDER, RECURRENT EPISODE, MODERATE (H): Chronic | ICD-10-CM

## 2024-04-28 DIAGNOSIS — N95.1 MENOPAUSAL SYNDROME (HOT FLASHES): ICD-10-CM

## 2024-04-29 RX ORDER — VENLAFAXINE HYDROCHLORIDE 75 MG/1
75 TABLET, EXTENDED RELEASE ORAL DAILY
Qty: 90 TABLET | Refills: 0 | Status: SHIPPED | OUTPATIENT
Start: 2024-04-29 | End: 2024-06-20

## 2024-04-29 RX ORDER — ESTRADIOL 0.5 MG/1
0.5 TABLET ORAL DAILY
Qty: 90 TABLET | Refills: 0 | Status: SHIPPED | OUTPATIENT
Start: 2024-04-29 | End: 2024-06-20

## 2024-04-30 ENCOUNTER — MYC MEDICAL ADVICE (OUTPATIENT)
Dept: ENDOCRINOLOGY | Facility: CLINIC | Age: 59
End: 2024-04-30
Payer: COMMERCIAL

## 2024-04-30 DIAGNOSIS — E66.01 CLASS 2 SEVERE OBESITY WITH SERIOUS COMORBIDITY AND BODY MASS INDEX (BMI) OF 38.0 TO 38.9 IN ADULT, UNSPECIFIED OBESITY TYPE (H): ICD-10-CM

## 2024-04-30 DIAGNOSIS — E66.812 CLASS 2 SEVERE OBESITY WITH SERIOUS COMORBIDITY AND BODY MASS INDEX (BMI) OF 38.0 TO 38.9 IN ADULT, UNSPECIFIED OBESITY TYPE (H): ICD-10-CM

## 2024-05-13 ENCOUNTER — OFFICE VISIT (OUTPATIENT)
Dept: URGENT CARE | Facility: URGENT CARE | Age: 59
End: 2024-05-13
Payer: COMMERCIAL

## 2024-05-13 ENCOUNTER — NURSE TRIAGE (OUTPATIENT)
Dept: FAMILY MEDICINE | Facility: CLINIC | Age: 59
End: 2024-05-13
Payer: COMMERCIAL

## 2024-05-13 VITALS
DIASTOLIC BLOOD PRESSURE: 74 MMHG | WEIGHT: 232 LBS | SYSTOLIC BLOOD PRESSURE: 124 MMHG | RESPIRATION RATE: 15 BRPM | BODY MASS INDEX: 38.61 KG/M2 | TEMPERATURE: 98.3 F | HEART RATE: 72 BPM | OXYGEN SATURATION: 97 %

## 2024-05-13 DIAGNOSIS — L02.416 CUTANEOUS ABSCESS OF LEFT LOWER EXTREMITY: Primary | ICD-10-CM

## 2024-05-13 PROCEDURE — 87186 SC STD MICRODIL/AGAR DIL: CPT | Mod: 59

## 2024-05-13 PROCEDURE — 10060 I&D ABSCESS SIMPLE/SINGLE: CPT

## 2024-05-13 PROCEDURE — 87070 CULTURE OTHR SPECIMN AEROBIC: CPT

## 2024-05-13 PROCEDURE — 87077 CULTURE AEROBIC IDENTIFY: CPT | Mod: 59

## 2024-05-13 NOTE — TELEPHONE ENCOUNTER
Desirae Mccallum APRN CNP  Fz Rn Triage Pool6 minutes ago (11:08 AM)     SS  Patient should go to ER/Urgent care and should not wait until tomorrow.    MELISSA Griggs

## 2024-05-13 NOTE — PROGRESS NOTES
Assessment & Plan   (L02.416) Cutaneous abscess of left lower extremity  (primary encounter diagnosis)  Plan: DRAIN SKIN ABSCESS SIMPLE/SINGLE,         lidocaine-EPINEPHrine 1 %-1:[id number] injection 3         mL, Abscess Aerobic Bacterial Culture Routine         Without Gram Stain, amoxicillin-clavulanate         (AUGMENTIN) 875-125 MG tablet    Abscess patient instructions discussed and provided.  Patient has been on doxycycline in the past and is allergic to Keflex and Bactrim.  She also indicates that she is outside in the sun a lot for her job.  Augmentin selected as the antibiotic pending the wound culture.  Informed the patient to keep the area clean and dry for 48 hours and after 48 hours it is okay to get wet.  We discussed taking the antibiotic as prescribed and finishing the full course even if symptoms improve.  We also discussed trying yogurt with active cultures or probiotic such as Culturelle daily to help prevent diarrhea will take the antibiotic.  Informed the patient to use bacitracin and Band-Aid to the area for dressing.  We discussed we will contact her within 1-2 business days if we need to change the antibiotic based on the wound culture.  Informed the patient to follow-up with her surgical consultation as scheduled for further evaluation and treatment of the abscess.  Instructed the patient to return to clinic with any increase in redness, swelling, drainage, bleeding, pain and or fever/chills.  Patient acknowledged her understanding of the above plan.    Affected area cleaned with povidone x 3 then wiped away with alcohol.  1 pecent lidocaine with epineprhine used to infiltrate the area with good anethesia.  Number 11 scapel used to make a stab incion.  Purlent drainage was removed.  Wound culture collected. Gauze used to absorb the excess drainage.  Appropriate wound care dressing applied.  Patient tolerated procedure well.    The use of Dragon/[employer] dictation services may have been used to  construct the content in this note; any grammatical or spelling errors are non-intentional. Please contact the author of this note directly if you are in need of any clarification.      LEOPOLDO Landin CNP [facility]    Fransisco Gonsalez is a 59 year old female who presents to clinic today for the following health issues:  Chief Complaint   Patient presents with    Infection     Onset: January 2024: Cyst/infection on the back of the left thigh. Pt states that she has previously had it drained and was scheduled to have it removed, but was cancelled. The cyst has gotten much worse since Friday 5/10/24.      HPI  Patient reports having an abscess on the back of her upper left leg since January of this year.  She states that it was drained in January and was taking an oral antibiotic.  She indicates that she has had 2 separate appointments canceled this past year for surgical removal of the abscess.  Her next surgical consultation is scheduled for June 1.  She states that the redness, swelling and pain has worsened over the past few days.  She has been utilizing warm soaks with Epsom salts for treatment.     ROS:  Negative except noted above.    Review of Systems        Objective    /74 (BP Location: Left arm, Patient Position: Sitting, Cuff Size: Adult Regular)   Pulse 72   Temp 98.3  F (36.8  C) (Tympanic)   Resp 15   Wt 105.2 kg (232 lb)   LMP  (LMP Unknown)   SpO2 97%   BMI 38.61 kg/m    Physical Exam   GENERAL: alert and no distress  SKIN:  4.5cm x 4.5cm fluctuant area of erythema and edema posterior upper left leg.  Tender upon palpation and warmer to touch than the surrounding tissue.  No drainage or bleeding noted.

## 2024-05-13 NOTE — PATIENT INSTRUCTIONS
Abscess drained for you in the clinic today.  Keep the area clean and dry for 48 hours.  After 48 hours it is okay to get wet.  Take the antibiotic as prescribed and finish the full course even if symptoms improve.  Try yogurt with active cultures or probiotics such as Culturelle daily to help prevent diarrhea while using antibiotics.  Wound culture is pending.  We will contact you within 1-2 business days if we need to change the antibiotic.  Use bacitracin and Band-Aid to the area for dressing.  Follow-up with your surgical consultation as scheduled for further evaluation and treatment of the abscess.  Return to clinic with any increase in redness, swelling, drainage, bleeding, pain and/or fever/chills.

## 2024-05-13 NOTE — TELEPHONE ENCOUNTER
Provider's message relayed to pt. Pt verbalized understanding and agrees to go to [facility] today.    Patsy Raya RN  [facility]

## 2024-05-13 NOTE — TELEPHONE ENCOUNTER
Nurse Triage SBAR    Is this a 2nd Level Triage? YES, LICENSED PRACTITIONER REVIEW IS REQUIRED    Situation: Cyst, likely infected. Pt would like appt for draining and abx if needed.    Background: Pt states that cyst is not new. She had seen Arnav Flaco 1/8/24 for drainage and was referred to general surgery. She made appointment which them which got cancelled and then she got re-scheduled for 6/3/24, but she thinks it is now infected and she can't wait this long.    Assessment: Cyst present back of right thigh. It feels like it is about the size of a silver dollar. Inflammation and redness extends out to about the size of her fist. Skin is beet red and feels like it is on fire. Hot to touch. She denies any fever or chills. She states that the top of the cyst in the very center, about the size of a nickel, looks dark purple/black right in the center. Rates pain as severe 10/10 at the site when sitting on it or moving and it gets touched. Otherwise pain is 7/10 at the site when up and standing.    Protocol Recommended Disposition:   Go To ED/UCC Now (Or To Office With PCP Approval)    Recommendation: Please advise if okay for in clinic appt or UC/ED needed. For in clinic appt, if there are no openings today, okay to wait till next available appt?     Routed to provider    Reason for Disposition   Black (necrotic), dark purple, or blisters develop in area of wound    Additional Information   Negative: Widespread rash and bright red, sunburn-like and too weak to stand   Negative: Sounds like a life-threatening emergency to the triager   Negative: Painful lump or swelling at opening to anus (rectum)   Negative: Painful lump or swelling at opening to vagina (on labia)   Negative: Painful lump or swelling on scrotum   Negative: Doesn't match the SYMPTOMS of a boil   Negative: Widespread red rash    Protocols used: Boil (Skin Abscess)-A-ALEXUS Elizabeth RN

## 2024-05-16 LAB
BACTERIA ABSC ANAEROBE+AEROBE CULT: ABNORMAL
BACTERIA ABSC ANAEROBE+AEROBE CULT: ABNORMAL

## 2024-05-30 ENCOUNTER — MYC MEDICAL ADVICE (OUTPATIENT)
Dept: CARDIOLOGY | Facility: CLINIC | Age: 59
End: 2024-05-30
Payer: COMMERCIAL

## 2024-05-30 ENCOUNTER — VIRTUAL VISIT (OUTPATIENT)
Dept: CARDIOLOGY | Facility: CLINIC | Age: 59
End: 2024-05-30
Payer: COMMERCIAL

## 2024-05-30 VITALS — WEIGHT: 218 LBS | BODY MASS INDEX: 36.28 KG/M2

## 2024-05-30 DIAGNOSIS — E66.01 CLASS 2 SEVERE OBESITY WITH SERIOUS COMORBIDITY AND BODY MASS INDEX (BMI) OF 38.0 TO 38.9 IN ADULT, UNSPECIFIED OBESITY TYPE (H): ICD-10-CM

## 2024-05-30 DIAGNOSIS — F90.9 ATTENTION DEFICIT HYPERACTIVITY DISORDER (ADHD), UNSPECIFIED ADHD TYPE: ICD-10-CM

## 2024-05-30 DIAGNOSIS — E66.01 CLASS 2 SEVERE OBESITY WITH SERIOUS COMORBIDITY AND BODY MASS INDEX (BMI) OF 38.0 TO 38.9 IN ADULT, UNSPECIFIED OBESITY TYPE (H): Primary | ICD-10-CM

## 2024-05-30 DIAGNOSIS — E66.812 CLASS 2 SEVERE OBESITY WITH SERIOUS COMORBIDITY AND BODY MASS INDEX (BMI) OF 38.0 TO 38.9 IN ADULT, UNSPECIFIED OBESITY TYPE (H): Primary | ICD-10-CM

## 2024-05-30 DIAGNOSIS — F41.1 GENERALIZED ANXIETY DISORDER: Chronic | ICD-10-CM

## 2024-05-30 DIAGNOSIS — N95.1 MENOPAUSAL SYNDROME (HOT FLASHES): ICD-10-CM

## 2024-05-30 DIAGNOSIS — F33.42 RECURRENT MAJOR DEPRESSIVE DISORDER, IN FULL REMISSION (H): ICD-10-CM

## 2024-05-30 DIAGNOSIS — E66.812 CLASS 2 SEVERE OBESITY WITH SERIOUS COMORBIDITY AND BODY MASS INDEX (BMI) OF 38.0 TO 38.9 IN ADULT, UNSPECIFIED OBESITY TYPE (H): ICD-10-CM

## 2024-05-30 DIAGNOSIS — F51.04 PSYCHOPHYSIOLOGICAL INSOMNIA: ICD-10-CM

## 2024-05-30 DIAGNOSIS — E06.3 HASHIMOTO'S THYROIDITIS: ICD-10-CM

## 2024-05-30 ASSESSMENT — PAIN SCALES - GENERAL: PAINLEVEL: NO PAIN (0)

## 2024-05-30 NOTE — PROGRESS NOTES
Medication Therapy Management (MTM) Encounter    ASSESSMENT:                            Medication Adherence/Access: to help with access to Zepbound, recommend 2 doses to preferred pharmacy to be able to fill 5mg or 7.5mg, depending on pharmacy supply (see below).      Weight Management /s/p sleeve gastrectomy 2018: patient having significant success on Zepbound for weight management and reduction in food noise. Recommend to continue with lifestyle mods and, depending on availability of Zepbound from pharmacy, expect continued success on Zepbound 5mg or may have some improvements in both appetite and weight management with 7.5mg as some concern from patient effects are waning. Either dose safe and likely effective - having 2 doses able to fill may help with access (see above). Patient would benefit from additional pharmacotherapy for weight management. Given class II obesity, hepatic steatosis - recommend GLP1/GIP therapy as data to support most significant weight loss and patient has no contraindications. Patient also seeing benefit from reduction in food noise and increased satiety. Discussed dietary and behavioral modifications.       Hypothyroidism (Hashimoto's Thyroiditis): stable     Anxiety/depression/ADHD/insomnia: stable - may be able to reduce some meds for mental health as food noise specifically improved mood, will continue to monitor. Bupropion has additional benefit for craving reduction, would prioritize keeping this if able. Venlafaxine may help with VMS from menopause, so may have to taper to lowest effective dose of both. For future considerations.    Menopause Syndome: stable - may be able to reduce hormones use as weight management and metabolic picture improves, will continue to monitor.  PLAN:                            After 4 weeks of Zepbound 5mg, you may continue on 5mg Zepbound weekly or increase to Zepbound 7.5mg weekly. The dose will depend on pharmacy availability and how you are  feeling if effects of 5mg are waning.    You are doing great! As a reminder, To help with tolerability and efficacy of Zepbound:  Eat small meals/snacks throughout the day (about every 2 hours)  Focus on getting protein in first with each meal and snack.   Drink plenty of water - goal 64 oz throughout the day  You may try Metamucil, Benefiber, or Citrucel to help feel more full (less nausea) and have softer, more consistent bowel movements.  To optimize weight management - work on incorporating resistance training/weight lifting to build muscle and improve overall metabolism of adipose tissue.      Follow-up: 8/23 at 3pm with Zully Crain [facility]. Call to schedule follow up visit with Jessica Negro PA-C or Linn Foster CNP  --[facility] Phone Number: [phone] (schedules for [facility] and [facility] - providers, dietitians, health coaches)      SUBJECTIVE/OBJECTIVE:                          Sunshine Delgado is a 59 year old female contacted via secure video for an initial visit. She was referred to me from Jessica Negro PA-C .      Reason for visit: Medication Therapy Management - GLP1/GIP Management .    Allergies/ADRs: Reviewed in chart  Past Medical History: Reviewed in chart  Tobacco: She reports that she has never smoked. She has never been exposed to tobacco smoke. She has never used smokeless tobacco.  Alcohol: not currently using  Caffeine: 1c coffee in am, occasional second cup daily    Medication Adherence/Access: Medication barriers: obtaining medication from pharmacy. Called insurance and found may have better cost and availability if fills at [facility] ([facility] may also have good coverage per Epic).    Weight Management /s/p sleeve gastrectomy 2018  Zepbound 5 mg once weekly  Bupropion  mg once daily     Last seen by Jessica Negro PA-C 1/23/24 for Return Medical Weight Management post bariatric surgery Visit     Patient reports no  "current medication side effects. Has ondensetron if needed. Had a successful sleeve gastrectomy, after Covid had significant weight regain. Has had significant improvement in food noise, appetite since starting Zepbound - very pleased (lost about 20 lbs since starting coupled with lifestyle mods. Wonders if effects of Zepbound waning slightly as continues on 5mg dose?    Nutrition/Eating Habits: has significant reduction in food noise; focusing on protein and veggies for food. Works to eat every 2-4 hours. Drinks >64 oz water daily. Has protein shake if has gone too long without eating.  Exercise/Activity: walks daily indoors and out.     Medication History:  GLP1/GIP : Patient denies personal or family history of MEN Type2, MTC, Pancreatitis.    Naltrexone caused dizziness.    Weight prior to surgery - 289lb   Donell weight - 192lb     Starting weight (lbs): 289  Today's weight (lbs): 218  Change in weight (lbs/percent): 71 lbs/ 25%       Wt Readings from Last 4 Encounters:   05/30/24 98.9 kg (218 lb)   05/13/24 105.2 kg (232 lb)   04/09/24 107.5 kg (237 lb)   02/21/24 106.6 kg (235 lb 1.6 oz)     Estimated body mass index is 36.28 kg/m  as calculated from the following:    Height as of 4/9/24: 1.651 m (5' 5\").    Weight as of this encounter: 98.9 kg (218 lb).      Hypothyroidism (Hashimoto's Thyroiditis):   Levothyroxine 75 mcg daily  Patient is having the following symptoms: none.      TSH   Date Value Ref Range Status   02/21/2024 2.92 0.30 - 4.20 uIU/mL Final   04/25/2023 2.72 0.40 - 4.00 mU/L Final   06/14/2021 2.86 0.40 - 4.00 mU/L Final     T4 Free   Date Value Ref Range Status   03/17/2021 0.89 0.76 - 1.46 ng/dL Final      Anxiety/depression/ADHD/insomnia   Bupropion  mg every morning  Venlafaxine 75mg once daily  Trazodone 50 mg every night at bedtime     Works well. Notes mood and thought/focus best it's been as an adult since starting Zepbound. Feels that food noise was clouding thought more than " she had realized. Denies side effects.    Menopause Syndrome:   Estradiol 0.5 mg once daily     Works well. Denies side effects     Today's Vitals: Wt 98.9 kg (218 lb)   LMP  (LMP Unknown)   BMI 36.28 kg/m    ----------------      I spent 28 minutes with this patient today. All changes were made via collaborative practice agreement with Jessica Negro. A copy of the visit note was provided to the patient's provider(s).    A summary of these recommendations was sent via [employer].    Zully Crain, Pharm D., MPH    Medication Therapy Management Pharmacist   [facility]      Telemedicine Visit Details  Type of service:  Video Conference via [employer]  Start Time:  8:05 AM  End Time:  8:33 AM     Medication Therapy Recommendations  Class 2 severe obesity with serious comorbidity and body mass index (BMI) of 38.0 to 38.9 in adult, unspecified obesity type (H)    Current Medication: tirzepatide-Weight Management (ZEPBOUND) 5 MG/0.5ML prefilled pen (Discontinued)   Rationale: Medication product not available - Adherence - Adherence   Recommendation: Provide Adherence Intervention   Status: Accepted per CPA

## 2024-05-30 NOTE — Clinical Note
Sunshine has seen all of us on the team at this point :) Not sure who she will get in to see next - due to see DORIE (last visit in Jan 2024).  S/p sleeve gastrectomy 2018 - started Zepbound about 6 weeks ago and doing great! Access is only issue - so trying a mail order pharmacy her insurance prefers.  I will see her in Aug. Asked her to get in with DORIE as soon as there is an opening.  Zully

## 2024-05-30 NOTE — PROGRESS NOTES
Clinical Pharmacy Note    Patient was notified by [facility] pharmacy they cannot fill meds in [place]. Requested meds to be sent to [facility] instead. These were sent per CPA    Zully Crain, Pharm D., MPH    Medication Therapy Management Pharmacist   [facility]

## 2024-05-30 NOTE — PATIENT INSTRUCTIONS
Recommendations from MTM Pharmacist visit:                                                    MTM (medication therapy management) is a service provided by a clinical pharmacist designed to help you get the most of out of your medicines.  You may be sent a phone or email survey evaluating today's visit.  Please provide feedback you have for the service he received today if you are able.    After 4 weeks of Zepbound 5mg, you may continue on 5mg Zepbound weekly or increase to Zepbound 7.5mg weekly. The dose will depend on pharmacy availability and how you are feeling if effects of 5mg are waning. Let Zully Crain RPH know if need to send to [facility] instead of [facility] pharmacy.    You are doing great! As a reminder, To help with tolerability and efficacy of Zepbound:  Eat small meals/snacks throughout the day (about every 2 hours)  Focus on getting protein in first with each meal and snack.   Drink plenty of water - goal 64 oz throughout the day  You may try Metamucil, Benefiber, or Citrucel to help feel more full (less nausea) and have softer, more consistent bowel movements.  To optimize weight management - work on incorporating resistance training/weight lifting to build muscle and improve overall metabolism of adipose tissue.    Below is a list of protein shakes and bars that are lower in sugar and higher in protein (and taste pretty good!)    Follow-up: 8/23 at 3pm with Zully Crain RPH. Call to schedule follow up visit with Jessica Negro PA-C or Linn Foster CNP  --[facility] Phone Number: [phone] (schedules for [facility] and [place] clinics - providers, dietitians, health coaches)        It was great speaking with you today.  I value your experience and would be very thankful for your time in providing feedback in our clinic survey. In the next few days, you may receive an email or text message from [employer] with a link to a survey related to  "your  clinical pharmacist.\"     To schedule another MT appointment, please call the clinic directly ([facility] Phone Number: [phone] (schedules for [facility] and [facility] - providers, dietitians, health coaches) or you may call the MTM scheduling line at [phone] or toll-free at [phone].     My Clinical Pharmacist's contact information:                                                      Please feel free to contact me with any questions or concerns you have.      Zully Crain, Pharm D., MPH    Medication Therapy Management Pharmacist   [facility]      Meal Replacement Shake Options:   *Protein Shake Criteria: no more than 210 Calories, at least 20 grams of protein, and less than 10 grams of sugar   Premier Protein (160 Calories, 30 g protein)  Slim Fast Advanced Nutrition (180 Calories, 20 g protein)  Muscle Milk, lactose-free, 17 oz bottle (210 Calories, 30 g protein)  Integrated Supplements, no artificial sugars (110 Calories, 20 g protein)  Boost/Ensure Max (160 calories, 30 gm protein)   Fairlife Protein Shakes (160-230 calories, 26-42 gm protein)  Aldi's Elevation Protein Powder (180 calories, 30 gm protein)   Orgain Protein Shakes (130-160 calories, 20-26 gm protein)     Meal Replacement Bar Options:  Quest Protein Bars (190 Calories, 20 g protein)  Built Bar (170 Calories, 15-20 g protein)  One Protein Bar (210 calories, 20 g protein)  Cramer Signature Protein Bar ([employer]) (190 Calories, 21 g protein)  Pure Protein Bars (180 Calories, 21 g protein)    Low Calorie Frozen Meal:  Healthy Choice Power Bowls  Seng Ram  Smart Ones  Indra Haywood      "

## 2024-05-30 NOTE — LETTER
5/30/2024      RE: Sunshine YARBROUGH [place]  [address] [place] [address]       Dear Colleague,    Thank you for the opportunity to participate in the care of your patient, Sunshine Delgado, at the [facility] at [facility]. Please see a copy of my visit note below.    Medication Therapy Management (MTM) Encounter    ASSESSMENT:                            Medication Adherence/Access: to help with access to Zepbound, recommend 2 doses to preferred pharmacy to be able to fill 5mg or 7.5mg, depending on pharmacy supply (see below).      Weight Management /s/p sleeve gastrectomy 2018: patient having significant success on Zepbound for weight management and reduction in food noise. Recommend to continue with lifestyle mods and, depending on availability of Zepbound from pharmacy, expect continued success on Zepbound 5mg or may have some improvements in both appetite and weight management with 7.5mg as some concern from patient effects are waning. Either dose safe and likely effective - having 2 doses able to fill may help with access (see above). Patient would benefit from additional pharmacotherapy for weight management. Given class II obesity, hepatic steatosis - recommend GLP1/GIP therapy as data to support most significant weight loss and patient has no contraindications. Patient also seeing benefit from reduction in food noise and increased satiety. Discussed dietary and behavioral modifications.       Hypothyroidism (Hashimoto's Thyroiditis): stable     Anxiety/depression/ADHD/insomnia: stable - may be able to reduce some meds for mental health as food noise specifically improved mood, will continue to monitor. Bupropion has additional benefit for craving reduction, would prioritize keeping this if able. Venlafaxine may help with VMS from menopause, so may have to taper to lowest effective dose of both. For future considerations.    Menopause  Syndome: stable - may be able to reduce hormones use as weight management and metabolic picture improves, will continue to monitor.  PLAN:                            After 4 weeks of Zepbound 5mg, you may continue on 5mg Zepbound weekly or increase to Zepbound 7.5mg weekly. The dose will depend on pharmacy availability and how you are feeling if effects of 5mg are waning.    You are doing great! As a reminder, To help with tolerability and efficacy of Zepbound:  Eat small meals/snacks throughout the day (about every 2 hours)  Focus on getting protein in first with each meal and snack.   Drink plenty of water - goal 64 oz throughout the day  You may try Metamucil, Benefiber, or Citrucel to help feel more full (less nausea) and have softer, more consistent bowel movements.  To optimize weight management - work on incorporating resistance training/weight lifting to build muscle and improve overall metabolism of adipose tissue.      Follow-up: 8/23 at 3pm with Zully Crain [facility]. Call to schedule follow up visit with Jessica Negro PA-C or Linn Foster CNP  --[facility] Phone Number: [phone] (schedules for [facility] and [facility] - providers, dietitians, health coaches)      SUBJECTIVE/OBJECTIVE:                          Sunshine Delgado is a 59 year old female contacted via secure video for an initial visit. She was referred to me from Jessica Negro PA-C .      Reason for visit: Medication Therapy Management - GLP1/GIP Management .    Allergies/ADRs: Reviewed in chart  Past Medical History: Reviewed in chart  Tobacco: She reports that she has never smoked. She has never been exposed to tobacco smoke. She has never used smokeless tobacco.  Alcohol: not currently using  Caffeine: 1c coffee in am, occasional second cup daily    Medication Adherence/Access: Medication barriers: obtaining medication from pharmacy. Called insurance and found may have better cost and  "availability if fills [facility] ([facility] may also have good coverage per Epic).    Weight Management/s/p sleeve gastrectomy 2018  Zepbound 5 mg once weekly  Bupropion  mg once daily     Last seen by Jessica Negro PA-C 1/23/24 for Return Medical Weight Management post bariatric surgery Visit     Patient reports no current medication side effects. Has ondensetron if needed. Had a successful sleeve gastrectomy, after Covid had significant weight regain. Has had significant improvement in food noise, appetite since starting Zepbound - very pleased (lost about 20 lbs since starting coupled with lifestyle mods. Wonders if effects of Zepbound waning slightly as continues on 5mg dose?    Nutrition/Eating Habits: has significant reduction in food noise; focusing on protein and veggies for food. Works to eat every 2-4 hours. Drinks >64 oz water daily. Has protein shake if has gone too long without eating.  Exercise/Activity: walks daily indoors and out.     Medication History:  GLP1/GIP : Patient denies personal or family history of MEN Type2, MTC, Pancreatitis.    Naltrexone caused dizziness.    Weight prior to surgery - 289lb   Donell weight - 192lb     Starting weight (lbs): 289  Today's weight (lbs): 218  Change in weight (lbs/percent): 71 lbs/ 25%       Wt Readings from Last 4 Encounters:   05/30/24 98.9 kg (218 lb)   05/13/24 105.2 kg (232 lb)   04/09/24 107.5 kg (237 lb)   02/21/24 106.6 kg (235 lb 1.6 oz)     Estimated body mass index is 36.28 kg/m  as calculated from the following:    Height as of 4/9/24: 1.651 m (5' 5\").    Weight as of this encounter: 98.9 kg (218 lb).      Hypothyroidism (Hashimoto's Thyroiditis):   Levothyroxine 75 mcg daily  Patient is having the following symptoms: none.      TSH   Date Value Ref Range Status   02/21/2024 2.92 0.30 - 4.20 uIU/mL Final   04/25/2023 2.72 0.40 - 4.00 mU/L Final   06/14/2021 2.86 0.40 - 4.00 mU/L Final     T4 Free   Date Value Ref Range Status "   03/17/2021 0.89 0.76 - 1.46 ng/dL Final      Anxiety/depression/ADHD/insomnia   Bupropion  mg every morning  Venlafaxine 75mg once daily  Trazodone 50 mg every night at bedtime     Works well. Notes mood and thought/focus best it's been as an adult since starting Zepbound. Feels that food noise was clouding thought more than she had realized. Denies side effects.    Menopause Syndrome:   Estradiol 0.5 mg once daily     Works well. Denies side effects     Today's Vitals: Wt 98.9 kg (218 lb)   LMP  (LMP Unknown)   BMI 36.28 kg/m    ----------------      I spent 28 minutes with this patient today. All changes were made via collaborative practice agreement with Jessica Negro. A copy of the visit note was provided to the patient's provider(s).    A summary of these recommendations was sent via [employer].    Zully Crain, Pharm D., MPH    Medication Therapy Management Pharmacist   [facility]      Telemedicine Visit Details  Type of service:  Video Conference via [employer]  Start Time:  8:05 AM  End Time:  8:33 AM     Medication Therapy Recommendations  Class 2 severe obesity with serious comorbidity and body mass index (BMI) of 38.0 to 38.9 in adult, unspecified obesity type (H)    Current Medication: tirzepatide-Weight Management (ZEPBOUND) 5 MG/0.5ML prefilled pen (Discontinued)   Rationale: Medication product not available - Adherence - Adherence   Recommendation: Provide Adherence Intervention   Status: Accepted per [facility]              Clinical Pharmacy Note    Patient was notified by [facility] pharmacy they cannot fill meds in [place]. Requested meds to be sent to [facility] instead. These were sent per [facility]    Zully Crain, Pharm ANDRA., MPH    Medication Therapy Management Pharmacist   [facility]      Please do not hesitate to contact me if you have any questions/concerns.     Sincerely,     Zully Crain, [facility]

## 2024-05-30 NOTE — NURSING NOTE
Is the patient currently in the state of [place]? YES    Visit mode:VIDEO    If the visit is dropped, the patient can be reconnected by: VIDEO VISIT: Text to cell phone:   Telephone Information:   Mobile [phone]       Will anyone else be joining the visit? NO  (If patient encounters technical issues they should call [phone]    How would you like to obtain your AVS? MyChart    Are changes needed to the allergy or medication list? No, Pt stated no changes to allergies, and Pt stated no med changes    Are refills needed on medications prescribed by this physician? NO    Reason for visit: Consult    Klaudia WELSH

## 2024-05-31 NOTE — TELEPHONE ENCOUNTER
Clinical Pharmacy Note    See [employer] messaging - patient requests Zepbound 7.5mg x 90 day supply sent to [facility] per pharmacy requirements. Patient to notify Medication Therapy Management if not able to get supply of Zepbound 7.5mg - will refill 5mg if needed due to supply.    Zully Crain, Pharm D., MPH    Medication Therapy Management Pharmacist   [facility]

## 2024-06-01 ENCOUNTER — HEALTH MAINTENANCE LETTER (OUTPATIENT)
Age: 59
End: 2024-06-01

## 2024-06-03 ENCOUNTER — OFFICE VISIT (OUTPATIENT)
Dept: FAMILY MEDICINE | Facility: CLINIC | Age: 59
End: 2024-06-03
Payer: COMMERCIAL

## 2024-06-03 ENCOUNTER — TELEPHONE (OUTPATIENT)
Dept: ENDOCRINOLOGY | Facility: CLINIC | Age: 59
End: 2024-06-03

## 2024-06-03 VITALS
HEART RATE: 66 BPM | HEIGHT: 65 IN | BODY MASS INDEX: 36.99 KG/M2 | RESPIRATION RATE: 16 BRPM | TEMPERATURE: 98.3 F | WEIGHT: 222 LBS | OXYGEN SATURATION: 97 % | DIASTOLIC BLOOD PRESSURE: 77 MMHG | SYSTOLIC BLOOD PRESSURE: 111 MMHG

## 2024-06-03 DIAGNOSIS — R05.1 ACUTE COUGH: Primary | ICD-10-CM

## 2024-06-03 PROCEDURE — 99214 OFFICE O/P EST MOD 30 MIN: CPT | Performed by: PHYSICIAN ASSISTANT

## 2024-06-03 RX ORDER — ALBUTEROL SULFATE 90 UG/1
2 AEROSOL, METERED RESPIRATORY (INHALATION) EVERY 6 HOURS PRN
Qty: 18 G | Refills: 1 | Status: SHIPPED | OUTPATIENT
Start: 2024-06-03 | End: 2024-07-22

## 2024-06-03 RX ORDER — PREDNISONE 20 MG/1
40 TABLET ORAL DAILY
Qty: 10 TABLET | Refills: 0 | Status: SHIPPED | OUTPATIENT
Start: 2024-06-03 | End: 2024-06-08

## 2024-06-03 RX ORDER — BENZONATATE 100 MG/1
100 CAPSULE ORAL 3 TIMES DAILY PRN
Qty: 40 CAPSULE | Refills: 0 | Status: SHIPPED | OUTPATIENT
Start: 2024-06-03

## 2024-06-03 ASSESSMENT — PATIENT HEALTH QUESTIONNAIRE - PHQ9
SUM OF ALL RESPONSES TO PHQ QUESTIONS 1-9: 2
SUM OF ALL RESPONSES TO PHQ QUESTIONS 1-9: 2
10. IF YOU CHECKED OFF ANY PROBLEMS, HOW DIFFICULT HAVE THESE PROBLEMS MADE IT FOR YOU TO DO YOUR WORK, TAKE CARE OF THINGS AT HOME, OR GET ALONG WITH OTHER PEOPLE: NOT DIFFICULT AT ALL

## 2024-06-03 NOTE — LETTER
Maricarmen 3, 2024      Sunshine Delgado  [address]   [place] [address]        To Whom It May Concern:    Sunshine Delgado  was seen on 6/3/2024.  Please excuse her from work today due to illness.         Sincerely,        Linn Montemayor PA-C

## 2024-06-03 NOTE — TELEPHONE ENCOUNTER
Clinical Pharmacy Note    Patient told by pharmacy insurance 30 day supply needed (change from previous reported 90 day supply requirement). Sent 30 day Zepbound 7.5 mg supply + 2 refills to [facility] per patient request.    Zully Crain, Pharm D., MPH    Medication Therapy Management Pharmacist   [facility]

## 2024-06-03 NOTE — PROGRESS NOTES
"  Assessment & Plan     Acute cough  Significant cough during visit. No sputum production, no shortness of breath, negative home covid test. Suggest treating with prednisone, albuterol, tessalon. Gave note for work today - said she could send a message if needing to be off of work longer this week. Follow up if not improving, or if worsening. She is agreeable.   I discussed with the patient risks and benefits of the new medication prescribed including potential side effects.  The patient had opportunity to ask questions and is comfortable with and interested in medications as prescribed.   - benzonatate (TESSALON) 100 MG capsule; Take 1 capsule (100 mg) by mouth 3 times daily as needed for cough  - albuterol (PROAIR HFA/PROVENTIL HFA/VENTOLIN HFA) 108 (90 Base) MCG/ACT inhaler; Inhale 2 puffs into the lungs every 6 hours as needed for shortness of breath, wheezing or cough  - predniSONE (DELTASONE) 20 MG tablet; Take 2 tablets (40 mg) by mouth daily for 5 days          BMI  Estimated body mass index is 36.94 kg/m  as calculated from the following:    Height as of this encounter: 1.651 m (5' 5\").    Weight as of this encounter: 100.7 kg (222 lb).   Weight management plan: Discussed healthy diet and exercise guidelines          Fransisco Gonsalez is a 59 year old, presenting for the following health issues:  URI (Chest congestion)      6/3/2024     1:20 PM   Additional Questions   Roomed by An V.         6/3/2024     1:20 PM   Patient Reported Additional Medications   Patient reports taking the following new medications none     URI    History of Present Illness       Reason for visit:  Really bad cold in my chest    She eats 2-3 servings of fruits and vegetables daily.She consumes 0 sweetened beverage(s) daily.She exercises with enough effort to increase her heart rate 30 to 60 minutes per day.  She exercises with enough effort to increase her heart rate 5 days per week. She is missing 1 dose(s) of medications per " "week.  She is not taking prescribed medications regularly due to remembering to take and other.         Acute Illness  Acute illness concerns: Chest congested, cough and headaches  Onset/Duration: 5 days  Symptoms:  Fever: YES-  at onset of symptoms- felt warm but no temp taken at home  Chills/Sweats: YES  Headache (location?): YES  Sinus Pressure: YES  Conjunctivitis:  YES  Ear Pain: YES: left  Rhinorrhea: YES  Congestion: YES- chest congestion  Sore Throat: YES  Cough: YES-non-productive, productive of yellow sputum  Wheeze: YES  Decreased Appetite: YES  Nausea: YES  Vomiting: YES- last night  Diarrhea: No  Dysuria/Freq.: No  Dysuria or Hematuria: No  Fatigue/Achiness: YES- fatigue and body achy  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   No recent chills, fever. Main symptom is barking cough. No sputum production. Coughing all night.   Feels she gets a similar cough with respiratory illnesses recently.           Review of Systems  Constitutional, neuro, ENT, endocrine, pulmonary, cardiac, gastrointestinal, genitourinary, musculoskeletal, integument and psychiatric systems are negative, except as otherwise noted.      Objective    /77   Pulse 66   Temp 98.3  F (36.8  C) (Oral)   Resp 16   Ht 1.651 m (5' 5\")   Wt 100.7 kg (222 lb)   LMP  (LMP Unknown)   SpO2 97%   BMI 36.94 kg/m    Body mass index is 36.94 kg/m .  Physical Exam   GENERAL: alert and no distress  HENT: ear canals and TM's normal, nose and mouth without ulcers or lesions  NECK: no adenopathy, no asymmetry, masses, or scars  RESP: + dry barking cough, lungs clear to auscultation - no rales, rhonchi or wheezes  CV: regular rate and rhythm, normal S1 S2, no S3 or S4, no murmur, click or rub, no peripheral edema  ABDOMEN: soft, nontender, no hepatosplenomegaly, no masses and bowel sounds normal  MS: no gross musculoskeletal defects noted, no " edema            Signed Electronically by: Linn Montemayor PA-C

## 2024-06-03 NOTE — TELEPHONE ENCOUNTER
Left Voicemail (1st Attempt) and Sent Mychart (1st Attempt) for the patient to call back and schedule the following:    Appointment type: RET JAVAN   Provider: Next Available PA  Return date: Next Avilable  Specialty phone number: [phone]  Additional appointment(s) needed: no   Additonal Notes: Per in basket from Zully Crain RPH, pt needs a follow-up with any of the DORIE's

## 2024-06-04 ENCOUNTER — MYC MEDICAL ADVICE (OUTPATIENT)
Dept: FAMILY MEDICINE | Facility: CLINIC | Age: 59
End: 2024-06-04
Payer: COMMERCIAL

## 2024-06-05 ENCOUNTER — TELEPHONE (OUTPATIENT)
Dept: ENDOCRINOLOGY | Facility: CLINIC | Age: 59
End: 2024-06-05
Payer: COMMERCIAL

## 2024-06-05 NOTE — TELEPHONE ENCOUNTER
Left Voicemail (2nd Attempt) and Sent Mychart (2nd Attempt) for the patient to call back and schedule the following:    Appointment type: RET JAVAN   Provider: Next Available PA   Return date: Next available  Specialty phone number: [phone]  Additional appointment(s) needed: n/a  Additonal Notes: n/a

## 2024-06-20 ENCOUNTER — OFFICE VISIT (OUTPATIENT)
Dept: FAMILY MEDICINE | Facility: CLINIC | Age: 59
End: 2024-06-20
Payer: COMMERCIAL

## 2024-06-20 VITALS
WEIGHT: 221.6 LBS | HEART RATE: 65 BPM | SYSTOLIC BLOOD PRESSURE: 104 MMHG | DIASTOLIC BLOOD PRESSURE: 71 MMHG | BODY MASS INDEX: 37.83 KG/M2 | HEIGHT: 64 IN | OXYGEN SATURATION: 98 % | TEMPERATURE: 98.2 F | RESPIRATION RATE: 16 BRPM

## 2024-06-20 DIAGNOSIS — Z13.220 SCREENING FOR HYPERLIPIDEMIA: ICD-10-CM

## 2024-06-20 DIAGNOSIS — F99 INSOMNIA DUE TO OTHER MENTAL DISORDER: ICD-10-CM

## 2024-06-20 DIAGNOSIS — E06.3 HASHIMOTO'S THYROIDITIS: ICD-10-CM

## 2024-06-20 DIAGNOSIS — F33.1 MAJOR DEPRESSIVE DISORDER, RECURRENT EPISODE, MODERATE (H): Chronic | ICD-10-CM

## 2024-06-20 DIAGNOSIS — F51.05 INSOMNIA DUE TO OTHER MENTAL DISORDER: ICD-10-CM

## 2024-06-20 DIAGNOSIS — N95.1 MENOPAUSAL SYNDROME (HOT FLASHES): ICD-10-CM

## 2024-06-20 DIAGNOSIS — N18.31 STAGE 3A CHRONIC KIDNEY DISEASE (H): ICD-10-CM

## 2024-06-20 DIAGNOSIS — Z91.030 BEE STING ALLERGY: ICD-10-CM

## 2024-06-20 DIAGNOSIS — Z00.00 ROUTINE GENERAL MEDICAL EXAMINATION AT A HEALTH CARE FACILITY: Primary | ICD-10-CM

## 2024-06-20 DIAGNOSIS — F90.9 ATTENTION DEFICIT HYPERACTIVITY DISORDER (ADHD), UNSPECIFIED ADHD TYPE: ICD-10-CM

## 2024-06-20 DIAGNOSIS — F41.1 GENERALIZED ANXIETY DISORDER: Chronic | ICD-10-CM

## 2024-06-20 PROCEDURE — 84439 ASSAY OF FREE THYROXINE: CPT | Performed by: PHYSICIAN ASSISTANT

## 2024-06-20 PROCEDURE — 80061 LIPID PANEL: CPT | Performed by: PHYSICIAN ASSISTANT

## 2024-06-20 PROCEDURE — 36415 COLL VENOUS BLD VENIPUNCTURE: CPT | Performed by: PHYSICIAN ASSISTANT

## 2024-06-20 PROCEDURE — 99396 PREV VISIT EST AGE 40-64: CPT | Performed by: PHYSICIAN ASSISTANT

## 2024-06-20 PROCEDURE — 82043 UR ALBUMIN QUANTITATIVE: CPT | Performed by: PHYSICIAN ASSISTANT

## 2024-06-20 PROCEDURE — 82570 ASSAY OF URINE CREATININE: CPT | Performed by: PHYSICIAN ASSISTANT

## 2024-06-20 PROCEDURE — 80048 BASIC METABOLIC PNL TOTAL CA: CPT | Performed by: PHYSICIAN ASSISTANT

## 2024-06-20 PROCEDURE — 99213 OFFICE O/P EST LOW 20 MIN: CPT | Mod: 25 | Performed by: PHYSICIAN ASSISTANT

## 2024-06-20 PROCEDURE — 84443 ASSAY THYROID STIM HORMONE: CPT | Performed by: PHYSICIAN ASSISTANT

## 2024-06-20 RX ORDER — BUPROPION HYDROCHLORIDE 300 MG/1
300 TABLET ORAL EVERY MORNING
Qty: 90 TABLET | Refills: 3 | Status: SHIPPED | OUTPATIENT
Start: 2024-06-20

## 2024-06-20 RX ORDER — EPINEPHRINE 0.3 MG/.3ML
0.3 INJECTION SUBCUTANEOUS PRN
Qty: 0.6 ML | Refills: 3 | Status: SHIPPED | OUTPATIENT
Start: 2024-06-20

## 2024-06-20 RX ORDER — ESTRADIOL 0.5 MG/1
0.5 TABLET ORAL DAILY
Qty: 90 TABLET | Refills: 3 | Status: SHIPPED | OUTPATIENT
Start: 2024-06-20

## 2024-06-20 RX ORDER — VENLAFAXINE HYDROCHLORIDE 75 MG/1
75 TABLET, EXTENDED RELEASE ORAL DAILY
Qty: 90 TABLET | Refills: 3 | Status: SHIPPED | OUTPATIENT
Start: 2024-06-20

## 2024-06-20 RX ORDER — TRAZODONE HYDROCHLORIDE 50 MG/1
100 TABLET, FILM COATED ORAL AT BEDTIME
Qty: 180 TABLET | Refills: 0 | Status: SHIPPED | OUTPATIENT
Start: 2024-06-20

## 2024-06-20 RX ORDER — LEVOTHYROXINE SODIUM 75 UG/1
75 TABLET ORAL DAILY
Qty: 90 TABLET | Refills: 3 | Status: SHIPPED | OUTPATIENT
Start: 2024-06-20 | End: 2024-09-21

## 2024-06-20 SDOH — HEALTH STABILITY: PHYSICAL HEALTH: ON AVERAGE, HOW MANY DAYS PER WEEK DO YOU ENGAGE IN MODERATE TO STRENUOUS EXERCISE (LIKE A BRISK WALK)?: 4 DAYS

## 2024-06-20 ASSESSMENT — ANXIETY QUESTIONNAIRES
GAD7 TOTAL SCORE: 0
2. NOT BEING ABLE TO STOP OR CONTROL WORRYING: NOT AT ALL
GAD7 TOTAL SCORE: 0
7. FEELING AFRAID AS IF SOMETHING AWFUL MIGHT HAPPEN: NOT AT ALL
1. FEELING NERVOUS, ANXIOUS, OR ON EDGE: NOT AT ALL
8. IF YOU CHECKED OFF ANY PROBLEMS, HOW DIFFICULT HAVE THESE MADE IT FOR YOU TO DO YOUR WORK, TAKE CARE OF THINGS AT HOME, OR GET ALONG WITH OTHER PEOPLE?: NOT DIFFICULT AT ALL
5. BEING SO RESTLESS THAT IT IS HARD TO SIT STILL: NOT AT ALL
GAD7 TOTAL SCORE: 0
3. WORRYING TOO MUCH ABOUT DIFFERENT THINGS: NOT AT ALL
IF YOU CHECKED OFF ANY PROBLEMS ON THIS QUESTIONNAIRE, HOW DIFFICULT HAVE THESE PROBLEMS MADE IT FOR YOU TO DO YOUR WORK, TAKE CARE OF THINGS AT HOME, OR GET ALONG WITH OTHER PEOPLE: NOT DIFFICULT AT ALL
6. BECOMING EASILY ANNOYED OR IRRITABLE: NOT AT ALL
4. TROUBLE RELAXING: NOT AT ALL
7. FEELING AFRAID AS IF SOMETHING AWFUL MIGHT HAPPEN: NOT AT ALL

## 2024-06-20 ASSESSMENT — PATIENT HEALTH QUESTIONNAIRE - PHQ9
SUM OF ALL RESPONSES TO PHQ QUESTIONS 1-9: 1
10. IF YOU CHECKED OFF ANY PROBLEMS, HOW DIFFICULT HAVE THESE PROBLEMS MADE IT FOR YOU TO DO YOUR WORK, TAKE CARE OF THINGS AT HOME, OR GET ALONG WITH OTHER PEOPLE: SOMEWHAT DIFFICULT
SUM OF ALL RESPONSES TO PHQ QUESTIONS 1-9: 1

## 2024-06-20 ASSESSMENT — SOCIAL DETERMINANTS OF HEALTH (SDOH): HOW OFTEN DO YOU GET TOGETHER WITH FRIENDS OR RELATIVES?: ONCE A WEEK

## 2024-06-20 NOTE — PROGRESS NOTES
Preventive Care Visit  [facility] JONATAN Montemayor PA-C, Family Medicine  Jun 20, 2024      Assessment & Plan     Routine general medical examination at a health care facility  Well adult.     Hashimoto's thyroiditis  Refilled. Recheck labs.   - levothyroxine (SYNTHROID/LEVOTHROID) 75 MCG tablet; Take 1 tablet (75 mcg) by mouth daily  - TSH; Future  - T4, free; Future  - TSH  - T4, free    Menopausal syndrome (hot flashes)  Refilled. H/o hysterectomy/  - estradiol (ESTRACE) 0.5 MG tablet; Take 1 tablet (0.5 mg) by mouth daily    Bee sting allergy  Refilled.   - EPINEPHrine (AUVI-Q) 0.3 MG/0.3ML injection 2-pack; Inject 0.3 mLs (0.3 mg) into the muscle as needed for anaphylaxis    Generalized anxiety disorder  Refilled. Stable. Doing well on current dose.   - buPROPion (WELLBUTRIN XL) 300 MG 24 hr tablet; Take 1 tablet (300 mg) by mouth every morning    Major depressive disorder, recurrent episode, moderate (H)  Refilled.   - buPROPion (WELLBUTRIN XL) 300 MG 24 hr tablet; Take 1 tablet (300 mg) by mouth every morning  - venlafaxine (EFFEXOR-ER) 75 MG 24 hr tablet; Take 1 tablet (75 mg) by mouth daily    Attention deficit hyperactivity disorder (ADHD), unspecified ADHD type  Refilled.   - buPROPion (WELLBUTRIN XL) 300 MG 24 hr tablet; Take 1 tablet (300 mg) by mouth every morning    Insomnia due to other mental disorder  Refilled.   - traZODone (DESYREL) 50 MG tablet; Take 2 tablets (100 mg) by mouth at bedtime    Stage 3a chronic kidney disease (H)  Recheck. Consider nephrology referral.   - Albumin Random Urine Quantitative with Creat Ratio; Future  - Basic metabolic panel  (Ca, Cl, CO2, Creat, Gluc, K, Na, BUN); Future  - Albumin Random Urine Quantitative with Creat Ratio  - Basic metabolic panel  (Ca, Cl, CO2, Creat, Gluc, K, Na, BUN)    Screening for hyperlipidemia  Recheck.  - Lipid panel reflex to direct LDL Non-fasting; Future  - Lipid panel reflex to direct LDL  Non-fasting            Counseling  Appropriate preventive services were discussed with this patient, including applicable screening as appropriate for fall prevention, nutrition, physical activity, Tobacco-use cessation, weight loss and cognition.  Checklist reviewing preventive services available has been given to the patient.  Reviewed patient's diet, addressing concerns and/or questions.           Fransisco Gonsalez is a 59 year old, presenting for the following:  Physical        6/20/2024     1:27 PM   Additional Questions   Roomed by An V.         6/20/2024     1:27 PM   Patient Reported Additional Medications   Patient reports taking the following new medications pantoprazole 40mg        Health Care Directive  Patient does not have a Health Care Directive or Living Will: Discussed advance care planning with patient; information given to patient to review.    HPI    Started Zepbound in April. Down about 15 lbs. Would like to be 165 - 170 lb.     Cough is improved.     No kidney disease in the family. Did have kidney infections when younger.               6/20/2024   General Health   How would you rate your overall physical health? Good   Feel stress (tense, anxious, or unable to sleep) Not at all            6/20/2024   Nutrition   Three or more servings of calcium each day? Yes   Diet: Regular (no restrictions)   How many servings of fruit and vegetables per day? 4 or more   How many sweetened beverages each day? 0-1            6/20/2024   Exercise   Days per week of moderate/strenous exercise 4 days            6/20/2024   Social Factors   Frequency of gathering with friends or relatives Once a week   Worry food won't last until get money to buy more No   Food not last or not have enough money for food? No   Do you have housing? (Housing is defined as stable permanent housing and does not include staying ouside in a car, in a tent, in an abandoned building, in an overnight shelter, or couch-surfing.) Yes   Are  you worried about losing your housing? No   Lack of transportation? No   Unable to get utilities (heat,electricity)? No            6/20/2024   Fall Risk   Fallen 2 or more times in the past year? No   Trouble with walking or balance? No             6/20/2024   Dental   Dentist two times every year? Yes            6/20/2024   TB Screening   Were you born outside of the [place]? No          Today's PHQ-9 Score:       6/20/2024     1:16 PM   PHQ-9 SCORE   PHQ-9 Total Score MyChart 1 (Minimal depression)   PHQ-9 Total Score 1         6/20/2024   Substance Use   Alcohol more than 3/day or more than 7/wk Not Applicable   Do you use any other substances recreationally? No        Social History     Tobacco Use    Smoking status: Never     Passive exposure: Never    Smokeless tobacco: Never   Vaping Use    Vaping status: Never Used   Substance Use Topics    Alcohol use: Yes     Comment: 1-2 per mo    Drug use: Not Currently     Types: Marijuana     Comment: 12/20 last dose           11/21/2023   LAST FHS-7 RESULTS   1st degree relative breast or ovarian cancer No   Any relative bilateral breast cancer No   Any male have breast cancer No   Any ONE woman have BOTH breast AND ovarian cancer No   Any woman with breast cancer before 50yrs No   2 or more relatives with breast AND/OR ovarian cancer No   2 or more relatives with breast AND/OR bowel cancer No                   6/20/2024   STI Screening   New sexual partner(s) since last STI/HIV test? No        History of abnormal Pap smear: No - age 30- 64 PAP with HPV every 5 years recommended       ASCVD Risk   The 10-year ASCVD risk score (Joceline TORRES, et al., 2019) is: 1.5%    Values used to calculate the score:      Age: 59 years      Sex: Female      Is Non- : No      Diabetic: No      Tobacco smoker: No      Systolic Blood Pressure: 104 mmHg      Is BP treated: No      HDL Cholesterol: 65 mg/dL      Total Cholesterol: 207 mg/dL           Reviewed and  "updated as needed this visit by Provider                          Review of Systems  Constitutional, neuro, ENT, endocrine, pulmonary, cardiac, gastrointestinal, genitourinary, musculoskeletal, integument and psychiatric systems are negative, except as otherwise noted.     Objective    Exam  /71   Pulse 65   Temp 98.2  F (36.8  C) (Oral)   Resp 16   Ht 1.622 m (5' 3.86\")   Wt 100.5 kg (221 lb 9.6 oz)   LMP  (LMP Unknown)   SpO2 98%   BMI 38.21 kg/m     Estimated body mass index is 38.21 kg/m  as calculated from the following:    Height as of this encounter: 1.622 m (5' 3.86\").    Weight as of this encounter: 100.5 kg (221 lb 9.6 oz).    Physical Exam  GENERAL: alert and no distress  EYES: Eyes grossly normal to inspection, PERRL and conjunctivae and sclerae normal  HENT: ear canals and TM's normal, nose and mouth without ulcers or lesions  NECK: no adenopathy, no asymmetry, masses, or scars  RESP: lungs clear to auscultation - no rales, rhonchi or wheezes  CV: regular rate and rhythm, normal S1 S2, no S3 or S4, no murmur, click or rub, no peripheral edema  ABDOMEN: soft, nontender, no hepatosplenomegaly, no masses and bowel sounds normal  MS: no gross musculoskeletal defects noted, no edema  SKIN: no suspicious lesions or rashes  NEURO: Normal strength and tone, mentation intact and speech normal  PSYCH: mentation appears normal, affect normal/bright        Signed Electronically by: Linn Montemayor PA-C    Answers submitted by the patient for this visit:  Patient Health Questionnaire (Submitted on 6/20/2024)  If you checked off any problems, how difficult have these problems made it for you to do your work, take care of things at home, or get along with other people?: Somewhat difficult  PHQ9 TOTAL SCORE: 1  ADRIANNE-7 (Submitted on 6/20/2024)  ADRIANNE 7 TOTAL SCORE: 0    "

## 2024-06-20 NOTE — PATIENT INSTRUCTIONS
"Patient Education   Preventive Care Advice   This is general advice we often give to help people stay healthy. Your care team may have specific advice just for you. Please talk to your care team about your own preventive care needs.  Lifestyle  Exercise at least 150 minutes each week (30 minutes a day, 5 days a week).  Do muscle strengthening activities 2 days a week. These help control your weight and prevent disease.  No smoking.  Wear sunscreen to prevent skin cancer.  Have your home tested for radon every 2 to 5 years. Radon is a colorless, odorless gas that can harm your lungs. To learn more, go to www.health.[facility] and search for \"Radon in Homes.\"  Keep guns unloaded and locked up in a safe place like a safe or gun vault, or, use a gun lock and hide the keys. Always lock away bullets separately. To learn more, visit [employer].[place].gov and search for \"safe gun storage.\"  Nutrition  Eat 5 or more servings of fruits and vegetables each day.  Try wheat bread, brown rice and whole grain pasta (instead of white bread, rice, and pasta).  Get enough calcium and vitamin D. Check the label on foods and aim for 100% of the RDA (recommended daily allowance).  Regular exams  Have a dental exam and cleaning every 6 months.  See your health care team every year to talk about:  Any changes in your health.  Any medicines your care team has prescribed.  Preventive care, family planning, and ways to prevent chronic diseases.  Shots (vaccines)   HPV shots (up to age 26), if you've never had them before.  Hepatitis B shots (up to age 59), if you've never had them before.  COVID-19 shot: Get this shot when it's due.  Flu shot: Get a flu shot every year.  Tetanus shot: Get a tetanus shot every 10 years.  Pneumococcal, hepatitis A, and RSV shots: Ask your care team if you need these based on your risk.  Shingles shot (for age 50 and up).  General health tests  Diabetes screening:  Starting at age 35, Get screened for diabetes at least " every 3 years.  If you are younger than age 35, ask your care team if you should be screened for diabetes.  Cholesterol test: At age 39, start having a cholesterol test every 5 years, or more often if advised.  Bone density scan (DEXA): At age 50, ask your care team if you should have this scan for osteoporosis (brittle bones).  Hepatitis C: Get tested at least once in your life.  Abdominal aortic aneurysm screening: Talk to your doctor about having this screening if you:  Have ever smoked; and  Are biologically male; and  Are between the ages of 65 and 75.  STIs (sexually transmitted infections)  Before age 24: Ask your care team if you should be screened for STIs.  After age 24: Get screened for STIs if you're at risk. You are at risk for STIs (including HIV) if:  You are sexually active with more than one person.  You don't use condoms every time.  You or a partner was diagnosed with a sexually transmitted infection.  If you are at risk for HIV, ask about PrEP medicine to prevent HIV.  Get tested for HIV at least once in your life, whether you are at risk for HIV or not.  Cancer screening tests  Cervical cancer screening: If you have a cervix, begin getting regular cervical cancer screening tests at age 21. Most people who have regular screenings with normal results can stop after age 65. Talk about this with your provider.  Breast cancer scan (mammogram): If you've ever had breasts, begin having regular mammograms starting at age 40. This is a scan to check for breast cancer.  Colon cancer screening: It is important to start screening for colon cancer at age 45.  Have a colonoscopy test every 10 years (or more often if you're at risk) Or, ask your provider about stool tests like a FIT test every year or Cologuard test every 3 years.  To learn more about your testing options, visit: www.[employer]/742717.pdf.  For help making a decision, visit: juvenal/wi86337.  Prostate cancer screening test: If you have a  prostate and are age 55 to 69, ask your provider if you would benefit from a yearly prostate cancer screening test.  Lung cancer screening: If you are a current or former smoker age 50 to 80, ask your care team if ongoing lung cancer screenings are right for you.  For informational purposes only. Not to replace the advice of your health care provider. Copyright   2023 [facility]. All rights reserved. Clinically reviewed by the [facility]. [employer] [id number] - REV 04/24.

## 2024-06-21 LAB
ANION GAP SERPL CALCULATED.3IONS-SCNC: 11 MMOL/L (ref 7–15)
BUN SERPL-MCNC: 13.8 MG/DL (ref 6–20)
CALCIUM SERPL-MCNC: 9.3 MG/DL (ref 8.6–10)
CHLORIDE SERPL-SCNC: 105 MMOL/L (ref 98–107)
CHOLEST SERPL-MCNC: 187 MG/DL
CREAT SERPL-MCNC: 1.1 MG/DL (ref 0.51–0.95)
CREAT UR-MCNC: 223 MG/DL
DEPRECATED HCO3 PLAS-SCNC: 25 MMOL/L (ref 22–29)
EGFRCR SERPLBLD CKD-EPI 2021: 58 ML/MIN/1.73M2
FASTING STATUS PATIENT QL REPORTED: NO
FASTING STATUS PATIENT QL REPORTED: NO
GLUCOSE SERPL-MCNC: 97 MG/DL (ref 70–99)
HDLC SERPL-MCNC: 56 MG/DL
LDLC SERPL CALC-MCNC: 108 MG/DL
MICROALBUMIN UR-MCNC: <12 MG/L
MICROALBUMIN/CREAT UR: NORMAL MG/G{CREAT}
NONHDLC SERPL-MCNC: 131 MG/DL
POTASSIUM SERPL-SCNC: 4.1 MMOL/L (ref 3.4–5.3)
SODIUM SERPL-SCNC: 141 MMOL/L (ref 135–145)
T4 FREE SERPL-MCNC: 1.38 NG/DL (ref 0.9–1.7)
TRIGL SERPL-MCNC: 114 MG/DL
TSH SERPL DL<=0.005 MIU/L-ACNC: 1.57 UIU/ML (ref 0.3–4.2)

## 2024-07-09 ENCOUNTER — MYC MEDICAL ADVICE (OUTPATIENT)
Dept: CARDIOLOGY | Facility: CLINIC | Age: 59
End: 2024-07-09
Payer: COMMERCIAL

## 2024-07-09 DIAGNOSIS — E66.01 CLASS 2 SEVERE OBESITY WITH SERIOUS COMORBIDITY AND BODY MASS INDEX (BMI) OF 38.0 TO 38.9 IN ADULT, UNSPECIFIED OBESITY TYPE (H): Primary | ICD-10-CM

## 2024-07-09 DIAGNOSIS — E66.812 CLASS 2 SEVERE OBESITY WITH SERIOUS COMORBIDITY AND BODY MASS INDEX (BMI) OF 38.0 TO 38.9 IN ADULT, UNSPECIFIED OBESITY TYPE (H): Primary | ICD-10-CM

## 2024-07-10 ENCOUNTER — TELEPHONE (OUTPATIENT)
Dept: ENDOCRINOLOGY | Facility: CLINIC | Age: 59
End: 2024-07-10
Payer: COMMERCIAL

## 2024-07-11 NOTE — TELEPHONE ENCOUNTER
Clinical Pharmacy Note    Sent 3 month supply of Zepbound 10mg to [facility] per patient request - insurance requires dose titration from 7.5mg. patient tolerating well.    CPA with Jessica Negro PA-C used.    Zully Crain, Pharm D., MPH    Medication Therapy Management Pharmacist   [facility]

## 2024-07-14 NOTE — TELEPHONE ENCOUNTER
PRIOR AUTHORIZATION DENIED    Medication: ZEPBOUND 7.5 MG/0.5ML SC SOAJ    Insurance Company: [employer]-Specialty PA's - Phone [phone] Fax [phone]    Denial Date: 7/13/2024    Denial Reason(s): Plan would want patient to move up to next higher strength.  Clinic has already prescribed the 10mg dose and patient has filled it.

## 2024-07-20 DIAGNOSIS — R05.1 ACUTE COUGH: ICD-10-CM

## 2024-07-22 RX ORDER — ALBUTEROL SULFATE 90 UG/1
2 AEROSOL, METERED RESPIRATORY (INHALATION) EVERY 6 HOURS PRN
Qty: 8.5 G | Refills: 1 | Status: SHIPPED | OUTPATIENT
Start: 2024-07-22

## 2024-08-14 NOTE — CONFIDENTIAL NOTE
RECORDS RECEIVED FROM: internal    DATE RECEIVED: 8.29.24    NOTES (FOR ALL VISITS) STATUS DETAILS   OFFICE NOTES from referring provider Internal  Self referred    MEDICATION LIST     IMAGING      XR (Chest) Internal  8/18/23  [facility]- 8.3.23   CT (HEAD/NECK/CHEST/ABDOMEN) Internal /ce Internal -   [facility]- 8.3.23   LABS     DIABETES: HBGA1C, CREATININE, FASTING LIPIDS, MICROALBUMIN URINE, POTASSIUM, TSH, T4    THYROID: TSH, T4, CBC, THYRODLONULIN, TOTAL T3, FREE T4, CALCITONIN, CEA Internal  Bmp- 6.20.24  T4- 6.20.24  TSH- 6.20.24  Lipid- 6.20.24  CMP- 2.21.24  CBC- 2.21.24  Vitamin D- 7/10/23        Action 8.14.24 sv    Action Taken Image request sent to [facility] for   CXR- 8.3.23  CT- 8.3.23  --received images---

## 2024-08-19 ENCOUNTER — TELEPHONE (OUTPATIENT)
Dept: SLEEP MEDICINE | Facility: CLINIC | Age: 59
End: 2024-08-19
Payer: COMMERCIAL

## 2024-08-19 DIAGNOSIS — R29.818 SUSPECTED SLEEP APNEA: Primary | ICD-10-CM

## 2024-08-19 NOTE — TELEPHONE ENCOUNTER
Received following email please review. If any questions please reach out to financial securing listed below.    Hello!    We received a denial for the below patient, we would need to appeal with [employer] Denial Notification     Patient Name:                        Sunshine Delgado  :                                       1965  MRN:                                       [id number]     Dr. De La Paz,     The authorization for procedure 91517 on date of service 9/10/2024 has been denied by the patient's insurance for the following reason:     Denial Reason:        Below is the information we provided to the patient's insurance company:     Order:                          PSG SPLIT ORDER  Visit Notes:                  OV 2024  Results:                       PSG SPLIT 2016  Other:                          N/A     An appeal can be filed for possible decision reversal. This can take up to 30 days for the insurance to process once submitted. If you wish to proceed with an appeal, please provide additional clinical records and a letter explaining why you feel this service is medically necessary.     Thank you,     Roseann Wu Prior Authorization

## 2024-08-19 NOTE — TELEPHONE ENCOUNTER
Due to insurance denial of PSG, will change to home sleep testing.  There is a concern regarding sleep associated hypoventilation.  If HST shows sustained hypoxemia, will have to consider a titration PSG.

## 2024-08-23 ENCOUNTER — VIRTUAL VISIT (OUTPATIENT)
Dept: CARDIOLOGY | Facility: CLINIC | Age: 59
End: 2024-08-23
Attending: PHYSICIAN ASSISTANT
Payer: COMMERCIAL

## 2024-08-23 VITALS — BODY MASS INDEX: 34.14 KG/M2 | WEIGHT: 198 LBS

## 2024-08-23 DIAGNOSIS — E66.812 CLASS 2 SEVERE OBESITY WITH SERIOUS COMORBIDITY AND BODY MASS INDEX (BMI) OF 38.0 TO 38.9 IN ADULT, UNSPECIFIED OBESITY TYPE (H): Primary | ICD-10-CM

## 2024-08-23 DIAGNOSIS — E66.01 CLASS 2 SEVERE OBESITY WITH SERIOUS COMORBIDITY AND BODY MASS INDEX (BMI) OF 38.0 TO 38.9 IN ADULT, UNSPECIFIED OBESITY TYPE (H): Primary | ICD-10-CM

## 2024-08-23 ASSESSMENT — PAIN SCALES - GENERAL: PAINLEVEL: NO PAIN (0)

## 2024-08-23 NOTE — Clinical Note
Sunshine is doing GREAT on Zepbound - insurance is requiring titration so we will follow up as she continues to go up and will appeal if lower doses needed for tolerability.  I sent a message to schedulers to schedule with  you in 3 months and me in 6. Zully

## 2024-08-23 NOTE — LETTER
8/23/2024      RE: Sunshine Delgado  [address] [place] [address]       Dear Colleague,    Thank you for the opportunity to participate in the care of your patient, Sunshine Delgado, at the [facility] [place] at [facility]. Please see a copy of my visit note below.    Medication Therapy Management (MTM) Encounter    ASSESSMENT:                            Medication Adherence/Access: sent Zepbound prescription in per insurance requirements: 30 days Zepbound 12.5mg and 90 days Zepbound 15mg.    Weight management :       Weight Management /s/p sleeve gastrectomy 2018: patient having significant success on Zepbound for weight management and reduction in food noise. Recommend to continue with lifestyle mods and optimizing Zepbound per insurance requirements (see above). Given class II obesity, hepatic steatosis - recommend GLP1/GIP therapy as data to support most significant weight loss and patient has no contraindications. Patient also seeing benefit from reduction in food noise and increased satiety. Discussed dietary and behavioral modifications to help with tolerability given history of intolerability at Zepbound 7.5mg weekly.    PLAN:                            When you use up current supply of Zepbound 10 mg, Increase to Zepbound 12.5 mg injection once weekly. Take this medication on the same day each week for 4 weeks..     2. If tolerating, you may increase to Zepbound 15 mg once weekly after 4 weeks (after box above exhausted). Stay on this dose until follow up.        As a reminder, here is a great link for how to administer your Zepbound:  https://zepbound.meir.com/how-to-use    To help with tolerability and effectiveness of Zepbound:  Eat small meals/snacks throughout the day (about every 2 hours)  Focus on getting protein in first with each meal and snack.   Drink plenty of water - goal 64 oz throughout the day  You may try Metamucil,  Benefiber, or Citrucel to help feel more full (less nausea) and have softer, more consistent bowel movements.  To optimize weight management - work on incorporating resistance training/weight lifting to build muscle and improve overall metabolism of adipose tissue.       Follow-up: 3 months with Jessica Negro PA-C; 6 months with Zully Crain [facility]     SUBJECTIVE/OBJECTIVE:                          Sunshine Delgado is a 59 year old female seen for a follow-up visit.       Reason for visit: Medication Therapy Management GLP1/GIP Management .    Allergies/ADRs: Reviewed in chart  Past Medical History: Reviewed in chart  Tobacco: She reports that she has never smoked. She has never been exposed to tobacco smoke. She has never used smokeless tobacco.  Alcohol: not currently using  Caffeine: 1c coffee in am, occasional second cup daily    Medication Adherence/Access: Medication barriers: obtaining medication from pharmacy. Called insurance and found may have better cost and availability if fills at [facility] ([facility] may also have good coverage per Epic).    Insurance has specific insurance requirements, may not stay on 10 mg (only 90 days) and can only be 12.5mg (stepping dose x 30 days), then 15 mg.    Weight Management  /s/p sleeve gastrectomy 2018  Zepbound 10 mg once weekly x 8 weeks  Bupropion  mg once daily     Last seen by Jessica Negro PA-C 1/23/24 for Return Medical Weight Management post bariatric surgery Visit     Patient reports no current medication side effects (nausea and vomiting on Zepbound 7.5mg, helped by diet, but now none on Zepbound 10 mg. Has had significant improvement in food noise, appetite since starting Zepbound. Very happy that mental capacity is greater and mental health is better too    Nutrition/Eating Habits: has significant reduction in food noise; focusing on protein and veggies for food. Works to eat every 2-4 hours. Drinks >64 oz water daily. Has protein shake if has  "gone too long without eating.  Exercise/Activity: walks daily indoors and out.  Lots of working in yard and garden - happy to move and easier to do so. Much more stamina physically and emotionally.    Medication/weight management  History:  GLP1/GIP : Patient denies personal or family history of MEN Type2, MTC, Pancreatitis.    Naltrexone caused dizziness.   Had a successful sleeve gastrectomy, after Covid had significant weight regain.    Weight prior to surgery - 289lb   Donell weight - 192lb     Starting weight (lbs): 289  Before Zepbound  (lbs): 218 (Zepbound start)       Current weight today: 198 lbs 0 oz  Cumulative Weight Loss: -91 lb, -31.5% from baseline  Since starting Zepbound: -20 lbs, -9.2%    Wt Readings from Last 4 Encounters:   08/23/24 89.8 kg (198 lb)   06/20/24 100.5 kg (221 lb 9.6 oz)   06/03/24 100.7 kg (222 lb)   05/30/24 98.9 kg (218 lb)     Estimated body mass index is 34.14 kg/m  as calculated from the following:    Height as of 6/20/24: 1.622 m (5' 3.86\").    Weight as of this encounter: 89.8 kg (198 lb).        Today's Vitals: Wt 89.8 kg (198 lb)   LMP  (LMP Unknown)   BMI 34.14 kg/m    ----------------      I spent 18 minutes with this patient today. All changes were made via collaborative practice agreement with Any Morris. A copy of the visit note was provided to the patient's provider(s).    A summary of these recommendations was sent via Apruve.    Zully Crain, Pharm D., MPH    Medication Therapy Management Pharmacist   [facility]      Telemedicine Visit Details  Type of service:  Video Conference via hybris  Start Time:  3:05 PM  End Time: 3:23 PM     Medication Therapy Recommendations  Class 2 severe obesity with serious comorbidity and body mass index (BMI) of 38.0 to 38.9 in adult, unspecified obesity type (H)    Current Medication: tirzepatide-Weight Management (ZEPBOUND) 10 MG/0.5ML prefilled pen   Rationale: Medication " product not available - Adherence - Adherence   Recommendation: Provide Adherence Intervention   Status: Accepted per CPA                Please do not hesitate to contact me if you have any questions/concerns.     Sincerely,     Zully Crain RP   Authored by Resident/PA/NP

## 2024-08-23 NOTE — PROGRESS NOTES
Medication Therapy Management (MTM) Encounter    ASSESSMENT:                            Medication Adherence/Access: sent Zepbound prescription in per insurance requirements: 30 days Zepbound 12.5mg and 90 days Zepbound 15mg.    Weight management :       Weight Management /s/p sleeve gastrectomy 2018: patient having significant success on Zepbound for weight management and reduction in food noise. Recommend to continue with lifestyle mods and optimizing Zepbound per insurance requirements (see above). Given class II obesity, hepatic steatosis - recommend GLP1/GIP therapy as data to support most significant weight loss and patient has no contraindications. Patient also seeing benefit from reduction in food noise and increased satiety. Discussed dietary and behavioral modifications to help with tolerability given history of intolerability at Zepbound 7.5mg weekly.    PLAN:                            When you use up current supply of Zepbound 10 mg, Increase to Zepbound 12.5 mg injection once weekly. Take this medication on the same day each week for 4 weeks..     2. If tolerating, you may increase to Zepbound 15 mg once weekly after 4 weeks (after box above exhausted). Stay on this dose until follow up.        As a reminder, here is a great link for how to administer your Zepbound:  https://zepbound.NakedRoom.Muxlim/how-to-use    To help with tolerability and effectiveness of Zepbound:  Eat small meals/snacks throughout the day (about every 2 hours)  Focus on getting protein in first with each meal and snack.   Drink plenty of water - goal 64 oz throughout the day  You may try Metamucil, Benefiber, or Citrucel to help feel more full (less nausea) and have softer, more consistent bowel movements.  To optimize weight management - work on incorporating resistance training/weight lifting to build muscle and improve overall metabolism of adipose tissue.       Follow-up: 3 months with Jessica Negro PA-C; 6 months with Zully GIMENEZ  ZECHARIAH Crain     SUBJECTIVE/OBJECTIVE:                          Sunshine Delgado is a 59 year old female seen for a follow-up visit.       Reason for visit: Medication Therapy Management GLP1/GIP Management .    Allergies/ADRs: Reviewed in chart  Past Medical History: Reviewed in chart  Tobacco: She reports that she has never smoked. She has never been exposed to tobacco smoke. She has never used smokeless tobacco.  Alcohol: not currently using  Caffeine: 1c coffee in am, occasional second cup daily    Medication Adherence/Access: Medication barriers: obtaining medication from pharmacy. Called insurance and found may have better cost and availability if fills at [facility] ([facility] may also have good coverage per Epic).    Insurance has specific insurance requirements, may not stay on 10 mg (only 90 days) and can only be 12.5mg (stepping dose x 30 days), then 15 mg.    Weight Management   /s/p sleeve gastrectomy 2018  Zepbound 10 mg once weekly x 8 weeks  Bupropion  mg once daily     Last seen by Jessica Negro PA-C 1/23/24 for Return Medical Weight Management post bariatric surgery Visit     Patient reports no current medication side effects (nausea and vomiting on Zepbound 7.5mg, helped by diet, but now none on Zepbound 10 mg. Has had significant improvement in food noise, appetite since starting Zepbound. Very happy that mental capacity is greater and mental health is better too    Nutrition/Eating Habits: has significant reduction in food noise; focusing on protein and veggies for food. Works to eat every 2-4 hours. Drinks >64 oz water daily. Has protein shake if has gone too long without eating.  Exercise/Activity: walks daily indoors and out.  Lots of working in yard and garden - happy to move and easier to do so. Much more stamina physically and emotionally.    Medication/weight management  History:  GLP1/GIP : Patient denies personal or family history of MEN Type2, MTC, Pancreatitis.    Naltrexone  "caused dizziness.   Had a successful sleeve gastrectomy, after Covid had significant weight regain.    Weight prior to surgery - 289lb   Donell weight - 192lb     Starting weight (lbs): 289  Before Zepbound  (lbs): 218 (Zepbound start)       Current weight today: 198 lbs 0 oz  Cumulative Weight Loss: -91 lb, -31.5% from baseline  Since starting Zepbound: -20 lbs, -9.2%    Wt Readings from Last 4 Encounters:   08/23/24 89.8 kg (198 lb)   06/20/24 100.5 kg (221 lb 9.6 oz)   06/03/24 100.7 kg (222 lb)   05/30/24 98.9 kg (218 lb)     Estimated body mass index is 34.14 kg/m  as calculated from the following:    Height as of 6/20/24: 1.622 m (5' 3.86\").    Weight as of this encounter: 89.8 kg (198 lb).        Today's Vitals: Wt 89.8 kg (198 lb)   LMP  (LMP Unknown)   BMI 34.14 kg/m    ----------------      I spent 18 minutes with this patient today. All changes were made via collaborative practice agreement with Any Morris. A copy of the visit note was provided to the patient's provider(s).    A summary of these recommendations was sent via Ioxus.    Zully Crain, Pharm D., MPH    Medication Therapy Management Pharmacist   [facility]      Telemedicine Visit Details  Type of service:  Video Conference via [employer]  Start Time:  3:05 PM  End Time: 3:23 PM     Medication Therapy Recommendations  Class 2 severe obesity with serious comorbidity and body mass index (BMI) of 38.0 to 38.9 in adult, unspecified obesity type (H)    Current Medication: tirzepatide-Weight Management (ZEPBOUND) 10 MG/0.5ML prefilled pen   Rationale: Medication product not available - Adherence - Adherence   Recommendation: Provide Adherence Intervention   Status: Accepted per CPA            "

## 2024-08-23 NOTE — PATIENT INSTRUCTIONS
"Recommendations from MTM Pharmacist visit:                                                    MTM (medication therapy management) is a service provided by a clinical pharmacist designed to help you get the most of out of your medicines.  You may be sent a phone or email survey evaluating today's visit.  Please provide feedback you have for the service he received today if you are able.      When you use up current supply of Zepbound 10 mg, Increase to Zepbound 12.5 mg injection once weekly. Take this medication on the same day each week for 4 weeks..     2. If tolerating, you may increase to Zepbound 15 mg once weekly after 4 weeks (after box above exhausted). Stay on this dose until follow up.        As a reminder, here is a great link for how to administer your Zepbound:  https://zepbound.Indie Vinos/how-to-use    To help with tolerability and effectiveness of Zepbound:  Eat small meals/snacks throughout the day (about every 2 hours)  Focus on getting protein in first with each meal and snack.   Drink plenty of water - goal 64 oz throughout the day  You may try Metamucil, Benefiber, or Citrucel to help feel more full (less nausea) and have softer, more consistent bowel movements.  To optimize weight management - work on incorporating resistance training/weight lifting to build muscle and improve overall metabolism of adipose tissue.       Follow-up: 3 months with Jessica Negro PA-C; 6 months with Zully Crain, [facility]         It was great speaking with you today.  I value your experience and would be very thankful for your time in providing feedback in our clinic survey. In the next few days, you may receive an email or text message from [employer] with a link to a survey related to your  clinical pharmacist.\"     To schedule another MTM appointment, please call the clinic directly ([facility] Phone Number: [phone] (schedules for [facility] and [facility] - " providers, dietitians, health coaches) or you may call the [facility] scheduling line at [phone] or toll-free at [phone].     My Clinical Pharmacist's contact information:                                                      Please feel free to contact me with any questions or concerns you have.      Zully Crain, Pharm D., MPH    Medication Therapy Management Pharmacist   [facility]          Meal Replacement Shake Options:   *Protein Shake Criteria: no more than 210 Calories, at least 20 grams of protein, and less than 10 grams of sugar   Premier Protein (160 Calories, 30 g protein)  Slim Fast Advanced Nutrition (180 Calories, 20 g protein)  Muscle Milk, lactose-free, 17 oz bottle (210 Calories, 30 g protein)  Integrated Supplements, no artificial sugars (110 Calories, 20 g protein)  Boost/Ensure Max (160 calories, 30 gm protein)   Fairlife Protein Shakes (160-230 calories, 26-42 gm protein)  Aldi's Elevation Protein Powder (180 calories, 30 gm protein)   Orgain Protein Shakes (130-160 calories, 20-26 gm protein)     Meal Replacement Bar Options:  Quest Protein Bars (190 Calories, 20 g protein)  Built Bar (170 Calories, 15-20 g protein)  One Protein Bar (210 calories, 20 g protein)  [place] Signature Protein Bar ([employer]) (190 Calories, 21 g protein)  Pure Protein Bars (180 Calories, 21 g protein)    Low Calorie Frozen Meal:  Healthy Choice Power Bowls  Seng Cuisine  Smart Ones  Indra Haywood      ---------------------------------------------------------------  Tips to Increase the Protein in Your Diet  You may need more protein in your diet to help you heal from an illness, surgery or wound. Extra protein can also help you gain weight. Here are some ideas for adding high-protein foods to your meals.  Meat and fish  Add chopped cooked meat to vegetables, salads, casseroles, soups, sauces and biscuit dough.  Use in omelets, soufflés, quiches, sandwich fillings  and chicken or turkey stuffing.  Wrap in pie crust or biscuit dough to make a turnover.  Add to stuffed baked potatoes.  Make a dip with diced meat or flaked fish mixed with sour cream and spices.  Chopped, hard-cooked eggs  Add to salads.  Use for snacks and sandwich filling.  High-protein milk  To make high-protein milk, mix 1 quart whole milk with 1 cup powdered milk.  Add to cream soups, mashed potatoes, scrambled eggs, cereals and dried eggnog mix.  Use as an ingredient in puddings, custards, hot chocolate, milk shakes and pancakes.  Powdered milk  If you don't have any high-protein milk on hand, you can use powdered milk. Add 3 tablespoons to:  gravies, sauces, cream soups, mayonnaise  casseroles, meat patties, meatloaf, tuna salad, deviled ham  scalloped or mashed potatoes, creamed spinach  scrambled eggs, egg salad  cereals  yogurt, milk drinks, ice cream, frozen desserts, puddings, custards.  Add 4 to 6 tablespoons powdered milk to make:  cream sauces  breads, muffins, pancakes, waffles, cookies, cakes  cream pies, frostings, cake fillings  fruit cobblers, bread or rice pudding, gelatin desserts.  For high-protein eggnog, add 3 to 6 tablespoons powdered milk to prepared eggnog.  Hard or soft cheese  Melt on sandwiches, breads, tortillas, hamburgers, hot dogs, other meats, vegetables, eggs and pies.  Grate into soups, chili, sauces, casseroles, vegetables, potatoes, rice, noodles or meatloaf.  Eat with toast or crackers, or melt for zaria dip.  Cottage cheese or ricotta cheese  Mix with or scoop on top of fruits and vegetables.  Add to casseroles, lasagna, spaghetti, noodles and egg dishes (omelets, scrambled eggs, soufflés).  Use in gelatin, pudding-type desserts, cheesecake and pancake batter.  Use to stuff crepes, pasta shells or manicotti.  Fruit yogurt  Blend with fruits for a fruit smoothie.  Use as a dip for fruits and vegetables.  Scoop on top of pancakes or waffles.  Tofu  Blend silken tofu with  fruits and juices for a smoothie.  Add chunks of firm tofu to soups and stews, or crumble into meatloaf.  Blend dried onion soup mix into soft or silken tofu for dip.  Use pureed silken tofu for part of the mayonnaise, sour cream, cream cheese or ricotta cheese called for in recipes.  Beans  Use cooked beans or peas in soups, casseroles, pasta, tacos and burritos.  Nuts and seeds  Note: For children under 3, discuss with the child's care team.  Use in casseroles, breads, muffins, pancakes, cookies and waffles.  Sprinkle on fruits, cereals, ice cream, yogurt, vegetables and salads.  Mix with raisins, dried fruits and chocolate chips for a snack.  Nut butters  Note: For children under 3, discuss with the child's care team.  Spread on sandwiches, toast, muffins, crackers, waffles, pancakes and fruit slices.  Use as a dip for raw vegetables.  Blend with milk drinks, or swirl through ice cream, yogurt or hot cereal.  Nutrition supplements (nutrition bars, drinks and powders)  Add powders to milk drinks and desserts.  Mix with ice cream, milk and fruit for a high-protein milk shake.    For informational purposes only. Not to replace the advice of your health care provider. Clinically reviewed by Sheri King, [place], LD, and the Clinical Nutrition Service Line. Copyright   2005 [facility]. All rights reserved. [employer] 170510 - REV 04/24.      -----------------------------------------------------------------------------------------------------------------  Learning About High-Protein Foods  What foods are high in protein?     The foods you eat contain nutrients, such as vitamins and minerals. Protein is a nutrient. Your body needs the right amount to stay healthy and work as it should. You can use the list below to help you make choices about which foods to eat.  Here are some examples of foods that are high in protein.  Dairy and dairy alternatives  Cheese  Milk  Soy milk  Yogurt (especially  "Greek)  Meat  Beef  Chicken  Ham  Lamb  Lunch meat  Pork  Sausage  Turkey  Other protein foods  Beans (black, garbanzo, kidney, lima)  Eggs  Hummus  Lentils  Nuts  Peanut butter and other nut butters  Peas  Soybeans  Tofu  Veggie or soy solomon (Check the nutrition label for the amount of protein in each serving.)  Seafood  Anchovies  Cod  Crab  Halibut  [place]bisonia  Sardines  Shrimp  Tilapia  [place]  Tuna  Protein supplements  Bars (Check the nutrition label for the amount of protein in each serving.)  Drinks  Powders  Work with your doctor to find out how much of this nutrient you need. Depending on your health, you may need more or less of it in your diet.  Where can you learn more?  Go to https://www.[employer].net/patiented  Enter P335 in the search box to learn more about \"Learning About High-Protein Foods.\"  Current as of: September 20, 2023               Content Version: 14.0    1741-9684 [employer].   Care instructions adapted under license by your healthcare professional. If you have questions about a medical condition or this instruction, always ask your healthcare professional. Healthwise, [employer] disclaims any warranty or liability for your use of this information.         "

## 2024-08-29 ENCOUNTER — ANCILLARY PROCEDURE (OUTPATIENT)
Dept: ULTRASOUND IMAGING | Facility: CLINIC | Age: 59
End: 2024-08-29
Attending: INTERNAL MEDICINE
Payer: COMMERCIAL

## 2024-08-29 ENCOUNTER — LAB (OUTPATIENT)
Dept: LAB | Facility: CLINIC | Age: 59
End: 2024-08-29
Payer: COMMERCIAL

## 2024-08-29 ENCOUNTER — PRE VISIT (OUTPATIENT)
Dept: ENDOCRINOLOGY | Facility: CLINIC | Age: 59
End: 2024-08-29

## 2024-08-29 ENCOUNTER — OFFICE VISIT (OUTPATIENT)
Dept: ENDOCRINOLOGY | Facility: CLINIC | Age: 59
End: 2024-08-29
Payer: COMMERCIAL

## 2024-08-29 VITALS
BODY MASS INDEX: 34.66 KG/M2 | DIASTOLIC BLOOD PRESSURE: 83 MMHG | SYSTOLIC BLOOD PRESSURE: 123 MMHG | OXYGEN SATURATION: 100 % | WEIGHT: 201 LBS | HEART RATE: 69 BPM

## 2024-08-29 DIAGNOSIS — R73.03 PREDIABETES: Primary | ICD-10-CM

## 2024-08-29 DIAGNOSIS — Z78.0 POSTMENOPAUSAL STATUS: ICD-10-CM

## 2024-08-29 DIAGNOSIS — R73.03 PREDIABETES: ICD-10-CM

## 2024-08-29 DIAGNOSIS — E06.3 HASHIMOTO'S THYROIDITIS: ICD-10-CM

## 2024-08-29 LAB
HBA1C MFR BLD: 5.3 %
TSH SERPL DL<=0.005 MIU/L-ACNC: 1.94 UIU/ML (ref 0.3–4.2)

## 2024-08-29 PROCEDURE — 76536 US EXAM OF HEAD AND NECK: CPT | Mod: GC | Performed by: RADIOLOGY

## 2024-08-29 PROCEDURE — 99000 SPECIMEN HANDLING OFFICE-LAB: CPT | Performed by: PATHOLOGY

## 2024-08-29 PROCEDURE — 36415 COLL VENOUS BLD VENIPUNCTURE: CPT | Performed by: PATHOLOGY

## 2024-08-29 PROCEDURE — 84443 ASSAY THYROID STIM HORMONE: CPT | Performed by: PATHOLOGY

## 2024-08-29 PROCEDURE — 83036 HEMOGLOBIN GLYCOSYLATED A1C: CPT | Performed by: INTERNAL MEDICINE

## 2024-08-29 PROCEDURE — G2211 COMPLEX E/M VISIT ADD ON: HCPCS | Performed by: INTERNAL MEDICINE

## 2024-08-29 PROCEDURE — 99204 OFFICE O/P NEW MOD 45 MIN: CPT | Performed by: INTERNAL MEDICINE

## 2024-08-29 ASSESSMENT — PAIN SCALES - GENERAL: PAINLEVEL: NO PAIN (0)

## 2024-08-29 NOTE — PROGRESS NOTES
Endocrinology Clinic Visit 8/29/2024    NAME:  Sunshine Delgado  PCP:  Linn Montemayor  MRN:  [id number]  Reason for Consult:  hypothyroidism  Requesting Provider:  Jessica Negro       HISTORY OF PRESENT ILLNESS  Sunshine Delgado is a 59 year old female with AF s/p 2 ablation, depression, anxiety, MARIA GUADALUPE   who is here for initial evaluation and management of Hashimoto's thyroiditis.    2020 had COVID and found to have Hashimoto's thyroiditis.  Since then sick more often  Pt still has low energy/ feeling cold/ constipation even with normal lab work  Toss and turn at night, and sleepy doing the day  Zepbound through weight management clinic, weight 236-->201 lbs.  Energy level initially improve and now back to baseline    On Estradiol for about 1 year    Pt takes LT4 1st thing AM about 60 mins before breakfast    Sister with thyroid cancer      REVIEW OF SYSTEMS  10 point negative except as mentioned in HPI    Past Medical/Surgical History:  Past Medical History:   Diagnosis Date    Antiplatelet or antithrombotic long-term use     Arrhythmia     Atrial fibrillation with rapid ventricular response (H) 1/26/2020    Bee sting allergy     Depressive disorder     Generalized anxiety disorder 10/15/2009    lexapro made things worse.      Hashimoto's thyroiditis 5/6/2020    Morbid obesity (H)     Ocular migraine     MARIA GUADALUPE (obstructive sleep apnea)- severe (AHI 84) 09/09/2011    patient not using since 2019    Primary osteoarthritis of right knee 9/23/2022    Renal disease     Voice fatigue 10/22/2009     Past Surgical History:   Procedure Laterality Date    ANESTHESIA CARDIOVERSION N/A 11/22/2021    Procedure: ANESTHESIA, FOR CARDIOVERSION@1515;  Surgeon: GENERIC ANESTHESIA PROVIDER;  Location:  OR    [facility]OSCOPY  [facility]      EP ABLATION FOCAL AFIB N/A 7/22/2021    Procedure: EP ABLATION FOCAL AFIB;  Surgeon: Vicente Craig MD;  Location:  HEART CARDIAC CATH LAB    EP ABLATION FOCAL AFIB N/A 3/31/2022     Procedure: Ablation Focal Atrial Fibrillation;  Surgeon: Vicente Craig MD;  Location:  HEART CARDIAC CATH LAB    GENITOURINARY SURGERY  2007    HYSTERECTOMY, PAP NO LONGER INDICATED      HYSTERECTOMY, VAGINAL  2007    still has ovaries    LAPAROSCOPIC GASTRIC SLEEVE N/A 3/13/2018    Procedure: LAPAROSCOPIC GASTRIC SLEEVE;  Laparoscopic Sleeve Gastrectomy;  Surgeon: Kameron Joseph MD;  Location:  OR       Medications  Current Outpatient Medications   Medication Sig Dispense Refill    acetaminophen (TYLENOL) 325 MG tablet Take 325 mg by mouth every 4 hours as needed      albuterol (PROAIR HFA/PROVENTIL HFA/VENTOLIN HFA) 108 (90 Base) MCG/ACT inhaler INHALE 2 PUFFS INTO THE LUNGS EVERY 6 HOURS AS NEEDED FOR SHORTNESS OF BREATH, WHEEZING OR COUGH 8.5 g 1    benzonatate (TESSALON) 100 MG capsule Take 1 capsule (100 mg) by mouth 3 times daily as needed for cough 40 capsule 0    buPROPion (WELLBUTRIN XL) 300 MG 24 hr tablet Take 1 tablet (300 mg) by mouth every morning 90 tablet 3    EPINEPHrine (AUVI-Q) 0.3 MG/0.3ML injection 2-pack Inject 0.3 mLs (0.3 mg) into the muscle as needed for anaphylaxis 0.6 mL 3    estradiol (ESTRACE) 0.5 MG tablet Take 1 tablet (0.5 mg) by mouth daily 90 tablet 3    levothyroxine (SYNTHROID/LEVOTHROID) 75 MCG tablet Take 1 tablet (75 mcg) by mouth daily 90 tablet 3    tirzepatide-Weight Management (ZEPBOUND) 10 MG/0.5ML prefilled pen Inject 0.5 mLs (10 mg) subcutaneously every 7 days 6 mL 0    tirzepatide-Weight Management (ZEPBOUND) 12.5 MG/0.5ML prefilled pen Inject 0.5 mLs (12.5 mg) subcutaneously every 7 days. 2 mL 0    tirzepatide-Weight Management (ZEPBOUND) 15 MG/0.5ML prefilled pen Inject 0.5 mLs (15 mg) subcutaneously every 7 days. Start after 4 weeks of 12.5 mg Zepbound, if tolerating 6 mL 0    traZODone (DESYREL) 50 MG tablet Take 2 tablets (100 mg) by mouth at bedtime 180 tablet 0    venlafaxine (EFFEXOR-ER) 75 MG 24 hr tablet Take 1 tablet (75 mg) by mouth daily 90  tablet 3     No current facility-administered medications for this visit.       Allergies  Allergies   Allergen Reactions    Cephalexin Hives    Strawberry Extract Hives    Sulfa Antibiotics Hives    Cinnamon Hives    Sulfamethoxazole-Trimethoprim Hives    Vicodin [Hydrocodone-Acetaminophen]     Wasp Venom Protein      Other reaction(s): Chest Pain    Wasps [Hornets] Swelling     Now carries Epi Pen    Zoloft [Sertraline]      suicidal ideation    Contrast Dye Other (See Comments) and Rash     Patient had sneezing and an itchy throat following contrast injection of 100 mL Isovue-370.  From IV contrast dye         Family History  family history includes Alzheimer Disease in her mother; Anxiety Disorder in her son; Arthritis in her mother; Cancer in an other family member; Depression in her father, sister, and sister; Diabetes in her father and mother; Eye Disorder in her mother; Hyperlipidemia in her father; Hypertension in her mother; Neurologic Disorder in her mother; Obesity in her father, mother, and sister; Osteoporosis in her maternal grandmother and mother; Psychotic Disorder in her mother and sister; Thyroid Disease in her sister and sister.    Social History  Social History     Tobacco Use    Smoking status: Never     Passive exposure: Never    Smokeless tobacco: Never   Substance Use Topics    Alcohol use: Yes     Comment: 1-2 per mo       Physical Exam  /83   Pulse 69   Wt 91.2 kg (201 lb)   LMP  (LMP Unknown)   SpO2 100%   BMI 34.66 kg/m    Body mass index is 34.66 kg/m .  GENERAL :  In no apparent distress  EYES: No scleral icterus,  No proptosis  NECK: No visible masses. No thyroid nodule palpable, no cervical LN palpable  RESP: Normal breathing  NEURO: awake, alert, responds appropriately to questions.      DATA REVIEW  Labs/Imaging    Lab Results   Component Value Date    A1C 5.7 10/15/2009     CT 4/2023       Latest Reference Range & Units 11/07/14 12:37   Tissue Transglutaminase Antibody  IgA 0 - 3.9 U/mL <1.0  Interpretation:  Negative        Latest Reference Range & Units 02/21/24 09:39   Hemoglobin 11.7 - 15.7 g/dL 13.6     ASSESSMENT/PLAN:   Sunshine Delgado is a 59 year old female with AF s/p 2 ablation, depression, anxiety, MARIA GUADALUPE who is here for initial evaluation and management of Hashimoto's thyroiditis.      ## Hashimoto's thyroiditis and primary hypothyroidism  ## BMI 40.5 --> 34.6  ## FH of thyroid cancer  ## Postmenopausal  ## AF s/p 2 ablation  ## MARIA GUADALUPE  Today we discussed about Hashimotos and hypothyroid.  Goal of treatment to resolve symptoms, target normal TSH and avoid over replacement.  For low energy, most likely from MARIA GUADALUPE, will need further evaluation and management  -- labs today and will send more prescription of levothyroxine accordingly. Some medications and weight change can affect dosing, please let me know if any issues  -- await for sleep study  -- if low energy does not improve or cannot be explained by other things, will try adding T3  -- DXA  -- US thyroid and will let you know via mychart    Hypothyroidism  American Thyroid Association     The longitudinal plan of care for the diagnosis(es)/condition(s) as documented were addressed during this visit. Due to the added complexity in care, I will continue to support Sunshine in the subsequent management and with ongoing continuity of care.  Follow up 4 months        Please reach out to the following centers to schedule your appointment:       Imaging (DEXA, CT, MRI, XRAY)    [facility] ([facility], [facility], [place]) [phone]   [facility] ([place], [place]) [phone]   [facility] ([facility]) [phone]   [facility] ([facility]) [phone]       Lab    General [phone]   [facility] [phone]   [place] [phone]   [facility]  [phone]   [facility] [phone]   [place] [phone]   [facility] [place]) [phone]   [facility] Walk-In Only   [place]  [phone]   [place] [phone]   [facility] [phone]   [place] [phone]       Infusion    [phone]   [place] [phone]   [place] [phone]   [place] [phone]   [place] [phone]   [place] [phone]   [facility] [phone]     For any questions, please reach out to the Endocrinology Clinic Number for assistance: [phone].     Orders Placed This Encounter   Procedures    US Thyroid    DX Bone Density    TSH with free T4 reflex    Hemoglobin A1c            Rod Cook MD

## 2024-08-29 NOTE — PATIENT INSTRUCTIONS
## Hashimoto's thyroiditis and primary hypothyroidism  ## BMI 40.5 --> 34.6  ## FH of thyroid cancer  ## Postmenopausal  Today we discussed about Hashimotos and hypothyroid.  Goal of treatment to resolve symptoms, target normal TSH and avoid over replacement.  -- labs today and will send more prescription of levothyroxine accordingly. Some medications and weight change can affect dosing, please let me know if any issues  -- await for sleep study  -- if low energy does not improve or cannot be explained by other things, will try adding T3  -- DXA  -- US thyroid and will let you know via Objective Logisticshart    Hypothyroidism  American Thyroid Association       Follow up 4 months    Please reach out to the following centers to schedule your appointment:       Imaging (DEXA, CT, MRI, XRAY)    John Muir Walnut Creek Medical Center (Saint Francis Hospital Muskogee – Muskogee, Mary Breckinridge Hospital/Ivinson Memorial Hospital - Laramie, Bellaire) 152.154.6166   Central Arkansas Veterans Healthcare System (Auburn, Wyoming) 330.434.4044   Methodist TexSan Hospital (Doctors Hospital) 392.271.7174   Select Medical Specialty Hospital - Cincinnati North (Magruder Hospital) 421.736.4409       Lab    General 9-610-973-7166   Saint Francis Hospital Muskogee – Muskogee 726-114-7682   Kissimmee 319-681-4565   Northampton State Hospital  413-207-1578   Portland Shriners Hospital 442-775-8037   Bellaire 032-517-7800   Washakie Medical Center - Worland) 740.218.6218   Ivinson Memorial Hospital - Laramie Walk-In Only   Clio 368-894-7651   Nevada 744-892-1106   Fort Ransom 439-533-2842   New Orleans 751-079-5272       Infusion    Saint Francis Hospital Muskogee – Muskogee 366-205-1777   Bellaire 320-178-5280   Wyoming 475-425-5382   New Orleans 487-992-2002   Termo 675-462-8573   Bell City 188-735-0624   Bloomington/Community Memorial Hospital 140-033-5944     For any questions, please reach out to the Endocrinology Clinic Number for assistance: 854.894.7670.

## 2024-08-29 NOTE — NURSING NOTE
Chief Complaint   Patient presents with    Endocrine Problem     MD wanted to make sure everything is going OK with her thyroid issues. How can she avoid getting sick a lot, she's continually getting sick and can't fight it off as well.     Blood pressure 123/83, pulse 69, weight 91.2 kg (201 lb), SpO2 100%, not currently breastfeeding.    Bridget Ceron

## 2024-08-29 NOTE — LETTER
8/29/2024       RE: Sunshine Delgado  [address] [place] [address]     Dear Colleague,    Thank you for referring your patient, Sunshine Delgado, to the [facility] [place] [facility]. Please see a copy of my visit note below.    08/28/24 10:14 AM : Appointment reminder phone call made to patient.Pt verbalized understanding      Endocrinology Clinic Visit 8/29/2024    NAME:  Sunshine Delgado  PCP:  Linn Montemayor  MRN:  [id number]  Reason for Consult:  hypothyroidism  Requesting Provider:  Jessica Negro       HISTORY OF PRESENT ILLNESS  Sunshine Delgado is a 59 year old female with AF s/p 2 ablation, depression, anxiety, MARIA GUADALUPE   who is here for initial evaluation and management of Hashimoto's thyroiditis.    2020 had COVID and found to have Hashimoto's thyroiditis.  Since then sick more often  Pt still has low energy/ feeling cold/ constipation even with normal lab work  Toss and turn at night, and sleepy doing the day  Zepbound through weight management clinic, weight 236-->201 lbs.  Energy level initially improve and now back to baseline    On Estradiol for about 1 year    Pt takes LT4 1st thing AM about 60 mins before breakfast    Sister with thyroid cancer      REVIEW OF SYSTEMS  10 point negative except as mentioned in HPI    Past Medical/Surgical History:  Past Medical History:   Diagnosis Date     Antiplatelet or antithrombotic long-term use      Arrhythmia      Atrial fibrillation with rapid ventricular response (H) 1/26/2020     Bee sting allergy      Depressive disorder      Generalized anxiety disorder 10/15/2009    lexapro made things worse.       Hashimoto's thyroiditis 5/6/2020     Morbid obesity (H)      Ocular migraine      MARIA UGADALUPE (obstructive sleep apnea)- severe (AHI 84) 09/09/2011    patient not using since 2019     Primary osteoarthritis of right knee 9/23/2022     Renal disease      Voice fatigue 10/22/2009     Past  Surgical History:   Procedure Laterality Date     ANESTHESIA CARDIOVERSION N/A 11/22/2021    Procedure: ANESTHESIA, FOR CARDIOVERSION@1515;  Surgeon: GENERIC ANESTHESIA PROVIDER;  Location:  OR     COLONOSCOPY  2000     DAVINCI GASTRIC SLEEVE       EP ABLATION FOCAL AFIB N/A 7/22/2021    Procedure: EP ABLATION FOCAL AFIB;  Surgeon: Vicente Craig MD;  Location:  HEART CARDIAC CATH LAB     EP ABLATION FOCAL AFIB N/A 3/31/2022    Procedure: Ablation Focal Atrial Fibrillation;  Surgeon: Vicente Craig MD;  Location:  HEART CARDIAC CATH LAB     GENITOURINARY SURGERY  2007     HYSTERECTOMY, PAP NO LONGER INDICATED       HYSTERECTOMY, VAGINAL  2007    still has ovaries     LAPAROSCOPIC GASTRIC SLEEVE N/A 3/13/2018    Procedure: LAPAROSCOPIC GASTRIC SLEEVE;  Laparoscopic Sleeve Gastrectomy;  Surgeon: Kameron Joseph MD;  Location:  OR       Medications  Current Outpatient Medications   Medication Sig Dispense Refill     acetaminophen (TYLENOL) 325 MG tablet Take 325 mg by mouth every 4 hours as needed       albuterol (PROAIR HFA/PROVENTIL HFA/VENTOLIN HFA) 108 (90 Base) MCG/ACT inhaler INHALE 2 PUFFS INTO THE LUNGS EVERY 6 HOURS AS NEEDED FOR SHORTNESS OF BREATH, WHEEZING OR COUGH 8.5 g 1     benzonatate (TESSALON) 100 MG capsule Take 1 capsule (100 mg) by mouth 3 times daily as needed for cough 40 capsule 0     buPROPion (WELLBUTRIN XL) 300 MG 24 hr tablet Take 1 tablet (300 mg) by mouth every morning 90 tablet 3     EPINEPHrine (AUVI-Q) 0.3 MG/0.3ML injection 2-pack Inject 0.3 mLs (0.3 mg) into the muscle as needed for anaphylaxis 0.6 mL 3     estradiol (ESTRACE) 0.5 MG tablet Take 1 tablet (0.5 mg) by mouth daily 90 tablet 3     levothyroxine (SYNTHROID/LEVOTHROID) 75 MCG tablet Take 1 tablet (75 mcg) by mouth daily 90 tablet 3     tirzepatide-Weight Management (ZEPBOUND) 10 MG/0.5ML prefilled pen Inject 0.5 mLs (10 mg) subcutaneously every 7 days 6 mL 0     tirzepatide-Weight Management (ZEPBOUND) 12.5  MG/0.5ML prefilled pen Inject 0.5 mLs (12.5 mg) subcutaneously every 7 days. 2 mL 0     tirzepatide-Weight Management (ZEPBOUND) 15 MG/0.5ML prefilled pen Inject 0.5 mLs (15 mg) subcutaneously every 7 days. Start after 4 weeks of 12.5 mg Zepbound, if tolerating 6 mL 0     traZODone (DESYREL) 50 MG tablet Take 2 tablets (100 mg) by mouth at bedtime 180 tablet 0     venlafaxine (EFFEXOR-ER) 75 MG 24 hr tablet Take 1 tablet (75 mg) by mouth daily 90 tablet 3     No current facility-administered medications for this visit.       Allergies  Allergies   Allergen Reactions     Cephalexin Hives     Strawberry Extract Hives     Sulfa Antibiotics Hives     Cinnamon Hives     Sulfamethoxazole-Trimethoprim Hives     Vicodin [Hydrocodone-Acetaminophen]      Wasp Venom Protein      Other reaction(s): Chest Pain     Wasps [Hornets] Swelling     Now carries Epi Pen     Zoloft [Sertraline]      suicidal ideation     Contrast Dye Other (See Comments) and Rash     Patient had sneezing and an itchy throat following contrast injection of 100 mL Isovue-370.  From IV contrast dye         Family History  family history includes Alzheimer Disease in her mother; Anxiety Disorder in her son; Arthritis in her mother; Cancer in an other family member; Depression in her father, sister, and sister; Diabetes in her father and mother; Eye Disorder in her mother; Hyperlipidemia in her father; Hypertension in her mother; Neurologic Disorder in her mother; Obesity in her father, mother, and sister; Osteoporosis in her maternal grandmother and mother; Psychotic Disorder in her mother and sister; Thyroid Disease in her sister and sister.    Social History  Social History     Tobacco Use     Smoking status: Never     Passive exposure: Never     Smokeless tobacco: Never   Substance Use Topics     Alcohol use: Yes     Comment: 1-2 per mo       Physical Exam  /83   Pulse 69   Wt 91.2 kg (201 lb)   LMP  (LMP Unknown)   SpO2 100%   BMI 34.66 kg/m     Body mass index is 34.66 kg/m .  GENERAL :  In no apparent distress  EYES: No scleral icterus,  No proptosis  NECK: No visible masses. No thyroid nodule palpable, no cervical LN palpable  RESP: Normal breathing  NEURO: awake, alert, responds appropriately to questions.      DATA REVIEW  Labs/Imaging    Lab Results   Component Value Date    A1C 5.7 10/15/2009     CT 4/2023       Latest Reference Range & Units 11/07/14 12:37   Tissue Transglutaminase Antibody IgA 0 - 3.9 U/mL <1.0  Interpretation:  Negative        Latest Reference Range & Units 02/21/24 09:39   Hemoglobin 11.7 - 15.7 g/dL 13.6     ASSESSMENT/PLAN:   Sunshine Delgado is a 59 year old female with AF s/p 2 ablation, depression, anxiety, MARIA GUADALUPE who is here for initial evaluation and management of Hashimoto's thyroiditis.      ## Hashimoto's thyroiditis and primary hypothyroidism  ## BMI 40.5 --> 34.6  ## FH of thyroid cancer  ## Postmenopausal  ## AF s/p 2 ablation  ## MARIA GUADALUPE  Today we discussed about Hashimotos and hypothyroid.  Goal of treatment to resolve symptoms, target normal TSH and avoid over replacement.  For low energy, most likely from MARIA GUADALUPE, will need further evaluation and management  -- labs today and will send more prescription of levothyroxine accordingly. Some medications and weight change can affect dosing, please let me know if any issues  -- await for sleep study  -- if low energy does not improve or cannot be explained by other things, will try adding T3  -- DXA  -- US thyroid and will let you know via mychart    Hypothyroidism  American Thyroid Association     The longitudinal plan of care for the diagnosis(es)/condition(s) as documented were addressed during this visit. Due to the added complexity in care, I will continue to support Sunshine in the subsequent management and with ongoing continuity of care.  Follow up 4 months        Please reach out to the following centers to schedule your appointment:       Imaging (DEXA, CT, MRI, XRAY)     [facility] ([facility] [place] [place]) [phone]   [facility] ([place], [place]) [phone]   [facility] ([facility]) [phone]   [facility] ([facility]) [phone]       Lab    General [phone]   [facility] [phone]   [place] [phone]   [facility]  [phone]   [facility] [phone]   [place] [phone]   [place] ([facility]) [phone]   [facility] [place] [facility]   [place] [phone]   [place] [phone]   [place] [phone]   [place] [phone]       Infusion    [facility] [phone]   [place] [phone]   [place] [phone]   [place] [phone]   [place] [phone]   [place] [phone]   [facility] [phone]     For any questions, please reach out to the Endocrinology Clinic Number for assistance: [phone].     Orders Placed This Encounter   Procedures     US Thyroid     DX Bone Density     TSH with free T4 reflex     Hemoglobin A1c            Rod Cook MD          Again, thank you for allowing me to participate in the care of your patient.      Sincerely,    Rod Cook MD

## 2024-09-12 ENCOUNTER — TELEPHONE (OUTPATIENT)
Dept: ENDOCRINOLOGY | Facility: CLINIC | Age: 59
End: 2024-09-12
Payer: COMMERCIAL

## 2024-09-12 NOTE — TELEPHONE ENCOUNTER
Left Voicemail (1st Attempt) for the patient to call back and schedule the following:    Appointment type: DEXA   Provider: vincent  Return date: next avail   Specialty phone number: [phone]  Additional appointment(s) needed:   Additonal Notes: LVM, MyC x1   Check Out Comments from visit on 8/29:   -- labs (done)  -- DXA  -- US thyroid (done)   -- 3-4 mo FU (scheduled already)     Lorena Greene on 9/12/2024 at 12:41 PM

## 2024-09-17 ENCOUNTER — TELEPHONE (OUTPATIENT)
Dept: ENDOCRINOLOGY | Facility: CLINIC | Age: 59
End: 2024-09-17
Payer: COMMERCIAL

## 2024-09-17 ENCOUNTER — MYC MEDICAL ADVICE (OUTPATIENT)
Dept: FAMILY MEDICINE | Facility: CLINIC | Age: 59
End: 2024-09-17
Payer: COMMERCIAL

## 2024-09-21 RX ORDER — LEVOTHYROXINE SODIUM 75 UG/1
75 TABLET ORAL DAILY
Qty: 90 TABLET | Refills: 3 | Status: SHIPPED | OUTPATIENT
Start: 2024-09-21

## 2024-09-21 NOTE — RESULT ENCOUNTER NOTE
Hello -    Here are my comments about the recent results: thyroid ultrasound is compatible with Hashimoto's thyroiditis. There is a very small thyroid nodule, will monitor.    Regards,   Rod Cook MD

## 2024-09-21 NOTE — RESULT ENCOUNTER NOTE
Hello -    Here are my comments about the recent results: A1c improving. Thyroid function test is normal, will continue current dose of levothyroxine. Recheck labs in 2-3 months.    Regards,   Rod Cook MD

## 2024-09-24 ENCOUNTER — E-VISIT (OUTPATIENT)
Dept: URGENT CARE | Facility: CLINIC | Age: 59
End: 2024-09-24
Payer: COMMERCIAL

## 2024-09-24 ENCOUNTER — LAB (OUTPATIENT)
Dept: LAB | Facility: CLINIC | Age: 59
End: 2024-09-24
Payer: COMMERCIAL

## 2024-09-24 DIAGNOSIS — N30.00 ACUTE CYSTITIS WITHOUT HEMATURIA: Primary | ICD-10-CM

## 2024-09-24 DIAGNOSIS — R30.0 DIFFICULT OR PAINFUL URINATION: ICD-10-CM

## 2024-09-24 DIAGNOSIS — R30.0 DIFFICULT OR PAINFUL URINATION: Primary | ICD-10-CM

## 2024-09-24 LAB
ALBUMIN UR-MCNC: NEGATIVE MG/DL
APPEARANCE UR: ABNORMAL
BACTERIA #/AREA URNS HPF: ABNORMAL /HPF
BILIRUB UR QL STRIP: NEGATIVE
C TRACH DNA SPEC QL NAA+PROBE: NEGATIVE
CLUE CELLS: ABNORMAL
COLOR UR AUTO: YELLOW
GLUCOSE UR STRIP-MCNC: NEGATIVE MG/DL
HGB UR QL STRIP: ABNORMAL
KETONES UR STRIP-MCNC: NEGATIVE MG/DL
LEUKOCYTE ESTERASE UR QL STRIP: ABNORMAL
MUCOUS THREADS #/AREA URNS LPF: PRESENT /LPF
N GONORRHOEA DNA SPEC QL NAA+PROBE: NEGATIVE
NITRATE UR QL: NEGATIVE
PH UR STRIP: 5.5 [PH] (ref 5–7)
RBC #/AREA URNS AUTO: ABNORMAL /HPF
SP GR UR STRIP: 1.01 (ref 1–1.03)
SQUAMOUS #/AREA URNS AUTO: ABNORMAL /LPF
TRICHOMONAS, WET PREP: ABNORMAL
UROBILINOGEN UR STRIP-ACNC: 0.2 E.U./DL
WBC #/AREA URNS AUTO: ABNORMAL /HPF
WBC CLUMPS #/AREA URNS HPF: PRESENT /HPF
WBC'S/HIGH POWER FIELD, WET PREP: ABNORMAL
YEAST, WET PREP: ABNORMAL

## 2024-09-24 PROCEDURE — 87210 SMEAR WET MOUNT SALINE/INK: CPT

## 2024-09-24 PROCEDURE — 87186 SC STD MICRODIL/AGAR DIL: CPT

## 2024-09-24 PROCEDURE — 99421 OL DIG E/M SVC 5-10 MIN: CPT | Performed by: PREVENTIVE MEDICINE

## 2024-09-24 PROCEDURE — 87491 CHLMYD TRACH DNA AMP PROBE: CPT

## 2024-09-24 PROCEDURE — 87086 URINE CULTURE/COLONY COUNT: CPT

## 2024-09-24 PROCEDURE — 81001 URINALYSIS AUTO W/SCOPE: CPT

## 2024-09-24 PROCEDURE — 87591 N.GONORRHOEAE DNA AMP PROB: CPT

## 2024-09-24 RX ORDER — NITROFURANTOIN 25; 75 MG/1; MG/1
100 CAPSULE ORAL 2 TIMES DAILY
Qty: 10 CAPSULE | Refills: 0 | Status: SHIPPED | OUTPATIENT
Start: 2024-09-24 | End: 2024-09-29

## 2024-09-24 NOTE — PATIENT INSTRUCTIONS
Dear Sunshine Delgado,     After reviewing your responses, I would like you to come in for a urine test to make sure we treat you correctly. This urine test is to evaluate you for a possible urinary tract infection, and should be scheduled for today or tomorrow. Schedule a Lab Only appointment here.     Lab appointments are not available at most locations on the weekends. If no Lab Only appointment is available, you should be seen in any of our convenient Walk-in or Urgent Care Centers, which can be found on our website here.     You will receive instructions with your results in [place] once they are available.     If your symptoms worsen, you develop pain in your back or stomach, develop fevers, or are not improving in 5 days, please contact your primary care provider for an appointment or visit a Walk-in or Urgent Care Center to be seen.     Thanks again for choosing us as your health care partner,     Billy Cruz MD, MD

## 2024-09-25 LAB — BACTERIA UR CULT: ABNORMAL

## 2024-10-08 ENCOUNTER — OFFICE VISIT (OUTPATIENT)
Dept: FAMILY MEDICINE | Facility: CLINIC | Age: 59
End: 2024-10-08
Payer: COMMERCIAL

## 2024-10-08 VITALS
DIASTOLIC BLOOD PRESSURE: 74 MMHG | WEIGHT: 191 LBS | BODY MASS INDEX: 32.61 KG/M2 | RESPIRATION RATE: 19 BRPM | SYSTOLIC BLOOD PRESSURE: 107 MMHG | HEART RATE: 66 BPM | OXYGEN SATURATION: 99 % | TEMPERATURE: 98.9 F | HEIGHT: 64 IN

## 2024-10-08 DIAGNOSIS — J01.01 ACUTE RECURRENT MAXILLARY SINUSITIS: Primary | ICD-10-CM

## 2024-10-08 PROCEDURE — 99213 OFFICE O/P EST LOW 20 MIN: CPT | Performed by: PHYSICIAN ASSISTANT

## 2024-10-08 RX ORDER — AMOXICILLIN 875 MG/1
875 TABLET, COATED ORAL 2 TIMES DAILY
Qty: 20 TABLET | Refills: 0 | Status: SHIPPED | OUTPATIENT
Start: 2024-10-08 | End: 2024-10-18

## 2024-10-08 NOTE — PROGRESS NOTES
Assessment & Plan     Acute recurrent maxillary sinusitis  Persistent symptoms, will treat with antibiotics. Continue symptomatic care. Follow up if not improving.     - amoxicillin (AMOXIL) 875 MG tablet; Take 1 tablet (875 mg) by mouth 2 times daily for 10 days.                Subjective   Sunshine is a 59 year old, presenting for the following health issues:  Sinus Problem and Ear Problem        10/8/2024    10:49 AM   Additional Questions   Roomed by lynette a     History of Present Illness       Reason for visit:  Sinus and ear pain  Symptom onset:  1-2 weeks ago   She is taking medications regularly.     Patient is a 59 year old female who presents today with a ~2 week history of bilateral maxillary sinus pain and pressure and L > R  ear pain. Associated symptoms include intermittent posterior headache, dental pain, muffled hearing and crackling in the ears. She is also experiencing congestion and post nasal drainage that is worst in the morning, and even caused her to vomit this morning. She has taken OTC Tylenol and sudafed BID for the past several days with minimal benefit. 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 Endorses a history of previous sinus infections with her most recent episode > 1 year ago. Experiences seasonal allergy symptoms and takes Zyrtec and Flonase in the spring months prn, neither of which she is taking currently.    Just finished 5 days of macrobid for UTI.   Acute Illness  Acute illness concerns: sinus and ear  Onset/Duration: x1-2 weeks   Symptoms:  Fever: No  Chills/Sweats: No  Headache (location?): YES- back of head  Sinus Pressure: YES  Conjunctivitis:  No  Ear Pain: YES: bilateral  Rhinorrhea: No  Congestion: No, throat drainage  Sore Throat: No  Cough: no  Wheeze: No  Decreased Appetite: No  Nausea: No  Vomiting: YES  Diarrhea: No  Dysuria/Freq.: No  Dysuria or Hematuria: No  Fatigue/Achiness: YES- body aches  Sick/Strep Exposure: YES- works in a  "school  Therapies tried and outcome: otc tylenol, tylenol cold and flu, sudafed                   Objective    /74 (BP Location: Left arm, Patient Position: Chair, Cuff Size: Adult Large)   Pulse 66   Temp 98.9  F (37.2  C) (Temporal)   Resp (!) 65   Ht 1.622 m (5' 3.86\")   Wt 86.6 kg (191 lb)   LMP  (LMP Unknown)   SpO2 99%   BMI 32.93 kg/m    Body mass index is 32.93 kg/m .  Physical Exam   GENERAL: alert and no distress  EYES: Eyes grossly normal to inspection, PERRL and conjunctivae and sclerae normal  HENT: Bilateral frontal and maxillary facial tenderness. ear canals and TM's normal, nose and mouth without ulcers or lesions  NECK: no adenopathy, no asymmetry, masses, or scars  RESP: lungs clear to auscultation - no rales, rhonchi or wheezes  CV: regular rate and rhythm, normal S1 S2, no S3 or S4, no murmur, click or rub,   MS: no gross musculoskeletal defects noted              Signed Electronically by: Manasa Levine PA-C  The student Roselia LUBIN acted as a scribe and the encounter documented above was completely performed by myself and the documentation reflects the work I have performed today.   Manasa Levine PA-C       "

## 2024-10-15 DIAGNOSIS — F99 INSOMNIA DUE TO OTHER MENTAL DISORDER: ICD-10-CM

## 2024-10-15 DIAGNOSIS — F51.05 INSOMNIA DUE TO OTHER MENTAL DISORDER: ICD-10-CM

## 2024-10-15 RX ORDER — TRAZODONE HYDROCHLORIDE 50 MG/1
100 TABLET, FILM COATED ORAL AT BEDTIME
Qty: 180 TABLET | Refills: 0 | Status: SHIPPED | OUTPATIENT
Start: 2024-10-15

## 2024-10-22 ENCOUNTER — PATIENT OUTREACH (OUTPATIENT)
Dept: CARE COORDINATION | Facility: CLINIC | Age: 59
End: 2024-10-22
Payer: COMMERCIAL

## 2024-10-25 ENCOUNTER — TELEPHONE (OUTPATIENT)
Dept: CARDIOLOGY | Facility: CLINIC | Age: 59
End: 2024-10-25

## 2024-10-25 NOTE — TELEPHONE ENCOUNTER
Patient Contacted for the patient to call back and schedule the following:    Appointment type: RTN EP  Provider: CAMILA BEAVERS  Return date: 02/19/2025  Specialty phone number: [phone] OPT 1  Additional appointment(s) needed: N/A  Additonal Notes: N/A

## 2024-10-28 ENCOUNTER — MYC MEDICAL ADVICE (OUTPATIENT)
Dept: CARDIOLOGY | Facility: CLINIC | Age: 59
End: 2024-10-28
Payer: COMMERCIAL

## 2024-10-28 DIAGNOSIS — E66.01 CLASS 2 SEVERE OBESITY WITH SERIOUS COMORBIDITY AND BODY MASS INDEX (BMI) OF 38.0 TO 38.9 IN ADULT, UNSPECIFIED OBESITY TYPE (H): ICD-10-CM

## 2024-10-28 DIAGNOSIS — E66.812 CLASS 2 SEVERE OBESITY WITH SERIOUS COMORBIDITY AND BODY MASS INDEX (BMI) OF 38.0 TO 38.9 IN ADULT, UNSPECIFIED OBESITY TYPE (H): ICD-10-CM

## 2024-10-29 ENCOUNTER — TELEPHONE (OUTPATIENT)
Dept: ENDOCRINOLOGY | Facility: CLINIC | Age: 59
End: 2024-10-29
Payer: COMMERCIAL

## 2024-10-29 NOTE — TELEPHONE ENCOUNTER
Prior Authorization Approval    Medication: ZEPBOUND 2.5 MG/0.5ML SC SOAJ  Authorization Effective Date: 10/29/2024  Authorization Expiration Date: 10/29/2025  Approved Dose/Quantity: 2ml/ 28 days  Reference #: [id number]   Insurance Company: [employer] Non-Specialty PA's - Phone [phone] Fax [phone]  Expected CoPay: $ 0  CoPay Card Available:      Financial Assistance Needed: No  Which Pharmacy is filling the prescription:    Pharmacy Notified: No  Patient Notified: No

## 2024-11-12 ENCOUNTER — TELEPHONE (OUTPATIENT)
Dept: ENDOCRINOLOGY | Facility: CLINIC | Age: 59
End: 2024-11-12
Payer: COMMERCIAL

## 2024-11-13 NOTE — PROGRESS NOTES
Sunshine Delgado  :  1965  DOS: 11/15/2024  MRN: [id number]  PCP: Lesly Arteaga    Sports Medicine Clinic Visit      Interim History - November 15, 2024  - Last seen on 2024 for right knee primary tricompartmental osteoarthritis and a degenerative medial meniscus tear, chronic ACL tear.   - Since the last visit, she reports a continuation of her symptoms. She is hoping for an injection into her knee today in hopes of similar relief as the last injection.   - No interim injury.       Interim History - 2024  - Last seen in clinic on 2023 for right knee primary tricompartmental osteoarthritis and a degenerative medial meniscus tear, chronic ACL tear.  - Right knee corticosteroid injection completed on 2023 provided moderate to great relief for 5+ months.      - Since the last visit, she notes a return in right knee pain and a new injury .   - Injury on 2024 where she reports that she fell down the stairs while trying to help her dog.  She held onto the railing and her right leg twisted underneath her and what sounds like a varus to valgus force.  She felt a significant pop during the injury and was unable to bear weight afterward.  She noticed significant swelling of the knee afterward as well.  She has had a difficult time weightbearing since the injury and has had continued pain throughout the knee.  Also with significant feelings of instability, even while in her lateral J brace.  She was using crutches initially after her ED visit on 2024, but discontinued due to discomfort.  Also using Tylenol as needed for additional pain control.  Does not take medications often.  She is concerned about her knee after this recent injury, as she has had more pain than usual and more instability.    - XR R knee 2024 shows normal anatomic alignment with mild to moderate degenerative changes of the medial compartment, no fractures or dislocations.  There is a small knee joint  "effusion and distal quadriceps tendon enthesopathy.      Interim History - July 28, 2023  - Last seen in clinic on 3/17/2023 for right knee primary osteoarthritis and a degenerative medial meniscus tear. Performed CSI (5 months of relief from CSI in 09/2022), given lateral J brace, did not attend PT so given HEP.   - Right knee injection completed on 3/17/23 provided 2.5 months of great relief.   - Since the last visit, patient notes a return in right anteromedial knee pain over the last 2 months. Pain with descending stairs. Also, she notes that her knee feels like it is buckling while walking, on occasion. Has been using the hot tub and icing lately. Is hopeful for a repeat corticosteroid injection today.   - No interim injury.       Interim History - March 17, 2023  Since last visit on 9/23/2022 patient has noticed a return/increase in medial right knee pain.  Right knee corticosteroid injection completed on 9/23/2022 provided great relief for 5 months. Has been using ice and heat (hot tub). Feeling of instability, buckling and a need to \"pop\" with sudden movements occasionally when walking quickly. Pain with going down stairs and slippery surfaces.  Did not start physical therapy. Has not gotten a brace.  No new injury in the interim. Also, she has lost over 30 pounds since our last visit, which has helped her overall knee pain.     Initial Visit: September 23, 2022  HPI  Sunshine Delgado is a 57 year old female who is seen in consultation at the request of  Hadley Grace PA-C presenting with right knee pain. MRI on file.    Mechanism of Injury: No acute injury onset.      Duration and progression of pain: Knee pain started in early June 2022. Worsening.   Location of pain: Right medial knee.  Radiation: from medial right knee to the right hip    Swelling: yes  Instability: yes  Mechanical symptoms: chronic popping, no locking    Numbness/Tingling: medial right knee numbness occasionally  Radicular pain: " no  Weakness: yes, limited by pain    Alleviating factors: hottub helps with muscle tightness.  Aggravating factors: extension of the right leg. Notes constant medial knee pain.     Treatments tried (medications, injections, bracing, therapy, modalities): ice and Tylenol. Heats in the hottub nightly. [place]    Prior medical visits related to complaint:  Followed by SUPRIYA Stubbs.   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    Past Medical History:   Diagnosis Date    Antiplatelet or antithrombotic long-term use     Arrhythmia     Atrial fibrillation with rapid ventricular response (H) 1/26/2020    Bee sting allergy     Depressive disorder     Generalized anxiety disorder 10/15/2009    lexapro made things worse.      Hashimoto's thyroiditis 5/6/2020    Morbid obesity (H)     Ocular migraine     MARIA GUADALUPE (obstructive sleep apnea)- severe (AHI 84) 09/09/2011    patient not using since 2019    Primary osteoarthritis of right knee 9/23/2022    Renal disease     Voice fatigue 10/22/2009     Past Surgical History:   Procedure Laterality Date    ANESTHESIA CARDIOVERSION N/A 11/22/2021    Procedure: ANESTHESIA, FOR CARDIOVERSION@1515;  Surgeon: GENERIC ANESTHESIA PROVIDER;  Location:  OR    COLONOSCOPY  2000    DAVINCI GASTRIC SLEEVE      EP ABLATION FOCAL AFIB N/A 7/22/2021    Procedure: EP ABLATION FOCAL AFIB;  Surgeon: Vicente Craig MD;  Location:  HEART CARD[facility] CATH LAB    EP ABLATION FOCAL AFIB N/A 3/31/2022    Procedure: Ablation Focal Atrial Fibrillation;  Surgeon: Vicente Craig MD;  Location:  HEART CARDI[facility] CATH  LAB    GENITOURINARY SURGERY  2007    HYSTERECTOMY, PAP NO LONGER INDICATED      HYSTERECTOMY, VAGINAL  2007    still has ovaries    LAPAROSCOPIC GASTRIC SLEEVE N/A 3/13/2018    Procedure: LAPAROSCOPIC GASTRIC SLEEVE;  Laparoscopic Sleeve Gastrectomy;  Surgeon: Kameron Joseph MD;  Location:  OR     Family History   Problem Relation Age of Onset    Eye Disorder Mother         lost eyesight; probable macular degeneration    Psychotic Disorder Mother         Dementia /Alzhimers    Neurologic Disorder Mother         seizures    Diabetes Mother     Hypertension Mother     Alzheimer Disease Mother     Arthritis Mother     Osteoporosis Mother     Obesity Mother     Diabetes Father     Depression Father     Hyperlipidemia Father     Obesity Father     Psychotic Disorder Sister         bipolar    Thyroid Disease Sister         h/o thyroid cancer    Depression Sister         Bipolar    Obesity Sister     Cancer Other         Brain cancer    Osteoporosis Maternal Grandmother     Anxiety Disorder Son     Depression Sister     Thyroid Disease Sister        Objective  LMP  (LMP Unknown)     General: healthy, alert and in no acute distress    HEENT: no scleral icterus or conjunctival erythema   Skin: no suspicious lesions or rash. No jaundice.   CV: regular rhythm by palpation, 2+ distal pulses, no pedal edema    Resp: normal respiratory effort without conversational dyspnea   Psych: normal mood and affect    Gait: nonantalgic, appropriate coordination and balance     Neuro:        - Sensation to light touch:  Intact throughout the BLE including all peripheral nerve distributions.        - MSR:  2+ in bilateral patella, achilles.        - Special tests:   - Slump/SLR:  Neg bilaterally    MSK - Knee:       - Inspection:    - Mild effusion present without surrounding erythema, warmth, ecchymosis, lesion.        - ROM:     - Limited slightly in flexion by anteromedial knee pain.       - Palpation:    - TTP at the medial  joint line, MCL  - NTTP elsewhere.        - Strength:    - 5/5 in BLE including HF, Hab, Hadd, KF*, KE*, DF, PF, EHL, Inv, Ev.        - Special tests:        - Lachman: Positive 2B with slight pain        - A/P drawer: Positive       - Pivot shift: Positive   - Garry:  Pos for click and medial compartment pain     - Varus stress:  Neg for laxity, positive for mild pain    - Valgus stress:  Neg for laxity, pos for mild pain   - Patellar grind:  Pos      Radiology  I previously independently reviewed the available relevant imaging, including MRI R Knee (6/17/22), and agree with the interpretation of mild-moderate tricompartmental cartilage defects, degenerative tear of the medial meniscus, and chronic ACL tear.     I independently reviewed recent relevant imaging, with the following interpretation:  - XR R knee 2/6/2024 shows mild medial compartment degenerative changes and enthesopathy of the superior patella pole, small knee joint effusion present.  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  Multidirectional tears of the medial and lateral menisci.  High-grade tearing of the ACL and MCL bursitis present.      Procedure  Large Joint Injection/Arthocentesis: R knee joint    Date/Time: 11/15/2024 10:10 AM    Performed by: Roshan Farr DO  Authorized by: Roshan Farr DO    Indications:  Pain  Needle Size:  22 G  Guidance: landmark guided    Location:  Knee      Medications:  40 mg triamcinolone 40 MG/ML  Outcome:  Tolerated well, no immediate complications  Procedure discussed: discussed risks, benefits, and alternatives    Consent Given by:  Patient  Timeout: timeout called immediately prior to procedure    Prep: patient was prepped and draped in usual sterile fashion      Intraarticular Knee Joint Injection  The patient was informed of the risks and benefits of the procedure and alternatives were  discussed. A written consent was signed by the patient.   The injection site was prepped with chlorhexidine in sterile fashion.   An injectate solution containing 2 mL of 1% lidocaine, 2 mL of 0.5% bupivacaine, and 1 mL of Kenalog (40 mg/mL) was drawn up into a 5 mL syringe.  Injection was performed using sterile technique.  A 1.5-inch 22-gauge needle was used to enter the knee joint using a lateral infrapatellar approach and injectate was injected successfully. After the injection, the site was cleaned and a bandage applied. The patient tolerated the procedure well without complications.       Assessment  1. Primary osteoarthritis of right knee    2. Degenerative tear of medial meniscus, right    3. Rupture of anterior cruciate ligament of right knee, subsequent encounter        Plan  Sunshine Delgado is a 57 year old female that presents for follow up of her right knee pain secondary to primary osteoarthritis and a degenerative medial meniscus tear and a chronic ACL tear.      2/9/24:  She is following up specifically today because she had a significant recent injury to the right knee.  She had a fall while on the stairs that resulted in a varus to valgus twisting mechanism injury to the knee where she fell a loud pop and had severe pain, instability, and inability to bear weight on the knee after the injury.  Significant effusion, stiffness afterward as well.  On exam, she had a positive Lachman and pivot shift, positive Garry medial and lateral.  Based on her known injuries in the knee as above, concern that she has progressed to a complete ACL tear or possible other internal derangement to the knee that would explain the effusion and inability to bear weight.  It will be important to evaluate the integrity of the soft tissues of the knee with advanced imaging (MRI).  MRI order has been placed today and instructions given for scheduling MRI and follow-up with me afterward.   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   - Recommended continuing with nonoperative conservative treatments for the knee after discussion.    11/15/24:  She presents today with return of pain in the right knee, achy in nature and worse with weightbearing activities.  No mechanical locking symptoms or any worsening instability.  Hopeful for a corticosteroid injection today in hopes of similar relief as last injections.  No other significant concerns at this time.    We discussed the nature of the condition and additional treatment options and mutually agreed upon the following plan:    - Imaging:       - Reviewed results and images of her recent relevant imaging with patient and questions were answered.  - Medications:         - Discussed pharmacologic options for pain relief. May continue to use Tylenol PRN, avoid NSAIDs due to kidney problems. May also use topical creams such as IcyHot, BioFreeze, or Voltaren gel PRN.   - Injections:         - Great relief from CSI injections, requesting another injection today in hopes of similar relief.   - Performed a corticosteroid injection of the right knee joint today in clinic. Patient tolerated the procedure well without complications.   - Post-procedure instructions:    - Keep the injection site clean and dry.   - Do not submerge the injection site for 24 hours (no baths, pools). Showers are ok.   - Rest the area for 24-48 hours before resuming normal activities. Avoid overexerting the area for the first few weeks.   - It may take 2-3 days to start noticing the effects of the injection and up to 3-4 weeks to feel significant benefits.   - In general, an injection in the same anatomical region can be repeated every 3 months as needed.  However, for the health of your tissues you will want to space out the injections as much as  possible and request them only when symptoms are significantly bothersome and disrupting daily function and/or sleep.   - Prior authorization previously submitted for viscosupplementation injection.  Determined that no prior authorization is necessary.  We may consider viscosupplementation injections in the future as indicated.  - Therapy:     - Continue home exercise program as instructed.  - Modalities:         - May use ice, heat, massage PRN.   - Bracing:         - Continue lateral J brace for walking and exacerbating activities as needed.   - Activity:         - Activities as tolerated. Avoid high impact exercise, and activities that are high risk for falls.  - Follow up:         - In 3+ months as needed for reevaluation and update to treatment plan.  Sooner for new/worsening symptoms.       - Patient has clinic contact information for questions or concerns.       Roshan Farr DO, GARRICK  [facility] - Sports Medicine  [facility] - Department of Orthopedic Surgery

## 2024-11-15 ENCOUNTER — OFFICE VISIT (OUTPATIENT)
Dept: ORTHOPEDICS | Facility: CLINIC | Age: 59
End: 2024-11-15
Payer: COMMERCIAL

## 2024-11-15 DIAGNOSIS — M17.11 PRIMARY OSTEOARTHRITIS OF RIGHT KNEE: Primary | ICD-10-CM

## 2024-11-15 DIAGNOSIS — S83.511D RUPTURE OF ANTERIOR CRUCIATE LIGAMENT OF RIGHT KNEE, SUBSEQUENT ENCOUNTER: ICD-10-CM

## 2024-11-15 DIAGNOSIS — M23.203 DEGENERATIVE TEAR OF MEDIAL MENISCUS, RIGHT: ICD-10-CM

## 2024-11-15 PROCEDURE — 99213 OFFICE O/P EST LOW 20 MIN: CPT | Mod: 25 | Performed by: STUDENT IN AN ORGANIZED HEALTH CARE EDUCATION/TRAINING PROGRAM

## 2024-11-15 PROCEDURE — 20610 DRAIN/INJ JOINT/BURSA W/O US: CPT | Mod: RT | Performed by: STUDENT IN AN ORGANIZED HEALTH CARE EDUCATION/TRAINING PROGRAM

## 2024-11-15 RX ADMIN — TRIAMCINOLONE ACETONIDE 40 MG: 40 INJECTION, SUSPENSION INTRA-ARTICULAR; INTRAMUSCULAR at 10:10

## 2024-11-15 NOTE — NURSING NOTE
[facility]   ORTHOPEDICS & SPORTS MEDICINE  [address] [place] [address]  Dept: [phone]  ______________________________________________________________________________    Patient: Sunshine Delgado   : 1965   MRN: [id number]   November 15, 2024    INVASIVE PROCEDURE SAFETY CHECKLIST    Date: 11/15/24   Procedure: Right Knee Injection  Patient Name: Sunshine Delgado  MRN: [id number]  YOB: 1965    Action: Complete sections as appropriate. Any discrepancy results in a HARD COPY until resolved.     PRE PROCEDURE:  Patient ID verified with 2 identifiers (name and  or MRN): Yes  Procedure and site verified with patient/designee (when able): Yes  Accurate consent documentation in medical record: Yes  H&P (or appropriate assessment) documented in medical record: Yes  H&P must be up to 20 days prior to procedure and updates within 24 hours of procedure as applicable: NA  Relevant diagnostic and radiology test results appropriately labeled and displayed as applicable: NA  Procedure site(s) marked with provider initials: NA    TIMEOUT:  Time-Out performed immediately prior to starting procedure, including verbal and active participation of all team members addressing the following:Yes  * Correct patient identify  * Confirmed that the correct side and site are marked  * An accurate procedure consent form  * Agreement on the procedure to be done  * Correct patient position  * Relevant images and results are properly labeled and appropriately displayed  * The need to administer antibiotics or fluids for irrigation purposes during the procedure as applicable   * Safety precautions based on patient history or medication use    DURING PROCEDURE: Verification of correct person, site, and procedures any time the responsibility for care of the patient is transferred to another member of the care team.       Prior to injection, verified patient identity using patient's name and date of  birth.  Due to injection administration, patient instructed to remain in clinic for 15 minutes  afterwards, and to report any adverse reaction to me immediately.    Joint injection was performed.      Drug Amount Wasted:  None.  Vial/Syringe: Single dose vial  Expiration Date:  07/2026      Emiilano English, [facility]  November 15, 2024   none

## 2024-11-15 NOTE — LETTER
11/15/2024      Sunshine Delgado  [address]  [place] [address]      Dear Colleague,    Thank you for referring your patient, Sunshine Delgado, to the [facility] [place]. Please see a copy of my visit note below.    Sunshine Delgado  :  1965  DOS: 11/15/2024  MRN: [id number]  PCP: Lesly Arteaga    Sports Medicine Clinic Visit      Interim History - November 15, 2024  - Last seen on 2024 for right knee primary tricompartmental osteoarthritis and a degenerative medial meniscus tear, chronic ACL tear.   - Since the last visit, she reports a continuation of her symptoms. She is hoping for an injection into her knee today in hopes of similar relief as the last injection.   - No interim injury.       Interim History - 2024  - Last seen in clinic on 2023 for right knee primary tricompartmental osteoarthritis and a degenerative medial meniscus tear, chronic ACL tear.  - Right knee corticosteroid injection completed on 2023 provided moderate to great relief for 5+ months.      - Since the last visit, she notes a return in right knee pain and a new injury .   - Injury on 2024 where she reports that she fell down the stairs while trying to help her dog.  She held onto the railing and her right leg twisted underneath her and what sounds like a varus to valgus force.  She felt a significant pop during the injury and was unable to bear weight afterward.  She noticed significant swelling of the knee afterward as well.  She has had a difficult time weightbearing since the injury and has had continued pain throughout the knee.  Also with significant feelings of instability, even while in her lateral J brace.  She was using crutches initially after her ED visit on 2024, but discontinued due to discomfort.  Also using Tylenol as needed for additional pain control.  Does not take medications often.  She is concerned about her knee after this recent  "injury, as she has had more pain than usual and more instability.    - XR R knee 2/6/2024 shows normal anatomic alignment with mild to moderate degenerative changes of the medial compartment, no fractures or dislocations.  There is a small knee joint effusion and distal quadriceps tendon enthesopathy.      Interim History - July 28, 2023  - Last seen in clinic on 3/17/2023 for right knee primary osteoarthritis and a degenerative medial meniscus tear. Performed CSI (5 months of relief from CSI in 09/2022), given lateral J brace, did not attend PT so given HEP.   - Right knee injection completed on 3/17/23 provided 2.5 months of great relief.   - Since the last visit, patient notes a return in right anteromedial knee pain over the last 2 months. Pain with descending stairs. Also, she notes that her knee feels like it is buckling while walking, on occasion. Has been using the hot tub and icing lately. Is hopeful for a repeat corticosteroid injection today.   - No interim injury.       Interim History - March 17, 2023  Since last visit on 9/23/2022 patient has noticed a return/increase in medial right knee pain.  Right knee corticosteroid injection completed on 9/23/2022 provided great relief for 5 months. Has been using ice and heat (hot tub). Feeling of instability, buckling and a need to \"pop\" with sudden movements occasionally when walking quickly. Pain with going down stairs and slippery surfaces.  Did not start physical therapy. Has not gotten a brace.  No new injury in the interim. Also, she has lost over 30 pounds since our last visit, which has helped her overall knee pain.     Initial Visit: September 23, 2022  HPI  Sunhsine Delgado is a 57 year old female who is seen in consultation at the request of  Hadley Grace PA-C presenting with right knee pain. MRI on file.    Mechanism of Injury: No acute injury onset.      Duration and progression of pain: Knee pain started in early June 2022. Worsening.   Location " of pain: Right medial knee.  Radiation: from medial right knee to the right hip    Swelling: yes  Instability: yes  Mechanical symptoms: chronic popping, no locking    Numbness/Tingling: medial right knee numbness occasionally  Radicular pain: no  Weakness: yes, limited by pain    Alleviating factors: hottub helps with muscle tightness.  Aggravating factors: extension of the right leg. Notes constant medial knee pain.     Treatments tried (medications, injections, bracing, therapy, modalities): ice and Tylenol. Heats in the hottub nightly. [place]    Prior medical visits related to complaint:  Followed by SUPRIYA Stubbs.   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    Past Medical History:   Diagnosis Date     Antiplatelet or antithrombotic long-term use      Arrhythmia      Atrial fibrillation with rapid ventricular response (H) 1/26/2020     Bee sting allergy      Depressive disorder      Generalized anxiety disorder 10/15/2009    lexapro made things worse.       Hashimoto's thyroiditis 5/6/2020     Morbid obesity (H)      Ocular migraine      MARIA GUADALUPE (obstructive sleep apnea)- severe (AHI 84) 09/09/2011    patient not using since 2019     Primary osteoarthritis of right knee 9/23/2022     Renal disease      Voice fatigue 10/22/2009     Past Surgical History:   Procedure Laterality Date     ANESTHESIA CARDIOVERSION N/A 11/22/2021    Procedure: ANESTHESIA, FOR CARDIOVERSION@1515;  Surgeon: GENERIC ANESTHESIA PROVIDER;  Location: [facility] OR     COLONOSCOPY  2000     [facility] GASTRIC SLEEVE       EP ABLATION FOCAL AFIB N/A  7/22/2021    Procedure: EP ABLATION FOCAL AFIB;  Surgeon: Vicente Craig MD;  Location:  HEART CARDIAC CATH LAB     EP ABLATION FOCAL AFIB N/A 3/31/2022    Procedure: Ablation Focal Atrial Fibrillation;  Surgeon: Vicente Craig MD;  Location:  HEART CARDIAC CATH LAB     GENITOURINARY SURGERY  2007     HYSTERECTOMY, PAP NO LONGER INDICATED       HYSTERECTOMY, VAGINAL  2007    still has ovaries     LAPAROSCOPIC GASTRIC SLEEVE N/A 3/13/2018    Procedure: LAPAROSCOPIC GASTRIC SLEEVE;  Laparoscopic Sleeve Gastrectomy;  Surgeon: Kameron Joseph MD;  Location:  OR     Family History   Problem Relation Age of Onset     Eye Disorder Mother         lost eyesight; probable macular degeneration     Psychotic Disorder Mother         Dementia /Alzhimers     Neurologic Disorder Mother         seizures     Diabetes Mother      Hypertension Mother      Alzheimer Disease Mother      Arthritis Mother      Osteoporosis Mother      Obesity Mother      Diabetes Father      Depression Father      Hyperlipidemia Father      Obesity Father      Psychotic Disorder Sister         bipolar     Thyroid Disease Sister         h/o thyroid cancer     Depression Sister         Bipolar     Obesity Sister      Cancer Other         Brain cancer     Osteoporosis Maternal Grandmother      Anxiety Disorder Son      Depression Sister      Thyroid Disease Sister        Objective  LMP  (LMP Unknown)     General: healthy, alert and in no acute distress    HEENT: no scleral icterus or conjunctival erythema   Skin: no suspicious lesions or rash. No jaundice.   CV: regular rhythm by palpation, 2+ distal pulses, no pedal edema    Resp: normal respiratory effort without conversational dyspnea   Psych: normal mood and affect    Gait: nonantalgic, appropriate coordination and balance     Neuro:        - Sensation to light touch:  Intact throughout the BLE including all peripheral nerve distributions.        - MSR:  2+ in bilateral patella, achilles.         - Special tests:   - Slump/SLR:  Neg bilaterally    MSK - Knee:       - Inspection:    - Mild effusion present without surrounding erythema, warmth, ecchymosis, lesion.        - ROM:     - Limited slightly in flexion by anteromedial knee pain.       - Palpation:    - TTP at the medial joint line, MCL  - NTTP elsewhere.        - Strength:    - 5/5 in BLE including HF, Hab, Hadd, KF*, KE*, DF, PF, EHL, Inv, Ev.        - Special tests:        - Lachman: Positive 2B with slight pain        - A/P drawer: Positive       - Pivot shift: Positive   - Garry:  Pos for click and medial compartment pain     - Varus stress:  Neg for laxity, positive for mild pain    - Valgus stress:  Neg for laxity, pos for mild pain   - Patellar grind:  Pos      Radiology  I previously independently reviewed the available relevant imaging, including MRI R Knee (6/17/22), and agree with the interpretation of mild-moderate tricompartmental cartilage defects, degenerative tear of the medial meniscus, and chronic ACL tear.     I independently reviewed recent relevant imaging, with the following interpretation:  - XR R knee 2/6/2024 shows mild medial compartment degenerative changes and enthesopathy of the superior patella pole, small knee joint effusion present.  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  Multidirectional tears of the medial and lateral menisci.  High-grade tearing of the ACL and MCL bursitis present.      Procedure  Large Joint Injection/Arthocentesis: R knee joint    Date/Time: 11/15/2024 10:10 AM    Performed by: Roshan Farr DO  Authorized by: Roshan Farr DO    Indications:  Pain  Needle Size:  22 G  Guidance: landmark guided    Location:  Knee      Medications:  40 mg triamcinolone 40 MG/ML  Outcome:  Tolerated well, no immediate complications  Procedure discussed: discussed risks, benefits, and alternatives     Consent Given by:  Patient  Timeout: timeout called immediately prior to procedure    Prep: patient was prepped and draped in usual sterile fashion      Intraarticular Knee Joint Injection  The patient was informed of the risks and benefits of the procedure and alternatives were discussed. A written consent was signed by the patient.   The injection site was prepped with chlorhexidine in sterile fashion.   An injectate solution containing 2 mL of 1% lidocaine, 2 mL of 0.5% bupivacaine, and 1 mL of Kenalog (40 mg/mL) was drawn up into a 5 mL syringe.  Injection was performed using sterile technique.  A 1.5-inch 22-gauge needle was used to enter the knee joint using a lateral infrapatellar approach and injectate was injected successfully. After the injection, the site was cleaned and a bandage applied. The patient tolerated the procedure well without complications.       Assessment  1. Primary osteoarthritis of right knee    2. Degenerative tear of medial meniscus, right    3. Rupture of anterior cruciate ligament of right knee, subsequent encounter        Plan  Sunshine Delgado is a 57 year old female that presents for follow up of her right knee pain secondary to primary osteoarthritis and a degenerative medial meniscus tear and a chronic ACL tear.      2/9/24:  She is following up specifically today because she had a significant recent injury to the right knee.  She had a fall while on the stairs that resulted in a varus to valgus twisting mechanism injury to the knee where she fell a loud pop and had severe pain, instability, and inability to bear weight on the knee after the injury.  Significant effusion, stiffness afterward as well.  On exam, she had a positive Lachman and pivot shift, positive Garry medial and lateral.  Based on her known injuries in the knee as above, concern that she has progressed to a complete ACL tear or possible other internal derangement to the knee that would explain the effusion  and inability to bear weight.  It will be important to evaluate the integrity of the soft tissues of the knee with advanced imaging (MRI).  MRI order has been placed today and instructions given for scheduling MRI and follow-up with me afterward.   - MRI showed multidirectional tearing of the medial and lateral menisci with continued presence of the high-grade ACL tear, MCL bursitis, and grade IV chondromalacia of the medial and patellofemoral compartments.   - Recommended continuing with nonoperative conservative treatments for the knee after discussion.    11/15/24:  She presents today with return of pain in the right knee, achy in nature and worse with weightbearing activities.  No mechanical locking symptoms or any worsening instability.  Hopeful for a corticosteroid injection today in hopes of similar relief as last injections.  No other significant concerns at this time.    We discussed the nature of the condition and additional treatment options and mutually agreed upon the following plan:    - Imaging:       - Reviewed results and images of her recent relevant imaging with patient and questions were answered.  - Medications:         - Discussed pharmacologic options for pain relief. May continue to use Tylenol PRN, avoid NSAIDs due to kidney problems. May also use topical creams such as IcyHot, BioFreeze, or Voltaren gel PRN.   - Injections:         - Great relief from CSI injections, requesting another injection today in hopes of similar relief.   - Performed a corticosteroid injection of the right knee joint today in clinic. Patient tolerated the procedure well without complications.   - Post-procedure instructions:    - Keep the injection site clean and dry.   - Do not submerge the injection site for 24 hours (no baths, pools). Showers are ok.   - Rest the area for 24-48 hours before resuming normal activities. Avoid overexerting the area for the first few weeks.   - It may take 2-3 days to start noticing  the effects of the injection and up to 3-4 weeks to feel significant benefits.   - In general, an injection in the same anatomical region can be repeated every 3 months as needed.  However, for the health of your tissues you will want to space out the injections as much as possible and request them only when symptoms are significantly bothersome and disrupting daily function and/or sleep.   - Prior authorization previously submitted for viscosupplementation injection.  Determined that no prior authorization is necessary.  We may consider viscosupplementation injections in the future as indicated.  - Therapy:     - Continue home exercise program as instructed.  - Modalities:         - May use ice, heat, massage PRN.   - Bracing:         - Continue lateral J brace for walking and exacerbating activities as needed.   - Activity:         - Activities as tolerated. Avoid high impact exercise, and activities that are high risk for falls.  - Follow up:         - In 3+ months as needed for reevaluation and update to treatment plan.  Sooner for new/worsening symptoms.       - Patient has clinic contact information for questions or concerns.       Roshan Farr DO, CAQSM  [facility] - Sports Medicine  [facility] - Department of Orthopedic Surgery       Again, thank you for allowing me to participate in the care of your patient.        Sincerely,        Roshan Farr DO

## 2024-11-15 NOTE — PROGRESS NOTES
"Video-Visit Details    Type of service:  Video Visit    Video Start Time: 10:57 AM   Video End Time: 11:11 AM     Originating Location (pt. Location): Other - parked car     Distant Location (provider location):  Offsite (providers home) [facility] [place]     Platform used for Video Visit: ADstruc    Nutrition Assessment  Reason For Visit:  Sunshine Delgado is a 59 year old female presents today for new re-establish nutrition visit. Pt with history of sleeve gastrectomy in 2018 with Dr. Joseph.      Patient referred by SUPRIYA Swann on January 23, 2024.     Patient with Co-morbidities of obesity including:  Type II DM no  Renal Failure no  Sleep apnea no  Hypertension no   Dyslipidemia no  Joint pain no  Back pain no  GERD yes    Anthropometrics:  Weight prior to surgery: 289 lb   Weight after surgery: 192 lb   Current Weight: 178 lb   Estimated body mass index is 30.55 kg/m  as calculated from the following:    Height as of this encounter: 1.626 m (5' 4\").    Weight as of this encounter: 80.7 kg (178 lb).    Goal weight for patient is 160 lb.     Weight Loss Medications: Zepbound 15 mg - no negative side effects     Current Vitamins/Minerals: Daily Women's MVI, Biotin, Selenium     Nutrition History:      9/12/2017     3:00 PM   Recall Diet Questions Reviewed With Patient   Describe what you typically consume for breakfast (typical or most recent) Coffee protein bar eggs    Describe what you typically consume for lunch (typical or most recent) Salad with chicken cheese tomatoes    Describe what you typically consume for supper (typical or most recent) Pizza or sandwich or tacos   Describe what you typically consume as snacks (typical or most recent) Popcorn, almonds, chocolate,,protein idalia   How many ounces of water, or other low calorie drinks, do you drink daily (8 oz=1 glass)? 64 oz or more   How many ounces of caffeine (coffee, tea, pop) do you drink daily (8 oz=1 " glass)? 16 oz   How often do you drink alcohol? Monthly or less   If you do drink alcohol, how many drinks might you have in a day? (one drink = 5 oz. wine, 1 can/bottle of beer, 1 shot liquor) 1 or 2           9/12/2017     3:00 PM   Eating Habits   Do you have any dietary restrictions? No   Do you currently binge eat (eat a large amount of food in a short time)? No   Are you an emotional eater? Yes   Do you get up to eat after falling asleep? No   What foods do you crave? Sweets     Recent food recall: -   Eats small frequent meals, focus is high protein.   Snacks: bowl of cottage cheese, fruit, Ensure protein shake   Beverages: Drinks a lot of water most days, adds Liquid IV flavor packet.  1 cup of coffee. Still separates liquids from meal times.     Eating more fruits and vegetables. Has some constipation at times, added a fiber drink and this helps. Patient is doing really well. She is very pleased with how the Zepbound has been working for her.     Physical Activity:  Gets a lot of walks in.     Nutrition Prescription:  Grams Protein: 60-90  Amount of Fluid: 48-64 oz    Nutrition Diagnosis  Obesity r/t long history of positive energy balance aeb BMI >30.    Intervention  Materials/Education provided on maintenance dietary guidelines after bariatric surgery, portion sizes, eating pace and chewing well, snacking, separation of beverages from meals, vitamin and mineral supplements after weight loss surgery, and protein needs. Discussed importance of physical activity.      Provided education on nutrition considerations when on a GLP1 medication including, changes in meal/snack volumes; meeting protein, hydration and micronutrient needs; limiting high-fat foods; and diet/lifestyle strategies for preventing constipation.    Patient demonstrates understanding. Provided pt with list of goals and RD contact information.    Expected Engagement: good    Goals:  1) Continue to aim for meeting 60-90 grams of protein  daily.   2) Try to increase fiber intake as able, continue with fiber supplement as needed.   3) Aim for at least 64 oz of water daily.     After Weight Loss Surgery    Why Take Supplements for Life after Weight Loss Surgery  [email or web address]     Supplements after Sleeve Gastrectomy, Gastric Bypass or Single Anastomosis Duodenal Switch  [email or web address]    Keeping Up Your Diet after Weight Loss Surgery  [email or web address]    Preventing Low Blood Sugar after Weight Loss Surgery  [email or web address]     Preventing Dumping Syndrome after Weight Loss Surgery  [email or web address]     Follow-Up: as needed.      Time spent with patient: 14 minutes.  Christa Webb RD, LD

## 2024-11-18 ENCOUNTER — VIRTUAL VISIT (OUTPATIENT)
Dept: ENDOCRINOLOGY | Facility: CLINIC | Age: 59
End: 2024-11-18
Payer: COMMERCIAL

## 2024-11-18 VITALS — HEIGHT: 64 IN | WEIGHT: 178 LBS | BODY MASS INDEX: 30.39 KG/M2

## 2024-11-18 DIAGNOSIS — E66.9 OBESITY: ICD-10-CM

## 2024-11-18 DIAGNOSIS — Z71.3 NUTRITIONAL COUNSELING: Primary | ICD-10-CM

## 2024-11-18 DIAGNOSIS — Z98.84 S/P LAPAROSCOPIC SLEEVE GASTRECTOMY: ICD-10-CM

## 2024-11-18 PROCEDURE — 97802 MEDICAL NUTRITION INDIV IN: CPT | Mod: 95

## 2024-11-18 PROCEDURE — 99207 PR NO CHARGE LOS: CPT | Mod: 95

## 2024-11-18 ASSESSMENT — PAIN SCALES - GENERAL: PAINLEVEL_OUTOF10: NO PAIN (0)

## 2024-11-18 NOTE — PATIENT INSTRUCTIONS
Terrell Gonsalez,     It was nice to meet you today!    Follow-up with RD as needed.     Thank you,    Christa Webb, [place], LD  If you would like to schedule or reschedule an appointment with the RD, please call [phone]    Nutrition Goals  1) Continue to aim for meeting 60-90 grams of protein daily.   2) Try to increase fiber intake as able, continue with fiber supplement as needed.   3) Aim for at least 64 oz of water daily.     After Weight Loss Surgery    Why Take Supplements for Life after Weight Loss Surgery  [email or web address]     Supplements after Sleeve Gastrectomy, Gastric Bypass or Single Anastomosis Duodenal Switch  [email or web address]    Keeping Up Your Diet after Weight Loss Surgery  [email or web address]    Preventing Low Blood Sugar after Weight Loss Surgery  [email or web address]     Preventing Dumping Syndrome after Weight Loss Surgery  [email or web address]     COMPREHENSIVE WEIGHT MANAGEMENT PROGRAM  VIRTUAL SUPPORT GROUPS    At [facility], our Comprehensive Weight Management program offers on-line support groups for patients who are working on weight loss and considering, preparing for, or have had weight loss surgery.     There is no cost for this opportunity.  You are invited to attend the?Virtual Support Groups?provided by any of the following locations:    [facility] via [employer] Teams with Pavithra Simon RD, RN  2.   [place] via [employer] with Sy Gold, PhD, LP  3.   [place] via [employer] with Claudia Miller RN  4.   [facility] via a Zoom Meeting with ZAYRA Avilez    The following Support Group information can also be found on our website:  [email or web address]      [facility]   WEIGHT LOSS SURGERY SUPPORT GROUP  The support group is a patient-lead forum that meets monthly to share experiences, encouragement  "and education. It is open to those who have had weight loss surgery, are scheduled for surgery, or are considering surgery.   WHEN: 3rd Wednesday of each month from 5:00PM - 6:00PM using Microsoft Teams.   FACILITATOR: Led by Pavithra Roy RD, LD, RN, the program's Clinical Coordinator.   TO REGISTER: Please contact the clinic via Spring or call the nurse line directly at [phone] to inform our staff that you would like an invite sent to you and to let us know the email you would like the invite sent to. Prior to the meeting, a link with directions on how to join the meeting will be sent to you.    2024 Meetings September 18: Desiree Falcon, PhD, Outpatient Clinical Therapist, \"Pro Tips for Habit Change\".  October 16:  Let's Talk  This will be a time of group support.??   November 20:  Let's Talk  about How to Navigate the Upcoming Holiday Season.  December 18:  Let's Talk  and Celebrate the past year.       [facility] BARIATRIC CARE SUPPORT GROUP  This is open to all pre- and post- operative bariatric surgery patients as well as their support system.   WHEN: 3rd Tuesday of each month from 6:30 PM - 8:00 PM using Microsoft Teams.   FACILITATOR: Led by Sy Gold, Ph.D who is a Licensed Psychologist with the [facility] Comprehensive Weight Management Program.   TO REGISTER: Please send an email to Sy Gold, Ph.D., LP at?[email or web address] you would like an invitation to the group. Prior to the meeting, a link with directions on how to join the meeting will be sent to you.    2024 Meetings September 17: IN PERSON MEETING. [facility] [place], [address], 1st floor Conference Room.  October 15: Date changed to OCTOBER 8th (2nd Tuesday).   November 19: \"Holiday Eating\", Breana Bautista RD, LD.  December 17: \"Hospital Stay and Compliance\", Claudia Miller, VELMA, CBN.      [facility] and " [facility] POST-OPERATIVE BARIATRIC SURGERY SUPPORT GROUP  This is a support group for [facility] bariatric patients (and those external to [facility]) who have had bariatric surgery and are at least 3 months post-surgery.  WHEN: 4th Thursday of the month from 11:00 AM - 12:00 PM using Microsoft Teams.   FACILITATOR: Led by Certified Bariatric Nurse, Claudia Miller RN.   TO REGISTER: Please send an email to Claudia at [email or web address] if you would like an invitation to the group.  Prior to the meeting, a link with directions on how to join the meeting will be sent to you.    2024 Meetings  September 26 October 24 November 28: No meeting  December 26    [facility]   HEALTHY LIFESTYLE COACHING GROUP  This is a 60 minute virtual coaching group for those who want to lead a healthier lifestyle. Come together to set goals and overcome barriers in a supportive group environment. We will address the four pillars of health: nutrition, exercise, sleep, and emotional well-being. This group is highly recommended for those who are participating in the 24 week Healthy Lifestyle Plan and our Health Coaching sessions.   WHEN: 1st Friday of the month, 12:30 PM - 1:30 PM   using a Zoom meeting.     FACILITATOR: Led by National Board-Certified Health and , Claudai Wright [facility].  TO REGISTER: Please call the [facility] at [phone] to register. You will get an appointment to attend in [email or web address]. Fifteen minutes prior to the meeting, complete the e-check in and you will get the link to join the meeting.  There is no charge to attend this group and space is limited.  Please register for each month you wish to attend    2024 Meetings  September 5 No meeting.  October 4 November 1 December 6

## 2024-11-18 NOTE — NURSING NOTE
Current patient location: work     Is the patient currently in the state of [place]? YES    Visit mode:VIDEO    If the visit is dropped, the patient can be reconnected by: VIDEO VISIT: Text to cell phone:   Telephone Information:   Mobile [phone]       Will anyone else be joining the visit? NO  (If patient encounters technical issues they should call [phone])    Are changes needed to the allergy or medication list? N/A    Are refills needed on medications prescribed by this physician? NO    Rooming Documentation:  Not applicable      Reason for visit: RECHECK    Wt other than 24 hrs:    Pain more than one location:  no  Cinda WELSH

## 2024-11-18 NOTE — LETTER
"11/18/2024       RE: Sunshine Delgado  [address]  [place] [address]     Dear Colleague,    Thank you for referring your patient, Sunshine Delgado, to the [facility] MANAGEMENT [facility] [place] at [facility]. Please see a copy of my visit note below.    Video-Visit Details    Type of service:  Video Visit    Video Start Time: 10:57 AM   Video End Time: 11:11 AM     Originating Location (pt. Location): Other - parked car     Distant Location (provider location):  Offsite (providers home) [facility]NA[facility] [place]     Platform used for Video Visit: LUBB-TEX    Nutrition Assessment  Reason For Visit:  Sunshine Delgado is a 59 year old female presents today for new re-establish nutrition visit. Pt with history of sleeve gastrectomy in 2018 with Dr. Joseph.      Patient referred by SUPRIYA Swann on January 23, 2024.     Patient with Co-morbidities of obesity including:  Type II DM no  Renal Failure no  Sleep apnea no  Hypertension no   Dyslipidemia no  Joint pain no  Back pain no  GERD yes    Anthropometrics:  Weight prior to surgery: 289 lb   Weight after surgery: 192 lb   Current Weight: 178 lb   Estimated body mass index is 30.55 kg/m  as calculated from the following:    Height as of this encounter: 1.626 m (5' 4\").    Weight as of this encounter: 80.7 kg (178 lb).    Goal weight for patient is 160 lb.     Weight Loss Medications: Zepbound 15 mg - no negative side effects     Current Vitamins/Minerals: Daily Women's MVI, Biotin, Selenium     Nutrition History:      9/12/2017     3:00 PM   Recall Diet Questions Reviewed With Patient   Describe what you typically consume for breakfast (typical or most recent) Coffee protein bar eggs    Describe what you typically consume for lunch (typical or most recent) Salad with chicken cheese tomatoes    Describe what you typically consume for supper (typical or most recent) Pizza or " sandwich or tacos   Describe what you typically consume as snacks (typical or most recent) Popcorn, almonds, chocolate,,protein idalia   How many ounces of water, or other low calorie drinks, do you drink daily (8 oz=1 glass)? 64 oz or more   How many ounces of caffeine (coffee, tea, pop) do you drink daily (8 oz=1 glass)? 16 oz   How often do you drink alcohol? Monthly or less   If you do drink alcohol, how many drinks might you have in a day? (one drink = 5 oz. wine, 1 can/bottle of beer, 1 shot liquor) 1 or 2           9/12/2017     3:00 PM   Eating Habits   Do you have any dietary restrictions? No   Do you currently binge eat (eat a large amount of food in a short time)? No   Are you an emotional eater? Yes   Do you get up to eat after falling asleep? No   What foods do you crave? Sweets     Recent food recall: -   Eats small frequent meals, focus is high protein.   Snacks: bowl of cottage cheese, fruit, Ensure protein shake   Beverages: Drinks a lot of water most days, adds Liquid IV flavor packet.  1 cup of coffee. Still separates liquids from meal times.     Eating more fruits and vegetables. Has some constipation at times, added a fiber drink and this helps. Patient is doing really well. She is very pleased with how the Zepbound has been working for her.     Physical Activity:  Gets a lot of walks in.     Nutrition Prescription:  Grams Protein: 60-90  Amount of Fluid: 48-64 oz    Nutrition Diagnosis  Obesity r/t long history of positive energy balance aeb BMI >30.    Intervention  Materials/Education provided on maintenance dietary guidelines after bariatric surgery, portion sizes, eating pace and chewing well, snacking, separation of beverages from meals, vitamin and mineral supplements after weight loss surgery, and protein needs. Discussed importance of physical activity.      Provided education on nutrition considerations when on a GLP1 medication including, changes in meal/snack volumes; meeting protein,  hydration and micronutrient needs; limiting high-fat foods; and diet/lifestyle strategies for preventing constipation.    Patient demonstrates understanding. Provided pt with list of goals and RD contact information.    Expected Engagement: good    Goals:  1) Continue to aim for meeting 60-90 grams of protein daily.   2) Try to increase fiber intake as able, continue with fiber supplement as needed.   3) Aim for at least 64 oz of water daily.     After Weight Loss Surgery    Why Take Supplements for Life after Weight Loss Surgery  [email or web address]     Supplements after Sleeve Gastrectomy, Gastric Bypass or Single Anastomosis Duodenal Switch  [email or web address]    Keeping Up Your Diet after Weight Loss Surgery  [email or web address]    Preventing Low Blood Sugar after Weight Loss Surgery  [email or web address]     Preventing Dumping Syndrome after Weight Loss Surgery  [email or web address]     Follow-Up: as needed.      Time spent with patient: 14 minutes.  Christa Webb RD, LD      Again, thank you for allowing me to participate in the care of your patient.      Sincerely,    Christa Webb RD

## 2024-11-19 ENCOUNTER — PATIENT OUTREACH (OUTPATIENT)
Dept: CARE COORDINATION | Facility: CLINIC | Age: 59
End: 2024-11-19

## 2024-11-19 RX ORDER — TRIAMCINOLONE ACETONIDE 40 MG/ML
40 INJECTION, SUSPENSION INTRA-ARTICULAR; INTRAMUSCULAR
Status: COMPLETED | OUTPATIENT
Start: 2024-11-15 | End: 2024-11-15

## 2024-12-24 DIAGNOSIS — N18.31 STAGE 3A CHRONIC KIDNEY DISEASE (H): Primary | ICD-10-CM

## 2024-12-26 ENCOUNTER — VIRTUAL VISIT (OUTPATIENT)
Dept: PHARMACY | Facility: CLINIC | Age: 59
End: 2024-12-26
Payer: COMMERCIAL

## 2024-12-26 VITALS — BODY MASS INDEX: 29.35 KG/M2 | WEIGHT: 171 LBS

## 2024-12-26 DIAGNOSIS — E66.812 CLASS 2 SEVERE OBESITY WITH SERIOUS COMORBIDITY AND BODY MASS INDEX (BMI) OF 38.0 TO 38.9 IN ADULT, UNSPECIFIED OBESITY TYPE (H): ICD-10-CM

## 2024-12-26 DIAGNOSIS — Z98.84 S/P LAPAROSCOPIC SLEEVE GASTRECTOMY: Primary | ICD-10-CM

## 2024-12-26 DIAGNOSIS — E66.01 CLASS 2 SEVERE OBESITY WITH SERIOUS COMORBIDITY AND BODY MASS INDEX (BMI) OF 38.0 TO 38.9 IN ADULT, UNSPECIFIED OBESITY TYPE (H): ICD-10-CM

## 2024-12-26 RX ORDER — MULTIVITAMIN,THERAPEUTIC
1 TABLET ORAL DAILY
COMMUNITY

## 2024-12-26 RX ORDER — BIOTIN 10000 MCG
1 CAPSULE ORAL DAILY
COMMUNITY

## 2024-12-26 NOTE — PATIENT INSTRUCTIONS
Recommendations from MTM Pharmacist visit:                                                    MTM (medication therapy management) is a service provided by a clinical pharmacist designed to help you get the most of out of your medicines.  You may be sent a phone or email survey evaluating today's visit.  Please provide feedback you have for the service he received today if you are able.      You are due for the following labs after sleeve gastrectomy (I added these to be collected along with your upcoming nephrology labs):    Vitamin A  Vitamin B1  Vitamin B12  Selenium  Parathormone    2. Continue Zepbound 15 mg weekly.    To help with tolerability and effectiveness of Zepbound:  Eat 3 meals with protein focus daily to help with nausea. If you forget to eat, you may feel nausea as a hunger cue.  Focus on getting protein in first with each meal and snack.   A good starting goal is 60 g protein daily (track this, especially if at weight loss plateau). Once you consistently are getting 60g daily, try getting 90 g protein daily.  Drink plenty of water - goal 64 oz throughout the day  You may try Metamucil, Benefiber, or Citrucel to help feel more full (less nausea) and have softer, more consistent bowel movements.  To optimize weight management - work on incorporating resistance training/weight lifting to build muscle and improve overall metabolism of adipose tissue.      Follow-up: approx 6 months with Zully Crain, PharmD - schedule after your visit with Jessica Negro PA-C in March     Appointments in Next Year      Jan 06, 2025 10:00 AM  LAB with  LAB  [facility] ([facility] [place] ) [phone]     Jan 13, 2025 10:30 AM  (Arrive by 10:15 AM)  Return Nephrology with Patricia Menchaca DO  [facility] ([facility]) [phone]     Feb 19, 2025 12:15 PM  (Arrive by 12:00 PM)  Return EP with Arleth Johnson,  "APRN CNP  [facility] [place] ([facility] [place] ) [phone]     Mar 25, 2025 3:30 PM  (Arrive by 3:15 PM)  Return Bariatric Visit with LINDA Swann [facility] [place] ([facility] [place] ) [phone]     Jun 23, 2025 4:00 PM  (Arrive by 3:40 PM)  Preventative Adult Visit with Linn Montemayor PA-C  [facility] ([facility]) [phone]           It was great speaking with you today.  I value your experience and would be very thankful for your time in providing feedback in our clinic survey. In the next few days, you may receive an email or text message from [employer] with a link to a survey related to your  clinical pharmacist.\"     To schedule another MTM appointment, please call the clinic directly (Comprehensive Weight Management Clinic Phone Number: [phone] (schedules for [facility] and [facility] - providers, dietitians, health coaches) or you may call the MTM scheduling line at [phone] or toll-free at [phone].     My Clinical Pharmacist's contact information:                                                      Please feel free to contact me with any questions or concerns you have.      Zully Crain, Pharm D., MPH    Medication Therapy Management Pharmacist   [facility] Weight Management [facility]          Meal Replacement Shake Options:   *Protein Shake Criteria: no more than 210 Calories, at least 20 grams of protein, and less than 10 grams of sugar   Premier Protein (160 Calories, 30 g protein)  Slim Fast Advanced Nutrition (180 Calories, 20 g protein)  Muscle Milk, lactose-free, 17 oz bottle (210 Calories, 30 g protein)  Integrated Supplements, no artificial sugars (110 Calories, 20 g protein)  Boost/Ensure Max (160 calories, 30 gm protein)   Fairlife Protein Shakes (160-230 calories, 26-42 " gm protein)  Aldis [facility] Protein Powder (180 calories, 30 gm protein)   Orgain Protein Shakes (130-160 calories, 20-26 gm protein)     Meal Replacement Bar Options:  Quest Protein Bars (190 Calories, 20 g protein)  Built Bar (170 Calories, 15-20 g protein)  One Protein Bar (210 calories, 20 g protein)  Cramer Signature Protein Bar ([employer]) (190 Calories, 21 g protein)  Pure Protein Bars (180 Calories, 21 g protein)    Low Calorie Frozen Meal:  Healthy Choice Power Bowls  Lean Cuisine  Smart Ones  Indraazeb Martineztahir Haywood      ---------------------------------------------------------------  Tips to Increase the Protein in Your Diet  You may need more protein in your diet to help you heal from an illness, surgery or wound. Extra protein can also help you gain weight. Here are some ideas for adding high-protein foods to your meals.  Meat and fish  Add chopped cooked meat to vegetables, salads, casseroles, soups, sauces and biscuit dough.  Use in omelets, soufflés, quiches, sandwich fillings and chicken or turkey stuffing.  Wrap in pie crust or biscuit dough to make a turnover.  Add to stuffed baked potatoes.  Make a dip with diced meat or flaked fish mixed with sour cream and spices.  Chopped, hard-cooked eggs  Add to salads.  Use for snacks and sandwich filling.  High-protein milk  To make high-protein milk, mix 1 quart whole milk with 1 cup powdered milk.  Add to cream soups, mashed potatoes, scrambled eggs, cereals and dried eggnog mix.  Use as an ingredient in puddings, custards, hot chocolate, milk shakes and pancakes.  Powdered milk  If you don't have any high-protein milk on hand, you can use powdered milk. Add 3 tablespoons to:  gravies, sauces, cream soups, mayonnaise  casseroles, meat patties, meatloaf, tuna salad, deviled ham  scalloped or mashed potatoes, creamed spinach  scrambled eggs, egg salad  cereals  yogurt, milk drinks, ice cream, frozen desserts, puddings, custards.  Add 4 to 6 tablespoons  powdered milk to make:  cream sauces  breads, muffins, pancakes, waffles, cookies, cakes  cream pies, frostings, cake fillings  fruit cobblers, bread or rice pudding, gelatin desserts.  For high-protein eggnog, add 3 to 6 tablespoons powdered milk to prepared eggnog.  Hard or soft cheese  Melt on sandwiches, breads, tortillas, hamburgers, hot dogs, other meats, vegetables, eggs and pies.  Grate into soups, chili, sauces, casseroles, vegetables, potatoes, rice, noodles or meatloaf.  Eat with toast or crackers, or melt for zaria dip.  Cottage cheese or ricotta cheese  Mix with or scoop on top of fruits and vegetables.  Add to casseroles, lasagna, spaghetti, noodles and egg dishes (omelets, scrambled eggs, soufflés).  Use in gelatin, pudding-type desserts, cheesecake and pancake batter.  Use to stuff crepes, pasta shells or manicotti.  Fruit yogurt  Blend with fruits for a fruit smoothie.  Use as a dip for fruits and vegetables.  Scoop on top of pancakes or waffles.  Tofu  Blend silken tofu with fruits and juices for a smoothie.  Add chunks of firm tofu to soups and stews, or crumble into meatloaf.  Blend dried onion soup mix into soft or silken tofu for dip.  Use pureed silken tofu for part of the mayonnaise, sour cream, cream cheese or ricotta cheese called for in recipes.  Beans  Use cooked beans or peas in soups, casseroles, pasta, tacos and burritos.  Nuts and seeds  Note: For children under 3, discuss with the child's care team.  Use in casseroles, breads, muffins, pancakes, cookies and waffles.  Sprinkle on fruits, cereals, ice cream, yogurt, vegetables and salads.  Mix with raisins, dried fruits and chocolate chips for a snack.  Nut butters  Note: For children under 3, discuss with the child's care team.  Spread on sandwiches, toast, muffins, crackers, waffles, pancakes and fruit slices.  Use as a dip for raw vegetables.  Blend with milk drinks, or swirl through ice cream, yogurt or hot cereal.  Nutrition  "supplements (nutrition bars, drinks and powders)  Add powders to milk drinks and desserts.  Mix with ice cream, milk and fruit for a high-protein milk shake.    For informational purposes only. Not to replace the advice of your health care provider. Clinically reviewed by Sheri King, RD, LD, and the Clinical Nutrition Service Line. Copyright   2005 [facility]. All rights reserved. [employer] 772237 - REV 04/24.      -----------------------------------------------------------------------------------------------------------------  Learning About High-Protein Foods  What foods are high in protein?     The foods you eat contain nutrients, such as vitamins and minerals. Protein is a nutrient. Your body needs the right amount to stay healthy and work as it should. You can use the list below to help you make choices about which foods to eat.  Here are some examples of foods that are high in protein.  Dairy and dairy alternatives  Cheese  Milk  Soy milk  Yogurt (especially Greek)  Meat  Beef  Chicken  Ham  Lamb  Lunch meat  Pork  Sausage  Turkey  Other protein foods  Beans (black, garbanzo, kidney, lima)  Eggs  Hummus  Lentils  Nuts  Peanut butter and other nut butters  Peas  Soybeans  Tofu  Veggie or soy solomon (Check the nutrition label for the amount of protein in each serving.)  Seafood  Anchovies  Cod  Crab  Halibut  [place]  Sardines  Shrimp  Tilapia  Tilly  Tuna  Protein supplements  Bars (Check the nutrition label for the amount of protein in each serving.)  Drinks  Powders  Work with your doctor to find out how much of this nutrient you need. Depending on your health, you may need more or less of it in your diet.  Where can you learn more?  Go to https://www.Credit Coach.net/patiented  Enter P335 in the search box to learn more about \"Learning About High-Protein Foods.\"  Current as of: September 20, 2023               Content Version: 14.0    2631-3938 Healthwise, [facility].   Care " instructions adapted under license by your healthcare professional. If you have questions about a medical condition or this instruction, always ask your healthcare professional. Healthwise, Incorporated disclaims any warranty or liability for your use of this information.

## 2024-12-26 NOTE — PROGRESS NOTES
Medication Therapy Management (MTM) Encounter    ASSESSMENT:                            Medication Adherence/Access: No issues identified.    Weight management :   Patient would benefit from continuing pharmacotherapy for weight management. Given tolerating well, recommend continue current dose GLP1/GIP therapy as data to support most significant weight loss and patient has no contraindications. Patient also realizing benefit from reduction in food noise and increased satiety. Education provided on continued dietary and behavioral modifications.     Due for annual sleeve gastrectomy labs -- recommend to collect with labs pending from nephrology in next 2 weeks.    The following labs need to be done at 3 months postop for bariatric surgery and every year after surgery: vitamin D (ordered by nephrology for upcoming visit), parathormone, comprehensive metabolic panel (collected 2/21/24 and renal panel pending), complete blood count with platelets (ordered by nephrology for upcoming visit), lipids (fasting), hemoglobin A1c (collected 8/29/24), Vitamin A and vitamin B12.   -add selenium  Patient also on selenium supplement - monitoring recommended.  -add vitamin B1 (thiamine) if rapid weight loss  -add vitamin B12 if LSG, RYGB, DS - over due    PLAN:                              You are due for the following labs after sleeve gastrectomy (I added these to be collected along with your upcoming nephrology labs):    Vitamin A  Vitamin B1  Vitamin B12  Selenium  Parathormone    2. Continue Zepbound 15 mg weekly.    To help with tolerability and effectiveness of Zepbound:  Eat 3 meals with protein focus daily to help with nausea. If you forget to eat, you may feel nausea as a hunger cue.  Focus on getting protein in first with each meal and snack.   A good starting goal is 60 g protein daily (track this, especially if at weight loss plateau). Once you consistently are getting 60g daily, try getting 90 g protein daily.  Drink  plenty of water - goal 64 oz throughout the day  You may try Metamucil, Benefiber, or Citrucel to help feel more full (less nausea) and have softer, more consistent bowel movements.  To optimize weight management - work on incorporating resistance training/weight lifting to build muscle and improve overall metabolism of adipose tissue.      Follow-up: approx 6 months with Zully Crain, SaschaD - schedule after your visit with Jessica Negro PA-C in March     Appointments in Next Year      Jan 06, 2025 10:00 AM  LAB with  LAB  [facility] [place] ([facility] ) [phone]     Jan 13, 2025 10:30 AM  (Arrive by 10:15 AM)  Return Nephrology with Patricia Menchaca DO  [facility] ([facility]) [phone]     Feb 19, 2025 12:15 PM  (Arrive by 12:00 PM)  Return EP with LEOPOLDO Hernandez CNP  [facility] ([facility] ) [phone]     Mar 25, 2025 3:30 PM  (Arrive by 3:15 PM)  Return Bariatric Visit with Jessica Negro PA-C  [facility] [place] ([facility] ) [phone]     Jun 23, 2025 4:00 PM  (Arrive by 3:40 PM)  Preventative Adult Visit with Linn Montemayor PA-C  [facility] ([facility]) [phone]            SUBJECTIVE/OBJECTIVE:                          Sunshine Delgado is a 59 year old female seen for a follow-up visit.       Reason for visit: Medication Therapy Management GLP1/GIP agonist .    Allergies/ADRs: Reviewed in chart  Past Medical History: Reviewed in chart  Tobacco: She reports that she has never smoked. She has never been exposed to tobacco smoke. She has never used smokeless tobacco.  Alcohol: not currently using - no taste for this with Zepbound.    Medication Adherence/Access: no issues reported.    Weight  Management /s/p sleeve gastrectomy 2018  Zepbound 15 mg once weekly   Bupropion  mg once daily        Current Vitamins/Minerals: Daily Women's MVI, Biotin, Selenium -- unknown strengths    Last seen by Jessica Negro PA-C 1/23/24 for Return Medical Weight Management post bariatric surgery Visit   Christa Webb, Registered Dietician 11/18/24    Patient reports no current medication side effects. Has had significant improvement in food noise, appetite since starting Zepbound. Very happy that mental capacity is greater and mental health is better too    Nutrition/Eating Habits: has significant reduction in food noise; focusing on protein and veggies for food (feels needs more veggies). Works to eat every 2-4 hours - small meals through the day. Drinks >64 oz water daily. Has protein shake if has gone too long without eating.    Light breakfast - fruit/yogurt  Bigger breakfast between split shifts - oatmeal, eggs  Snacks: cottage cheese, protein shake  Dinner: potato, protein, veggies    Plan with Christa Webb, Registered Dietician   Goals:  1) Continue to aim for meeting 60-90 grams of protein daily.   2) Try to increase fiber intake as able, continue with fiber supplement as needed.   3) Aim for at least 64 oz of water daily.        Exercise/Activity: walks between split shifts at the [employer] and adding weight liftingLots of working in yard and garden - happy to move and easier to do so. Much more stamina physically and emotionally.    Medication/weight management  History:  GLP1/GIP : Patient denies personal or family history of MEN Type2, MTC, Pancreatitis.    Naltrexone caused dizziness.   Had a successful sleeve gastrectomy, after Covid had significant weight regain.    Weight prior to surgery - 289lb   Donell weight - 192lb     Starting weight (lbs): 289  Before Zepbound  (lbs): 218 (Zepbound start)           Current weight today: 171 lb  Cumulative Weight Loss: -118 lb, -41% from baseline    Wt Readings from  "Last 4 Encounters:   12/26/24 171 lb (77.6 kg)   11/18/24 178 lb (80.7 kg)   10/08/24 191 lb (86.6 kg)   08/29/24 201 lb (91.2 kg)     Estimated body mass index is 29.35 kg/m  as calculated from the following:    Height as of 11/18/24: 5' 4\" (1.626 m).    Weight as of this encounter: 171 lb (77.6 kg).    Lab Results   Component Value Date    A1C 5.3 08/29/2024    GLC 97 06/20/2024     06/20/2024    POTASSIUM 4.1 06/20/2024    CONCHA 9.3 06/20/2024    CHLORIDE 105 06/20/2024    CO2 25 06/20/2024    BUN 13.8 06/20/2024    CR 1.10 (H) 06/20/2024    GFRESTIMATED 58 (L) 06/20/2024    CHOL 187 06/20/2024     (H) 06/20/2024    HDL 56 06/20/2024    TRIG 114 06/20/2024    VITDT 32 07/10/2023    B12 449 11/07/2019    UMALCR  06/20/2024      Comment:      Unable to calculate, urine albumin and/or urine creatinine is outside detectable limits.  Microalbuminuria is defined as an albumin:creatinine ratio of 17 to 299 for males and 25 to 299 for females. A ratio of albumin:creatinine of 300 or higher is indicative of overt proteinuria.  Due to biologic variability, positive results should be confirmed by a second, first-morning random or 24-hour timed urine specimen. If there is discrepancy, a third specimen is recommended. When 2 out of 3 results are in the microalbuminuria range, this is evidence for incipient nephropathy and warrants increased efforts at glucose control, blood pressure control, and institution of therapy with an angiotensin-converting-enzyme (ACE) inhibitor (if the patient can tolerate it).      TSH 1.94 08/29/2024     Liver Function Studies -   Recent Labs   Lab Test 02/21/24  0939   PROTTOTAL 6.7   ALBUMIN 4.3   BILITOTAL 0.5   ALKPHOS 60   AST 21   ALT 17         Today's Vitals: Wt 171 lb (77.6 kg)   LMP  (LMP Unknown)   BMI 29.35 kg/m    ----------------      I spent 18 minutes with this patient today. All changes were made via collaborative practice agreement with * No referring provider " recorded for this case *.     A summary of these recommendations was sent via [employer].    Zully Crain, Pharm D., MPH    Medication Therapy Management Pharmacist   [facility]     Telemedicine Visit Details  The patient's medications can be safely assessed via a telemedicine encounter.  Type of service:  Video Conference via [employer]  Originating Location (pt. Location): Other work    Distant Location (provider location):  Off-site  Start Time:  8:42 AM  End Time:  9:00 AM     Medication Therapy Recommendations  S/P laparoscopic sleeve gastrectomy   1 Current Medication: selenium 50 MCG TABS tablet   Current Medication Sig: Take 50 mcg by mouth daily.   Rationale: Medication requires monitoring - Needs additional monitoring - Safety   Recommendation: Order Lab   Status: Accepted per CPA   Identified Date: 12/26/2024 Completed Date: 12/26/2024

## 2024-12-26 NOTE — Clinical Note
Sunshine is doing FABULOUSLY with Zepbound! She is seeing you in March - has some missed annual ivania labs that I ordered in combo with her upcoming nephrology labs so we will be seeing those in the next few weeks.   Told her that she and I would follow up as you recommend after your visit in March.  Zully

## 2025-01-06 ENCOUNTER — LAB (OUTPATIENT)
Dept: LAB | Facility: CLINIC | Age: 60
End: 2025-01-06
Attending: INTERNAL MEDICINE
Payer: COMMERCIAL

## 2025-01-06 DIAGNOSIS — N18.31 STAGE 3A CHRONIC KIDNEY DISEASE (H): ICD-10-CM

## 2025-01-06 DIAGNOSIS — E06.3 HASHIMOTO'S THYROIDITIS: ICD-10-CM

## 2025-01-06 DIAGNOSIS — Z98.84 S/P LAPAROSCOPIC SLEEVE GASTRECTOMY: ICD-10-CM

## 2025-01-06 LAB
ALBUMIN MFR UR ELPH: 18.1 MG/DL
ALBUMIN SERPL BCG-MCNC: 4.5 G/DL (ref 3.5–5.2)
ANION GAP SERPL CALCULATED.3IONS-SCNC: 9 MMOL/L (ref 7–15)
BUN SERPL-MCNC: 20.3 MG/DL (ref 6–20)
CALCIUM SERPL-MCNC: 9.9 MG/DL (ref 8.8–10.4)
CHLORIDE SERPL-SCNC: 101 MMOL/L (ref 98–107)
CREAT SERPL-MCNC: 1.15 MG/DL (ref 0.51–0.95)
CREAT UR-MCNC: 282 MG/DL
EGFRCR SERPLBLD CKD-EPI 2021: 55 ML/MIN/1.73M2
ERYTHROCYTE [DISTWIDTH] IN BLOOD BY AUTOMATED COUNT: 14.4 % (ref 10–15)
GLUCOSE SERPL-MCNC: 91 MG/DL (ref 70–99)
HCO3 SERPL-SCNC: 31 MMOL/L (ref 22–29)
HCT VFR BLD AUTO: 44.2 % (ref 35–47)
HGB BLD-MCNC: 14.3 G/DL (ref 11.7–15.7)
MCH RBC QN AUTO: 28.9 PG (ref 26.5–33)
MCHC RBC AUTO-ENTMCNC: 32.4 G/DL (ref 31.5–36.5)
MCV RBC AUTO: 89 FL (ref 78–100)
PHOSPHATE SERPL-MCNC: 3.6 MG/DL (ref 2.5–4.5)
PLATELET # BLD AUTO: 286 10E3/UL (ref 150–450)
POTASSIUM SERPL-SCNC: 4.5 MMOL/L (ref 3.4–5.3)
PROT/CREAT 24H UR: 0.06 MG/MG CR (ref 0–0.2)
PTH-INTACT SERPL-MCNC: 42 PG/ML (ref 15–65)
RBC # BLD AUTO: 4.95 10E6/UL (ref 3.8–5.2)
SODIUM SERPL-SCNC: 141 MMOL/L (ref 135–145)
T4 FREE SERPL-MCNC: 1.43 NG/DL (ref 0.9–1.7)
TSH SERPL DL<=0.005 MIU/L-ACNC: 5.17 UIU/ML (ref 0.3–4.2)
VIT B12 SERPL-MCNC: 600 PG/ML (ref 232–1245)
WBC # BLD AUTO: 6.9 10E3/UL (ref 4–11)

## 2025-01-06 PROCEDURE — 82607 VITAMIN B-12: CPT

## 2025-01-06 PROCEDURE — 82306 VITAMIN D 25 HYDROXY: CPT | Performed by: INTERNAL MEDICINE

## 2025-01-06 PROCEDURE — 36415 COLL VENOUS BLD VENIPUNCTURE: CPT | Performed by: PATHOLOGY

## 2025-01-06 PROCEDURE — 84590 ASSAY OF VITAMIN A: CPT | Mod: 90 | Performed by: PATHOLOGY

## 2025-01-06 PROCEDURE — 84255 ASSAY OF SELENIUM: CPT | Mod: 90 | Performed by: PATHOLOGY

## 2025-01-06 PROCEDURE — 99000 SPECIMEN HANDLING OFFICE-LAB: CPT | Performed by: PATHOLOGY

## 2025-01-06 PROCEDURE — 83970 ASSAY OF PARATHORMONE: CPT | Performed by: PATHOLOGY

## 2025-01-06 PROCEDURE — 84439 ASSAY OF FREE THYROXINE: CPT | Performed by: PATHOLOGY

## 2025-01-06 PROCEDURE — 80069 RENAL FUNCTION PANEL: CPT | Performed by: PATHOLOGY

## 2025-01-06 PROCEDURE — 85027 COMPLETE CBC AUTOMATED: CPT | Performed by: PATHOLOGY

## 2025-01-06 PROCEDURE — 84156 ASSAY OF PROTEIN URINE: CPT | Performed by: PATHOLOGY

## 2025-01-06 PROCEDURE — 84443 ASSAY THYROID STIM HORMONE: CPT | Performed by: PATHOLOGY

## 2025-01-07 ENCOUNTER — LAB (OUTPATIENT)
Dept: LAB | Facility: CLINIC | Age: 60
End: 2025-01-07
Payer: COMMERCIAL

## 2025-01-07 DIAGNOSIS — Z98.84 S/P LAPAROSCOPIC SLEEVE GASTRECTOMY: ICD-10-CM

## 2025-01-07 LAB
DEPRECATED CALCIDIOL+CALCIFEROL SERPL-MC: <53 UG/L (ref 20–75)
SELENIUM SERPL-MCNC: 190.2 UG/L
VITAMIN D2 SERPL-MCNC: <5 UG/L
VITAMIN D3 SERPL-MCNC: 48 UG/L

## 2025-01-07 PROCEDURE — 84425 ASSAY OF VITAMIN B-1: CPT | Mod: 90 | Performed by: PATHOLOGY

## 2025-01-07 PROCEDURE — 36415 COLL VENOUS BLD VENIPUNCTURE: CPT | Performed by: PATHOLOGY

## 2025-01-07 PROCEDURE — 99000 SPECIMEN HANDLING OFFICE-LAB: CPT | Performed by: PATHOLOGY

## 2025-01-08 DIAGNOSIS — Z98.84 S/P LAPAROSCOPIC SLEEVE GASTRECTOMY: Primary | ICD-10-CM

## 2025-01-08 LAB
ANNOTATION COMMENT IMP: NORMAL
RETINYL PALMITATE SERPL-MCNC: 0.03 MG/L
VIT A SERPL-MCNC: 0.68 MG/L

## 2025-01-08 NOTE — PROGRESS NOTES
Clinical Pharmacy Note    Entering orders for repeat TSH, T4 and selenium given elevations in labs. Recommend repeat in 8-12 weeks. Patient contacted via [employer] notifying due for these labs. CPA with Jessica Negro PA-C used to order these labs.    Zully Crain, Pharm D., MPH    Medication Therapy Management Pharmacist   [facility]

## 2025-01-10 LAB — VIT B1 PYROPHOSHATE BLD-SCNC: 135 NMOL/L

## 2025-01-13 ENCOUNTER — OFFICE VISIT (OUTPATIENT)
Dept: NEPHROLOGY | Facility: CLINIC | Age: 60
End: 2025-01-13
Attending: PHYSICIAN ASSISTANT
Payer: COMMERCIAL

## 2025-01-13 VITALS
HEART RATE: 68 BPM | OXYGEN SATURATION: 97 % | DIASTOLIC BLOOD PRESSURE: 73 MMHG | BODY MASS INDEX: 30.24 KG/M2 | WEIGHT: 176.2 LBS | SYSTOLIC BLOOD PRESSURE: 105 MMHG

## 2025-01-13 DIAGNOSIS — N18.31 STAGE 3A CHRONIC KIDNEY DISEASE (H): ICD-10-CM

## 2025-01-13 DIAGNOSIS — I48.91 ATRIAL FIBRILLATION WITH CONTROLLED VENTRICULAR RATE (H): ICD-10-CM

## 2025-01-13 DIAGNOSIS — E66.09 CLASS 1 OBESITY DUE TO EXCESS CALORIES WITHOUT SERIOUS COMORBIDITY WITH BODY MASS INDEX (BMI) OF 30.0 TO 30.9 IN ADULT: Primary | ICD-10-CM

## 2025-01-13 DIAGNOSIS — Z87.442 HISTORY OF NEPHROLITHIASIS: ICD-10-CM

## 2025-01-13 DIAGNOSIS — E66.811 CLASS 1 OBESITY DUE TO EXCESS CALORIES WITHOUT SERIOUS COMORBIDITY WITH BODY MASS INDEX (BMI) OF 30.0 TO 30.9 IN ADULT: Primary | ICD-10-CM

## 2025-01-13 LAB
ALBUMIN UR-MCNC: 20 MG/DL
APPEARANCE UR: CLEAR
BACTERIA #/AREA URNS HPF: ABNORMAL /HPF
BILIRUB UR QL STRIP: NEGATIVE
CAOX CRY #/AREA URNS HPF: ABNORMAL /HPF
COLOR UR AUTO: YELLOW
GLUCOSE UR STRIP-MCNC: NEGATIVE MG/DL
HGB UR QL STRIP: NEGATIVE
KETONES UR STRIP-MCNC: NEGATIVE MG/DL
LEUKOCYTE ESTERASE UR QL STRIP: ABNORMAL
MUCOUS THREADS #/AREA URNS LPF: PRESENT /LPF
NITRATE UR QL: NEGATIVE
PH UR STRIP: 5.5 [PH] (ref 5–7)
RBC #/AREA URNS AUTO: ABNORMAL /HPF
SKIP: ABNORMAL
SP GR UR STRIP: 1.03 (ref 1–1.03)
SQUAMOUS #/AREA URNS AUTO: ABNORMAL /LPF
UROBILINOGEN UR STRIP-MCNC: 2 MG/DL
WBC #/AREA URNS AUTO: ABNORMAL /HPF

## 2025-01-13 PROCEDURE — 99215 OFFICE O/P EST HI 40 MIN: CPT | Performed by: INTERNAL MEDICINE

## 2025-01-13 PROCEDURE — 81001 URINALYSIS AUTO W/SCOPE: CPT | Performed by: INTERNAL MEDICINE

## 2025-01-13 NOTE — PROGRESS NOTES
01/13/25  Nephrology Visit    Assessment/Plan:   Stage 3a chronic kidney disease (H)  Kidney change occurred between March 2013 and Jan 2015. Around this time is when her  passed and also when she was having chronic diarrhea. I question whether she could have had a chronic prerenal state around that time. Addt risks for kidney injury include hx of kidney stones, afib, obesity, hashimotos. She is avoiding NsAIDs and staying hydrated. Imaging was without signs of obstruction. Will plan routine follow-up. Repeating UA today to assure no hematuria. No proteinuria present.   - Adult Nephrology  Referral    Obesity: much success with weight loss - bmi is now 30.2. Congratulated her on her success.     Hashimotos: last TSH slightly elevated but free T4 normal - defer mgmt to PCP    MARI AGUADALUPE: currently off of CPAP - plans to follow-up with PCP regarding repeat sleep study    Hx of kidney stones: no recent events. Encourage 100 oz fluid intake.     Patient Instructions   Labs in 6 months  Follow-up in one year with labs prior  Urine sample before leaving today.      43 minutes spent by me on the date of the encounter doing chart review, review of test results, interpretation of tests, patient visit, and documentation     Patricia Menchaca DO            CC: CKD    HPI: Sunshine Delgado is a 59 year old female who presents for evaluation of CKD. Ms. Delgado had normal kidney function until January 2015 when the creatinine was noted to rise. Since then, her creatinine has been 1.2-1.5. She was evaluated by my colleague, Dr. Henderson, in 2023 but establishing care here today. Previously, Cystatin C GFR was the same as CKD-EPI at 52. She has had great success with weight loss - 286 lbs at one point in time - 191 lbs in October and now down to 176 lbs. She has found that her MARIA GUADALUPE is improved - less daytime fatigue despite not using CPAP. She has been working on getting a repeat sleep study completed but having difficulty with  coverage. No recent kidney stones.    We spent time discussing what was occurring around the time of her change in kidney function between March of 2013 and Jan 2015. Her  was ill at this time and passed away in 2015. She admits that she was not caring for herself as much as she was the caretaker. Also around this time, she had difficulty with loose stools - she describes having dumping syndrome where she was stooling often and in significant amounts. Question whether she could have been chronically prerenal around that time leading to CKD.    No NSAIDs at this time. No hematria. No UTI sxs at this time. She feels her ADHD is improved.     Current Outpatient Medications   Medication Sig Dispense Refill    acetaminophen (TYLENOL) 325 MG tablet Take 325 mg by mouth every 4 hours as needed      albuterol (PROAIR HFA/PROVENTIL HFA/VENTOLIN HFA) 108 (90 Base) MCG/ACT inhaler INHALE 2 PUFFS INTO THE LUNGS EVERY 6 HOURS AS NEEDED FOR SHORTNESS OF BREATH, WHEEZING OR COUGH 8.5 g 1    Biotin 10 MG CAPS Take 1 capsule by mouth daily.      buPROPion (WELLBUTRIN XL) 300 MG 24 hr tablet Take 1 tablet (300 mg) by mouth every morning 90 tablet 3    EPINEPHrine (AUVI-Q) 0.3 MG/0.3ML injection 2-pack Inject 0.3 mLs (0.3 mg) into the muscle as needed for anaphylaxis 0.6 mL 3    levothyroxine (SYNTHROID/LEVOTHROID) 75 MCG tablet Take 1 tablet (75 mcg) by mouth daily. 90 tablet 3    multivitamin, therapeutic (THERA-VIT) TABS tablet Take 1 tablet by mouth daily.      selenium 50 MCG TABS tablet Take 50 mcg by mouth daily.      tirzepatide-Weight Management (ZEPBOUND) 15 MG/0.5ML prefilled pen Inject 0.5 mLs (15 mg) subcutaneously every 7 days. Start after 4 weeks of 12.5 mg Zepbound, if tolerating 6 mL 1    traZODone (DESYREL) 50 MG tablet TAKE TWO TABLETS BY MOUTH EVERY NIGHT AT BEDTIME 180 tablet 0    venlafaxine (EFFEXOR-ER) 75 MG 24 hr tablet Take 1 tablet (75 mg) by mouth daily 90 tablet 3     No current  facility-administered medications for this visit.       Exam:  /73 (BP Location: Left arm, Patient Position: Chair, Cuff Size: Adult Regular)   Pulse 68   Wt 79.9 kg (176 lb 3.2 oz)   LMP  (LMP Unknown)   SpO2 97%   BMI 30.24 kg/m    GENERAL APPEARANCE: alert and no distress  Ext - no edema    Results:    No visits with results within 1 Day(s) from this visit.   Latest known visit with results is:   Lab on 01/07/2025   Component Date Value Ref Range Status    Vitamin B1 Whole Blood Level 01/07/2025 135  70 - 180 nmol/L Final    INTERPRETIVE INFORMATION: Vitamin B1, Whole Blood    This assay measures the concentration of thiamine   diphosphate (TDP), the primary active form of vitamin B1.   Approximately 90 percent of vitamin B1 present in whole   blood is TDP. Thiamine and thiamine monophosphate, which   comprise the remaining 10 percent, are not measured.    This test was developed and its performance characteristics   determined by [facility]. It has not been cleared or   approved by the US Food and Drug Administration. This test   was performed in a CLIA certified laboratory and is   intended for clinical purposes.  Performed By: [facility]  [address] [place] [id number]  : Shan Patiño MD, PhD  CLIA Number: [id number]      Lab results were reviewed and interpreted.         Patricia Menchaca DO

## 2025-01-13 NOTE — NURSING NOTE
Sunshine Delgado's goals for this visit include:   Chief Complaint   Patient presents with    RECHECK     Follow up CKD  previpus patient of        She requests these members of her care team be copied on today's visit information: no     PCP: Linn Montemayor    Referring Provider:  Echo Henderson MD  [address] [place] [id number]    /73 (BP Location: Left arm, Patient Position: Chair, Cuff Size: Adult Regular)   Pulse 68   Wt 79.9 kg (176 lb 3.2 oz)   LMP  (LMP Unknown)   SpO2 97%   BMI 30.24 kg/m      Do you need any medication refills at today's visit? No

## 2025-01-14 DIAGNOSIS — E06.3 HASHIMOTO'S THYROIDITIS: ICD-10-CM

## 2025-01-14 RX ORDER — LEVOTHYROXINE SODIUM 75 UG/1
75 TABLET ORAL DAILY
Qty: 90 TABLET | Refills: 3 | Status: CANCELLED | OUTPATIENT
Start: 2025-01-14

## 2025-01-15 ENCOUNTER — MYC MEDICAL ADVICE (OUTPATIENT)
Dept: FAMILY MEDICINE | Facility: CLINIC | Age: 60
End: 2025-01-15
Payer: COMMERCIAL

## 2025-01-15 RX ORDER — LEVOTHYROXINE SODIUM 75 UG/1
75 TABLET ORAL DAILY
Qty: 90 TABLET | Refills: 1 | Status: SHIPPED | OUTPATIENT
Start: 2025-01-15

## 2025-01-16 NOTE — TELEPHONE ENCOUNTER
"Please advise on pt's comment: \"Also my Thyroid was checked and my numbers are high I m tired all the time now.\"    Note result message from Zully Crain, Pharm D., MPH 1/8/25  \"   You will see that your TSH is slightly elevated. This is your thyroid stimulating hormone and it can mean that your thyroid levels are a little low. So we also checked your actual thyroid (you will see it as \"Free T4\" in the labs - that is within normal limits, so that's great! We sometimes see where the TSH is a little elevated, but if T4 is within normal limits, it is ok to watch this without any changes in meds. We will have you repeat this level in about 8-12 weeks to see if any changes before we adjust meds. I will put that order in now so that you can get it when convenient in March some time.   \"    Desirae Elizabeth RN   "
Patient/Caregiver provided printed discharge information.

## 2025-01-20 ENCOUNTER — PATIENT OUTREACH (OUTPATIENT)
Dept: CARE COORDINATION | Facility: CLINIC | Age: 60
End: 2025-01-20
Payer: COMMERCIAL

## 2025-01-25 ENCOUNTER — HEALTH MAINTENANCE LETTER (OUTPATIENT)
Age: 60
End: 2025-01-25

## 2025-01-30 ENCOUNTER — OFFICE VISIT (OUTPATIENT)
Dept: FAMILY MEDICINE | Facility: CLINIC | Age: 60
End: 2025-01-30
Payer: COMMERCIAL

## 2025-01-30 VITALS
WEIGHT: 173 LBS | DIASTOLIC BLOOD PRESSURE: 77 MMHG | TEMPERATURE: 97.3 F | RESPIRATION RATE: 18 BRPM | OXYGEN SATURATION: 97 % | HEART RATE: 74 BPM | BODY MASS INDEX: 29.7 KG/M2 | SYSTOLIC BLOOD PRESSURE: 114 MMHG

## 2025-01-30 DIAGNOSIS — Z12.11 SCREEN FOR COLON CANCER: ICD-10-CM

## 2025-01-30 DIAGNOSIS — J01.00 ACUTE NON-RECURRENT MAXILLARY SINUSITIS: Primary | ICD-10-CM

## 2025-01-30 DIAGNOSIS — J02.9 SORE THROAT: ICD-10-CM

## 2025-01-30 DIAGNOSIS — F33.1 MAJOR DEPRESSIVE DISORDER, RECURRENT EPISODE, MODERATE (H): ICD-10-CM

## 2025-01-30 DIAGNOSIS — I48.91 ATRIAL FIBRILLATION WITH CONTROLLED VENTRICULAR RATE (H): ICD-10-CM

## 2025-01-30 LAB
DEPRECATED S PYO AG THROAT QL EIA: NEGATIVE
FLUAV RNA SPEC QL NAA+PROBE: NEGATIVE
FLUBV RNA RESP QL NAA+PROBE: NEGATIVE
RSV RNA SPEC NAA+PROBE: NEGATIVE
S PYO DNA THROAT QL NAA+PROBE: NOT DETECTED
SARS-COV-2 RNA RESP QL NAA+PROBE: NEGATIVE

## 2025-01-30 ASSESSMENT — PATIENT HEALTH QUESTIONNAIRE - PHQ9
SUM OF ALL RESPONSES TO PHQ QUESTIONS 1-9: 6
10. IF YOU CHECKED OFF ANY PROBLEMS, HOW DIFFICULT HAVE THESE PROBLEMS MADE IT FOR YOU TO DO YOUR WORK, TAKE CARE OF THINGS AT HOME, OR GET ALONG WITH OTHER PEOPLE: SOMEWHAT DIFFICULT
SUM OF ALL RESPONSES TO PHQ QUESTIONS 1-9: 6

## 2025-01-30 NOTE — PROGRESS NOTES
"  Assessment & Plan       ICD-10-CM    1. Acute non-recurrent maxillary sinusitis  J01.00 amoxicillin-clavulanate (AUGMENTIN) 875-125 MG tablet      2. Sore throat  J02.9 Streptococcus A Rapid Screen w/Reflex to PCR - Clinic Collect     Influenza A/B, RSV and SARS-CoV2 PCR (COVID-19)     Influenza A/B, RSV and SARS-CoV2 PCR (COVID-19) Nose     Group A Streptococcus PCR Throat Swab      3. Screen for colon cancer  Z12.11 Colonoscopy Screening  Referral      4. Major depressive disorder, recurrent episode, moderate (H)  F33.1       5. Atrial fibrillation with controlled ventricular rate (H)  I48.91       6. Body mass index (BMI) 40.0-44.9, adult (H)  Z68.41           Atrial fibrillation: Well-controlled with current therapy, followed by cardiology  Major depression: Stable, well-controlled with current therapy      The longitudinal plan of care for the diagnosis(es)/condition(s) as documented were addressed during this visit. Due to the added complexity in care, I will continue to support Sunshine in the subsequent management and with ongoing continuity of care.   BMI  Estimated body mass index is 29.7 kg/m  as calculated from the following:    Height as of 11/18/24: 1.626 m (5' 4\").    Weight as of this encounter: 78.5 kg (173 lb).   Weight management plan: Discussed healthy diet and exercise guidelines      There are no Patient Instructions on file for this visit.    Fransisco Gonsalez is a 59 year old, presenting for the following health issues:  URI        1/30/2025    11:04 AM   Additional Questions   Roomed by Rebeca CHUA CMA     History of Present Illness       Reason for visit:  Sore throat rash on my neck ears are hot and feel raw stomach ache headache eyes hurt and mild fever on and off for a week also my face is hot  Symptom onset:  1-2 weeks ago   She is taking medications regularly.     Patient is a URI symptoms for a week, which improved  More recently symptoms returned with muscle aches, chills, " headache, sinus pressure, sore throat, mild cough              Review of Systems  Constitutional, HEENT, cardiovascular, pulmonary, gi and gu systems are negative, except as otherwise noted.      Objective    /77 (BP Location: Right arm, Patient Position: Chair, Cuff Size: Adult Regular)   Pulse 74   Temp 97.3  F (36.3  C) (Temporal)   Resp 18   Wt 78.5 kg (173 lb)   LMP  (LMP Unknown)   SpO2 97%   BMI 29.70 kg/m    Body mass index is 29.7 kg/m .  Physical Exam  Vitals reviewed.   Constitutional:       General: She is not in acute distress.     Appearance: Normal appearance. She is well-developed.   HENT:      Head: Normocephalic and atraumatic.      Right Ear: External ear normal.      Left Ear: External ear normal.      Nose: Nose normal.      Comments: Frontal, maxillary sinus tenderness bilaterally  Eyes:      General: No scleral icterus.     Extraocular Movements: Extraocular movements intact.      Conjunctiva/sclera: Conjunctivae normal.   Cardiovascular:      Rate and Rhythm: Normal rate.   Pulmonary:      Effort: Pulmonary effort is normal. No respiratory distress.      Breath sounds: No wheezing or rales.   Musculoskeletal:         General: No deformity. Normal range of motion.      Cervical back: Normal range of motion.   Skin:     General: Skin is warm and dry.      Findings: No rash.   Neurological:      Mental Status: She is alert and oriented to person, place, and time. Mental status is at baseline.      Gait: Gait normal.   Psychiatric:         Behavior: Behavior normal.         Thought Content: Thought content normal.         Judgment: Judgment normal.                    Signed Electronically by: Duarte Alba MD

## 2025-02-04 ENCOUNTER — OFFICE VISIT (OUTPATIENT)
Dept: FAMILY MEDICINE | Facility: CLINIC | Age: 60
End: 2025-02-04
Payer: COMMERCIAL

## 2025-02-04 VITALS
SYSTOLIC BLOOD PRESSURE: 112 MMHG | BODY MASS INDEX: 29.53 KG/M2 | HEIGHT: 64 IN | WEIGHT: 173 LBS | TEMPERATURE: 97.7 F | DIASTOLIC BLOOD PRESSURE: 76 MMHG | RESPIRATION RATE: 12 BRPM | OXYGEN SATURATION: 99 % | HEART RATE: 67 BPM

## 2025-02-04 DIAGNOSIS — F33.1 MAJOR DEPRESSIVE DISORDER, RECURRENT EPISODE, MODERATE (H): Primary | Chronic | ICD-10-CM

## 2025-02-04 DIAGNOSIS — R21 RASH: ICD-10-CM

## 2025-02-04 DIAGNOSIS — F41.1 GENERALIZED ANXIETY DISORDER: ICD-10-CM

## 2025-02-04 PROCEDURE — 99214 OFFICE O/P EST MOD 30 MIN: CPT | Performed by: PHYSICIAN ASSISTANT

## 2025-02-04 PROCEDURE — G2211 COMPLEX E/M VISIT ADD ON: HCPCS | Performed by: PHYSICIAN ASSISTANT

## 2025-02-04 RX ORDER — VENLAFAXINE HYDROCHLORIDE 150 MG/1
150 TABLET, EXTENDED RELEASE ORAL DAILY
Qty: 90 TABLET | Refills: 0 | Status: SHIPPED | OUTPATIENT
Start: 2025-02-04

## 2025-02-04 ASSESSMENT — ANXIETY QUESTIONNAIRES
2. NOT BEING ABLE TO STOP OR CONTROL WORRYING: SEVERAL DAYS
5. BEING SO RESTLESS THAT IT IS HARD TO SIT STILL: NOT AT ALL
1. FEELING NERVOUS, ANXIOUS, OR ON EDGE: MORE THAN HALF THE DAYS
8. IF YOU CHECKED OFF ANY PROBLEMS, HOW DIFFICULT HAVE THESE MADE IT FOR YOU TO DO YOUR WORK, TAKE CARE OF THINGS AT HOME, OR GET ALONG WITH OTHER PEOPLE?: VERY DIFFICULT
IF YOU CHECKED OFF ANY PROBLEMS ON THIS QUESTIONNAIRE, HOW DIFFICULT HAVE THESE PROBLEMS MADE IT FOR YOU TO DO YOUR WORK, TAKE CARE OF THINGS AT HOME, OR GET ALONG WITH OTHER PEOPLE: VERY DIFFICULT
4. TROUBLE RELAXING: NOT AT ALL
GAD7 TOTAL SCORE: 6
6. BECOMING EASILY ANNOYED OR IRRITABLE: MORE THAN HALF THE DAYS
7. FEELING AFRAID AS IF SOMETHING AWFUL MIGHT HAPPEN: NOT AT ALL
3. WORRYING TOO MUCH ABOUT DIFFERENT THINGS: SEVERAL DAYS
GAD7 TOTAL SCORE: 6
GAD7 TOTAL SCORE: 6
7. FEELING AFRAID AS IF SOMETHING AWFUL MIGHT HAPPEN: NOT AT ALL

## 2025-02-04 ASSESSMENT — PATIENT HEALTH QUESTIONNAIRE - PHQ9
SUM OF ALL RESPONSES TO PHQ QUESTIONS 1-9: 10
10. IF YOU CHECKED OFF ANY PROBLEMS, HOW DIFFICULT HAVE THESE PROBLEMS MADE IT FOR YOU TO DO YOUR WORK, TAKE CARE OF THINGS AT HOME, OR GET ALONG WITH OTHER PEOPLE: VERY DIFFICULT
SUM OF ALL RESPONSES TO PHQ QUESTIONS 1-9: 10

## 2025-02-04 NOTE — PROGRESS NOTES
"  Assessment & Plan     Major depressive disorder, recurrent episode, moderate (H)  Generalized anxiety disorder  Not at goal Increase effexor. Patient agreeable.  - venlafaxine (EFFEXOR-ER) 150 MG 24 hr tablet; Take 1 tablet (150 mg) by mouth daily.        Rash  Unclear cause. Possibly improving. Suggest she touch base next week if persisting or worsening. Could consider prednisone course.       The longitudinal plan of care for the diagnosis(es)/condition(s) as documented were addressed during this visit. Due to the added complexity in care, I will continue to support Sunshine in the subsequent management and with ongoing continuity of care.            Subjective   Sunshine is a 59 year old, presenting for the following health issues:  Depression and Derm Problem (Rash on back of ears to neck and chest )      2/4/2025    11:07 AM   Additional Questions   Roomed by An V.         2/4/2025    11:07 AM   Patient Reported Additional Medications   Patient reports taking the following new medications none     HPI       Depression and Anxiety   How are you doing with your depression since your last visit? No change  How are you doing with your anxiety since your last visit?  Improved slightly  Are you having other symptoms that might be associated with depression or anxiety? Yes:  low in energy   Have you had a significant life event? OTHER: Niece dx with brain cancer    Do you have any concerns with your use of alcohol or other drugs? No      Recently feeling more \"blah\". More anxiety.       Social History     Tobacco Use    Smoking status: Never     Passive exposure: Never    Smokeless tobacco: Never   Vaping Use    Vaping status: Never Used   Substance Use Topics    Alcohol use: Not Currently     Comment: zepbound - no taste for etoh    Drug use: Not Currently     Types: Marijuana     Comment: 12/20 last dose         6/20/2024     1:16 PM 1/30/2025    10:44 AM 2/4/2025    11:01 AM   PHQ   PHQ-9 Total Score 1 6  10    Q9: " "Thoughts of better off dead/self-harm past 2 weeks Not at all Not at all Not at all       Patient-reported         4/25/2023    11:08 AM 6/20/2024     1:17 PM 2/4/2025    11:02 AM   ADRIANNE-7 SCORE   Total Score  0 (minimal anxiety) 6 (mild anxiety)   Total Score 4 0 6        Patient-reported         Suicide Assessment Five-step Evaluation and Treatment (SAFE-T)    How many servings of fruits and vegetables do you eat daily?  2-3  On average, how many sweetened beverages do you drink each day (Examples: soda, juice, sweet tea, etc.  Do NOT count diet or artificially sweetened beverages)?   0  How many days per week do you exercise enough to make your heart beat faster? 4  How many minutes a day do you exercise enough to make your heart beat faster? 60 or more  How many days per week do you miss taking your medication? 0    Rash  Onset/Duration: 1.5 weeks  Description  Location: behind the ears down to neck and chest   Character: flakey, painful, warm   Itching: mild  Intensity:  mild  Progression of Symptoms:  same  Accompanying signs and symptoms:   Fever: No  Body aches or joint pain: No  Sore throat symptoms: YES- last week but not currently   Recent cold symptoms: YES- last week but not currently  History:           Previous episodes of similar rash: None  New exposures:  None  Recent travel: No  Exposure to similar rash: No  Precipitating or alleviating factors: n/a  Therapies tried and outcome: Cortisone Cream OTC            Objective    /76   Pulse 67   Temp 97.7  F (36.5  C) (Temporal)   Resp 12   Ht 1.624 m (5' 3.94\")   Wt 78.5 kg (173 lb)   LMP  (LMP Unknown)   SpO2 99%   BMI 29.75 kg/m    Body mass index is 29.75 kg/m .  Physical Exam   GENERAL: alert and no distress  NECK: no adenopathy, no asymmetry, masses, or scars  RESP: lungs clear to auscultation - no rales, rhonchi or wheezes  CV: regular rate and rhythm, normal S1 S2, no S3 or S4, no murmur, click or rub, no peripheral edema  ABDOMEN: " soft, nontender, no hepatosplenomegaly, no masses and bowel sounds normal  SKIN: widespread erythema across neck and chest, no bleeding, no excoriations, no papules  MS: no gross musculoskeletal defects noted, no edema            Signed Electronically by: Linn Montemayor PA-C

## 2025-02-12 ENCOUNTER — HOSPITAL ENCOUNTER (OUTPATIENT)
Dept: MAMMOGRAPHY | Facility: CLINIC | Age: 60
Discharge: HOME | End: 2025-02-12
Attending: PHYSICIAN ASSISTANT
Payer: COMMERCIAL

## 2025-02-12 DIAGNOSIS — Z12.31 VISIT FOR SCREENING MAMMOGRAM: ICD-10-CM

## 2025-02-12 PROCEDURE — 77063 BREAST TOMOSYNTHESIS BI: CPT

## 2025-02-12 PROCEDURE — 77067 SCR MAMMO BI INCL CAD: CPT

## 2025-02-17 NOTE — PROGRESS NOTES
ELECTROPHYSIOLOGY CLINIC VISIT    Assessment/Recommendations   Assessment/Plan:    Ms. Delgado is a 59 year old female who has a past medical history significant for hashimoto's thyroiditis, MARIA GUADALUPE (uses CPAP), CKD, PAF (CHADSVASC 1 Eliquis) s/p DCCVs s/p PVI/CTI 21 s/p DCCV 21 s/p PVI/CTI 3/31/22, anxiety, and depression. She presents today for follow up.     Paroxsymal Atrial Fibrillation/Flutter:   We discussed in detail with the patient management/treatment options for Skip includin. Stroke Prophylaxis:  CHADSVASC=+gender  1, corresponding to a 1.3% annual stroke / systemic emolism event rate. She does not require chronic anticoagulation at this time.   2. Rate Control: She had been on Metoprolol with fatigue and it was stopped. She had been on Atenolol which was recently stopped. Can consider CCB if needed.   3. Rhythm Control: Cardioversion, Antiarrhythmics and/or ablation are options for rhythm control. She has been having frequent PAF/AFL that is highly symptomatic. She has had a couple DCCVs with continued recurrences. She had been on Flecainide which was not effective. She was recently switched to amiodarone. We discussed that given her younger age, we do not favor keeping her on this long term. We discussed alternative AATs vs. Ablation. She elected to pursue ablation which she had PVI/CTI on 21. She had recurrent persistent AF/AFL with zio from 10/2021 showing AF throughout and had DCCV on 21. She had recurrent AF/AFl and had a repeat PVI/CTI ablation on 3/31/22. Doing well now without recurrences.      4. Risk Factor Management: tight BP control, and MARIA GUADALUPE evaluation as indicated.      Given generalized fatigue and dizziness will get updated labs for thyroid function, Hgb, and renal function with electrolytes.    Follow up in 2 years or sooner if need arises.        History of Present Illness/Subjective    Ms. Sunshine Delgado is a 59 year old female who comes in today for EP  follow-up of AF/AFL.    Ms. Delgado is a 59 year old female who has a past medical history significant for hashimoto's thyroiditis, MARIA GUADALUPE (uses CPAP), CKD, PAF (CHADSVASC 1 Eliquis) s/p DCCV s/p PVI 7/22/21 s/p PVI/CTI 3/31/22, anxiety, and depression.      She was admitted in 1/2020 to OSH with abdominal pain and was found to have infectious mononucleosis. At that time her ECG showed AF and she spontaneously converted while in the hospital. An echo showed normal LVEF, no significant valvular abnormalities, and small pericardial effusion. She was started on metoprolol and ASA. Her metoprolol was later stopped by PCP due to frequent lightheadedness. She established care with Dr. Fajardo. She followed up in 3/2021 and reported feeling palpitations on and off and one episode lasting up to 3 days. She felt some chest pressure, shortness of breath, and presyncope. ECG showed AF vent rate 89 bpm. She had been started on lisinopril in 2/2021 by PCP for CKD and had felt a little more lightheaded since then, but also notes she had lightheadedness predating the lisinopril. A zio patch monitor from 4/2/21-4/16/21 showed 29% AF/AFL burden up to 10 hours 54 minutes and 39 symptom activations showed mostly AF/AFL or PSVT and sometimes sinus without ectopy. She was placed on atenolol and reported fatigue with this. Flecainide 50 mg BID was added. Subsequent ECG stress testing was negative for ischemia or QRS widening. Her PAF episodes had completely subsided for about 1 month after starting the Flecainide until end of 5/2021. She felt much improved when maintaining sinus. She then started developing increased AF symptoms, occurring daily. She has symptoms of chest tightness, dizziness, and palpitations with her AF.  She then presented to [facility] on 6/10/21 with symptomatic AF and underwent DCCV. Her Flecainide was increased to 100 mg BID. She represented to [facility] on 6/14/21 again with symptomatic AF and underwent DCCV. Her Flecainide and  atenolol were stopped and she was changed to amiodarone. She was then referred to EP for consideration for ablation. At EP appointment on 6/16/21, she was in AFL and feeling fatigue, MCHUGH, lightheadedness/dizziness, and generally unwell. We loaded her with amiodarone and arranged for another DCCV. She presented to DCCV in sinus and it was canceled. She wanted to get off amiodarone if possible and elected to pursue ablation and had PVI/CTI on 7/22/21.     EP Visit 1/12/22: She presents today for follow up after ablation. Groin sites well healed. A zio patch monitor from 10/2021 showed AF throughout poor HR control with avg  bpm. She underwent DCCV on 11/22/21. She reports feeling OK. She notes feeling less symptomatic now with her AFL then before ablation. She does still have palpitations and decreased stamina. She denies chest discomfort, abdominal fullness/bloating or peripheral edema, shortness of breath, paroxysmal nocturnal dyspnea, orthopnea, lightheadedness, dizziness, pre-syncope, or syncope. Presenting 12 lead ECG shows AFL Vent Rate 78 bpm, QRS 74 ms, QTc 424 ms. Current cardiac medications include: Diltiazem.     EP Visit 7/13/22: She presents today for follow up. She had repeat PVI/CTI ablation on 3/31/22. Groin sites well healed. She reports feeling well. She has some minor occasional palpitations lasting seconds. No known AF/AFL recurrences. She denies chest discomfort, abdominal fullness/bloating or peripheral edema, shortness of breath, paroxysmal nocturnal dyspnea, orthopnea, lightheadedness, dizziness, pre-syncope, or syncope. Presenting 12 lead ECG shows NSR Vent Rate 72 bpm,  ms, QRS 88 ms, QTc 424 ms. Current cardiac medications include: Eliquis. Eliquis stopped at this visit.     EP Visit 1/11/23: She presents today for follow up. She reports feeling well. She has lost over 30 lbs in 9 months. She denies chest discomfort, palpitations, abdominal fullness/bloating or peripheral edema,  shortness of breath, paroxysmal nocturnal dyspnea, orthopnea, lightheadedness, dizziness, pre-syncope, or syncope. Presenting 12 lead ECG shows NSR  Vent Rate 60 bpm,  ms, QRS 88 ms, QTc 400 ms. No current cardiac medications.     EP Visit 2/21/24: She presents today for follow up. She reports feeling some generalized fatigue and some lightheadedness. She has some rare palpitations, short lived. She has been having pleurisy pain. She also has a knee injury causing her pain and has a MRI upcoming. She denies chest discomfort,abdominal fullness/bloating or peripheral edema, shortness of breath, paroxysmal nocturnal dyspnea, orthopnea, pre-syncope, or syncope. No current cardiac medications.     She presents today for follow up. She reports feeling well. She denies chest discomfort, palpitations, abdominal fullness/bloating or peripheral edema, shortness of breath, paroxysmal nocturnal dyspnea, orthopnea, lightheadedness, dizziness, pre-syncope, or syncope. Presenting 12 lead ECG shows NSR Vent Rate 71 bpm,  ms, QRS 94 ms, QTc 425 ms. No current cardiac medications.     I have reviewed and updated the patient's Past Medical History, Social History, Family History and Medication List.     Cardiographics (Personally Reviewed) :   5/11/21 STRESS ECHOCARDIOGRAM:  Interpretation Summary  Submaximal ECG stress test  65% maximal predicted HR achieved. The test was terminated due to fatigue.  Normal blood pressure response to exercise.  The patient did not report any symptoms during exercise.  No ECG evidence of ischemia.  No QRS prolongation at peak exercise compared to rest.  No supraventricular or ventricular arrhythmias.  Good exercise tolerance. The patient achieved 10 METs at peak exercise.     1/27/20 ECHOCARDIOGRAM:   Final Conclusion   Normal left ventricular size.   Normal left ventricular systolic function.   No obvious regional wall motion abnormalities.   The ejection fraction is visually estimated at  55-60%.   The right ventricle is normal in size and function.   The left atrium is normal in size.   There is trace mitral regurgitation.   There is no significant tricuspid regurgitation.   Small pericardial effusion.   No echocardiographic findings to suggest hemodynamic effect of the pericardial fluid.   Estimated EF: 55-60%  7/2021 CTA:  IMPRESSION:  1. Normal pulmonary venous anatomy normal variant right middle  pulmonary vein. All pulmonary veins draining into the left atrium       Physical Examination   /83 (BP Location: Right arm, Patient Position: Chair, Cuff Size: Adult Regular)   Pulse 75   Wt 76.2 kg (168 lb)   LMP  (LMP Unknown)   SpO2 99%   BMI 28.89 kg/m    Wt Readings from Last 3 Encounters:   02/04/25 78.5 kg (173 lb)   01/30/25 78.5 kg (173 lb)   01/13/25 79.9 kg (176 lb 3.2 oz)     General Appearance:   Alert, well-appearing and in no acute distress.   HEENT: Atraumatic, normocephalic. PERRL.  MMM.   Chest/Lungs:   Respirations unlabored.  Lungs are clear to auscultation.   Cardiovascular:   Regular, S1/S2, no murmur.    Abdomen:  Soft, nontender, nondistended.   Extremities: No cyanosis or clubbing. No edema.    Musculoskeletal: Moves all extremities.  Gait normal.   Skin: Warm, dry, intact.    Neurologic: Mood and affect are appropriate.  Alert and oriented to person, place, time, and situation.          Medications  Allergies   Current Outpatient Medications   Medication Sig Dispense Refill    acetaminophen (TYLENOL) 325 MG tablet Take 325 mg by mouth every 4 hours as needed      albuterol (PROAIR HFA/PROVENTIL HFA/VENTOLIN HFA) 108 (90 Base) MCG/ACT inhaler INHALE 2 PUFFS INTO THE LUNGS EVERY 6 HOURS AS NEEDED FOR SHORTNESS OF BREATH, WHEEZING OR COUGH 8.5 g 1    Biotin 10 MG CAPS Take 1 capsule by mouth daily.      buPROPion (WELLBUTRIN XL) 300 MG 24 hr tablet Take 1 tablet (300 mg) by mouth every morning 90 tablet 3    EPINEPHrine (AUVI-Q) 0.3 MG/0.3ML injection 2-pack Inject 0.3  mLs (0.3 mg) into the muscle as needed for anaphylaxis 0.6 mL 3    levothyroxine (SYNTHROID/LEVOTHROID) 75 MCG tablet Take 1 tablet (75 mcg) by mouth daily. 90 tablet 1    multivitamin, therapeutic (THERA-VIT) TABS tablet Take 1 tablet by mouth daily.      selenium 50 MCG TABS tablet Take 50 mcg by mouth daily.      tirzepatide-Weight Management (ZEPBOUND) 15 MG/0.5ML prefilled pen Inject 0.5 mLs (15 mg) subcutaneously every 7 days. Start after 4 weeks of 12.5 mg Zepbound, if tolerating 6 mL 1    traZODone (DESYREL) 50 MG tablet TAKE TWO TABLETS BY MOUTH EVERY NIGHT AT BEDTIME 180 tablet 0    venlafaxine (EFFEXOR-ER) 150 MG 24 hr tablet Take 1 tablet (150 mg) by mouth daily. 90 tablet 0    Allergies   Allergen Reactions    Cephalexin Hives    Strawberry Extract Hives    Sulfa Antibiotics Hives    Cinnamon Hives    Sulfamethoxazole-Trimethoprim Hives    Vicodin [Hydrocodone-Acetaminophen]     Wasp Venom Protein      Other reaction(s): Chest Pain    Wasps [Hornets] Swelling     Now carries Epi Pen    Zoloft [Sertraline]      suicidal ideation    Contrast Dye Other (See Comments) and Rash     Patient had sneezing and an itchy throat following contrast injection of 100 mL Isovue-370.  From IV contrast dye         Lab Results (Personally Reviewed)    Chemistry/lipid CBC Cardiac Enzymes/BNP/TSH/INR   Lab Results   Component Value Date    BUN 20.3 (H) 01/06/2025     01/06/2025    CO2 31 (H) 01/06/2025     Creatinine   Date Value Ref Range Status   01/06/2025 1.15 (H) 0.51 - 0.95 mg/dL Final   06/14/2021 1.22 (H) 0.52 - 1.04 mg/dL Final       Lab Results   Component Value Date    CHOL 187 06/20/2024    HDL 56 06/20/2024     (H) 06/20/2024      Lab Results   Component Value Date    WBC 6.9 01/06/2025    HGB 14.3 01/06/2025    HCT 44.2 01/06/2025    MCV 89 01/06/2025     01/06/2025    Lab Results   Component Value Date    TSH 5.17 (H) 01/06/2025    INR 1.27 (H) 03/31/2022        The patient states  understanding and is agreeable with the plan.   LEOPOLDO Colin CNP  Electrophysiology Consult Service  Securely message with [employer]   Text page via [facility] Paging/Directory

## 2025-02-19 ENCOUNTER — OFFICE VISIT (OUTPATIENT)
Dept: CARDIOLOGY | Facility: CLINIC | Age: 60
End: 2025-02-19
Attending: NURSE PRACTITIONER
Payer: COMMERCIAL

## 2025-02-19 VITALS
BODY MASS INDEX: 28.89 KG/M2 | WEIGHT: 168 LBS | HEART RATE: 75 BPM | OXYGEN SATURATION: 99 % | SYSTOLIC BLOOD PRESSURE: 125 MMHG | DIASTOLIC BLOOD PRESSURE: 83 MMHG

## 2025-02-19 DIAGNOSIS — I48.91 ATRIAL FIBRILLATION WITH CONTROLLED VENTRICULAR RATE (H): ICD-10-CM

## 2025-02-19 LAB
ATRIAL RATE - MUSE: 71 BPM
DIASTOLIC BLOOD PRESSURE - MUSE: NORMAL MMHG
INTERPRETATION ECG - MUSE: NORMAL
P AXIS - MUSE: 67 DEGREES
PR INTERVAL - MUSE: 166 MS
QRS DURATION - MUSE: 94 MS
QT - MUSE: 392 MS
QTC - MUSE: 425 MS
R AXIS - MUSE: 65 DEGREES
SYSTOLIC BLOOD PRESSURE - MUSE: NORMAL MMHG
T AXIS - MUSE: 55 DEGREES
VENTRICULAR RATE- MUSE: 71 BPM

## 2025-02-19 PROCEDURE — 99213 OFFICE O/P EST LOW 20 MIN: CPT | Performed by: NURSE PRACTITIONER

## 2025-02-19 PROCEDURE — 93005 ELECTROCARDIOGRAM TRACING: CPT

## 2025-02-19 ASSESSMENT — PAIN SCALES - GENERAL: PAINLEVEL_OUTOF10: NO PAIN (0)

## 2025-02-19 NOTE — LETTER
2025      RE: Sunshine Delgado  [address]   [facility] [address]       Dear Colleague,    Thank you for the opportunity to participate in the care of your patient, Sunshine Delgado, at the [facility] [place] [facility]. Please see a copy of my visit note below.        ELECTROPHYSIOLOGY CLINIC VISIT    Assessment/Recommendations   Assessment/Plan:    Ms. Delgado is a 59 year old female who has a past medical history significant for hashimoto's thyroiditis, MARIA GUADALUPE (uses CPAP), CKD, PAF (CHADSVASC 1 Eliquis) s/p DCCVs s/p PVI/CTI 21 s/p DCCV 21 s/p PVI/CTI 3/31/22, anxiety, and depression. She presents today for follow up.     Paroxsymal Atrial Fibrillation/Flutter:   We discussed in detail with the patient management/treatment options for Skip includin. Stroke Prophylaxis:  CHADSVASC=+gender  1, corresponding to a 1.3% annual stroke / systemic emolism event rate. She does not require chronic anticoagulation at this time.   2. Rate Control: She had been on Metoprolol with fatigue and it was stopped. She had been on Atenolol which was recently stopped. Can consider CCB if needed.   3. Rhythm Control: Cardioversion, Antiarrhythmics and/or ablation are options for rhythm control. She has been having frequent PAF/AFL that is highly symptomatic. She has had a couple DCCVs with continued recurrences. She had been on Flecainide which was not effective. She was recently switched to amiodarone. We discussed that given her younger age, we do not favor keeping her on this long term. We discussed alternative AATs vs. Ablation. She elected to pursue ablation which she had PVI/CTI on 21. She had recurrent persistent AF/AFL with zio from 10/2021 showing AF throughout and had DCCV on 21. She had recurrent AF/AFl and had a repeat PVI/CTI ablation on 3/31/22. Doing well now without recurrences.      4. Risk Factor Management: tight BP  control, and MARIA GUADALUPE evaluation as indicated.      Given generalized fatigue and dizziness will get updated labs for thyroid function, Hgb, and renal function with electrolytes.    Follow up in 2 years or sooner if need arises.        History of Present Illness/Subjective    Ms. Sunshine Delgado is a 59 year old female who comes in today for EP follow-up of AF/AFL.    Ms. Delgado is a 59 year old female who has a past medical history significant for hashimoto's thyroiditis, MARIA GUADALUPE (uses CPAP), CKD, PAF (CHADSVASC 1 Eliquis) s/p DCCV s/p PVI 7/22/21 s/p PVI/CTI 3/31/22, anxiety, and depression.      She was admitted in 1/2020 to OSH with abdominal pain and was found to have infectious mononucleosis. At that time her ECG showed AF and she spontaneously converted while in the hospital. An echo showed normal LVEF, no significant valvular abnormalities, and small pericardial effusion. She was started on metoprolol and ASA. Her metoprolol was later stopped by PCP due to frequent lightheadedness. She established care with Dr. Fajardo. She followed up in 3/2021 and reported feeling palpitations on and off and one episode lasting up to 3 days. She felt some chest pressure, shortness of breath, and presyncope. ECG showed AF vent rate 89 bpm. She had been started on lisinopril in 2/2021 by PCP for CKD and had felt a little more lightheaded since then, but also notes she had lightheadedness predating the lisinopril. A zio patch monitor from 4/2/21-4/16/21 showed 29% AF/AFL burden up to 10 hours 54 minutes and 39 symptom activations showed mostly AF/AFL or PSVT and sometimes sinus without ectopy. She was placed on atenolol and reported fatigue with this. Flecainide 50 mg BID was added. Subsequent ECG stress testing was negative for ischemia or QRS widening. Her PAF episodes had completely subsided for about 1 month after starting the Flecainide until end of 5/2021. She felt much improved when maintaining sinus. She then started developing  increased AF symptoms, occurring daily. She has symptoms of chest tightness, dizziness, and palpitations with her AF.  She then presented to [facility] on 6/10/21 with symptomatic AF and underwent DCCV. Her Flecainide was increased to 100 mg BID. She represented to [facility] on 6/14/21 again with symptomatic AF and underwent DCCV. Her Flecainide and atenolol were stopped and she was changed to amiodarone. She was then referred to EP for consideration for ablation. At EP appointment on 6/16/21, she was in AFL and feeling fatigue, MCHUGH, lightheadedness/dizziness, and generally unwell. We loaded her with amiodarone and arranged for another DCCV. She presented to [facility] in sinus and it was canceled. She wanted to get off amiodarone if possible and elected to pursue ablation and had PVI/CTI on 7/22/21.     EP Visit 1/12/22: She presents today for follow up after ablation. Groin sites well healed. A zio patch monitor from 10/2021 showed AF throughout poor HR control with avg  bpm. She underwent DCCV on 11/22/21. She reports feeling OK. She notes feeling less symptomatic now with her AFL then before ablation. She does still have palpitations and decreased stamina. She denies chest discomfort, abdominal fullness/bloating or peripheral edema, shortness of breath, paroxysmal nocturnal dyspnea, orthopnea, lightheadedness, dizziness, pre-syncope, or syncope. Presenting 12 lead ECG shows AFL Vent Rate 78 bpm, QRS 74 ms, QTc 424 ms. Current cardiac medications include: Diltiazem.     EP Visit 7/13/22: She presents today for follow up. She had repeat PVI/CTI ablation on 3/31/22. Groin sites well healed. She reports feeling well. She has some minor occasional palpitations lasting seconds. No known AF/AFL recurrences. She denies chest discomfort, abdominal fullness/bloating or peripheral edema, shortness of breath, paroxysmal nocturnal dyspnea, orthopnea, lightheadedness, dizziness, pre-syncope, or syncope. Presenting 12 lead ECG shows NSR  Vent Rate 72 bpm,  ms, QRS 88 ms, QTc 424 ms. Current cardiac medications include: Eliquis. Eliquis stopped at this visit.     EP Visit 1/11/23: She presents today for follow up. She reports feeling well. She has lost over 30 lbs in 9 months. She denies chest discomfort, palpitations, abdominal fullness/bloating or peripheral edema, shortness of breath, paroxysmal nocturnal dyspnea, orthopnea, lightheadedness, dizziness, pre-syncope, or syncope. Presenting 12 lead ECG shows NSR  Vent Rate 60 bpm,  ms, QRS 88 ms, QTc 400 ms. No current cardiac medications.     EP Visit 2/21/24: She presents today for follow up. She reports feeling some generalized fatigue and some lightheadedness. She has some rare palpitations, short lived. She has been having pleurisy pain. She also has a knee injury causing her pain and has a MRI upcoming. She denies chest discomfort,abdominal fullness/bloating or peripheral edema, shortness of breath, paroxysmal nocturnal dyspnea, orthopnea, pre-syncope, or syncope. No current cardiac medications.     She presents today for follow up. She reports feeling well. She denies chest discomfort, palpitations, abdominal fullness/bloating or peripheral edema, shortness of breath, paroxysmal nocturnal dyspnea, orthopnea, lightheadedness, dizziness, pre-syncope, or syncope. Presenting 12 lead ECG shows NSR Vent Rate 71 bpm,  ms, QRS 94 ms, QTc 425 ms. No current cardiac medications.     I have reviewed and updated the patient's Past Medical History, Social History, Family History and Medication List.     Cardiographics (Personally Reviewed) :   5/11/21 STRESS ECHOCARDIOGRAM:  Interpretation Summary  Submaximal ECG stress test  65% maximal predicted HR achieved. The test was terminated due to fatigue.  Normal blood pressure response to exercise.  The patient did not report any symptoms during exercise.  No ECG evidence of ischemia.  No QRS prolongation at peak exercise compared to rest.  No  supraventricular or ventricular arrhythmias.  Good exercise tolerance. The patient achieved 10 METs at peak exercise.     1/27/20 ECHOCARDIOGRAM:   Final Conclusion   Normal left ventricular size.   Normal left ventricular systolic function.   No obvious regional wall motion abnormalities.   The ejection fraction is visually estimated at 55-60%.   The right ventricle is normal in size and function.   The left atrium is normal in size.   There is trace mitral regurgitation.   There is no significant tricuspid regurgitation.   Small pericardial effusion.   No echocardiographic findings to suggest hemodynamic effect of the pericardial fluid.   Estimated EF: 55-60%  7/2021 CTA:  IMPRESSION:  1. Normal pulmonary venous anatomy normal variant right middle  pulmonary vein. All pulmonary veins draining into the left atrium       Physical Examination   /83 (BP Location: Right arm, Patient Position: Chair, Cuff Size: Adult Regular)   Pulse 75   Wt 76.2 kg (168 lb)   LMP  (LMP Unknown)   SpO2 99%   BMI 28.89 kg/m    Wt Readings from Last 3 Encounters:   02/04/25 78.5 kg (173 lb)   01/30/25 78.5 kg (173 lb)   01/13/25 79.9 kg (176 lb 3.2 oz)     General Appearance:   Alert, well-appearing and in no acute distress.   HEENT: Atraumatic, normocephalic. PERRL.  MMM.   Chest/Lungs:   Respirations unlabored.  Lungs are clear to auscultation.   Cardiovascular:   Regular, S1/S2, no murmur.    Abdomen:  Soft, nontender, nondistended.   Extremities: No cyanosis or clubbing. No edema.    Musculoskeletal: Moves all extremities.  Gait normal.   Skin: Warm, dry, intact.    Neurologic: Mood and affect are appropriate.  Alert and oriented to person, place, time, and situation.          Medications  Allergies   Current Outpatient Medications   Medication Sig Dispense Refill     acetaminophen (TYLENOL) 325 MG tablet Take 325 mg by mouth every 4 hours as needed       albuterol (PROAIR HFA/PROVENTIL HFA/VENTOLIN HFA) 108 (90 Base)  MCG/ACT inhaler INHALE 2 PUFFS INTO THE LUNGS EVERY 6 HOURS AS NEEDED FOR SHORTNESS OF BREATH, WHEEZING OR COUGH 8.5 g 1     Biotin 10 MG CAPS Take 1 capsule by mouth daily.       buPROPion (WELLBUTRIN XL) 300 MG 24 hr tablet Take 1 tablet (300 mg) by mouth every morning 90 tablet 3     EPINEPHrine (AUVI-Q) 0.3 MG/0.3ML injection 2-pack Inject 0.3 mLs (0.3 mg) into the muscle as needed for anaphylaxis 0.6 mL 3     levothyroxine (SYNTHROID/LEVOTHROID) 75 MCG tablet Take 1 tablet (75 mcg) by mouth daily. 90 tablet 1     multivitamin, therapeutic (THERA-VIT) TABS tablet Take 1 tablet by mouth daily.       selenium 50 MCG TABS tablet Take 50 mcg by mouth daily.       tirzepatide-Weight Management (ZEPBOUND) 15 MG/0.5ML prefilled pen Inject 0.5 mLs (15 mg) subcutaneously every 7 days. Start after 4 weeks of 12.5 mg Zepbound, if tolerating 6 mL 1     traZODone (DESYREL) 50 MG tablet TAKE TWO TABLETS BY MOUTH EVERY NIGHT AT BEDTIME 180 tablet 0     venlafaxine (EFFEXOR-ER) 150 MG 24 hr tablet Take 1 tablet (150 mg) by mouth daily. 90 tablet 0    Allergies   Allergen Reactions     Cephalexin Hives     Strawberry Extract Hives     Sulfa Antibiotics Hives     Cinnamon Hives     Sulfamethoxazole-Trimethoprim Hives     Vicodin [Hydrocodone-Acetaminophen]      Wasp Venom Protein      Other reaction(s): Chest Pain     Wasps [Hornets] Swelling     Now carries Epi Pen     Zoloft [Sertraline]      suicidal ideation     Contrast Dye Other (See Comments) and Rash     Patient had sneezing and an itchy throat following contrast injection of 100 mL Isovue-370.  From IV contrast dye         Lab Results (Personally Reviewed)    Chemistry/lipid CBC Cardiac Enzymes/BNP/TSH/INR   Lab Results   Component Value Date    BUN 20.3 (H) 01/06/2025     01/06/2025    CO2 31 (H) 01/06/2025     Creatinine   Date Value Ref Range Status   01/06/2025 1.15 (H) 0.51 - 0.95 mg/dL Final   06/14/2021 1.22 (H) 0.52 - 1.04 mg/dL Final       Lab Results    Component Value Date    CHOL 187 06/20/2024    HDL 56 06/20/2024     (H) 06/20/2024      Lab Results   Component Value Date    WBC 6.9 01/06/2025    HGB 14.3 01/06/2025    HCT 44.2 01/06/2025    MCV 89 01/06/2025     01/06/2025    Lab Results   Component Value Date    TSH 5.17 (H) 01/06/2025    INR 1.27 (H) 03/31/2022        The patient states understanding and is agreeable with the plan.   LEOPOLDO Colin CNP  Electrophysiology Consult Service  Securely message with [employer]   Text page via [facility] Paging/Directory                                 Please do not hesitate to contact me if you have any questions/concerns.     Sincerely,     LEOPOLDO Adair CNP

## 2025-02-19 NOTE — NURSING NOTE
Chief Complaint   Patient presents with    Follow Up      RETURN EP       Vitals were taken, medications reconciled, and EKG was performed.    Johan Melendez, EMT  12:06 PM

## 2025-03-18 DIAGNOSIS — F99 INSOMNIA DUE TO OTHER MENTAL DISORDER: ICD-10-CM

## 2025-03-18 DIAGNOSIS — F51.05 INSOMNIA DUE TO OTHER MENTAL DISORDER: ICD-10-CM

## 2025-03-18 RX ORDER — TRAZODONE HYDROCHLORIDE 50 MG/1
100 TABLET ORAL AT BEDTIME
Qty: 180 TABLET | Refills: 0 | Status: SHIPPED | OUTPATIENT
Start: 2025-03-18

## 2025-03-19 ENCOUNTER — OFFICE VISIT (OUTPATIENT)
Dept: OPTOMETRY | Facility: CLINIC | Age: 60
End: 2025-03-19
Payer: COMMERCIAL

## 2025-03-19 DIAGNOSIS — Z01.01 ENCOUNTER FOR EXAMINATION OF EYES AND VISION WITH ABNORMAL FINDINGS: Primary | ICD-10-CM

## 2025-03-19 DIAGNOSIS — H52.223 REGULAR ASTIGMATISM OF BOTH EYES: ICD-10-CM

## 2025-03-19 DIAGNOSIS — Z98.890 HISTORY OF PHOTOREFRACTIVE KERATECTOMY: ICD-10-CM

## 2025-03-19 DIAGNOSIS — H52.4 PRESBYOPIA: ICD-10-CM

## 2025-03-19 DIAGNOSIS — H52.13 MYOPIA OF BOTH EYES: ICD-10-CM

## 2025-03-19 PROCEDURE — 92015 DETERMINE REFRACTIVE STATE: CPT | Performed by: OPTOMETRIST

## 2025-03-19 PROCEDURE — 92004 COMPRE OPH EXAM NEW PT 1/>: CPT | Performed by: OPTOMETRIST

## 2025-03-19 ASSESSMENT — TONOMETRY
IOP_METHOD: APPLANATION
OS_IOP_MMHG: 11
OD_IOP_MMHG: 10

## 2025-03-19 ASSESSMENT — VISUAL ACUITY
METHOD: SNELLEN - LINEAR
OS_SC: 20/80-1
OD_SC: 20/80-1
OS_SC+: +2
OD_SC: 20/40
OD_SC+: +2
OS_SC: 20/30

## 2025-03-19 ASSESSMENT — EXTERNAL EXAM - RIGHT EYE: OD_EXAM: NORMAL

## 2025-03-19 ASSESSMENT — KERATOMETRY
OS_K2POWER_DIOPTERS: 41.00
OS_AXISANGLE_DEGREES: 099
OD_AXISANGLE_DEGREES: 167
OS_AXISANGLE2_DEGREES: 009
OD_K1POWER_DIOPTERS: 40.50
OD_AXISANGLE2_DEGREES: 167
OS_K1POWER_DIOPTERS: 40.00
OD_K2POWER_DIOPTERS: 41.25

## 2025-03-19 ASSESSMENT — CONF VISUAL FIELD
METHOD: COUNTING FINGERS
OS_INFERIOR_TEMPORAL_RESTRICTION: 0
OS_INFERIOR_NASAL_RESTRICTION: 0
OD_NORMAL: 1
OS_NORMAL: 1
OS_SUPERIOR_NASAL_RESTRICTION: 0
OS_SUPERIOR_TEMPORAL_RESTRICTION: 0
OD_INFERIOR_NASAL_RESTRICTION: 0
OD_SUPERIOR_NASAL_RESTRICTION: 0
OD_SUPERIOR_TEMPORAL_RESTRICTION: 0
OD_INFERIOR_TEMPORAL_RESTRICTION: 0

## 2025-03-19 ASSESSMENT — REFRACTION_MANIFEST
OD_SPHERE: -1.00
OD_AXIS: 062
OS_AXIS: 096
OS_SPHERE: -1.25
OS_AXIS: 100
OS_CYLINDER: +1.25
OS_ADD: +2.50
METHOD_AUTOREFRACTION: 1
OD_ADD: +2.50
OD_AXIS: 074
OS_CYLINDER: +1.25
OD_SPHERE: -0.75
OS_SPHERE: -1.00
OD_CYLINDER: +0.75
OD_CYLINDER: +0.50

## 2025-03-19 ASSESSMENT — SLIT LAMP EXAM - LIDS
COMMENTS: NORMAL
COMMENTS: NORMAL

## 2025-03-19 ASSESSMENT — EXTERNAL EXAM - LEFT EYE: OS_EXAM: NORMAL

## 2025-03-19 ASSESSMENT — CUP TO DISC RATIO
OD_RATIO: 0.25
OS_RATIO: 0.35

## 2025-03-19 NOTE — LETTER
"3/19/2025      Sunshine YARBROUGH Danny  [address] [place] [address]      Dear Colleague,    Thank you for referring your patient, Sunshine Delgado, to the [facility]. Please see a copy of my visit note below.    Chief Complaint   Patient presents with     Annual Eye Exam     Floaters      -Family hx of AMD - mother (wet)     -S/P PRK each eye 2018(?) - [facility](?)     -Floaters - each eye - ongoing for years but worsening over the last few months - some are small 'gnat like' and some are 'hair like' - noticed large \"shadow figure\" while outside with dog a few nights ago (-)flashes/curtain effect     Last Eye Exam: 2018(?) - PRK Post op - saw Dr. Caldera in 2021 for floater eval   Dilated Previously: Yes, side effects of dilation explained today    What are you currently using to see?  +2.xx OTC readers - wears for everything up close - did not bring        Distance Vision Acuity: Noticed gradual change in both eyes - squinting more while driving - ongoing for 1-2 yrs     Near Vision Acuity: Satisfied with vision while reading and using computer with readers     Eye Comfort: good overall - occasionally red in AM   Do you use eye drops? : No  Occupation or Hobbies:  for [employer] - 4+ hrs/day on screen     Samreen Zamora  Optometry Assistant        Medical, surgical and family histories reviewed and updated 3/19/2025.       OBJECTIVE: See Ophthalmology exam    ASSESSMENT:    ICD-10-CM    1. Encounter for examination of eyes and vision with abnormal findings  Z01.01       2. History of photorefractive keratectomy  Z98.890       3. Myopia of both eyes  H52.13       4. Regular astigmatism of both eyes  H52.223       5. Presbyopia  H52.4           PLAN:     Patient Instructions   Updated glasses prescription provided today.   Allow 2 weeks to adapt to the new glasses.   Discussed bifocal vs progressive lens options.     Return in 2 years for a comprehensive eye exam, or sooner if " needed.      The effects of the dilating drops last for 4- 6 hours.  You will be more sensitive to light and vision will be blurry up close.  Mydriatic sunglasses were given if needed.     Oleg Correia OD  [address] [place]  [address]    [phone]       Again, thank you for allowing me to participate in the care of your patient.        Sincerely,        Oleg Correia OD    Electronically signed

## 2025-03-19 NOTE — PROGRESS NOTES
"Chief Complaint   Patient presents with    Annual Eye Exam    Floaters      -Family hx of AMD - mother (wet)     -S/P PRK each eye 2018(?) - [facility](?)     -Floaters - each eye - ongoing for years but worsening over the last few months - some are small 'gnat like' and some are 'hair like' - noticed large \"shadow figure\" while outside with dog a few nights ago (-)flashes/curtain effect     Last Eye Exam: 2018(?) - PRK Post op - saw Dr. Caldera in 2021 for floater eval   Dilated Previously: Yes, side effects of dilation explained today    What are you currently using to see?  +2.xx OTC readers - wears for everything up close - did not bring        Distance Vision Acuity: Noticed gradual change in both eyes - squinting more while driving - ongoing for 1-2 yrs     Near Vision Acuity: Satisfied with vision while reading and using computer with readers     Eye Comfort: good overall - occasionally red in AM   Do you use eye drops? : No  Occupation or Hobbies:  for [email or web address] - 4+ hrs/day on screen     Samreen Zamora  Optometry Assistant        Medical, surgical and family histories reviewed and updated 3/19/2025.       OBJECTIVE: See Ophthalmology exam    ASSESSMENT:    ICD-10-CM    1. Encounter for examination of eyes and vision with abnormal findings  Z01.01       2. History of photorefractive keratectomy  Z98.890       3. Myopia of both eyes  H52.13       4. Regular astigmatism of both eyes  H52.223       5. Presbyopia  H52.4           PLAN:     Patient Instructions   Updated glasses prescription provided today.   Allow 2 weeks to adapt to the new glasses.   Discussed bifocal vs progressive lens options.     Return in 2 years for a comprehensive eye exam, or sooner if needed.      The effects of the dilating drops last for 4- 6 hours.  You will be more sensitive to light and vision will be blurry up close.  Mydriatic sunglasses were given if needed.     Oleg Correia, [facility]  [address]  [place]  [address]    [phone]

## 2025-03-19 NOTE — PATIENT INSTRUCTIONS
Updated glasses prescription provided today.   Allow 2 weeks to adapt to the new glasses.   Discussed bifocal vs progressive lens options.     Return in 2 years for a comprehensive eye exam, or sooner if needed.      The effects of the dilating drops last for 4- 6 hours.  You will be more sensitive to light and vision will be blurry up close.  Mydriatic sunglasses were given if needed.     Oleg Correia, SHAKIRA  [facility]  [place]  [address] [phone]

## 2025-03-24 ENCOUNTER — VIRTUAL VISIT (OUTPATIENT)
Dept: ENDOCRINOLOGY | Facility: CLINIC | Age: 60
End: 2025-03-24
Payer: COMMERCIAL

## 2025-03-24 DIAGNOSIS — E06.3 HASHIMOTO'S THYROIDITIS: Primary | ICD-10-CM

## 2025-03-24 DIAGNOSIS — R13.10 DYSPHAGIA, UNSPECIFIED TYPE: ICD-10-CM

## 2025-03-24 PROCEDURE — 1126F AMNT PAIN NOTED NONE PRSNT: CPT | Mod: 95 | Performed by: INTERNAL MEDICINE

## 2025-03-24 PROCEDURE — G2211 COMPLEX E/M VISIT ADD ON: HCPCS | Performed by: INTERNAL MEDICINE

## 2025-03-24 PROCEDURE — 98006 SYNCH AUDIO-VIDEO EST MOD 30: CPT | Performed by: INTERNAL MEDICINE

## 2025-03-24 NOTE — PROGRESS NOTES
"Virtual Visit Details    Type of service:  Video Visit     Originating Location (pt. Location): {video visit patient location:533275::\"Home\"}  {PROVIDER LOCATION On-site should be selected for visits conducted from your clinic location or adjoining [facility] hospital, academic office, or other nearby [facility] building. Off-site should be selected for all other provider locations, including home:[id number]}  Distant Location (provider location):  {virtual location provider:293549}  Platform used for Video Visit: {Virtual Visit Platforms:397164::\"Shook\"}    "

## 2025-03-24 NOTE — PROGRESS NOTES
Video-Visit Details    Type of service:  Video Visit  Video Start Time: 1435  Video End Time:1446  Originating Location (pt. Location): [place], [place]  Distant Location (provider location):  Home  Platform used for Video Visit: Juli Cook MD    Endocrinology Clinic Visit      Sunshine Delgado is a 60 year old female with AF s/p 2 ablation, depression, anxiety, MARIA GUADALUPE who is here for FU  Hashimoto's thyroiditis.    Interval History  A little lower energy  Pt got 8 h of sleep  Could not get sleep study, previous study was 2010 was prescribed CPAP, last used 2018  Pt has been on Zepbound 15 mg since 11/2024  Weight 169-->158 H 5 4  BMI 26.6  Some problem swallowing when lay down  Symptoms occur every night      Initial visit  2020 had COVID and found to have Hashimoto's thyroiditis.  Since then sick more often  Pt still has low energy/ feeling cold/ constipation even with normal lab work  Toss and turn at night, and sleepy doing the day  Zepbound through weight management clinic, weight 236-->201 lbs.  Energy level initially improve and now back to baseline    On Estradiol for about 1 year    Pt takes LT4 1st thing AM about 60 mins before breakfast    Sister with thyroid cancer      REVIEW OF SYSTEMS  10 point negative except as mentioned in HPI    Past Medical/Surgical History:  Past Medical History:   Diagnosis Date    Antiplatelet or antithrombotic long-term use     Arrhythmia     Atrial fibrillation with rapid ventricular response (H) 1/26/2020    Bee sting allergy     Depressive disorder     Generalized anxiety disorder 10/15/2009    lexapro made things worse.      Hashimoto's thyroiditis 5/6/2020    Morbid obesity (H)     Ocular migraine     MARIA GUADALUPE (obstructive sleep apnea)- severe (AHI 84) 09/09/2011    patient not using since 2019    Primary osteoarthritis of right knee 9/23/2022    Renal disease     Voice fatigue 10/22/2009     Past Surgical History:   Procedure Laterality Date    ANESTHESIA CARDIOVERSION N/A  11/22/2021    Procedure: ANESTHESIA, FOR CARDIOVERSION@1515;  Surgeon: GENERIC ANESTHESIA PROVIDER;  Location:  OR    COLONOSCOPY  2000    DAVINCI GASTRIC SLEEVE      EP ABLATION FOCAL AFIB N/A 07/22/2021    Procedure: EP ABLATION FOCAL AFIB;  Surgeon: Vicente Craig MD;  Location:  [facility]ATH [facility]    EP ABLATION FOCAL AFIB N/A 03/31/2022    Procedure: Ablation Focal Atrial Fibrillation;  Surgeon: Vicente Craig MD;  Location:  HEART CARDI[facility] CATH LAB    GENITOURINARY SURGERY  2007    HYSTERECTOMY, PAP NO LONGER INDICATED      HYSTERECTOMY, VAGINAL  2007    still has ovaries    LAPAROSCOPIC GASTRIC SLEEVE N/A 03/13/2018    Procedure: LAPAROSCOPIC GASTRIC SLEEVE;  Laparoscopic Sleeve Gastrectomy;  Surgeon: Kameron Joseph MD;  Location:  OR    PHOTOREFRACTIVE KERATECTOMY Bilateral 2018    [facility]       Medications  Current Outpatient Medications   Medication Sig Dispense Refill    acetaminophen (TYLENOL) 325 MG tablet Take 325 mg by mouth every 4 hours as needed      albuterol (PROAIR HFA/PROVENTIL HFA/VENTOLIN HFA) 108 (90 Base) MCG/ACT inhaler INHALE 2 PUFFS INTO THE LUNGS EVERY 6 HOURS AS NEEDED FOR SHORTNESS OF BREATH, WHEEZING OR COUGH 8.5 g 1    Biotin 10 MG CAPS Take 1 capsule by mouth daily.      buPROPion (WELLBUTRIN XL) 300 MG 24 hr tablet Take 1 tablet (300 mg) by mouth every morning 90 tablet 3    EPINEPHrine (AUVI-Q) 0.3 MG/0.3ML injection 2-pack Inject 0.3 mLs (0.3 mg) into the muscle as needed for anaphylaxis 0.6 mL 3    levothyroxine (SYNTHROID/LEVOTHROID) 75 MCG tablet Take 1 tablet (75 mcg) by mouth daily. 90 tablet 1    multivitamin, therapeutic (THERA-VIT) TABS tablet Take 1 tablet by mouth daily.      tirzepatide-Weight Management (ZEPBOUND) 15 MG/0.5ML prefilled pen Inject 0.5 mLs (15 mg) subcutaneously every 7 days. Start after 4 weeks of 12.5 mg Zepbound, if tolerating 6 mL 1    traZODone (DESYREL) 50 MG tablet TAKE TWO TABLETS BY MOUTH EVERY NIGHT AT BEDTIME 180 tablet 0     venlafaxine (EFFEXOR-ER) 150 MG 24 hr tablet Take 1 tablet (150 mg) by mouth daily. 90 tablet 0     No current facility-administered medications for this visit.       Allergies  Allergies   Allergen Reactions    Cephalexin Hives    Strawberry Extract Hives    Sulfa Antibiotics Hives    Cinnamon Hives    Sulfamethoxazole-Trimethoprim Hives    Vicodin [Hydrocodone-Acetaminophen]     Wasp Venom Protein      Other reaction(s): Chest Pain    Wasps [Hornets] Swelling     Now carries Epi Pen    Zoloft [Sertraline]      suicidal ideation    Contrast Dye Other (See Comments) and Rash     Patient had sneezing and an itchy throat following contrast injection of 100 mL Isovue-370.  From IV contrast dye         Family History  family history includes Alzheimer Disease in her mother; Anxiety Disorder in her son; Arthritis in her mother; Cancer in an other family member; Depression in her father, sister, and sister; Diabetes in her father and mother; Eye Disorder in her maternal great-grandfather; Hyperlipidemia in her father; Hypertension in her mother; Macular Degeneration in her mother; Neurologic Disorder in her mother; Obesity in her father, mother, and sister; Osteoporosis in her maternal grandmother and mother; Psychotic Disorder in her mother and sister; Thyroid Disease in her sister and sister.    Social History  Social History     Tobacco Use    Smoking status: Never     Passive exposure: Never    Smokeless tobacco: Never   Substance Use Topics    Alcohol use: Not Currently     Comment: zepbound - no taste for etoh       Physical Exam  LMP  (LMP Unknown)   There is no height or weight on file to calculate BMI.  GENERAL :  In no apparent distress  RESP: Normal breathing  NEURO: awake, alert, responds appropriately to questions.      DATA REVIEW  Labs/Imaging    TSH   Date Value Ref Range Status   01/06/2025 5.17 (H) 0.30 - 4.20 uIU/mL Final   04/25/2023 2.72 0.40 - 4.00 mU/L Final   06/14/2021 2.86 0.40 - 4.00 mU/L Final  "    Lab Results   Component Value Date    WBC 6.9 01/06/2025    WBC 6.5 06/14/2021     Lab Results   Component Value Date    RBC 4.95 01/06/2025    RBC 4.89 06/14/2021     Lab Results   Component Value Date    HGB 14.3 01/06/2025    HGB 14.4 06/14/2021     Lab Results   Component Value Date    HCT 44.2 01/06/2025    HCT 42.9 06/14/2021     No components found for: \"MCT\"  Lab Results   Component Value Date    MCV 89 01/06/2025    MCV 88 06/14/2021     Lab Results   Component Value Date    MCH 28.9 01/06/2025    MCH 29.4 06/14/2021     Lab Results   Component Value Date    MCHC 32.4 01/06/2025    MCHC 33.6 06/14/2021     Lab Results   Component Value Date    RDW 14.4 01/06/2025    RDW 14.1 06/14/2021     Lab Results   Component Value Date     01/06/2025     06/14/2021       US THYROID 8/29/2024 11:24 AM     COMPARISON: None     HISTORY: Hashimoto's thyroiditis     FINDINGS:   Thyroid parenchyma: heterogenous  The right lobe of the thyroid measures: 4.9 x 1.9 x 1.8 cm  The left lobe of the thyroid measures: 3.9 x 1.8 x 1.5 cm  The thyroid isthmus measures: 0.4 cm     Nodule 1:  Lobe: Left  Location: Inferior  Size: 0.7 x 0.7 x 0.7 cm  Composition: Solid or almost completely solid (2 points)  Echogenicity: Hypoechoic (2 points)  Shape: Wider than tall (0 points)  Margin: Smooth (0 points)  Echogenic Foci: None or large comet tail artifact (0 points)  Stability: Not previously imaged  TIRADS: TR4 (4-6 points)      Left level 7 ovoid lymph node measuring 1.4 x 1.0 x 0.9 cm with  echogenic hilum.                                                                      Impression:     1. Heterogeneous appearance of the thyroid gland, consistent with  patient's known Hashimoto's thyroiditis.     2. Left thyroid nodule measuring up to 0.7 cm, no follow-up is  recommended.      ASSESSMENT/PLAN:   Sunshine Delgado is a 60 year old female with AF s/p 2 ablation, depression, anxiety, MARIA GUADALUPE who is here for FU Hashimoto's " thyroiditis.    ## Hashimoto's thyroiditis and primary hypothyroidism  ## BMI 40.5 --> 34.6-->26.6  ## FH of thyroid cancer  ## AF s/p 2 ablation  ## MARIA GUADALUPE  ## Worsening swallowing problem  Ongoing low energy, unable to have recheck sleep study  In 1/2025, TSH was mildly elevated, anticipate lower LT4 dose as weight loss  -- labs, hold biotin 1 week prior to lab. Will send prescription accordingly  -- if TSH is normal, will try adding T3  -- US thyroid and will let you know via [employer]    [email or web address]    The longitudinal plan of care for the diagnosis(es)/condition(s) as documented were addressed during this visit. Due to the added complexity in care, I will continue to support Sunshine in the subsequent management and with ongoing continuity of care.      Follow-up 6 months    For any questions, please reach out to the Endocrinology Clinic Number for assistance: [phone].     Orders Placed This Encounter   Procedures    US Thyroid    TSH with free T4 reflex            Rod Cook MD

## 2025-03-24 NOTE — LETTER
3/24/2025      Sunshine Delgado  [address]  [place] [address]      Dear Colleague,    Thank you for referring your patient, Sunshine Delgado, to the [facility] [place]. Please see a copy of my visit note below.    Video-Visit Details    Type of service:  Video Visit  Video Start Time: 1435  Video End Time:1446  Originating Location (pt. Location): [place], [place]  Distant Location (provider location):  Home  Platform used for Video Visit: Juli Cook MD    Endocrinology Clinic Visit      Sunshine Delgado is a 60 year old female with AF s/p 2 ablation, depression, anxiety, MARIA GUADALUPE who is here for FU  Hashimoto's thyroiditis.    Interval History  A little lower energy  Pt got 8 h of sleep  Could not get sleep study, previous study was 2010 was prescribed CPAP, last used 2018  Pt has been on Zepbound 15 mg since 11/2024  Weight 169-->158 H 5 4  BMI 26.6  Some problem swallowing when lay down  Symptoms occur every night      Initial visit  2020 had COVID and found to have Hashimoto's thyroiditis.  Since then sick more often  Pt still has low energy/ feeling cold/ constipation even with normal lab work  Toss and turn at night, and sleepy doing the day  Zepbound through weight management clinic, weight 236-->201 lbs.  Energy level initially improve and now back to baseline    On Estradiol for about 1 year    Pt takes LT4 1st thing AM about 60 mins before breakfast    Sister with thyroid cancer      REVIEW OF SYSTEMS  10 point negative except as mentioned in HPI    Past Medical/Surgical History:  Past Medical History:   Diagnosis Date     Antiplatelet or antithrombotic long-term use      Arrhythmia      Atrial fibrillation with rapid ventricular response (H) 1/26/2020     Bee sting allergy      Depressive disorder      Generalized anxiety disorder 10/15/2009    lexapro made things worse.       Hashimoto's thyroiditis 5/6/2020     Morbid obesity (H)      Ocular migraine      MARIA GUADALUPE (obstructive  sleep apnea)- severe (AHI 84) 09/09/2011    patient not using since 2019     Primary osteoarthritis of right knee 9/23/2022     Renal disease      Voice fatigue 10/22/2009     Past Surgical History:   Procedure Laterality Date     ANESTHESIA CARDIOVERSION N/A 11/22/2021    Procedure: ANESTHESIA, FOR CARDIOVERSION@1515;  Surgeon: GENERIC ANESTHESIA PROVIDER;  Location:  OR     COLONOSCOPY  2000     DAVINCI GASTRIC SLEEVE       EP ABLATION FOCAL AFIB N/A 07/22/2021    Procedure: EP ABLATION FOCAL AFIB;  Surgeon: Vicente Craig MD;  Location:  HEART CARDIAC CATH LAB     EP ABLATION FOCAL AFIB N/A 03/31/2022    Procedure: Ablation Focal Atrial Fibrillation;  Surgeon: Vicente Craig MD;  Location:  HEART CARDIAC CATH LAB     GENITOURINARY SURGERY  2007     HYSTERECTOMY, PAP NO LONGER INDICATED       HYSTERECTOMY, VAGINAL  2007    still has ovaries     LAPAROSCOPIC GASTRIC SLEEVE N/A 03/13/2018    Procedure: LAPAROSCOPIC GASTRIC SLEEVE;  Laparoscopic Sleeve Gastrectomy;  Surgeon: Kameron Joseph MD;  Location:  OR     PHOTOREFRACTIVE KERATECTOMY Bilateral 2018    [facility]       Medications  Current Outpatient Medications   Medication Sig Dispense Refill     acetaminophen (TYLENOL) 325 MG tablet Take 325 mg by mouth every 4 hours as needed       albuterol (PROAIR HFA/PROVENTIL HFA/VENTOLIN HFA) 108 (90 Base) MCG/ACT inhaler INHALE 2 PUFFS INTO THE LUNGS EVERY 6 HOURS AS NEEDED FOR SHORTNESS OF BREATH, WHEEZING OR COUGH 8.5 g 1     Biotin 10 MG CAPS Take 1 capsule by mouth daily.       buPROPion (WELLBUTRIN XL) 300 MG 24 hr tablet Take 1 tablet (300 mg) by mouth every morning 90 tablet 3     EPINEPHrine (AUVI-Q) 0.3 MG/0.3ML injection 2-pack Inject 0.3 mLs (0.3 mg) into the muscle as needed for anaphylaxis 0.6 mL 3     levothyroxine (SYNTHROID/LEVOTHROID) 75 MCG tablet Take 1 tablet (75 mcg) by mouth daily. 90 tablet 1     multivitamin, therapeutic (THERA-VIT) TABS tablet Take 1 tablet by mouth daily.        tirzepatide-Weight Management (ZEPBOUND) 15 MG/0.5ML prefilled pen Inject 0.5 mLs (15 mg) subcutaneously every 7 days. Start after 4 weeks of 12.5 mg Zepbound, if tolerating 6 mL 1     traZODone (DESYREL) 50 MG tablet TAKE TWO TABLETS BY MOUTH EVERY NIGHT AT BEDTIME 180 tablet 0     venlafaxine (EFFEXOR-ER) 150 MG 24 hr tablet Take 1 tablet (150 mg) by mouth daily. 90 tablet 0     No current facility-administered medications for this visit.       Allergies  Allergies   Allergen Reactions     Cephalexin Hives     Strawberry Extract Hives     Sulfa Antibiotics Hives     Cinnamon Hives     Sulfamethoxazole-Trimethoprim Hives     Vicodin [Hydrocodone-Acetaminophen]      Wasp Venom Protein      Other reaction(s): Chest Pain     Wasps [Hornets] Swelling     Now carries Epi Pen     Zoloft [Sertraline]      suicidal ideation     Contrast Dye Other (See Comments) and Rash     Patient had sneezing and an itchy throat following contrast injection of 100 mL Isovue-370.  From IV contrast dye         Family History  family history includes Alzheimer Disease in her mother; Anxiety Disorder in her son; Arthritis in her mother; Cancer in an other family member; Depression in her father, sister, and sister; Diabetes in her father and mother; Eye Disorder in her maternal great-grandfather; Hyperlipidemia in her father; Hypertension in her mother; Macular Degeneration in her mother; Neurologic Disorder in her mother; Obesity in her father, mother, and sister; Osteoporosis in her maternal grandmother and mother; Psychotic Disorder in her mother and sister; Thyroid Disease in her sister and sister.    Social History  Social History     Tobacco Use     Smoking status: Never     Passive exposure: Never     Smokeless tobacco: Never   Substance Use Topics     Alcohol use: Not Currently     Comment: zepbound - no taste for etoh       Physical Exam  LMP  (LMP Unknown)   There is no height or weight on file to calculate BMI.  GENERAL :  In no  "apparent distress  RESP: Normal breathing  NEURO: awake, alert, responds appropriately to questions.      DATA REVIEW  Labs/Imaging    TSH   Date Value Ref Range Status   01/06/2025 5.17 (H) 0.30 - 4.20 uIU/mL Final   04/25/2023 2.72 0.40 - 4.00 mU/L Final   06/14/2021 2.86 0.40 - 4.00 mU/L Final     Lab Results   Component Value Date    WBC 6.9 01/06/2025    WBC 6.5 06/14/2021     Lab Results   Component Value Date    RBC 4.95 01/06/2025    RBC 4.89 06/14/2021     Lab Results   Component Value Date    HGB 14.3 01/06/2025    HGB 14.4 06/14/2021     Lab Results   Component Value Date    HCT 44.2 01/06/2025    HCT 42.9 06/14/2021     No components found for: \"MCT\"  Lab Results   Component Value Date    MCV 89 01/06/2025    MCV 88 06/14/2021     Lab Results   Component Value Date    MCH 28.9 01/06/2025    MCH 29.4 06/14/2021     Lab Results   Component Value Date    MCHC 32.4 01/06/2025    MCHC 33.6 06/14/2021     Lab Results   Component Value Date    RDW 14.4 01/06/2025    RDW 14.1 06/14/2021     Lab Results   Component Value Date     01/06/2025     06/14/2021       US THYROID 8/29/2024 11:24 AM     COMPARISON: None     HISTORY: Hashimoto's thyroiditis     FINDINGS:   Thyroid parenchyma: heterogenous  The right lobe of the thyroid measures: 4.9 x 1.9 x 1.8 cm  The left lobe of the thyroid measures: 3.9 x 1.8 x 1.5 cm  The thyroid isthmus measures: 0.4 cm     Nodule 1:  Lobe: Left  Location: Inferior  Size: 0.7 x 0.7 x 0.7 cm  Composition: Solid or almost completely solid (2 points)  Echogenicity: Hypoechoic (2 points)  Shape: Wider than tall (0 points)  Margin: Smooth (0 points)  Echogenic Foci: None or large comet tail artifact (0 points)  Stability: Not previously imaged  TIRADS: TR4 (4-6 points)      Left level 7 ovoid lymph node measuring 1.4 x 1.0 x 0.9 cm with  echogenic hilum.                                                                      Impression:     1. Heterogeneous appearance of the " thyroid gland, consistent with  patient's known Hashimoto's thyroiditis.     2. Left thyroid nodule measuring up to 0.7 cm, no follow-up is  recommended.      ASSESSMENT/PLAN:   Sunshine Delgado is a 60 year old female with AF s/p 2 ablation, depression, anxiety, MARIA GUADALUPE who is here for FU Hashimoto's thyroiditis.    ## Hashimoto's thyroiditis and primary hypothyroidism  ## BMI 40.5 --> 34.6-->26.6  ## FH of thyroid cancer  ## AF s/p 2 ablation  ## MARIA GUADALUPE  ## Worsening swallowing problem  Ongoing low energy, unable to have recheck sleep study  In 1/2025, TSH was mildly elevated, anticipate lower LT4 dose as weight loss  -- labs, hold biotin 1 week prior to lab. Will send prescription accordingly  -- if TSH is normal, will try adding T3  -- US thyroid and will let you know via [employer]    [email or web address]    The longitudinal plan of care for the diagnosis(es)/condition(s) as documented were addressed during this visit. Due to the added complexity in care, I will continue to support Sunshine in the subsequent management and with ongoing continuity of care.      Follow-up 6 months    For any questions, please reach out to the Endocrinology Clinic Number for assistance: [phone].     Orders Placed This Encounter   Procedures     US Thyroid     TSH with free T4 reflex            Rod Cook MD          Again, thank you for allowing me to participate in the care of your patient.        Sincerely,        Rod Cook MD    Electronically signed

## 2025-03-24 NOTE — NURSING NOTE
Current patient location: [address] [place] [address]   [facility] [address]    Is the patient currently in the state of [place]? YES    Visit mode: VIDEO    If the visit is dropped, the patient can be reconnected by:VIDEO VISIT: Text to cell phone:   Telephone Information:   Mobile [phone]       Will anyone else be joining the visit? NO  (If patient encounters technical issues they should call [phone])    Are changes needed to the allergy or medication list? No and Pt stated no med changes    Are refills needed on medications prescribed by this physician? NO    Rooming Documentation:  Not applicable    Reason for visit: RECHECK    Jennifer WELSH

## 2025-03-25 ENCOUNTER — VIRTUAL VISIT (OUTPATIENT)
Dept: ENDOCRINOLOGY | Facility: CLINIC | Age: 60
End: 2025-03-25
Payer: COMMERCIAL

## 2025-03-25 VITALS — HEIGHT: 64 IN | WEIGHT: 155 LBS | BODY MASS INDEX: 26.46 KG/M2

## 2025-03-25 DIAGNOSIS — E66.812 CLASS 2 SEVERE OBESITY WITH SERIOUS COMORBIDITY AND BODY MASS INDEX (BMI) OF 38.0 TO 38.9 IN ADULT, UNSPECIFIED OBESITY TYPE (H): ICD-10-CM

## 2025-03-25 DIAGNOSIS — E66.01 CLASS 2 SEVERE OBESITY WITH SERIOUS COMORBIDITY AND BODY MASS INDEX (BMI) OF 38.0 TO 38.9 IN ADULT, UNSPECIFIED OBESITY TYPE (H): ICD-10-CM

## 2025-03-25 PROCEDURE — 98005 SYNCH AUDIO-VIDEO EST LOW 20: CPT

## 2025-03-25 NOTE — LETTER
3/25/2025       RE: Sunshine Delgado  [address]  [place] [address]     Dear Colleague,    Thank you for referring your patient, Sunshine Delgado, to the [facility]. Please see a copy of my visit note below.      Video-Visit Details    Type of service:  Video Visit   Originating Location (pt. Location): Home    Distant Location (provider location):  Off-site  Platform used for Video Visit: [facility] Bariatric Surgery Note    RE: Sunshine Delgado  MR#: [id number]  : 1965  VISIT DATE: Mar 25, 2025      Dear Linn Montemayor,    I had the pleasure of seeing your patient, Sunshine Delgado, in my post-bariatric surgery assessment clinic.    Assessment & Plan  Problem List Items Addressed This Visit       Class 2 severe obesity with serious comorbidity and body mass index (BMI) of 38.0 to 38.9 in adult, unspecified obesity type (H)    Relevant Medications    tirzepatide-Weight Management (ZEPBOUND) 15 MG/0.5ML prefilled pen      Continue Zepbound 15mg once weekly. Refills sent. Reach out once at goal weight and can dose decrease to 10mg.   Jessica Voss in 6 months     24 minutes spent by me on the date of the encounter doing chart review, history and exam, documentation and further activities per the note    CHIEF COMPLAINT: Post-bariatric surgery follow-up.     HISTORY OF PRESENT ILLNESS:      3/24/2025     3:43 PM   Questions Regarding Prior Weight Loss Surgery Reviewed With Patient   I had the following weight loss procedure Sleeve Gastrectomy   What year was your surgery? 2018   How has your weight changed since your last visit? I have lost weight   Do you currently have any of the following Heartburn, acid reflux, or GERD (acid reflux disease)?    None of the above   Do you have any concerns today? No     S/p gastric sleeve with Dr. Joseph on 3/2018  Last seen by Any Morris 1 month post op  4/2018  Weight prior to surgery - 289lb   Donell weight - 192lb     Overall is really happy with these results. She is still losing weight, but has slowed down.     Would like to lose another 5-10lbs     Anti-obesity medications:     Current:   Zepbound 15mg - has been on this dose for around 6 months. No side effects.     Overall feels like this has been such a great help. Has been really helpful with MARIA GUADALUPE and ADHD.       Recent diet changes: Eating 3 meals a day, protein shakes as snacks as needed. Has fruit and nuts at work. Protein forward for meals.   Breakfast - hard boiled egg, yogurt + fruit, oatmeal   Lunch - protein   Snack - protein shake.   Dinner - protein + vegetables   Snack - fruit    -Water? 64oz daily. Using water drop.     Recent exercise/activity changes: walking MO[place] - 4 miles 4xweek. Walking dogs on weekends. Very active at work. Would like to get into weight training.     Recent stressors: has been going well. Having more energy     Recent sleep changes: No longer using CPAP. Goal to get retested.     Vitamins/Labs: Biotin, Women MV     Excess skin - very minimal overall. No rashes and sores. Will start weight training     GERD - has really improved. Taking omeprazole as needed.     Weight History:      3/24/2025     3:43 PM   --   What is your highest lifetime weight? 289   What is your lowest weight since surgery? (In pounds) 156     Initial Weight (lbs): 289 lbs  Weight: 70.3 kg (155 lb)  Last Visits Weight: 101.2 kg (223 lb)  Cumulative weight loss (lbs): 134  Weight Loss Percentage: 46.37%    Wt Readings from Last 5 Encounters:   03/25/25 70.3 kg (155 lb)   02/19/25 76.2 kg (168 lb)   02/04/25 78.5 kg (173 lb)   01/30/25 78.5 kg (173 lb)   01/13/25 79.9 kg (176 lb 3.2 oz)             3/24/2025     3:43 PM   Questions Regarding Co-Morbidities and Health Concerns Reviewed With Patient   Pre-diabetes Never   Diabetes II Never   High Blood Pressure Never   High cholesterol Never    Heartburn/Reflux Improved   Sleep apnea Improved   PCOS Never   Back pain Gone away   Joint pain Gone away   Lower leg swelling Never           3/24/2025     3:43 PM   Eating Habits   How many meals do you eat per day? 3   Do you snack between meals? Sometimes   How much food are you eating at each meal? Greater than 1 cup   Are you able to separate your meals and liquids by at least 30 minutes? Yes   Are you able to avoid liquid calories? Yes           3/24/2025     3:43 PM   Exercise Questions Reviewed With Patient   How often do you exercise? 3 to 4 times per week   What is the duration of your exercise (in minutes)? 60+ Minutes   What types of exercise do you do? walking    home gym   What keeps you from being more active? Too tired       Social History:      3/24/2025     3:43 PM   --   Are you smoking? No   Are you drinking alcohol? No       Medications:  Current Outpatient Medications   Medication Sig Dispense Refill     tirzepatide-Weight Management (ZEPBOUND) 15 MG/0.5ML prefilled pen Inject 0.5 mLs (15 mg) subcutaneously every 7 days. 6 mL 1     acetaminophen (TYLENOL) 325 MG tablet Take 325 mg by mouth every 4 hours as needed       albuterol (PROAIR HFA/PROVENTIL HFA/VENTOLIN HFA) 108 (90 Base) MCG/ACT inhaler INHALE 2 PUFFS INTO THE LUNGS EVERY 6 HOURS AS NEEDED FOR SHORTNESS OF BREATH, WHEEZING OR COUGH 8.5 g 1     Biotin 10 MG CAPS Take 1 capsule by mouth daily.       buPROPion (WELLBUTRIN XL) 300 MG 24 hr tablet Take 1 tablet (300 mg) by mouth every morning 90 tablet 3     EPINEPHrine (AUVI-Q) 0.3 MG/0.3ML injection 2-pack Inject 0.3 mLs (0.3 mg) into the muscle as needed for anaphylaxis 0.6 mL 3     levothyroxine (SYNTHROID/LEVOTHROID) 75 MCG tablet Take 1 tablet (75 mcg) by mouth daily. 90 tablet 1     multivitamin, therapeutic (THERA-VIT) TABS tablet Take 1 tablet by mouth daily.       traZODone (DESYREL) 50 MG tablet TAKE TWO TABLETS BY MOUTH EVERY NIGHT AT BEDTIME 180 tablet 0     venlafaxine  (EFFEXOR-ER) 150 MG 24 hr tablet Take 1 tablet (150 mg) by mouth daily. 90 tablet 0     No current facility-administered medications for this visit.         3/24/2025     3:43 PM   --   Do you avoid NSAIDs such as (Ibuprofen, Aleve, Naproxen, Advil)? Yes       ROS:  GI:       3/24/2025     3:43 PM   --   Vomiting No   Diarrhea No   Constipation No   Swallowing trouble No   Abdominal pain No   Heartburn Yes     Skin:       3/24/2025     3:43 PM   BAR RBS ROS - SKIN   Rash in skin folds No     Psych:       3/24/2025     3:43 PM   --   Depression No   Anxiety No     Female Only:       3/24/2025     3:43 PM   BAR RBS ROS -    Female only None of the above   Stress urinary incontinence No           Objective                  Physical Exam   GENERAL: alert and no distress  EYES: Eyes grossly normal to inspection.  No discharge or erythema, or obvious scleral/conjunctival abnormalities.  RESP: No audible wheeze, cough, or visible cyanosis.    SKIN: Visible skin clear. No significant rash, abnormal pigmentation or lesions.  NEURO: Cranial nerves grossly intact.  Mentation and speech appropriate for age.  PSYCH: Appropriate affect, tone, and pace of words        Sincerely,    Jessica Negro PA-C    The longitudinal plan of care for the diagnosis(es)/condition(s) as documented were addressed during this visit. Due to the added complexity in care, I will continue to support Sunshine in the subsequent management and with ongoing continuity of care.      Again, thank you for allowing me to participate in the care of your patient.      Sincerely,    Jessica Negro PA-C

## 2025-03-25 NOTE — PROGRESS NOTES
Video-Visit Details    Type of service:  Video Visit   Originating Location (pt. Location): Home    Distant Location (provider location):  Off-site  Platform used for Video Visit: [facility] Bariatric Surgery Note    RE: Sunshine Delgado  MR#: [id number]  : 1965  VISIT DATE: Mar 25, 2025      Dear Linn Montemayor,    I had the pleasure of seeing your patient, Sunshine Delgado, in my post-bariatric surgery assessment clinic.    Assessment & Plan   Problem List Items Addressed This Visit       Class 2 severe obesity with serious comorbidity and body mass index (BMI) of 38.0 to 38.9 in adult, unspecified obesity type (H)    Relevant Medications    tirzepatide-Weight Management (ZEPBOUND) 15 MG/0.5ML prefilled pen      Continue Zepbound 15mg once weekly. Refills sent. Reach out once at goal weight and can dose decrease to 10mg.   Jessica Voss in 6 months     24 minutes spent by me on the date of the encounter doing chart review, history and exam, documentation and further activities per the note    CHIEF COMPLAINT: Post-bariatric surgery follow-up.     HISTORY OF PRESENT ILLNESS:      3/24/2025     3:43 PM   Questions Regarding Prior Weight Loss Surgery Reviewed With Patient   I had the following weight loss procedure Sleeve Gastrectomy   What year was your surgery? 2018   How has your weight changed since your last visit? I have lost weight   Do you currently have any of the following Heartburn, acid reflux, or GERD (acid reflux disease)?    None of the above   Do you have any concerns today? No     S/p gastric sleeve with Dr. Joseph on 3/2018  Last seen by Any Morris 1 month post op 2018  Weight prior to surgery - 289lb   Donell weight - 192lb     Overall is really happy with these results. She is still losing weight, but has slowed down.     Would like to lose another 5-10lbs     Anti-obesity medications:     Current:   Zepbound 15mg - has been on this dose for around 6 months. No side effects.      Overall feels like this has been such a great help. Has been really helpful with MARIA GUADALUPE and ADHD.       Recent diet changes: Eating 3 meals a day, protein shakes as snacks as needed. Has fruit and nuts at work. Protein forward for meals.   Breakfast - hard boiled egg, yogurt + fruit, oatmeal   Lunch - protein   Snack - protein shake.   Dinner - protein + vegetables   Snack - fruit    -Water? 64oz daily. Using water drop.     Recent exercise/activity changes: walking [employer] - 4 miles 4xweek. Walking dogs on weekends. Very active at work. Would like to get into weight training.     Recent stressors: has been going well. Having more energy     Recent sleep changes: No longer using CPAP. Goal to get retested.     Vitamins/Labs: Biotin, Women MV     Excess skin - very minimal overall. No rashes and sores. Will start weight training     GERD - has really improved. Taking omeprazole as needed.     Weight History:      3/24/2025     3:43 PM   --   What is your highest lifetime weight? 289   What is your lowest weight since surgery? (In pounds) 156     Initial Weight (lbs): 289 lbs  Weight: 70.3 kg (155 lb)  Last Visits Weight: 101.2 kg (223 lb)  Cumulative weight loss (lbs): 134  Weight Loss Percentage: 46.37%    Wt Readings from Last 5 Encounters:   03/25/25 70.3 kg (155 lb)   02/19/25 76.2 kg (168 lb)   02/04/25 78.5 kg (173 lb)   01/30/25 78.5 kg (173 lb)   01/13/25 79.9 kg (176 lb 3.2 oz)             3/24/2025     3:43 PM   Questions Regarding Co-Morbidities and Health Concerns Reviewed With Patient   Pre-diabetes Never   Diabetes II Never   High Blood Pressure Never   High cholesterol Never   Heartburn/Reflux Improved   Sleep apnea Improved   PCOS Never   Back pain Gone away   Joint pain Gone away   Lower leg swelling Never           3/24/2025     3:43 PM   Eating Habits   How many meals do you eat per day? 3   Do you snack between meals? Sometimes   How much food are you eating at each meal? Greater than 1 cup   Are you  able to separate your meals and liquids by at least 30 minutes? Yes   Are you able to avoid liquid calories? Yes           3/24/2025     3:43 PM   Exercise Questions Reviewed With Patient   How often do you exercise? 3 to 4 times per week   What is the duration of your exercise (in minutes)? 60+ Minutes   What types of exercise do you do? walking    home gym   What keeps you from being more active? Too tired       Social History:      3/24/2025     3:43 PM   --   Are you smoking? No   Are you drinking alcohol? No       Medications:  Current Outpatient Medications   Medication Sig Dispense Refill    tirzepatide-Weight Management (ZEPBOUND) 15 MG/0.5ML prefilled pen Inject 0.5 mLs (15 mg) subcutaneously every 7 days. 6 mL 1    acetaminophen (TYLENOL) 325 MG tablet Take 325 mg by mouth every 4 hours as needed      albuterol (PROAIR HFA/PROVENTIL HFA/VENTOLIN HFA) 108 (90 Base) MCG/ACT inhaler INHALE 2 PUFFS INTO THE LUNGS EVERY 6 HOURS AS NEEDED FOR SHORTNESS OF BREATH, WHEEZING OR COUGH 8.5 g 1    Biotin 10 MG CAPS Take 1 capsule by mouth daily.      buPROPion (WELLBUTRIN XL) 300 MG 24 hr tablet Take 1 tablet (300 mg) by mouth every morning 90 tablet 3    EPINEPHrine (AUVI-Q) 0.3 MG/0.3ML injection 2-pack Inject 0.3 mLs (0.3 mg) into the muscle as needed for anaphylaxis 0.6 mL 3    levothyroxine (SYNTHROID/LEVOTHROID) 75 MCG tablet Take 1 tablet (75 mcg) by mouth daily. 90 tablet 1    multivitamin, therapeutic (THERA-VIT) TABS tablet Take 1 tablet by mouth daily.      traZODone (DESYREL) 50 MG tablet TAKE TWO TABLETS BY MOUTH EVERY NIGHT AT BEDTIME 180 tablet 0    venlafaxine (EFFEXOR-ER) 150 MG 24 hr tablet Take 1 tablet (150 mg) by mouth daily. 90 tablet 0     No current facility-administered medications for this visit.         3/24/2025     3:43 PM   --   Do you avoid NSAIDs such as (Ibuprofen, Aleve, Naproxen, Advil)? Yes       ROS:  GI:       3/24/2025     3:43 PM   --   Vomiting No   Diarrhea No   Constipation  No   Swallowing trouble No   Abdominal pain No   Heartburn Yes     Skin:       3/24/2025     3:43 PM   BAR RBS ROS - SKIN   Rash in skin folds No     Psych:       3/24/2025     3:43 PM   --   Depression No   Anxiety No     Female Only:       3/24/2025     3:43 PM   BAR RBS ROS -    Female only None of the above   Stress urinary incontinence No           Objective                   Physical Exam   GENERAL: alert and no distress  EYES: Eyes grossly normal to inspection.  No discharge or erythema, or obvious scleral/conjunctival abnormalities.  RESP: No audible wheeze, cough, or visible cyanosis.    SKIN: Visible skin clear. No significant rash, abnormal pigmentation or lesions.  NEURO: Cranial nerves grossly intact.  Mentation and speech appropriate for age.  PSYCH: Appropriate affect, tone, and pace of words        Sincerely,    Jessica Negro PA-C    The longitudinal plan of care for the diagnosis(es)/condition(s) as documented were addressed during this visit. Due to the added complexity in care, I will continue to support Sunshine in the subsequent management and with ongoing continuity of care.

## 2025-04-01 NOTE — PATIENT INSTRUCTIONS
Visit Plan:     Continue Zepbound 15mg once weekly. Refills sent. Reach out once at goal weight and can dose decrease to 10mg.   Jessica Voss in 6 months

## 2025-04-07 ENCOUNTER — ANCILLARY PROCEDURE (OUTPATIENT)
Dept: ULTRASOUND IMAGING | Facility: CLINIC | Age: 60
End: 2025-04-07
Attending: INTERNAL MEDICINE
Payer: COMMERCIAL

## 2025-04-07 DIAGNOSIS — E06.3 HASHIMOTO'S THYROIDITIS: ICD-10-CM

## 2025-04-07 DIAGNOSIS — R13.10 DYSPHAGIA, UNSPECIFIED TYPE: ICD-10-CM

## 2025-04-07 PROCEDURE — 76536 US EXAM OF HEAD AND NECK: CPT | Performed by: STUDENT IN AN ORGANIZED HEALTH CARE EDUCATION/TRAINING PROGRAM

## 2025-04-10 NOTE — RESULT ENCOUNTER NOTE
Hello -    Here are my comments about the recent results: US shows very small thyroid nodule and does not meet criteria for FNA biopsy. This also could not explain swallowing problem. If ongoing symptoms, recommend getting further evaluation and management with primary. Lymph node is smaller, recommend follows with primary care as well.    Regards,   Rod Cook MD

## 2025-04-23 NOTE — LETTER
October 25, 2019          Sunshine Delgado,  [address] [place] [id number]          Dear Sunshine Costa wanted me to reach out to check in with you. Lesly Costa asked me to message you to request that you complete the Patient Health Questionnaire so Lesly Costa can better understand if you treatment is helping with your moods. I've attached it for you to this message. We really care about you and want to help you in every way possible to feel better.       If you have received your health care elsewhere, please call the clinic so the information can be documented in your chart.    Please call [phone] or message us through your [employer] account to schedule an appointment or provide information for your chart.     Feel free to contact us if you have any questions or concerns!    I look forward to seeing you and working with you on your health care needs.     Sincerely,       Your [place] Care Team/                       PAST SURGICAL HISTORY:  No significant past surgical history

## 2025-04-24 ENCOUNTER — LAB (OUTPATIENT)
Dept: LAB | Facility: CLINIC | Age: 60
End: 2025-04-24
Payer: COMMERCIAL

## 2025-04-24 DIAGNOSIS — E06.3 HASHIMOTO'S THYROIDITIS: ICD-10-CM

## 2025-04-24 LAB — TSH SERPL DL<=0.005 MIU/L-ACNC: 1.96 UIU/ML (ref 0.3–4.2)

## 2025-04-30 ENCOUNTER — E-VISIT (OUTPATIENT)
Dept: FAMILY MEDICINE | Facility: CLINIC | Age: 60
End: 2025-04-30
Payer: COMMERCIAL

## 2025-04-30 DIAGNOSIS — J01.90 ACUTE SINUSITIS WITH SYMPTOMS > 10 DAYS: Primary | ICD-10-CM

## 2025-04-30 RX ORDER — DOXYCYCLINE 100 MG/1
100 CAPSULE ORAL 2 TIMES DAILY
Qty: 14 CAPSULE | Refills: 0 | Status: SHIPPED | OUTPATIENT
Start: 2025-04-30 | End: 2025-05-07

## 2025-05-22 DIAGNOSIS — F51.05 INSOMNIA DUE TO OTHER MENTAL DISORDER: ICD-10-CM

## 2025-05-22 DIAGNOSIS — F33.1 MAJOR DEPRESSIVE DISORDER, RECURRENT EPISODE, MODERATE (H): Chronic | ICD-10-CM

## 2025-05-22 DIAGNOSIS — F99 INSOMNIA DUE TO OTHER MENTAL DISORDER: ICD-10-CM

## 2025-05-22 RX ORDER — TRAZODONE HYDROCHLORIDE 50 MG/1
100 TABLET ORAL AT BEDTIME
Qty: 180 TABLET | Refills: 0 | Status: SHIPPED | OUTPATIENT
Start: 2025-05-22

## 2025-05-22 RX ORDER — VENLAFAXINE HYDROCHLORIDE 150 MG/1
150 TABLET, EXTENDED RELEASE ORAL DAILY
Qty: 90 TABLET | Refills: 0 | Status: SHIPPED | OUTPATIENT
Start: 2025-05-22

## 2025-05-28 ENCOUNTER — MYC REFILL (OUTPATIENT)
Dept: FAMILY MEDICINE | Facility: CLINIC | Age: 60
End: 2025-05-28
Payer: COMMERCIAL

## 2025-05-28 ENCOUNTER — MYC REFILL (OUTPATIENT)
Dept: LAB | Facility: CLINIC | Age: 60
End: 2025-05-28
Payer: COMMERCIAL

## 2025-05-28 DIAGNOSIS — F90.9 ATTENTION DEFICIT HYPERACTIVITY DISORDER (ADHD), UNSPECIFIED ADHD TYPE: ICD-10-CM

## 2025-05-28 DIAGNOSIS — F33.1 MAJOR DEPRESSIVE DISORDER, RECURRENT EPISODE, MODERATE (H): Chronic | ICD-10-CM

## 2025-05-28 DIAGNOSIS — F41.1 GENERALIZED ANXIETY DISORDER: Chronic | ICD-10-CM

## 2025-05-28 DIAGNOSIS — E06.3 HASHIMOTO'S THYROIDITIS: ICD-10-CM

## 2025-05-28 DIAGNOSIS — Z91.030 BEE STING ALLERGY: ICD-10-CM

## 2025-05-28 RX ORDER — EPINEPHRINE 0.3 MG/.3ML
0.3 INJECTION SUBCUTANEOUS PRN
Qty: 0.6 ML | Refills: 1 | Status: SHIPPED | OUTPATIENT
Start: 2025-05-28

## 2025-05-29 RX ORDER — BUPROPION HYDROCHLORIDE 300 MG/1
300 TABLET ORAL EVERY MORNING
Qty: 90 TABLET | Refills: 0 | Status: SHIPPED | OUTPATIENT
Start: 2025-05-29

## 2025-05-29 NOTE — TELEPHONE ENCOUNTER
Pt needs follow up for refills in September or first available after.    Requested Prescriptions   Pending Prescriptions Disp Refills    levothyroxine (SYNTHROID/LEVOTHROID) 75 MCG tablet 90 tablet 1     Sig: Take 1 tablet (75 mcg) by mouth daily.       Thyroid Protocol Passed - 5/29/2025 10:34 AM        Passed - Patient is 12 years or older        Passed - Medication is active on med list and the sig matches. RN to manually verify dose and sig if red X/fail.     If the protocol passes (green check), you do not need to verify med dose and sig.    A prescription matches if they are the same clinical intention.    For Example: once daily and every morning are the same.    The protocol can not identify upper and lower case letters as matching and will fail.     For Example: Take 1 tablet (50 mg) by mouth daily     TAKE 1 TABLET (50 MG) BY MOUTH DAILY    For all fails (red x), verify dose and sig.    If the refill does match what is on file, the RN can still proceed to approve the refill request.       If they do not match, route to the appropriate provider.             Passed - Recent (12 month) or future (90 days) visit with authorizing provider's specialty (provided they have been seen in the past 15 months)     The patient must have completed an in-person or virtual visit within the past 12 months or has a future visit scheduled within the next 90 days with the authorizing provider s specialty.  Urgent care and e-visits do not qualify as an office visit for this protocol.          Passed - Medication indicated for associated diagnosis     Medication is associated with one or more of the following diagnoses:  Hypothyroidism  Thyroid stimulating hormone suppression therapy  Thyroid cancer  Acquired atrophy of thyroid          Passed - Normal TSH on file in past 12 months     Recent Labs   Lab Test 04/24/25  1142   TSH 1.96              Passed - No active pregnancy on record     If patient is pregnant or has had a  positive pregnancy test, please check TSH.          Passed - No positive pregnancy test in past 12 months     If patient is pregnant or has had a positive pregnancy test, please check TSH.

## 2025-06-02 ENCOUNTER — TELEPHONE (OUTPATIENT)
Dept: ENDOCRINOLOGY | Facility: CLINIC | Age: 60
End: 2025-06-02
Payer: COMMERCIAL

## 2025-06-02 NOTE — TELEPHONE ENCOUNTER
[facility] Call Center    Phone Message    May a detailed message be left on voicemail: yes     Reason for Call: Other: Follow up scheduled for 9/8/2025 needs refills approved until then      Action Taken: Other: ENDO     Travel Screening: Not Applicable     Date of Service:

## 2025-06-02 NOTE — TELEPHONE ENCOUNTER
06.02- [facility] x2-   Pt needs follow up for refills in September or first available after.

## 2025-06-03 RX ORDER — LEVOTHYROXINE SODIUM 75 UG/1
75 TABLET ORAL DAILY
Qty: 90 TABLET | Refills: 1 | OUTPATIENT
Start: 2025-06-03

## 2025-06-05 PROBLEM — B27.90 INFECTIOUS MONONUCLEOSIS: Status: RESOLVED | Noted: 2020-01-27 | Resolved: 2025-06-05

## 2025-06-05 PROBLEM — R74.8 ELEVATED LIVER ENZYMES: Status: RESOLVED | Noted: 2020-01-26 | Resolved: 2025-06-05

## 2025-06-05 PROBLEM — M25.562 LEFT KNEE PAIN, UNSPECIFIED CHRONICITY: Status: RESOLVED | Noted: 2022-09-23 | Resolved: 2025-06-05

## 2025-06-05 PROBLEM — M25.511 ACUTE PAIN OF RIGHT SHOULDER: Status: RESOLVED | Noted: 2020-05-28 | Resolved: 2025-06-05

## 2025-06-22 SDOH — HEALTH STABILITY: PHYSICAL HEALTH: ON AVERAGE, HOW MANY DAYS PER WEEK DO YOU ENGAGE IN MODERATE TO STRENUOUS EXERCISE (LIKE A BRISK WALK)?: 4 DAYS

## 2025-06-22 SDOH — HEALTH STABILITY: PHYSICAL HEALTH: ON AVERAGE, HOW MANY MINUTES DO YOU ENGAGE IN EXERCISE AT THIS LEVEL?: 40 MIN

## 2025-06-22 ASSESSMENT — SOCIAL DETERMINANTS OF HEALTH (SDOH): HOW OFTEN DO YOU GET TOGETHER WITH FRIENDS OR RELATIVES?: PATIENT DECLINED

## 2025-06-23 ENCOUNTER — OFFICE VISIT (OUTPATIENT)
Dept: FAMILY MEDICINE | Facility: CLINIC | Age: 60
End: 2025-06-23
Attending: PHYSICIAN ASSISTANT
Payer: COMMERCIAL

## 2025-06-23 VITALS
HEART RATE: 66 BPM | OXYGEN SATURATION: 99 % | TEMPERATURE: 97.2 F | HEIGHT: 64 IN | BODY MASS INDEX: 26.4 KG/M2 | SYSTOLIC BLOOD PRESSURE: 129 MMHG | RESPIRATION RATE: 12 BRPM | DIASTOLIC BLOOD PRESSURE: 80 MMHG | WEIGHT: 154.6 LBS

## 2025-06-23 DIAGNOSIS — Z12.11 SCREEN FOR COLON CANCER: ICD-10-CM

## 2025-06-23 DIAGNOSIS — N18.31 STAGE 3A CHRONIC KIDNEY DISEASE (H): ICD-10-CM

## 2025-06-23 DIAGNOSIS — G47.33 OSA (OBSTRUCTIVE SLEEP APNEA): Chronic | ICD-10-CM

## 2025-06-23 DIAGNOSIS — Z13.6 SCREENING FOR CARDIOVASCULAR CONDITION: ICD-10-CM

## 2025-06-23 DIAGNOSIS — D69.2 PURPURA: ICD-10-CM

## 2025-06-23 DIAGNOSIS — Z00.00 ROUTINE GENERAL MEDICAL EXAMINATION AT A HEALTH CARE FACILITY: Primary | ICD-10-CM

## 2025-06-23 DIAGNOSIS — R61 NIGHT SWEATS: ICD-10-CM

## 2025-06-23 PROCEDURE — 85384 FIBRINOGEN ACTIVITY: CPT | Performed by: PHYSICIAN ASSISTANT

## 2025-06-23 PROCEDURE — 85610 PROTHROMBIN TIME: CPT | Performed by: PHYSICIAN ASSISTANT

## 2025-06-23 PROCEDURE — 82043 UR ALBUMIN QUANTITATIVE: CPT | Performed by: PHYSICIAN ASSISTANT

## 2025-06-23 PROCEDURE — 82570 ASSAY OF URINE CREATININE: CPT | Performed by: PHYSICIAN ASSISTANT

## 2025-06-23 PROCEDURE — 85730 THROMBOPLASTIN TIME PARTIAL: CPT | Performed by: PHYSICIAN ASSISTANT

## 2025-06-23 PROCEDURE — 84443 ASSAY THYROID STIM HORMONE: CPT | Performed by: PHYSICIAN ASSISTANT

## 2025-06-23 PROCEDURE — 84439 ASSAY OF FREE THYROXINE: CPT | Performed by: PHYSICIAN ASSISTANT

## 2025-06-23 PROCEDURE — 99396 PREV VISIT EST AGE 40-64: CPT | Performed by: PHYSICIAN ASSISTANT

## 2025-06-23 PROCEDURE — 80076 HEPATIC FUNCTION PANEL: CPT | Performed by: PHYSICIAN ASSISTANT

## 2025-06-23 PROCEDURE — 36415 COLL VENOUS BLD VENIPUNCTURE: CPT | Performed by: PHYSICIAN ASSISTANT

## 2025-06-23 PROCEDURE — 3079F DIAST BP 80-89 MM HG: CPT | Performed by: PHYSICIAN ASSISTANT

## 2025-06-23 PROCEDURE — 3074F SYST BP LT 130 MM HG: CPT | Performed by: PHYSICIAN ASSISTANT

## 2025-06-23 PROCEDURE — 80061 LIPID PANEL: CPT | Performed by: PHYSICIAN ASSISTANT

## 2025-06-23 PROCEDURE — G2211 COMPLEX E/M VISIT ADD ON: HCPCS | Performed by: PHYSICIAN ASSISTANT

## 2025-06-23 PROCEDURE — 82306 VITAMIN D 25 HYDROXY: CPT | Performed by: PHYSICIAN ASSISTANT

## 2025-06-23 PROCEDURE — 99213 OFFICE O/P EST LOW 20 MIN: CPT | Mod: 25 | Performed by: PHYSICIAN ASSISTANT

## 2025-06-23 NOTE — PROGRESS NOTES
Preventive Care Visit  [facility] JONATAN Montemayor PA-C, Family Medicine  Jun 23, 2025      Assessment & Plan     Routine general medical examination at a health care facility  Well adult.     Screening for cardiovascular condition  Screen.  - Lipid panel reflex to direct LDL Non-fasting; Future  - Lipid panel reflex to direct LDL Non-fasting    Stage 3a chronic kidney disease (H)  Recheck.   - Albumin Random Urine Quantitative with Creat Ratio; Future  - Albumin Random Urine Quantitative with Creat Ratio    Night sweats  Eval.   - TSH; Future  - T4, free; Future  - TSH  - T4, free    MARIA GUADALUPE (obstructive sleep apnea)- severe (AHI 84)  Suggest repeat testing. Was going to do a year ago. Now 50+ lbs down. May not need a CPAP?  - Adult Sleep Eval & Management  Referral; Future    Purpura  Labs today.  - Vitamin D Deficiency; Future  - Hepatic panel (Albumin, ALT, AST, Bili, Alk Phos, TP); Future  - Partial thromboplastin time; Future  - INR; Future  - Fibrinogen activity; Future  - Vitamin D Deficiency  - Hepatic panel (Albumin, ALT, AST, Bili, Alk Phos, TP)  - Partial thromboplastin time  - INR  - Fibrinogen activity    Screen for colon cancer  Screen.  - Colonoscopy Screening  Referral; Future      The longitudinal plan of care for the diagnosis(es)/condition(s) as documented were addressed during this visit. Due to the added complexity in care, I will continue to support Sunshine in the subsequent management and with ongoing continuity of care.        Counseling  Appropriate preventive services were addressed with this patient via screening, questionnaire, or discussion as appropriate for fall prevention, nutrition, physical activity, Tobacco-use cessation, social engagement, weight loss and cognition.  Checklist reviewing preventive services available has been given to the patient.  Reviewed patient's diet, addressing concerns and/or questions.             Subjective   Sunshine is a  60 year old, presenting for the following:  Physical, Bleeding/Bruising (Easy bruising), and Other ( Wake up with sweats )        6/23/2025     3:42 PM   Additional Questions   Roomed by An V.         6/23/2025     3:42 PM   Patient Reported Additional Medications   Patient reports taking the following new medications none          HPI      Night sweats for a few months, easy bruising for about a year.   Drenching in bed. Needs to get up and change.     Recently added protein shakes - more energy. Started weight lifting at home.              Advance Care Planning    Discussed advance care planning with patient; however, patient declined at this time.        6/22/2025   General Health   How would you rate your overall physical health? Good   Feel stress (tense, anxious, or unable to sleep) Not at all         6/22/2025   Nutrition   Three or more servings of calcium each day? Yes   Diet: I don't know   How many servings of fruit and vegetables per day? (!) 2-3   How many sweetened beverages each day? 0-1         6/22/2025   Exercise   Days per week of moderate/strenous exercise 4 days   Average minutes spent exercising at this level 40 min         6/22/2025   Social Factors   Frequency of gathering with friends or relatives Patient declined   Worry food won't last until get money to buy more No   Food not last or not have enough money for food? No   Do you have housing? (Housing is defined as stable permanent housing and does not include staying outside in a car, in a tent, in an abandoned building, in an overnight shelter, or couch-surfing.) Yes   Are you worried about losing your housing? No   Lack of transportation? No   Unable to get utilities (heat,electricity)? No         6/22/2025   Fall Risk   Fallen 2 or more times in the past year? No   Trouble with walking or balance? No          6/22/2025   Dental   Dentist two times every year? Yes       Today's PHQ-9 Score:       6/22/2025     5:03 PM   PHQ-9 SCORE    PHQ-9 Total Score MyChart 1 (Minimal depression)   PHQ-9 Total Score 1        Patient-reported         6/22/2025   Substance Use   Alcohol more than 3/day or more than 7/wk Not Applicable   Do you use any other substances recreationally? (!) CANNABIS PRODUCTS     Social History     Tobacco Use    Smoking status: Never     Passive exposure: Never    Smokeless tobacco: Never   Vaping Use    Vaping status: Never Used   Substance Use Topics    Alcohol use: Never     Comment: zepbound - no taste for etoh    Drug use: Not Currently     Types: Marijuana     Comment: 12/20 last dose           2/12/2025   LAST FHS-7 RESULTS   1st degree relative breast or ovarian cancer No   Any relative bilateral breast cancer No   Any male have breast cancer No   Any ONE woman have BOTH breast AND ovarian cancer No   Any woman with breast cancer before 50yrs No   2 or more relatives with breast AND/OR ovarian cancer No   2 or more relatives with breast AND/OR bowel cancer No        Mammogram Screening - Mammogram every 1-2 years updated in Health Maintenance based on mutual decision making        6/22/2025   STI Screening   New sexual partner(s) since last STI/HIV test? No     History of abnormal Pap smear: Status post hysterectomy with removal of cervix and no history of CIN2 or greater or cervical cancer. Health Maintenance and Surgical History updated.       ASCVD Risk   The 10-year ASCVD risk score (Joceline DK, et al., 2019) is: 2.5%    Values used to calculate the score:      Age: 60 years      Sex: Female      Is Non- : No      Diabetic: No      Tobacco smoker: No      Systolic Blood Pressure: 129 mmHg      Is BP treated: No      HDL Cholesterol: 56 mg/dL      Total Cholesterol: 187 mg/dL           Reviewed and updated as needed this visit by Provider                          Review of Systems  Constitutional, neuro, ENT, endocrine, pulmonary, cardiac, gastrointestinal, genitourinary,  "musculoskeletal, integument and psychiatric systems are negative, except as otherwise noted.     Objective    Exam  /80   Pulse 66   Temp 97.2  F (36.2  C) (Temporal)   Resp 12   Ht 1.624 m (5' 3.94\")   Wt 70.1 kg (154 lb 9.6 oz)   LMP  (LMP Unknown)   SpO2 99%   BMI 26.59 kg/m     Estimated body mass index is 26.59 kg/m  as calculated from the following:    Height as of this encounter: 1.624 m (5' 3.94\").    Weight as of this encounter: 70.1 kg (154 lb 9.6 oz).    Physical Exam  GENERAL: alert and no distress  EYES: Eyes grossly normal to inspection, PERRL and conjunctivae and sclerae normal  HENT: ear canals and TM's normal, nose and mouth without ulcers or lesions  NECK: no adenopathy, no asymmetry, masses, or scars  RESP: lungs clear to auscultation - no rales, rhonchi or wheezes  CV: regular rate and rhythm, normal S1 S2, no S3 or S4, no murmur, click or rub, no peripheral edema  ABDOMEN: soft, nontender, no hepatosplenomegaly, no masses and bowel sounds normal  MS: no gross musculoskeletal defects noted, no edema  SKIN: no suspicious lesions or rashes  NEURO: Normal strength and tone, mentation intact and speech normal  PSYCH: mentation appears normal, affect normal/bright        Signed Electronically by: Linn Montemayor PA-C    Answers submitted by the patient for this visit:  Patient Health Questionnaire (Submitted on 6/22/2025)  If you checked off any problems, how difficult have these problems made it for you to do your work, take care of things at home, or get along with other people?: Somewhat difficult  PHQ9 TOTAL SCORE: 1    "

## 2025-06-23 NOTE — PATIENT INSTRUCTIONS
Patient Education   Preventive Care Advice   This is general advice given by our system to help you stay healthy. However, your care team may have specific advice just for you. Please talk to your care team about your preventive care needs.  Nutrition  Eat 5 or more servings of fruits and vegetables each day.  Try wheat bread, brown rice and whole grain pasta (instead of white bread, rice, and pasta).  Get enough calcium and vitamin D. Check the label on foods and aim for 100% of the RDA (recommended daily allowance).  Lifestyle  Exercise at least 150 minutes each week  (30 minutes a day, 5 days a week).  Do muscle strengthening activities 2 days a week. These help control your weight and prevent disease.  No smoking.  Wear sunscreen to prevent skin cancer.  Have a dental exam and cleaning every 6 months.  Yearly exams  See your health care team every year to talk about:  Any changes in your health.  Any medicines your care team has prescribed.  Preventive care, family planning, and ways to prevent chronic diseases.  Shots (vaccines)   HPV shots (up to age 26), if you've never had them before.  Hepatitis B shots (up to age 59), if you've never had them before.  COVID-19 shot: Get this shot when it's due.  Flu shot: Get a flu shot every year.  Tetanus shot: Get a tetanus shot every 10 years.  Pneumococcal, hepatitis A, and RSV shots: Ask your care team if you need these based on your risk.  Shingles shot (for age 50 and up)  General health tests  Diabetes screening:  Starting at age 35, Get screened for diabetes at least every 3 years.  If you are younger than age 35, ask your care team if you should be screened for diabetes.  Cholesterol test: At age 39, start having a cholesterol test every 5 years, or more often if advised.  Bone density scan (DEXA): At age 50, ask your care team if you should have this scan for osteoporosis (brittle bones).  Hepatitis C: Get tested at least once in your life.  STIs (sexually  transmitted infections)  Before age 24: Ask your care team if you should be screened for STIs.  After age 24: Get screened for STIs if you're at risk. You are at risk for STIs (including HIV) if:  You are sexually active with more than one person.  You don't use condoms every time.  You or a partner was diagnosed with a sexually transmitted infection.  If you are at risk for HIV, ask about PrEP medicine to prevent HIV.  Get tested for HIV at least once in your life, whether you are at risk for HIV or not.  Cancer screening tests  Cervical cancer screening: If you have a cervix, begin getting regular cervical cancer screening tests starting at age 21.  Breast cancer scan (mammogram): If you've ever had breasts, begin having regular mammograms starting at age 40. This is a scan to check for breast cancer.  Colon cancer screening: It is important to start screening for colon cancer at age 45.  Have a colonoscopy test every 10 years (or more often if you're at risk) Or, ask your provider about stool tests like a FIT test every year or Cologuard test every 3 years.  To learn more about your testing options, visit:   .  For help making a decision, visit:   https://bit.ly/qm50627.  Prostate cancer screening test: If you have a prostate, ask your care team if a prostate cancer screening test (PSA) at age 55 is right for you.  Lung cancer screening: If you are a current or former smoker ages 50 to 80, ask your care team if ongoing lung cancer screenings are right for you.  For informational purposes only. Not to replace the advice of your health care provider. Copyright   2023 [facility]. All rights reserved. Clinically reviewed by the [facility] Transitions Program. [employer] 792502 - REV 01/24.  Substance Use Disorder: Care Instructions  Overview     You can improve your life and health by stopping your use of alcohol or drugs. When you don't drink or use drugs, you may feel and sleep better. You may  get along better with your family, friends, and coworkers. There are medicines and programs that can help with substance use disorder.  How can you care for yourself at home?  Here are some ways to help you stay sober and prevent relapse.  If you have been given medicine to help keep you sober or reduce your cravings, be sure to take it exactly as prescribed.  Talk to your doctor about programs that can help you stop using drugs or drinking alcohol.  Do not keep alcohol or drugs in your home.  Plan ahead. Think about what you'll say if other people ask you to drink or use drugs. Try not to spend time with people who drink or use drugs.  Use the time and money spent on drinking or drugs to do something that's important to you.  Preventing a relapse  Have a plan to deal with relapse. Learn to recognize changes in your thinking that lead you to drink or use drugs. Get help before you start to drink or use drugs again.  Try to stay away from situations, friends, or places that may lead you to drink or use drugs.  If you feel the need to drink alcohol or use drugs again, seek help right away. Call a trusted friend or family member. Some people get support from organizations such as Narcotics Anonymous or [employer] or from treatment facilities.  If you relapse, get help as soon as you can. Some people make a plan with another person that outlines what they want that person to do for them if they relapse. The plan usually includes how to handle the relapse and who to notify in case of relapse.  Don't give up. Remember that a relapse doesn't mean that you have failed. Use the experience to learn the triggers that lead you to drink or use drugs. Then quit again. Recovery is a lifelong process. Many people have several relapses before they are able to quit for good.  Follow-up care is a key part of your treatment and safety. Be sure to make and go to all appointments, and call your doctor if you are having problems. It's  "also a good idea to know your test results and keep a list of the medicines you take.  When should you call for help?   Call 911  anytime you think you may need emergency care. For example, call if you or someone else:    Has overdosed or has withdrawal signs. Be sure to tell the emergency workers that you are or someone else is using or trying to quit using drugs. Overdose or withdrawal signs may include:  Losing consciousness.  Seizure.  Seeing or hearing things that aren't there (hallucinations).     Is thinking or talking about suicide or harming others.   Where to get help 24 hours a day, 7 days a week   If you or someone you know talks about suicide, self-harm, a mental health crisis, a substance use crisis, or any other kind of emotional distress, get help right away. You can:    Call the Suicide and Crisis Lifeline at 988.     Call 5-291-075-TALK (1-125.682.5346).     Text HOME to 590377 to access the Crisis Text Line.   Consider saving these numbers in your phone.  Go to [employer] for more information or to chat online.  Call your doctor now or seek immediate medical care if:    You are having withdrawal symptoms. These may include nausea or vomiting, sweating, shakiness, and anxiety.   Watch closely for changes in your health, and be sure to contact your doctor if:    You have a relapse.     You need more help or support to stop.   Where can you learn more?  Go to https://www.[employer].net/patiented  Enter H573 in the search box to learn more about \"Substance Use Disorder: Care Instructions.\"  Current as of: August 20, 2024  Content Version: 14.5 2024-2025 [employer].   Care instructions adapted under license by your healthcare professional. If you have questions about a medical condition or this instruction, always ask your healthcare professional. [employer] disclaims any warranty or liability for your use of this information.       "

## 2025-06-24 ENCOUNTER — PATIENT OUTREACH (OUTPATIENT)
Dept: CARE COORDINATION | Facility: CLINIC | Age: 60
End: 2025-06-24
Payer: COMMERCIAL

## 2025-06-24 ENCOUNTER — RESULTS FOLLOW-UP (OUTPATIENT)
Dept: FAMILY MEDICINE | Facility: CLINIC | Age: 60
End: 2025-06-24

## 2025-06-24 LAB
ALBUMIN SERPL BCG-MCNC: 4.6 G/DL (ref 3.5–5.2)
ALP SERPL-CCNC: 52 U/L (ref 40–150)
ALT SERPL W P-5'-P-CCNC: 27 U/L (ref 0–50)
APTT PPP: 28 SECONDS (ref 22–38)
AST SERPL W P-5'-P-CCNC: 27 U/L (ref 0–45)
BILIRUB SERPL-MCNC: 0.4 MG/DL
BILIRUBIN DIRECT (ROCHE PRO & PURE): 0.15 MG/DL (ref 0–0.45)
CHOLEST SERPL-MCNC: 196 MG/DL
CREAT UR-MCNC: 145 MG/DL
FASTING STATUS PATIENT QL REPORTED: YES
FIBRINOGEN PPP-MCNC: 407 MG/DL (ref 170–510)
HDLC SERPL-MCNC: 53 MG/DL
INR PPP: 1.08 (ref 0.85–1.15)
LDLC SERPL CALC-MCNC: 124 MG/DL
MICROALBUMIN UR-MCNC: <12 MG/L
MICROALBUMIN/CREAT UR: NORMAL MG/G{CREAT}
NONHDLC SERPL-MCNC: 143 MG/DL
PROT SERPL-MCNC: 6.7 G/DL (ref 6.4–8.3)
PROTHROMBIN TIME: 14.2 SECONDS (ref 11.8–14.8)
T4 FREE SERPL-MCNC: 1.36 NG/DL (ref 0.9–1.7)
TRIGL SERPL-MCNC: 95 MG/DL
TSH SERPL DL<=0.005 MIU/L-ACNC: 1.97 UIU/ML (ref 0.3–4.2)
VIT D+METAB SERPL-MCNC: 44 NG/ML (ref 20–50)

## 2025-06-26 ENCOUNTER — PATIENT OUTREACH (OUTPATIENT)
Dept: CARE COORDINATION | Facility: CLINIC | Age: 60
End: 2025-06-26
Payer: COMMERCIAL

## 2025-06-30 ENCOUNTER — TELEPHONE (OUTPATIENT)
Dept: ENDOCRINOLOGY | Facility: CLINIC | Age: 60
End: 2025-06-30
Payer: COMMERCIAL

## 2025-06-30 NOTE — TELEPHONE ENCOUNTER
Left Voicemail (1st Attempt) and Sent Mychart (1st Attempt) for the patient to call back and schedule the following:    Appointment type: Return Bariatric  Appointment mode: in-person or Virtual Visit  Provider: Jessica Negro PA-C  Return date: Approx. 9/25/25 or next available and wait list  Specialty phone number: [phone]    Additional Notes:   RESCHEDULE 9/26/25 visit

## 2025-07-07 ENCOUNTER — TELEPHONE (OUTPATIENT)
Dept: GASTROENTEROLOGY | Facility: CLINIC | Age: 60
End: 2025-07-07
Payer: COMMERCIAL

## 2025-07-07 ENCOUNTER — DOCUMENTATION ONLY (OUTPATIENT)
Dept: LAB | Facility: CLINIC | Age: 60
End: 2025-07-07
Payer: COMMERCIAL

## 2025-07-07 ENCOUNTER — TELEPHONE (OUTPATIENT)
Dept: ENDOCRINOLOGY | Facility: CLINIC | Age: 60
End: 2025-07-07
Payer: COMMERCIAL

## 2025-07-07 DIAGNOSIS — N18.31 STAGE 3A CHRONIC KIDNEY DISEASE (H): Primary | ICD-10-CM

## 2025-07-07 NOTE — TELEPHONE ENCOUNTER
"Endoscopy Scheduling Screen    Caller: patient    Have you had any respiratory illness or flu-like symptoms in the last 10 days?  No    Patient is ACTIVE on [employer].  Inform patient that all appointment instructions will be sent via [employer].    Review patient's insurance for any non participating payor.    Ordering/Referring Provider:   SHAHEEN JAIME        (If ordering provider performs procedure, schedule with ordering provider unless otherwise instructed. )    BMI: Estimated body mass index is 26.59 kg/m  as calculated from the following:    Height as of 6/23/25: 1.624 m (5' 3.94\").    Weight as of 6/23/25: 70.1 kg (154 lb 9.6 oz).     Sedation Ordered  moderate sedation.   If patient BMI > 50 do not schedule in [facility].    If patient BMI > 45 do not schedule at [facility].    Are you taking methadone or Suboxone?  NO, No RN review required.    Have you been diagnosed and are being treated for severe PTSD or severe anxiety?  NO, No RN review required.    Are you taking any prescription medications for pain 3 or more times per week?   NO, No RN review required.    Do you have a history of malignant hyperthermia?  No    (Females) Are you currently pregnant?   No     Have you been diagnosed or told you have pulmonary hypertension?   No    Do you have an LVAD?  No    Have you been told you have moderate to severe sleep apnea?  Yes. Do you use a CPAP? No. (RN Review required for scheduling unless scheduling in Hospital.) PT HAS PENDING SLEEP STUDY TO DETERMINE SLEEP APNEA SEVERITY POST WEIGHT LOSS    Have you been told you have COPD, asthma, or any other lung disease?  Yes     What breathing problems do you have?  Asthma     Do you use home oxygen?  No    Have your breathing problems required an ED visit or hospitalization in the last year?  No.    Has your doctor ordered any cardiac tests like echo, angiogram, stress test, ablation, or EKG, that you have not completed yet?  No    Do you  have a history of any heart " "conditions?  Yes     Have you had any hospitalizations  in the last year for heart related issues, for example a stent placement, heart attack, or cardiomyopathy?  No    Do you have any implantable devices in your body (pacemaker, ICD)?  No    Do you take nitroglycerine?  No    Have you ever had or are you waiting for an organ transplant?  No. Continue scheduling, no site restrictions.    Have you had a stroke or transient ischemic attack (TIA aka \"mini stroke\") in the last 2 years?   No.    Have you been diagnosed with or been told you have cirrhosis of the liver?   No.    Are you currently on dialysis?   No    Do you need assistance transferring?   No    BMI: Estimated body mass index is 26.59 kg/m  as calculated from the following:    Height as of 6/23/25: 1.624 m (5' 3.94\").    Weight as of 6/23/25: 70.1 kg (154 lb 9.6 oz).     Is patients BMI > 40 and scheduling location UP?  No    Do you take an injectable or oral medication for weight loss or diabetes (excluding insulin)?  Yes, hold time can be up to 7 days. Please consult with you prescribing provider to discuss endoscopy recommendations. (Please schedule at least 7 days out.)ZEPBOUND    Do you take the medication Naltrexone?  No    Do you take blood thinners?  No       Prep   Are you currently on dialysis or do you have chronic kidney disease?  Yes (Golytely Prep)    Do you have a diagnosis of diabetes?  No    Do you have a diagnosis of cystic fibrosis (CF)?  No    On a regular basis do you go 3 -5 days between bowel movements?  No    BMI > 40?  No    Preferred Pharmacy:    [facility] [place] - [address]  [address]  [address]  [facility] [address]  Phone: [phone] Fax: [phone]    Final Scheduling Details   Pt's preferred to schedule in . Pt has pending sleep study to check severity of sleep apnea after weight loss. I will call the pt Monday to complete scheduling after sleep study.  "

## 2025-07-07 NOTE — TELEPHONE ENCOUNTER
Left Voicemail (1st Attempt), got Busy signal 2nd attempt,  and Sent Mychart (2nd Attempt) for the patient to call back and schedule the following:     Appointment type: Return Bariatric  Appointment mode: in-person or Virtual Visit  Provider: Jessica Negro PA-C  Return date: Approx. 9/25/25 or next available and wait list  Specialty phone number: [phone]     Additional Notes:   RESCHEDULE 9/26/25 visit

## 2025-07-07 NOTE — PROGRESS NOTES
Sunshine Delgado has an upcoming lab appointment:    Future Appointments   Date Time Provider Department Center   7/10/2025 10:30 AM  SLEEP LAB [facility] SLEEP   7/11/2025  9:00 AM  SLEEP LAB [facility] SLEEP   7/14/2025  3:00 PM LAB [facility] F[facility]   7/25/2025  3:50 PM [facility] [place]   8/19/2025 11:00 AM Cleveland Smith MD [facility]   9/8/2025  9:00 AM Rod Cook MD [facility]   12/12/2025 12:30 PM Jessica Negro PA-C [facility]   1/12/2026  9:15 AM LAB FIRST FLOOR [facility]   1/12/2026 10:00 AM Patricia Menchaca DO [facility]   2/16/2026 10:00 AM [id number]   6/23/2026  4:00 PM [facility]inn [facility]emayor PA-C [facility]     Patient is scheduled for the following lab(s):     [facility]       There is no order available. Please review and place future orders as appropriate.    Thank you,  Dayna Lorenzo

## 2025-07-10 ENCOUNTER — OFFICE VISIT (OUTPATIENT)
Dept: SLEEP MEDICINE | Facility: CLINIC | Age: 60
End: 2025-07-10
Attending: INTERNAL MEDICINE
Payer: COMMERCIAL

## 2025-07-10 DIAGNOSIS — R29.818 SUSPECTED SLEEP APNEA: ICD-10-CM

## 2025-07-10 ASSESSMENT — SLEEP AND FATIGUE QUESTIONNAIRES
HOW LIKELY ARE YOU TO NOD OFF OR FALL ASLEEP WHILE SITTING AND READING: HIGH CHANCE OF DOZING
HOW LIKELY ARE YOU TO NOD OFF OR FALL ASLEEP WHILE SITTING AND TALKING TO SOMEONE: WOULD NEVER DOZE
HOW LIKELY ARE YOU TO NOD OFF OR FALL ASLEEP WHEN YOU ARE A PASSENGER IN A CAR FOR AN HOUR WITHOUT A BREAK: SLIGHT CHANCE OF DOZING
HOW LIKELY ARE YOU TO NOD OFF OR FALL ASLEEP IN A CAR, WHILE STOPPED FOR A FEW MINUTES IN TRAFFIC: WOULD NEVER DOZE
HOW LIKELY ARE YOU TO NOD OFF OR FALL ASLEEP WHILE LYING DOWN TO REST IN THE AFTERNOON WHEN CIRCUMSTANCES PERMIT: SLIGHT CHANCE OF DOZING
HOW LIKELY ARE YOU TO NOD OFF OR FALL ASLEEP WHILE SITTING QUIETLY AFTER LUNCH WITHOUT ALCOHOL: WOULD NEVER DOZE
HOW LIKELY ARE YOU TO NOD OFF OR FALL ASLEEP WHILE WATCHING TV: MODERATE CHANCE OF DOZING
HOW LIKELY ARE YOU TO NOD OFF OR FALL ASLEEP WHILE SITTING INACTIVE IN A PUBLIC PLACE: SLIGHT CHANCE OF DOZING

## 2025-07-10 NOTE — PROGRESS NOTES
Pt is completing a home sleep test. Pt was instructed on how to put on the Noxturnal T3 device and associated equipment before going to bed and given the opportunity to practice putting it on before leaving the sleep center. Pt was reminded to bring the home sleep test kit back to the center tomorrow, at the scheduled time for download and reporting. Patient was instructed to complete study using the following treatment?  None  Neck circumference: 32 CM / 12 inches.  ESS: 4  Stop Ban  Device number: [id number]  Marianela Russell MA

## 2025-08-04 ENCOUNTER — MYC REFILL (OUTPATIENT)
Dept: ENDOCRINOLOGY | Facility: CLINIC | Age: 60
End: 2025-08-04
Payer: COMMERCIAL

## 2025-08-04 DIAGNOSIS — E66.812 CLASS 2 SEVERE OBESITY WITH SERIOUS COMORBIDITY AND BODY MASS INDEX (BMI) OF 38.0 TO 38.9 IN ADULT, UNSPECIFIED OBESITY TYPE (H): ICD-10-CM

## 2025-08-04 DIAGNOSIS — E66.01 CLASS 2 SEVERE OBESITY WITH SERIOUS COMORBIDITY AND BODY MASS INDEX (BMI) OF 38.0 TO 38.9 IN ADULT, UNSPECIFIED OBESITY TYPE (H): ICD-10-CM

## 2025-08-14 ENCOUNTER — APPOINTMENT (OUTPATIENT)
Dept: CT IMAGING | Facility: CLINIC | Age: 60
End: 2025-08-14
Attending: EMERGENCY MEDICINE
Payer: COMMERCIAL

## 2025-08-14 ENCOUNTER — HOSPITAL ENCOUNTER (EMERGENCY)
Facility: CLINIC | Age: 60
Discharge: HOME | End: 2025-08-14
Attending: EMERGENCY MEDICINE
Payer: COMMERCIAL

## 2025-08-14 DIAGNOSIS — B34.9 VIRAL SYNDROME: ICD-10-CM

## 2025-08-14 DIAGNOSIS — R07.89 ATYPICAL CHEST PAIN: Primary | ICD-10-CM

## 2025-08-14 DIAGNOSIS — R51.9 NONINTRACTABLE HEADACHE, UNSPECIFIED CHRONICITY PATTERN, UNSPECIFIED HEADACHE TYPE: ICD-10-CM

## 2025-08-14 LAB
ALBUMIN SERPL BCG-MCNC: 4.4 G/DL (ref 3.5–5.2)
ALP SERPL-CCNC: 51 U/L (ref 40–150)
ALT SERPL W P-5'-P-CCNC: 12 U/L (ref 0–50)
ANION GAP SERPL CALCULATED.3IONS-SCNC: 11 MMOL/L (ref 7–15)
AST SERPL W P-5'-P-CCNC: 16 U/L (ref 0–45)
ATRIAL RATE - MUSE: 74 BPM
BASOPHILS # BLD AUTO: 0.03 10E3/UL (ref 0–0.2)
BASOPHILS NFR BLD AUTO: 0.5 %
BILIRUB SERPL-MCNC: 0.5 MG/DL
BUN SERPL-MCNC: 29.9 MG/DL (ref 6–20)
CALCIUM SERPL-MCNC: 9.2 MG/DL (ref 8.8–10.4)
CHLORIDE SERPL-SCNC: 101 MMOL/L (ref 98–107)
CREAT SERPL-MCNC: 1.05 MG/DL (ref 0.51–0.95)
DIASTOLIC BLOOD PRESSURE - MUSE: NORMAL MMHG
EGFRCR SERPLBLD CKD-EPI 2021: 61 ML/MIN/1.73M2
EOSINOPHIL # BLD AUTO: 0.06 10E3/UL (ref 0–0.7)
EOSINOPHIL NFR BLD AUTO: 1.1 %
ERYTHROCYTE [DISTWIDTH] IN BLOOD BY AUTOMATED COUNT: 13 % (ref 10–15)
GLUCOSE SERPL-MCNC: 74 MG/DL (ref 70–99)
HCO3 SERPL-SCNC: 25 MMOL/L (ref 22–29)
HCT VFR BLD AUTO: 39.8 % (ref 35–47)
HGB BLD-MCNC: 13.6 G/DL (ref 11.7–15.7)
HOLD SPECIMEN: NORMAL
IMM GRANULOCYTES # BLD: <0.03 10E3/UL
IMM GRANULOCYTES NFR BLD: 0.4 %
INTERPRETATION ECG - MUSE: NORMAL
LIPASE SERPL-CCNC: 53 U/L (ref 13–60)
LYMPHOCYTES # BLD AUTO: 1.8 10E3/UL (ref 0.8–5.3)
LYMPHOCYTES NFR BLD AUTO: 32.9 %
MCH RBC QN AUTO: 29.7 PG (ref 26.5–33)
MCHC RBC AUTO-ENTMCNC: 34.2 G/DL (ref 31.5–36.5)
MCV RBC AUTO: 86.9 FL (ref 78–100)
MONOCYTES # BLD AUTO: 0.38 10E3/UL (ref 0–1.3)
MONOCYTES NFR BLD AUTO: 6.9 %
NEUTROPHILS # BLD AUTO: 3.18 10E3/UL (ref 1.6–8.3)
NEUTROPHILS NFR BLD AUTO: 58.2 %
NRBC # BLD AUTO: <0.03 10E3/UL
NRBC BLD AUTO-RTO: 0 /100
P AXIS - MUSE: 76 DEGREES
PLATELET # BLD AUTO: 239 10E3/UL (ref 150–450)
POTASSIUM SERPL-SCNC: 3.6 MMOL/L (ref 3.4–5.3)
PR INTERVAL - MUSE: 166 MS
PROT SERPL-MCNC: 6.5 G/DL (ref 6.4–8.3)
QRS DURATION - MUSE: 94 MS
QT - MUSE: 382 MS
QTC - MUSE: 424 MS
R AXIS - MUSE: 94 DEGREES
RBC # BLD AUTO: 4.58 10E6/UL (ref 3.8–5.2)
SODIUM SERPL-SCNC: 137 MMOL/L (ref 135–145)
SYSTOLIC BLOOD PRESSURE - MUSE: NORMAL MMHG
T AXIS - MUSE: 69 DEGREES
TROPONIN T SERPL HS-MCNC: <6 NG/L
VENTRICULAR RATE- MUSE: 74 BPM
WBC # BLD AUTO: 5.47 10E3/UL (ref 4–11)

## 2025-08-14 PROCEDURE — 84484 ASSAY OF TROPONIN QUANT: CPT | Performed by: EMERGENCY MEDICINE

## 2025-08-14 PROCEDURE — 96375 TX/PRO/DX INJ NEW DRUG ADDON: CPT | Performed by: EMERGENCY MEDICINE

## 2025-08-14 PROCEDURE — 96376 TX/PRO/DX INJ SAME DRUG ADON: CPT | Mod: 59 | Performed by: EMERGENCY MEDICINE

## 2025-08-14 PROCEDURE — 71275 CT ANGIOGRAPHY CHEST: CPT

## 2025-08-14 PROCEDURE — 99284 EMERGENCY DEPT VISIT MOD MDM: CPT | Mod: GC | Performed by: EMERGENCY MEDICINE

## 2025-08-14 PROCEDURE — 36415 COLL VENOUS BLD VENIPUNCTURE: CPT | Performed by: EMERGENCY MEDICINE

## 2025-08-14 PROCEDURE — 84155 ASSAY OF PROTEIN SERUM: CPT | Performed by: EMERGENCY MEDICINE

## 2025-08-14 PROCEDURE — 250N000013 HC RX MED GY IP 250 OP 250 PS 637: Performed by: EMERGENCY MEDICINE

## 2025-08-14 PROCEDURE — 250N000011 HC RX IP 250 OP 636: Performed by: EMERGENCY MEDICINE

## 2025-08-14 PROCEDURE — 96361 HYDRATE IV INFUSION ADD-ON: CPT | Performed by: EMERGENCY MEDICINE

## 2025-08-14 PROCEDURE — 93005 ELECTROCARDIOGRAM TRACING: CPT | Performed by: EMERGENCY MEDICINE

## 2025-08-14 PROCEDURE — 99285 EMERGENCY DEPT VISIT HI MDM: CPT | Mod: 25 | Performed by: EMERGENCY MEDICINE

## 2025-08-14 PROCEDURE — 74177 CT ABD & PELVIS W/CONTRAST: CPT

## 2025-08-14 PROCEDURE — 250N000009 HC RX 250: Performed by: EMERGENCY MEDICINE

## 2025-08-14 PROCEDURE — 85004 AUTOMATED DIFF WBC COUNT: CPT | Performed by: EMERGENCY MEDICINE

## 2025-08-14 PROCEDURE — 258N000003 HC RX IP 258 OP 636: Performed by: EMERGENCY MEDICINE

## 2025-08-14 PROCEDURE — 83690 ASSAY OF LIPASE: CPT | Performed by: EMERGENCY MEDICINE

## 2025-08-14 PROCEDURE — 93010 ELECTROCARDIOGRAM REPORT: CPT | Performed by: EMERGENCY MEDICINE

## 2025-08-14 PROCEDURE — 96374 THER/PROPH/DIAG INJ IV PUSH: CPT | Mod: 59 | Performed by: EMERGENCY MEDICINE

## 2025-08-14 PROCEDURE — 250N000011 HC RX IP 250 OP 636: Performed by: FAMILY MEDICINE

## 2025-08-14 RX ORDER — METHYLPREDNISOLONE SODIUM SUCCINATE 125 MG/2ML
40 INJECTION INTRAMUSCULAR; INTRAVENOUS EVERY 4 HOURS
Status: COMPLETED | OUTPATIENT
Start: 2025-08-14 | End: 2025-08-14

## 2025-08-14 RX ORDER — ONDANSETRON 2 MG/ML
4 INJECTION INTRAMUSCULAR; INTRAVENOUS ONCE
Status: COMPLETED | OUTPATIENT
Start: 2025-08-14 | End: 2025-08-14

## 2025-08-14 RX ORDER — HYDROMORPHONE HYDROCHLORIDE 1 MG/ML
0.5 INJECTION, SOLUTION INTRAMUSCULAR; INTRAVENOUS; SUBCUTANEOUS ONCE
Refills: 0 | Status: COMPLETED | OUTPATIENT
Start: 2025-08-14 | End: 2025-08-14

## 2025-08-14 RX ORDER — ACETAMINOPHEN 500 MG
1000 TABLET ORAL ONCE
Status: COMPLETED | OUTPATIENT
Start: 2025-08-14 | End: 2025-08-14

## 2025-08-14 RX ORDER — IOPAMIDOL 755 MG/ML
500 INJECTION, SOLUTION INTRAVASCULAR ONCE
Status: COMPLETED | OUTPATIENT
Start: 2025-08-14 | End: 2025-08-14

## 2025-08-14 RX ORDER — DIPHENHYDRAMINE HYDROCHLORIDE 50 MG/ML
50 INJECTION, SOLUTION INTRAMUSCULAR; INTRAVENOUS ONCE
Status: COMPLETED | OUTPATIENT
Start: 2025-08-14 | End: 2025-08-14

## 2025-08-14 RX ORDER — KETOROLAC TROMETHAMINE 15 MG/ML
15 INJECTION, SOLUTION INTRAMUSCULAR; INTRAVENOUS ONCE
Status: COMPLETED | OUTPATIENT
Start: 2025-08-14 | End: 2025-08-14

## 2025-08-14 RX ADMIN — KETOROLAC TROMETHAMINE 15 MG: 15 INJECTION, SOLUTION INTRAMUSCULAR; INTRAVENOUS at 16:33

## 2025-08-14 RX ADMIN — ACETAMINOPHEN 1000 MG: 500 TABLET ORAL at 14:12

## 2025-08-14 RX ADMIN — ONDANSETRON 4 MG: 2 INJECTION INTRAMUSCULAR; INTRAVENOUS at 12:58

## 2025-08-14 RX ADMIN — METHYLPREDNISOLONE SODIUM SUCCINATE 37.5 MG: 125 INJECTION INTRAMUSCULAR; INTRAVENOUS at 16:59

## 2025-08-14 RX ADMIN — SODIUM CHLORIDE 75 ML: 9 INJECTION, SOLUTION INTRAVENOUS at 19:28

## 2025-08-14 RX ADMIN — METHYLPREDNISOLONE SODIUM SUCCINATE 37.5 MG: 125 INJECTION INTRAMUSCULAR; INTRAVENOUS at 13:03

## 2025-08-14 RX ADMIN — HYDROMORPHONE HYDROCHLORIDE 0.5 MG: 1 INJECTION, SOLUTION INTRAMUSCULAR; INTRAVENOUS; SUBCUTANEOUS at 21:01

## 2025-08-14 RX ADMIN — DIPHENHYDRAMINE HYDROCHLORIDE 50 MG: 50 INJECTION, SOLUTION INTRAMUSCULAR; INTRAVENOUS at 18:08

## 2025-08-14 RX ADMIN — IOPAMIDOL 71 ML: 755 INJECTION, SOLUTION INTRAVENOUS at 19:28

## 2025-08-14 RX ADMIN — SODIUM CHLORIDE 1000 ML: 0.9 INJECTION, SOLUTION INTRAVENOUS at 12:57

## 2025-08-14 ASSESSMENT — COLUMBIA-SUICIDE SEVERITY RATING SCALE - C-SSRS
2. HAVE YOU ACTUALLY HAD ANY THOUGHTS OF KILLING YOURSELF IN THE PAST MONTH?: NO
6. HAVE YOU EVER DONE ANYTHING, STARTED TO DO ANYTHING, OR PREPARED TO DO ANYTHING TO END YOUR LIFE?: NO
1. IN THE PAST MONTH, HAVE YOU WISHED YOU WERE DEAD OR WISHED YOU COULD GO TO SLEEP AND NOT WAKE UP?: NO

## 2025-08-14 ASSESSMENT — ACTIVITIES OF DAILY LIVING (ADL)
ADLS_ACUITY_SCORE: 46

## 2025-08-18 ENCOUNTER — TELEPHONE (OUTPATIENT)
Dept: CARDIOLOGY | Facility: CLINIC | Age: 60
End: 2025-08-18
Payer: COMMERCIAL

## 2025-08-26 DIAGNOSIS — F41.1 GENERALIZED ANXIETY DISORDER: Chronic | ICD-10-CM

## 2025-08-26 DIAGNOSIS — F90.9 ATTENTION DEFICIT HYPERACTIVITY DISORDER (ADHD), UNSPECIFIED ADHD TYPE: ICD-10-CM

## 2025-08-26 DIAGNOSIS — F33.1 MAJOR DEPRESSIVE DISORDER, RECURRENT EPISODE, MODERATE (H): Chronic | ICD-10-CM

## 2025-08-26 RX ORDER — BUPROPION HYDROCHLORIDE 300 MG/1
300 TABLET ORAL EVERY MORNING
Qty: 90 TABLET | Refills: 0 | Status: SHIPPED | OUTPATIENT
Start: 2025-08-26

## (undated) DEVICE — NDL TRANSSEPTAL BRK XS 18GA 71CM BRK-1 CVD G407209

## (undated) DEVICE — LINEN TOWEL PACK X30 5481

## (undated) DEVICE — SUCTION MANIFOLD DORNOCH ULTRA CART UL-CL500

## (undated) DEVICE — TUBE SET SMARKABLATE IRRIGATION

## (undated) DEVICE — ENDO DISSECTOR BLUNT 05MM 3/PK 173019

## (undated) DEVICE — DRSG PRIMAPORE 02X3" 7133

## (undated) DEVICE — Device

## (undated) DEVICE — LINEN TOWEL PACK X6 WHITE 5487

## (undated) DEVICE — CATH MAPPING 7FR D-CURVE PENTARAY NAV D128211

## (undated) DEVICE — INTRODUCER SHEATH FAST-CATH CATH-LOCK 7FRX12CM 406702

## (undated) DEVICE — CATH SOUNDSTAR 8FRX90CM 10439011

## (undated) DEVICE — REPRO BARD EP XT DECAPL STRBL DX EP CATH 6F

## (undated) DEVICE — CATH THERMOCOOL SMARTTOUCH SF DF CURVE

## (undated) DEVICE — KIT VENOUS FLUSH 60210177

## (undated) DEVICE — PAD DEFIBRILLATOR ELECTRODE 4.25INX6IN ADULT 2001M-C

## (undated) DEVICE — NDL INSUFFLATION 150MM VERRES 172016

## (undated) DEVICE — CLIP APPLIER ENDO 05MM MED/LG 176630

## (undated) DEVICE — DECANTER VIAL 2006S

## (undated) DEVICE — INTRODUCER SHEATH FAST-CATH SWARTZ 8.5FRX63CM SL1 CVD 406849

## (undated) DEVICE — DEVICE ENDO STITCH APPLIER 10MM 173016

## (undated) DEVICE — STPL SKIN 35W 059037

## (undated) DEVICE — PATCH CARTO 3 EXTERNAL REFERENCE 3D MAPPING CREFP6

## (undated) DEVICE — SU VICRYL 2-0 UR-6 27" J602H

## (undated) DEVICE — ENDO TROCAR 05MM VERSAONE BLADELESS W/STD FIX CAN NONB5STF

## (undated) DEVICE — ESU LIGASURE MARYLAND VESSEL LAP 44CM XLONG LF1944

## (undated) DEVICE — INTRO CATH 12CM 8.5FR FST-CATH

## (undated) DEVICE — SU MONOCRYL 4-0 PS-2 27" UND Y426H

## (undated) DEVICE — GLOVE PROTEXIS POWDER FREE SMT 7.5  2D72PT75X

## (undated) DEVICE — INTRO SHEATH MICRO PLATINUM TIP 4FRX40CM 7274

## (undated) DEVICE — PREP CHLORAPREP 26ML TINTED ORANGE  260815

## (undated) DEVICE — STPL ENDO HANDLE GIA ULTRA UNIVERSAL XLONG EGIAUXL

## (undated) DEVICE — ENDO SCOPE WARMER SEAL  C3101

## (undated) DEVICE — INTRODUCER SHEATH FAST-CATH 9FRX12CM 406116

## (undated) DEVICE — STPL ENDO RELOAD 60MM MEDIUM THICK PURPLE EGIA60AMT

## (undated) DEVICE — ENDO TROCAR 15MM VERSAONE BLADELESS W/STD FIX CAN NONB15STF

## (undated) DEVICE — SHEATH HEMO FAST CATH RAMP 406965

## (undated) DEVICE — ANTIFOG SOLUTION W/FOAM PAD 31142527

## (undated) DEVICE — INTRO SHEATH 4FRX10CM PINNACLE RSS402

## (undated) DEVICE — PACK HEART LEFT CUSTOM

## (undated) DEVICE — ENDO CANNULA 05MM VERSAONE UNIVERSAL UNVCA5STF

## (undated) DEVICE — LEFT HEART PK FAIRVIEW UNIV MEDICAL CENTER

## (undated) DEVICE — ENDO POUCH 5X9" 15MM ENDOCATCH II 173049

## (undated) DEVICE — 9FR X 12CM FAST-CATH HEMOSTASISINTRODUCER, W/HEMO VALVE AND SIDEPORT, DILATOR, AND 50CM DOUBLE DISTAL GUIDEWIRE WITH J AND STRAIGHT ENDS, .038IN MAX GWIRE

## (undated) DEVICE — ESU GROUND PAD ADULT W/CORD E7507

## (undated) RX ORDER — METHYLPREDNISOLONE SODIUM SUCCINATE 125 MG/2ML
INJECTION, POWDER, LYOPHILIZED, FOR SOLUTION INTRAMUSCULAR; INTRAVENOUS
Status: DISPENSED
Start: 2021-07-22

## (undated) RX ORDER — BUPIVACAINE HYDROCHLORIDE 2.5 MG/ML
INJECTION, SOLUTION EPIDURAL; INFILTRATION; INTRACAUDAL
Status: DISPENSED
Start: 2018-03-13

## (undated) RX ORDER — ONDANSETRON 2 MG/ML
INJECTION INTRAMUSCULAR; INTRAVENOUS
Status: DISPENSED
Start: 2018-03-13

## (undated) RX ORDER — CEFAZOLIN SODIUM 1 G/50ML
SOLUTION INTRAVENOUS
Status: DISPENSED
Start: 2018-03-13

## (undated) RX ORDER — GLYCOPYRROLATE 0.2 MG/ML
INJECTION, SOLUTION INTRAMUSCULAR; INTRAVENOUS
Status: DISPENSED
Start: 2018-03-13

## (undated) RX ORDER — PROPOFOL 10 MG/ML
INJECTION, EMULSION INTRAVENOUS
Status: DISPENSED
Start: 2018-03-13

## (undated) RX ORDER — FENTANYL CITRATE 50 UG/ML
INJECTION, SOLUTION INTRAMUSCULAR; INTRAVENOUS
Status: DISPENSED
Start: 2018-03-13

## (undated) RX ORDER — HEPARIN SODIUM 1000 [USP'U]/ML
INJECTION, SOLUTION INTRAVENOUS; SUBCUTANEOUS
Status: DISPENSED
Start: 2022-03-31

## (undated) RX ORDER — FENTANYL CITRATE 50 UG/ML
INJECTION, SOLUTION INTRAMUSCULAR; INTRAVENOUS
Status: DISPENSED
Start: 2022-03-31

## (undated) RX ORDER — HYDROMORPHONE HCL/0.9% NACL/PF 0.2MG/0.2
SYRINGE (ML) INTRAVENOUS
Status: DISPENSED
Start: 2018-03-13

## (undated) RX ORDER — HEPARIN SODIUM 1000 [USP'U]/ML
INJECTION, SOLUTION INTRAVENOUS; SUBCUTANEOUS
Status: DISPENSED
Start: 2021-07-22

## (undated) RX ORDER — LIDOCAINE HYDROCHLORIDE 20 MG/ML
INJECTION, SOLUTION EPIDURAL; INFILTRATION; INTRACAUDAL; PERINEURAL
Status: DISPENSED
Start: 2022-03-31

## (undated) RX ORDER — ONDANSETRON 2 MG/ML
INJECTION INTRAMUSCULAR; INTRAVENOUS
Status: DISPENSED
Start: 2022-03-31

## (undated) RX ORDER — POTASSIUM CHLORIDE 750 MG/1
TABLET, EXTENDED RELEASE ORAL
Status: DISPENSED
Start: 2021-11-22

## (undated) RX ORDER — METHYLPREDNISOLONE SODIUM SUCCINATE 125 MG/2ML
INJECTION, POWDER, LYOPHILIZED, FOR SOLUTION INTRAMUSCULAR; INTRAVENOUS
Status: DISPENSED
Start: 2022-03-31

## (undated) RX ORDER — LIDOCAINE HYDROCHLORIDE 20 MG/ML
INJECTION, SOLUTION EPIDURAL; INFILTRATION; INTRACAUDAL; PERINEURAL
Status: DISPENSED
Start: 2018-03-13

## (undated) RX ORDER — FENTANYL CITRATE 50 UG/ML
INJECTION, SOLUTION INTRAMUSCULAR; INTRAVENOUS
Status: DISPENSED
Start: 2021-07-22

## (undated) RX ORDER — ROCURONIUM BROMIDE 50 MG/5 ML
SYRINGE (ML) INTRAVENOUS
Status: DISPENSED
Start: 2018-03-13

## (undated) RX ORDER — PROTAMINE SULFATE 10 MG/ML
INJECTION, SOLUTION INTRAVENOUS
Status: DISPENSED
Start: 2021-07-22

## (undated) RX ORDER — HYDROMORPHONE HCL IN WATER/PF 6 MG/30 ML
PATIENT CONTROLLED ANALGESIA SYRINGE INTRAVENOUS
Status: DISPENSED
Start: 2022-03-31

## (undated) RX ORDER — DEXAMETHASONE SODIUM PHOSPHATE 4 MG/ML
INJECTION, SOLUTION INTRA-ARTICULAR; INTRALESIONAL; INTRAMUSCULAR; INTRAVENOUS; SOFT TISSUE
Status: DISPENSED
Start: 2022-03-31

## (undated) RX ORDER — GABAPENTIN 300 MG/1
CAPSULE ORAL
Status: DISPENSED
Start: 2018-03-13

## (undated) RX ORDER — HEPARIN SODIUM 200 [USP'U]/100ML
INJECTION, SOLUTION INTRAVENOUS
Status: DISPENSED
Start: 2021-07-22

## (undated) RX ORDER — LIDOCAINE HYDROCHLORIDE 20 MG/ML
INJECTION, SOLUTION EPIDURAL; INFILTRATION; INTRACAUDAL; PERINEURAL
Status: DISPENSED
Start: 2021-07-22

## (undated) RX ORDER — CYANOCOBALAMIN 1000 UG/ML
INJECTION, SOLUTION INTRAMUSCULAR; SUBCUTANEOUS
Status: DISPENSED
Start: 2018-04-12

## (undated) RX ORDER — PROPOFOL 10 MG/ML
INJECTION, EMULSION INTRAVENOUS
Status: DISPENSED
Start: 2022-03-31

## (undated) RX ORDER — BUPIVACAINE HYDROCHLORIDE 2.5 MG/ML
INJECTION, SOLUTION EPIDURAL; INFILTRATION; INTRACAUDAL
Status: DISPENSED
Start: 2021-07-22

## (undated) RX ORDER — PROTAMINE SULFATE 10 MG/ML
INJECTION, SOLUTION INTRAVENOUS
Status: DISPENSED
Start: 2022-03-31

## (undated) RX ORDER — SODIUM CHLORIDE, SODIUM LACTATE, POTASSIUM CHLORIDE, CALCIUM CHLORIDE 600; 310; 30; 20 MG/100ML; MG/100ML; MG/100ML; MG/100ML
INJECTION, SOLUTION INTRAVENOUS
Status: DISPENSED
Start: 2021-07-22

## (undated) RX ORDER — DEXAMETHASONE SODIUM PHOSPHATE 4 MG/ML
INJECTION, SOLUTION INTRA-ARTICULAR; INTRALESIONAL; INTRAMUSCULAR; INTRAVENOUS; SOFT TISSUE
Status: DISPENSED
Start: 2018-03-13

## (undated) RX ORDER — LABETALOL HYDROCHLORIDE 5 MG/ML
INJECTION, SOLUTION INTRAVENOUS
Status: DISPENSED
Start: 2018-03-13

## (undated) RX ORDER — BUPIVACAINE HYDROCHLORIDE 2.5 MG/ML
INJECTION, SOLUTION EPIDURAL; INFILTRATION; INTRACAUDAL
Status: DISPENSED
Start: 2022-03-31

## (undated) RX ORDER — ONDANSETRON 2 MG/ML
INJECTION INTRAMUSCULAR; INTRAVENOUS
Status: DISPENSED
Start: 2021-07-22

## (undated) RX ORDER — PROPOFOL 10 MG/ML
INJECTION, EMULSION INTRAVENOUS
Status: DISPENSED
Start: 2021-07-22